# Patient Record
Sex: MALE | Race: WHITE | NOT HISPANIC OR LATINO | URBAN - METROPOLITAN AREA
[De-identification: names, ages, dates, MRNs, and addresses within clinical notes are randomized per-mention and may not be internally consistent; named-entity substitution may affect disease eponyms.]

---

## 2019-08-06 ENCOUNTER — INPATIENT (INPATIENT)
Facility: HOSPITAL | Age: 74
LOS: 19 days | Discharge: NURSING, REHAB OR LONG-TERM CARE | End: 2019-08-26
Attending: HOSPITALIST | Admitting: HOSPITALIST
Payer: MEDICARE

## 2019-08-06 VITALS
RESPIRATION RATE: 17 BRPM | HEART RATE: 93 BPM | OXYGEN SATURATION: 95 % | DIASTOLIC BLOOD PRESSURE: 77 MMHG | SYSTOLIC BLOOD PRESSURE: 127 MMHG | TEMPERATURE: 97 F

## 2019-08-06 DIAGNOSIS — K02.9 DENTAL CARIES, UNSPECIFIED: ICD-10-CM

## 2019-08-06 DIAGNOSIS — D72.829 ELEVATED WHITE BLOOD CELL COUNT, UNSPECIFIED: ICD-10-CM

## 2019-08-06 DIAGNOSIS — N50.89 OTHER SPECIFIED DISORDERS OF THE MALE GENITAL ORGANS: ICD-10-CM

## 2019-08-06 DIAGNOSIS — Z29.9 ENCOUNTER FOR PROPHYLACTIC MEASURES, UNSPECIFIED: ICD-10-CM

## 2019-08-06 DIAGNOSIS — F99 MENTAL DISORDER, NOT OTHERWISE SPECIFIED: ICD-10-CM

## 2019-08-06 DIAGNOSIS — M62.82 RHABDOMYOLYSIS: ICD-10-CM

## 2019-08-06 LAB
ALBUMIN SERPL ELPH-MCNC: 4.1 G/DL — SIGNIFICANT CHANGE UP (ref 3.3–5)
ALP SERPL-CCNC: 81 U/L — SIGNIFICANT CHANGE UP (ref 40–120)
ALT FLD-CCNC: 34 U/L — SIGNIFICANT CHANGE UP (ref 4–41)
ANION GAP SERPL CALC-SCNC: 12 MMO/L — SIGNIFICANT CHANGE UP (ref 7–14)
ANION GAP SERPL CALC-SCNC: 16 MMO/L — HIGH (ref 7–14)
APAP SERPL-MCNC: < 15 UG/ML — LOW (ref 15–25)
APPEARANCE UR: CLEAR — SIGNIFICANT CHANGE UP
APTT BLD: 26.2 SEC — LOW (ref 27.5–36.3)
AST SERPL-CCNC: 78 U/L — HIGH (ref 4–40)
BACTERIA # UR AUTO: NEGATIVE — SIGNIFICANT CHANGE UP
BASE EXCESS BLDV CALC-SCNC: 3.2 MMOL/L — SIGNIFICANT CHANGE UP
BASE EXCESS BLDV CALC-SCNC: 4.1 MMOL/L — SIGNIFICANT CHANGE UP
BASOPHILS # BLD AUTO: 0.06 K/UL — SIGNIFICANT CHANGE UP (ref 0–0.2)
BASOPHILS # BLD AUTO: 0.07 K/UL — SIGNIFICANT CHANGE UP (ref 0–0.2)
BASOPHILS NFR BLD AUTO: 0.2 % — SIGNIFICANT CHANGE UP (ref 0–2)
BASOPHILS NFR BLD AUTO: 0.4 % — SIGNIFICANT CHANGE UP (ref 0–2)
BASOPHILS NFR SPEC: 1.7 % — SIGNIFICANT CHANGE UP (ref 0–2)
BILIRUB SERPL-MCNC: 0.7 MG/DL — SIGNIFICANT CHANGE UP (ref 0.2–1.2)
BILIRUB UR-MCNC: NEGATIVE — SIGNIFICANT CHANGE UP
BLASTS # FLD: 0 % — SIGNIFICANT CHANGE UP (ref 0–0)
BLOOD GAS VENOUS - CREATININE: 0.75 MG/DL — SIGNIFICANT CHANGE UP (ref 0.5–1.3)
BLOOD GAS VENOUS - CREATININE: 0.84 MG/DL — SIGNIFICANT CHANGE UP (ref 0.5–1.3)
BLOOD UR QL VISUAL: HIGH
BUN SERPL-MCNC: 22 MG/DL — SIGNIFICANT CHANGE UP (ref 7–23)
BUN SERPL-MCNC: 25 MG/DL — HIGH (ref 7–23)
CALCIUM SERPL-MCNC: 8.7 MG/DL — SIGNIFICANT CHANGE UP (ref 8.4–10.5)
CALCIUM SERPL-MCNC: 9.7 MG/DL — SIGNIFICANT CHANGE UP (ref 8.4–10.5)
CHLORIDE BLDV-SCNC: 102 MMOL/L — SIGNIFICANT CHANGE UP (ref 96–108)
CHLORIDE BLDV-SCNC: 105 MMOL/L — SIGNIFICANT CHANGE UP (ref 96–108)
CHLORIDE SERPL-SCNC: 102 MMOL/L — SIGNIFICANT CHANGE UP (ref 98–107)
CHLORIDE SERPL-SCNC: 98 MMOL/L — SIGNIFICANT CHANGE UP (ref 98–107)
CK MB BLD-MCNC: 1.4 — SIGNIFICANT CHANGE UP (ref 0–2.5)
CK MB BLD-MCNC: 45.23 NG/ML — HIGH (ref 1–6.6)
CK SERPL-CCNC: 2450 U/L — HIGH (ref 30–200)
CK SERPL-CCNC: 3311 U/L — HIGH (ref 30–200)
CO2 SERPL-SCNC: 25 MMOL/L — SIGNIFICANT CHANGE UP (ref 22–31)
CO2 SERPL-SCNC: 26 MMOL/L — SIGNIFICANT CHANGE UP (ref 22–31)
COLOR SPEC: YELLOW — SIGNIFICANT CHANGE UP
CREAT SERPL-MCNC: 0.78 MG/DL — SIGNIFICANT CHANGE UP (ref 0.5–1.3)
CREAT SERPL-MCNC: 0.82 MG/DL — SIGNIFICANT CHANGE UP (ref 0.5–1.3)
EOSINOPHIL # BLD AUTO: 0.01 K/UL — SIGNIFICANT CHANGE UP (ref 0–0.5)
EOSINOPHIL # BLD AUTO: 0.02 K/UL — SIGNIFICANT CHANGE UP (ref 0–0.5)
EOSINOPHIL NFR BLD AUTO: 0 % — SIGNIFICANT CHANGE UP (ref 0–6)
EOSINOPHIL NFR BLD AUTO: 0.1 % — SIGNIFICANT CHANGE UP (ref 0–6)
EOSINOPHIL NFR FLD: 0 % — SIGNIFICANT CHANGE UP (ref 0–6)
ETHANOL BLD-MCNC: < 10 MG/DL — SIGNIFICANT CHANGE UP
FIBRINOGEN PPP-MCNC: 542 MG/DL — HIGH (ref 350–510)
GAS PNL BLDV: 133 MMOL/L — LOW (ref 136–146)
GAS PNL BLDV: 136 MMOL/L — SIGNIFICANT CHANGE UP (ref 136–146)
GLUCOSE BLDV-MCNC: 110 MG/DL — HIGH (ref 70–99)
GLUCOSE BLDV-MCNC: 149 MG/DL — HIGH (ref 70–99)
GLUCOSE SERPL-MCNC: 118 MG/DL — HIGH (ref 70–99)
GLUCOSE SERPL-MCNC: 138 MG/DL — HIGH (ref 70–99)
GLUCOSE UR-MCNC: NEGATIVE — SIGNIFICANT CHANGE UP
HBA1C BLD-MCNC: 5.5 % — SIGNIFICANT CHANGE UP (ref 4–5.6)
HCO3 BLDV-SCNC: 27 MMOL/L — SIGNIFICANT CHANGE UP (ref 20–27)
HCO3 BLDV-SCNC: 27 MMOL/L — SIGNIFICANT CHANGE UP (ref 20–27)
HCT VFR BLD CALC: 36.2 % — LOW (ref 39–50)
HCT VFR BLD CALC: 41.4 % — SIGNIFICANT CHANGE UP (ref 39–50)
HCT VFR BLDV CALC: 38.5 % — LOW (ref 39–51)
HCT VFR BLDV CALC: 42.5 % — SIGNIFICANT CHANGE UP (ref 39–51)
HGB BLD-MCNC: 11.6 G/DL — LOW (ref 13–17)
HGB BLD-MCNC: 13.5 G/DL — SIGNIFICANT CHANGE UP (ref 13–17)
HGB BLDV-MCNC: 12.5 G/DL — LOW (ref 13–17)
HGB BLDV-MCNC: 13.8 G/DL — SIGNIFICANT CHANGE UP (ref 13–17)
HYALINE CASTS # UR AUTO: NEGATIVE — SIGNIFICANT CHANGE UP
IMM GRANULOCYTES NFR BLD AUTO: 0.5 % — SIGNIFICANT CHANGE UP (ref 0–1.5)
IMM GRANULOCYTES NFR BLD AUTO: 0.7 % — SIGNIFICANT CHANGE UP (ref 0–1.5)
INR BLD: 1.35 — HIGH (ref 0.88–1.17)
KETONES UR-MCNC: >150 — HIGH
LACTATE BLDV-MCNC: 2.6 MMOL/L — HIGH (ref 0.5–2)
LACTATE BLDV-MCNC: 2.7 MMOL/L — HIGH (ref 0.5–2)
LEUKOCYTE ESTERASE UR-ACNC: NEGATIVE — SIGNIFICANT CHANGE UP
LYMPHOCYTES # BLD AUTO: 0.68 K/UL — LOW (ref 1–3.3)
LYMPHOCYTES # BLD AUTO: 0.94 K/UL — LOW (ref 1–3.3)
LYMPHOCYTES # BLD AUTO: 2.7 % — LOW (ref 13–44)
LYMPHOCYTES # BLD AUTO: 5.1 % — LOW (ref 13–44)
LYMPHOCYTES NFR SPEC AUTO: 2.5 % — LOW (ref 13–44)
MAGNESIUM SERPL-MCNC: 1.8 MG/DL — SIGNIFICANT CHANGE UP (ref 1.6–2.6)
MAGNESIUM SERPL-MCNC: 2 MG/DL — SIGNIFICANT CHANGE UP (ref 1.6–2.6)
MCHC RBC-ENTMCNC: 31.8 PG — SIGNIFICANT CHANGE UP (ref 27–34)
MCHC RBC-ENTMCNC: 32 % — SIGNIFICANT CHANGE UP (ref 32–36)
MCHC RBC-ENTMCNC: 32.2 PG — SIGNIFICANT CHANGE UP (ref 27–34)
MCHC RBC-ENTMCNC: 32.6 % — SIGNIFICANT CHANGE UP (ref 32–36)
MCV RBC AUTO: 98.8 FL — SIGNIFICANT CHANGE UP (ref 80–100)
MCV RBC AUTO: 99.2 FL — SIGNIFICANT CHANGE UP (ref 80–100)
METAMYELOCYTES # FLD: 0 % — SIGNIFICANT CHANGE UP (ref 0–1)
MONOCYTES # BLD AUTO: 1.64 K/UL — HIGH (ref 0–0.9)
MONOCYTES # BLD AUTO: 1.92 K/UL — HIGH (ref 0–0.9)
MONOCYTES NFR BLD AUTO: 7.7 % — SIGNIFICANT CHANGE UP (ref 2–14)
MONOCYTES NFR BLD AUTO: 8.8 % — SIGNIFICANT CHANGE UP (ref 2–14)
MONOCYTES NFR BLD: 8.4 % — SIGNIFICANT CHANGE UP (ref 2–9)
MYELOCYTES NFR BLD: 0 % — SIGNIFICANT CHANGE UP (ref 0–0)
NEUTROPHIL AB SER-ACNC: 84.9 % — HIGH (ref 43–77)
NEUTROPHILS # BLD AUTO: 15.82 K/UL — HIGH (ref 1.8–7.4)
NEUTROPHILS # BLD AUTO: 22.11 K/UL — HIGH (ref 1.8–7.4)
NEUTROPHILS NFR BLD AUTO: 85.1 % — HIGH (ref 43–77)
NEUTROPHILS NFR BLD AUTO: 88.7 % — HIGH (ref 43–77)
NEUTS BAND # BLD: 0 % — SIGNIFICANT CHANGE UP (ref 0–6)
NITRITE UR-MCNC: NEGATIVE — SIGNIFICANT CHANGE UP
NRBC # FLD: 0 K/UL — SIGNIFICANT CHANGE UP (ref 0–0)
NRBC # FLD: 0 K/UL — SIGNIFICANT CHANGE UP (ref 0–0)
OTHER - HEMATOLOGY %: 0 — SIGNIFICANT CHANGE UP
PCO2 BLDV: 44 MMHG — SIGNIFICANT CHANGE UP (ref 41–51)
PCO2 BLDV: 45 MMHG — SIGNIFICANT CHANGE UP (ref 41–51)
PH BLDV: 7.41 PH — SIGNIFICANT CHANGE UP (ref 7.32–7.43)
PH BLDV: 7.42 PH — SIGNIFICANT CHANGE UP (ref 7.32–7.43)
PH UR: 6 — SIGNIFICANT CHANGE UP (ref 5–8)
PHOSPHATE SERPL-MCNC: 3 MG/DL — SIGNIFICANT CHANGE UP (ref 2.5–4.5)
PLATELET # BLD AUTO: 205 K/UL — SIGNIFICANT CHANGE UP (ref 150–400)
PLATELET # BLD AUTO: 262 K/UL — SIGNIFICANT CHANGE UP (ref 150–400)
PLATELET COUNT - ESTIMATE: NORMAL — SIGNIFICANT CHANGE UP
PMV BLD: 10.6 FL — SIGNIFICANT CHANGE UP (ref 7–13)
PMV BLD: 10.8 FL — SIGNIFICANT CHANGE UP (ref 7–13)
PO2 BLDV: 34 MMHG — LOW (ref 35–40)
PO2 BLDV: 51 MMHG — HIGH (ref 35–40)
POTASSIUM BLDV-SCNC: 3.6 MMOL/L — SIGNIFICANT CHANGE UP (ref 3.4–4.5)
POTASSIUM BLDV-SCNC: 3.6 MMOL/L — SIGNIFICANT CHANGE UP (ref 3.4–4.5)
POTASSIUM SERPL-MCNC: 3.7 MMOL/L — SIGNIFICANT CHANGE UP (ref 3.5–5.3)
POTASSIUM SERPL-MCNC: 3.9 MMOL/L — SIGNIFICANT CHANGE UP (ref 3.5–5.3)
POTASSIUM SERPL-SCNC: 3.7 MMOL/L — SIGNIFICANT CHANGE UP (ref 3.5–5.3)
POTASSIUM SERPL-SCNC: 3.9 MMOL/L — SIGNIFICANT CHANGE UP (ref 3.5–5.3)
PROMYELOCYTES # FLD: 0 % — SIGNIFICANT CHANGE UP (ref 0–0)
PROT SERPL-MCNC: 7.3 G/DL — SIGNIFICANT CHANGE UP (ref 6–8.3)
PROT UR-MCNC: 70 — SIGNIFICANT CHANGE UP
PROTHROM AB SERPL-ACNC: 15.5 SEC — HIGH (ref 9.8–13.1)
RBC # BLD: 3.65 M/UL — LOW (ref 4.2–5.8)
RBC # BLD: 4.19 M/UL — LOW (ref 4.2–5.8)
RBC # FLD: 12.3 % — SIGNIFICANT CHANGE UP (ref 10.3–14.5)
RBC # FLD: 12.4 % — SIGNIFICANT CHANGE UP (ref 10.3–14.5)
RBC CASTS # UR COMP ASSIST: SIGNIFICANT CHANGE UP (ref 0–?)
REVIEW TO FOLLOW: YES — SIGNIFICANT CHANGE UP
SALICYLATES SERPL-MCNC: < 5 MG/DL — LOW (ref 15–30)
SAO2 % BLDV: 62.6 % — SIGNIFICANT CHANGE UP (ref 60–85)
SAO2 % BLDV: 85.8 % — HIGH (ref 60–85)
SODIUM SERPL-SCNC: 139 MMOL/L — SIGNIFICANT CHANGE UP (ref 135–145)
SODIUM SERPL-SCNC: 140 MMOL/L — SIGNIFICANT CHANGE UP (ref 135–145)
SP GR SPEC: 1.03 — SIGNIFICANT CHANGE UP (ref 1–1.04)
SQUAMOUS # UR AUTO: SIGNIFICANT CHANGE UP
TROPONIN T, HIGH SENSITIVITY: 21 NG/L — SIGNIFICANT CHANGE UP (ref ?–14)
TROPONIN T, HIGH SENSITIVITY: 27 NG/L — SIGNIFICANT CHANGE UP (ref ?–14)
TSH SERPL-MCNC: 0.89 UIU/ML — SIGNIFICANT CHANGE UP (ref 0.27–4.2)
URATE SERPL-MCNC: 5.8 MG/DL — SIGNIFICANT CHANGE UP (ref 3.4–8.8)
UROBILINOGEN FLD QL: NORMAL — SIGNIFICANT CHANGE UP
VARIANT LYMPHS # BLD: 2.5 % — SIGNIFICANT CHANGE UP
WBC # BLD: 18.58 K/UL — HIGH (ref 3.8–10.5)
WBC # BLD: 24.95 K/UL — HIGH (ref 3.8–10.5)
WBC # FLD AUTO: 18.58 K/UL — HIGH (ref 3.8–10.5)
WBC # FLD AUTO: 24.95 K/UL — HIGH (ref 3.8–10.5)
WBC UR QL: SIGNIFICANT CHANGE UP (ref 0–?)

## 2019-08-06 PROCEDURE — 71045 X-RAY EXAM CHEST 1 VIEW: CPT | Mod: 26

## 2019-08-06 PROCEDURE — 71260 CT THORAX DX C+: CPT | Mod: 26

## 2019-08-06 PROCEDURE — 70450 CT HEAD/BRAIN W/O DYE: CPT | Mod: 26

## 2019-08-06 PROCEDURE — 99223 1ST HOSP IP/OBS HIGH 75: CPT

## 2019-08-06 PROCEDURE — 70487 CT MAXILLOFACIAL W/DYE: CPT | Mod: 26

## 2019-08-06 PROCEDURE — 74177 CT ABD & PELVIS W/CONTRAST: CPT | Mod: 26

## 2019-08-06 PROCEDURE — 76870 US EXAM SCROTUM: CPT | Mod: 26

## 2019-08-06 PROCEDURE — 72170 X-RAY EXAM OF PELVIS: CPT | Mod: 26

## 2019-08-06 RX ORDER — SODIUM CHLORIDE 9 MG/ML
1000 INJECTION INTRAMUSCULAR; INTRAVENOUS; SUBCUTANEOUS
Refills: 0 | Status: DISCONTINUED | OUTPATIENT
Start: 2019-08-06 | End: 2019-08-07

## 2019-08-06 RX ORDER — PIPERACILLIN AND TAZOBACTAM 4; .5 G/20ML; G/20ML
3.38 INJECTION, POWDER, LYOPHILIZED, FOR SOLUTION INTRAVENOUS ONCE
Refills: 0 | Status: COMPLETED | OUTPATIENT
Start: 2019-08-06 | End: 2019-08-06

## 2019-08-06 RX ORDER — PIPERACILLIN AND TAZOBACTAM 4; .5 G/20ML; G/20ML
3.38 INJECTION, POWDER, LYOPHILIZED, FOR SOLUTION INTRAVENOUS EVERY 8 HOURS
Refills: 0 | Status: DISCONTINUED | OUTPATIENT
Start: 2019-08-06 | End: 2019-08-06

## 2019-08-06 RX ORDER — LACTOBACILLUS ACIDOPHILUS 100MM CELL
1 CAPSULE ORAL EVERY 12 HOURS
Refills: 0 | Status: DISCONTINUED | OUTPATIENT
Start: 2019-08-06 | End: 2019-08-06

## 2019-08-06 RX ORDER — VANCOMYCIN HCL 1 G
1000 VIAL (EA) INTRAVENOUS EVERY 12 HOURS
Refills: 0 | Status: DISCONTINUED | OUTPATIENT
Start: 2019-08-06 | End: 2019-08-07

## 2019-08-06 RX ORDER — PIPERACILLIN AND TAZOBACTAM 4; .5 G/20ML; G/20ML
3.38 INJECTION, POWDER, LYOPHILIZED, FOR SOLUTION INTRAVENOUS EVERY 8 HOURS
Refills: 0 | Status: DISCONTINUED | OUTPATIENT
Start: 2019-08-07 | End: 2019-08-07

## 2019-08-06 RX ORDER — SODIUM CHLORIDE 9 MG/ML
1000 INJECTION, SOLUTION INTRAVENOUS ONCE
Refills: 0 | Status: COMPLETED | OUTPATIENT
Start: 2019-08-06 | End: 2019-08-06

## 2019-08-06 RX ORDER — SODIUM CHLORIDE 9 MG/ML
2000 INJECTION INTRAMUSCULAR; INTRAVENOUS; SUBCUTANEOUS ONCE
Refills: 0 | Status: COMPLETED | OUTPATIENT
Start: 2019-08-06 | End: 2019-08-06

## 2019-08-06 RX ADMIN — SODIUM CHLORIDE 2000 MILLILITER(S): 9 INJECTION INTRAMUSCULAR; INTRAVENOUS; SUBCUTANEOUS at 15:56

## 2019-08-06 RX ADMIN — SODIUM CHLORIDE 150 MILLILITER(S): 9 INJECTION INTRAMUSCULAR; INTRAVENOUS; SUBCUTANEOUS at 22:19

## 2019-08-06 RX ADMIN — SODIUM CHLORIDE 1000 MILLILITER(S): 9 INJECTION, SOLUTION INTRAVENOUS at 14:32

## 2019-08-06 RX ADMIN — PIPERACILLIN AND TAZOBACTAM 200 GRAM(S): 4; .5 INJECTION, POWDER, LYOPHILIZED, FOR SOLUTION INTRAVENOUS at 22:38

## 2019-08-06 NOTE — ED ADULT NURSE NOTE - OBJECTIVE STATEMENT
Patient states that  he fell at home on Sunday and he could not get himself up. patient has multiple abrasions, right ear noted bleeding, right eye  swelling. Right  chest multiple pressure sores.

## 2019-08-06 NOTE — H&P ADULT - PROBLEM SELECTOR PLAN 2
ID consult in AM, RVP, R/O Pneumonia, + Cough, R/O Aspiration Pneumonia, R/O Cellulitis Face, Blood cultures, + Skin Erythema and Scab in Lower Abdomen R/O Cellulitis, Wound Consult, IV Vanco, IV Zosyn for now,    Hep A, B, C  profile,

## 2019-08-06 NOTE — H&P ADULT - SKIN COMMENTS
Skin: 6cm abrasion over R pectoral region with other areas of closed, stippled pink edema over thorax
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
## 2019-08-06 NOTE — ED PROVIDER NOTE - PROGRESS NOTE DETAILS
Peyman PGY3: PMD Frank [place]on [phone] Maryann PGY3: ekg nsr pt with rhabdo fluids given dental paged for tooth fx awaiting callback will admit

## 2019-08-06 NOTE — H&P ADULT - MUSCULOSKELETAL
details… detailed exam decreased ROM/no joint warmth/no calf tenderness/no joint swelling/no joint erythema/diminished strength
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
## 2019-08-06 NOTE — H&P ADULT - REASON FOR ADMISSION
S/P fall, Rhabdo, R/O Pneumonia, Leukocytosis, S/P fall, Rhabdo, R/O Pneumonia, Leukocytosis, Cough, R/O Cellulitis Face/lower Abdomen

## 2019-08-06 NOTE — ED ADULT NURSE NOTE - NSIMPLEMENTINTERV_GEN_ALL_ED
Implemented All Fall Risk Interventions:  [place] to call system. Call bell, personal items and telephone within reach. Instruct patient to call for assistance. Room bathroom lighting operational. Non-slip footwear when patient is off stretcher. Physically safe environment: no spills, clutter or unnecessary equipment. Stretcher in lowest position, wheels locked, appropriate side rails in place. Provide visual cue, wrist band, yellow gown, etc. Monitor gait and stability. Monitor for mental status changes and reorient to person, place, and time. Review medications for side effects contributing to fall risk. Reinforce activity limits and safety measures with patient and family.

## 2019-08-06 NOTE — H&P ADULT - PROBLEM SELECTOR PLAN 1
S/P Fall , elevated CPK, Tele monitor, ECHO, F/U CPK, Troponin, Fall/aspiration precaution, + weakness, cachectic, poor oral intake,   IVF NS @ 150 cc/hr for now, F/U CBC, CMP,   PT consult: weakness, Fatigue,   HIV Test, Iron studies, Vit B12, Folate, Ferritin, Decreased po intake, failure to thrive,    ECHO,

## 2019-08-06 NOTE — ED PROVIDER NOTE - OBJECTIVE STATEMENT
Patient reports falling off a stool on Sunday night around midnight and becoming pinned in position until rescued by EMS this afternoon. Denies drug or alcohol use prior to falling, and denies feeling lightheaded or dizzy prior. Patient is unsure what may have caused the fall other than a sense that his muscles have been "dysfunctional" lately. In the past month, he has noticed some muscle wasting, particularly in his arms, with 6-8 lbs of weight loss in this time. He has also noticed some fasciculations in his legs in the past month as well. He denies any family history of muscular pathology. He also denies seizures. Reports taking diazepam bid and flurazepam and no other medications. Of note, patient has known history of L scrotal hydrocele but has refused surgery. Patient reports falling off a stool 1.5 days prior to presentation around midnight and becoming pinned in position until rescued by EMS this afternoon. Denies drug or alcohol use prior to falling, and denies feeling lightheaded or dizzy prior. Patient is unsure what may have caused the fall other than a sense that his muscles have been "dysfunctional" lately. In the past month, he has noticed some muscle wasting, particularly in his arms, with 6-8 lbs of weight loss in this time. He has also noticed some fasciculations in his legs in the past month as well. He denies any family history of muscular pathology. He also denies seizures, chest pain, shortness of breath. Reports taking diazepam bid and flurazepam and no other medications. Of note, patient has known history of L scrotal hydrocele but has refused surgery. Patient reports falling off a stool 1.5 days prior to presentation around midnight and becoming pinned in position until rescued by EMS this afternoon. Denies drug or alcohol use prior to falling, and denies feeling lightheaded or dizzy prior. Patient is unsure what may have caused the fall other than a sense that his muscles have been "dysfunctional" lately. In the past month, he has noticed some muscle wasting, particularly in his arms, with 6-8 lbs of weight loss in this time. He has also noticed some fasciculations in his legs in the past month as well. He denies any family history of muscular pathology. He also denies seizures, chest pain, shortness of breath, nausea, and abdominal pain. Reports taking diazepam bid and flurazepam and no other medications. Of note, patient has known history of L scrotal hydrocele but has refused surgery. Patient reports falling off a stool 1.5 days prior to presentation around midnight and becoming pinned in position until rescued by EMS this afternoon. Denies drug or alcohol use prior to falling, and denies feeling lightheaded or dizzy prior. Patient is unsure what may have caused the fall other than a sense that his muscles have been "dysfunctional" lately. In the past month, he has noticed some muscle wasting, particularly in his arms, with 6-8 lbs of weight loss in this time. He has also noticed some fasciculations in his legs in the past month as well. He denies any family history of muscular pathology. He also denies seizures, chest pain, shortness of breath, nausea, and abdominal pain. Reports taking diazepam bid and flurazepam and no other medications. Of note, patient has known history of L scrotal hydrocele but has refused surgery.    Zamzam  ()

## 2019-08-06 NOTE — H&P ADULT - ENMT COMMENTS
R periorbital edema occluding vision, bleeding erythematous R pinna with crusting, 4cm abrasion over R temporal region, 4cm abrasion on R lower mandible with yellow/green exudate; moderate neck stiffness

## 2019-08-06 NOTE — ED ADULT NURSE REASSESSMENT NOTE - NS ED NURSE REASSESS COMMENT FT1
received pt A&Ox3 absent any distress or C/O pain. able to make needs know with care provided PRN. will continue with current plan of care PT swabbed for RVP with facemask placed pending results
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
## 2019-08-06 NOTE — ED PROVIDER NOTE - NS ED ROS FT
Gen: No fever, normal appetite  Eyes: +R eye swelling, no blurry vision  ENT: R ear pain, +congestion  Resp: No cough or trouble breathing  Cardiovascular: No chest pain or palpitation  Gastroenteric: No nausea/vomiting  : No dysuria  MS: +chronic low back pain  Skin: +abrasions, no rashes  Neuro: No headache; +thigh fasciculations  Remainder negative, except as per the HPI

## 2019-08-06 NOTE — ED PROVIDER NOTE - PHYSICAL EXAMINATION
Const:  Unkempt, cachectic man lying still in bed with multiple large abrasions and areas of edema, A+Ox3  HEENT: R periorbital edema occluding vision, bleeding erythematous R pinna with crusting, 4cm abrasion over R temporal region, 4cm abrasion on R lower mandible with yellow/green exudate; moderate neck stiffness   CV: Heart regular, normal S1/2, no murmurs  Pulm: Lungs clear to auscultation bilaterally  GI: Abdomen non-distended; No organomegaly, no tenderness, no masses  Pelvis/: Pelvic bones non-tender and non-mobile to palpation; large, full, non-erythematous L scrotal edema   Skin: 6cm abrasion over R pectoral region with other areas of closed, stippled pink edema over thorax  MSK: Able to move all extremities but with some stiffness on R side  Neuro: Alert; A+Ox3, following commands; language intact with no aphasia or speech slurring; short-term memory intact

## 2019-08-07 DIAGNOSIS — A41.9 SEPSIS, UNSPECIFIED ORGANISM: ICD-10-CM

## 2019-08-07 DIAGNOSIS — F05 DELIRIUM DUE TO KNOWN PHYSIOLOGICAL CONDITION: ICD-10-CM

## 2019-08-07 DIAGNOSIS — R13.10 DYSPHAGIA, UNSPECIFIED: ICD-10-CM

## 2019-08-07 DIAGNOSIS — J96.01 ACUTE RESPIRATORY FAILURE WITH HYPOXIA: ICD-10-CM

## 2019-08-07 LAB
ALBUMIN SERPL ELPH-MCNC: 3.3 G/DL — SIGNIFICANT CHANGE UP (ref 3.3–5)
ALP SERPL-CCNC: 64 U/L — SIGNIFICANT CHANGE UP (ref 40–120)
ALT FLD-CCNC: 32 U/L — SIGNIFICANT CHANGE UP (ref 4–41)
ANION GAP SERPL CALC-SCNC: 12 MMO/L — SIGNIFICANT CHANGE UP (ref 7–14)
APAP SERPL-MCNC: < 15 UG/ML — LOW (ref 15–25)
AST SERPL-CCNC: 60 U/L — HIGH (ref 4–40)
B PERT DNA SPEC QL NAA+PROBE: NOT DETECTED — SIGNIFICANT CHANGE UP
BACTERIA UR CULT: SIGNIFICANT CHANGE UP
BASE EXCESS BLDA CALC-SCNC: 0.3 MMOL/L — SIGNIFICANT CHANGE UP
BASOPHILS # BLD AUTO: 0.06 K/UL — SIGNIFICANT CHANGE UP (ref 0–0.2)
BASOPHILS NFR BLD AUTO: 0.4 % — SIGNIFICANT CHANGE UP (ref 0–2)
BILIRUB SERPL-MCNC: 0.8 MG/DL — SIGNIFICANT CHANGE UP (ref 0.2–1.2)
BLOOD GAS ARTERIAL - FIO2: 100 — SIGNIFICANT CHANGE UP
BUN SERPL-MCNC: 23 MG/DL — SIGNIFICANT CHANGE UP (ref 7–23)
C PNEUM DNA SPEC QL NAA+PROBE: NOT DETECTED — SIGNIFICANT CHANGE UP
CALCIUM SERPL-MCNC: 8.3 MG/DL — LOW (ref 8.4–10.5)
CHLORIDE BLDA-SCNC: 105 MMOL/L — SIGNIFICANT CHANGE UP (ref 96–108)
CHLORIDE SERPL-SCNC: 101 MMOL/L — SIGNIFICANT CHANGE UP (ref 98–107)
CHOLEST SERPL-MCNC: 127 MG/DL — SIGNIFICANT CHANGE UP (ref 120–199)
CK SERPL-CCNC: 1695 U/L — HIGH (ref 30–200)
CO2 SERPL-SCNC: 25 MMOL/L — SIGNIFICANT CHANGE UP (ref 22–31)
CREAT BLDA-MCNC: 0.88 MG/DL — SIGNIFICANT CHANGE UP (ref 0.5–1.3)
CREAT SERPL-MCNC: 0.83 MG/DL — SIGNIFICANT CHANGE UP (ref 0.5–1.3)
EOSINOPHIL # BLD AUTO: 0.04 K/UL — SIGNIFICANT CHANGE UP (ref 0–0.5)
EOSINOPHIL NFR BLD AUTO: 0.2 % — SIGNIFICANT CHANGE UP (ref 0–6)
ETHANOL BLD-MCNC: < 10 MG/DL — SIGNIFICANT CHANGE UP
FERRITIN SERPL-MCNC: 315.3 NG/ML — SIGNIFICANT CHANGE UP (ref 30–400)
FLUAV H1 2009 PAND RNA SPEC QL NAA+PROBE: NOT DETECTED — SIGNIFICANT CHANGE UP
FLUAV H1 RNA SPEC QL NAA+PROBE: NOT DETECTED — SIGNIFICANT CHANGE UP
FLUAV H3 RNA SPEC QL NAA+PROBE: NOT DETECTED — SIGNIFICANT CHANGE UP
FLUAV SUBTYP SPEC NAA+PROBE: NOT DETECTED — SIGNIFICANT CHANGE UP
FLUBV RNA SPEC QL NAA+PROBE: NOT DETECTED — SIGNIFICANT CHANGE UP
FOLATE SERPL-MCNC: 19.1 NG/ML — SIGNIFICANT CHANGE UP (ref 4.7–20)
GLUCOSE BLDA-MCNC: 133 MG/DL — HIGH (ref 70–99)
GLUCOSE BLDC GLUCOMTR-MCNC: 117 MG/DL — HIGH (ref 70–99)
GLUCOSE SERPL-MCNC: 128 MG/DL — HIGH (ref 70–99)
GRAM STN SPT: SIGNIFICANT CHANGE UP
HADV DNA SPEC QL NAA+PROBE: NOT DETECTED — SIGNIFICANT CHANGE UP
HAV IGM SER-ACNC: NONREACTIVE — SIGNIFICANT CHANGE UP
HBA1C BLD-MCNC: 5.4 % — SIGNIFICANT CHANGE UP (ref 4–5.6)
HBV CORE IGM SER-ACNC: NONREACTIVE — SIGNIFICANT CHANGE UP
HBV SURFACE AG SER-ACNC: NONREACTIVE — SIGNIFICANT CHANGE UP
HCO3 BLDA-SCNC: 25 MMOL/L — SIGNIFICANT CHANGE UP (ref 22–26)
HCOV PNL SPEC NAA+PROBE: SIGNIFICANT CHANGE UP
HCT VFR BLD CALC: 38 % — LOW (ref 39–50)
HCT VFR BLDA CALC: 35.7 % — LOW (ref 39–51)
HCV AB S/CO SERPL IA: 0.12 S/CO — SIGNIFICANT CHANGE UP (ref 0–0.99)
HCV AB SERPL-IMP: SIGNIFICANT CHANGE UP
HDLC SERPL-MCNC: 63 MG/DL — HIGH (ref 35–55)
HGB BLD-MCNC: 12.3 G/DL — LOW (ref 13–17)
HGB BLDA-MCNC: 11.6 G/DL — LOW (ref 13–17)
HIV 1+2 AB+HIV1 P24 AG SERPL QL IA: SIGNIFICANT CHANGE UP
HMPV RNA SPEC QL NAA+PROBE: NOT DETECTED — SIGNIFICANT CHANGE UP
HPIV1 RNA SPEC QL NAA+PROBE: NOT DETECTED — SIGNIFICANT CHANGE UP
HPIV2 RNA SPEC QL NAA+PROBE: NOT DETECTED — SIGNIFICANT CHANGE UP
HPIV3 RNA SPEC QL NAA+PROBE: NOT DETECTED — SIGNIFICANT CHANGE UP
HPIV4 RNA SPEC QL NAA+PROBE: NOT DETECTED — SIGNIFICANT CHANGE UP
IMM GRANULOCYTES NFR BLD AUTO: 0.4 % — SIGNIFICANT CHANGE UP (ref 0–1.5)
IRON SATN MFR SERPL: 15 UG/DL — LOW (ref 45–165)
IRON SATN MFR SERPL: 197 UG/DL — SIGNIFICANT CHANGE UP (ref 155–535)
LACTATE BLDA-SCNC: 1.3 MMOL/L — SIGNIFICANT CHANGE UP (ref 0.5–2)
LIPID PNL WITH DIRECT LDL SERPL: 56 MG/DL — SIGNIFICANT CHANGE UP
LYMPHOCYTES # BLD AUTO: 0.8 K/UL — LOW (ref 1–3.3)
LYMPHOCYTES # BLD AUTO: 4.9 % — LOW (ref 13–44)
MAGNESIUM SERPL-MCNC: 1.9 MG/DL — SIGNIFICANT CHANGE UP (ref 1.6–2.6)
MCHC RBC-ENTMCNC: 32.4 % — SIGNIFICANT CHANGE UP (ref 32–36)
MCHC RBC-ENTMCNC: 32.5 PG — SIGNIFICANT CHANGE UP (ref 27–34)
MCV RBC AUTO: 100.3 FL — HIGH (ref 80–100)
MONOCYTES # BLD AUTO: 1.44 K/UL — HIGH (ref 0–0.9)
MONOCYTES NFR BLD AUTO: 8.8 % — SIGNIFICANT CHANGE UP (ref 2–14)
NEUTROPHILS # BLD AUTO: 13.91 K/UL — HIGH (ref 1.8–7.4)
NEUTROPHILS NFR BLD AUTO: 85.3 % — HIGH (ref 43–77)
NRBC # FLD: 0 K/UL — SIGNIFICANT CHANGE UP (ref 0–0)
NT-PROBNP SERPL-SCNC: 740.2 PG/ML — SIGNIFICANT CHANGE UP
PCO2 BLDA: 32 MMHG — LOW (ref 35–48)
PH BLDA: 7.48 PH — HIGH (ref 7.35–7.45)
PHOSPHATE SERPL-MCNC: 2 MG/DL — LOW (ref 2.5–4.5)
PLATELET # BLD AUTO: 201 K/UL — SIGNIFICANT CHANGE UP (ref 150–400)
PMV BLD: 10.4 FL — SIGNIFICANT CHANGE UP (ref 7–13)
PO2 BLDA: 83 MMHG — SIGNIFICANT CHANGE UP (ref 83–108)
POTASSIUM BLDA-SCNC: 3 MMOL/L — LOW (ref 3.4–4.5)
POTASSIUM SERPL-MCNC: 3.5 MMOL/L — SIGNIFICANT CHANGE UP (ref 3.5–5.3)
POTASSIUM SERPL-SCNC: 3.5 MMOL/L — SIGNIFICANT CHANGE UP (ref 3.5–5.3)
PROT SERPL-MCNC: 6.1 G/DL — SIGNIFICANT CHANGE UP (ref 6–8.3)
RBC # BLD: 3.79 M/UL — LOW (ref 4.2–5.8)
RBC # FLD: 12.4 % — SIGNIFICANT CHANGE UP (ref 10.3–14.5)
RSV RNA SPEC QL NAA+PROBE: NOT DETECTED — SIGNIFICANT CHANGE UP
RV+EV RNA SPEC QL NAA+PROBE: NOT DETECTED — SIGNIFICANT CHANGE UP
SALICYLATES SERPL-MCNC: < 5 MG/DL — LOW (ref 15–30)
SAO2 % BLDA: 97.1 % — SIGNIFICANT CHANGE UP (ref 95–99)
SODIUM BLDA-SCNC: 133 MMOL/L — LOW (ref 136–146)
SODIUM SERPL-SCNC: 138 MMOL/L — SIGNIFICANT CHANGE UP (ref 135–145)
SPECIMEN SOURCE: SIGNIFICANT CHANGE UP
T4 FREE SERPL-MCNC: 1.25 NG/DL — SIGNIFICANT CHANGE UP (ref 0.9–1.8)
TRIGL SERPL-MCNC: 89 MG/DL — SIGNIFICANT CHANGE UP (ref 10–149)
TROPONIN T, HIGH SENSITIVITY: 25 NG/L — SIGNIFICANT CHANGE UP (ref ?–14)
TSH SERPL-MCNC: 1.03 UIU/ML — SIGNIFICANT CHANGE UP (ref 0.27–4.2)
UIBC SERPL-MCNC: 182.2 UG/DL — SIGNIFICANT CHANGE UP (ref 110–370)
VIT B12 SERPL-MCNC: 455 PG/ML — SIGNIFICANT CHANGE UP (ref 200–900)
WBC # BLD: 16.32 K/UL — HIGH (ref 3.8–10.5)
WBC # FLD AUTO: 16.32 K/UL — HIGH (ref 3.8–10.5)

## 2019-08-07 PROCEDURE — 99223 1ST HOSP IP/OBS HIGH 75: CPT | Mod: GC

## 2019-08-07 PROCEDURE — 90792 PSYCH DIAG EVAL W/MED SRVCS: CPT

## 2019-08-07 PROCEDURE — 99233 SBSQ HOSP IP/OBS HIGH 50: CPT

## 2019-08-07 RX ORDER — POTASSIUM CHLORIDE 20 MEQ
40 PACKET (EA) ORAL ONCE
Refills: 0 | Status: DISCONTINUED | OUTPATIENT
Start: 2019-08-07 | End: 2019-08-07

## 2019-08-07 RX ORDER — HEPARIN SODIUM 5000 [USP'U]/ML
5000 INJECTION INTRAVENOUS; SUBCUTANEOUS
Refills: 0 | Status: DISCONTINUED | OUTPATIENT
Start: 2019-08-07 | End: 2019-08-20

## 2019-08-07 RX ORDER — SODIUM CHLORIDE 9 MG/ML
3 INJECTION INTRAMUSCULAR; INTRAVENOUS; SUBCUTANEOUS EVERY 6 HOURS
Refills: 0 | Status: DISCONTINUED | OUTPATIENT
Start: 2019-08-07 | End: 2019-08-12

## 2019-08-07 RX ORDER — SODIUM CHLORIDE 9 MG/ML
1000 INJECTION INTRAMUSCULAR; INTRAVENOUS; SUBCUTANEOUS
Refills: 0 | Status: DISCONTINUED | OUTPATIENT
Start: 2019-08-07 | End: 2019-08-08

## 2019-08-07 RX ORDER — DIAZEPAM 5 MG
5 TABLET ORAL EVERY 6 HOURS
Refills: 0 | Status: DISCONTINUED | OUTPATIENT
Start: 2019-08-07 | End: 2019-08-07

## 2019-08-07 RX ORDER — DIAZEPAM 5 MG
5 TABLET ORAL
Refills: 0 | Status: DISCONTINUED | OUTPATIENT
Start: 2019-08-07 | End: 2019-08-07

## 2019-08-07 RX ORDER — LACTOBACILLUS ACIDOPHILUS 100MM CELL
1 CAPSULE ORAL EVERY 12 HOURS
Refills: 0 | Status: DISCONTINUED | OUTPATIENT
Start: 2019-08-07 | End: 2019-08-12

## 2019-08-07 RX ORDER — PIPERACILLIN AND TAZOBACTAM 4; .5 G/20ML; G/20ML
3.38 INJECTION, POWDER, LYOPHILIZED, FOR SOLUTION INTRAVENOUS EVERY 8 HOURS
Refills: 0 | Status: DISCONTINUED | OUTPATIENT
Start: 2019-08-07 | End: 2019-08-12

## 2019-08-07 RX ORDER — IPRATROPIUM/ALBUTEROL SULFATE 18-103MCG
3 AEROSOL WITH ADAPTER (GRAM) INHALATION EVERY 6 HOURS
Refills: 0 | Status: DISCONTINUED | OUTPATIENT
Start: 2019-08-07 | End: 2019-08-12

## 2019-08-07 RX ORDER — PIPERACILLIN AND TAZOBACTAM 4; .5 G/20ML; G/20ML
3.38 INJECTION, POWDER, LYOPHILIZED, FOR SOLUTION INTRAVENOUS ONCE
Refills: 0 | Status: DISCONTINUED | OUTPATIENT
Start: 2019-08-07 | End: 2019-08-07

## 2019-08-07 RX ORDER — SODIUM,POTASSIUM PHOSPHATES 278-250MG
1 POWDER IN PACKET (EA) ORAL THREE TIMES A DAY
Refills: 0 | Status: COMPLETED | OUTPATIENT
Start: 2019-08-07 | End: 2019-08-07

## 2019-08-07 RX ORDER — SODIUM CHLORIDE 9 MG/ML
500 INJECTION, SOLUTION INTRAVENOUS
Refills: 0 | Status: DISCONTINUED | OUTPATIENT
Start: 2019-08-07 | End: 2019-08-08

## 2019-08-07 RX ORDER — POTASSIUM CHLORIDE 20 MEQ
10 PACKET (EA) ORAL
Refills: 0 | Status: COMPLETED | OUTPATIENT
Start: 2019-08-07 | End: 2019-08-07

## 2019-08-07 RX ORDER — ACETAMINOPHEN 500 MG
1000 TABLET ORAL ONCE
Refills: 0 | Status: COMPLETED | OUTPATIENT
Start: 2019-08-07 | End: 2019-08-07

## 2019-08-07 RX ORDER — VANCOMYCIN HCL 1 G
1000 VIAL (EA) INTRAVENOUS ONCE
Refills: 0 | Status: COMPLETED | OUTPATIENT
Start: 2019-08-07 | End: 2019-08-07

## 2019-08-07 RX ADMIN — SODIUM CHLORIDE 3 MILLILITER(S): 9 INJECTION INTRAMUSCULAR; INTRAVENOUS; SUBCUTANEOUS at 22:46

## 2019-08-07 RX ADMIN — HEPARIN SODIUM 5000 UNIT(S): 5000 INJECTION INTRAVENOUS; SUBCUTANEOUS at 18:59

## 2019-08-07 RX ADMIN — Medication 100 MILLIEQUIVALENT(S): at 18:55

## 2019-08-07 RX ADMIN — Medication 1 MILLIGRAM(S): at 18:55

## 2019-08-07 RX ADMIN — Medication 3 MILLILITER(S): at 16:44

## 2019-08-07 RX ADMIN — PIPERACILLIN AND TAZOBACTAM 25 GRAM(S): 4; .5 INJECTION, POWDER, LYOPHILIZED, FOR SOLUTION INTRAVENOUS at 16:41

## 2019-08-07 RX ADMIN — PIPERACILLIN AND TAZOBACTAM 25 GRAM(S): 4; .5 INJECTION, POWDER, LYOPHILIZED, FOR SOLUTION INTRAVENOUS at 23:04

## 2019-08-07 RX ADMIN — Medication 250 MILLIGRAM(S): at 03:05

## 2019-08-07 RX ADMIN — Medication 3 MILLILITER(S): at 22:46

## 2019-08-07 RX ADMIN — Medication 400 MILLIGRAM(S): at 14:23

## 2019-08-07 RX ADMIN — SODIUM CHLORIDE 75 MILLILITER(S): 9 INJECTION INTRAMUSCULAR; INTRAVENOUS; SUBCUTANEOUS at 21:10

## 2019-08-07 RX ADMIN — Medication 100 MILLIEQUIVALENT(S): at 16:51

## 2019-08-07 RX ADMIN — PIPERACILLIN AND TAZOBACTAM 25 GRAM(S): 4; .5 INJECTION, POWDER, LYOPHILIZED, FOR SOLUTION INTRAVENOUS at 06:12

## 2019-08-07 RX ADMIN — Medication 1000 MILLIGRAM(S): at 14:45

## 2019-08-07 NOTE — BEHAVIORAL HEALTH ASSESSMENT NOTE - OTHER
cachectic on NRB unable to assess due to ?AMS in setting of respiratory distress. unable to complete evaluation due to ?AMS in setting of respiratory distress. unable to fully assess due to ?AMS in setting of respiratory distress.

## 2019-08-07 NOTE — CONSULT NOTE ADULT - SUBJECTIVE AND OBJECTIVE BOX
CHIEF COMPLAINT:     HPI:  73 y/o male with possible HX of Psych disorder , lives alone, very poor historian, unkempt and with very poor  hygiene , missing teeth,  Dental caries, on PO Benzo at home, denies ETOH and smoking,  reports falling off a stool 1.5 days prior to presentation around midnight and becoming pinned in position until rescued by EMS this afternoon. Denies drug or alcohol use prior to falling, and denies feeling lightheaded or dizzy prior. Patient is unsure what may have caused the fall other than a sense that his muscles have been "dysfunctional" lately. In the past month, he has noticed some muscle wasting, particularly in his arms, with 6-8 lbs of weight loss in this time. He has also noticed some fasciculations in his legs in the past month as well. He denies any family history of muscular pathology. He also denies seizures, chest pain, shortness of breath, nausea, and abdominal pain. Reports taking diazepam bid and flurazepam at nights and no other medications. Of note, patient has known history of L scrotal hydrocele but has refused surgery. Pt awake, A+O x 3, C/O + Productive cough as well, no fever, no dysuria, no N/V, No HA, no abdominal pain, + weakness, + cachectic with B/L Temporal wasting , Zamzam  (girl friend),     S/P CT Chest/Abdomen/pelvis as below:   < from: CT Abdomen and Pelvis w/ IV Cont (19 @ 16:47) >  IMPRESSION: A few right lower lobe ground glass opacities of uncertain   etiology. No evidence of solid organ injury or acute fracture.    S/P CT Maxillofacial as Below:     < from: CT Maxillofacial w/ IV Cont (19 @ 16:47) >  IMPRESSION:    Discontinuous appearance of the left lower medial incisor which appears   displaced posteriorly suggesting an avulsed tooth/alveolar fracture.   Clinical correlation is advised. Poor dentition with multiple missing   teeth. Right facial and external auditory canal soft tissue swelling likely   posttraumatic in nature however cellulitis/otitis externa is not excluded.    CT Head: unremarkable, , X ray: Pelvis No FX,     RVP was sent , started the pt on IV Zosyn, R/O Pneumonia, Elevated CPK 3311, + Rhabdo as well, on IVF NS @ 150 cc/hr, S/P IVF LR Bolus x 1 lit, and IVF Ns x 2 Lit Bolus, Dental consult was requested, NPO for now, pt was placed on TELE ,    Labs: PT 15.5, INR 1.35, PTT 26.2, Fibrinogen assay 542, Lactate 2.7, CPK 3311, Mg 2, Na 140, K+ 3.9, BUN 25, Creatinine 0.82, Glucose 138, AST 78, Troponin HS 21, WBC 24.95, Hgb 13.5, Platelet 262, UA: Large Blood , Ketone > 150,     Vitals: Tem 98.5, HR 87, /66, RR 16, 95 % RA, (06 Aug 2019 20:44)    MICU consult for hypoxic respiratory failure:       Consult for hypoxic respiratory failure:   In the ED pt suddenly developed acute hypoxia to 83% on RA and 91% on NRB w a RR of 23. Pt spiked a fever to 101.3 and was tachycardic (NSR) to low 100's.  Pt had gotten a total of 3L of fluids before hypoxia for rhabdo as well as 1g vanc and 2 doses of zosyn for empiric tx of sepsis w/ blood and urine cultures pending, RVP and U/A neg for infection; pt had been pan-scanned and chest CT revealed R lower lobe ground glass opacities.      Vitals on presentation were as follows: 122/55 (70)  (NSR), 97% on NRB w/ RR 23  On physical exam patient had decreased breath sounds in LLL and was endorsing cough w/ sputum production.  POCUS revealed dynamic air bronchograms in LLL consolidation.        FAMILY HISTORY:  FHx: stomach cancer      SOCIAL HISTORY:  Smoking: __ packs x ___ years  EtOH Use:  Marital Status:  Occupation:  Recent Travel:  Country of Birth:  Advance Directives:    Allergies    No Known Allergies    Intolerances        HOME MEDICATIONS:    REVIEW OF SYSTEMS:  Constitutional: feels warm  Eyes: has discharge from eyes bilaterally   ENT: no sore throat but does endorse cough w/ green sputum production   CV: no chest pain  Resp: + SOB  GI: no abdominal pain, no N/V/D/C  : no dysuria   MSK: R upper humerus in pain   Integumentary: multiple skin lesions from fall throughout body   Neurological: AAO x 3   Psychiatric: disheveled and hard to understand  [ ] All other systems negative  [ ] Unable to assess ROS because ________    OBJECTIVE:  ICU Vital Signs Last 24 Hrs  T(C): 38.5 (07 Aug 2019 13:51), Max: 38.5 (07 Aug 2019 13:51)  T(F): 101.3 (07 Aug 2019 13:51), Max: 101.3 (07 Aug 2019 13:51)  HR: 96 (07 Aug 2019 16:00) (59 - 101)  BP: 121/74 (07 Aug 2019 16:00) (108/59 - 134/66)  BP(mean): 86 (07 Aug 2019 16:00) (86 - 86)  ABP: --  ABP(mean): --  RR: 17 (07 Aug 2019 16:00) (16 - 22)  SpO2: 93% (07 Aug 2019 16:00) (91% - 99%)         @ 07:01  -  07 @ 07:00  --------------------------------------------------------  IN: 0 mL / OUT: 120 mL / NET: -120 mL      CAPILLARY BLOOD GLUCOSE      POCT Blood Glucose.: 119 mg/dL (07 Aug 2019 13:24)      PHYSICAL EXAM:  GENERAL: disheveled, unkempt  HEAD:  Atraumatic, Normocephalic  EYES: EOMI, PERRLA, conjunctiva and sclera clear  NECK: Supple, No JVD  CHEST/LUNG: Clear to auscultation bilaterally; No wheeze  HEART: Regular rate and rhythm; No murmurs, rubs, or gallops  ABDOMEN: Soft, Nontender, Nondistended; Bowel sounds present  EXTREMITIES:  2+ Peripheral Pulses, No clubbing, cyanosis, or edema  PSYCH: AAOx3  NEUROLOGY: non-focal  SKIN: No rashes or lesions      HOSPITAL MEDICATIONS:  MEDICATIONS  (STANDING):  ALBUTerol/ipratropium for Nebulization. 3 milliLiter(s) Nebulizer every 6 hours  heparin  Injectable 5000 Unit(s) SubCutaneous two times a day  lactated ringers. 500 milliLiter(s) (100 mL/Hr) IV Continuous <Continuous>  lactobacillus acidophilus 1 Tablet(s) Oral every 12 hours  LORazepam   Injectable 1 milliGRAM(s) IV Push two times a day  piperacillin/tazobactam IVPB.. 3.375 Gram(s) IV Intermittent every 8 hours  potassium chloride  10 mEq/100 mL IVPB 10 milliEquivalent(s) IV Intermittent every 1 hour  potassium phosphate / sodium phosphate powder 1 Packet(s) Oral three times a day  sodium chloride 0.9%. 1000 milliLiter(s) (75 mL/Hr) IV Continuous <Continuous>  sodium chloride 3%  Inhalation 3 milliLiter(s) Inhalation every 6 hours    MEDICATIONS  (PRN):      LABS:                        12.3   16.32 )-----------( 201      ( 07 Aug 2019 05:38 )             38.0     08-07    138  |  101  |  23  ----------------------------<  128<H>  3.5   |  25  |  0.83    Ca    8.3<L>      07 Aug 2019 05:38  Phos  2.0     08-  Mg     1.9     -    TPro  6.1  /  Alb  3.3  /  TBili  0.8  /  DBili  x   /  AST  60<H>  /  ALT  32  /  AlkPhos  64  08-07    PT/INR - ( 06 Aug 2019 16:30 )   PT: 15.5 SEC;   INR: 1.35          PTT - ( 06 Aug 2019 16:30 )  PTT:26.2 SEC  Urinalysis Basic - ( 06 Aug 2019 13:50 )    Color: YELLOW / Appearance: CLEAR / S.028 / pH: 6.0  Gluc: NEGATIVE / Ketone: >150  / Bili: NEGATIVE / Urobili: NORMAL   Blood: LARGE / Protein: 70 / Nitrite: NEGATIVE   Leuk Esterase: NEGATIVE / RBC: 3-5 / WBC 0-2   Sq Epi: OCC / Non Sq Epi: x / Bacteria: NEGATIVE      Arterial Blood Gas:   @ 15:00  7.48/32/83/25/97.1/0.3  ABG lactate: 1.3    Venous Blood Gas:   @ 15:45  7.41/45/51/27/85.8  VBG Lactate: 2.7  Venous Blood Gas:   @ 13:50  7.42/44/34/27/62.6  VBG Lactate: 2.6 CHIEF COMPLAINT:     HPI:  73 y/o male with possible HX of Psych disorder , lives alone, very poor historian, unkempt and with very poor  hygiene , missing teeth,  Dental caries, on PO Benzo at home, denies ETOH and smoking,  reports falling off a stool 1.5 days prior to presentation around midnight and becoming pinned in position until rescued by EMS this afternoon. Denies drug or alcohol use prior to falling, and denies feeling lightheaded or dizzy prior. Patient is unsure what may have caused the fall other than a sense that his muscles have been "dysfunctional" lately. In the past month, he has noticed some muscle wasting, particularly in his arms, with 6-8 lbs of weight loss in this time. He has also noticed some fasciculations in his legs in the past month as well. He denies any family history of muscular pathology. He also denies seizures, chest pain, shortness of breath, nausea, and abdominal pain. Reports taking diazepam bid and flurazepam at nights and no other medications. Of note, patient has known history of L scrotal hydrocele but has refused surgery. Pt awake, A+O x 3, C/O + Productive cough as well, no fever, no dysuria, no N/V, No HA, no abdominal pain, + weakness, + cachectic with B/L Temporal wasting , Zamzam  (girl friend),     S/P CT Chest/Abdomen/pelvis as below:   < from: CT Abdomen and Pelvis w/ IV Cont (19 @ 16:47) >  IMPRESSION: A few right lower lobe ground glass opacities of uncertain   etiology. No evidence of solid organ injury or acute fracture.    S/P CT Maxillofacial as Below:     < from: CT Maxillofacial w/ IV Cont (19 @ 16:47) >  IMPRESSION:    Discontinuous appearance of the left lower medial incisor which appears   displaced posteriorly suggesting an avulsed tooth/alveolar fracture.   Clinical correlation is advised. Poor dentition with multiple missing   teeth. Right facial and external auditory canal soft tissue swelling likely   posttraumatic in nature however cellulitis/otitis externa is not excluded.    CT Head: unremarkable, , X ray: Pelvis No FX,     RVP was sent , started the pt on IV Zosyn, R/O Pneumonia, Elevated CPK 3311, + Rhabdo as well, on IVF NS @ 150 cc/hr, S/P IVF LR Bolus x 1 lit, and IVF Ns x 2 Lit Bolus, Dental consult was requested, NPO for now, pt was placed on TELE ,    Labs: PT 15.5, INR 1.35, PTT 26.2, Fibrinogen assay 542, Lactate 2.7, CPK 3311, Mg 2, Na 140, K+ 3.9, BUN 25, Creatinine 0.82, Glucose 138, AST 78, Troponin HS 21, WBC 24.95, Hgb 13.5, Platelet 262, UA: Large Blood , Ketone > 150,     Vitals: Tem 98.5, HR 87, /66, RR 16, 95 % RA, (06 Aug 2019 20:44)    MICU consult for hypoxic respiratory failure:       Consult for hypoxic respiratory failure:   In the ED pt suddenly developed acute hypoxia to 83% on RA and 91% on NRB w a RR of 23. Pt spiked a fever to 101.3 and was tachycardic (NSR) to low 100's.  Pt had gotten a total of 3L of fluids before hypoxia for rhabdo as well as 1g vanc and 2 doses of zosyn for empiric tx of sepsis w/ blood and urine cultures pending, RVP and U/A neg for infection; pt had been pan-scanned and chest CT revealed R lower lobe ground glass opacities.      Vitals on presentation were as follows: 122/55 (70)  (NSR), 97% on NRB w/ RR 23  On physical exam patient had decreased breath sounds in LLL and was endorsing cough w/ sputum production.  POCUS revealed dynamic air bronchograms in LLL consolidation.        FAMILY HISTORY:  FHx: stomach cancer      SOCIAL HISTORY:  Smoking: __ packs x ___ years  EtOH Use:  Marital Status:  Occupation:  Recent Travel:  Country of Birth:  Advance Directives:    Allergies    No Known Allergies    Intolerances        HOME MEDICATIONS:    REVIEW OF SYSTEMS:  Constitutional: feels warm  Eyes: has discharge from eyes bilaterally   ENT: no sore throat but does endorse cough w/ green sputum production   CV: no chest pain  Resp: + SOB  GI: no abdominal pain, no N/V/D/C  : no dysuria   MSK: R upper humerus in pain   Integumentary: multiple skin lesions from fall throughout body   Neurological: AAO x 3   Psychiatric: disheveled and hard to understand  [ ] All other systems negative  [ ] Unable to assess ROS because ________    OBJECTIVE:  ICU Vital Signs Last 24 Hrs  T(C): 38.5 (07 Aug 2019 13:51), Max: 38.5 (07 Aug 2019 13:51)  T(F): 101.3 (07 Aug 2019 13:51), Max: 101.3 (07 Aug 2019 13:51)  HR: 96 (07 Aug 2019 16:00) (59 - 101)  BP: 121/74 (07 Aug 2019 16:00) (108/59 - 134/66)  BP(mean): 86 (07 Aug 2019 16:00) (86 - 86)  ABP: --  ABP(mean): --  RR: 17 (07 Aug 2019 16:00) (16 - 22)  SpO2: 93% (07 Aug 2019 16:00) (91% - 99%)        08 @ 07:01  -  07 @ 07:00  --------------------------------------------------------  IN: 0 mL / OUT: 120 mL / NET: -120 mL      CAPILLARY BLOOD GLUCOSE      POCT Blood Glucose.: 119 mg/dL (07 Aug 2019 13:24)      PHYSICAL EXAM:  GENERAL: disheveled, unkempt  HEAD:  multiple skin wounds on R side of face, hair is unkempt and not clean   EYES: EOMI, PERRLA, has pustular scleral drainage, no scleral erythema   NECK: Supple, No JVD  CHEST/LUNG: R lung clear breath sounds, L lung decreased breath sounds in middle and lower lung,  No wheeze  HEART: tachycardic w/  normal rhythm; No murmurs, rubs, or gallops  ABDOMEN: Soft, Nontender, Nondistended; Bowel sounds present  EXTREMITIES:  2+ Peripheral Pulses, No clubbing, cyanosis, or edema  PSYCH: AAOx3  NEUROLOGY: non-focal  SKIN: multiple abrasions from [facility]       HOSPITAL MEDICATIONS:  MEDICATIONS  (STANDING):  ALBUTerol/ipratropium for Nebulization. 3 milliLiter(s) Nebulizer every 6 hours  heparin  Injectable 5000 Unit(s) SubCutaneous two times a day  lactated ringers. 500 milliLiter(s) (100 mL/Hr) IV Continuous <Continuous>  lactobacillus acidophilus 1 Tablet(s) Oral every 12 hours  LORazepam   Injectable 1 milliGRAM(s) IV Push two times a day  piperacillin/tazobactam IVPB.. 3.375 Gram(s) IV Intermittent every 8 hours  potassium chloride  10 mEq/100 mL IVPB 10 milliEquivalent(s) IV Intermittent every 1 hour  potassium phosphate / sodium phosphate powder 1 Packet(s) Oral three times a day  sodium chloride 0.9%. 1000 milliLiter(s) (75 mL/Hr) IV Continuous <Continuous>  sodium chloride 3%  Inhalation 3 milliLiter(s) Inhalation every 6 hours    MEDICATIONS  (PRN):      LABS:                        12.3   16.32 )-----------( 201      ( 07 Aug 2019 05:38 )             38.0     08-07    138  |  101  |  23  ----------------------------<  128<H>  3.5   |  25  |  0.83    Ca    8.3<L>      07 Aug 2019 05:38  Phos  2.0     08-  Mg     1.9     -    TPro  6.1  /  Alb  3.3  /  TBili  0.8  /  DBili  x   /  AST  60<H>  /  ALT  32  /  AlkPhos  64  08-07    PT/INR - ( 06 Aug 2019 16:30 )   PT: 15.5 SEC;   INR: 1.35          PTT - ( 06 Aug 2019 16:30 )  PTT:26.2 SEC  Urinalysis Basic - ( 06 Aug 2019 13:50 )    Color: YELLOW / Appearance: CLEAR / S.028 / pH: 6.0  Gluc: NEGATIVE / Ketone: >150  / Bili: NEGATIVE / Urobili: NORMAL   Blood: LARGE / Protein: 70 / Nitrite: NEGATIVE   Leuk Esterase: NEGATIVE / RBC: 3-5 / WBC 0-2   Sq Epi: OCC / Non Sq Epi: x / Bacteria: NEGATIVE      Arterial Blood Gas:   @ 15:00  7.48/32/83/25/97.1/0.3  ABG lactate: 1.3    Venous Blood Gas:   @ 15:45  7.41/45/51/27/85.8  VBG Lactate: 2.7  Venous Blood Gas:   @ 13:50  7.42/44/34/27/62.6  VBG Lactate: 2.6

## 2019-08-07 NOTE — PROGRESS NOTE ADULT - PROBLEM SELECTOR PLAN 5
urine tox pending   f/u psych recs Testicle US showed large complex left hydrocele   No sonographic evidence of testicular torsion.

## 2019-08-07 NOTE — PROGRESS NOTE ADULT - PROBLEM SELECTOR PLAN 2
could be due to PNA vs pulm edema   ABG showed primary respiratory alkalosis   MICU consulted   c/w O2 supplement to keep O2 Sat > 92% could be due to PNA vs pulm edema   ABG showed primary respiratory alkalosis   MICU consulted   c/w O2 supplement to keep O2 Sat > 92%  frequent suctioning. d/w RN

## 2019-08-07 NOTE — PROGRESS NOTE ADULT - PROBLEM SELECTOR PLAN 4
Testicle US showed large complex left hydrocele   No sonographic evidence of testicular torsion. s/p s/s eval  Cinesophagram ordered s/p s/s eval, rec cineesophagogram for further eval

## 2019-08-07 NOTE — BEHAVIORAL HEALTH ASSESSMENT NOTE - NSBHCHARTREVIEWINVESTIGATE_PSY_A_CORE FT
< from: 12 Lead ECG (08.06.19 @ 16:00) >      Ventricular Rate 83 BPM    Atrial Rate 83 BPM    P-R Interval 124 ms    QRS Duration 86 ms    Q-T Interval 394 ms    QTC Calculation(Bezet) 462 ms    P Axis 80 degrees    R Axis 16 degrees    T Axis 78 degrees    Diagnosis Line Normal sinus rhythm  Normal ECG    < end of copied text >

## 2019-08-07 NOTE — CONSULT NOTE ADULT - ASSESSMENT
75 y/o male with possible HX of Psych disorder admitted after being found stuck at home between furniture after a fall w/ labs revealing for rhabdomyolysis who was consulted for sepsis w/ hypoxic respiratory failure 2/2 LLL PNA     Plan:   - obtain sputum cultures  - hypertonic saline nebulizing treatments q6 w/ IVF hydration for secretion mobilization  - airway clearance w/ vest  - standing duonebs q6   - c/w zosyn; RVP negative - no concern for Staph PNA, can d/c Vanc   -  repeat CXR  - obtain blood gas w/ lactate  - c/w supplemental O2 to maintain SpO2 > 92%       Nirali Harding MD  Internal Medicine, PGY-2  [id number]

## 2019-08-07 NOTE — BEHAVIORAL HEALTH ASSESSMENT NOTE - NSBHCHARTREVIEWVS_PSY_A_CORE FT
Vital Signs Last 24 Hrs  T(C): 38.5 (07 Aug 2019 13:51), Max: 38.5 (07 Aug 2019 13:51)  T(F): 101.3 (07 Aug 2019 13:51), Max: 101.3 (07 Aug 2019 13:51)  HR: 101 (07 Aug 2019 13:51) (59 - 101)  BP: 122/61 (07 Aug 2019 13:51) (108/59 - 134/66)  BP(mean): --  RR: 22 (07 Aug 2019 13:51) (16 - 22)  SpO2: 91% (07 Aug 2019 13:51) (91% - 99%)

## 2019-08-07 NOTE — BEHAVIORAL HEALTH ASSESSMENT NOTE - NSBHCHARTREVIEWLAB_PSY_A_CORE FT
12.3   16.32 )-----------( 201      ( 07 Aug 2019 05:38 )             38.0     08-07    138  |  101  |  23  ----------------------------<  128<H>  3.5   |  25  |  0.83    Ca    8.3<L>      07 Aug 2019 05:38  Phos  2.0     -  Mg     1.9         TPro  6.1  /  Alb  3.3  /  TBili  0.8  /  DBili  x   /  AST  60<H>  /  ALT  32  /  AlkPhos  64  -    Urinalysis Basic - ( 06 Aug 2019 13:50 )    Color: YELLOW / Appearance: CLEAR / S.028 / pH: 6.0  Gluc: NEGATIVE / Ketone: >150  / Bili: NEGATIVE / Urobili: NORMAL   Blood: LARGE / Protein: 70 / Nitrite: NEGATIVE   Leuk Esterase: NEGATIVE / RBC: 3-5 / WBC 0-2   Sq Epi: OCC / Non Sq Epi: x / Bacteria: NEGATIVE    Thyroid Stimulating Hormone, Serum: 1.03 uIU/mL (19 @ 05:38)    Vitamin B12, Serum: 455 pg/mL (19 @ 05:38)    Folate, Serum: 19.1:   PLEASE NOTE NEW REFERENCE RANGE ng/mL (19 @ 05:38)

## 2019-08-07 NOTE — PROGRESS NOTE ADULT - SUBJECTIVE AND OBJECTIVE BOX
Patient is a 74y old  Male who presents to ED due to S/P fall, Rhabdo, R/O Pneumonia, Leukocytosis, Cough, R/O Cellulitis Face/lower Abdomen (06 Aug 2019 20:44). Pt claims that teeth were unaffected by fall.       HPI:  75 y/o male with possible HX of Psych disorder , lives alone, very poor historian, unkempt and with very poor hygiene, missing teeth, dental caries, on PO Benzo at home, denies ETOH and smoking,  reports falling off a stool 1.5 days prior to presentation around midnight and becoming pinned in position until rescued by EMS this afternoon. Denies drug or alcohol use prior to falling, and denies feeling lightheaded or dizzy prior. Patient is unsure what may have caused the fall other than a sense that his muscles have been "dysfunctional" lately. In the past month, he has noticed some muscle wasting, particularly in his arms, with 6-8 lbs of weight loss in this time. He has also noticed some fasciculations in his legs in the past month as well. He denies any family history of muscular pathology. He also denies seizures, chest pain, shortness of breath, nausea, and abdominal pain. Reports taking diazepam bid and flurazepam at nights and no other medications. Of note, patient has known history of L scrotal hydrocele but has refused surgery. Pt awake, A+O x 3, C/O + Productive cough as well, no fever, no dysuria, no N/V, No HA, no abdominal pain, + weakness, + cachectic with B/L Temporal wasting , Zamzam  (girl friend),     S/P CT Chest/Abdomen/pelvis as below:   from: CT Abdomen and Pelvis w/ IV Cont (19 @ 16:47) >  IMPRESSION: A few right lower lobe ground glass opacities of uncertain   etiology. No evidence of solid organ injury or acute fracture.    S/P CT Maxillofacial as Below:     < from: CT Maxillofacial w/ IV Cont (19 @ 16:47) >  IMPRESSION:    Discontinuous appearance of the left lower medial incisor which appears   displaced posteriorly suggesting an avulsed tooth/alveolar fracture.   Clinical correlation is advised. Poor dentition with multiple missing   teeth. Right facial and external auditory canal soft tissue swelling likely   posttraumatic in nature however cellulitis/otitis externa is not excluded.    CT Head: unremarkable, , X ray: Pelvis No FX,     RVP was sent , started the pt on IV Zosyn, R/O Pneumonia, Elevated CPK 3311, + Rhabdo as well, on IVF NS @ 150 cc/hr, S/P IVF LR Bolus x 1 lit, and IVF Ns x 2 Lit Bolus, Dental consult was requested, NPO for now, pt was placed on TELE ,    Labs: PT 15.5, INR 1.35, PTT 26.2, Fibrinogen assay 542, Lactate 2.7, CPK 3311, Mg 2, Na 140, K+ 3.9, BUN 25, Creatinine 0.82, Glucose 138, AST 78, Troponin HS 21, WBC 24.95, Hgb 13.5, Platelet 262, UA: Large Blood , Ketone > 150,     Vitals: Tem 98.5, HR 87, /66, RR 16, 95 % RA, (06 Aug 2019 20:44)      PAST MEDICAL & SURGICAL HISTORY:  Scrotal swelling  Psychiatric disorder  No significant past surgical history      MEDICATIONS  (STANDING):  piperacillin/tazobactam IVPB.. 3.375 Gram(s) IV Intermittent every 8 hours  sodium chloride 0.9%. 1000 milliLiter(s) (150 mL/Hr) IV Continuous <Continuous>  vancomycin  IVPB 1000 milliGRAM(s) IV Intermittent once    MEDICATIONS  (PRN):      Allergies    No Known Allergies    Intolerances        FAMILY HISTORY:  FHx: stomach cancer      SOCIAL HISTORY: pt presented alone at time of dental page.      Vital Signs Last 24 Hrs  T(C): 36.9 (06 Aug 2019 20:30), Max: 37.1 (06 Aug 2019 17:20)  T(F): 98.5 (06 Aug 2019 20:30), Max: 98.7 (06 Aug 2019 17:20)  HR: 87 (06 Aug 2019 20:30) (84 - 93)  BP: 134/66 (06 Aug 2019 20:30) (127/77 - 134/66)  BP(mean): --  RR: 16 (06 Aug 2019 20:30) (16 - 17)  SpO2: 95% (06 Aug 2019 20:30) (95% - 96%)    EOE:  TMJ ( -  ) clicks                    ( -   ) pops                    ( -   ) crepitus             Mandible FROM             Facial bones and MOM grossly intact             ( -  ) trismus             ( -  ) LAD             ( -  ) swelling             ( -  ) asymmetry             ( -  ) palpation             (  - ) SOB             (   -) dysphagia             (  - ) LOC    IOE:  permanent dentition: MCT, Multiple missing teeth. Teeth are coated in severe amounts of plaque and calculus. #26 presents with grade 3 mobility.            hard/soft palate:  ( -  ) palatal torus           tongue/FOM wnl           labial/buccal mucosa wnl           ( -  ) percussion           ( -  ) palpation           ( -  ) swelling         LABS:                        11.6   18.58 )-----------( 205      ( 06 Aug 2019 22:07 )             36.2     08-    139  |  102  |  22  ----------------------------<  118<H>  3.7   |  25  |  0.78    Ca    8.7      06 Aug 2019 22:07  Phos  3.0     -  Mg     1.8         TPro  7.3  /  Alb  4.1  /  TBili  0.7  /  DBili  x   /  AST  78<H>  /  ALT  34  /  AlkPhos  81      Platelet Count - Automated: 205 K/uL [150 - 400] ( @ 22:07)  WBC Count: 18.58 K/uL <H> [3.8 - 10.5] ( @ 22:07)  INR: 1.35 <H> [0.88 - 1.17] ( @ 16:30)  Platelet Count - Automated: 262 K/uL [150 - 400] ( @ 13:50)  WBC Count: 24.95 K/uL <H> [3.8 - 10.5] ( @ 13:50)    Urinalysis Basic - ( 06 Aug 2019 13:50 )    Color: YELLOW / Appearance: CLEAR / S.028 / pH: 6.0  Gluc: NEGATIVE / Ketone: >150  / Bili: NEGATIVE / Urobili: NORMAL   Blood: LARGE / Protein: 70 / Nitrite: NEGATIVE   Leuk Esterase: NEGATIVE / RBC: 3-5 / WBC 0-2   Sq Epi: OCC / Non Sq Epi: x / Bacteria: NEGATIVE    DENTAL RADIOGRAPHS: none taken, pt refused treatment.     ASSESSMENT: 75 yo M presents without trauma to dentition from recent fall. Pt does have severe dental needs but patient refused dental treatment other than limited clinical exam.      PROCEDURE:  Verbal consent given for limited exam. Patient denied radiographs or any other dental treatment today. Limited clinical exam completed in ED hallway. Patient has severe periodontal disease, severe plaque and calculus accumulations, and Class III mobile tooth #26 which patient reports did not change from his fall. Advised extraction of #26 due to aspiration risk, but patient denied treatment. Reviewed RBA. Recommended patient see outpatient dentist for extraction of #26 and management of periodontal disease as soon as possible after discharge. Patient understood.       RECOMMENDATIONS:  1) Soft diet  2) Dental F/U with outpatient dentist for comprehensive dental care.   3) If any difficulty swallowing/breathing, fever occur, return to ED     Arabella Malone DMD #[id number]  Melvin Agudelo DDS #[id number]

## 2019-08-07 NOTE — BEHAVIORAL HEALTH ASSESSMENT NOTE - NSBHCHARTREVIEWIMAGING_PSY_A_CORE FT
Parenchymal volume loss and chronic microvascular ischemic changes are   identified    There is no evidence acute hemorrhage mass or mass effect in the   posterior fossa or supratentorial region.

## 2019-08-07 NOTE — PROGRESS NOTE ADULT - SUBJECTIVE AND OBJECTIVE BOX
Patient is a 74y old  Male who presents with a chief complaint of S/P fall, Rhabdo, R/O Pneumonia, Leukocytosis, Cough, R/O Cellulitis Face/lower Abdomen (07 Aug 2019 00:57)      SUBJECTIVE / OVERNIGHT EVENTS:  Pt was seen in AM. He appears cachectic and weak. + cough with gurgling sound. He is oriented, but sometimes it is hard to understand what he is trying to say. He denied cp/sob/dizziness/palpitation. However, later of the day, he developed high fever 101.3 and hypoxia. His MS remained the same.     MEDICATIONS  (STANDING):  ALBUTerol/ipratropium for Nebulization. 3 milliLiter(s) Nebulizer every 6 hours  heparin  Injectable 5000 Unit(s) SubCutaneous two times a day  lactobacillus acidophilus 1 Tablet(s) Oral every 12 hours  LORazepam   Injectable 1 milliGRAM(s) IV Push two times a day  piperacillin/tazobactam IVPB.. 3.375 Gram(s) IV Intermittent every 8 hours  potassium chloride  10 mEq/100 mL IVPB 10 milliEquivalent(s) IV Intermittent every 1 hour  potassium phosphate / sodium phosphate powder 1 Packet(s) Oral three times a day  sodium chloride 0.9%. 1000 milliLiter(s) (75 mL/Hr) IV Continuous <Continuous>  sodium chloride 3%  Inhalation 3 milliLiter(s) Inhalation every 6 hours    MEDICATIONS  (PRN):      T(C): 38.5 (19 @ 13:51), Max: 38.5 (19 @ 13:51)  HR: 101 (19 @ 13:51) (59 - 101)  BP: 122/61 (19 @ 13:51) (108/59 - 134/66)  RR: 22 (19 @ 13:51) (16 - 22)  SpO2: 91% (19 @ 13:51) (91% - 99%)  CAPILLARY BLOOD GLUCOSE      POCT Blood Glucose.: 119 mg/dL (07 Aug 2019 13:24)  POCT Blood Glucose.: 117 mg/dL (07 Aug 2019 09:19)    I&O's Summary    06 Aug 2019 07:01  -  07 Aug 2019 07:00  --------------------------------------------------------  IN: 0 mL / OUT: 120 mL / NET: -120 mL        PHYSICAL EXAM:  GENERAL: NAD, cachectic, weak   HEAD:  Atraumatic  EYES: conjunctiva and sclera clear  NECK: No JVD  CHEST/LUNG: gurgling sound in throat, poor breath effort   HEART: hard to auscultate due to gurgling sound  ABDOMEN: Soft, Nontender, Nondistended; Bowel sounds present  EXTREMITIES:  2+ Peripheral Pulses, No clubbing, cyanosis, or edema  PSYCH: AAOx3, calm   NEUROLOGY: non-focal  SKIN: multiple abrasions in the body, including chest wall, right ear, legs   UROLOGY: + scrotal edema     LABS:                        12.3   16.32 )-----------( 201      ( 07 Aug 2019 05:38 )             38.0     08-07    138  |  101  |  23  ----------------------------<  128<H>  3.5   |  25  |  0.83    Ca    8.3<L>      07 Aug 2019 05:38  Phos  2.0     08-07  Mg     1.9     08-07    TPro  6.1  /  Alb  3.3  /  TBili  0.8  /  DBili  x   /  AST  60<H>  /  ALT  32  /  AlkPhos  64  08-07    PT/INR - ( 06 Aug 2019 16:30 )   PT: 15.5 SEC;   INR: 1.35          PTT - ( 06 Aug 2019 16:30 )  PTT:26.2 SEC  CARDIAC MARKERS ( 07 Aug 2019 05:38 )  x     / x     / 1695 u/L / x     / x      CARDIAC MARKERS ( 06 Aug 2019 22:07 )  x     / x     / 2450 u/L / x     / x      CARDIAC MARKERS ( 06 Aug 2019 13:50 )  x     / x     / 3311 u/L / 45.23 ng/mL / x          Urinalysis Basic - ( 06 Aug 2019 13:50 )    Color: YELLOW / Appearance: CLEAR / S.028 / pH: 6.0  Gluc: NEGATIVE / Ketone: >150  / Bili: NEGATIVE / Urobili: NORMAL   Blood: LARGE / Protein: 70 / Nitrite: NEGATIVE   Leuk Esterase: NEGATIVE / RBC: 3-5 / WBC 0-2   Sq Epi: OCC / Non Sq Epi: x / Bacteria: NEGATIVE        RADIOLOGY & ADDITIONAL TESTS:    Imaging Personally Reviewed: < from: CT Abdomen and Pelvis w/ IV Cont (19 @ 16:47) >  IMPRESSION: A few right lower lobe ground glass opacities of uncertain   etiology.    No evidence of solid organ injury or acute fracture.    < end of copied text >      Consultant(s) Notes Reviewed:      Care Discussed with Consultants/Other Providers:

## 2019-08-07 NOTE — BEHAVIORAL HEALTH ASSESSMENT NOTE - NSBHCONSULTRECOMMENDOTHER_PSY_A_CORE FT
Valium 5 mg PO/IV BID  Valium 5 mg PO/IV q6h PRN agitation  Can give additional Valium 5 mg PO qhs PRN insomnia     ***patient's home flurazepam is not available; no direct conversion for flurazepam:diazepam***    ***Continue to monitor respiratory status/oxygen saturation; hold BZD for oversedation or if concern for respiratory depression*** Valium 5 mg PO BID  Valium 5 mg PO q6h PRN agitation  Can give additional Valium 5 mg PO qhs PRN insomnia     WHILE PATIENT IS NPO:  Ativan 1 mg IVP BID  Ativan 1 mg IVP q6h PRN agitation/insomnia    ***Continue to monitor respiratory status/oxygen saturation; hold BZD for oversedation or if concern for respiratory depression***    ***patient's home flurazepam is not available; no direct conversion for flurazepam:diazepam*** Valium 5 mg PO BID  Valium 5 mg PO q6h PRN agitation  Can give additional Valium 5 mg PO qhs PRN insomnia . As per team pt. cannot take PO meds and IV/IM Valium not available.     WHILE PATIENT IS NPO:  Ativan 1 mg IVP BID  Ativan 1 mg IVP q6h PRN agitation/insomnia    ***Please continue to monitor respiratory status/oxygen saturation closely; hold BZD for oversedation or if concern for respiratory depression***    ***patient's home flurazepam is not available; no direct conversion for flurazepam:diazepam***

## 2019-08-07 NOTE — BEHAVIORAL HEALTH ASSESSMENT NOTE - HPI (INCLUDE ILLNESS QUALITY, SEVERITY, DURATION, TIMING, CONTEXT, MODIFYING FACTORS, ASSOCIATED SIGNS AND SYMPTOMS)
74M living alone, +girlfriend; PMH scrotal hydrocele; PPH unspecified depression vs anxiety; BIBEMS s/p fall one night prior to being found on floor during wellness check. Per EMS, patient was found in a "hoarder house buried face down in newspapers". Psychiatry consulted for long history of BZD use.    Per admission note, patient p/w poor hygiene, missing teeth, dental caries, denied ETOH and smoking, reported falling off a stool 1.5 days prior to presentation around midnight and becoming pinned in position until rescued by EMS. Patient was unsure what may have caused the fall other than a sense that his muscles have been "dysfunctional" lately. In the past month, he has noticed some muscle wasting, particularly in his arms, with 6-8 lbs of weight loss in this time. He has also noticed some fasciculations in his legs in the past month as well.    Writer was unable to complete evaluation due to AMS in setting of respiratory distress.    Per patient's PMD Dr Frank Britton [phone], patient has a long history of depression and anxiety. Dr Durham has been continuing a very old prescription for flurazepam and diazepam for patient's anxiety. Patient has been taking flurazepam 15 to 30 mg qhs regularly, and the valium 5 mg PRN 0-4 times a day. Patient was last seen by Dr Durham about 2 months ago, and was in his usual state of health.    Per PA student assessment completed earlier in day:  "Patient was seen and examined at the bedside, AAOx2 (To self, and date) was unaware where he was initially, then said he was in a hospital in the city. When asked why he was in the medical center, he admitted to falling off the stool in his house that was unrelated to the benzo use.  The patient was linear, and there was no thought process deficits at the time of interview. When asked about any previous psychiatric hx he denied any prior hx and/or hospitalizations. 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 He then emphasized he had no psychiatric hx once again. Denied any SI/HI AH/VH at the moment. Denied any alcohol, tobacco, and/or substance abuse. As per collateral, Zamzam, she stated that patient had suffered from bouts of anxiety and depression when he was in graduate school and engaged in talk therapy. To note, when Zamzam presented to the hospital, she appeared to be somewhat “confused” when trying to find patient; that is, when she saw patient in front of her, she seemed a bit shocked that was him. However, she called EMS for patient who then subsequently brought him into the ED.  Zamzam then stated that he had a neurological workup at [facility] 10 days ago. Neurologist name: Sulema Siddiqi – 0 . PCP: Frank Britton – [phone]" 74M living alone, +girlfriend; PMH scrotal hydrocele; PPH unspecified depression vs anxiety; BIBEMS s/p fall one night prior to being found on floor during wellness check. Per EMS, patient was found in a "hoarder house buried face down in newspapers". Psychiatry consulted for long history of BZD use.    Per admission note, patient p/w poor hygiene, missing teeth, dental caries, denied ETOH and smoking, reported falling off a stool 1.5 days prior to presentation around midnight and becoming pinned in position until rescued by EMS. Patient was unsure what may have caused the fall other than a sense that his muscles have been "dysfunctional" lately. In the past month, he has noticed some muscle wasting, particularly in his arms, with 6-8 lbs of weight loss in this time. He has also noticed some fasciculations in his legs in the past month as well.    Writer was unable to complete evaluation due to AMS in setting of respiratory distress.    Per patient's PMD Dr Frank Britton [phone], patient has a long history of depression and anxiety. Dr Durham has been continuing a very old prescription for flurazepam and diazepam for patient's anxiety. Patient has been taking flurazepam 15 to 30 mg qhs regularly, and the valium 5 mg PRN 0-4 times a day. Patient was last seen by Dr Durham about 2 months ago, and was in his usual state of health.    Per PA student assessment completed earlier in day:  "Patient was seen and examined at the bedside, AAOx2 (To self, and date) was unaware where he was initially, then said he was in a hospital in the city. When asked why he was in the medical center, he admitted to falling off the stool in his house that was unrelated to the benzo use.  The patient was linear, and there was no thought process deficits at the time of interview. When asked about any previous psychiatric hx he denied any prior hx and/or hospitalizations. 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 He then emphasized he had no psychiatric hx once again. Denied any SI/HI AH/VH at the moment. Denied any alcohol, tobacco, and/or substance abuse. As per collateral, Zamzam, she stated that patient had suffered from bouts of anxiety and depression when he was in graduate school and engaged in talk therapy. To note, when Zamzam presented to the hospital, she appeared to be somewhat “confused” when trying to find patient; that is, when she saw patient in front of her, she seemed a bit shocked that was him. However, she called EMS for patient who then subsequently brought him into the ED.  Zamzam then stated that he had a neurological workup at [facility] 10 days ago. Neurologist name: Sulema Siddiqi – 3 . PCP: Frank Britton – [phone]"    Istop checked.

## 2019-08-07 NOTE — PROGRESS NOTE ADULT - PROBLEM SELECTOR PLAN 6
s/p dental eval, but pt refused further dental treatment   Dental F/U with outpatient dentist for comprehensive dental care. urine tox pending   long term use of Benzo   start valium, and monitor signs of benzo withdrawal  f/u psych recs urine tox pending. Pt is unkempt, cachectic. Long grown toe nails.   Per patient's PMD Dr Frank Britton [phone], patient has a long history of depression and anxiety. long term use of Benzo   start valium, and monitor signs of benzo withdrawal  f/u psych recs urine tox pending. Pt is unkempt, cachectic. Long grown toe nails.   Per patient's PMD Dr Frank Britton [phone], patient has a long history of depression and anxiety. long term use of Benzo   start ativan bid while NPO, and monitor signs of benzo withdrawal and respiratory status closely   f/u psych recs

## 2019-08-07 NOTE — SWALLOW BEDSIDE ASSESSMENT ADULT - SWALLOW EVAL: CURRENT DIET
Clear Liquid then changed to NPO status upon chart review. Clear Liquid discontinued; NPO discontinued.

## 2019-08-07 NOTE — BEHAVIORAL HEALTH ASSESSMENT NOTE - RISK ASSESSMENT
Patient is at acute risk of self harm 2/2 delirium. Patient is at acute risk of self harm 2/2 likley delirium.    Will follow

## 2019-08-07 NOTE — BEHAVIORAL HEALTH ASSESSMENT NOTE - CASE SUMMARY
Patient seen and evaluated, agree with above assessment and plan, unable to fully evaluate patient at this time due to acute medical issues . Recommend meds. as written above, collateral obtained from patient's PMD Dr Frank Britton [phone] as above, case discussed in detail with primary team, recs given will follow.

## 2019-08-07 NOTE — BEHAVIORAL HEALTH ASSESSMENT NOTE - SUMMARY
74M living alone, +girlfriend; PMH scrotal hydrocele; PPH unspecified depression vs anxiety; BIBEMS s/p fall one night prior to being found on floor during wellness check. Per EMS, patient was found in a "hoarder house buried face down in newspapers". Psychiatry consulted for long history of BZD use.    Patient currently with altered mental status in the setting of unclear acute medical issue(s), with possible history of longer functional decline. Though patient is at risk of delirium, patient should be continued on BZD to avoid withdrawal. 74M living alone, +girlfriend; PMH scrotal hydrocele; PPH unspecified depression vs anxiety; BIBEMS s/p fall one night prior to being found on floor during wellness check. Per EMS, patient was found in a "hoarder house buried face down in newspapers". Psychiatry consulted for long history of BZD use.    Patient currently with altered mental status in the setting of unclear acute medical issue(s), with possible history of longer functional decline. Though patient is at risk of delirium and also currently in respiratory distress, however  patient should be continued on BZD to avoid withdrawal, which can be very serious (as pt. has been on benzo) for a while.  Recommend to decrease the dose of standing benzo. and monitor vitals, O2 saturation very closely.

## 2019-08-07 NOTE — PROGRESS NOTE ADULT - PROBLEM SELECTOR PLAN 7
DVT Prophylaxis: HSQ s/p dental eval, but pt refused further dental treatment   Dental F/U with outpatient dentist for comprehensive dental care.

## 2019-08-07 NOTE — PROGRESS NOTE ADULT - PROBLEM SELECTOR PLAN 1
sepsis with hypoxia, likely due to PNA  c/w vanco/zosyn for now   RVP neg, UA neg  f/u blood cx / urine cx 8/6: NGTD   leukocytosis improved   ID consulted , will follow

## 2019-08-08 DIAGNOSIS — Z02.9 ENCOUNTER FOR ADMINISTRATIVE EXAMINATIONS, UNSPECIFIED: ICD-10-CM

## 2019-08-08 DIAGNOSIS — D53.9 NUTRITIONAL ANEMIA, UNSPECIFIED: ICD-10-CM

## 2019-08-08 LAB
AMPHET UR-MCNC: NEGATIVE — SIGNIFICANT CHANGE UP
ANION GAP SERPL CALC-SCNC: 12 MMO/L — SIGNIFICANT CHANGE UP (ref 7–14)
BARBITURATES UR SCN-MCNC: NEGATIVE — SIGNIFICANT CHANGE UP
BASOPHILS # BLD AUTO: 0.05 K/UL — SIGNIFICANT CHANGE UP (ref 0–0.2)
BASOPHILS NFR BLD AUTO: 0.3 % — SIGNIFICANT CHANGE UP (ref 0–2)
BENZODIAZ UR-MCNC: POSITIVE — SIGNIFICANT CHANGE UP
BUN SERPL-MCNC: 22 MG/DL — SIGNIFICANT CHANGE UP (ref 7–23)
CALCIUM SERPL-MCNC: 8.3 MG/DL — LOW (ref 8.4–10.5)
CANNABINOIDS UR-MCNC: NEGATIVE — SIGNIFICANT CHANGE UP
CHLORIDE SERPL-SCNC: 102 MMOL/L — SIGNIFICANT CHANGE UP (ref 98–107)
CK SERPL-CCNC: 1414 U/L — HIGH (ref 30–200)
CO2 SERPL-SCNC: 25 MMOL/L — SIGNIFICANT CHANGE UP (ref 22–31)
COCAINE METAB.OTHER UR-MCNC: NEGATIVE — SIGNIFICANT CHANGE UP
CREAT SERPL-MCNC: 0.74 MG/DL — SIGNIFICANT CHANGE UP (ref 0.5–1.3)
EOSINOPHIL # BLD AUTO: 0.01 K/UL — SIGNIFICANT CHANGE UP (ref 0–0.5)
EOSINOPHIL NFR BLD AUTO: 0.1 % — SIGNIFICANT CHANGE UP (ref 0–6)
GLUCOSE SERPL-MCNC: 134 MG/DL — HIGH (ref 70–99)
HCT VFR BLD CALC: 32.8 % — LOW (ref 39–50)
HGB BLD-MCNC: 10.6 G/DL — LOW (ref 13–17)
IMM GRANULOCYTES NFR BLD AUTO: 0.7 % — SIGNIFICANT CHANGE UP (ref 0–1.5)
LYMPHOCYTES # BLD AUTO: 0.45 K/UL — LOW (ref 1–3.3)
LYMPHOCYTES # BLD AUTO: 2.8 % — LOW (ref 13–44)
MAGNESIUM SERPL-MCNC: 1.7 MG/DL — SIGNIFICANT CHANGE UP (ref 1.6–2.6)
MCHC RBC-ENTMCNC: 32.3 % — SIGNIFICANT CHANGE UP (ref 32–36)
MCHC RBC-ENTMCNC: 32.5 PG — SIGNIFICANT CHANGE UP (ref 27–34)
MCV RBC AUTO: 100.6 FL — HIGH (ref 80–100)
METHADONE UR-MCNC: NEGATIVE — SIGNIFICANT CHANGE UP
MONOCYTES # BLD AUTO: 1.17 K/UL — HIGH (ref 0–0.9)
MONOCYTES NFR BLD AUTO: 7.2 % — SIGNIFICANT CHANGE UP (ref 2–14)
NEUTROPHILS # BLD AUTO: 14.54 K/UL — HIGH (ref 1.8–7.4)
NEUTROPHILS NFR BLD AUTO: 88.9 % — HIGH (ref 43–77)
NRBC # FLD: 0 K/UL — SIGNIFICANT CHANGE UP (ref 0–0)
OPIATES UR-MCNC: NEGATIVE — SIGNIFICANT CHANGE UP
OXYCODONE UR-MCNC: NEGATIVE — SIGNIFICANT CHANGE UP
PCP UR-MCNC: NEGATIVE — SIGNIFICANT CHANGE UP
PHOSPHATE SERPL-MCNC: 2.4 MG/DL — LOW (ref 2.5–4.5)
PLATELET # BLD AUTO: 151 K/UL — SIGNIFICANT CHANGE UP (ref 150–400)
PMV BLD: 10.9 FL — SIGNIFICANT CHANGE UP (ref 7–13)
POTASSIUM SERPL-MCNC: 3.2 MMOL/L — LOW (ref 3.5–5.3)
POTASSIUM SERPL-SCNC: 3.2 MMOL/L — LOW (ref 3.5–5.3)
RBC # BLD: 3.26 M/UL — LOW (ref 4.2–5.8)
RBC # FLD: 12.4 % — SIGNIFICANT CHANGE UP (ref 10.3–14.5)
SODIUM SERPL-SCNC: 139 MMOL/L — SIGNIFICANT CHANGE UP (ref 135–145)
SPECIMEN SOURCE: SIGNIFICANT CHANGE UP
SPECIMEN SOURCE: SIGNIFICANT CHANGE UP
WBC # BLD: 16.34 K/UL — HIGH (ref 3.8–10.5)
WBC # FLD AUTO: 16.34 K/UL — HIGH (ref 3.8–10.5)

## 2019-08-08 PROCEDURE — 99233 SBSQ HOSP IP/OBS HIGH 50: CPT

## 2019-08-08 PROCEDURE — 99232 SBSQ HOSP IP/OBS MODERATE 35: CPT

## 2019-08-08 PROCEDURE — 71045 X-RAY EXAM CHEST 1 VIEW: CPT | Mod: 26

## 2019-08-08 RX ORDER — POTASSIUM CHLORIDE 20 MEQ
10 PACKET (EA) ORAL
Refills: 0 | Status: COMPLETED | OUTPATIENT
Start: 2019-08-08 | End: 2019-08-08

## 2019-08-08 RX ORDER — SODIUM CHLORIDE 9 MG/ML
1000 INJECTION, SOLUTION INTRAVENOUS
Refills: 0 | Status: DISCONTINUED | OUTPATIENT
Start: 2019-08-08 | End: 2019-08-09

## 2019-08-08 RX ADMIN — PIPERACILLIN AND TAZOBACTAM 25 GRAM(S): 4; .5 INJECTION, POWDER, LYOPHILIZED, FOR SOLUTION INTRAVENOUS at 21:52

## 2019-08-08 RX ADMIN — SODIUM CHLORIDE 100 MILLILITER(S): 9 INJECTION, SOLUTION INTRAVENOUS at 06:48

## 2019-08-08 RX ADMIN — PIPERACILLIN AND TAZOBACTAM 25 GRAM(S): 4; .5 INJECTION, POWDER, LYOPHILIZED, FOR SOLUTION INTRAVENOUS at 16:13

## 2019-08-08 RX ADMIN — Medication 1 DROP(S): at 21:52

## 2019-08-08 RX ADMIN — SODIUM CHLORIDE 3 MILLILITER(S): 9 INJECTION INTRAMUSCULAR; INTRAVENOUS; SUBCUTANEOUS at 03:25

## 2019-08-08 RX ADMIN — HEPARIN SODIUM 5000 UNIT(S): 5000 INJECTION INTRAVENOUS; SUBCUTANEOUS at 17:53

## 2019-08-08 RX ADMIN — Medication 0.5 MILLIGRAM(S): at 17:53

## 2019-08-08 RX ADMIN — Medication 100 MILLIEQUIVALENT(S): at 12:38

## 2019-08-08 RX ADMIN — SODIUM CHLORIDE 100 MILLILITER(S): 9 INJECTION, SOLUTION INTRAVENOUS at 20:05

## 2019-08-08 RX ADMIN — Medication 3 MILLILITER(S): at 03:25

## 2019-08-08 RX ADMIN — HEPARIN SODIUM 5000 UNIT(S): 5000 INJECTION INTRAVENOUS; SUBCUTANEOUS at 06:49

## 2019-08-08 RX ADMIN — SODIUM CHLORIDE 3 MILLILITER(S): 9 INJECTION INTRAMUSCULAR; INTRAVENOUS; SUBCUTANEOUS at 21:24

## 2019-08-08 RX ADMIN — Medication 3 MILLILITER(S): at 09:57

## 2019-08-08 RX ADMIN — Medication 100 MILLIEQUIVALENT(S): at 10:59

## 2019-08-08 RX ADMIN — Medication 3 MILLILITER(S): at 21:24

## 2019-08-08 RX ADMIN — Medication 1 MILLIGRAM(S): at 06:49

## 2019-08-08 RX ADMIN — Medication 100 MILLIEQUIVALENT(S): at 11:51

## 2019-08-08 RX ADMIN — SODIUM CHLORIDE 3 MILLILITER(S): 9 INJECTION INTRAMUSCULAR; INTRAVENOUS; SUBCUTANEOUS at 09:58

## 2019-08-08 RX ADMIN — PIPERACILLIN AND TAZOBACTAM 25 GRAM(S): 4; .5 INJECTION, POWDER, LYOPHILIZED, FOR SOLUTION INTRAVENOUS at 06:49

## 2019-08-08 RX ADMIN — SODIUM CHLORIDE 100 MILLILITER(S): 9 INJECTION, SOLUTION INTRAVENOUS at 11:00

## 2019-08-08 NOTE — PHYSICAL THERAPY INITIAL EVALUATION ADULT - PERTINENT HX OF CURRENT PROBLEM, REHAB EVAL
Patient is a 74 year old male presenting after fall and being stuck between furniture x 1.5 days. Patient admitted for Rhabdo, r/o Pneumonia, Leukocytosis, Cough, r/o Cellulitis Face/lower Abdomen. Patient has a past medical history significant for depression and anxiety BIBEMS.

## 2019-08-08 NOTE — PHYSICAL THERAPY INITIAL EVALUATION ADULT - GENERAL OBSERVATIONS, REHAB EVAL
Patient received semisupine in bed, +telemetry, +pulse oximeter, +IV, in no apparent distress. Friends present. Patient agreeable to participate in physical therapy evaluation.

## 2019-08-08 NOTE — PROGRESS NOTE BEHAVIORAL HEALTH - NSBHCONSULTRECOMMENDOTHER_PSY_A_CORE FT
WHILE PATIENT IS NPO:  Ativan 0.5 mg IVP BID  Ativan 0.5 mg IVP q6h PRN agitation/insomnia    Valium 5 mg PO BID  Valium 5 mg PO q6h PRN agitation  Can give additional Valium 5 mg PO qhs PRN insomnia . As per team pt. cannot take PO meds and IV/IM Valium not available.     ***Please continue to monitor respiratory status/oxygen saturation closely; hold BZD for oversedation or if concern for respiratory depression***    ***patient's home flurazepam is not available; no direct conversion for flurazepam:diazepam*** WHILE PATIENT IS NPO:  Given current respiratory status, will decrease the ativan dose   Ativan 0.5 mg IVP BID   Ativan 0.5 mg IVP q6h PRN agitation/insomnia    *****Will consider switching back to valium (outpt. dose) once mental status is improved and when pt. can take po meds.   Valium 5 mg PO BID  Valium 5 mg PO q6h PRN agitation  Can give additional Valium 5 mg PO qhs PRN insomnia . As per team pt. cannot take PO meds and IV/IM Valium not available.     ***Please continue to monitor respiratory status/oxygen saturation closely; hold BZD for oversedation or if concern for respiratory depression***    ***patient's home flurazepam is not available; no direct conversion for flurazepam:diazepam***

## 2019-08-08 NOTE — PROGRESS NOTE ADULT - PROBLEM SELECTOR PLAN 5
Testicle US showed large complex left hydrocele   No sonographic evidence of testicular torsion. s/p s/s eval, pending cineesophagogram for further eval  NPO

## 2019-08-08 NOTE — ADVANCED PRACTICE NURSE CONSULT - REASON FOR CONSULT
Patient seen on skin care rounds after wound care referral received for assessment of skin impairment and recommendations of topical management. Chart reviewed: WBC 16.34k/uL, H/H 10.6/32.8, Plt 151, Akash 15. Patient evaluated by medical ICU team for hypoxic respiratory failure, did not require ICU monitoring at that time, recommended continuous pulse oximetry monitoring. Patient interviewed, fell at home was stuck on the floor lying on "sharp cardboard." Patient was unable to reach the phone and was "twisting and turning on the floor trying to get help," was able to finally call out to neighbors. Patient seen on skin care rounds after wound care referral received for assessment of skin impairment and recommendations of topical management. Chart reviewed: WBC 16.34k/uL, H/H 10.6/32.8, Plt 151, Akash 15. H/O Psych disorder, depression and anxiety, lives alone, very poor historian, unkempt and with very poor hygiene , missing teeth, dental caries, on PO Benzo at home, denies ETOH and smoking, reported falling off a stool 1.5 days prior to presentation around midnight and becoming pinned in position until rescued by EMS afternoon of admission. Followed by hospitalist, patient evaluated by medical ICU team for hypoxic respiratory failure, did not require ICU monitoring at that time, recommended continuous pulse oximetry monitoring. Patient interviewed, fell at home was stuck on the floor lying on "sharp cardboard." Patient was unable to reach the phone and was "twisting and turning on the floor trying to get help," was able to finally call out to neighbors.

## 2019-08-08 NOTE — PHYSICAL THERAPY INITIAL EVALUATION ADULT - MANUAL MUSCLE TESTING RESULTS, REHAB EVAL
Bilateral knee flexion, ankle dorsiflexion strength 3/5; bilateral upper extremities grossly 3/5 as assessed through antigravity movements

## 2019-08-08 NOTE — PROGRESS NOTE BEHAVIORAL HEALTH - NSBHFUPINTERVALHXFT_PSY_A_CORE
Patient received Ativan 1 mg IVP BID. No additional BZD PRNs required.    On evaluation with hospitalist attending, patient is sleeping while receiving nebs. He awakens to light tactile stimulus. Patient is oriented to self, month, and current president. He knows he is in a hospital but was unaware of which hospital or for how long. Patient remembers having "slipped off" a piece of furniture and being under some furniture.     Patient states that he takes 15-30 mg of flurazepam nightly and 1.25-5 mg of Valium PRN. Patient is adamant that he does not take either for depression or anxiety. He denies AH/VH/SI/HI/paranoid ideation.    Pending professional collateral from outpatient neurologist. Patient received Ativan 1 mg IVP BID. No additional BZD PRNs required.    On evaluation with hospitalist attending Dr. Tse, patient is sleeping while receiving nebs. He awakens to light tactile stimulus. Patient is oriented to self, month, and current president. He knows he is in a hospital but was unaware of which hospital or for how long. Patient remembers having "slipped off" a piece of furniture and being under some furniture.     Patient states that he takes 15-30 mg of flurazepam nightly and 1.25-5 mg of Valium PRN bid. Patient is adamant that he does not take either for depression or anxiety. Stated that he takes flurazepam for poor sleep.  He denies AH/VH/SI/HI/paranoid ideation.  Pt. also reported that he has been on benzos. for more then 30 years.  Pending professional collateral from outpatient neurologist. Left a message, awaiting call back.

## 2019-08-08 NOTE — PHYSICAL THERAPY INITIAL EVALUATION ADULT - PATIENT PROFILE REVIEW, REHAB EVAL
yes/Physical therapy orders received; Activity order: bedrest. Consult with Jordana BROWN, Tim DOWNEY, ok for physical therapy evaluation.

## 2019-08-08 NOTE — ADVANCED PRACTICE NURSE CONSULT - RECOMMEDATIONS
Recommend plastic surgery consult for assessment of facial and right ear trauma wounds.  Nutrition consult, patient with multiple trauma injuries with various tissue types, s/p fall.    Upon discharge wound recommend follow up care at [facility] for multiple partial thickness injuries: [phone]. Address: [address] [place] [address].     Topical Recommendations:  Right ear- Gently cleanse with NS, pat dry. Apply Liquid barrier film to periwound skin. Apply Aquacel hydrofiber to sheet to anterior and posterior ear, cover with 4x4 gauze and abdominal pad. Secure with size 10 tubular stretch netting. Change daily and prn if soiled and/or compromised.    Right cheek bone- apply liquid barrier film daily.    Right mandible, right and left iliac crest, right and left upper thigh- Cleanse wound and periwound skin with NS. Pat dry. Apply Liquid barrier film to periwound skin. Apply Silicone foam with border, change every-other day.    Right shoulder, right upper chest- Cleanse wound and periwound skin with NS. Dry well. Apply Liquid barrier film to periwound skin. Apply thin Hydrocolloid, change every other day.    Continue low air loss bed therapy, continue heel elevation with Z-flex fluidized positioning boots, continue to turn & reposition q2h with Z-tania fluidized positioning device, soft pillow between bony prominences, continue incontinence management with barrier creams & single breathable pad. Apply Interdry textile sheeting, under intertriginous scrotum leaving 2 inches exposed from groin at ends to wick, remove to wash & dry affected area, then replace. Individual sheeting may be used for up to 5 days unless soiled. Continue measures to decrease friction/shear/pressure.     Continue with nutritional support as per dietary/orders.    Findings and plan discussed with patient and primary team. Patient educated on topical wound therapy, all questions and concerns addressed.    Please contact Wound Care Service Line if we can be of further assistance (ext [phone]). Recommend plastic surgery consult for assessment of facial and right ear trauma wounds.  Nutrition consult, patient with multiple trauma injuries with various tissue types, s/p fall.    Please obtain height and weight.    Upon discharge wound recommend follow up care at [facility] for multiple partial thickness injuries: [phone]. Address: [address] [place] [address].     Topical Recommendations:  Right ear- Gently cleanse with NS, pat dry. Apply Liquid barrier film to periwound skin. Apply Aquacel hydrofiber to sheet to anterior and posterior ear, cover with 4x4 gauze and abdominal pad. Secure with size 10 tubular stretch netting. Change daily and prn if soiled and/or compromised.    Right cheek bone- apply liquid barrier film daily.    Right mandible, right and left iliac crest, right and left upper thigh- Cleanse wound and periwound skin with NS. Pat dry. Apply Liquid barrier film to periwound skin. Apply Silicone foam with border, change every-other day.    Right shoulder, right upper chest- Cleanse wound and periwound skin with NS. Dry well. Apply Liquid barrier film to periwound skin. Apply thin Hydrocolloid, change every other day.    Continue low air loss bed therapy, continue heel elevation with Z-flex fluidized positioning boots, continue to turn & reposition q2h with Z-tania fluidized positioning device, soft pillow between bony prominences, continue incontinence management with barrier creams & single breathable pad. Apply Interdry textile sheeting, under intertriginous scrotum leaving 2 inches exposed from groin at ends to wick, remove to wash & dry affected area, then replace. Individual sheeting may be used for up to 5 days unless soiled. Continue measures to decrease friction/shear/pressure.     Continue with nutritional support as per dietary/orders.    Findings and plan discussed with patient and primary team. Patient educated on topical wound therapy, all questions and concerns addressed.    Please contact Wound Care Service Line if we can be of further assistance (ext [phone]).

## 2019-08-08 NOTE — CONSULT NOTE ADULT - SUBJECTIVE AND OBJECTIVE BOX
HPI:  73 y/o male with possible HX of Psych disorder , lives alone, very poor historian, unkempt and with very poor  hygiene , missing teeth,  Dental caries, on PO Benzo at home, denies ETOH and smoking,  reports falling off a stool 1.5 days prior to presentation around midnight and becoming pinned in position until rescued by EMS this afternoon. Denies drug or alcohol use prior to falling, and denies feeling lightheaded or dizzy prior. Patient is unsure what may have caused the fall other than a sense that his muscles have been "dysfunctional" lately. In the past month, he has noticed some muscle wasting, particularly in his arms, with 6-8 lbs of weight loss in this time. He has also noticed some fasciculations in his legs in the past month as well. He denies any family history of muscular pathology. He also denies seizures, chest pain, shortness of breath, nausea, and abdominal pain. Reports taking diazepam bid and flurazepam at nights and no other medications. Of note, patient has known history of L scrotal hydrocele but has refused surgery. Pt awake, A+O x 3, C/O + Productive cough as well, no fever, no dysuria, no N/V, No HA, no abdominal pain, + weakness, + cachectic with B/L Temporal wasting , Zamzam  (girl friend),     S/P CT Chest/Abdomen/pelvis as below:   < from: CT Abdomen and Pelvis w/ IV Cont (08.06.19 @ 16:47) >  IMPRESSION: A few right lower lobe ground glass opacities of uncertain   etiology. No evidence of solid organ injury or acute fracture.    S/P CT Maxillofacial as Below:     < from: CT Maxillofacial w/ IV Cont (08.06.19 @ 16:47) >  IMPRESSION:    Discontinuous appearance of the left lower medial incisor which appears   displaced posteriorly suggesting an avulsed tooth/alveolar fracture.   Clinical correlation is advised. Poor dentition with multiple missing   teeth. Right facial and external auditory canal soft tissue swelling likely   posttraumatic in nature however cellulitis/otitis externa is not excluded.    CT Head: unremarkable, , X ray: Pelvis No FX,     RVP was sent , started the pt on IV Zosyn, R/O Pneumonia, Elevated CPK 3311, + Rhabdo as well, on IVF NS @ 150 cc/hr, S/P IVF LR Bolus x 1 lit, and IVF Ns x 2 Lit Bolus, Dental consult was requested, NPO for now, pt was placed on TELE ,    Labs: PT 15.5, INR 1.35, PTT 26.2, Fibrinogen assay 542, Lactate 2.7, CPK 3311, Mg 2, Na 140, K+ 3.9, BUN 25, Creatinine 0.82, Glucose 138, AST 78, Troponin HS 21, WBC 24.95, Hgb 13.5, Platelet 262, UA: Large Blood , Ketone > 150,     Vitals: Tem 98.5, HR 87, /66, RR 16, 95 % RA, (06 Aug 2019 20:44)    Patient is a 74y old  Male who presents with a chief complaint of S/P fall, Rhabdo, R/O Pneumonia, Leukocytosis, Cough, R/O Cellulitis Face/lower Abdomen (07 Aug 2019 18:12)    Allergies    No Known Allergies    Intolerances      Vital Signs Last 24 Hrs  T(C): 37.2 (08 Aug 2019 06:00), Max: 38.5 (07 Aug 2019 13:51)  T(F): 99 (08 Aug 2019 06:00), Max: 101.3 (07 Aug 2019 13:51)  HR: 68 (08 Aug 2019 06:00) (64 - 101)  BP: 115/65 (08 Aug 2019 06:00) (115/65 - 122/61)  BP(mean): 86 (07 Aug 2019 16:00) (86 - 86)  RR: 18 (08 Aug 2019 06:00) (17 - 22)  SpO2: 98% (08 Aug 2019 06:00) (91% - 98%)                        10.6   16.34 )-----------( 151      ( 08 Aug 2019 06:50 )             32.8     08-08    139  |  102  |  22  ----------------------------<  134<H>  3.2<L>   |  25  |  0.74    Ca    8.3<L>      08 Aug 2019 06:50  Phos  2.4     08-08  Mg     1.7     08-08    TPro  6.1  /  Alb  3.3  /  TBili  0.8  /  DBili  x   /  AST  60<H>  /  ALT  32  /  AlkPhos  64  08-07    CAPILLARY BLOOD GLUCOSE      POCT Blood Glucose.: 119 mg/dL (07 Aug 2019 13:24)    MEDICATIONS  (STANDING):  ALBUTerol/ipratropium for Nebulization. 3 milliLiter(s) Nebulizer every 6 hours  heparin  Injectable 5000 Unit(s) SubCutaneous two times a day  lactated ringers. 500 milliLiter(s) (100 mL/Hr) IV Continuous <Continuous>  lactobacillus acidophilus 1 Tablet(s) Oral every 12 hours  LORazepam   Injectable 1 milliGRAM(s) IV Push two times a day  piperacillin/tazobactam IVPB.. 3.375 Gram(s) IV Intermittent every 8 hours  potassium chloride  10 mEq/100 mL IVPB 10 milliEquivalent(s) IV Intermittent every 1 hour  sodium chloride 0.9%. 1000 milliLiter(s) (75 mL/Hr) IV Continuous <Continuous>  sodium chloride 3%  Inhalation 3 milliLiter(s) Inhalation every 6 hours    MEDICATIONS  (PRN):    PAST MEDICAL & SURGICAL HISTORY:  Scrotal swelling  Psychiatric disorder  No significant past surgical history      CHRISTIAN:  Painful, ingrowing toe nails x 10  Thickened and discolored  s/p debridement no open lesions or local signs of infection  Pedal pulses palp 2/4 bilaterally  No gross deformities  Epicritic sensation intact to b/l feet

## 2019-08-08 NOTE — PROGRESS NOTE ADULT - SUBJECTIVE AND OBJECTIVE BOX
Patient is a 74y old  Male who presents with a chief complaint of S/P fall, Rhabdo, R/O Pneumonia, Leukocytosis, Cough, R/O Cellulitis Face/lower Abdomen (08 Aug 2019 10:33)      SUBJECTIVE / OVERNIGHT EVENTS: Tmax 101.3 on 8/7. Patient denies pain, SOB, N/V/D. Wasn't sure where he was and how he got here but does remember being trapped at home between furniture. States he somehow slid off a chair and got stuck between furniture for 2 days. Denies LOC. Has family but not nearby per patient.    MEDICATIONS  (STANDING):  ALBUTerol/ipratropium for Nebulization. 3 milliLiter(s) Nebulizer every 6 hours  heparin  Injectable 5000 Unit(s) SubCutaneous two times a day  lactated ringers. 1000 milliLiter(s) (100 mL/Hr) IV Continuous <Continuous>  lactobacillus acidophilus 1 Tablet(s) Oral every 12 hours  LORazepam   Injectable 0.5 milliGRAM(s) IV Push two times a day  piperacillin/tazobactam IVPB.. 3.375 Gram(s) IV Intermittent every 8 hours  sodium chloride 3%  Inhalation 3 milliLiter(s) Inhalation every 6 hours    MEDICATIONS  (PRN):  LORazepam   Injectable 0.5 milliGRAM(s) IV Push every 6 hours PRN Agitation/insomnia      T(C): 37.2 (08-08-19 @ 10:00), Max: 37.6 (08-07-19 @ 22:00)  HR: 65 (08-08-19 @ 10:00) (64 - 96)  BP: 112/58 (08-08-19 @ 10:00) (112/58 - 121/74)  RR: 18 (08-08-19 @ 12:36) (17 - 19)  SpO2: 95% (08-08-19 @ 12:36) (93% - 98%)  CAPILLARY BLOOD GLUCOSE    I&O's Summary    PHYSICAL EXAM:  GENERAL: thin and disheveled, no apparent distress, somnolent but easily arousable, receiving neb treatment  HEENT: bruising over right cheekbone and jaw with some TTP, neck rotated to left and neck ROM limited, yellowish discharge from eyes, no neck LAD  CHEST/LUNG: course rhonchi b/l  HEART: s1/s2, RRR, no murmurs appreciated  ABDOMEN: Soft, Nontender, Nondistended; Bowel sounds present  EXTREMITIES: WWP, trace ankle edema b/l  NEUROLOGY: arousable, able to move all extremities but right shoulder limited by pain and right thigh, responds to Qs appropriately, 4/5 muscle strength b/l lower ext and good hand  b/l, no sensory deficits  PSYCH: calm, cooperative, follos commands, oriented to self, month, year, president but did not know where he was, somewhat tangential  SKIN: bruising over right thigh and right side of face    LABS:                        10.6   16.34 )-----------( 151      ( 08 Aug 2019 06:50 )             32.8     08-08    139  |  102  |  22  ----------------------------<  134<H>  3.2<L>   |  25  |  0.74    Ca    8.3<L>      08 Aug 2019 06:50  Phos  2.4     08-08  Mg     1.7     08-08    TPro  6.1  /  Alb  3.3  /  TBili  0.8  /  DBili  x   /  AST  60<H>  /  ALT  32  /  AlkPhos  64  08-07    PT/INR - ( 06 Aug 2019 16:30 )   PT: 15.5 SEC;   INR: 1.35          PTT - ( 06 Aug 2019 16:30 )  PTT:26.2 SEC  CARDIAC MARKERS ( 08 Aug 2019 06:50 )  x     / x     / 1414 u/L / x     / x      CARDIAC MARKERS ( 07 Aug 2019 05:38 )  x     / x     / 1695 u/L / x     / x      CARDIAC MARKERS ( 06 Aug 2019 22:07 )  x     / x     / 2450 u/L / x     / x              Culture - Respiratory with Gram Stain (collected 08-07-19 @ 17:47)  Source: SPUTUM      RADIOLOGY & ADDITIONAL TESTS: Patient is a 74y old  Male who presents with a chief complaint of S/P fall, Rhabdo, R/O Pneumonia, Leukocytosis, Cough, R/O Cellulitis Face/lower Abdomen (08 Aug 2019 10:33)      SUBJECTIVE / OVERNIGHT EVENTS: Tmax 101.3 on 8/7. Patient denies pain, SOB, N/V/D. Wasn't sure where he was and how he got here but does remember being trapped at home between furniture. States he somehow slid off a chair and got stuck between furniture for 2 days. Denies LOC. Has family but not nearby per patient.    MEDICATIONS  (STANDING):  ALBUTerol/ipratropium for Nebulization. 3 milliLiter(s) Nebulizer every 6 hours  heparin  Injectable 5000 Unit(s) SubCutaneous two times a day  lactated ringers. 1000 milliLiter(s) (100 mL/Hr) IV Continuous <Continuous>  lactobacillus acidophilus 1 Tablet(s) Oral every 12 hours  LORazepam   Injectable 0.5 milliGRAM(s) IV Push two times a day  piperacillin/tazobactam IVPB.. 3.375 Gram(s) IV Intermittent every 8 hours  sodium chloride 3%  Inhalation 3 milliLiter(s) Inhalation every 6 hours    MEDICATIONS  (PRN):  LORazepam   Injectable 0.5 milliGRAM(s) IV Push every 6 hours PRN Agitation/insomnia      T(C): 37.2 (08-08-19 @ 10:00), Max: 37.6 (08-07-19 @ 22:00)  HR: 65 (08-08-19 @ 10:00) (64 - 96)  BP: 112/58 (08-08-19 @ 10:00) (112/58 - 121/74)  RR: 18 (08-08-19 @ 12:36) (17 - 19)  SpO2: 95% (08-08-19 @ 12:36) (93% - 98%)  CAPILLARY BLOOD GLUCOSE    I&O's Summary    PHYSICAL EXAM:  GENERAL: thin and disheveled, no apparent distress, somnolent but easily arousable, receiving neb treatment  HEENT: bruising over right cheekbone and jaw with some TTP, neck rotated to left and neck ROM limited, yellowish discharge from eyes, no neck LAD  CHEST/LUNG: course rhonchi b/l  HEART: s1/s2, RRR, no murmurs appreciated  ABDOMEN: Soft, Nontender, Nondistended; Bowel sounds present, distended suprapubic region  EXTREMITIES: WWP, trace ankle edema b/l  : scrotal swelling  NEUROLOGY: arousable, able to move all extremities but right shoulder limited by pain and right thigh, responds to Qs appropriately, 4/5 muscle strength b/l lower ext and good hand  b/l, no sensory deficits  PSYCH: calm, cooperative, follos commands, oriented to self, month, year, president but did not know where he was, somewhat tangential  SKIN: bruising over right thigh and right side of face    LABS:                        10.6   16.34 )-----------( 151      ( 08 Aug 2019 06:50 )             32.8     08-08    139  |  102  |  22  ----------------------------<  134<H>  3.2<L>   |  25  |  0.74    Ca    8.3<L>      08 Aug 2019 06:50  Phos  2.4     08-08  Mg     1.7     08-08    TPro  6.1  /  Alb  3.3  /  TBili  0.8  /  DBili  x   /  AST  60<H>  /  ALT  32  /  AlkPhos  64  08-07    PT/INR - ( 06 Aug 2019 16:30 )   PT: 15.5 SEC;   INR: 1.35          PTT - ( 06 Aug 2019 16:30 )  PTT:26.2 SEC  CARDIAC MARKERS ( 08 Aug 2019 06:50 )  x     / x     / 1414 u/L / x     / x      CARDIAC MARKERS ( 07 Aug 2019 05:38 )  x     / x     / 1695 u/L / x     / x      CARDIAC MARKERS ( 06 Aug 2019 22:07 )  x     / x     / 2450 u/L / x     / x              Culture - Respiratory with Gram Stain (collected 08-07-19 @ 17:47)  Source: SPUTUM      RADIOLOGY & ADDITIONAL TESTS:

## 2019-08-08 NOTE — CHART NOTE - NSCHARTNOTEFT_GEN_A_CORE
This documentation is due to the fact that my previous note did not save yesterday. called by nurse yesterday at 2pm due to O2 saturation 84% on room air and 90% on no rebreather. Pt without complianst stating that he feels sleepy. NO chest pain, SOB, GARZA, PND, orthopnea, palpitations, diaphoresis, lightheadedness, dizziness, syncope, increased lower extremity edema, fever chills, malaise, myalgias, anorexia, weight changes ( loss or gain), night sweats, generalized fatigue abdominal pain, N/V/C/D BRBPR, melena, urinary symptoms, cough, and wheezing.    Physical Exam  Vital Signs Last 24 Hrs  T(C): 37.2 (08 Aug 2019 06:00), Max: 38.5 (07 Aug 2019 13:51)  T(F): 99 (08 Aug 2019 06:00), Max: 101.3 (07 Aug 2019 13:51)  HR: 68 (08 Aug 2019 06:00) (64 - 101)  BP: 115/65 (08 Aug 2019 06:00) (115/65 - 122/61)  BP(mean): 86 (07 Aug 2019 16:00) (86 - 86)  RR: 18 (08 Aug 2019 06:00) (17 - 22)  SpO2: 98% (08 Aug 2019 06:00) (91% - 98%)  Appearance: Normal	  HEENT:   Normal oral mucosa, PERRL, EOMI	  Lymphatic: No lymphadenopathy  Cardiovascular: Normal S1 S2, No JVD, No murmurs, No edema  Respiratory: Lungs clear to auscultation	  Psychiatry: A & O x 3, Mood & affect appropriate  Gastrointestinal:  Soft, Non-tender, + BS	  Skin: No rashes, No ecchymoses, No cyanosis  Neurologic: Non-focal  Extremities: Normal range of motion, No clubbing, cyanosis or edema  Vascular: Peripheral pulses palpable 2+ bilaterally    TELEMETRY: 	    ECG:  	  RADIOLOGY:     OTHER: 	  LABS                        10.6<L> [13.0 - 17.0]  16.34<H> [3.8 - 10.5] )-----------( 151 [150 - 400]    ( 08 Aug 2019 06:50 )             32.8<L> [39.0 - 50.0]    08-08    139  |  102  |  22  ----------------------------<  134<H>  3.2<L>   |  25  |  0.74    Ca    8.3<L>      08 Aug 2019 06:50  Phos  2.4     08-08  Mg     1.7     08-08    TPro  6.1  /  Alb  3.3  /  TBili  0.8  /  DBili  x   /  AST  60<H>  /  ALT  32  /  AlkPhos  64  08-07    CARDIAC MARKERS ( 08 Aug 2019 06:50 )  x     / x     / 1414 u/L / x     / x      CARDIAC MARKERS ( 07 Aug 2019 05:38 )  x     / x     / 1695 u/L / x     / x      CARDIAC MARKERS ( 06 Aug 2019 22:07 )  x     / x     / 2450 u/L / x     / x      CARDIAC MARKERS ( 06 Aug 2019 13:50 )  x     / x     / 3311 u/L / 45.23 ng/mL / x          ABG - ( 07 Aug 2019 15:00 )  pH, Arterial: 7.48  pH, Blood: x     /  pCO2: 32    /  pO2: 83    / HCO3: 25    / Base Excess: 0.3   /  SaO2: 97.1              Urinalysis Basic - ( 06 Aug 2019 13:50 )    Color: YELLOW / Appearance: CLEAR / S.028 / pH: 6.0  Gluc: NEGATIVE / Ketone: >150  / Bili: NEGATIVE / Urobili: NORMAL   Blood: LARGE / Protein: 70 / Nitrite: NEGATIVE   Leuk Esterase: NEGATIVE / RBC: 3-5 / WBC 0-2   Sq Epi: OCC / Non Sq Epi: x / Bacteria: NEGATIVE      PT/INR - ( 06 Aug 2019 16:30 )   PT: 15.5 SEC;   INR: 1.35          PTT - ( 06 Aug 2019 16:30 )  PTT:26.2 SEC  LIVER FUNCTIONS - ( 07 Aug 2019 05:38 )  Alb: 3.3 g/dL / Pro: 6.1 g/dL / ALK PHOS: 64 u/L / ALT: 32 u/L / AST: 60 u/L / GGT: x           CAPILLARY BLOOD GLUCOSE      POCT Blood Glucose.: 119 mg/dL (07 Aug 2019 13:24)  POCT Blood Glucose.: 117 mg/dL (07 Aug 2019 09:19)    MEDICATIONS  (STANDING):  ALBUTerol/ipratropium for Nebulization. 3 milliLiter(s) Nebulizer every 6 hours  heparin  Injectable 5000 Unit(s) SubCutaneous two times a day  lactated ringers. 500 milliLiter(s) (100 mL/Hr) IV Continuous <Continuous>  lactobacillus acidophilus 1 Tablet(s) Oral every 12 hours  LORazepam   Injectable 1 milliGRAM(s) IV Push two times a day  piperacillin/tazobactam IVPB.. 3.375 Gram(s) IV Intermittent every 8 hours  sodium chloride 0.9%. 1000 milliLiter(s) (75 mL/Hr) IV Continuous <Continuous>  sodium chloride 3%  Inhalation 3 milliLiter(s) Inhalation every 6 hours    MEDICATIONS  (PRN):    73 y/o male with possible HX of Psych disorder ,  reports falling off a stool 1.5 days prior to presentation around midnight and becoming pinned in position until rescued by EMS this afternoon. Denies drug or alcohol use prior to falling, and denies feeling lightheaded or dizzy prior now with decreased oxygen saturation    Attending Dr Belcher at bedside, MICU also called  PT o2 sat improved on Non rebreather. ABG showed mild  Continue O2 therapy and will wean off NR via ventimask Start nebulizers.  Continue zosyn for leukocytosis due to possible Nosocomial PNA. Check bld cultures./ urine cultures. Pt's saturation increased to 94% on Venti mask. Attending aware. continue abx for infection.  On hypertonic saline, nebulizers, chest vest, and suction.          1.Rhabdomyolysis.  + weakness, cachectic, poor oral intake,  S/P Fall ,  Tele monitor, ECHO,  Troponin, Fall/aspiration precaution,   IVF NS @ 150 cc/hr for now,    Trend CPK =3311--> 1695 MI ruled out via troponins  ECHO ordered      2.Leukocytosis.  Plan: ID consult in AM, RVP neg, + Cough R/O Pneumonia, , , R/O Cellulitis Face, + Skin Erythema and Scab in Lower Abdomen R/O Cellulitis, Wound Consult, on only IV Zosyn ( given vanco in ED)   CT head/maxilofacial: Discontinuous appearance of the left lower medial incisor which appears displaced posteriorly suggesting an avulsed tooth/alveolar fracture. Poor dentition with multiple missing teeth.Right facial and external auditory canal soft tissue swelling likely posttraumatic in nature however cellulitis/otitis externa is not excluded.   CT abd/pelvis: few RLL groundglass opacities     3.Scrotal swelling.  Scrotal US: Limited examination secondary to large mildly complex left hydrocele. Small right hydrocele. No sonographic evidence of testicular torsion. F/U urology as outpatient    4.Psychiatric disorder.     Psych consult in AM, on Benzo at home, continue benzos abut switch to ativan 1mg BID    5.: Dental caries and loose teethg  Plan: Dental consult =-pt refused dental extraction-for follow up as outpatient NPO for now, IVF NS,   Speech and Swallow consult,- cinesophragram pending      PT/OT/social work consult for placement- possibly nursing home/rehab?  Case dw Dr belcher This documentation is due to the fact that my previous note did not save yesterday. Called by nurse yesterday 2019 at 2pm due to O2 saturation 84% on room air and 90% on no rebreather. Pt without compliants stating that he feels sleepy. NO chest pain, SOB, GARZA, PND, orthopnea, palpitations, diaphoresis, lightheadedness, dizziness, syncope, increased lower extremity edema, fever chills, malaise, myalgias, anorexia, weight changes ( loss or gain), night sweats, generalized fatigue abdominal pain, N/V/C/D BRBPR, melena, urinary symptoms, cough, and wheezing.    Physical Exam  Vital Signs Last 24 Hrs  T(C): 37.2 (08 Aug 2019 06:00), Max: 38.5 (07 Aug 2019 13:51)  T(F): 99 (08 Aug 2019 06:00), Max: 101.3 (07 Aug 2019 13:51)  HR: 68 (08 Aug 2019 06:00) (64 - 101)  BP: 115/65 (08 Aug 2019 06:00) (115/65 - 122/61)  BP(mean): 86 (07 Aug 2019 16:00) (86 - 86)  RR: 18 (08 Aug 2019 06:00) (17 - 22)  SpO2: 98% (08 Aug 2019 06:00) (91% - 98%)  Appearance: Normal	  HEENT:   Normal oral mucosa, PERRL, EOMI	  Lymphatic: No lymphadenopathy  Cardiovascular: Normal S1 S2, No JVD, No murmurs, No edema  Respiratory: Lungs clear to auscultation	  Psychiatry: A & O x 3, Mood & affect appropriate  Gastrointestinal:  Soft, Non-tender, + BS	  Skin: No rashes, No ecchymoses, No cyanosis  Neurologic: Non-focal  Extremities: Normal range of motion, No clubbing, cyanosis or edema  Vascular: Peripheral pulses palpable 2+ bilaterally    TELEMETRY: 	    ECG:  	  RADIOLOGY:     OTHER: 	  LABS                        10.6<L> [13.0 - 17.0]  16.34<H> [3.8 - 10.5] )-----------( 151 [150 - 400]    ( 08 Aug 2019 06:50 )             32.8<L> [39.0 - 50.0]    08-08    139  |  102  |  22  ----------------------------<  134<H>  3.2<L>   |  25  |  0.74    Ca    8.3<L>      08 Aug 2019 06:50  Phos  2.4     08-08  Mg     1.7     08-08    TPro  6.1  /  Alb  3.3  /  TBili  0.8  /  DBili  x   /  AST  60<H>  /  ALT  32  /  AlkPhos  64  08-07    CARDIAC MARKERS ( 08 Aug 2019 06:50 )  x     / x     / 1414 u/L / x     / x      CARDIAC MARKERS ( 07 Aug 2019 05:38 )  x     / x     / 1695 u/L / x     / x      CARDIAC MARKERS ( 06 Aug 2019 22:07 )  x     / x     / 2450 u/L / x     / x      CARDIAC MARKERS ( 06 Aug 2019 13:50 )  x     / x     / 3311 u/L / 45.23 ng/mL / x          ABG - ( 07 Aug 2019 15:00 )  pH, Arterial: 7.48  pH, Blood: x     /  pCO2: 32    /  pO2: 83    / HCO3: 25    / Base Excess: 0.3   /  SaO2: 97.1              Urinalysis Basic - ( 06 Aug 2019 13:50 )    Color: YELLOW / Appearance: CLEAR / S.028 / pH: 6.0  Gluc: NEGATIVE / Ketone: >150  / Bili: NEGATIVE / Urobili: NORMAL   Blood: LARGE / Protein: 70 / Nitrite: NEGATIVE   Leuk Esterase: NEGATIVE / RBC: 3-5 / WBC 0-2   Sq Epi: OCC / Non Sq Epi: x / Bacteria: NEGATIVE      PT/INR - ( 06 Aug 2019 16:30 )   PT: 15.5 SEC;   INR: 1.35          PTT - ( 06 Aug 2019 16:30 )  PTT:26.2 SEC  LIVER FUNCTIONS - ( 07 Aug 2019 05:38 )  Alb: 3.3 g/dL / Pro: 6.1 g/dL / ALK PHOS: 64 u/L / ALT: 32 u/L / AST: 60 u/L / GGT: x           CAPILLARY BLOOD GLUCOSE      POCT Blood Glucose.: 119 mg/dL (07 Aug 2019 13:24)  POCT Blood Glucose.: 117 mg/dL (07 Aug 2019 09:19)    MEDICATIONS  (STANDING):  ALBUTerol/ipratropium for Nebulization. 3 milliLiter(s) Nebulizer every 6 hours  heparin  Injectable 5000 Unit(s) SubCutaneous two times a day  lactated ringers. 500 milliLiter(s) (100 mL/Hr) IV Continuous <Continuous>  lactobacillus acidophilus 1 Tablet(s) Oral every 12 hours  LORazepam   Injectable 1 milliGRAM(s) IV Push two times a day  piperacillin/tazobactam IVPB.. 3.375 Gram(s) IV Intermittent every 8 hours  sodium chloride 0.9%. 1000 milliLiter(s) (75 mL/Hr) IV Continuous <Continuous>  sodium chloride 3%  Inhalation 3 milliLiter(s) Inhalation every 6 hours    MEDICATIONS  (PRN):    73 y/o male with possible HX of Psych disorder ,  reports falling off a stool 1.5 days prior to presentation around midnight and becoming pinned in position until rescued by EMS this afternoon. Denies drug or alcohol use prior to falling, and denies feeling lightheaded or dizzy prior now with decreased oxygen saturation    Attending Dr Belcher at bedside, MICU also called  PT o2 sat improved on Non rebreather. ABG showed mild  Continue O2 therapy and will wean off NR via ventimask Start nebulizers.  Continue zosyn for leukocytosis due to possible Nosocomial PNA. Check bld cultures./ urine cultures. Pt's saturation increased to 94% on Venti mask. Attending aware. continue abx for infection.  On hypertonic saline, nebulizers, chest vest, and suction.          1.Rhabdomyolysis.  + weakness, cachectic, poor oral intake,  S/P Fall ,  Tele monitor, ECHO,  Troponin, Fall/aspiration precaution,   IVF NS @ 150 cc/hr for now,    Trend CPK =3311--> 1695 MI ruled out via troponins  ECHO ordered      2.Leukocytosis.  Plan: ID consult in AM, RVP neg, + Cough R/O Pneumonia, , , R/O Cellulitis Face, + Skin Erythema and Scab in Lower Abdomen R/O Cellulitis, Wound Consult, on only IV Zosyn ( given vanco in ED)   CT head/maxilofacial: Discontinuous appearance of the left lower medial incisor which appears displaced posteriorly suggesting an avulsed tooth/alveolar fracture. Poor dentition with multiple missing teeth.Right facial and external auditory canal soft tissue swelling likely posttraumatic in nature however cellulitis/otitis externa is not excluded.   CT abd/pelvis: few RLL groundglass opacities     3.Scrotal swelling.  Scrotal US: Limited examination secondary to large mildly complex left hydrocele. Small right hydrocele. No sonographic evidence of testicular torsion. F/U urology as outpatient    4.Psychiatric disorder.     Psych consult in AM, on Benzo at home, continue benzos abut switch to ativan 1mg BID    5.: Dental caries and loose teethg  Plan: Dental consult =-pt refused dental extraction-for follow up as outpatient NPO for now, IVF NS,   Speech and Swallow consult,- cinesophragram pending      PT/OT/social work consult for placement- possibly nursing home/rehab?  Case dw Dr belcher

## 2019-08-08 NOTE — PROGRESS NOTE ADULT - PROBLEM SELECTOR PLAN 2
could be due to PNA vs pulm edema   ABG showed primary respiratory alkalosis   MICU consulted   c/w O2 supplement to keep O2 Sat > 92%  frequent suctioning. d/w RN hypoxia resolved, now satting well on RA, thought to be from PNA vs pulm edema   ABG showed primary respiratory alkalosis   C/w Zosyn for PNA

## 2019-08-08 NOTE — CONSULT NOTE ADULT - ASSESSMENT
74 year old male with onychomycosis, b/l feet  -Pain relieved s/p debridement of ingrowing toe nails x 10  -No open lesions or local signs of infection  -Z floats while in bed  -No other acute podiatric concerns at this time  -Please reconsult as needed    pager [phone]

## 2019-08-08 NOTE — PROGRESS NOTE BEHAVIORAL HEALTH - OTHER
cachectic not assessed gait not assessed Neutral Initially confused but increasingly goal-directed as eval progressed somewhat confused

## 2019-08-08 NOTE — PHYSICAL THERAPY INITIAL EVALUATION ADULT - ADDITIONAL COMMENTS
Patient lives in a house with one step to enter. Within the home patient reports there are numerous steps throughout home, including one full flight of stairs to get to his bedroom. Patient lives alone and reports using no assistive device prior. Patient reports ~3weeks of balance disturbances prior to admission and "not able to move my legs because I don't know where I am in space".    CT Head: unremarkable; X ray: Pelvis No Fracture    Patient left as found, in semi supine, all lines intact, call smith in reach and RN Tim aware.

## 2019-08-08 NOTE — PROGRESS NOTE ADULT - PROBLEM SELECTOR PLAN 7
s/p dental eval, but pt refused further dental treatment   Dental F/U with outpatient dentist for comprehensive dental care. Testicle US showed large complex left hydrocele   No sonographic evidence of testicular torsion.

## 2019-08-08 NOTE — PROGRESS NOTE ADULT - PROBLEM SELECTOR PLAN 1
sepsis with hypoxia, likely due to PNA  c/w vanco/zosyn for now   RVP neg, UA neg  f/u blood cx / urine cx 8/6: NGTD   leukocytosis improved   ID consulted , will follow sepsis with hypoxia, likely due to PNA  c/w zosyn for now. WBC downtrending. Check urine legionella  Resp cx with gram neg diplococci, hep panel neg, HIV neg  RVP neg, UA neg, Bcx NGTD, urine cx 8/6: NGTD

## 2019-08-08 NOTE — PROGRESS NOTE BEHAVIORAL HEALTH - CASE SUMMARY
Patient seen and evaluated, agree with above assessment and plan, pt. still somewhat confused with impaired attention, however mental status is improved from yesterday, pt. AAOX2, denies acute psychotic sxs, denies SI and HI. Stated that he has been on benzos. for more than 30 years.  Recommend meds. as written above,  case discussed with Dr. Hull, will follow

## 2019-08-08 NOTE — PROGRESS NOTE ADULT - PROBLEM SELECTOR PLAN 3
S/P Fall , elevated CPK  c/w IVF   CPK trended down urine tox pending. PMD Dr Frank Britton [phone], patient has a long history of depression and anxiety and long term use of Benzo   Decreased ativan 0.5mg bid while NPO, and monitor signs of benzo withdrawal and respiratory status closely   Psych following

## 2019-08-08 NOTE — PROGRESS NOTE ADULT - PROBLEM SELECTOR PLAN 6
urine tox pending. Pt is unkempt, cachectic. Long grown toe nails.   Per patient's PMD Dr Frank Britton [phone], patient has a long history of depression and anxiety. long term use of Benzo   start ativan bid while NPO, and monitor signs of benzo withdrawal and respiratory status closely   f/u psych recs iron low, ferritin normal, b12 and folate WNL  outpt followup, transfuse for Hgb<7

## 2019-08-09 DIAGNOSIS — N40.1 BENIGN PROSTATIC HYPERPLASIA WITH LOWER URINARY TRACT SYMPTOMS: ICD-10-CM

## 2019-08-09 LAB
ALBUMIN SERPL ELPH-MCNC: 2.4 G/DL — LOW (ref 3.3–5)
ALP SERPL-CCNC: 54 U/L — SIGNIFICANT CHANGE UP (ref 40–120)
ALT FLD-CCNC: 31 U/L — SIGNIFICANT CHANGE UP (ref 4–41)
ANION GAP SERPL CALC-SCNC: 13 MMO/L — SIGNIFICANT CHANGE UP (ref 7–14)
AST SERPL-CCNC: 29 U/L — SIGNIFICANT CHANGE UP (ref 4–40)
BILIRUB SERPL-MCNC: 0.5 MG/DL — SIGNIFICANT CHANGE UP (ref 0.2–1.2)
BUN SERPL-MCNC: 19 MG/DL — SIGNIFICANT CHANGE UP (ref 7–23)
CALCIUM SERPL-MCNC: 8.2 MG/DL — LOW (ref 8.4–10.5)
CHLORIDE SERPL-SCNC: 100 MMOL/L — SIGNIFICANT CHANGE UP (ref 98–107)
CK SERPL-CCNC: 466 U/L — HIGH (ref 30–200)
CO2 SERPL-SCNC: 24 MMOL/L — SIGNIFICANT CHANGE UP (ref 22–31)
CREAT SERPL-MCNC: 0.74 MG/DL — SIGNIFICANT CHANGE UP (ref 0.5–1.3)
GLUCOSE SERPL-MCNC: 104 MG/DL — HIGH (ref 70–99)
HCT VFR BLD CALC: 29.9 % — LOW (ref 39–50)
HGB BLD-MCNC: 9.6 G/DL — LOW (ref 13–17)
L PNEUMO AG UR QL: NEGATIVE — SIGNIFICANT CHANGE UP
MAGNESIUM SERPL-MCNC: 1.7 MG/DL — SIGNIFICANT CHANGE UP (ref 1.6–2.6)
MCHC RBC-ENTMCNC: 31.8 PG — SIGNIFICANT CHANGE UP (ref 27–34)
MCHC RBC-ENTMCNC: 32.1 % — SIGNIFICANT CHANGE UP (ref 32–36)
MCV RBC AUTO: 99 FL — SIGNIFICANT CHANGE UP (ref 80–100)
NRBC # FLD: 0 K/UL — SIGNIFICANT CHANGE UP (ref 0–0)
PLATELET # BLD AUTO: 159 K/UL — SIGNIFICANT CHANGE UP (ref 150–400)
PMV BLD: 11.4 FL — SIGNIFICANT CHANGE UP (ref 7–13)
POTASSIUM SERPL-MCNC: 3.5 MMOL/L — SIGNIFICANT CHANGE UP (ref 3.5–5.3)
POTASSIUM SERPL-SCNC: 3.5 MMOL/L — SIGNIFICANT CHANGE UP (ref 3.5–5.3)
PROT SERPL-MCNC: 5.2 G/DL — LOW (ref 6–8.3)
RBC # BLD: 3.02 M/UL — LOW (ref 4.2–5.8)
RBC # FLD: 12.1 % — SIGNIFICANT CHANGE UP (ref 10.3–14.5)
SODIUM SERPL-SCNC: 137 MMOL/L — SIGNIFICANT CHANGE UP (ref 135–145)
WBC # BLD: 14.41 K/UL — HIGH (ref 3.8–10.5)
WBC # FLD AUTO: 14.41 K/UL — HIGH (ref 3.8–10.5)

## 2019-08-09 PROCEDURE — 71045 X-RAY EXAM CHEST 1 VIEW: CPT | Mod: 26

## 2019-08-09 PROCEDURE — 99233 SBSQ HOSP IP/OBS HIGH 50: CPT

## 2019-08-09 PROCEDURE — 74230 X-RAY XM SWLNG FUNCJ C+: CPT | Mod: 26

## 2019-08-09 PROCEDURE — 93306 TTE W/DOPPLER COMPLETE: CPT | Mod: 26

## 2019-08-09 RX ORDER — TAMSULOSIN HYDROCHLORIDE 0.4 MG/1
0.4 CAPSULE ORAL AT BEDTIME
Refills: 0 | Status: DISCONTINUED | OUTPATIENT
Start: 2019-08-09 | End: 2019-08-12

## 2019-08-09 RX ORDER — SODIUM CHLORIDE 9 MG/ML
1000 INJECTION, SOLUTION INTRAVENOUS
Refills: 0 | Status: DISCONTINUED | OUTPATIENT
Start: 2019-08-09 | End: 2019-08-12

## 2019-08-09 RX ADMIN — Medication 3 MILLILITER(S): at 22:43

## 2019-08-09 RX ADMIN — SODIUM CHLORIDE 75 MILLILITER(S): 9 INJECTION, SOLUTION INTRAVENOUS at 16:59

## 2019-08-09 RX ADMIN — Medication 3 MILLILITER(S): at 17:54

## 2019-08-09 RX ADMIN — Medication 1 DROP(S): at 22:14

## 2019-08-09 RX ADMIN — SODIUM CHLORIDE 3 MILLILITER(S): 9 INJECTION INTRAMUSCULAR; INTRAVENOUS; SUBCUTANEOUS at 17:55

## 2019-08-09 RX ADMIN — Medication 1 DROP(S): at 06:27

## 2019-08-09 RX ADMIN — PIPERACILLIN AND TAZOBACTAM 25 GRAM(S): 4; .5 INJECTION, POWDER, LYOPHILIZED, FOR SOLUTION INTRAVENOUS at 06:26

## 2019-08-09 RX ADMIN — SODIUM CHLORIDE 3 MILLILITER(S): 9 INJECTION INTRAMUSCULAR; INTRAVENOUS; SUBCUTANEOUS at 04:04

## 2019-08-09 RX ADMIN — HEPARIN SODIUM 5000 UNIT(S): 5000 INJECTION INTRAVENOUS; SUBCUTANEOUS at 18:55

## 2019-08-09 RX ADMIN — SODIUM CHLORIDE 3 MILLILITER(S): 9 INJECTION INTRAMUSCULAR; INTRAVENOUS; SUBCUTANEOUS at 22:43

## 2019-08-09 RX ADMIN — HEPARIN SODIUM 5000 UNIT(S): 5000 INJECTION INTRAVENOUS; SUBCUTANEOUS at 06:26

## 2019-08-09 RX ADMIN — Medication 0.5 MILLIGRAM(S): at 06:26

## 2019-08-09 RX ADMIN — Medication 3 MILLILITER(S): at 04:04

## 2019-08-09 RX ADMIN — PIPERACILLIN AND TAZOBACTAM 25 GRAM(S): 4; .5 INJECTION, POWDER, LYOPHILIZED, FOR SOLUTION INTRAVENOUS at 14:37

## 2019-08-09 RX ADMIN — PIPERACILLIN AND TAZOBACTAM 25 GRAM(S): 4; .5 INJECTION, POWDER, LYOPHILIZED, FOR SOLUTION INTRAVENOUS at 22:14

## 2019-08-09 RX ADMIN — SODIUM CHLORIDE 100 MILLILITER(S): 9 INJECTION, SOLUTION INTRAVENOUS at 06:27

## 2019-08-09 NOTE — SWALLOW VFSS/MBS ASSESSMENT ADULT - COMMENTS
Per charting, "75 y/o male with possible HX of Psych disorder , lives alone, very poor historian, unkempt and with very poor  hygiene , missing teeth,  Dental caries, on PO Benzo at home, denies ETOH and smoking,  reports falling off a stool 1.5 days prior to presentation around midnight and becoming pinned in position until rescued by EMS this afternoon. Denies drug or alcohol use prior to falling, and denies feeling lightheaded or dizzy prior. Patient is unsure what may have caused the fall other than a sense that his muscles have been "dysfunctional" lately. In the past month, he has noticed some muscle wasting, particularly in his arms, with 6-8 lbs of weight loss in this time. He has also noticed some fasciculations in his legs in the past month as well. He denies any family history of muscular pathology. He also denies seizures, chest pain, shortness of breath, nausea, and abdominal pain. Reports taking diazepam bid and flurazepam at nights and no other medications. Of note, patient has known history of L scrotal hydrocele but has refused surgery. Pt awake, A+O x 3, C/O + Productive cough as well, no fever, no dysuria, no N/V, No HA, no abdominal pain, + weakness, + cachectic with B/L Temporal wasting , Zamzam  (girl friend).  Admission: S/P fall, Rhabdo, R/O Pneumonia, Leukocytosis, Cough, R/O Cellulitis Face/lower Abdomen. "    Of note, clinical swallow assessment completed 8/7, at which time oral nutrition/hydration was contraindicated with recommendation for cine in order to determine PO candidacy.

## 2019-08-09 NOTE — SWALLOW VFSS/MBS ASSESSMENT ADULT - ROSENBEK'S PENETRATION ASPIRATION SCALE
(1) no aspiration, contrast does not enter airway after the swallow/(8) contrast passes glottis, visible subglottic residue remains, absent patient response (aspiration) (8) contrast passes glottis, visible subglottic residue remains, absent patient response (aspiration)

## 2019-08-09 NOTE — PROGRESS NOTE ADULT - SUBJECTIVE AND OBJECTIVE BOX
Patient is a 74y old  Male who presents with a chief complaint of S/P fall, Rhabdo, R/O Pneumonia, Leukocytosis, Cough, R/O Cellulitis Face/lower Abdomen (08 Aug 2019 13:59)      SUBJECTIVE / OVERNIGHT EVENTS: Febrile to 100.6 overnight. Retaining urine and required straight cath. Patient reports having BPH and takes saw palmetto. Denies chest pain, SOB, N/V/D    MEDICATIONS  (STANDING):  ALBUTerol/ipratropium for Nebulization. 3 milliLiter(s) Nebulizer every 6 hours  artificial tears (preservative free) Ophthalmic Solution 1 Drop(s) Both EYES three times a day  heparin  Injectable 5000 Unit(s) SubCutaneous two times a day  lactobacillus acidophilus 1 Tablet(s) Oral every 12 hours  LORazepam   Injectable 0.5 milliGRAM(s) IV Push two times a day  piperacillin/tazobactam IVPB.. 3.375 Gram(s) IV Intermittent every 8 hours  sodium chloride 3%  Inhalation 3 milliLiter(s) Inhalation every 6 hours  tamsulosin 0.4 milliGRAM(s) Oral at bedtime    MEDICATIONS  (PRN):  LORazepam   Injectable 0.5 milliGRAM(s) IV Push every 6 hours PRN Agitation/insomnia      T(C): 37.1 (08-09-19 @ 10:15), Max: 38.1 (08-09-19 @ 00:04)  HR: 79 (08-09-19 @ 10:15) (75 - 95)  BP: 107/60 (08-09-19 @ 10:15) (107/60 - 130/72)  RR: 17 (08-09-19 @ 10:15) (17 - 19)  SpO2: 96% (08-09-19 @ 10:15) (95% - 97%)  CAPILLARY BLOOD GLUCOSE        I&O's Summary    08 Aug 2019 07:01  -  09 Aug 2019 07:00  --------------------------------------------------------  IN: 0 mL / OUT: 420 mL / NET: -420 mL    09 Aug 2019 07:01  -  09 Aug 2019 14:35  --------------------------------------------------------  IN: 0 mL / OUT: 455 mL / NET: -455 mL        PHYSICAL EXAM:  GENERAL: thin and disheveled, no apparent distress, on room air  HEENT: bruising over right cheekbone and jaw with some TTP, and bruising/tear of right ear  CHEST/LUNG: CTA b/l, slightly decreased at LLL  HEART: s1/s2, RRR, no murmurs appreciated  ABDOMEN: Soft, Nontender, Nondistended; Bowel sounds present, distended suprapubic region  EXTREMITIES: WWP, trace ankle edema b/l  : scrotal swelling  NEUROLOGY: awake, alert, able to move all extremities, responds to Qs appropriately  PSYCH: calm, cooperative, follows commands, somewhat tangential  SKIN: bruising over right thigh and right side of face    LABS:                        9.6    14.41 )-----------( 159      ( 09 Aug 2019 06:44 )             29.9     08-09    137  |  100  |  19  ----------------------------<  104<H>  3.5   |  24  |  0.74    Ca    8.2<L>      09 Aug 2019 06:44  Phos  2.4     08-08  Mg     1.7     08-09    TPro  5.2<L>  /  Alb  2.4<L>  /  TBili  0.5  /  DBili  x   /  AST  29  /  ALT  31  /  AlkPhos  54  08-09      CARDIAC MARKERS ( 09 Aug 2019 06:44 )  x     / x     / 466 u/L / x     / x      CARDIAC MARKERS ( 08 Aug 2019 06:50 )  x     / x     / 1414 u/L / x     / x              RADIOLOGY & ADDITIONAL TESTS:

## 2019-08-09 NOTE — SWALLOW VFSS/MBS ASSESSMENT ADULT - RECOMMENDED CONSISTENCY
IMPRESSIONS CONT: Pt p/w reduced laryngopharyngeal sensation, as pt did not demonstrate cough response in relation to penetration/aspiration and denied sensation of pharyngeal stasis.    RECOMMENDATIONS:  1. Oral nutrition/hydration/medication is contraindicated at this time, consider short-term non-oral means of nutrition/hydration/medication.  2. Repeat objective assessment is warranted to determine PO candidacy given severity of oropharyngeal dysphagia.  3. Maintain good oral hygiene.

## 2019-08-09 NOTE — PROGRESS NOTE ADULT - PROBLEM SELECTOR PLAN 8
s/p dental eval, but pt refused further dental treatment   Dental F/U with outpatient dentist for comprehensive dental care.

## 2019-08-09 NOTE — PROGRESS NOTE ADULT - PROBLEM SELECTOR PLAN 5
urine tox + benzo. Per PMD Dr Frank Britton [phone], patient has a long history of depression and anxiety and long term use of Benzo   C/w ativan IV 0.5mg bid, monitor signs of benzo withdrawal and respiratory status closely. Psych following

## 2019-08-09 NOTE — SWALLOW VFSS/MBS ASSESSMENT ADULT - DIAGNOSTIC IMPRESSIONS
Pt p/w 1. Mild oral dysphagia when given puree, honey thick liquids, nectar thick liquids, and thin liquids marked by reduced utensil stripping, mild anterior spillage with liquids, reduced bolus cohesion with liquids resulting in premature spillage to the hypopharynx, delayed oral transit, and adequate oral clearance post swallow. 2. Severe pharyngeal dysphagia when given honey thick, nectar thick, and thin liquids marked by brief delay in pharyngeal swallow trigger at the level of the pyriforms with nectar thick and thin liquids, x3 swallows per bolus, reduced BOT retraction resulting in moderate to severe vallecular residue, reduced hyolaryngeal elevation/excursion with mild pyriform residue, incomplete epiglottic retroflexion, and reduced pharyngeal contractility resulting in mild to mod lateral pharyngeal wall residue. There was deep/silent penetration during the swallow with thin and nectar thick liquids with subsequent silent aspiration 2/2 penetrated residue migrating to subglottic level and spillage from pyriform residue. There was flash penetration with complete retrieval during the swallow with honey thick liquids; however, pharyngeal residue observed to spillover resulting in silent aspiration. Pt p/w reduced laryngopharyngeal sensation and was unable to clear penetrated/aspirated residue with cued cough response. 3. Moderate pharyngeal dysphagia when given puree marked by timely pharyngeal swallow trigger, x3 swallows per bolus, reduced BOT retraction resulting in mod to severe vallecular residue, reduced hyolaryngeal elevation/excursion with mild pyriform residue, and reduced pharyngeal contractility resulting in mild to mod lateral pharyngeal wall residue. There was no penetration and/or aspiration observed during the swallow; however, there was observed silent penetration/aspiration post swallow 2/2 presence of pharyngeal residue and upon pharyngeal residue clearance attempts (cued effortful swallows, liquid wash, chin tuck; all deemed unsuccessful).

## 2019-08-09 NOTE — PROGRESS NOTE ADULT - PROBLEM SELECTOR PLAN 1
2/2 aspiration/ gram negative pneumonia  c/w zosyn for now. WBC downtrending. f/u urine legionella  Resp cx with nml resp maricarmen, hep panel neg, HIV neg, RVP neg, UA neg, Bcx NGTD, urine cx 8/6: NGTD. CXR with new LLL collapse, satting well on RA: chest PT, incentive spirometry

## 2019-08-10 LAB
ANION GAP SERPL CALC-SCNC: 12 MMO/L — SIGNIFICANT CHANGE UP (ref 7–14)
APPEARANCE UR: CLEAR — SIGNIFICANT CHANGE UP
BACTERIA # UR AUTO: NEGATIVE — SIGNIFICANT CHANGE UP
BACTERIA SPT RESP CULT: SIGNIFICANT CHANGE UP
BILIRUB UR-MCNC: NEGATIVE — SIGNIFICANT CHANGE UP
BLOOD UR QL VISUAL: HIGH
BUN SERPL-MCNC: 14 MG/DL — SIGNIFICANT CHANGE UP (ref 7–23)
CALCIUM SERPL-MCNC: 7.9 MG/DL — LOW (ref 8.4–10.5)
CHLORIDE SERPL-SCNC: 99 MMOL/L — SIGNIFICANT CHANGE UP (ref 98–107)
CO2 SERPL-SCNC: 26 MMOL/L — SIGNIFICANT CHANGE UP (ref 22–31)
COLOR SPEC: YELLOW — SIGNIFICANT CHANGE UP
CREAT SERPL-MCNC: 0.67 MG/DL — SIGNIFICANT CHANGE UP (ref 0.5–1.3)
GLUCOSE SERPL-MCNC: 126 MG/DL — HIGH (ref 70–99)
GLUCOSE UR-MCNC: NEGATIVE — SIGNIFICANT CHANGE UP
HCT VFR BLD CALC: 30.1 % — LOW (ref 39–50)
HGB BLD-MCNC: 9.9 G/DL — LOW (ref 13–17)
HYALINE CASTS # UR AUTO: NEGATIVE — SIGNIFICANT CHANGE UP
KETONES UR-MCNC: HIGH
LEUKOCYTE ESTERASE UR-ACNC: NEGATIVE — SIGNIFICANT CHANGE UP
MAGNESIUM SERPL-MCNC: 1.8 MG/DL — SIGNIFICANT CHANGE UP (ref 1.6–2.6)
MCHC RBC-ENTMCNC: 32.6 PG — SIGNIFICANT CHANGE UP (ref 27–34)
MCHC RBC-ENTMCNC: 32.9 % — SIGNIFICANT CHANGE UP (ref 32–36)
MCV RBC AUTO: 99 FL — SIGNIFICANT CHANGE UP (ref 80–100)
NITRITE UR-MCNC: NEGATIVE — SIGNIFICANT CHANGE UP
NRBC # FLD: 0 K/UL — SIGNIFICANT CHANGE UP (ref 0–0)
PH UR: 7 — SIGNIFICANT CHANGE UP (ref 5–8)
PLATELET # BLD AUTO: 165 K/UL — SIGNIFICANT CHANGE UP (ref 150–400)
PMV BLD: 10.9 FL — SIGNIFICANT CHANGE UP (ref 7–13)
POTASSIUM SERPL-MCNC: 3.2 MMOL/L — LOW (ref 3.5–5.3)
POTASSIUM SERPL-SCNC: 3.2 MMOL/L — LOW (ref 3.5–5.3)
PROT UR-MCNC: 50 — SIGNIFICANT CHANGE UP
RBC # BLD: 3.04 M/UL — LOW (ref 4.2–5.8)
RBC # FLD: 12.2 % — SIGNIFICANT CHANGE UP (ref 10.3–14.5)
RBC CASTS # UR COMP ASSIST: >50 — HIGH (ref 0–?)
SODIUM SERPL-SCNC: 137 MMOL/L — SIGNIFICANT CHANGE UP (ref 135–145)
SP GR SPEC: 1.02 — SIGNIFICANT CHANGE UP (ref 1–1.04)
SPECIMEN SOURCE: SIGNIFICANT CHANGE UP
SPECIMEN SOURCE: SIGNIFICANT CHANGE UP
SQUAMOUS # UR AUTO: SIGNIFICANT CHANGE UP
UROBILINOGEN FLD QL: NORMAL — SIGNIFICANT CHANGE UP
WBC # BLD: 11.74 K/UL — HIGH (ref 3.8–10.5)
WBC # FLD AUTO: 11.74 K/UL — HIGH (ref 3.8–10.5)
WBC UR QL: SIGNIFICANT CHANGE UP (ref 0–?)

## 2019-08-10 PROCEDURE — 99232 SBSQ HOSP IP/OBS MODERATE 35: CPT

## 2019-08-10 RX ORDER — POTASSIUM CHLORIDE 20 MEQ
10 PACKET (EA) ORAL ONCE
Refills: 0 | Status: DISCONTINUED | OUTPATIENT
Start: 2019-08-10 | End: 2019-08-10

## 2019-08-10 RX ORDER — POTASSIUM CHLORIDE 20 MEQ
10 PACKET (EA) ORAL ONCE
Refills: 0 | Status: COMPLETED | OUTPATIENT
Start: 2019-08-10 | End: 2019-08-10

## 2019-08-10 RX ORDER — POTASSIUM CHLORIDE 20 MEQ
40 PACKET (EA) ORAL EVERY 4 HOURS
Refills: 0 | Status: COMPLETED | OUTPATIENT
Start: 2019-08-10 | End: 2019-08-10

## 2019-08-10 RX ADMIN — Medication 1 DROP(S): at 15:04

## 2019-08-10 RX ADMIN — Medication 1 DROP(S): at 05:56

## 2019-08-10 RX ADMIN — SODIUM CHLORIDE 3 MILLILITER(S): 9 INJECTION INTRAMUSCULAR; INTRAVENOUS; SUBCUTANEOUS at 16:16

## 2019-08-10 RX ADMIN — HEPARIN SODIUM 5000 UNIT(S): 5000 INJECTION INTRAVENOUS; SUBCUTANEOUS at 05:55

## 2019-08-10 RX ADMIN — PIPERACILLIN AND TAZOBACTAM 25 GRAM(S): 4; .5 INJECTION, POWDER, LYOPHILIZED, FOR SOLUTION INTRAVENOUS at 15:04

## 2019-08-10 RX ADMIN — Medication 1 DROP(S): at 22:30

## 2019-08-10 RX ADMIN — SODIUM CHLORIDE 3 MILLILITER(S): 9 INJECTION INTRAMUSCULAR; INTRAVENOUS; SUBCUTANEOUS at 05:07

## 2019-08-10 RX ADMIN — SODIUM CHLORIDE 75 MILLILITER(S): 9 INJECTION, SOLUTION INTRAVENOUS at 05:56

## 2019-08-10 RX ADMIN — HEPARIN SODIUM 5000 UNIT(S): 5000 INJECTION INTRAVENOUS; SUBCUTANEOUS at 18:12

## 2019-08-10 RX ADMIN — Medication 3 MILLILITER(S): at 11:58

## 2019-08-10 RX ADMIN — Medication 100 MILLIEQUIVALENT(S): at 22:30

## 2019-08-10 RX ADMIN — Medication 3 MILLILITER(S): at 21:50

## 2019-08-10 RX ADMIN — Medication 3 MILLILITER(S): at 05:07

## 2019-08-10 RX ADMIN — SODIUM CHLORIDE 3 MILLILITER(S): 9 INJECTION INTRAMUSCULAR; INTRAVENOUS; SUBCUTANEOUS at 11:58

## 2019-08-10 RX ADMIN — Medication 3 MILLILITER(S): at 16:16

## 2019-08-10 RX ADMIN — SODIUM CHLORIDE 3 MILLILITER(S): 9 INJECTION INTRAMUSCULAR; INTRAVENOUS; SUBCUTANEOUS at 21:55

## 2019-08-10 RX ADMIN — SODIUM CHLORIDE 75 MILLILITER(S): 9 INJECTION, SOLUTION INTRAVENOUS at 15:05

## 2019-08-10 RX ADMIN — PIPERACILLIN AND TAZOBACTAM 25 GRAM(S): 4; .5 INJECTION, POWDER, LYOPHILIZED, FOR SOLUTION INTRAVENOUS at 05:55

## 2019-08-10 NOTE — PROGRESS NOTE ADULT - SUBJECTIVE AND OBJECTIVE BOX
Patient is a 74y old  Male who presents with a chief complaint of S/P fall, Rhabdo, R/O Pneumonia, Leukocytosis, Cough, R/O Cellulitis Face/lower Abdomen (09 Aug 2019 14:35)      SUBJECTIVE / OVERNIGHT EVENTS: No new complaints.     MEDICATIONS  (STANDING):  ALBUTerol/ipratropium for Nebulization. 3 milliLiter(s) Nebulizer every 6 hours  artificial tears (preservative free) Ophthalmic Solution 1 Drop(s) Both EYES three times a day  dextrose 5% + sodium chloride 0.45%. 1000 milliLiter(s) (75 mL/Hr) IV Continuous <Continuous>  heparin  Injectable 5000 Unit(s) SubCutaneous two times a day  lactobacillus acidophilus 1 Tablet(s) Oral every 12 hours  LORazepam   Injectable 0.5 milliGRAM(s) IV Push two times a day  piperacillin/tazobactam IVPB.. 3.375 Gram(s) IV Intermittent every 8 hours  potassium chloride    Tablet ER 40 milliEquivalent(s) Oral every 4 hours  sodium chloride 3%  Inhalation 3 milliLiter(s) Inhalation every 6 hours  tamsulosin 0.4 milliGRAM(s) Oral at bedtime    MEDICATIONS  (PRN):  LORazepam   Injectable 0.5 milliGRAM(s) IV Push every 6 hours PRN Agitation/insomnia      Vital Signs Last 24 Hrs  T(C): 36.8 (10 Aug 2019 10:13), Max: 37.3 (09 Aug 2019 18:15)  T(F): 98.3 (10 Aug 2019 10:13), Max: 99.2 (09 Aug 2019 18:15)  HR: 77 (10 Aug 2019 10:13) (77 - 91)  BP: 126/61 (10 Aug 2019 10:13) (109/60 - 126/61)  BP(mean): --  RR: 16 (10 Aug 2019 10:13) (16 - 19)  SpO2: 98% (10 Aug 2019 10:13) (93% - 98%)  CAPILLARY BLOOD GLUCOSE        I&O's Summary    09 Aug 2019 07:01  -  10 Aug 2019 07:00  --------------------------------------------------------  IN: 375 mL / OUT: 1125 mL / NET: -750 mL    10 Aug 2019 07:01  -  10 Aug 2019 13:57  --------------------------------------------------------  IN: 225 mL / OUT: 700 mL / NET: -475 mL        PHYSICAL EXAM:  GENERAL: NAD, thin  HEAD:  bruising over right cheekbone and jaw with some TTP, and bruising/tear of right ear  EYES: EOMI, PERRLA, conjunctiva and sclera clear  NECK: Supple, No JVD  CHEST/LUNG: Clear to auscultation bilaterally; No wheeze  HEART: Regular rate and rhythm; No murmurs, rubs, or gallops  ABDOMEN: Soft, Nontender, Nondistended; Bowel sounds present  EXTREMITIES:  2+ Peripheral Pulses, No clubbing, cyanosis, or edema  : Scrotal swelling   PSYCH: AAOx3  NEUROLOGY: non-focal  SKIN: bruising over right thigh and right side of face      LABS:                        9.9    11.74 )-----------( 165      ( 10 Aug 2019 05:45 )             30.1     08-10    137  |  99  |  14  ----------------------------<  126<H>  3.2<L>   |  26  |  0.67    Ca    7.9<L>      10 Aug 2019 05:45  Mg     1.8     -10    TPro  5.2<L>  /  Alb  2.4<L>  /  TBili  0.5  /  DBili  x   /  AST  29  /  ALT  31  /  AlkPhos  54  08-09      CARDIAC MARKERS ( 09 Aug 2019 06:44 )  x     / x     / 466 u/L / x     / x          Urinalysis Basic - ( 09 Aug 2019 18:28 )    Color: YELLOW / Appearance: CLEAR / S.024 / pH: 7.0  Gluc: NEGATIVE / Ketone: MODERATE  / Bili: NEGATIVE / Urobili: NORMAL   Blood: MODERATE / Protein: 50 / Nitrite: NEGATIVE   Leuk Esterase: NEGATIVE / RBC: >50 / WBC 0-2   Sq Epi: OCC / Non Sq Epi: x / Bacteria: NEGATIVE        RADIOLOGY & ADDITIONAL TESTS:    Imaging Personally Reviewed:    Consultant(s) Notes Reviewed:      Care Discussed with Consultants/Other Providers:

## 2019-08-10 NOTE — CHART NOTE - NSCHARTNOTEFT_GEN_A_CORE
Patient refuses NGT  at this , explained to the patient he needs hydration and nutritional needs, still refuses.

## 2019-08-10 NOTE — PROGRESS NOTE ADULT - PROBLEM SELECTOR PLAN 1
2/2 aspiration/ gram negative pneumonia  c/w zosyn for now. WBC downtrending.  Resp cx with nml resp maricarmen, hep panel neg, HIV neg, RVP neg, UA neg, Bcx NGTD, urine cx 8/6: NGTD. CXR with new LLL collapse, satting well on RA: chest PT, incentive spirometry

## 2019-08-10 NOTE — PROGRESS NOTE ADULT - PROBLEM SELECTOR PLAN 5
urine tox + benzo. Per PMD Dr Frank Britton [phone], patient has a long history of depression and anxiety and long term use of Benzo   C/w ativan IV 0.5mg bid, monitor signs of benzo withdrawal and respiratory status closely. Psych following

## 2019-08-11 LAB
ANION GAP SERPL CALC-SCNC: 11 MMO/L — SIGNIFICANT CHANGE UP (ref 7–14)
BACTERIA BLD CULT: SIGNIFICANT CHANGE UP
BUN SERPL-MCNC: 10 MG/DL — SIGNIFICANT CHANGE UP (ref 7–23)
CALCIUM SERPL-MCNC: 8 MG/DL — LOW (ref 8.4–10.5)
CHLORIDE SERPL-SCNC: 99 MMOL/L — SIGNIFICANT CHANGE UP (ref 98–107)
CO2 SERPL-SCNC: 27 MMOL/L — SIGNIFICANT CHANGE UP (ref 22–31)
CREAT SERPL-MCNC: 0.61 MG/DL — SIGNIFICANT CHANGE UP (ref 0.5–1.3)
GLUCOSE SERPL-MCNC: 122 MG/DL — HIGH (ref 70–99)
HCT VFR BLD CALC: 30.5 % — LOW (ref 39–50)
HGB BLD-MCNC: 9.9 G/DL — LOW (ref 13–17)
MAGNESIUM SERPL-MCNC: 1.9 MG/DL — SIGNIFICANT CHANGE UP (ref 1.6–2.6)
MCHC RBC-ENTMCNC: 32.1 PG — SIGNIFICANT CHANGE UP (ref 27–34)
MCHC RBC-ENTMCNC: 32.5 % — SIGNIFICANT CHANGE UP (ref 32–36)
MCV RBC AUTO: 99 FL — SIGNIFICANT CHANGE UP (ref 80–100)
NRBC # FLD: 0 K/UL — SIGNIFICANT CHANGE UP (ref 0–0)
PLATELET # BLD AUTO: 193 K/UL — SIGNIFICANT CHANGE UP (ref 150–400)
PMV BLD: 10.5 FL — SIGNIFICANT CHANGE UP (ref 7–13)
POTASSIUM SERPL-MCNC: 3.1 MMOL/L — LOW (ref 3.5–5.3)
POTASSIUM SERPL-SCNC: 3.1 MMOL/L — LOW (ref 3.5–5.3)
RBC # BLD: 3.08 M/UL — LOW (ref 4.2–5.8)
RBC # FLD: 12.3 % — SIGNIFICANT CHANGE UP (ref 10.3–14.5)
SODIUM SERPL-SCNC: 137 MMOL/L — SIGNIFICANT CHANGE UP (ref 135–145)
WBC # BLD: 10.34 K/UL — SIGNIFICANT CHANGE UP (ref 3.8–10.5)
WBC # FLD AUTO: 10.34 K/UL — SIGNIFICANT CHANGE UP (ref 3.8–10.5)

## 2019-08-11 PROCEDURE — 99232 SBSQ HOSP IP/OBS MODERATE 35: CPT

## 2019-08-11 RX ORDER — POTASSIUM CHLORIDE 20 MEQ
10 PACKET (EA) ORAL
Refills: 0 | Status: COMPLETED | OUTPATIENT
Start: 2019-08-11 | End: 2019-08-11

## 2019-08-11 RX ORDER — SODIUM CHLORIDE 9 MG/ML
1000 INJECTION, SOLUTION INTRAVENOUS
Refills: 0 | Status: DISCONTINUED | OUTPATIENT
Start: 2019-08-11 | End: 2019-08-12

## 2019-08-11 RX ADMIN — Medication 3 MILLILITER(S): at 16:29

## 2019-08-11 RX ADMIN — SODIUM CHLORIDE 75 MILLILITER(S): 9 INJECTION, SOLUTION INTRAVENOUS at 17:33

## 2019-08-11 RX ADMIN — Medication 100 MILLIEQUIVALENT(S): at 13:42

## 2019-08-11 RX ADMIN — PIPERACILLIN AND TAZOBACTAM 25 GRAM(S): 4; .5 INJECTION, POWDER, LYOPHILIZED, FOR SOLUTION INTRAVENOUS at 05:49

## 2019-08-11 RX ADMIN — Medication 3 MILLILITER(S): at 11:08

## 2019-08-11 RX ADMIN — HEPARIN SODIUM 5000 UNIT(S): 5000 INJECTION INTRAVENOUS; SUBCUTANEOUS at 05:49

## 2019-08-11 RX ADMIN — SODIUM CHLORIDE 3 MILLILITER(S): 9 INJECTION INTRAMUSCULAR; INTRAVENOUS; SUBCUTANEOUS at 11:08

## 2019-08-11 RX ADMIN — HEPARIN SODIUM 5000 UNIT(S): 5000 INJECTION INTRAVENOUS; SUBCUTANEOUS at 17:29

## 2019-08-11 RX ADMIN — PIPERACILLIN AND TAZOBACTAM 25 GRAM(S): 4; .5 INJECTION, POWDER, LYOPHILIZED, FOR SOLUTION INTRAVENOUS at 13:42

## 2019-08-11 RX ADMIN — Medication 100 MILLIEQUIVALENT(S): at 12:36

## 2019-08-11 RX ADMIN — PIPERACILLIN AND TAZOBACTAM 25 GRAM(S): 4; .5 INJECTION, POWDER, LYOPHILIZED, FOR SOLUTION INTRAVENOUS at 00:04

## 2019-08-11 RX ADMIN — SODIUM CHLORIDE 75 MILLILITER(S): 9 INJECTION, SOLUTION INTRAVENOUS at 08:03

## 2019-08-11 RX ADMIN — Medication 0.5 MILLIGRAM(S): at 17:29

## 2019-08-11 RX ADMIN — Medication 1 DROP(S): at 13:42

## 2019-08-11 RX ADMIN — Medication 1 DROP(S): at 22:31

## 2019-08-11 RX ADMIN — Medication 1 DROP(S): at 05:49

## 2019-08-11 RX ADMIN — SODIUM CHLORIDE 75 MILLILITER(S): 9 INJECTION, SOLUTION INTRAVENOUS at 15:23

## 2019-08-11 RX ADMIN — Medication 100 MILLIEQUIVALENT(S): at 10:35

## 2019-08-11 RX ADMIN — SODIUM CHLORIDE 75 MILLILITER(S): 9 INJECTION, SOLUTION INTRAVENOUS at 05:49

## 2019-08-11 RX ADMIN — PIPERACILLIN AND TAZOBACTAM 25 GRAM(S): 4; .5 INJECTION, POWDER, LYOPHILIZED, FOR SOLUTION INTRAVENOUS at 22:31

## 2019-08-11 RX ADMIN — Medication 0.5 MILLIGRAM(S): at 08:03

## 2019-08-11 RX ADMIN — SODIUM CHLORIDE 3 MILLILITER(S): 9 INJECTION INTRAMUSCULAR; INTRAVENOUS; SUBCUTANEOUS at 16:29

## 2019-08-11 NOTE — PROGRESS NOTE ADULT - PROBLEM SELECTOR PLAN 5
urine tox + benzo. Per PMD Dr Frank Britton [phone], patient has a long history of depression and anxiety and long term use of Benzo   C/w ativan IV 0.5mg bid, monitor signs of benzo withdrawal and respiratory status closely. Psych following

## 2019-08-11 NOTE — PROGRESS NOTE ADULT - PROBLEM SELECTOR PLAN 3
Failed cineesophagogram  NPO/IVF  Refusing NGT  Patient appears to have capacity and wants to eat, but wants girlfriend to be involved int he conversation before making any final decisions

## 2019-08-11 NOTE — PROGRESS NOTE ADULT - SUBJECTIVE AND OBJECTIVE BOX
Patient is a 74y old  Male who presents with a chief complaint of S/P fall, Rhabdo, R/O Pneumonia, Leukocytosis, Cough, R/O Cellulitis Face/lower Abdomen (10 Aug 2019 13:57)      SUBJECTIVE / OVERNIGHT EVENTS: No new complaints.     MEDICATIONS  (STANDING):  ALBUTerol/ipratropium for Nebulization. 3 milliLiter(s) Nebulizer every 6 hours  artificial tears (preservative free) Ophthalmic Solution 1 Drop(s) Both EYES three times a day  dextrose 5% + sodium chloride 0.45%. 1000 milliLiter(s) (75 mL/Hr) IV Continuous <Continuous>  dextrose 5% + sodium chloride 0.45%. 1000 milliLiter(s) (75 mL/Hr) IV Continuous <Continuous>  heparin  Injectable 5000 Unit(s) SubCutaneous two times a day  lactobacillus acidophilus 1 Tablet(s) Oral every 12 hours  LORazepam   Injectable 0.5 milliGRAM(s) IV Push two times a day  piperacillin/tazobactam IVPB.. 3.375 Gram(s) IV Intermittent every 8 hours  sodium chloride 3%  Inhalation 3 milliLiter(s) Inhalation every 6 hours  tamsulosin 0.4 milliGRAM(s) Oral at bedtime    MEDICATIONS  (PRN):  LORazepam   Injectable 0.5 milliGRAM(s) IV Push every 6 hours PRN Agitation/insomnia      Vital Signs Last 24 Hrs  T(C): 36.9 (11 Aug 2019 13:36), Max: 37 (10 Aug 2019 18:09)  T(F): 98.5 (11 Aug 2019 13:36), Max: 98.6 (10 Aug 2019 18:09)  HR: 86 (11 Aug 2019 13:36) (74 - 86)  BP: 130/57 (11 Aug 2019 13:36) (102/59 - 130/57)  BP(mean): --  RR: 18 (11 Aug 2019 13:36) (17 - 19)  SpO2: 95% (11 Aug 2019 13:36) (95% - 98%)  CAPILLARY BLOOD GLUCOSE        I&O's Summary    10 Aug 2019 07:01  -  11 Aug 2019 07:00  --------------------------------------------------------  IN: 825 mL / OUT: 2700 mL / NET: -1875 mL        PHYSICAL EXAM:  GENERAL: NAD, thin  HEAD:  bruising over right cheekbone and jaw with some TTP, and bruising/tear of right ear  EYES: EOMI, PERRLA, conjunctiva and sclera clear  NECK: Supple, No JVD  CHEST/LUNG: Clear to auscultation bilaterally; No wheeze  HEART: Regular rate and rhythm; No murmurs, rubs, or gallops  ABDOMEN: Soft, Nontender, Nondistended; Bowel sounds present  EXTREMITIES:  2+ Peripheral Pulses, No clubbing, cyanosis, or edema  : Scrotal swelling   PSYCH: AAOx3  NEUROLOGY: non-focal  SKIN: bruising over right thigh and right side of face    LABS:                        9.9    10.34 )-----------( 193      ( 11 Aug 2019 06:10 )             30.5     08-11    137  |  99  |  10  ----------------------------<  122<H>  3.1<L>   |  27  |  0.61    Ca    8.0<L>      11 Aug 2019 06:10  Mg     1.9     08-11            Urinalysis Basic - ( 09 Aug 2019 18:28 )    Color: YELLOW / Appearance: CLEAR / S.024 / pH: 7.0  Gluc: NEGATIVE / Ketone: MODERATE  / Bili: NEGATIVE / Urobili: NORMAL   Blood: MODERATE / Protein: 50 / Nitrite: NEGATIVE   Leuk Esterase: NEGATIVE / RBC: >50 / WBC 0-2   Sq Epi: OCC / Non Sq Epi: x / Bacteria: NEGATIVE        RADIOLOGY & ADDITIONAL TESTS:    Imaging Personally Reviewed:    Consultant(s) Notes Reviewed:      Care Discussed with Consultants/Other Providers:

## 2019-08-12 LAB
ANION GAP SERPL CALC-SCNC: 12 MMO/L — SIGNIFICANT CHANGE UP (ref 7–14)
APPEARANCE UR: CLEAR — SIGNIFICANT CHANGE UP
BACTERIA # UR AUTO: SIGNIFICANT CHANGE UP
BACTERIA BLD CULT: SIGNIFICANT CHANGE UP
BACTERIA BLD CULT: SIGNIFICANT CHANGE UP
BILIRUB UR-MCNC: NEGATIVE — SIGNIFICANT CHANGE UP
BLOOD UR QL VISUAL: HIGH
BUN SERPL-MCNC: 8 MG/DL — SIGNIFICANT CHANGE UP (ref 7–23)
CALCIUM SERPL-MCNC: 8.4 MG/DL — SIGNIFICANT CHANGE UP (ref 8.4–10.5)
CHLORIDE SERPL-SCNC: 100 MMOL/L — SIGNIFICANT CHANGE UP (ref 98–107)
CO2 SERPL-SCNC: 26 MMOL/L — SIGNIFICANT CHANGE UP (ref 22–31)
COLOR SPEC: YELLOW — SIGNIFICANT CHANGE UP
CREAT SERPL-MCNC: 0.64 MG/DL — SIGNIFICANT CHANGE UP (ref 0.5–1.3)
GLUCOSE SERPL-MCNC: 118 MG/DL — HIGH (ref 70–99)
GLUCOSE UR-MCNC: NEGATIVE — SIGNIFICANT CHANGE UP
HCT VFR BLD CALC: 32.3 % — LOW (ref 39–50)
HGB BLD-MCNC: 10.5 G/DL — LOW (ref 13–17)
HYALINE CASTS # UR AUTO: NEGATIVE — SIGNIFICANT CHANGE UP
KETONES UR-MCNC: SIGNIFICANT CHANGE UP
LEUKOCYTE ESTERASE UR-ACNC: NEGATIVE — SIGNIFICANT CHANGE UP
MAGNESIUM SERPL-MCNC: 1.9 MG/DL — SIGNIFICANT CHANGE UP (ref 1.6–2.6)
MCHC RBC-ENTMCNC: 31.8 PG — SIGNIFICANT CHANGE UP (ref 27–34)
MCHC RBC-ENTMCNC: 32.5 % — SIGNIFICANT CHANGE UP (ref 32–36)
MCV RBC AUTO: 97.9 FL — SIGNIFICANT CHANGE UP (ref 80–100)
MUCOUS THREADS # UR AUTO: SIGNIFICANT CHANGE UP
NITRITE UR-MCNC: NEGATIVE — SIGNIFICANT CHANGE UP
NRBC # FLD: 0 K/UL — SIGNIFICANT CHANGE UP (ref 0–0)
PH UR: 6 — SIGNIFICANT CHANGE UP (ref 5–8)
PLATELET # BLD AUTO: 264 K/UL — SIGNIFICANT CHANGE UP (ref 150–400)
PMV BLD: 10.5 FL — SIGNIFICANT CHANGE UP (ref 7–13)
POTASSIUM SERPL-MCNC: 3.2 MMOL/L — LOW (ref 3.5–5.3)
POTASSIUM SERPL-SCNC: 3.2 MMOL/L — LOW (ref 3.5–5.3)
PROT UR-MCNC: 50 — SIGNIFICANT CHANGE UP
RBC # BLD: 3.3 M/UL — LOW (ref 4.2–5.8)
RBC # FLD: 12.1 % — SIGNIFICANT CHANGE UP (ref 10.3–14.5)
RBC CASTS # UR COMP ASSIST: HIGH (ref 0–?)
SODIUM SERPL-SCNC: 138 MMOL/L — SIGNIFICANT CHANGE UP (ref 135–145)
SP GR SPEC: 1.02 — SIGNIFICANT CHANGE UP (ref 1–1.04)
SQUAMOUS # UR AUTO: SIGNIFICANT CHANGE UP
UROBILINOGEN FLD QL: NORMAL — SIGNIFICANT CHANGE UP
WBC # BLD: 11.83 K/UL — HIGH (ref 3.8–10.5)
WBC # FLD AUTO: 11.83 K/UL — HIGH (ref 3.8–10.5)
WBC UR QL: SIGNIFICANT CHANGE UP (ref 0–?)

## 2019-08-12 PROCEDURE — 99232 SBSQ HOSP IP/OBS MODERATE 35: CPT

## 2019-08-12 PROCEDURE — 99233 SBSQ HOSP IP/OBS HIGH 50: CPT

## 2019-08-12 PROCEDURE — 71045 X-RAY EXAM CHEST 1 VIEW: CPT | Mod: 26

## 2019-08-12 RX ORDER — POTASSIUM CHLORIDE 20 MEQ
10 PACKET (EA) ORAL
Refills: 0 | Status: COMPLETED | OUTPATIENT
Start: 2019-08-12 | End: 2019-08-12

## 2019-08-12 RX ORDER — TAMSULOSIN HYDROCHLORIDE 0.4 MG/1
0.4 CAPSULE ORAL AT BEDTIME
Refills: 0 | Status: DISCONTINUED | OUTPATIENT
Start: 2019-08-12 | End: 2019-08-13

## 2019-08-12 RX ORDER — IPRATROPIUM/ALBUTEROL SULFATE 18-103MCG
3 AEROSOL WITH ADAPTER (GRAM) INHALATION EVERY 6 HOURS
Refills: 0 | Status: DISCONTINUED | OUTPATIENT
Start: 2019-08-12 | End: 2019-08-26

## 2019-08-12 RX ORDER — LACTOBACILLUS ACIDOPHILUS 100MM CELL
1 CAPSULE ORAL EVERY 12 HOURS
Refills: 0 | Status: DISCONTINUED | OUTPATIENT
Start: 2019-08-12 | End: 2019-08-19

## 2019-08-12 RX ADMIN — HEPARIN SODIUM 5000 UNIT(S): 5000 INJECTION INTRAVENOUS; SUBCUTANEOUS at 05:50

## 2019-08-12 RX ADMIN — Medication 3 MILLILITER(S): at 10:13

## 2019-08-12 RX ADMIN — Medication 1 DROP(S): at 14:38

## 2019-08-12 RX ADMIN — Medication 0.5 MILLIGRAM(S): at 02:10

## 2019-08-12 RX ADMIN — Medication 100 MILLIEQUIVALENT(S): at 13:22

## 2019-08-12 RX ADMIN — Medication 1 DROP(S): at 05:33

## 2019-08-12 RX ADMIN — SODIUM CHLORIDE 75 MILLILITER(S): 9 INJECTION, SOLUTION INTRAVENOUS at 05:35

## 2019-08-12 RX ADMIN — Medication 1 DROP(S): at 23:11

## 2019-08-12 RX ADMIN — HEPARIN SODIUM 5000 UNIT(S): 5000 INJECTION INTRAVENOUS; SUBCUTANEOUS at 18:44

## 2019-08-12 RX ADMIN — Medication 1 TABLET(S): at 18:44

## 2019-08-12 RX ADMIN — SODIUM CHLORIDE 3 MILLILITER(S): 9 INJECTION INTRAMUSCULAR; INTRAVENOUS; SUBCUTANEOUS at 10:13

## 2019-08-12 RX ADMIN — Medication 0.5 MILLIGRAM(S): at 05:34

## 2019-08-12 RX ADMIN — Medication 100 MILLIEQUIVALENT(S): at 12:14

## 2019-08-12 RX ADMIN — Medication 0.5 MILLIGRAM(S): at 23:11

## 2019-08-12 RX ADMIN — PIPERACILLIN AND TAZOBACTAM 25 GRAM(S): 4; .5 INJECTION, POWDER, LYOPHILIZED, FOR SOLUTION INTRAVENOUS at 05:33

## 2019-08-12 RX ADMIN — Medication 100 MILLIEQUIVALENT(S): at 11:08

## 2019-08-12 NOTE — PROGRESS NOTE ADULT - SUBJECTIVE AND OBJECTIVE BOX
Asked to evaluate right ear wound from pressure  Partial thickness injury    No gross signs of infection  Rec  Daily wound care  Wash with CHG soap and water  Silvadene or Aquacel AG   Avoid pressure

## 2019-08-12 NOTE — PROGRESS NOTE BEHAVIORAL HEALTH - NSBHCONSULTRECOMMENDOTHER_PSY_A_CORE FT
WHILE PATIENT IS NPO:  Given current respiratory status, will continue the ativan dose as below  Ativan 0.5 mg IVP BID   Ativan 0.5 mg IVP q6h PRN agitation/insomnia    *****Will consider switching back to valium (outpt. dose) once mental status is improved and when pt. can take po meds.   Valium 5 mg PO BID  Valium 5 mg PO q6h PRN agitation  Can give additional Valium 5 mg PO qhs PRN insomnia . As per team pt. cannot take PO meds and IV/IM Valium not available.     ***Please continue to monitor respiratory status/oxygen saturation closely; hold BZD for oversedation or if concern for respiratory depression***    ***patient's home flurazepam is not available; no direct conversion for flurazepam:diazepam***

## 2019-08-12 NOTE — PROGRESS NOTE BEHAVIORAL HEALTH - NSBHFUPINTERVALHXFT_PSY_A_CORE
Patient received Ativan PRN x1 on 8/11 and Ativan     Pt. seen and evaluated, AAOX2, mental status appears to be better than last week,  calm, cooperative, overall linear and mostly coherent. Patient expressed some frustration being in the hospital and due to acute medical issues, however he denies feeling depressed or anxious. Denies acute psychotic sxs and denies SI and HI.

## 2019-08-12 NOTE — PROGRESS NOTE ADULT - PROBLEM SELECTOR PLAN 5
urine tox + benzo. Per PMD Dr Frank Britton [phone], patient has a long history of depression and anxiety and long term use of Benzo   C/w ativan IV 0.5mg bid, monitor signs of benzo withdrawal and respiratory status Psych following

## 2019-08-12 NOTE — PROGRESS NOTE ADULT - SUBJECTIVE AND OBJECTIVE BOX
Patient is a 74y old  Male who presents with a chief complaint of S/P fall, Rhabdo, R/O Pneumonia, Leukocytosis, Cough, R/O Cellulitis Face/lower Abdomen (11 Aug 2019 13:59)      SUBJECTIVE / OVERNIGHT EVENTS: No acute events overnight. Denies chest pain, SOB, N/V.    MEDICATIONS  (STANDING):  ALBUTerol/ipratropium for Nebulization. 3 milliLiter(s) Nebulizer every 6 hours  artificial tears (preservative free) Ophthalmic Solution 1 Drop(s) Both EYES three times a day  dextrose 5% + sodium chloride 0.45%. 1000 milliLiter(s) (75 mL/Hr) IV Continuous <Continuous>  dextrose 5% + sodium chloride 0.45%. 1000 milliLiter(s) (75 mL/Hr) IV Continuous <Continuous>  heparin  Injectable 5000 Unit(s) SubCutaneous two times a day  lactobacillus acidophilus 1 Tablet(s) Oral every 12 hours  LORazepam   Injectable 0.5 milliGRAM(s) IV Push two times a day  piperacillin/tazobactam IVPB.. 3.375 Gram(s) IV Intermittent every 8 hours  sodium chloride 3%  Inhalation 3 milliLiter(s) Inhalation every 6 hours  tamsulosin 0.4 milliGRAM(s) Oral at bedtime    MEDICATIONS  (PRN):  LORazepam   Injectable 0.5 milliGRAM(s) IV Push every 6 hours PRN Agitation/insomnia      T(C): 36.7 (08-12-19 @ 12:12), Max: 37 (08-12-19 @ 05:29)  HR: 82 (08-12-19 @ 12:12) (74 - 93)  BP: 111/64 (08-12-19 @ 12:12) (111/64 - 127/80)  RR: 19 (08-12-19 @ 12:12) (17 - 19)  SpO2: 94% (08-12-19 @ 12:12) (94% - 98%)  CAPILLARY BLOOD GLUCOSE        I&O's Summary    11 Aug 2019 07:01  -  12 Aug 2019 07:00  --------------------------------------------------------  IN: 0 mL / OUT: 2750 mL / NET: -2750 mL    12 Aug 2019 07:01  -  12 Aug 2019 13:40  --------------------------------------------------------  IN: 0 mL / OUT: 280 mL / NET: -280 mL        PHYSICAL EXAM:  GENERAL: thin and disheveled, no apparent distress, on room air  HEENT: bruising over right cheekbone and jaw with some TTP, and bruising/tear of right ear  CHEST/LUNG: CTA b/l  HEART: s1/s2, RRR, no murmurs appreciated  ABDOMEN: Soft, Nontender, Nondistended; Bowel sounds present, distended suprapubic region  EXTREMITIES: WWP, trace ankle edema b/l  : scrotal swelling, + fowler with yellow urine  NEUROLOGY: awake, alert, able to move all extremities, responds to Qs appropriately  PSYCH: calm, cooperative, follows commands, somewhat tangential  SKIN: bruising over right thigh and right side of face    LABS:                        10.5   11.83 )-----------( 264      ( 12 Aug 2019 05:30 )             32.3     08-12    138  |  100  |  8   ----------------------------<  118<H>  3.2<L>   |  26  |  0.64    Ca    8.4      12 Aug 2019 05:30  Mg     1.9     08-12                RADIOLOGY & ADDITIONAL TESTS:

## 2019-08-12 NOTE — PROGRESS NOTE ADULT - PROBLEM SELECTOR PLAN 6
iron low, ferritin normal, b12 and folate WNL  outpt followup, transfuse for Hgb<7 Essential hypertension

## 2019-08-12 NOTE — PROGRESS NOTE ADULT - PROBLEM SELECTOR PLAN 3
Failed cineesophagogram, NPO  NGT placed and position confirmed with CXR  Start free water via NGT, nutrition recs for TFs, monitor for refeeding syndrome

## 2019-08-13 DIAGNOSIS — Z71.89 OTHER SPECIFIED COUNSELING: ICD-10-CM

## 2019-08-13 LAB
ANION GAP SERPL CALC-SCNC: 15 MMO/L — HIGH (ref 7–14)
BUN SERPL-MCNC: 13 MG/DL — SIGNIFICANT CHANGE UP (ref 7–23)
CALCIUM SERPL-MCNC: 8.4 MG/DL — SIGNIFICANT CHANGE UP (ref 8.4–10.5)
CHLORIDE SERPL-SCNC: 98 MMOL/L — SIGNIFICANT CHANGE UP (ref 98–107)
CO2 SERPL-SCNC: 22 MMOL/L — SIGNIFICANT CHANGE UP (ref 22–31)
CREAT SERPL-MCNC: 0.64 MG/DL — SIGNIFICANT CHANGE UP (ref 0.5–1.3)
GLUCOSE SERPL-MCNC: 85 MG/DL — SIGNIFICANT CHANGE UP (ref 70–99)
HCT VFR BLD CALC: 31.4 % — LOW (ref 39–50)
HGB BLD-MCNC: 10.2 G/DL — LOW (ref 13–17)
MAGNESIUM SERPL-MCNC: 1.9 MG/DL — SIGNIFICANT CHANGE UP (ref 1.6–2.6)
MCHC RBC-ENTMCNC: 32.1 PG — SIGNIFICANT CHANGE UP (ref 27–34)
MCHC RBC-ENTMCNC: 32.5 % — SIGNIFICANT CHANGE UP (ref 32–36)
MCV RBC AUTO: 98.7 FL — SIGNIFICANT CHANGE UP (ref 80–100)
NRBC # FLD: 0 K/UL — SIGNIFICANT CHANGE UP (ref 0–0)
PLATELET # BLD AUTO: 313 K/UL — SIGNIFICANT CHANGE UP (ref 150–400)
PMV BLD: 9.9 FL — SIGNIFICANT CHANGE UP (ref 7–13)
POTASSIUM SERPL-MCNC: 3.4 MMOL/L — LOW (ref 3.5–5.3)
POTASSIUM SERPL-SCNC: 3.4 MMOL/L — LOW (ref 3.5–5.3)
RBC # BLD: 3.18 M/UL — LOW (ref 4.2–5.8)
RBC # FLD: 12.2 % — SIGNIFICANT CHANGE UP (ref 10.3–14.5)
SODIUM SERPL-SCNC: 135 MMOL/L — SIGNIFICANT CHANGE UP (ref 135–145)
WBC # BLD: 10.2 K/UL — SIGNIFICANT CHANGE UP (ref 3.8–10.5)
WBC # FLD AUTO: 10.2 K/UL — SIGNIFICANT CHANGE UP (ref 3.8–10.5)

## 2019-08-13 PROCEDURE — 71045 X-RAY EXAM CHEST 1 VIEW: CPT | Mod: 26

## 2019-08-13 PROCEDURE — 99233 SBSQ HOSP IP/OBS HIGH 50: CPT

## 2019-08-13 RX ORDER — POTASSIUM CHLORIDE 20 MEQ
10 PACKET (EA) ORAL
Refills: 0 | Status: COMPLETED | OUTPATIENT
Start: 2019-08-13 | End: 2019-08-13

## 2019-08-13 RX ORDER — FINASTERIDE 5 MG/1
5 TABLET, FILM COATED ORAL DAILY
Refills: 0 | Status: DISCONTINUED | OUTPATIENT
Start: 2019-08-13 | End: 2019-08-19

## 2019-08-13 RX ADMIN — Medication 1 DROP(S): at 05:48

## 2019-08-13 RX ADMIN — Medication 100 MILLIEQUIVALENT(S): at 12:50

## 2019-08-13 RX ADMIN — Medication 0.5 MILLIGRAM(S): at 18:22

## 2019-08-13 RX ADMIN — HEPARIN SODIUM 5000 UNIT(S): 5000 INJECTION INTRAVENOUS; SUBCUTANEOUS at 05:48

## 2019-08-13 RX ADMIN — Medication 100 MILLIEQUIVALENT(S): at 14:49

## 2019-08-13 RX ADMIN — Medication 0.5 MILLIGRAM(S): at 05:49

## 2019-08-13 RX ADMIN — Medication 100 MILLIEQUIVALENT(S): at 11:52

## 2019-08-13 RX ADMIN — FINASTERIDE 5 MILLIGRAM(S): 5 TABLET, FILM COATED ORAL at 11:53

## 2019-08-13 RX ADMIN — HEPARIN SODIUM 5000 UNIT(S): 5000 INJECTION INTRAVENOUS; SUBCUTANEOUS at 18:23

## 2019-08-13 RX ADMIN — Medication 1 DROP(S): at 13:06

## 2019-08-13 RX ADMIN — Medication 1 DROP(S): at 22:38

## 2019-08-13 RX ADMIN — Medication 1 TABLET(S): at 05:48

## 2019-08-13 NOTE — PROVIDER CONTACT NOTE (OTHER) - ACTION/TREATMENT ORDERED:
Pa will come see patient
PA aware, awaiting placement of new NGT.
PA stated to dress wound as ordered. Did not assess patient in person. Will continue to monitor and pass along to day RN.

## 2019-08-13 NOTE — PROGRESS NOTE BEHAVIORAL HEALTH - OTHER
cachectic not assessed gait not assessed Frustrated more goal directed focused on current medical condition improved

## 2019-08-13 NOTE — DIETITIAN INITIAL EVALUATION ADULT. - PERTINENT LABORATORY DATA
08-13 Na135 mmol/L Glu 85 mg/dL K+ 3.4 mmol/L<L> Cr  0.64 mg/dL BUN 13 mg/dL 08-08 Phos 2.4 mg/dL<L> 08-09 Alb 2.4 g/dL<L> 08-07 [id number] 5.4 % 08-07 Chol 127 mg/dL LDL 56 mg/dL HDL 63 mg/dL<H> Trig 89 mg/dL

## 2019-08-13 NOTE — CHART NOTE - NSCHARTNOTEFT_GEN_A_CORE
Patient self-discontinued NGT.  NGT was re-inserted, gastric bubbles heard on auscultation. Will check CXR for placement.

## 2019-08-13 NOTE — DIETITIAN INITIAL EVALUATION ADULT. - PHYSICAL APPEARANCE
emaciated/other (specify)/underweight Nutrition focused physical exam conducted - found signs of malnutrition [ ]absent [ ]present   Subcutaneous fat loss: [ ] Orbital fat pads region, [ ]Buccal fat region, [ ]Triceps region,  [ ]Ribs region  Muscle wasting: [ ]Temples region, [ ]Clavicle region, [ ]Shoulder region, [ ]Scapula region, [ ]Interosseous region,  [ ]thigh region, [ ]Calf region

## 2019-08-13 NOTE — DIETITIAN INITIAL EVALUATION ADULT. - OTHER INFO
Pt is a 74 year old male who lives alone. He is a poor historian, unkempt, poor hygiene, missing teeth & dental caries.  Past medical Hx is inclusive of depression and was BIBEMS and admitted after being after being found down at home stuck between furniture.  He is presently admitted with sepsis 2/2 aspiration PNA and rhabdomyolysis. Speech and swallow bedside evaluation 8/7 and MBS 8/9 found PO intake to be contraindicated and alternate means of nutrition recommended. s/p NGT 8/12, currently in use for medications and free water.    Pt reports a usual Wt. of ~ 146 pounds prior to admission.  a 6-8 pound weight loss over the past month is reported. Also, Pt states that he is "losing muscle."  Pt appears severely cachectic and with severe depletion on muscle and fat stores. However, current weight is consistent with usual weight... question accuracy of weight.  Suggest obtaining standing weight as feasible. Pt is not able to discuss usual PO intake prior to admission, stated that he has not been eating less despite severe muscle/fat loss   Pt reports taking beta carotene, folic acid, Multivitamin , and saw palmetto prior to admission.

## 2019-08-13 NOTE — PROGRESS NOTE ADULT - PROBLEM SELECTOR PLAN 1
Failed cineesophagogram, NPO  NGT placed 8/12 and position confirmed with CXR  Started TFs and free water per nutrition recs: + severe protein calorie malnutrition  S/S reeval  Per Zamzam, patient's gf, patient was undergoing some Neuromuscular workup at [facility] and was awaiting an EMG prior to the event that brought him to hospital

## 2019-08-13 NOTE — PROGRESS NOTE ADULT - PROBLEM SELECTOR PLAN 2
undergoing TOV  flomax switched to proscar as unable to put flomax via NGT per pharmacy  UA with mod blood and RBC but no leuk esterase or bacteria

## 2019-08-13 NOTE — PROVIDER CONTACT NOTE (OTHER) - SITUATION
Upon daily ear wound dressing change, patient was found to have an area of indentation on right/mid forehead.

## 2019-08-13 NOTE — DIETITIAN INITIAL EVALUATION ADULT. - ENTERAL
Consider the provision of Jevity 1.2 @ a goal rate of 70ml per hour continuously x 24 hours.  This will provide a total of 1680 ml Jevity 1.2, 2,016 kcal, and 93.24 gram of protein.  This is approximately 31.5 kcal/kg and ~1.5 grams of protein per kg current weight.  Given possible risk of refeeding and cachexia, start TF at 15 ml per hour and increase by 10ml every 6 hours until goal rate is met.

## 2019-08-13 NOTE — PROGRESS NOTE ADULT - SUBJECTIVE AND OBJECTIVE BOX
Patient is a 74y old  Male who presents with a chief complaint of S/P fall, Rhabdo, R/O Pneumonia, Leukocytosis, Cough, R/O Cellulitis Face/lower Abdomen (12 Aug 2019 17:02)      SUBJECTIVE / OVERNIGHT EVENTS: No acute events overnight. Fowler dced overnight. No chest pain, SOB, N/V/D    MEDICATIONS  (STANDING):  artificial tears (preservative free) Ophthalmic Solution 1 Drop(s) Both EYES three times a day  finasteride 5 milliGRAM(s) Oral daily  heparin  Injectable 5000 Unit(s) SubCutaneous two times a day  lactobacillus acidophilus 1 Tablet(s) Oral every 12 hours  LORazepam   Injectable 0.5 milliGRAM(s) IV Push two times a day  potassium chloride  10 mEq/100 mL IVPB 10 milliEquivalent(s) IV Intermittent every 1 hour    MEDICATIONS  (PRN):  ALBUTerol/ipratropium for Nebulization 3 milliLiter(s) Nebulizer every 6 hours PRN Shortness of Breath and/or Wheezing  LORazepam   Injectable 0.5 milliGRAM(s) IV Push every 6 hours PRN Agitation/insomnia      T(C): 36.9 (19 @ 05:47), Max: 36.9 (19 @ 05:47)  HR: 90 (19 @ 05:47) (90 - 93)  BP: 115/63 (19 @ 05:47) (103/56 - 115/63)  RR: 17 (19 @ 05:47) (17 - 19)  SpO2: 96% (19 @ 05:47) (96% - 98%)  CAPILLARY BLOOD GLUCOSE    I&O's Summary    12 Aug 2019 07:01  -  13 Aug 2019 07:00  --------------------------------------------------------  IN: 200 mL / OUT: 1880 mL / NET: -1680 mL    PHYSICAL EXAM:  GENERAL: thin and disheveled, no apparent distress, on room air  HEENT: bruising over right cheekbone and jaw with some TTP, and bruising/tear of right ear covered with dressing  CHEST/LUNG: CTA anteriorly b/l  HEART: s1/s2, RRR, no murmurs appreciated  ABDOMEN: Soft, Nontender, Nondistended; Bowel sounds present, distended suprapubic region  EXTREMITIES: WWP, trace ankle edema b/l  : scrotal swelling, no fowler  NEUROLOGY: awake, alert, able to move all extremities, responds to Qs appropriately  PSYCH: calm, cooperative, follows commands, linear thought process although needs redirecting at times  SKIN: bruising over right thigh and right side of face    LABS:                        10.2   10.20 )-----------( 313      ( 13 Aug 2019 06:30 )             31.4     08-13    135  |  98  |  13  ----------------------------<  85  3.4<L>   |  22  |  0.64    Ca    8.4      13 Aug 2019 06:30  Mg     1.9     08-13            Urinalysis Basic - ( 12 Aug 2019 19:25 )    Color: YELLOW / Appearance: CLEAR / S.016 / pH: 6.0  Gluc: NEGATIVE / Ketone: SMALL  / Bili: NEGATIVE / Urobili: NORMAL   Blood: MODERATE / Protein: 50 / Nitrite: NEGATIVE   Leuk Esterase: NEGATIVE / RBC: 26-50 / WBC 3-5   Sq Epi: OCC / Non Sq Epi: x / Bacteria: FEW        RADIOLOGY & ADDITIONAL TESTS:

## 2019-08-13 NOTE — PROGRESS NOTE ADULT - PROBLEM SELECTOR PLAN 3
urine tox + benzo. Per PMD Dr Frank Britton [phone], patient has a long history of depression and anxiety and long term use of Benzo   C/w ativan IV 0.5mg bid, monitor signs of benzo withdrawal and respiratory status Psych following

## 2019-08-13 NOTE — PROGRESS NOTE BEHAVIORAL HEALTH - NSBHFUPINTERVALHXFT_PSY_A_CORE
Patient received Ativan PRN x2 on 8/12   Psychiatry was reconsulted for capacity to participate in goals of care: Pt. seen and evaluated, AAOX3, mental status appears to be better than last week,  calm, cooperative,  linear and mostly coherent. Patient expressed frustration and anxiety in the context of being in the hospital and due to acute medical issues, however he denies feeling depressed.  Denies acute psychotic sxs and denies SI and HI. Discussed about current acute medical issues. Patient knows his current acute medical condition, knows his current treatment, patient stated that  he does not want a feeding tube permanently. Patient was able to verbalized understanding of the indication/risks/benefits of accepting or refusing the proposed treatment. Patient was able to manipulate information provided to him in a logical manner, able to appreciate his current acute medical situation, patient was able verbalized understanding of the risk of aspiration pneumonia and even death. Patient also told me that he had a discussion with Dr. Tse, and he would want a trial of resuscitation with CPR/intubation if needed.

## 2019-08-13 NOTE — PROVIDER CONTACT NOTE (OTHER) - ASSESSMENT
Pt seems weak and lethargic
Area has blanchable redness, is not open, not bleeding, no drainage, no hematoma. Pt denies pain. Area measures 5 cm x 3 cm x 0.2 cm.
RN back was turned from pt while crushing his medications. When RN turn around, pt NG was out. Per pt he pulled it out because he thought it would be better to have a new one prior to tube feeds.

## 2019-08-13 NOTE — DIETITIAN INITIAL EVALUATION ADULT. - PERTINENT MEDS FT
MEDICATIONS  (STANDING):  artificial tears (preservative free) Ophthalmic Solution 1 Drop(s) Both EYES three times a day  finasteride 5 milliGRAM(s) Oral daily  heparin  Injectable 5000 Unit(s) SubCutaneous two times a day  lactobacillus acidophilus 1 Tablet(s) Oral every 12 hours  LORazepam   Injectable 0.5 milliGRAM(s) IV Push two times a day  potassium chloride  10 mEq/100 mL IVPB 10 milliEquivalent(s) IV Intermittent every 1 hour

## 2019-08-14 LAB
ANION GAP SERPL CALC-SCNC: 15 MMO/L — HIGH (ref 7–14)
BACTERIA BLD CULT: SIGNIFICANT CHANGE UP
BACTERIA BLD CULT: SIGNIFICANT CHANGE UP
BUN SERPL-MCNC: 22 MG/DL — SIGNIFICANT CHANGE UP (ref 7–23)
CALCIUM SERPL-MCNC: 8.8 MG/DL — SIGNIFICANT CHANGE UP (ref 8.4–10.5)
CHLORIDE SERPL-SCNC: 96 MMOL/L — LOW (ref 98–107)
CO2 SERPL-SCNC: 24 MMOL/L — SIGNIFICANT CHANGE UP (ref 22–31)
CREAT SERPL-MCNC: 0.65 MG/DL — SIGNIFICANT CHANGE UP (ref 0.5–1.3)
GLUCOSE SERPL-MCNC: 95 MG/DL — SIGNIFICANT CHANGE UP (ref 70–99)
HCT VFR BLD CALC: 32.5 % — LOW (ref 39–50)
HGB BLD-MCNC: 10.6 G/DL — LOW (ref 13–17)
MAGNESIUM SERPL-MCNC: 1.9 MG/DL — SIGNIFICANT CHANGE UP (ref 1.6–2.6)
MCHC RBC-ENTMCNC: 32.6 % — SIGNIFICANT CHANGE UP (ref 32–36)
MCHC RBC-ENTMCNC: 32.6 PG — SIGNIFICANT CHANGE UP (ref 27–34)
MCV RBC AUTO: 100 FL — SIGNIFICANT CHANGE UP (ref 80–100)
NRBC # FLD: 0 K/UL — SIGNIFICANT CHANGE UP (ref 0–0)
PHOSPHATE SERPL-MCNC: 2.8 MG/DL — SIGNIFICANT CHANGE UP (ref 2.5–4.5)
PLATELET # BLD AUTO: 352 K/UL — SIGNIFICANT CHANGE UP (ref 150–400)
PMV BLD: 9.7 FL — SIGNIFICANT CHANGE UP (ref 7–13)
POTASSIUM SERPL-MCNC: 3.5 MMOL/L — SIGNIFICANT CHANGE UP (ref 3.5–5.3)
POTASSIUM SERPL-SCNC: 3.5 MMOL/L — SIGNIFICANT CHANGE UP (ref 3.5–5.3)
RBC # BLD: 3.25 M/UL — LOW (ref 4.2–5.8)
RBC # FLD: 11.9 % — SIGNIFICANT CHANGE UP (ref 10.3–14.5)
SODIUM SERPL-SCNC: 135 MMOL/L — SIGNIFICANT CHANGE UP (ref 135–145)
WBC # BLD: 9.56 K/UL — SIGNIFICANT CHANGE UP (ref 3.8–10.5)
WBC # FLD AUTO: 9.56 K/UL — SIGNIFICANT CHANGE UP (ref 3.8–10.5)

## 2019-08-14 PROCEDURE — 99233 SBSQ HOSP IP/OBS HIGH 50: CPT

## 2019-08-14 PROCEDURE — 99497 ADVNCD CARE PLAN 30 MIN: CPT

## 2019-08-14 RX ORDER — POTASSIUM CHLORIDE 20 MEQ
10 PACKET (EA) ORAL
Refills: 0 | Status: COMPLETED | OUTPATIENT
Start: 2019-08-14 | End: 2019-08-14

## 2019-08-14 RX ADMIN — Medication 0.5 MILLIGRAM(S): at 06:18

## 2019-08-14 RX ADMIN — Medication 1 TABLET(S): at 06:17

## 2019-08-14 RX ADMIN — HEPARIN SODIUM 5000 UNIT(S): 5000 INJECTION INTRAVENOUS; SUBCUTANEOUS at 06:17

## 2019-08-14 RX ADMIN — Medication 1 DROP(S): at 14:31

## 2019-08-14 RX ADMIN — Medication 1 DROP(S): at 22:04

## 2019-08-14 RX ADMIN — Medication 100 MILLIEQUIVALENT(S): at 11:56

## 2019-08-14 RX ADMIN — Medication 100 MILLIEQUIVALENT(S): at 10:57

## 2019-08-14 RX ADMIN — Medication 1 TABLET(S): at 18:22

## 2019-08-14 RX ADMIN — Medication 0.5 MILLIGRAM(S): at 18:22

## 2019-08-14 RX ADMIN — Medication 100 MILLIEQUIVALENT(S): at 12:56

## 2019-08-14 RX ADMIN — HEPARIN SODIUM 5000 UNIT(S): 5000 INJECTION INTRAVENOUS; SUBCUTANEOUS at 18:22

## 2019-08-14 RX ADMIN — Medication 1 DROP(S): at 06:17

## 2019-08-14 RX ADMIN — FINASTERIDE 5 MILLIGRAM(S): 5 TABLET, FILM COATED ORAL at 14:30

## 2019-08-14 NOTE — CHART NOTE - NSCHARTNOTEFT_GEN_A_CORE
Multiple attempts made at placing fowler. However all attempts were unsuccessful. Blood was noted on the tip when trying to place fowler. Urology called to place fowler.

## 2019-08-14 NOTE — SWALLOW BEDSIDE ASSESSMENT ADULT - COMMENTS
As per discussion with PA (Jordana), cinesophagram not appropriate at this time given patient lethargic and with venti mask in place. MD to discontinue orders and reconsult this service when patient is awake/alert and with improved respiratory status.
73 y/o male with possible HX of Psych disorder , lives alone, very poor historian, unkempt and with very poor  hygiene , missing teeth,  Dental caries, on PO Benzo at home, denies ETOH and smoking,  reports falling off a stool 1.5 days prior to presentation around midnight and becoming pinned in position until rescued by EMS this afternoon. Denies drug or alcohol use prior to falling, and denies feeling lightheaded or dizzy prior. Patient is unsure what may have caused the fall other than a sense that his muscles have been "dysfunctional" lately. In the past month, he has noticed some muscle wasting, particularly in his arms, with 6-8 lbs of weight loss in this time. He has also noticed some fasciculations in his legs in the past month as well. He denies any family history of muscular pathology. He also denies seizures, chest pain, shortness of breath, nausea, and abdominal pain. Reports taking diazepam bid and flurazepam at nights and no other medications. Of note, patient has known history of L scrotal hydrocele but has refused surgery. Pt awake, A+O x 3, C/O + Productive cough as well, no fever, no dysuria, no N/V, No HA, no abdominal pain, + weakness, + cachectic with B/L Temporal wasting , Zamzam  (girl friend).  Admission: S/P fall, Rhabdo, R/O Pneumonia, Leukocytosis, Cough, R/O Cellulitis Face/lower Abdomen.     Patient seen in [facility], [facility] 90 degrees. Patient is alert and oriented; able to follow simple commands and express simple needs.
Patient is a "75 y/o male with possible HX of Psych disorder , lives alone, very poor historian, unkempt and with very poor hygiene, missing teeth, Dental caries, on PO Benzo at home, denies ETOH and smoking,  reports falling off a stool 1.5 days prior to presentation around midnight and becoming pinned in position until rescued by EMS. C/O Productive cough as well".     Patient known to this service. Clinical Bedside Swallow Evaluation completed on 8/7/19 recommended NPO (see full report for details). Cinesophagram completed on 8/9/19 recommended NPO secondary to silent aspiration (see full report for details).    Patient seen upright at bedside with RN and MD present. Patient was alert/awake and responsive to simple questions. Patient able to follow simple directions.

## 2019-08-14 NOTE — PROGRESS NOTE ADULT - PROBLEM SELECTOR PLAN 1
Failed cineesophagogram, NPO for now, S/S reeval appreciated and repeat cine for 8/15  NGT replaced 8/13 and position confirmed with CXR  C/w TFs and free water per nutrition recs: + severe protein calorie malnutrition  Per Zamzam, patient's gf, patient was undergoing some Neuromuscular workup at [facility] and was awaiting an EMG prior to the event that brought him to hospital

## 2019-08-14 NOTE — SWALLOW BEDSIDE ASSESSMENT ADULT - ASR SWALLOW RECOMMEND DIAG
VFSS/MBS/Consider repeat Cinesophagram given patient's history of silent aspiration
Cinesophagram to objectively assess the swallow mechanism to rule out PNA/Cough is dysphagia-related./VFSS/MBS

## 2019-08-14 NOTE — PROGRESS NOTE ADULT - PROBLEM SELECTOR PLAN 5
Testicle US showed large complex left hydrocele. Urology aware  No sonographic evidence of testicular torsion.

## 2019-08-14 NOTE — SWALLOW BEDSIDE ASSESSMENT ADULT - PHARYNGEAL PHASE
Decreased laryngeal elevation Decreased laryngeal elevation/Wet vocal quality post oral intake/Multiple swallows

## 2019-08-14 NOTE — SWALLOW BEDSIDE ASSESSMENT ADULT - SWALLOW EVAL: DIAGNOSIS
1. Functional oral phase for puree consistency marked by adequate bolus manipulation, adequate anterior-posterior transport and functional oral transit time 2. Mild oral phase for honey thick liquids marked by reduced bolus manipulation and reduced anterior-posterior transport with suspected posterior loss of bolus and premature spillage 3. Moderate pharyngeal phase dysphagia for puree consistency marked by reduced laryngeal elevation upon digital palpation and multiple swallows noted. No overt s/s of laryngeal penetration/aspiration noted 4. Moderate-severe pharyngeal phase dysphagia for honey thick liquids marked by reduced laryngeal elevation upon digital palpation and multiple swallows noted. Immediate evidence of wet vocal quality subsequent deglutition indicative of laryngeal penetration/aspiration.
Patient presents with OroPharyngeal Stage Dysphagia characterized by adequate oral containment, bolus manipulation and transfer of the bolus with adequate oral clearance. There is laryngeal elevation upon palpation, suspect delayed initiation of the pharyngeal swallow with multiple repeated swallows (3-5x) per bolus trial with overt signs of impaired airway protection characterized by delayed cough with puree/honey thick liquids and immediate cough with thin liquids.

## 2019-08-14 NOTE — SWALLOW BEDSIDE ASSESSMENT ADULT - ASR SWALLOW ASPIRATION MONITOR
change of breathing pattern/cough/fever/throat clearing/pneumonia/oral hygiene/position upright (90Y)/gurgly voice

## 2019-08-14 NOTE — PROGRESS NOTE ADULT - PROBLEM SELECTOR PLAN 3
urine tox + benzo. Per PMD Dr Frank Britton [phone], patient has a long history of depression and anxiety and long term use of Benzo   C/w ativan IV 0.5mg bid, monitor signs of benzo withdrawal and respiratory status Psych following

## 2019-08-14 NOTE — PROCEDURE NOTE - NSURITECHNIQUE_GU_A_CORE
Proper hand hygiene was performed/Sterile gloves were worn for the duration of the procedure/A sterile drape was used to cover all adjacent areas/The catheter was appropriately lubricated/All applicable medical record documentation is completed/The catheter was secured with a securement device (e.g. StatLock)/The collection bag is below the level of the patient and urinary bladder/The site was cleaned with soap/water and sterile solution (betadine)/The urinary drainage system is closed at the end of the procedure

## 2019-08-14 NOTE — SWALLOW BEDSIDE ASSESSMENT ADULT - SWALLOW EVAL: RECOMMENDED DIET
Oral means of nutrition/hydration contraindicated at this time; Consider to continue with short term non oral means of nutrition via NGT Oral nutrition/hydration contraindicated at this time; Consider to continue with short term non oral means of nutrition/ hydration/ medication via NGT

## 2019-08-14 NOTE — PROGRESS NOTE ADULT - PROBLEM SELECTOR PLAN 2
Failed TOV and fowler replaced by urology this AM. Ucx ordered by urology  flomax switched to proscar as unable to put flomax via NGT per pharmacy  UA with mod blood and RBC but no leuk esterase or bacteria

## 2019-08-15 LAB
ANION GAP SERPL CALC-SCNC: 10 MMO/L — SIGNIFICANT CHANGE UP (ref 7–14)
BUN SERPL-MCNC: 15 MG/DL — SIGNIFICANT CHANGE UP (ref 7–23)
CALCIUM SERPL-MCNC: 9 MG/DL — SIGNIFICANT CHANGE UP (ref 8.4–10.5)
CHLORIDE SERPL-SCNC: 96 MMOL/L — LOW (ref 98–107)
CO2 SERPL-SCNC: 30 MMOL/L — SIGNIFICANT CHANGE UP (ref 22–31)
CREAT SERPL-MCNC: 0.72 MG/DL — SIGNIFICANT CHANGE UP (ref 0.5–1.3)
GLUCOSE SERPL-MCNC: 131 MG/DL — HIGH (ref 70–99)
HCT VFR BLD CALC: 33.6 % — LOW (ref 39–50)
HGB BLD-MCNC: 10.6 G/DL — LOW (ref 13–17)
MAGNESIUM SERPL-MCNC: 1.9 MG/DL — SIGNIFICANT CHANGE UP (ref 1.6–2.6)
MCHC RBC-ENTMCNC: 31.5 % — LOW (ref 32–36)
MCHC RBC-ENTMCNC: 31.7 PG — SIGNIFICANT CHANGE UP (ref 27–34)
MCV RBC AUTO: 100.6 FL — HIGH (ref 80–100)
NRBC # FLD: 0 K/UL — SIGNIFICANT CHANGE UP (ref 0–0)
PHOSPHATE SERPL-MCNC: 2.9 MG/DL — SIGNIFICANT CHANGE UP (ref 2.5–4.5)
PLATELET # BLD AUTO: 395 K/UL — SIGNIFICANT CHANGE UP (ref 150–400)
PMV BLD: 9.8 FL — SIGNIFICANT CHANGE UP (ref 7–13)
POTASSIUM SERPL-MCNC: 3.8 MMOL/L — SIGNIFICANT CHANGE UP (ref 3.5–5.3)
POTASSIUM SERPL-SCNC: 3.8 MMOL/L — SIGNIFICANT CHANGE UP (ref 3.5–5.3)
RBC # BLD: 3.34 M/UL — LOW (ref 4.2–5.8)
RBC # FLD: 11.9 % — SIGNIFICANT CHANGE UP (ref 10.3–14.5)
SODIUM SERPL-SCNC: 136 MMOL/L — SIGNIFICANT CHANGE UP (ref 135–145)
WBC # BLD: 12.26 K/UL — HIGH (ref 3.8–10.5)
WBC # FLD AUTO: 12.26 K/UL — HIGH (ref 3.8–10.5)

## 2019-08-15 PROCEDURE — 74230 X-RAY XM SWLNG FUNCJ C+: CPT | Mod: 26

## 2019-08-15 PROCEDURE — 99233 SBSQ HOSP IP/OBS HIGH 50: CPT

## 2019-08-15 PROCEDURE — 99223 1ST HOSP IP/OBS HIGH 75: CPT

## 2019-08-15 RX ADMIN — Medication 1 DROP(S): at 21:47

## 2019-08-15 RX ADMIN — HEPARIN SODIUM 5000 UNIT(S): 5000 INJECTION INTRAVENOUS; SUBCUTANEOUS at 06:30

## 2019-08-15 RX ADMIN — Medication 1 TABLET(S): at 06:31

## 2019-08-15 RX ADMIN — Medication 0.5 MILLIGRAM(S): at 18:27

## 2019-08-15 RX ADMIN — Medication 1 DROP(S): at 06:31

## 2019-08-15 RX ADMIN — Medication 1 DROP(S): at 15:08

## 2019-08-15 RX ADMIN — HEPARIN SODIUM 5000 UNIT(S): 5000 INJECTION INTRAVENOUS; SUBCUTANEOUS at 18:27

## 2019-08-15 RX ADMIN — Medication 0.5 MILLIGRAM(S): at 06:31

## 2019-08-15 NOTE — PROGRESS NOTE ADULT - SUBJECTIVE AND OBJECTIVE BOX
Patient is a 74y old  Male who presents with a chief complaint of S/P fall, Rhabdo, R/O Pneumonia, Leukocytosis, Cough, R/O Cellulitis Face/lower Abdomen (14 Aug 2019 12:32)      SUBJECTIVE / OVERNIGHT EVENTS: No acute events overnight. Denies chest pain, SOB, cough. Had cineesophagogram done this AM    MEDICATIONS  (STANDING):  artificial tears (preservative free) Ophthalmic Solution 1 Drop(s) Both EYES three times a day  finasteride 5 milliGRAM(s) Oral daily  heparin  Injectable 5000 Unit(s) SubCutaneous two times a day  lactobacillus acidophilus 1 Tablet(s) Oral every 12 hours  LORazepam   Injectable 0.5 milliGRAM(s) IV Push two times a day    MEDICATIONS  (PRN):  ALBUTerol/ipratropium for Nebulization 3 milliLiter(s) Nebulizer every 6 hours PRN Shortness of Breath and/or Wheezing  LORazepam   Injectable 0.5 milliGRAM(s) IV Push every 6 hours PRN Agitation/insomnia      T(C): 36.9 (08-15-19 @ 11:38), Max: 36.9 (08-15-19 @ 11:38)  HR: 87 (08-15-19 @ 11:38) (81 - 87)  BP: 112/59 (08-15-19 @ 11:38) (106/63 - 114/62)  RR: 16 (08-15-19 @ 11:38) (16 - 16)  SpO2: 95% (08-15-19 @ 11:38) (95% - 98%)  CAPILLARY BLOOD GLUCOSE    I&O's Summary    14 Aug 2019 07:01  -  15 Aug 2019 07:00  --------------------------------------------------------  IN: 400 mL / OUT: 1800 mL / NET: -1400 mL    PHYSICAL EXAM:  GENERAL: thin and disheveled, no apparent distress, on room air  HEENT: bruising over right cheekbone and jaw with some TTP, and bruising/tear of right ear covered with dressing  CHEST/LUNG: CTA anteriorly b/l  HEART: s1/s2, RRR, no murmurs appreciated  ABDOMEN: Soft, Nontender, nontender  EXTREMITIES: WWP, trace ankle edema b/l  : scrotal swelling, + indwelling fowler with yellow urine  NEUROLOGY: awake, alert, responds to Qs appropriately  PSYCH: calm, cooperative, follows commands, linear thought process although needs redirecting at times and occasionally confused, states he needs to go back to his room in 423 while he was in the room  SKIN: bruising over right thigh and right side of face    LABS:                        10.6   12.26 )-----------( 395      ( 15 Aug 2019 07:30 )             33.6     08-15    136  |  96<L>  |  15  ----------------------------<  131<H>  3.8   |  30  |  0.72    Ca    9.0      15 Aug 2019 07:30  Phos  2.9     08-15  Mg     1.9     08-15                RADIOLOGY & ADDITIONAL TESTS:

## 2019-08-15 NOTE — SWALLOW VFSS/MBS ASSESSMENT ADULT - DIAGNOSTIC IMPRESSIONS
PO trials presented initially with NGT in place. NGT subsequently removed by MD, given moderate residue in valleculae noted with all trials presented and reduced ability to clear despite multiple attempts to clear via prompted second swallow: Without NGT in place, patient demonstrated 1. Mild oral phase dysphagia for puree consistency and honey thick liquids marked by adequate bolus manipulation, adequate tongue motion, and reduced anterior-posterior transport with posterior loss of bolus 2. Mild-Moderate oral phase dysphagia for nectar thick liquids and thin liquids marked by reduced bolus manipulation, adequate tongue motion, and reduced anterior-posterior transport with posterior loss of bolus 3. Moderate pharyngeal phase dysphagia for puree consistency and honey thick liquids marked by delayed initiation of pharyngeal swallow, reduced laryngeal elevation, reduced laryngeal vestibular closure, reduced base of tongue retraction, reduced epiglottic deflection and reduced pharyngeal contractility. Silent aspiration noted after the swallow given evidence of barium contrast in the trachea. Chin tuck attempted though not successful 4.  Severe pharyngeal dysphagia for nectar thick liquids and thin liquids marked by delay in initiation of pharyngeal swallow, reduced laryngeal elevation, reduced laryngeal vestibular closure, reduced base of tongue retraction, reduced epiglottic deflection and reduced pharyngeal contractility. Silent penetration noted before the swallow with subsequent silent aspiration. Chin tuck attempted, though unsuccessful. PO trials presented initially with NGT in place. NGT subsequently removed by Radiologist with verbal approval from medical team: Without NGT in place, patient demonstrated 1. Mild oral phase dysphagia for puree consistency and honey thick liquids marked by adequate bolus manipulation, adequate tongue motion, and reduced anterior-posterior transport with posterior loss of bolus 2. Mild-Moderate oral phase dysphagia for nectar thick liquids and thin liquids marked by reduced bolus manipulation, adequate tongue motion, and reduced anterior-posterior transport with posterior loss of bolus 3. Moderate pharyngeal phase dysphagia for puree consistency and honey thick liquids marked by delayed initiation of pharyngeal swallow, reduced laryngeal elevation, reduced laryngeal vestibular closure, reduced/ poor base of tongue retraction, reduced epiglottic deflection and reduced pharyngeal contractility. Silent aspiration noted after the swallow given evidence of barium contrast in the trachea. Chin tuck attempted though not successful 4.  Severe pharyngeal dysphagia for nectar thick liquids and thin liquids marked by delay in initiation of pharyngeal swallow, reduced laryngeal elevation, reduced laryngeal vestibular closure, reduced/poor base of tongue retraction, reduced epiglottic deflection and reduced pharyngeal contractility. Silent laryngeal penetration noted during the swallow with subsequent silent aspiration. Chin tuck attempted, though unsuccessful. PO trials presented initially with NGT in place. NGT subsequently removed by Radiologist with verbal approval from medical team: Without NGT in place, patient demonstrated 1. Mild oral phase dysphagia for puree consistency and honey thick liquids marked by adequate bolus manipulation, adequate tongue motion, and reduced anterior-posterior transport with posterior loss of bolus 2. Mild-Moderate oral phase dysphagia for nectar thick liquids and thin liquids marked by reduced bolus manipulation, adequate tongue motion, and reduced anterior-posterior transport with posterior loss of bolus 3. Moderate pharyngeal phase dysphagia for puree consistency and honey thick liquids marked by delayed initiation of pharyngeal swallow, reduced laryngeal elevation, reduced laryngeal vestibular closure, reduced/ poor base of tongue retraction, reduced epiglottic deflection and reduced pharyngeal contractility. Severe pharyngeal clearance deficits with severe residue located primarily in the valleculae. Silent aspiration noted after the swallow given evidence of barium contrast in the trachea when fluoroscopy machine was turned back on for the next PO trial. Chin tuck attempted though not successful 4.  Severe pharyngeal dysphagia for nectar thick liquids and thin liquids marked by delay in initiation of pharyngeal swallow, reduced laryngeal elevation, reduced laryngeal vestibular closure, reduced/poor base of tongue retraction, reduced epiglottic deflection and reduced pharyngeal contractility. Moderate pharyngeal clearance deficits with moderate residue located primarily in the valleculae. Silent laryngeal penetration noted during the swallow with subsequent silent aspiration. Chin tuck attempted, though unsuccessful.

## 2019-08-15 NOTE — SWALLOW VFSS/MBS ASSESSMENT ADULT - PHARYNGEAL PHASE COMMENTS
Delayed initiation of pharyngeal swallow, reduced laryngeal elevation, reduced laryngeal vestibular closure, reduced base of tongue retraction resulting in residue in valleculae post primary swallow, reduced epiglottic deflection and reduced pharyngeal contractility. No laryngeal penetration/aspiration observed. Delayed initiation of pharyngeal swallow, reduced laryngeal elevation, reduced laryngeal vestibular closure, reduced base of tongue retraction, reduced epiglottic deflection and reduced pharyngeal contractility. Delay in initiation of pharyngeal swallow, reduced laryngeal elevation, reduced vestibular closure, reduced base of tongue retraction, reduced epiglottic deflection and reduced pharyngeal contractility. Deep penetration above the level of the vocal cords noted before the swallow with subsequent silent aspiration after the swallow. Delayed initiation of pharyngeal swallow, reduced laryngeal elevation, reduced laryngeal vestibular closure, reduced epiglottic deflection and reduced pharyngeal contractility. Silent laryngeal penetration noted before the swallow. Delayed initiation of pharyngeal swallow, reduced laryngeal elevation, reduced laryngeal vestibular closure, reduced base of tongue retraction resulting in residue in valleculae post primary swallow, reduced epiglottic deflection and reduced pharyngeal contractility. Silent aspiration noted given evidence of barium contract in trachea. Delayed initiation of pharyngeal swallow, reduced laryngeal elevation, reduced laryngeal vestibular closure, reduced base of tongue retraction resulting in residue in valleculae post primary swallow with attempts at multiple dry swallows to clear without sucess, reduced epiglottic deflection and reduced pharyngeal contractility. Silent aspiration after the swallow noted given evidence of barium contract in trachea. Delayed initiation of pharyngeal swallow, reduced laryngeal elevation, reduced laryngeal vestibular closure, reduced base of tongue retraction, reduced epiglottic deflection and reduced pharyngeal contractility. Silent penetration noted during the swallow without retrieval. Delayed initiation of pharyngeal swallow, reduced laryngeal elevation, reduced laryngeal vestibular closure, reduced epiglottic deflection and reduced pharyngeal contractility. Silent laryngeal penetration noted before the swallow without retrieval. Delayed initiation of pharyngeal swallow, reduced laryngeal elevation, reduced laryngeal vestibular closure, reduced base of tongue retraction resulting in residue in valleculae post primary swallow with attempts at multiple dry swallows to clear without success, reduced epiglottic deflection and reduced pharyngeal contractility. Silent aspiration after the swallow noted given evidence of barium contrast in trachea. Delay in initiation of pharyngeal swallow, reduced laryngeal elevation, reduced vestibular closure, reduced base of tongue retraction, reduced epiglottic deflection and reduced pharyngeal contractility. Deep laryngeal penetration to the level of the vocal cords noted before the swallow with subsequent silent aspiration after the swallow. Delayed initiation of pharyngeal swallow, reduced laryngeal elevation, reduced laryngeal vestibular closure, reduced epiglottic deflection and reduced pharyngeal contractility. Silent laryngeal penetration noted during the swallow without retrieval.

## 2019-08-15 NOTE — SWALLOW VFSS/MBS ASSESSMENT ADULT - ROSENBEK'S PENETRATION ASPIRATION SCALE
(1) no aspiration, contrast does not enter airway (8) contrast passes glottis, visible subglottic residue remains, absent patient response (aspiration) (5) contrast contacts vocal cords, visible residue remains (penetration)

## 2019-08-15 NOTE — CONSULT NOTE ADULT - PROBLEM SELECTOR RECOMMENDATION 3
- Multifactorial.   - Patient is now in agreement with PEG tube.  - Aggressive mouth care.   - Aspiration precautions.  -

## 2019-08-15 NOTE — SWALLOW VFSS/MBS ASSESSMENT ADULT - ORAL PHASE
Uncontrolled bolus / spillover in hypopharynx/Incomplete tongue to palate contact Incomplete tongue to palate contact/Laryngeal penetration before swallow - silent/Uncontrolled bolus / spillover in hypopharynx Incomplete tongue to palate contact/Laryngeal penetration before swallow - silent within functional limits Reduced anterior - posterior transport/Uncontrolled bolus / spillover in norman-pharynx/Incomplete tongue to palate contact

## 2019-08-15 NOTE — CONSULT NOTE ADULT - SUBJECTIVE AND OBJECTIVE BOX
HPI:  75 y/o male with possible HX of Psych disorder , lives alone, very poor historian, unkempt and with very poor  hygiene , missing teeth,  Dental caries, on PO Benzo at home, denies ETOH and smoking,  reports falling off a stool 1.5 days prior to presentation around midnight and becoming pinned in position until rescued by EMS this afternoon. Denies drug or alcohol use prior to falling, and denies feeling lightheaded or dizzy prior. Patient is unsure what may have caused the fall other than a sense that his muscles have been "dysfunctional" lately. In the past month, he has noticed some muscle wasting, particularly in his arms, with 6-8 lbs of weight loss in this time. He has also noticed some fasciculations in his legs in the past month as well. He denies any family history of muscular pathology. He also denies seizures, chest pain, shortness of breath, nausea, and abdominal pain. Reports taking diazepam bid and flurazepam at nights and no other medications. Of note, patient has known history of L scrotal hydrocele but has refused surgery. Pt awake, A+O x 3, C/O + Productive cough as well, no fever, no dysuria, no N/V, No HA, no abdominal pain, + weakness, + cachectic with B/L Temporal wasting , Zamzam  (girl friend),     S/P CT Chest/Abdomen/pelvis as below:   < from: CT Abdomen and Pelvis w/ IV Cont (08.06.19 @ 16:47) >  IMPRESSION: A few right lower lobe ground glass opacities of uncertain   etiology. No evidence of solid organ injury or acute fracture.    S/P CT Maxillofacial as Below:     < from: CT Maxillofacial w/ IV Cont (08.06.19 @ 16:47) >  IMPRESSION:    Discontinuous appearance of the left lower medial incisor which appears   displaced posteriorly suggesting an avulsed tooth/alveolar fracture.   Clinical correlation is advised. Poor dentition with multiple missing   teeth. Right facial and external auditory canal soft tissue swelling likely   posttraumatic in nature however cellulitis/otitis externa is not excluded.    CT Head: unremarkable, , X ray: Pelvis No FX,     RVP was sent , started the pt on IV Zosyn, R/O Pneumonia, Elevated CPK 3311, + Rhabdo as well, on IVF NS @ 150 cc/hr, S/P IVF LR Bolus x 1 lit, and IVF Ns x 2 Lit Bolus, Dental consult was requested, NPO for now, pt was placed on TELE ,    Labs: PT 15.5, INR 1.35, PTT 26.2, Fibrinogen assay 542, Lactate 2.7, CPK 3311, Mg 2, Na 140, K+ 3.9, BUN 25, Creatinine 0.82, Glucose 138, AST 78, Troponin HS 21, WBC 24.95, Hgb 13.5, Platelet 262, UA: Large Blood , Ketone > 150,     Vitals: Tem 98.5, HR 87, /66, RR 16, 95 % RA, (06 Aug 2019 20:44)    PERTINENT PM/SXH:   Scrotal swelling  Psychiatric disorder  No pertinent past medical history    No significant past surgical history    FAMILY HISTORY:  FHx: stomach cancer    ITEMS NOT CHECKED ARE NOT PRESENT    SOCIAL HISTORY:   Significant other/partner:  [x ]  Children:  [ ]  Religious/Spirituality:  Substance hx:  [ ]   Tobacco hx:  [ x]   Alcohol hx: [x ]   Home Opioid hx:  [ ] I-Stop Reference No:  Living Situation: [x ]Home  [ ]Long term care  [ ]Rehab [ ]Other    ADVANCE DIRECTIVES:    DNR  MOLST  [ ]  Living Will  [ ]   DECISION MAKER(s):  [ ] Health Care Proxy(s)  [ ] Surrogate(s)  [ ] Guardian           Name(s): Phone Number(s): Zamzam Gutiérrez (Girlfriend) [phone]    BASELINE (I)ADL(s) (prior to admission):  [place]: [ ]Total  [x ] Moderate [ ]Dependent    Allergies    No Known Allergies    Intolerances    MEDICATIONS  (STANDING):  artificial tears (preservative free) Ophthalmic Solution 1 Drop(s) Both EYES three times a day  dextrose 5% + sodium chloride 0.45%. 1000 milliLiter(s) (75 mL/Hr) IV Continuous <Continuous>  finasteride 5 milliGRAM(s) Oral daily  heparin  Injectable 5000 Unit(s) SubCutaneous two times a day  lactobacillus acidophilus 1 Tablet(s) Oral every 12 hours  LORazepam   Injectable 0.5 milliGRAM(s) IV Push two times a day    MEDICATIONS  (PRN):  ALBUTerol/ipratropium for Nebulization 3 milliLiter(s) Nebulizer every 6 hours PRN Shortness of Breath and/or Wheezing  LORazepam   Injectable 0.5 milliGRAM(s) IV Push every 6 hours PRN Agitation/insomnia    PRESENT SYMPTOMS: [x ]Unable to obtain due to poor mentation   Source if other than patient:  [ ]Family   [ ]Team     Pain (Impact on QOL):  None  Location -         Minimal acceptable level (0-10 scale):   Aggravating factors -  Quality -  Radiation -  Severity (0-10 scale) -    Timing -    PAIN AD Score:     [email or web address] (press ctrl +  left click to view)    Dyspnea:                           [ ]Mild [ ]Moderate [ ]Severe  Anxiety:                             [ ]Mild [ ]Moderate [ ]Severe  Fatigue:                             [ ]Mild [ ]Moderate [ ]Severe  Nausea:                             [ ]Mild [ ]Moderate [ ]Severe  Loss of appetite:              [ ]Mild [ ]Moderate [ ]Severe  Constipation:                    [ ]Mild [ ]Moderate [ ]Severe  Grief Present                    [ ] Yes   [ ] No   Other Symptoms:  [x ]All other review of systems negative     Karnofsky Performance Score/Palliative Performance Status Version 2:    40-50     %    http://palliative.info/resource_material/[email or web address].pdf  PHYSICAL EXAM:  Vital Signs Last 24 Hrs  T(C): 36.6 (19 Aug 2019 05:08), Max: 36.7 (18 Aug 2019 12:11)  T(F): 97.8 (19 Aug 2019 05:08), Max: 98.1 (18 Aug 2019 17:16)  HR: 64 (19 Aug 2019 05:08) (64 - 91)  BP: 107/55 (19 Aug 2019 05:08) (93/57 - 123/73)  BP(mean): --  RR: 16 (19 Aug 2019 05:08) (16 - 17)  SpO2: 98% (19 Aug 2019 05:08) (96% - 99%) I&O's Summary    18 Aug 2019 07:01  -  19 Aug 2019 07:00  --------------------------------------------------------  IN: 525 mL / OUT: 1425 mL / NET: -900 mL    GENERAL:  [ x]Alert  [x ]Oriented x 1-2  [ ]Lethargic  [ ]Cachexia  [ ]Unarousable  [ ]Verbal  [ ]Non-Verbal  Behavioral:   [ ] Anxiety  [ ] Delirium [x ] Agitation [ ] Other  HEENT:  [ ]Normal   [ x]Dry mouth   [ ]ET Tube/Trach  [ ]Oral lesions [x] poor oral dentition.  PULMONARY:   [x ]Clear [ ]Tachypnea  [ ]Audible excessive secretions   [ ]Rhonchi        [ ]Right [ ]Left [ ]Bilateral  [ ]Crackles        [ ]Right [ ]Left [ ]Bilateral  [ ]Wheezing     [ ]Right [ ]Left [ ]Bilateral  CARDIOVASCULAR:    [x ]Regular [ ]Irregular [ ]Tachy  [ ]Edison [ ]Murmur [ ]Other  GASTROINTESTINAL:  [x ]Soft  [ ]Distended   [ x]+BS  [x ]Non tender [ ]Tender  [ ]PEG [ ]OGT/ NGT  Last BM:   GENITOURINARY:  [x ]Normal [ ] Incontinent   [ ]Oliguria/Anuria   [ ]S[place]cer  MUSCULOSKELETAL:   [ ]Normal   [ x]Weakness  [ ]Bed/Wheelchair bound [ ]Edema  NEUROLOGIC:   [ ]No focal deficits  [x ] Cognitive impairment  [ ] Dysphagia [ ]Dysarthria [ ] Paresis [ ]Other   SKIN:   [ x]Normal   [ ]Pressure ulcer(s)  [ ]Rash    CRITICAL CARE:  [ ] Shock Present  [ ]Septic [ ]Cardiogenic [ ]Neurologic [ ]Hypovolemic  [ ]  Vasopressors [ ]  Inotropes   [ ] Respiratory failure present  [ ] Acute  [ ] Chronic [ ] Hypoxic  [ ] Hypercarbic [ ] Other  [ ] Other organ failure     LABS:                        11.7   11.93 )-----------( 544      ( 19 Aug 2019 05:50 )             36.0   08-19    136  |  96<L>  |  8   ----------------------------<  120<H>  3.6   |  29  |  0.72    Ca    9.2      19 Aug 2019 05:50  Mg     1.9     08-19    PT/INR - ( 19 Aug 2019 05:50 )   PT: 17.0 SEC;   INR: 1.51                RADIOLOGY & ADDITIONAL STUDIES:    PROTEIN CALORIE MALNUTRITION PRESENT: [ ] Yes [ ] No  [ ] PPSV2 < or = to 30% [ ] significant weight loss  [ ] poor nutritional intake [ ] catabolic state [ ] anasarca     Albumin, Serum: 2.4 g/dL (08-09-19 @ 06:44)  Artificial Nutrition [ ]     REFERRALS:   [ ]Chaplaincy  [ ] Hospice  [ ]Child Life  [ ]Social Work  [ ]Case management [ ]Holistic Therapy   Goals of Care Discussion Document:

## 2019-08-15 NOTE — PROGRESS NOTE ADULT - PROBLEM SELECTOR PLAN 3
urine tox + benzo. Per PMD Dr Frank Britton [phone], patient has a long history of depression and anxiety and long term use of Benzo   C/w ativan IV 0.5mg bid, monitor signs of benzo withdrawal and respiratory status Psych following

## 2019-08-15 NOTE — CONSULT NOTE ADULT - PROBLEM SELECTOR RECOMMENDATION 4
- Patient with progressive functional and cognitive decline.  - Supportive care.   - Will attempt to contact patients girlfriend.

## 2019-08-15 NOTE — SWALLOW VFSS/MBS ASSESSMENT ADULT - COMMENTS
Patient is a "75 y/o male with possible HX of Psych disorder , lives alone, very poor historian, poor hygiene, missing teeth, Dental caries, on PO Benzo at home, denies ETOH and smoking,  admitted for rhabdomyolysis s/p fall. C/O Productive cough as well".     Patient known to this service. Clinical Bedside Swallow Evaluations completed on 8/7/19, 8/8/19 and 8/14/19 (see full reports for details). Cinesophagram completed on 8/9/19 recommended NPO due to silent aspiration (see full reports for details). Patient is a "75 y/o male with possible HX of Psych disorder , lives alone, very poor historian, poor hygiene, missing teeth, Dental caries, on PO Benzo at home, denies ETOH and smoking,  admitted for rhabdomyolysis s/p fall. C/O Productive cough as well".     Patient known to this service. Clinical Bedside Swallow Evaluations completed on 8/7/19, 8/8/19 and 8/14/19 (see full reports for details). Cinesophagram completed on 8/9/19 recommended NPO due to silent aspiration (see full reports for details).    Patient seen upright in chair. Patient was alert/awake and verbally responsive to simple stimuli. Patient able to follow all directions presented. Patient is a "75 y/o male with possible HX of Psych disorder , lives alone, very poor historian, poor hygiene, missing teeth, Dental caries, on PO Benzo at home, denies ETOH and smoking,  admitted for rhabdomyolysis s/p fall. C/O Productive cough as well".     Patient known to this service. Clinical Bedside Swallow Evaluations completed on 8/7/19, 8/8/19 and 8/14/19 (see full reports for details). Cinesophagram completed on 8/9/19 recommended NPO due to silent aspiration (see full reports for details).    Patient seen upright in chair. Patient was alert/awake and verbally responsive to simple stimuli. Patient able to follow simple directions presented.

## 2019-08-15 NOTE — CONSULT NOTE ADULT - PROBLEM SELECTOR RECOMMENDATION 9
- Patient with prolonged hospitalization, necessitating MICU stay.  - Hospital course with progressive functional decline, with note of weight loss and immobility even before the hospitalization.  - Required full assist with most ADLs.

## 2019-08-15 NOTE — PROGRESS NOTE ADULT - PROBLEM SELECTOR PLAN 2
fowler replaced by urology on 8/14 after failing TOV. f/u Ucx ordered by urology  Elevated WBC today likely reactive after fowler insertion on 8/14  flomax switched to proscar as unable to put flomax via NGT per pharmacy  UA with mod blood and RBC but no leuk esterase or bacteria

## 2019-08-15 NOTE — PROGRESS NOTE ADULT - PROBLEM SELECTOR PLAN 1
Failed cineesophagogram x2, NPO for now, does not want NGT at this time  Wants time to consider taking risk of pleasure feeds vs having a PEG  + severe protein calorie malnutrition  Per Zamzam, patient's gf, patient was undergoing some Neuromuscular workup at [place] and was awaiting an EMG prior to the event that brought him to hospital

## 2019-08-15 NOTE — CONSULT NOTE ADULT - SUBJECTIVE AND OBJECTIVE BOX
HPI:  75 y/o male with possible HX of Psych disorder , lives alone, very poor historian, unkempt and with very poor  hygiene , missing teeth,  Dental caries, on PO Benzo at home, denies ETOH and smoking,  reports falling off a stool 1.5 days prior to presentation around midnight and becoming pinned in position until rescued by EMS this afternoon. Denies drug or alcohol use prior to falling, and denies feeling lightheaded or dizzy prior. Patient is unsure what may have caused the fall other than a sense that his muscles have been "dysfunctional" lately. In the past month, he has noticed some muscle wasting, particularly in his arms, with 6-8 lbs of weight loss in this time. He has also noticed some fasciculations in his legs in the past month as well. He denies any family history of muscular pathology. He also denies seizures, chest pain, shortness of breath, nausea, and abdominal pain. Reports taking diazepam bid and flurazepam at nights and no other medications. Of note, patient has known history of L scrotal hydrocele but has refused surgery. Pt awake, A+O x 3, C/O + Productive cough as well, no fever, no dysuria, no N/V, No HA, no abdominal pain, + weakness, + cachectic with B/L Temporal wasting , Zamzam  (girl friend),     S/P CT Chest/Abdomen/pelvis as below:   < from: CT Abdomen and Pelvis w/ IV Cont (08.06.19 @ 16:47) >  IMPRESSION: A few right lower lobe ground glass opacities of uncertain   etiology. No evidence of solid organ injury or acute fracture.    S/P CT Maxillofacial as Below:     < from: CT Maxillofacial w/ IV Cont (08.06.19 @ 16:47) >  IMPRESSION:    Discontinuous appearance of the left lower medial incisor which appears   displaced posteriorly suggesting an avulsed tooth/alveolar fracture.   Clinical correlation is advised. Poor dentition with multiple missing   teeth. Right facial and external auditory canal soft tissue swelling likely   posttraumatic in nature however cellulitis/otitis externa is not excluded.    CT Head: unremarkable, , X ray: Pelvis No FX,     RVP was sent , started the pt on IV Zosyn, R/O Pneumonia, Elevated CPK 3311, + Rhabdo as well, on IVF NS @ 150 cc/hr, S/P IVF LR Bolus x 1 lit, and IVF Ns x 2 Lit Bolus, Dental consult was requested, NPO for now, pt was placed on TELE ,    Labs: PT 15.5, INR 1.35, PTT 26.2, Fibrinogen assay 542, Lactate 2.7, CPK 3311, Mg 2, Na 140, K+ 3.9, BUN 25, Creatinine 0.82, Glucose 138, AST 78, Troponin HS 21, WBC 24.95, Hgb 13.5, Platelet 262, UA: Large Blood , Ketone > 150,     Vitals: Tem 98.5, HR 87, /66, RR 16, 95 % RA, (06 Aug 2019 20:44)    PERTINENT PM/SXH:   Scrotal swelling  Psychiatric disorder  No pertinent past medical history    No significant past surgical history    FAMILY HISTORY:  FHx: stomach cancer    ITEMS NOT CHECKED ARE NOT PRESENT    SOCIAL HISTORY:   Significant other/partner:  [ ]  Children:  [ ]  Sikh/Spirituality:  Substance hx:  [ ]   Tobacco hx:  [ ]   Alcohol hx: [ ]   Home Opioid hx:  [ ] I-Stop Reference No:  Living Situation: [ ]Home  [ ]Long term care  [ ]Rehab [ ]Other    ADVANCE DIRECTIVES:    DNR  MOLST  [ ]  Living Will  [ ]   DECISION MAKER(s):  [ ] Health Care Proxy(s)  [ ] Surrogate(s)  [ ] Guardian           Name(s): Phone Number(s):    BASELINE (I)ADL(s) (prior to admission):  [place]: [ ]Total  [ ] Moderate [ ]Dependent    Allergies    No Known Allergies    Intolerances    MEDICATIONS  (STANDING):  artificial tears (preservative free) Ophthalmic Solution 1 Drop(s) Both EYES three times a day  finasteride 5 milliGRAM(s) Oral daily  heparin  Injectable 5000 Unit(s) SubCutaneous two times a day  lactobacillus acidophilus 1 Tablet(s) Oral every 12 hours  LORazepam   Injectable 0.5 milliGRAM(s) IV Push two times a day  potassium chloride  10 mEq/100 mL IVPB 10 milliEquivalent(s) IV Intermittent every 1 hour    MEDICATIONS  (PRN):  ALBUTerol/ipratropium for Nebulization 3 milliLiter(s) Nebulizer every 6 hours PRN Shortness of Breath and/or Wheezing  LORazepam   Injectable 0.5 milliGRAM(s) IV Push every 6 hours PRN Agitation/insomnia    PRESENT SYMPTOMS: [ ]Unable to obtain due to poor mentation   Source if other than patient:  [ ]Family   [ ]Team     Pain (Impact on QOL):    Location -         Minimal acceptable level (0-10 scale):                    Aggravating factors -  Quality -  Radiation -  Severity (0-10 scale) -    Timing -    PAIN AD Score:     [email or web address] (press ctrl +  left click to view)    Dyspnea:                           [ ]Mild [ ]Moderate [ ]Severe  Anxiety:                             [ ]Mild [ ]Moderate [ ]Severe  Fatigue:                             [ ]Mild [ ]Moderate [ ]Severe  Nausea:                             [ ]Mild [ ]Moderate [ ]Severe  Loss of appetite:              [ ]Mild [ ]Moderate [ ]Severe  Constipation:                    [ ]Mild [ ]Moderate [ ]Severe  Grief Present                    [ ] Yes   [ ] No   Other Symptoms:  [ ]All other review of systems negative     Karnofsky Performance Score/Palliative Performance Status Version 2:         %    http://palliative.info/resource_material/[email or web address].pdf  PHYSICAL EXAM:  Vital Signs Last 24 Hrs  T(C): 36.6 (16 Aug 2019 06:04), Max: 36.9 (15 Aug 2019 11:38)  T(F): 97.8 (16 Aug 2019 06:04), Max: 98.4 (15 Aug 2019 11:38)  HR: 73 (16 Aug 2019 06:04) (73 - 87)  BP: 102/57 (16 Aug 2019 06:04) (102/57 - 120/63)  BP(mean): --  RR: 17 (16 Aug 2019 06:04) (16 - 17)  SpO2: 95% (16 Aug 2019 06:04) (95% - 96%) I&O's Summary    15 Aug 2019 07:01  -  16 Aug 2019 07:00  --------------------------------------------------------  IN: 0 mL / OUT: 1530 mL / NET: -1530 mL    GENERAL:  [ ]Alert  [ ]Oriented x   [ ]Lethargic  [ ]Cachexia  [ ]Unarousable  [ ]Verbal  [ ]Non-Verbal  Behavioral:   [ ] Anxiety  [ ] Delirium [ ] Agitation [ ] Other  HEENT:  [ ]Normal   [ ]Dry mouth   [ ]ET Tube/Trach  [ ]Oral lesions  PULMONARY:   [ ]Clear [ ]Tachypnea  [ ]Audible excessive secretions   [ ]Rhonchi        [ ]Right [ ]Left [ ]Bilateral  [ ]Crackles        [ ]Right [ ]Left [ ]Bilateral  [ ]Wheezing     [ ]Right [ ]Left [ ]Bilateral  CARDIOVASCULAR:    [ ]Regular [ ]Irregular [ ]Tachy  [ ]Edison [ ]Murmur [ ]Other  GASTROINTESTINAL:  [ ]Soft  [ ]Distended   [ ]+BS  [ ]Non tender [ ]Tender  [ ]PEG [ ]OGT/ NGT  Last BM:   GENITOURINARY:  [ ]Normal [ ] Incontinent   [ ]Oliguria/Anuria   [ ]Spencer  MUSCULOSKELETAL:   [ ]Normal   [ ]Weakness  [ ]Bed/Wheelchair bound [ ]Edema  NEUROLOGIC:   [ ]No focal deficits  [ ] Cognitive impairment  [ ] Dysphagia [ ]Dysarthria [ ] Paresis [ ]Other   SKIN:   [ ]Normal   [ ]Pressure ulcer(s)  [ ]Rash    CRITICAL CARE:  [ ] Shock Present  [ ]Septic [ ]Cardiogenic [ ]Neurologic [ ]Hypovolemic  [ ]  Vasopressors [ ]  Inotropes   [ ] Respiratory failure present  [ ] Acute  [ ] Chronic [ ] Hypoxic  [ ] Hypercarbic [ ] Other  [ ] Other organ failure     LABS:                        10.9   9.76  )-----------( 459      ( 16 Aug 2019 06:30 )             35.0   08-16    137  |  96<L>  |  14  ----------------------------<  108<H>  3.6   |  27  |  0.71    Ca    8.8      16 Aug 2019 06:30  Phos  2.9     08-15  Mg     1.9     08-16          RADIOLOGY & ADDITIONAL STUDIES:    PROTEIN CALORIE MALNUTRITION PRESENT: [ ] Yes [ ] No  [ ] PPSV2 < or = to 30% [ ] significant weight loss  [ ] poor nutritional intake [ ] catabolic state [ ] anasarca     Albumin, Serum: 2.4 g/dL (08-09-19 @ 06:44)  Artificial Nutrition [ ]     REFERRALS:   [ ]Chaplaincy  [ ] Hospice  [ ]Child Life  [ ]Social Work  [ ]Case management [ ]Holistic Therapy   Goals of Care Discussion Document:

## 2019-08-15 NOTE — SWALLOW VFSS/MBS ASSESSMENT ADULT - NS SWALLOW VFSS REC ASPIR MON
pneumonia/throat clearing/upper respiratory infection/oral hygiene/change of breathing pattern/position upright (90Y)/cough/gurgly voice/fever
position upright (90Y)/pneumonia/oral hygiene/fever/cough/gurgly voice/throat clearing/change of breathing pattern

## 2019-08-15 NOTE — CONSULT NOTE ADULT - PROBLEM SELECTOR RECOMMENDATION 2
- Waxing and waning.  - Unclear what new baseline will be.  - Frequent reorientation.  - Medical team is treating underlying pathology.  - Supportive care.

## 2019-08-15 NOTE — SWALLOW VFSS/MBS ASSESSMENT ADULT - RECOMMENDED CONSISTENCY
1. Oral nutrition/hydration contraindicated at this time  2. Defer to MD for Nutritional Plan of Care discussion with family/patient   3. Maintain good oral hygiene  4. Aspiration precautions 1. Oral nutrition/hydration contraindicated at this time given the severity of the oropharyngeal stage deficits   2. Defer to MD for Nutritional Plan of Care discussion with family/patient   3. Maintain good oral hygiene  4. Aspiration precautions

## 2019-08-16 LAB
ANION GAP SERPL CALC-SCNC: 14 MMO/L — SIGNIFICANT CHANGE UP (ref 7–14)
BUN SERPL-MCNC: 14 MG/DL — SIGNIFICANT CHANGE UP (ref 7–23)
CALCIUM SERPL-MCNC: 8.8 MG/DL — SIGNIFICANT CHANGE UP (ref 8.4–10.5)
CHLORIDE SERPL-SCNC: 96 MMOL/L — LOW (ref 98–107)
CO2 SERPL-SCNC: 27 MMOL/L — SIGNIFICANT CHANGE UP (ref 22–31)
CREAT SERPL-MCNC: 0.71 MG/DL — SIGNIFICANT CHANGE UP (ref 0.5–1.3)
GLUCOSE SERPL-MCNC: 108 MG/DL — HIGH (ref 70–99)
HCT VFR BLD CALC: 35 % — LOW (ref 39–50)
HGB BLD-MCNC: 10.9 G/DL — LOW (ref 13–17)
MAGNESIUM SERPL-MCNC: 1.9 MG/DL — SIGNIFICANT CHANGE UP (ref 1.6–2.6)
MCHC RBC-ENTMCNC: 31.1 % — LOW (ref 32–36)
MCHC RBC-ENTMCNC: 31.6 PG — SIGNIFICANT CHANGE UP (ref 27–34)
MCV RBC AUTO: 101.4 FL — HIGH (ref 80–100)
NRBC # FLD: 0 K/UL — SIGNIFICANT CHANGE UP (ref 0–0)
PLATELET # BLD AUTO: 459 K/UL — HIGH (ref 150–400)
PMV BLD: 10.1 FL — SIGNIFICANT CHANGE UP (ref 7–13)
POTASSIUM SERPL-MCNC: 3.6 MMOL/L — SIGNIFICANT CHANGE UP (ref 3.5–5.3)
POTASSIUM SERPL-SCNC: 3.6 MMOL/L — SIGNIFICANT CHANGE UP (ref 3.5–5.3)
RBC # BLD: 3.45 M/UL — LOW (ref 4.2–5.8)
RBC # FLD: 12.2 % — SIGNIFICANT CHANGE UP (ref 10.3–14.5)
SODIUM SERPL-SCNC: 137 MMOL/L — SIGNIFICANT CHANGE UP (ref 135–145)
WBC # BLD: 9.76 K/UL — SIGNIFICANT CHANGE UP (ref 3.8–10.5)
WBC # FLD AUTO: 9.76 K/UL — SIGNIFICANT CHANGE UP (ref 3.8–10.5)

## 2019-08-16 PROCEDURE — 99233 SBSQ HOSP IP/OBS HIGH 50: CPT

## 2019-08-16 PROCEDURE — 99222 1ST HOSP IP/OBS MODERATE 55: CPT | Mod: GC

## 2019-08-16 RX ORDER — SODIUM CHLORIDE 9 MG/ML
1000 INJECTION, SOLUTION INTRAVENOUS
Refills: 0 | Status: DISCONTINUED | OUTPATIENT
Start: 2019-08-16 | End: 2019-08-22

## 2019-08-16 RX ORDER — POTASSIUM CHLORIDE 20 MEQ
10 PACKET (EA) ORAL
Refills: 0 | Status: COMPLETED | OUTPATIENT
Start: 2019-08-16 | End: 2019-08-16

## 2019-08-16 RX ADMIN — Medication 1 DROP(S): at 21:42

## 2019-08-16 RX ADMIN — SODIUM CHLORIDE 75 MILLILITER(S): 9 INJECTION, SOLUTION INTRAVENOUS at 22:07

## 2019-08-16 RX ADMIN — Medication 100 MILLIEQUIVALENT(S): at 09:27

## 2019-08-16 RX ADMIN — SODIUM CHLORIDE 75 MILLILITER(S): 9 INJECTION, SOLUTION INTRAVENOUS at 18:18

## 2019-08-16 RX ADMIN — Medication 1 DROP(S): at 14:01

## 2019-08-16 RX ADMIN — Medication 0.5 MILLIGRAM(S): at 06:06

## 2019-08-16 RX ADMIN — Medication 100 MILLIEQUIVALENT(S): at 10:39

## 2019-08-16 RX ADMIN — Medication 0.5 MILLIGRAM(S): at 18:18

## 2019-08-16 RX ADMIN — Medication 1 DROP(S): at 06:07

## 2019-08-16 RX ADMIN — Medication 100 MILLIEQUIVALENT(S): at 12:34

## 2019-08-16 RX ADMIN — HEPARIN SODIUM 5000 UNIT(S): 5000 INJECTION INTRAVENOUS; SUBCUTANEOUS at 06:13

## 2019-08-16 NOTE — CHART NOTE - NSCHARTNOTEFT_GEN_A_CORE
Spoke with patient regarding NGT. Explained to patient that PEG placement will likely happen early next week and he will need hydration/nutrition in the interim. Patient refusing NGT reinsertion at this time as his throat is still sore from previous NGT. He prefers IVF for now and to re-evaluate in the morning. Spoke with patient regarding NGT. Explained to patient that PEG placement will likely happen early next week and he will need hydration/nutrition in the interim. Patient refusing NGT reinsertion at this time as his throat is still sore from previous NGT. He prefers IVF for now and to re-evaluate in the morning. Maintenance fluids started.

## 2019-08-16 NOTE — PROGRESS NOTE ADULT - PROBLEM SELECTOR PLAN 1
Failed cineesophagogram x2, NPO, + severe protein calorie malnutrition  Patient agreeable to PEG today, GI consult  Per Zamzam, patient's gf, patient was undergoing some Neuromuscular workup at [place] and was awaiting an EMG prior to the event that brought him to hospital

## 2019-08-16 NOTE — PROGRESS NOTE ADULT - PROBLEM SELECTOR PLAN 3
urine tox + benzo. Per PMD Dr Frank Britton [phone], patient has a long history of depression and anxiety and long term use of Benzo   C/w ativan IV 0.5mg bid, monitor signs of benzo withdrawal and respiratory status Psych following

## 2019-08-16 NOTE — PROGRESS NOTE ADULT - SUBJECTIVE AND OBJECTIVE BOX
Patient is a 74y old  Male who presents with a chief complaint of S/P fall, Rhabdo, R/O Pneumonia, Leukocytosis, Cough, R/O Cellulitis Face/lower Abdomen (15 Aug 2019 15:56)      SUBJECTIVE / OVERNIGHT EVENTS: No acute events overnight    MEDICATIONS  (STANDING):  artificial tears (preservative free) Ophthalmic Solution 1 Drop(s) Both EYES three times a day  finasteride 5 milliGRAM(s) Oral daily  heparin  Injectable 5000 Unit(s) SubCutaneous two times a day  lactobacillus acidophilus 1 Tablet(s) Oral every 12 hours  LORazepam   Injectable 0.5 milliGRAM(s) IV Push two times a day  potassium chloride  10 mEq/100 mL IVPB 10 milliEquivalent(s) IV Intermittent every 1 hour    MEDICATIONS  (PRN):  ALBUTerol/ipratropium for Nebulization 3 milliLiter(s) Nebulizer every 6 hours PRN Shortness of Breath and/or Wheezing  LORazepam   Injectable 0.5 milliGRAM(s) IV Push every 6 hours PRN Agitation/insomnia      T(C): 36.6 (08-16-19 @ 06:04), Max: 36.9 (08-15-19 @ 11:38)  HR: 73 (08-16-19 @ 06:04) (73 - 87)  BP: 102/57 (08-16-19 @ 06:04) (102/57 - 120/63)  RR: 17 (08-16-19 @ 06:04) (16 - 17)  SpO2: 95% (08-16-19 @ 06:04) (95% - 96%)  CAPILLARY BLOOD GLUCOSE    I&O's Summary    15 Aug 2019 07:01  -  16 Aug 2019 07:00  --------------------------------------------------------  IN: 0 mL / OUT: 1530 mL / NET: -1530 mL    PHYSICAL EXAM:  GENERAL: cachectic and disheveled, no apparent distress, on room air  HEENT: b/l temporal wasting, scarring/tear of right ear  CHEST/LUNG: CTA anteriorly b/l  HEART: s1/s2, RRR, no murmurs appreciated  ABDOMEN: Soft, Nontender, nontender  EXTREMITIES: WWP, no edema  : scrotal swelling, + indwelling fowler with yellow urine  NEUROLOGY: awake, alert, responds to Qs appropriately  PSYCH: calm, cooperative, follows commands, linear thought process although needs redirecting at times  SKIN: tears of right anterior chest and right ear    LABS:                        10.9   9.76  )-----------( 459      ( 16 Aug 2019 06:30 )             35.0     08-16    137  |  96<L>  |  14  ----------------------------<  108<H>  3.6   |  27  |  0.71    Ca    8.8      16 Aug 2019 06:30  Phos  2.9     08-15  Mg     1.9     08-16                RADIOLOGY & ADDITIONAL TESTS:

## 2019-08-16 NOTE — CONSULT NOTE ADULT - SUBJECTIVE AND OBJECTIVE BOX
Chief Complaint:  Patient is a 74y old  Male who presents with a chief complaint of S/P fall, Rhabdo, R/O Pneumonia, Leukocytosis, Cough, R/O Cellulitis Face/lower Abdomen (16 Aug 2019 10:46)      HPI: 73 y/o male with possible HX of Psych disorder , lives alone, very poor historian, unkempt and with very poor  hygiene , missing teeth,  Dental caries, on PO Benzo at home, denies ETOH and smoking,  reports falling off a stool 1.5 days prior to presentation around midnight and becoming pinned in position until rescued by EMS this afternoon. Denies drug or alcohol use prior to falling, and denies feeling lightheaded or dizzy prior. Patient is unsure what may have caused the fall other than a sense that his muscles have been "dysfunctional" lately. In the past month, he has noticed some muscle wasting, particularly in his arms, with 6-8 lbs of weight loss in this time. He has also noticed some fasciculations in his legs in the past month as well. He denies any family history of muscular pathology. He also denies seizures, chest pain, shortness of breath, nausea, and abdominal pain. Reports taking diazepam bid and flurazepam at nights and no other medications. Of note, patient has known history of L scrotal hydrocele but has refused surgery. Pt awake, A+O x 3, C/O + Productive cough as well, no fever, no dysuria, no N/V, No HA, no abdominal pain, + weakness, + cachectic with B/L Temporal wasting . Patient was treated this hospitalization for aspiration pneumonia and rhabdomyolysis. Patient unable to pass speech and swallow evaluation and GI consulted for PEG placement.    GI Evaluation: Patient states he feels well overall. He denies a hx of dysphagia or difficulty tolerating oral intake. He states he never feels food being stuck in his chest. He is unclear why this has happened. Per chart review, patient is following up at OSH for neuromuscular disorder symptoms. Patient deneis cough, GERD, nausea, vomiting, diarrhea, fever or chills.         Allergies:  No Known Allergies      Home Medications:    Hospital Medications:  ALBUTerol/ipratropium for Nebulization 3 milliLiter(s) Nebulizer every 6 hours PRN  artificial tears (preservative free) Ophthalmic Solution 1 Drop(s) Both EYES three times a day  finasteride 5 milliGRAM(s) Oral daily  heparin  Injectable 5000 Unit(s) SubCutaneous two times a day  lactobacillus acidophilus 1 Tablet(s) Oral every 12 hours  LORazepam   Injectable 0.5 milliGRAM(s) IV Push two times a day  LORazepam   Injectable 0.5 milliGRAM(s) IV Push every 6 hours PRN      PMHX/PSHX:  Scrotal swelling  Psychiatric disorder  No pertinent past medical history  No significant past surgical history      Family history:  FHx: stomach cancer unclear which memebr, no hx of colon, breast, endometrial, cervical cancer      Social History: No alcohol, drug, tobacco use.    ROS:     General:  No wt loss, fevers, chills, night sweats, fatigue,   Eyes:  Good vision, no reported pain  ENT:  No sore throat, pain, runny nose, dysphagia  CV:  No pain, palpitations, hypo/hypertension  Resp:  No dyspnea, cough, tachypnea, wheezing  GI:  See HPI  :  No pain, bleeding, incontinence, nocturia  Muscle:  No pain, weakness  Neuro:  No weakness, tingling, memory problems  Psych:  No fatigue, insomnia, mood problems, depression  Endocrine:  No polyuria, polydipsia, cold/heat intolerance  Heme:  No petechiae, ecchymosis, easy bruisability  Skin:  No rash, edema      PHYSICAL EXAM:     Vital Signs:  Vital Signs Last 24 Hrs  T(C): 36.6 (16 Aug 2019 06:04), Max: 36.8 (15 Aug 2019 18:26)  T(F): 97.8 (16 Aug 2019 06:04), Max: 98.3 (15 Aug 2019 18:26)  HR: 73 (16 Aug 2019 06:04) (73 - 85)  BP: 102/57 (16 Aug 2019 06:04) (102/57 - 120/63)  BP(mean): --  RR: 17 (16 Aug 2019 06:04) (16 - 17)  SpO2: 95% (16 Aug 2019 06:04) (95% - 96%)  Daily     Daily     GENERAL:  unkept, frail appearing  HEENT:  NC/AT, poor dentition, conjunctivae clear and pink  CHEST:  Full & symmetric excursion, no increased effort, breath sounds clear  HEART:  Regular rhythm, S1, S2, no murmur/rub/S3/S4, no abdominal bruit, no edema, no JVD  ABDOMEN:  Soft, non-tender, non-distended, normoactive bowel sounds,  no masses , no hepatosplenomegaly  EXTREMITIES:  no cyanosis, clubbing or edema  SKIN:  scattered echymoses  NEURO:  Alert, oriented    LABS:                        10.9   9.76  )-----------( 459      ( 16 Aug 2019 06:30 )             35.0     08-16    137  |  96<L>  |  14  ----------------------------<  108<H>  3.6   |  27  |  0.71    Ca    8.8      16 Aug 2019 06:30  Phos  2.9     08-15  Mg     1.9     08-16                Imaging:  < from: Xray Cinesophagram (08.15.19 @ 08:57) >  INTERPRETATION:  CLINICAL INFORMATION:  Dysphagia    TECHNIQUE: Various foods with different consistencies were mixed with   barium and patient was asked to consume them under direct fluoroscopic   evaluation.  The study was performed in cooperation with the speech   pathologist.    FLUOROSCOPIC TIME:2.47 minutes.    COMPARISON:  [facility] [place] 8/9/19      IMPRESSION:  The nasogastric tube was removed with verbal approval from   KEVIN Chan. Silent laryngeal penetration was visualized with nectar   thick liquids. A full report will be issued by the department of speech   and hearing.     < end of copied text >    Swallow eval:  Recommendations:   · Diagnostic Impressions	PO trials presented initially with NGT in place. NGT subsequently removed by Radiologist with verbal approval from medical team: Without NGT in place, patient demonstrated 1. Mild oral phase dysphagia for puree consistency and honey thick liquids marked by adequate bolus manipulation, adequate tongue motion, and reduced anterior-posterior transport with posterior loss of bolus 2. Mild-Moderate oral phase dysphagia for nectar thick liquids and thin liquids marked by reduced bolus manipulation, adequate tongue motion, and reduced anterior-posterior transport with posterior loss of bolus 3. Moderate pharyngeal phase dysphagia for puree consistency and honey thick liquids marked by delayed initiation of pharyngeal swallow, reduced laryngeal elevation, reduced laryngeal vestibular closure, reduced/ poor base of tongue retraction, reduced epiglottic deflection and reduced pharyngeal contractility. Severe pharyngeal clearance deficits with severe residue located primarily in the valleculae. Silent aspiration noted after the swallow given evidence of barium contrast in the trachea when fluoroscopy machine was turned back on for the next PO trial. Chin tuck attempted though not successful 4.  Severe pharyngeal dysphagia for nectar thick liquids and thin liquids marked by delay in initiation of pharyngeal swallow, reduced laryngeal elevation, reduced laryngeal vestibular closure, reduced/poor base of tongue retraction, reduced epiglottic deflection and reduced pharyngeal contractility. Moderate pharyngeal clearance deficits with moderate residue located primarily in the valleculae. Silent laryngeal penetration noted during the swallow with subsequent silent aspiration. Chin tuck attempted, though unsuccessful.  	  · Recommended Consistencies	1. Oral nutrition/hydration contraindicated at this time given the severity of the oropharyngeal stage deficits   2. Defer to MD for Nutritional Plan of Care discussion with family/patient   3. Maintain good oral hygiene  4. Aspiration precautions

## 2019-08-16 NOTE — CONSULT NOTE ADULT - ASSESSMENT
Impression:  73 y/o male with possible HX of Psych disorder, lives alone, very poor historian, unkempt and with very poor hygiene, missing teeth,  Dental caries, on PO Benzo at home, presented with a fall s/p treatment for aspiraton pneumonia and rhabomyolysis. GI consulted for PEG placement given oropharyngeal dysphagia    # PEG Placement: at this time given swallow evaluation and severe oropharyngeal dysphagia, limited options for nutrition  # Pharyngeal dysphagia  # Aspiration pneumonia  # rhabdomyolysis    Recommendation:  - plan for PEG placement on Monday or Tuesday  - aspiration precautions  - swallow consultation appreciated  - recommend NGT placement in the interim, patient appears amenable at this time  - dental consultation for loose teeth   - consider neuro consultation for workup for oropharyngeal dysphagia    Rahul Crespo, PGY-4

## 2019-08-17 LAB
ANION GAP SERPL CALC-SCNC: 11 MMO/L — SIGNIFICANT CHANGE UP (ref 7–14)
BUN SERPL-MCNC: 15 MG/DL — SIGNIFICANT CHANGE UP (ref 7–23)
CALCIUM SERPL-MCNC: 9.1 MG/DL — SIGNIFICANT CHANGE UP (ref 8.4–10.5)
CHLORIDE SERPL-SCNC: 97 MMOL/L — LOW (ref 98–107)
CO2 SERPL-SCNC: 27 MMOL/L — SIGNIFICANT CHANGE UP (ref 22–31)
CREAT SERPL-MCNC: 0.67 MG/DL — SIGNIFICANT CHANGE UP (ref 0.5–1.3)
GLUCOSE SERPL-MCNC: 120 MG/DL — HIGH (ref 70–99)
HCT VFR BLD CALC: 33.7 % — LOW (ref 39–50)
HGB BLD-MCNC: 10.9 G/DL — LOW (ref 13–17)
MAGNESIUM SERPL-MCNC: 1.9 MG/DL — SIGNIFICANT CHANGE UP (ref 1.6–2.6)
MCHC RBC-ENTMCNC: 31.9 PG — SIGNIFICANT CHANGE UP (ref 27–34)
MCHC RBC-ENTMCNC: 32.3 % — SIGNIFICANT CHANGE UP (ref 32–36)
MCV RBC AUTO: 98.5 FL — SIGNIFICANT CHANGE UP (ref 80–100)
NRBC # FLD: 0 K/UL — SIGNIFICANT CHANGE UP (ref 0–0)
PLATELET # BLD AUTO: 492 K/UL — HIGH (ref 150–400)
PMV BLD: 9.9 FL — SIGNIFICANT CHANGE UP (ref 7–13)
POTASSIUM SERPL-MCNC: 4.2 MMOL/L — SIGNIFICANT CHANGE UP (ref 3.5–5.3)
POTASSIUM SERPL-SCNC: 4.2 MMOL/L — SIGNIFICANT CHANGE UP (ref 3.5–5.3)
RBC # BLD: 3.42 M/UL — LOW (ref 4.2–5.8)
RBC # FLD: 12.3 % — SIGNIFICANT CHANGE UP (ref 10.3–14.5)
SODIUM SERPL-SCNC: 135 MMOL/L — SIGNIFICANT CHANGE UP (ref 135–145)
WBC # BLD: 10.59 K/UL — HIGH (ref 3.8–10.5)
WBC # FLD AUTO: 10.59 K/UL — HIGH (ref 3.8–10.5)

## 2019-08-17 PROCEDURE — 99233 SBSQ HOSP IP/OBS HIGH 50: CPT

## 2019-08-17 RX ADMIN — Medication 0.5 MILLIGRAM(S): at 17:59

## 2019-08-17 RX ADMIN — SODIUM CHLORIDE 75 MILLILITER(S): 9 INJECTION, SOLUTION INTRAVENOUS at 17:59

## 2019-08-17 RX ADMIN — Medication 1 DROP(S): at 15:11

## 2019-08-17 RX ADMIN — Medication 0.5 MILLIGRAM(S): at 05:38

## 2019-08-17 RX ADMIN — Medication 1 DROP(S): at 05:38

## 2019-08-17 NOTE — PROGRESS NOTE ADULT - PROBLEM SELECTOR PLAN 2
fowler replaced by urology on 8/14 after failing TOV. WBC downtrending  resume flomax and proscar once has a feeding tube  UA with mod blood and RBC but no leuk esterase or bacteria

## 2019-08-17 NOTE — PROGRESS NOTE ADULT - PROBLEM SELECTOR PLAN 1
Failed cineesophagogram x2, NPO, + severe protein calorie malnutrition  Patient agreeable to PEG, will need dental extraction of loose teeth prior. Refuses temporary NGT in meantime. C/w gentle IVF  Per Zamzam, patient's gf, patient was undergoing some Neuromuscular workup at [place] and was awaiting an EMG prior to the event that brought him to hospital

## 2019-08-17 NOTE — PROGRESS NOTE ADULT - PROBLEM SELECTOR PLAN 3
urine tox + benzo. Per PMD Dr Frank Britton [phone], patient has a long history of depression and anxiety and long term use of Benzo   C/w ativan IV 0.5mg bid, monitor signs of benzo withdrawal and respiratory status Psych following

## 2019-08-17 NOTE — PROGRESS NOTE ADULT - PROBLEM SELECTOR PLAN 8
lives alone in unclean living environment. Has no family nearby per pt  SW to followup  PT recs: rehab

## 2019-08-17 NOTE — PROGRESS NOTE ADULT - PROBLEM SELECTOR PLAN 6
s/p dental eval, patient informed he will need extraction of loose teeth prior to PEG  Dental F/U with outpatient dentist for comprehensive dental care.

## 2019-08-17 NOTE — PROGRESS NOTE ADULT - SUBJECTIVE AND OBJECTIVE BOX
Patient is a 74y old  Male who presents with a chief complaint of S/P fall, Rhabdo, R/O Pneumonia, Leukocytosis, Cough, R/O Cellulitis Face/lower Abdomen (16 Aug 2019 13:07)      SUBJECTIVE / OVERNIGHT EVENTS: No acute events overnight. Denies chest pain, SOB. States he does not know how he got here. No N/V    MEDICATIONS  (STANDING):  artificial tears (preservative free) Ophthalmic Solution 1 Drop(s) Both EYES three times a day  dextrose 5% + sodium chloride 0.45%. 1000 milliLiter(s) (75 mL/Hr) IV Continuous <Continuous>  finasteride 5 milliGRAM(s) Oral daily  heparin  Injectable 5000 Unit(s) SubCutaneous two times a day  lactobacillus acidophilus 1 Tablet(s) Oral every 12 hours  LORazepam   Injectable 0.5 milliGRAM(s) IV Push two times a day    MEDICATIONS  (PRN):  ALBUTerol/ipratropium for Nebulization 3 milliLiter(s) Nebulizer every 6 hours PRN Shortness of Breath and/or Wheezing  LORazepam   Injectable 0.5 milliGRAM(s) IV Push every 6 hours PRN Agitation/insomnia      T(C): 36.6 (08-17-19 @ 05:11), Max: 36.6 (08-17-19 @ 05:11)  HR: 72 (08-17-19 @ 05:11) (72 - 90)  BP: 115/70 (08-17-19 @ 05:11) (115/70 - 128/77)  RR: 17 (08-17-19 @ 05:11) (17 - 17)  SpO2: 96% (08-17-19 @ 05:11) (96% - 96%)  CAPILLARY BLOOD GLUCOSE    I&O's Summary    16 Aug 2019 07:01  -  17 Aug 2019 07:00  --------------------------------------------------------  IN: 75 mL / OUT: 1050 mL / NET: -975 mL    17 Aug 2019 07:01  -  17 Aug 2019 11:24  --------------------------------------------------------  IN: 150 mL / OUT: 550 mL / NET: -400 mL    PHYSICAL EXAM:  GENERAL: cachectic and disheveled, no apparent distress, on room air  HEENT: b/l temporal wasting, scarring/tear of right ear  CHEST/LUNG: CTA anteriorly b/l  HEART: s1/s2, RRR, no murmurs appreciated  ABDOMEN: Soft, Nontender, nontender  EXTREMITIES: WWP, no edema  : scrotal swelling, + indwelling fowler with yellow urine  NEUROLOGY: awake, alert, responds to Qs  PSYCH: calm, cooperative, follows commands, tangential and circumferential and needs frequent redirecting  SKIN: skin tears of right anterior chest and right ear  LABS:                        10.9   10.59 )-----------( 492      ( 17 Aug 2019 07:48 )             33.7     08-17    135  |  97<L>  |  15  ----------------------------<  120<H>  4.2   |  27  |  0.67    Ca    9.1      17 Aug 2019 07:48  Mg     1.9     08-17                RADIOLOGY & ADDITIONAL TESTS:

## 2019-08-18 LAB
ANION GAP SERPL CALC-SCNC: 13 MMO/L — SIGNIFICANT CHANGE UP (ref 7–14)
BUN SERPL-MCNC: 10 MG/DL — SIGNIFICANT CHANGE UP (ref 7–23)
CALCIUM SERPL-MCNC: 9 MG/DL — SIGNIFICANT CHANGE UP (ref 8.4–10.5)
CHLORIDE SERPL-SCNC: 96 MMOL/L — LOW (ref 98–107)
CO2 SERPL-SCNC: 26 MMOL/L — SIGNIFICANT CHANGE UP (ref 22–31)
CREAT SERPL-MCNC: 0.68 MG/DL — SIGNIFICANT CHANGE UP (ref 0.5–1.3)
GLUCOSE SERPL-MCNC: 129 MG/DL — HIGH (ref 70–99)
HCT VFR BLD CALC: 33.8 % — LOW (ref 39–50)
HGB BLD-MCNC: 11.2 G/DL — LOW (ref 13–17)
MAGNESIUM SERPL-MCNC: 1.8 MG/DL — SIGNIFICANT CHANGE UP (ref 1.6–2.6)
MCHC RBC-ENTMCNC: 32.6 PG — SIGNIFICANT CHANGE UP (ref 27–34)
MCHC RBC-ENTMCNC: 33.1 % — SIGNIFICANT CHANGE UP (ref 32–36)
MCV RBC AUTO: 98.3 FL — SIGNIFICANT CHANGE UP (ref 80–100)
NRBC # FLD: 0 K/UL — SIGNIFICANT CHANGE UP (ref 0–0)
PLATELET # BLD AUTO: 514 K/UL — HIGH (ref 150–400)
PMV BLD: 9.3 FL — SIGNIFICANT CHANGE UP (ref 7–13)
POTASSIUM SERPL-MCNC: 3.7 MMOL/L — SIGNIFICANT CHANGE UP (ref 3.5–5.3)
POTASSIUM SERPL-SCNC: 3.7 MMOL/L — SIGNIFICANT CHANGE UP (ref 3.5–5.3)
RBC # BLD: 3.44 M/UL — LOW (ref 4.2–5.8)
RBC # FLD: 12.1 % — SIGNIFICANT CHANGE UP (ref 10.3–14.5)
SODIUM SERPL-SCNC: 135 MMOL/L — SIGNIFICANT CHANGE UP (ref 135–145)
WBC # BLD: 11.61 K/UL — HIGH (ref 3.8–10.5)
WBC # FLD AUTO: 11.61 K/UL — HIGH (ref 3.8–10.5)

## 2019-08-18 PROCEDURE — 99232 SBSQ HOSP IP/OBS MODERATE 35: CPT

## 2019-08-18 RX ADMIN — SODIUM CHLORIDE 75 MILLILITER(S): 9 INJECTION, SOLUTION INTRAVENOUS at 05:22

## 2019-08-18 RX ADMIN — Medication 0.5 MILLIGRAM(S): at 17:17

## 2019-08-18 RX ADMIN — HEPARIN SODIUM 5000 UNIT(S): 5000 INJECTION INTRAVENOUS; SUBCUTANEOUS at 17:18

## 2019-08-18 RX ADMIN — SODIUM CHLORIDE 75 MILLILITER(S): 9 INJECTION, SOLUTION INTRAVENOUS at 12:12

## 2019-08-18 RX ADMIN — Medication 1 DROP(S): at 14:15

## 2019-08-18 RX ADMIN — Medication 0.5 MILLIGRAM(S): at 05:53

## 2019-08-18 RX ADMIN — HEPARIN SODIUM 5000 UNIT(S): 5000 INJECTION INTRAVENOUS; SUBCUTANEOUS at 05:53

## 2019-08-18 NOTE — PROGRESS NOTE ADULT - PROBLEM SELECTOR PLAN 1
Failed cineesophagogram x2, NPO, + severe protein calorie malnutrition  Patient agreeable to PEG, will need dental extraction of loose teeth prior. Refuses temporary NGT in meantime. C/w gentle IVF  Per Zamzam, patient's gf, patient was undergoing some Neuromuscular workup at [place] and was awaiting an EMG prior to the event that brought him to hospital

## 2019-08-18 NOTE — PROGRESS NOTE ADULT - NSHPATTENDINGPLANDISCUSS_GEN_ALL_CORE
patient, KEVIN Flores
patient, PA
patient
patient, PA
pt, RN, PA
patient, PA
psychiatry, pt, RN, PA

## 2019-08-18 NOTE — PROGRESS NOTE ADULT - PROBLEM SELECTOR PLAN 2
fowler replaced by urology on 8/14 after failing TOV.  resume flomax and proscar once has a feeding tube  UA with mod blood and RBC but no leuk esterase or bacteria

## 2019-08-18 NOTE — PROGRESS NOTE ADULT - PROBLEM SELECTOR PLAN 3
urine tox + benzo. Per PMD Dr Frank Britton [phone], patient has a long history of depression and anxiety and long term use of Benzo   C/w ativan IV 0.5mg bid, monitor signs of benzo withdrawal and respiratory status Psych following

## 2019-08-18 NOTE — PROGRESS NOTE ADULT - SUBJECTIVE AND OBJECTIVE BOX
Patient is a 74y old  Male who presents with a chief complaint of S/P fall, Rhabdo, R/O Pneumonia, Leukocytosis, Cough, R/O Cellulitis Face/lower Abdomen (17 Aug 2019 11:24)      SUBJECTIVE / OVERNIGHT EVENTS: No acute events overnight. Denies chest pain, SOB, N/V. States he does not know how he got to this room.    MEDICATIONS  (STANDING):  artificial tears (preservative free) Ophthalmic Solution 1 Drop(s) Both EYES three times a day  dextrose 5% + sodium chloride 0.45%. 1000 milliLiter(s) (75 mL/Hr) IV Continuous <Continuous>  finasteride 5 milliGRAM(s) Oral daily  heparin  Injectable 5000 Unit(s) SubCutaneous two times a day  lactobacillus acidophilus 1 Tablet(s) Oral every 12 hours  LORazepam   Injectable 0.5 milliGRAM(s) IV Push two times a day    MEDICATIONS  (PRN):  ALBUTerol/ipratropium for Nebulization 3 milliLiter(s) Nebulizer every 6 hours PRN Shortness of Breath and/or Wheezing  LORazepam   Injectable 0.5 milliGRAM(s) IV Push every 6 hours PRN Agitation/insomnia      T(C): 36.6 (08-18-19 @ 05:18), Max: 36.8 (08-17-19 @ 20:33)  HR: 80 (08-18-19 @ 05:18) (75 - 80)  BP: 119/70 (08-18-19 @ 05:18) (114/68 - 126/66)  RR: 17 (08-18-19 @ 05:18) (16 - 18)  SpO2: 98% (08-18-19 @ 05:18) (98% - 98%)  CAPILLARY BLOOD GLUCOSE        I&O's Summary    17 Aug 2019 07:01  -  18 Aug 2019 07:00  --------------------------------------------------------  IN: 825 mL / OUT: 1850 mL / NET: -1025 mL    PHYSICAL EXAM:  GENERAL: cachectic and disheveled, no apparent distress, on room air  HEENT: b/l temporal wasting, scarring/tear of right ear  CHEST/LUNG: CTA anteriorly b/l  HEART: s1/s2, RRR, no murmurs appreciated  ABDOMEN: Soft, Nontender, nontender  EXTREMITIES: WWP, no edema  : scrotal swelling, + indwelling fowler with yellow urine  NEUROLOGY: awake, alert, responds to Qs  PSYCH: calm, cooperative, follows commands, tangential and circumferential and needs frequent redirecting, occasionally confused but presently AAOX3  SKIN: skin tears of right anterior chest and right ear    LABS:                        11.2   11.61 )-----------( 514      ( 18 Aug 2019 05:40 )             33.8     08-18    135  |  96<L>  |  10  ----------------------------<  129<H>  3.7   |  26  |  0.68    Ca    9.0      18 Aug 2019 05:40  Mg     1.8     08-18                RADIOLOGY & ADDITIONAL TESTS:

## 2019-08-19 DIAGNOSIS — Z51.5 ENCOUNTER FOR PALLIATIVE CARE: ICD-10-CM

## 2019-08-19 DIAGNOSIS — R41.0 DISORIENTATION, UNSPECIFIED: ICD-10-CM

## 2019-08-19 DIAGNOSIS — E43 UNSPECIFIED SEVERE PROTEIN-CALORIE MALNUTRITION: ICD-10-CM

## 2019-08-19 DIAGNOSIS — R62.7 ADULT FAILURE TO THRIVE: ICD-10-CM

## 2019-08-19 DIAGNOSIS — R53.81 OTHER MALAISE: ICD-10-CM

## 2019-08-19 DIAGNOSIS — R13.11 DYSPHAGIA, ORAL PHASE: ICD-10-CM

## 2019-08-19 LAB
ANION GAP SERPL CALC-SCNC: 11 MMO/L — SIGNIFICANT CHANGE UP (ref 7–14)
BUN SERPL-MCNC: 8 MG/DL — SIGNIFICANT CHANGE UP (ref 7–23)
CALCIUM SERPL-MCNC: 9.2 MG/DL — SIGNIFICANT CHANGE UP (ref 8.4–10.5)
CHLORIDE SERPL-SCNC: 96 MMOL/L — LOW (ref 98–107)
CO2 SERPL-SCNC: 29 MMOL/L — SIGNIFICANT CHANGE UP (ref 22–31)
CREAT SERPL-MCNC: 0.72 MG/DL — SIGNIFICANT CHANGE UP (ref 0.5–1.3)
GLUCOSE SERPL-MCNC: 120 MG/DL — HIGH (ref 70–99)
HCT VFR BLD CALC: 36 % — LOW (ref 39–50)
HGB BLD-MCNC: 11.7 G/DL — LOW (ref 13–17)
INR BLD: 1.51 — HIGH (ref 0.88–1.17)
MAGNESIUM SERPL-MCNC: 1.9 MG/DL — SIGNIFICANT CHANGE UP (ref 1.6–2.6)
MCHC RBC-ENTMCNC: 32.2 PG — SIGNIFICANT CHANGE UP (ref 27–34)
MCHC RBC-ENTMCNC: 32.5 % — SIGNIFICANT CHANGE UP (ref 32–36)
MCV RBC AUTO: 99.2 FL — SIGNIFICANT CHANGE UP (ref 80–100)
NRBC # FLD: 0 K/UL — SIGNIFICANT CHANGE UP (ref 0–0)
PLATELET # BLD AUTO: 544 K/UL — HIGH (ref 150–400)
PMV BLD: 9.5 FL — SIGNIFICANT CHANGE UP (ref 7–13)
POTASSIUM SERPL-MCNC: 3.6 MMOL/L — SIGNIFICANT CHANGE UP (ref 3.5–5.3)
POTASSIUM SERPL-SCNC: 3.6 MMOL/L — SIGNIFICANT CHANGE UP (ref 3.5–5.3)
PROTHROM AB SERPL-ACNC: 17 SEC — HIGH (ref 9.8–13.1)
RBC # BLD: 3.63 M/UL — LOW (ref 4.2–5.8)
RBC # FLD: 12.2 % — SIGNIFICANT CHANGE UP (ref 10.3–14.5)
SODIUM SERPL-SCNC: 136 MMOL/L — SIGNIFICANT CHANGE UP (ref 135–145)
WBC # BLD: 11.93 K/UL — HIGH (ref 3.8–10.5)
WBC # FLD AUTO: 11.93 K/UL — HIGH (ref 3.8–10.5)

## 2019-08-19 PROCEDURE — 99233 SBSQ HOSP IP/OBS HIGH 50: CPT

## 2019-08-19 RX ORDER — THIAMINE MONONITRATE (VIT B1) 100 MG
500 TABLET ORAL EVERY 8 HOURS
Refills: 0 | Status: COMPLETED | OUTPATIENT
Start: 2019-08-19 | End: 2019-08-22

## 2019-08-19 RX ADMIN — Medication 1 DROP(S): at 22:51

## 2019-08-19 RX ADMIN — Medication 1 DROP(S): at 05:34

## 2019-08-19 RX ADMIN — HEPARIN SODIUM 5000 UNIT(S): 5000 INJECTION INTRAVENOUS; SUBCUTANEOUS at 17:31

## 2019-08-19 RX ADMIN — SODIUM CHLORIDE 75 MILLILITER(S): 9 INJECTION, SOLUTION INTRAVENOUS at 04:07

## 2019-08-19 RX ADMIN — Medication 105 MILLIGRAM(S): at 22:51

## 2019-08-19 RX ADMIN — Medication 1 DROP(S): at 13:02

## 2019-08-19 RX ADMIN — Medication 0.5 MILLIGRAM(S): at 17:30

## 2019-08-19 RX ADMIN — HEPARIN SODIUM 5000 UNIT(S): 5000 INJECTION INTRAVENOUS; SUBCUTANEOUS at 05:34

## 2019-08-19 RX ADMIN — Medication 0.5 MILLIGRAM(S): at 06:24

## 2019-08-19 RX ADMIN — SODIUM CHLORIDE 75 MILLILITER(S): 9 INJECTION, SOLUTION INTRAVENOUS at 13:02

## 2019-08-19 NOTE — PROGRESS NOTE ADULT - PROBLEM SELECTOR PLAN 1
Failed cineesophagogram x2, NPO  - neurology eval given was going OSH eval for ?muscle weakness  - dental for teeth extraction--stable to go off the floor in stretcher for extractions, no need for abx pre-procedure as no known cardiac issues   - appreciate GI eval, possible PEG when amenable  - declines NGT  - currently on IVF

## 2019-08-19 NOTE — CONSULT NOTE ADULT - CONSULT REQUESTED DATE/TIME
08-Aug-2019 10:33
15-Aug-2019 13:00
15-Aug-2019 16:00
16-Aug-2019 13:07
07-Aug-2019 18:12
19-Aug-2019 13:13

## 2019-08-19 NOTE — PROGRESS NOTE ADULT - PROBLEM SELECTOR PLAN 2
Unclear cause, CTAP and CT chest without evidence of masses/LAD/malignancy.  TSH/B12/folic acid wnl.  HIV negative  - f/u neurology re: underlying neurological degenerative condition given dysphagia/retention/weakness  - needs nutrition, pending dental extractions, pending PEG if amenable (had been Friday, now wants to rediscuss)

## 2019-08-19 NOTE — CONSULT NOTE ADULT - CONSULT REASON
Advanced Illness  Goals of care.
Dysphagia
PEG placement
Painful,. elongated toenails
hypoxic respiratory failure
Dysphagia

## 2019-08-19 NOTE — PROGRESS NOTE ADULT - PROBLEM SELECTOR PLAN 3
Spencer replaced by urology on 8/14 after failing TOV.  - resume flomax and proscar once has a feeding tube

## 2019-08-19 NOTE — PROGRESS NOTE ADULT - SUBJECTIVE AND OBJECTIVE BOX
Patient is a 74y old  Male who presents with a chief complaint of S/P fall, Rhabdo, R/O Pneumonia, Leukocytosis, Cough, R/O Cellulitis Face/lower Abdomen    SUBJECTIVE / OVERNIGHT EVENTS:    Feels unsure about the PEG wants to d/w his "family" (girlfriend)  No CP, SOB, f/c/n/v  A&Ox3 but at times unclear what he is referring to when speaks     MEDICATIONS  (STANDING):  artificial tears (preservative free) Ophthalmic Solution 1 Drop(s) Both EYES three times a day  dextrose 5% + sodium chloride 0.45%. 1000 milliLiter(s) (75 mL/Hr) IV Continuous <Continuous>  finasteride 5 milliGRAM(s) Oral daily  heparin  Injectable 5000 Unit(s) SubCutaneous two times a day  lactobacillus acidophilus 1 Tablet(s) Oral every 12 hours  LORazepam   Injectable 0.5 milliGRAM(s) IV Push two times a day    MEDICATIONS  (PRN):  ALBUTerol/ipratropium for Nebulization 3 milliLiter(s) Nebulizer every 6 hours PRN Shortness of Breath and/or Wheezing  LORazepam   Injectable 0.5 milliGRAM(s) IV Push every 6 hours PRN Agitation/insomnia    T(C): 36.7 (08-19-19 @ 13:01), Max: 36.7 (08-18-19 @ 17:16)  HR: 85 (08-19-19 @ 13:01) (64 - 91)  BP: 118/72 (08-19-19 @ 13:01) (107/55 - 123/73)  RR: 17 (08-19-19 @ 13:01) (16 - 17)  SpO2: 99% (08-19-19 @ 13:01) (98% - 99%)      I&O's Summary    18 Aug 2019 07:01  -  19 Aug 2019 07:00  --------------------------------------------------------  IN: 525 mL / OUT: 1425 mL / NET: -900 mL      PHYSICAL EXAM:  GENERAL: NAD, thin  HEAD:  R ear w/ pressure injury   CHEST/LUNG: Clear to auscultation bilaterally; No wheeze  HEART: Regular rate and rhythm; No murmurs, rubs, or gallops  ABDOMEN: Soft, Nontender, Nondistended; Bowel sounds present  EXTREMITIES:   wwp, no edema   PSYCH: AAOx3  NEUROLOGY: generally weak, will not cooperate full as "im too codl right now"   SKIN: abrasions on R face   : + fowler, testicular swelling     LABS:                        11.7   11.93 )-----------( 544      ( 19 Aug 2019 05:50 )             36.0     08-19    136  |  96<L>  |  8   ----------------------------<  120<H>  3.6   |  29  |  0.72    Ca    9.2      19 Aug 2019 05:50  Mg     1.9     08-19    PT/INR - ( 19 Aug 2019 05:50 )   PT: 17.0 SEC;   INR: 1.51       Notes Reviewed:  GI  Care Discussed with Consultants/Other Providers:

## 2019-08-19 NOTE — CONSULT NOTE ADULT - SUBJECTIVE AND OBJECTIVE BOX
HPI:  75 y/o male with possible HX of Psych disorder , lives alone, very poor historian, unkempt and with very poor  hygiene , missing teeth,  Dental caries, on PO Benzo at home, denies ETOH and smoking,  reports falling off a stool 1.5 days prior to presentation around midnight and becoming pinned in position until rescued by EMS this afternoon. Denies drug or alcohol use prior to falling, and denies feeling lightheaded or dizzy prior. Patient is unsure what may have caused the fall other than a sense that his muscles have been "dysfunctional" lately. In the past month, he has noticed some muscle wasting, particularly in his arms, with 6-8 lbs of weight loss in this time. He has also noticed some fasciculations in his legs in the past month as well. He denies any family history of muscular pathology. He also denies seizures, chest pain, shortness of breath, nausea, and abdominal pain. Reports taking diazepam bid and flurazepam at nights and no other medications. Of note, patient has known history of L scrotal hydrocele but has refused surgery. Pt awake, A+O x 3, C/O + Productive cough as well, no fever, no dysuria, no N/V, No HA, no abdominal pain, + weakness, + cachectic with B/L Temporal wasting , Zamzam  (girl friend),   Neurology consulted: He states that the initial fall was mechanical (he lost his balance while trying to avoid a stool). The past two months he noticed loss of muscle bulk in the left upper extremity.    S/P CT Chest/Abdomen/pelvis as below:   < from: CT Abdomen and Pelvis w/ IV Cont (08.06.19 @ 16:47) >  IMPRESSION: A few right lower lobe ground glass opacities of uncertain   etiology. No evidence of solid organ injury or acute fracture.    S/P CT Maxillofacial as Below:     < from: CT Maxillofacial w/ IV Cont (08.06.19 @ 16:47) >  IMPRESSION:    Discontinuous appearance of the left lower medial incisor which appears   displaced posteriorly suggesting an avulsed tooth/alveolar fracture.   Clinical correlation is advised. Poor dentition with multiple missing   teeth. Right facial and external auditory canal soft tissue swelling likely   posttraumatic in nature however cellulitis/otitis externa is not excluded.    CT Head: unremarkable, , X ray: Pelvis No FX,     RVP was sent , started the pt on IV Zosyn, R/O Pneumonia, Elevated CPK 3311, + Rhabdo as well, on IVF NS @ 150 cc/hr, S/P IVF LR Bolus x 1 lit, and IVF Ns x 2 Lit Bolus, Dental consult was requested, NPO for now, pt was placed on TELE ,    Labs: PT 15.5, INR 1.35, PTT 26.2, Fibrinogen assay 542, Lactate 2.7, CPK 3311, Mg 2, Na 140, K+ 3.9, BUN 25, Creatinine 0.82, Glucose 138, AST 78, Troponin HS 21, WBC 24.95, Hgb 13.5, Platelet 262, UA: Large Blood , Ketone > 150,     Vitals: Tem 98.5, HR 87, /66, RR 16, 95 % RA, (06 Aug 2019 20:44)      REVIEW OF SYSTEMS	    A 10-system ROS was performed and is negative except for those items noted above and/or in the HPI.    PAST MEDICAL & SURGICAL HISTORY:  Scrotal swelling  Psychiatric disorder  No significant past surgical history    FAMILY HISTORY:  FHx: stomach cancer    SOCIAL HISTORY:   T/E/D: denies  Occupation: retired "white collar" work   Lives: alone    MEDICATIONS (HOME):  Home Medications:  diazePAM 5 mg oral tablet: 1 tab(s) orally 4 times a day ([facility] ref# [id number]: Last filled 7/15/19 x #120 tabs)  (07 Aug 2019 14:49)  flurazepam 30 mg oral capsule: 1 cap(s) orally once a day (at bedtime) (07 Aug 2019 14:49)    MEDICATIONS  (STANDING):  artificial tears (preservative free) Ophthalmic Solution 1 Drop(s) Both EYES three times a day  dextrose 5% + sodium chloride 0.45%. 1000 milliLiter(s) (75 mL/Hr) IV Continuous <Continuous>  finasteride 5 milliGRAM(s) Oral daily  heparin  Injectable 5000 Unit(s) SubCutaneous two times a day  lactobacillus acidophilus 1 Tablet(s) Oral every 12 hours  LORazepam   Injectable 0.5 milliGRAM(s) IV Push two times a day    MEDICATIONS  (PRN):  ALBUTerol/ipratropium for Nebulization 3 milliLiter(s) Nebulizer every 6 hours PRN Shortness of Breath and/or Wheezing  LORazepam   Injectable 0.5 milliGRAM(s) IV Push every 6 hours PRN Agitation/insomnia    ALLERGIES/INTOLERANCES:  Allergies  No Known Allergies    Intolerances    VITALS & EXAMINATION:  Vital Signs Last 24 Hrs  T(C): 36.7 (19 Aug 2019 13:01), Max: 36.7 (18 Aug 2019 17:16)  T(F): 98.1 (19 Aug 2019 13:01), Max: 98.1 (18 Aug 2019 17:16)  HR: 85 (19 Aug 2019 13:01) (64 - 91)  BP: 118/72 (19 Aug 2019 13:01) (107/55 - 123/73)  BP(mean): --  RR: 17 (19 Aug 2019 13:01) (16 - 17)  SpO2: 99% (19 Aug 2019 13:01) (98% - 99%)    General: thin elderly Male, appears stated age, in no apparent distress including pain  Head: scars on forehead, right zygomatic arch, right EAC, right mandible.   Eyes: right superior medial eyelid/canthus dehiscence    Extremities: thin.    Neurological (>12):  MS: Awake, alert, oriented to person, place, situation, time. Normal affect. Follows all commands.    Language: Speech is clear, fluent with good repetition & comprehension (able to name objects ID, button, knuckles)    CNs: PERRL (R = 3mm, L = 3mm). VFF. EOMI no nystagmus, no diplopia. V1-3 intact to LT, well developed masseter muscles b/l. decreased eyelid closing strength on right, unable to raise right eyebrow, smile appears grossly symmetrical. Hearing grossly normal (rubbing fingers) b/l. Symmetric palate elevation in midline. Gag reflex deferred. Head turning & shoulder shrug intact b/l. Tongue midline, normal movements, no atrophy.  Decreased finger tapping on left, decreased toe tapping on left  Can count from 1-20 in one breath      Motor: Decreased muscle bulk in all extremities & normal tone. No noticeable tremor. No pronator drift.              Deltoid	Biceps	Triceps	Wrist	Finger ABd	   R	5	5	5	5	5		5 	  L	5	5	5	5	5		5    	H-Flex	H-Ext	H-ABd	H-ADd	K-Flex	K-Ext	D-Flex	P-Flex  R	5	5	5	5	5	5	5	5 	   L	5	5	5	5	5	5	5	5	     Sensation: Intact to LT b/l throughout.     Cortical: Extinction on DSS (neglect): none    Reflexes:              Biceps(C5)       BR(C6)     Triceps(C7)               Patellar(L4)    Achilles(S1)    Plantar Resp  R	1	         	1             1		        2		    1		Down   L	1	          1             1		        3		    1		Down     Coordination: No dysmetria to FTN/HTS    Gait: Not tested due to weakness    LABORATORY:  CBC                       11.7   11.93 )-----------( 544      ( 19 Aug 2019 05:50 )             36.0     Chem 08-19    136  |  96<L>  |  8   ----------------------------<  120<H>  3.6   |  29  |  0.72    Ca    9.2      19 Aug 2019 05:50  Mg     1.9     08-19      LFTs   Coagulopathy PT/INR - ( 19 Aug 2019 05:50 )   PT: 17.0 SEC;   INR: 1.51            Lipid Panel 08-07 Chol 127 LDL 56 HDL 63<H> Trig 89  A1c 08-07 OhowsedhnpP0H 5.4  08-06 OwjzfdouccH6A 5.5    Cardiac enzymes     U/A   CSF  Immunological  Other    STUDIES & IMAGING:  Studies (EKG, EEG, EMG, etc):     Radiology (XR, CT, MR, U/S, TTE/TANI): HPI:  73 y/o male with possible HX of Psych disorder , lives alone, very poor historian, unkempt and with very poor  hygiene , missing teeth,  Dental caries, on PO Benzo at home, denies ETOH and smoking,  reports falling off a stool 1.5 days prior to presentation around midnight and becoming pinned in position until rescued by EMS this afternoon. Denies drug or alcohol use prior to falling, and denies feeling lightheaded or dizzy prior. Patient is unsure what may have caused the fall other than a sense that his muscles have been "dysfunctional" lately. In the past month, he has noticed some muscle wasting, particularly in his arms, with 6-8 lbs of weight loss in this time. He has also noticed some fasciculations in his legs in the past month as well. He denies any family history of muscular pathology. He also denies seizures, chest pain, shortness of breath, nausea, and abdominal pain. Reports taking diazepam bid and flurazepam at nights and no other medications. Of note, patient has known history of L scrotal hydrocele but has refused surgery. Pt awake, A+O x 3, C/O + Productive cough as well, no fever, no dysuria, no N/V, No HA, no abdominal pain, + weakness, + cachectic with B/L Temporal wasting , Zamzam  (girl friend),   Neurology consulted: He states that the initial fall was mechanical (he lost his balance while trying to avoid a stool). The past two months he noticed loss of muscle bulk particularly in the left upper extremity, difficulty getting up from a sitting position, difficulty walking, and stiffness/cramping in his waist, arms, and legs. His girlfriend states that he was seen outpatient by a neurologist (Dr. Siddiqi [phone]) 10 days before his fall for unsteady gait and weakness who found that his CK was elevated and recommended an EMG.  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 He endorses a decline in both near and distance visual acuity (blurriness) over the past ten years. He states that he has noticed eyelid drooping for the past 10 years. He states that he has not had constipation, but has had a decreased sense of smell. He noted that he sometimes kicks out with his leg during sleep. He sometimes has numbness, tingling, and cramping in his bilateral hands and feet. He sometimes notices a jerking movement in his leg. He denies any changes in hearing. He noticed that his voice has become more hypophonic since the fall.       S/P CT Chest/Abdomen/pelvis as below:   < from: CT Abdomen and Pelvis w/ IV Cont (08.06.19 @ 16:47) >  IMPRESSION: A few right lower lobe ground glass opacities of uncertain   etiology. No evidence of solid organ injury or acute fracture.    S/P CT Maxillofacial as Below:     < from: CT Maxillofacial w/ IV Cont (08.06.19 @ 16:47) >  IMPRESSION:    Discontinuous appearance of the left lower medial incisor which appears   displaced posteriorly suggesting an avulsed tooth/alveolar fracture.   Clinical correlation is advised. Poor dentition with multiple missing   teeth. Right facial and external auditory canal soft tissue swelling likely   posttraumatic in nature however cellulitis/otitis externa is not excluded.    CT Head: unremarkable, , X ray: Pelvis No FX,     RVP was sent , started the pt on IV Zosyn, R/O Pneumonia, Elevated CPK 3311, + Rhabdo as well, on IVF NS @ 150 cc/hr, S/P IVF LR Bolus x 1 lit, and IVF Ns x 2 Lit Bolus, Dental consult was requested, NPO for now, pt was placed on TELE ,    Labs: PT 15.5, INR 1.35, PTT 26.2, Fibrinogen assay 542, Lactate 2.7, CPK 3311, Mg 2, Na 140, K+ 3.9, BUN 25, Creatinine 0.82, Glucose 138, AST 78, Troponin HS 21, WBC 24.95, Hgb 13.5, Platelet 262, UA: Large Blood , Ketone > 150,     Vitals: Tem 98.5, HR 87, /66, RR 16, 95 % RA, (06 Aug 2019 20:44)      REVIEW OF SYSTEMS	    A 10-system ROS was performed and is negative except for those items noted above and/or in the HPI.    PAST MEDICAL & SURGICAL HISTORY:  Scrotal swelling  Psychiatric disorder  No significant past surgical history    FAMILY HISTORY:  FHx: stomach cancer  Mother: stroke    SOCIAL HISTORY:   T/E/D: denies  Occupation: retired "white collar" worker  Lives: alone    MEDICATIONS (HOME):  Home Medications:  diazePAM 5 mg oral tablet: 1 tab(s) orally 4 times a day ([facility] ref# [id number]: Last filled 7/15/19 x #120 tabs)  (07 Aug 2019 14:49)  flurazepam 30 mg oral capsule: 1 cap(s) orally once a day (at bedtime) (07 Aug 2019 14:49)    MEDICATIONS  (STANDING):  artificial tears (preservative free) Ophthalmic Solution 1 Drop(s) Both EYES three times a day  dextrose 5% + sodium chloride 0.45%. 1000 milliLiter(s) (75 mL/Hr) IV Continuous <Continuous>  finasteride 5 milliGRAM(s) Oral daily  heparin  Injectable 5000 Unit(s) SubCutaneous two times a day  lactobacillus acidophilus 1 Tablet(s) Oral every 12 hours  LORazepam   Injectable 0.5 milliGRAM(s) IV Push two times a day    MEDICATIONS  (PRN):  ALBUTerol/ipratropium for Nebulization 3 milliLiter(s) Nebulizer every 6 hours PRN Shortness of Breath and/or Wheezing  LORazepam   Injectable 0.5 milliGRAM(s) IV Push every 6 hours PRN Agitation/insomnia    ALLERGIES/INTOLERANCES:  Allergies  No Known Allergies    Intolerances    VITALS & EXAMINATION:  Vital Signs Last 24 Hrs  T(C): 36.7 (19 Aug 2019 13:01), Max: 36.7 (18 Aug 2019 17:16)  T(F): 98.1 (19 Aug 2019 13:01), Max: 98.1 (18 Aug 2019 17:16)  HR: 85 (19 Aug 2019 13:01) (64 - 91)  BP: 118/72 (19 Aug 2019 13:01) (107/55 - 123/73)  BP(mean): --  RR: 17 (19 Aug 2019 13:01) (16 - 17)  SpO2: 99% (19 Aug 2019 13:01) (98% - 99%)    General: cachectic elderly Male, appears stated age, in no apparent distress including pain  Head: scars on forehead, right zygomatic arch, right EAC, right mandible.   Eyes: b/l ptosis, right superior medial eyelid dehiscence?    Extremities: thin.    Neurological (>12):  MS: Awake, alert, oriented to person, place, situation, time. Normal affect. Follows all commands.    Language: Speech is hypophonic, fluent with good repetition & comprehension (able to name objects ID, button, knuckles)    CNs: PERRL (R = 3mm, L = 3mm). VFF. Bilateral ptosis, EOMI no nystagmus, no diplopia. V1-3 intact to LT, well developed masseter muscles b/l. decreased eyelid closing strength on right, unable to raise right eyebrow, smile appears grossly symmetrical. Hearing grossly normal (rubbing fingers) b/l. Symmetric palate elevation in midline. Gag reflex present b/l. Head turning & shoulder shrug intact b/l. Tongue midline, normal movements, no atrophy.  Decreased finger tapping on left, decreased toe tapping on left  Can count from 1-20 in one breath    Motor: Decreased muscle bulk in all extremities & normal tone. No noticeable tremor. No pronator drift.              Deltoid	Biceps	Triceps	Wrist	Finger ABd	   R	5	5	5	5	5		5 	  L	5	5	5	5	5		5    	H-Flex	H-Ext	H-ABd	H-ADd	K-Flex	K-Ext	D-Flex	P-Flex  R	5	5	5	5	5	5	5	5 	   L	5	5	5	5	5	5	5	5	     Sensation: Intact to LT b/l throughout.     Cortical: Extinction on DSS (neglect): none    Reflexes:              Biceps(C5)       BR(C6)     Triceps(C7)               Patellar(L4)    Achilles(S1)    Plantar Resp  R	1	         	1             1		        2		    1		Down   L	1	          1             1		        3		    1		Down     Coordination: No dysmetria to FTN/HTS    Gait: Not tested due to weakness    LABORATORY:  CBC                       11.7   11.93 )-----------( 544      ( 19 Aug 2019 05:50 )             36.0     Chem 08-19    136  |  96<L>  |  8   ----------------------------<  120<H>  3.6   |  29  |  0.72    Ca    9.2      19 Aug 2019 05:50  Mg     1.9     08-19      LFTs   Coagulopathy PT/INR - ( 19 Aug 2019 05:50 )   PT: 17.0 SEC;   INR: 1.51            Lipid Panel 08-07 Chol 127 LDL 56 HDL 63<H> Trig 89  A1c 08-07 QftsfnmvdkY5Q 5.4  08-06 JqmhmsctzjK6G 5.5    Cardiac enzymes     U/A   CSF  Immunological  Other    STUDIES & IMAGING:  Studies (EKG, EEG, EMG, etc):     Radiology (XR, CT, MR, U/S, TTE/TANI): HPI:  75 y/o male with possible HX of Psych disorder , lives alone, very poor historian, unkempt and with very poor  hygiene , missing teeth,  Dental caries, on PO Benzo at home, denies ETOH and smoking,  reports falling off a stool 1.5 days prior to presentation around midnight and becoming pinned in position until rescued by EMS this afternoon. Denies drug or alcohol use prior to falling, and denies feeling lightheaded or dizzy prior. Patient is unsure what may have caused the fall other than a sense that his muscles have been "dysfunctional" lately. In the past month, he has noticed some muscle wasting, particularly in his arms, with 6-8 lbs of weight loss in this time. He has also noticed some fasciculations in his legs in the past month as well. He denies any family history of muscular pathology. He also denies seizures, chest pain, shortness of breath, nausea, and abdominal pain. Reports taking diazepam bid and flurazepam at nights and no other medications. Of note, patient has known history of L scrotal hydrocele but has refused surgery. Pt awake, A+O x 3, C/O + Productive cough as well, no fever, no dysuria, no N/V, No HA, no abdominal pain, + weakness, + cachectic with B/L Temporal wasting , Zamzam  (girl friend),   Neurology consulted: He states that the initial fall was mechanical (he lost his balance while trying to avoid a stool). The past two months he noticed loss of muscle bulk particularly in the left upper extremity, difficulty getting up from a sitting position, difficulty walking, and stiffness/cramping in his waist, arms, and legs. His girlfriend states that he was seen outpatient by a neurologist (Dr. Siddiqi [phone]) 10 days before his fall for unsteady gait and weakness who found that his CK was elevated and recommended an EMG.  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 He endorses a decline in both near and distance visual acuity (blurriness) over the past ten years. He states that he has noticed eyelid drooping for the past 10 years. He states that he has not had constipation, but has had a decreased sense of smell. He noted that he sometimes kicks out with his leg during sleep. He sometimes has numbness, tingling, and cramping in his bilateral hands and feet. He sometimes notices a jerking movement in his leg. He denies any changes in hearing. He noticed that his voice has become more hypophonic since the fall.       S/P CT Chest/Abdomen/pelvis as below:   < from: CT Abdomen and Pelvis w/ IV Cont (08.06.19 @ 16:47) >  IMPRESSION: A few right lower lobe ground glass opacities of uncertain   etiology. No evidence of solid organ injury or acute fracture.    S/P CT Maxillofacial as Below:     < from: CT Maxillofacial w/ IV Cont (08.06.19 @ 16:47) >  IMPRESSION:    Discontinuous appearance of the left lower medial incisor which appears   displaced posteriorly suggesting an avulsed tooth/alveolar fracture.   Clinical correlation is advised. Poor dentition with multiple missing   teeth. Right facial and external auditory canal soft tissue swelling likely   posttraumatic in nature however cellulitis/otitis externa is not excluded.    CT Head: unremarkable, , X ray: Pelvis No FX,     RVP was sent , started the pt on IV Zosyn, R/O Pneumonia, Elevated CPK 3311, + Rhabdo as well, on IVF NS @ 150 cc/hr, S/P IVF LR Bolus x 1 lit, and IVF Ns x 2 Lit Bolus, Dental consult was requested, NPO for now, pt was placed on TELE ,    Labs: PT 15.5, INR 1.35, PTT 26.2, Fibrinogen assay 542, Lactate 2.7, CPK 3311, Mg 2, Na 140, K+ 3.9, BUN 25, Creatinine 0.82, Glucose 138, AST 78, Troponin HS 21, WBC 24.95, Hgb 13.5, Platelet 262, UA: Large Blood , Ketone > 150,     Vitals: Tem 98.5, HR 87, /66, RR 16, 95 % RA, (06 Aug 2019 20:44)      REVIEW OF SYSTEMS	    A 10-system ROS was performed and is negative except for those items noted above and/or in the HPI.    PAST MEDICAL & SURGICAL HISTORY:  Scrotal swelling  Psychiatric disorder  No significant past surgical history    FAMILY HISTORY:  FHx: stomach cancer  Mother: stroke    SOCIAL HISTORY:   T/E/D: denies  Occupation: retired "white collar" worker  Lives: alone    MEDICATIONS (HOME):  Home Medications:  diazePAM 5 mg oral tablet: 1 tab(s) orally 4 times a day ([facility] ref# [id number]: Last filled 7/15/19 x #120 tabs)  (07 Aug 2019 14:49)  flurazepam 30 mg oral capsule: 1 cap(s) orally once a day (at bedtime) (07 Aug 2019 14:49)    MEDICATIONS  (STANDING):  artificial tears (preservative free) Ophthalmic Solution 1 Drop(s) Both EYES three times a day  dextrose 5% + sodium chloride 0.45%. 1000 milliLiter(s) (75 mL/Hr) IV Continuous <Continuous>  finasteride 5 milliGRAM(s) Oral daily  heparin  Injectable 5000 Unit(s) SubCutaneous two times a day  lactobacillus acidophilus 1 Tablet(s) Oral every 12 hours  LORazepam   Injectable 0.5 milliGRAM(s) IV Push two times a day    MEDICATIONS  (PRN):  ALBUTerol/ipratropium for Nebulization 3 milliLiter(s) Nebulizer every 6 hours PRN Shortness of Breath and/or Wheezing  LORazepam   Injectable 0.5 milliGRAM(s) IV Push every 6 hours PRN Agitation/insomnia    ALLERGIES/INTOLERANCES:  Allergies  No Known Allergies    Intolerances    VITALS & EXAMINATION:  Vital Signs Last 24 Hrs  T(C): 36.7 (19 Aug 2019 13:01), Max: 36.7 (18 Aug 2019 17:16)  T(F): 98.1 (19 Aug 2019 13:01), Max: 98.1 (18 Aug 2019 17:16)  HR: 85 (19 Aug 2019 13:01) (64 - 91)  BP: 118/72 (19 Aug 2019 13:01) (107/55 - 123/73)  BP(mean): --  RR: 17 (19 Aug 2019 13:01) (16 - 17)  SpO2: 99% (19 Aug 2019 13:01) (98% - 99%)    General: cachectic elderly Male, appears stated age, in no apparent distress including pain  Head: scars on forehead, right zygomatic arch, right EAC, right mandible.   Eyes: b/l ptosis, right superior medial eyelid dehiscence?    Extremities: thin.    Neurological (>12):  MS: Awake, alert, oriented to person, place, situation, time. Normal affect. Follows all commands.    Language: Speech is hypophonic, fluent with good repetition & comprehension (able to name objects ID, button, knuckles)    CNs: PERRL (R = 3mm, L = 3mm). VFF. Bilateral ptosis, EOMI no nystagmus, no diplopia. V1-3 intact to LT, well developed masseter muscles b/l. decreased eyelid closing strength on right, unable to raise right eyebrow, smile appears grossly symmetrical. Hearing grossly normal (rubbing fingers) b/l. Symmetric palate elevation in midline. Gag reflex present b/l. Head turning & shoulder shrug intact b/l. Tongue midline, normal movements, no atrophy.  Decreased finger tapping on left, decreased toe tapping on left  Can count from 1-20 in one breath    Motor: Decreased muscle bulk in all extremities & normal tone. No noticeable tremor. No pronator drift. No cogwheel rigidity.               Deltoid	Biceps	Triceps	Wrist	Finger ABd	   R	5	5	5	5	5		5 	  L	5	5	5	5	5		5    	H-Flex	H-Ext	H-ABd	H-ADd	K-Flex	K-Ext	D-Flex	P-Flex  R	5	5	5	5	5	5	5	5 	   L	5	5	5	5	5	5	5	5	     Sensation: Intact to LT b/l throughout.     Cortical: Extinction on DSS (neglect): none    Reflexes:              Biceps(C5)       BR(C6)     Triceps(C7)               Patellar(L4)    Achilles(S1)    Plantar Resp  R	1	         	1             1		        2		    1		Down   L	1	          1             1		        3		    1		Down     Coordination: No dysmetria to FTN/HTS    Gait: Not tested due to weakness    LABORATORY:  CBC                       11.7   11.93 )-----------( 544      ( 19 Aug 2019 05:50 )             36.0     Chem 08-19    136  |  96<L>  |  8   ----------------------------<  120<H>  3.6   |  29  |  0.72    Ca    9.2      19 Aug 2019 05:50  Mg     1.9     08-19      LFTs   Coagulopathy PT/INR - ( 19 Aug 2019 05:50 )   PT: 17.0 SEC;   INR: 1.51            Lipid Panel 08-07 Chol 127 LDL 56 HDL 63<H> Trig 89  A1c 08-07 OoyktkerodO1H 5.4  08-06 AecwzhfqqzU9H 5.5    Cardiac enzymes     U/A   CSF  Immunological  Other    STUDIES & IMAGING:  Studies (EKG, EEG, EMG, etc):     Radiology (XR, CT, MR, U/S, TTE/TANI): HPI:  73 y/o male with possible HX of Psych disorder , lives alone, very poor historian, unkempt and with very poor  hygiene , missing teeth,  Dental caries, on PO Benzo at home, denies ETOH and smoking,  reports falling off a stool 1.5 days prior to presentation around midnight and becoming pinned in position until rescued by EMS this afternoon. Denies drug or alcohol use prior to falling, and denies feeling lightheaded or dizzy prior. Patient is unsure what may have caused the fall other than a sense that his muscles have been "dysfunctional" lately. In the past month, he has noticed some muscle wasting, particularly in his arms, with 6-8 lbs of weight loss in this time. He has also noticed some fasciculations in his legs in the past month as well. He denies any family history of muscular pathology. He also denies seizures, chest pain, shortness of breath, nausea, and abdominal pain. Reports taking diazepam bid and flurazepam at nights and no other medications. Of note, patient has known history of L scrotal hydrocele but has refused surgery. Pt awake, A+O x 3, C/O + Productive cough as well, no fever, no dysuria, no N/V, No HA, no abdominal pain, + weakness, + cachectic with B/L Temporal wasting , Zamzam  (girl friend),   Neurology consulted: He states that the initial fall involved a loss of balance while trying to avoid a stool. The past two months he noticed loss of muscle bulk particularly in the left upper extremity, difficulty getting up from a sitting position, difficulty walking, and stiffness/cramping in his waist, arms, and legs. His girlfriend states that he was seen outpatient by a neurologist (Dr. Siddiqi [phone]) 10 days before his fall for unsteady gait and weakness who found that his CK was elevated and recommended an EMG.  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 He endorses a decline in both near and distance visual acuity (blurriness) over the past ten years. He states that he has noticed eyelid drooping for the past 10 years. He states that he has not had constipation, but has had a decreased sense of smell. He noted that he sometimes kicks out with his leg during sleep. He sometimes has numbness, tingling, and cramping in his bilateral hands and feet. He sometimes notices a jerking movement in his leg. He denies any changes in hearing. He noticed that his voice has become more hypophonic since the fall.       S/P CT Chest/Abdomen/pelvis as below:   < from: CT Abdomen and Pelvis w/ IV Cont (08.06.19 @ 16:47) >  IMPRESSION: A few right lower lobe ground glass opacities of uncertain   etiology. No evidence of solid organ injury or acute fracture.    S/P CT Maxillofacial as Below:     < from: CT Maxillofacial w/ IV Cont (08.06.19 @ 16:47) >  IMPRESSION:    Discontinuous appearance of the left lower medial incisor which appears   displaced posteriorly suggesting an avulsed tooth/alveolar fracture.   Clinical correlation is advised. Poor dentition with multiple missing   teeth. Right facial and external auditory canal soft tissue swelling likely   posttraumatic in nature however cellulitis/otitis externa is not excluded.    CT Head: unremarkable, , X ray: Pelvis No FX,     RVP was sent , started the pt on IV Zosyn, R/O Pneumonia, Elevated CPK 3311, + Rhabdo as well, on IVF NS @ 150 cc/hr, S/P IVF LR Bolus x 1 lit, and IVF Ns x 2 Lit Bolus, Dental consult was requested, NPO for now, pt was placed on TELE ,    Labs: PT 15.5, INR 1.35, PTT 26.2, Fibrinogen assay 542, Lactate 2.7, CPK 3311, Mg 2, Na 140, K+ 3.9, BUN 25, Creatinine 0.82, Glucose 138, AST 78, Troponin HS 21, WBC 24.95, Hgb 13.5, Platelet 262, UA: Large Blood , Ketone > 150,     Vitals: Tem 98.5, HR 87, /66, RR 16, 95 % RA, (06 Aug 2019 20:44)      REVIEW OF SYSTEMS	    A 10-system ROS was performed and is negative except for those items noted above and/or in the HPI.    PAST MEDICAL & SURGICAL HISTORY:  Scrotal swelling  Psychiatric disorder  No significant past surgical history    FAMILY HISTORY:  FHx: stomach cancer  Mother: stroke    SOCIAL HISTORY:   T/E/D: denies  Occupation: retired "white collar" worker  Lives: alone    MEDICATIONS (HOME):  Home Medications:  diazePAM 5 mg oral tablet: 1 tab(s) orally 4 times a day ([facility] ref# [id number]: Last filled 7/15/19 x #120 tabs)  (07 Aug 2019 14:49)  flurazepam 30 mg oral capsule: 1 cap(s) orally once a day (at bedtime) (07 Aug 2019 14:49)    MEDICATIONS  (STANDING):  artificial tears (preservative free) Ophthalmic Solution 1 Drop(s) Both EYES three times a day  dextrose 5% + sodium chloride 0.45%. 1000 milliLiter(s) (75 mL/Hr) IV Continuous <Continuous>  finasteride 5 milliGRAM(s) Oral daily  heparin  Injectable 5000 Unit(s) SubCutaneous two times a day  lactobacillus acidophilus 1 Tablet(s) Oral every 12 hours  LORazepam   Injectable 0.5 milliGRAM(s) IV Push two times a day    MEDICATIONS  (PRN):  ALBUTerol/ipratropium for Nebulization 3 milliLiter(s) Nebulizer every 6 hours PRN Shortness of Breath and/or Wheezing  LORazepam   Injectable 0.5 milliGRAM(s) IV Push every 6 hours PRN Agitation/insomnia    ALLERGIES/INTOLERANCES:  Allergies  No Known Allergies    Intolerances    VITALS & EXAMINATION:  Vital Signs Last 24 Hrs  T(C): 36.7 (19 Aug 2019 13:01), Max: 36.7 (18 Aug 2019 17:16)  T(F): 98.1 (19 Aug 2019 13:01), Max: 98.1 (18 Aug 2019 17:16)  HR: 85 (19 Aug 2019 13:01) (64 - 91)  BP: 118/72 (19 Aug 2019 13:01) (107/55 - 123/73)  BP(mean): --  RR: 17 (19 Aug 2019 13:01) (16 - 17)  SpO2: 99% (19 Aug 2019 13:01) (98% - 99%)    General: cachectic elderly Male, appears stated age, in no apparent distress including pain  Head: scars on forehead, right zygomatic arch, right EAC, right mandible.   Eyes: b/l ptosis, right superior medial eyelid dehiscence?    Extremities: thin.    Neurological (>12):  MS: Awake, alert, oriented to person, place, situation, time. Normal affect. Follows all commands.    Language: Speech is hypophonic, fluent with good repetition & comprehension (able to name objects ID, button, knuckles)    CNs: PERRL (R = 3mm, L = 3mm). VFF. Bilateral ptosis, EOMI no nystagmus, no diplopia. V1-3 intact to LT, well developed masseter muscles b/l. decreased eyelid closing strength on right, unable to raise right eyebrow, smile appears grossly symmetrical. Hearing grossly normal (rubbing fingers) b/l. Symmetric palate elevation in midline. Gag reflex present b/l. Head turning & shoulder shrug intact b/l. Tongue midline, normal movements, no atrophy.  Decreased finger tapping on left, decreased toe tapping on left  Can count from 1-20 in one breath    Motor: Decreased muscle bulk in all extremities & normal tone. No noticeable tremor. No pronator drift. No cogwheel rigidity.               Deltoid	Biceps	Triceps	Wrist	Finger ABd	   R	5	5	5	5	5		5 	  L	5	5	5	5	5		5    	H-Flex	H-Ext	H-ABd	H-ADd	K-Flex	K-Ext	D-Flex	P-Flex  R	5	5	5	5	5	5	5	5 	   L	5	5	5	5	5	5	5	5	     Sensation: Intact to LT b/l throughout.     Cortical: Extinction on DSS (neglect): none    Reflexes:              Biceps(C5)       BR(C6)     Triceps(C7)               Patellar(L4)    Achilles(S1)    Plantar Resp  R	1	         	1             1		        2		    1		Down   L	1	          1             1		        3		    1		Down     Coordination: No dysmetria to FTN/HTS    Gait: Not tested due to weakness    LABORATORY:  CBC                       11.7   11.93 )-----------( 544      ( 19 Aug 2019 05:50 )             36.0     Chem 08-19    136  |  96<L>  |  8   ----------------------------<  120<H>  3.6   |  29  |  0.72    Ca    9.2      19 Aug 2019 05:50  Mg     1.9     08-19      LFTs   Coagulopathy PT/INR - ( 19 Aug 2019 05:50 )   PT: 17.0 SEC;   INR: 1.51            Lipid Panel 08-07 Chol 127 LDL 56 HDL 63<H> Trig 89  A1c 08-07 RjyqfhoumzK5S 5.4  08-06 CczuwmviqsD1T 5.5    Cardiac enzymes     U/A   CSF  Immunological  Other    STUDIES & IMAGING:  Studies (EKG, EEG, EMG, etc):     Radiology (XR, CT, MR, U/S, TTE/TANI): HPI:  73 y/o male with possible HX of Psych disorder , lives alone, very poor historian, unkempt and with very poor  hygiene , missing teeth,  Dental caries, on PO Benzo at home, denies ETOH and smoking,  reports falling off a stool 1.5 days prior to presentation around midnight and becoming pinned in position until rescued by EMS this afternoon. Denies drug or alcohol use prior to falling, and denies feeling lightheaded or dizzy prior. Patient is unsure what may have caused the fall other than a sense that his muscles have been "dysfunctional" lately. In the past month, he has noticed some muscle wasting, particularly in his arms, with 6-8 lbs of weight loss in this time. He has also noticed some fasciculations in his legs in the past month as well. He denies any family history of muscular pathology. He also denies seizures, chest pain, shortness of breath, nausea, and abdominal pain. Reports taking diazepam bid and flurazepam at nights and no other medications. Of note, patient has known history of L scrotal hydrocele but has refused surgery. Pt awake, A+O x 3, C/O + Productive cough as well, no fever, no dysuria, no N/V, No HA, no abdominal pain, + weakness, + cachectic with B/L Temporal wasting , Zamzam  (girl friend),   Neurology consulted: He states that the initial fall involved a loss of balance while trying to avoid a stool. The past two months he noticed loss of muscle bulk particularly in the left upper extremity, difficulty getting up from a sitting position, difficulty walking, and stiffness/cramping in his waist, arms, and legs. His girlfriend states that he was seen outpatient by a neurologist (Dr. Siddiqi [phone]) 10 days before his fall for unsteady gait and weakness who found that his CK was elevated and recommended an EMG.  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 He endorses a decline in both near and distance visual acuity (blurriness) over the past ten years. He states that he has noticed eyelid drooping for the past 10 years. He states that he has not had constipation, but has had a decreased sense of smell. He noted that he sometimes kicks out with his leg during sleep. He sometimes has numbness, tingling, and cramping in his bilateral hands and feet. He sometimes notices a jerking movement in his leg. He denies any changes in hearing. He noticed that his voice has become more hypophonic since the fall.       S/P CT Chest/Abdomen/pelvis as below:   < from: CT Abdomen and Pelvis w/ IV Cont (08.06.19 @ 16:47) >  IMPRESSION: A few right lower lobe ground glass opacities of uncertain   etiology. No evidence of solid organ injury or acute fracture.    S/P CT Maxillofacial as Below:     < from: CT Maxillofacial w/ IV Cont (08.06.19 @ 16:47) >  IMPRESSION:    Discontinuous appearance of the left lower medial incisor which appears   displaced posteriorly suggesting an avulsed tooth/alveolar fracture.   Clinical correlation is advised. Poor dentition with multiple missing   teeth. Right facial and external auditory canal soft tissue swelling likely   posttraumatic in nature however cellulitis/otitis externa is not excluded.    CT Head: unremarkable, , X ray: Pelvis No FX,     RVP was sent , started the pt on IV Zosyn, R/O Pneumonia, Elevated CPK 3311, + Rhabdo as well, on IVF NS @ 150 cc/hr, S/P IVF LR Bolus x 1 lit, and IVF Ns x 2 Lit Bolus, Dental consult was requested, NPO for now, pt was placed on TELE ,    Labs: PT 15.5, INR 1.35, PTT 26.2, Fibrinogen assay 542, Lactate 2.7, CPK 3311, Mg 2, Na 140, K+ 3.9, BUN 25, Creatinine 0.82, Glucose 138, AST 78, Troponin HS 21, WBC 24.95, Hgb 13.5, Platelet 262, UA: Large Blood , Ketone > 150,     Vitals: Tem 98.5, HR 87, /66, RR 16, 95 % RA, (06 Aug 2019 20:44)      REVIEW OF SYSTEMS	    A 10-system ROS was performed and is negative except for those items noted above and/or in the HPI.    PAST MEDICAL & SURGICAL HISTORY:  Scrotal swelling  Psychiatric disorder  No significant past surgical history    FAMILY HISTORY:  FHx: stomach cancer  Mother: stroke    SOCIAL HISTORY:   T/E/D: denies  Occupation: retired "white collar" worker  Lives: alone    MEDICATIONS (HOME):  Home Medications:  diazePAM 5 mg oral tablet: 1 tab(s) orally 4 times a day ([facility] ref# [id number]: Last filled 7/15/19 x #120 tabs)  (07 Aug 2019 14:49)  flurazepam 30 mg oral capsule: 1 cap(s) orally once a day (at bedtime) (07 Aug 2019 14:49)    MEDICATIONS  (STANDING):  artificial tears (preservative free) Ophthalmic Solution 1 Drop(s) Both EYES three times a day  dextrose 5% + sodium chloride 0.45%. 1000 milliLiter(s) (75 mL/Hr) IV Continuous <Continuous>  finasteride 5 milliGRAM(s) Oral daily  heparin  Injectable 5000 Unit(s) SubCutaneous two times a day  lactobacillus acidophilus 1 Tablet(s) Oral every 12 hours  LORazepam   Injectable 0.5 milliGRAM(s) IV Push two times a day    MEDICATIONS  (PRN):  ALBUTerol/ipratropium for Nebulization 3 milliLiter(s) Nebulizer every 6 hours PRN Shortness of Breath and/or Wheezing  LORazepam   Injectable 0.5 milliGRAM(s) IV Push every 6 hours PRN Agitation/insomnia    ALLERGIES/INTOLERANCES:  Allergies  No Known Allergies    Intolerances    VITALS & EXAMINATION:  Vital Signs Last 24 Hrs  T(C): 36.7 (19 Aug 2019 13:01), Max: 36.7 (18 Aug 2019 17:16)  T(F): 98.1 (19 Aug 2019 13:01), Max: 98.1 (18 Aug 2019 17:16)  HR: 85 (19 Aug 2019 13:01) (64 - 91)  BP: 118/72 (19 Aug 2019 13:01) (107/55 - 123/73)  BP(mean): --  RR: 17 (19 Aug 2019 13:01) (16 - 17)  SpO2: 99% (19 Aug 2019 13:01) (98% - 99%)    General: cachectic elderly Male, appears stated age, in no apparent distress including pain  Head: scars on forehead, right zygomatic arch, right EAC, right mandible.   Eyes: b/l ptosis, right superior medial eyelid dehiscence?    Extremities: thin.    Neurological (>12):  MS: Awake, alert, oriented to person, place, situation, time. Normal affect. Follows all commands.    Language: Speech is hypophonic, fluent with good repetition & comprehension (able to name objects ID, button, knuckles)    CNs: PERRL (R = 3mm, L = 3mm). VFF. Bilateral ptosis, EOMI no nystagmus, no diplopia. V1-3 intact to LT, well developed masseter muscles b/l. decreased eyelid closing strength on right, unable to raise right eyebrow, smile appears grossly symmetrical. Hearing grossly normal (rubbing fingers) b/l. Symmetric palate elevation in midline. Gag reflex present b/l. Head turning & shoulder shrug intact b/l. Tongue midline, normal movements, no atrophy.  Decreased finger tapping on left, decreased toe tapping on left  Can count from 1-20 in one breath    Motor: Decreased muscle bulk in all extremities & normal tone. No noticeable tremor. No pronator drift. No cogwheel rigidity.               Deltoid	Biceps	Triceps	Wrist	Finger ABd	   R	4	4	4	4	4		4 	  L	4	4	4	4	4		4    	H-Flex	H-Ext	H-ABd	H-ADd	K-Flex	K-Ext	D-Flex	P-Flex  R	4	4	4	4	4	4	4	4 	   L	4	4	4	4	4	4	4	4	     Sensation: Intact to LT b/l throughout.     Cortical: Extinction on DSS (neglect): none    Reflexes:              Biceps(C5)       BR(C6)     Triceps(C7)               Patellar(L4)    Achilles(S1)    Plantar Resp  R	1	         	1             1		        2		    1		Down   L	1	          1             1		        3		    1		Down     Coordination: No dysmetria to FTN/HTS    Gait: Not tested due to weakness    LABORATORY:  CBC                       11.7   11.93 )-----------( 544      ( 19 Aug 2019 05:50 )             36.0     Chem 08-19    136  |  96<L>  |  8   ----------------------------<  120<H>  3.6   |  29  |  0.72    Ca    9.2      19 Aug 2019 05:50  Mg     1.9     08-19      LFTs   Coagulopathy PT/INR - ( 19 Aug 2019 05:50 )   PT: 17.0 SEC;   INR: 1.51            Lipid Panel 08-07 Chol 127 LDL 56 HDL 63<H> Trig 89  A1c 08-07 [id number] 5.4  08-06 TprpctldgjY9U 5.5    Cardiac enzymes     U/A   CSF  Immunological  Other    STUDIES & IMAGING:  Studies (EKG, EEG, EMG, etc):     Radiology (XR, CT, MR, U/S, TTE/TANI):

## 2019-08-19 NOTE — CONSULT NOTE ADULT - REASON FOR ADMISSION
S/P fall, Rhabdo, R/O Pneumonia, Leukocytosis, Cough, R/O Cellulitis Face/lower Abdomen

## 2019-08-19 NOTE — PROGRESS NOTE ADULT - PROBLEM SELECTOR PLAN 4
Per PMD Dr Frank Britton [phone], patient has a long history of depression and anxiety and long term use of Benzo   - c/w ativan IV 0.5mg bid, monitor signs of benzo withdrawal and respiratory status - Psych following, recs appreciated

## 2019-08-19 NOTE — PROGRESS NOTE ADULT - PROBLEM SELECTOR PLAN 6
Testicle US showed large complex left hydrocele. Urology aware. No sonographic evidence of testicular torsion.  - monitor

## 2019-08-19 NOTE — PROGRESS NOTE ADULT - SUBJECTIVE AND OBJECTIVE BOX
Chief Complaint:  Patient is a 74y old  Male who presents with a chief complaint of S/P fall, Rhabdo, R/O Pneumonia, Leukocytosis, Cough, R/O Cellulitis Face/lower Abdomen (18 Aug 2019 11:44)      Interval Events: patient still remains npo. patient is confused about why he has swallowing issues as this is new to him. denies nausea or vomiting. requests to speak to family before deciding on peg    Allergies:  No Known Allergies      Hospital Medications:  ALBUTerol/ipratropium for Nebulization 3 milliLiter(s) Nebulizer every 6 hours PRN  artificial tears (preservative free) Ophthalmic Solution 1 Drop(s) Both EYES three times a day  dextrose 5% + sodium chloride 0.45%. 1000 milliLiter(s) IV Continuous <Continuous>  finasteride 5 milliGRAM(s) Oral daily  heparin  Injectable 5000 Unit(s) SubCutaneous two times a day  lactobacillus acidophilus 1 Tablet(s) Oral every 12 hours  LORazepam   Injectable 0.5 milliGRAM(s) IV Push two times a day  LORazepam   Injectable 0.5 milliGRAM(s) IV Push every 6 hours PRN      PMHX/PSHX:  Scrotal swelling  Psychiatric disorder  No pertinent past medical history  No significant past surgical history      Family history:  FHx: stomach cancer      ROS:     General:  No wt loss, fevers, chills, night sweats, fatigue,   Eyes:  Good vision, no reported pain  ENT:  No sore throat, pain, runny nose, dysphagia  CV:  No pain, palpitations, hypo/hypertension  Resp:  No dyspnea, cough, tachypnea, wheezing  GI:  See HPI  :  No pain, bleeding, incontinence, nocturia  Muscle:  No pain, weakness  Neuro:  No weakness, tingling, memory problems  Psych:  No fatigue, insomnia, mood problems, depression  Endocrine:  No polyuria, polydipsia, cold/heat intolerance  Heme:  No petechiae, ecchymosis, easy bruisability  Skin:  No rash, edema      PHYSICAL EXAM:     Vital Signs:  Vital Signs Last 24 Hrs  T(C): 36.6 (19 Aug 2019 05:08), Max: 36.7 (18 Aug 2019 12:11)  T(F): 97.8 (19 Aug 2019 05:08), Max: 98.1 (18 Aug 2019 17:16)  HR: 64 (19 Aug 2019 05:08) (64 - 91)  BP: 107/55 (19 Aug 2019 05:08) (93/57 - 123/73)  BP(mean): --  RR: 16 (19 Aug 2019 05:08) (16 - 17)  SpO2: 98% (19 Aug 2019 05:08) (96% - 99%)  Daily     Daily   GENERAL:  NAD, frail appearing  HEENT:  NC/AT, poor dentition, conjunctivae clear and pink  CHEST:  Full & symmetric excursion, no increased effort, breath sounds clear  HEART:  Regular rhythm, S1, S2, no murmur/rub/S3/S4, no abdominal bruit, no edema, no JVD  ABDOMEN:  Soft, non-tender, non-distended, +bowel sounds  EXTREMITIES:  no cyanosis, clubbing or edema  SKIN:  scattered echymoses  NEURO:  Alert, oriented  LABS:                        11.7   11.93 )-----------( 544      ( 19 Aug 2019 05:50 )             36.0     08-19    136  |  96<L>  |  8   ----------------------------<  120<H>  3.6   |  29  |  0.72    Ca    9.2      19 Aug 2019 05:50  Mg     1.9     08-19        PT/INR - ( 19 Aug 2019 05:50 )   PT: 17.0 SEC;   INR: 1.51                  Imaging:

## 2019-08-19 NOTE — CONSULT NOTE ADULT - ATTENDING COMMENTS
I have seen and evaluated the patient with the GI Fellow and GI Team.  I agree with the findings, formulation and plan of care as documented in the GI fellow's note, except as noted.
Acute hypoxemic respiratory failure in the setting of recent fall and acute rhabdomyolysis. Found on ultrasound to have dense consolidation at the left base with dynamic air bronchograms and B lines anteriorly on left, A lines anteriorly on right, and no consolidation or effusion at right base. Interestingly, CT chest done about 12 hrs earlier without evidence of consolidation or pneumonia on the left. Oral and nasotracheal suctioning with dense white-yellow mucopurulent secretions. Continue with IV antibiotics, currently on Zosyn. Mucociliary clearance with Duoneb q6h, chest vest & hypertonic saline 2-3x/day, chest PT, mucomyst. IVF hydration. Oxygen decreased from 100% NRB to 6 liters NC, with SpO2>92%. Does not require ICU care at this time, but will need pulse ox monitoring on the floors. Please call back with questions.
I personally interviewed, examined, and participated in the care of this patient, on rounds 8/21/2019 (he was off the olea getting dental surgery on rounds yesterday) with the neurology consult service team.  Reviewed findings and management plan with the team.  In addition to or instead of the Hx and findings and plan reported above, or in particular, I note as follow:    Extremely gaunt appearance with scalloped temporal areas and marked facial wasting.  Severe generalized limb muscle wasting; the calves are relatively spared in terms of visible wasting.  No evident tongue atrophy or fasciculations.  Mild slurring of /t/ and /d/ on testing alliteration.      Bilateral hamstring tightness (limited passive range of extension of the knees).      Marked generalized weakness; in addition, guarding of RUE due to shoulder injury,      Reflex                           Right    Left   Comment    Biceps                             1+       0  Triceps                            0         3-  Hatch                       absent  present  Patellar                           1         2-  Plantar                         ext        ext    No spontaneous fasciculations noted on short-term observation of limbs.  Percussion-induced fasciculations obtained from biceps, triceps and quadriceps muscles bilat; after percussion, continued fasciculations observed in biceps and triceps muscles bilat.        DISCUSSION    He has UMN signs (Hatch and Babinski reflexes), and LMN signs (wasting; fasciculations).  Concern is for motor neuron disease.      ADDITIONAL RECOMMENDATION    Non-contrast c-spine MRI.  Referral to neuromuscular specialist.  EMG/NCV (OK for out-patient).

## 2019-08-19 NOTE — CONSULT NOTE ADULT - ASSESSMENT
Assessment: 74 year old M who presents with a mechanical fall. Physical exam significant for muscle atrophy and decreased left sided fine motor movements. Initial labs significant for an elevated CK.  CTH showed no acute findings.      Impression: Dysphagia of possible neuromuscular etiology, stroke appears less likely     Plan:  [] MRI brain without contrast  [] Anti-MUSK antibodies, anti-Acetylcholine receptor antibodies  [] EMG outpatient  [] PT/OT  [] Recommend ophthalmology (oculoplastics) evaluation for right eye's ptosis    Case to be discussed with neurology attending, Dr. Alvarado. Assessment: 74 year old M who presents with a mechanical fall. Physical exam significant for muscle atrophy and decreased left sided fine motor movements. Initial labs significant for an elevated CK.  CTH showed no acute findings.      Impression: Dysphagia of possible neuromuscular etiology, stroke appears less likely, possible Parkinsonism     Plan:  [] MRI brain without contrast  [] Anti-MUSK antibodies, anti-Acetylcholine receptor antibodies  [] EMG outpatient  [] PT/OT  [] Recommend ophthalmology (oculoplastics) evaluation for right eye's ptosis    Case to be discussed with neurology attending, Dr. Alvarado.

## 2019-08-20 DIAGNOSIS — E87.6 HYPOKALEMIA: ICD-10-CM

## 2019-08-20 LAB
ANION GAP SERPL CALC-SCNC: 12 MMO/L — SIGNIFICANT CHANGE UP (ref 7–14)
BUN SERPL-MCNC: 7 MG/DL — SIGNIFICANT CHANGE UP (ref 7–23)
CALCIUM SERPL-MCNC: 8.9 MG/DL — SIGNIFICANT CHANGE UP (ref 8.4–10.5)
CHLORIDE SERPL-SCNC: 98 MMOL/L — SIGNIFICANT CHANGE UP (ref 98–107)
CO2 SERPL-SCNC: 27 MMOL/L — SIGNIFICANT CHANGE UP (ref 22–31)
CREAT SERPL-MCNC: 0.7 MG/DL — SIGNIFICANT CHANGE UP (ref 0.5–1.3)
GLUCOSE SERPL-MCNC: 122 MG/DL — HIGH (ref 70–99)
HCT VFR BLD CALC: 33 % — LOW (ref 39–50)
HGB BLD-MCNC: 10.8 G/DL — LOW (ref 13–17)
MAGNESIUM SERPL-MCNC: 1.9 MG/DL — SIGNIFICANT CHANGE UP (ref 1.6–2.6)
MCHC RBC-ENTMCNC: 32.2 PG — SIGNIFICANT CHANGE UP (ref 27–34)
MCHC RBC-ENTMCNC: 32.7 % — SIGNIFICANT CHANGE UP (ref 32–36)
MCV RBC AUTO: 98.5 FL — SIGNIFICANT CHANGE UP (ref 80–100)
NRBC # FLD: 0 K/UL — SIGNIFICANT CHANGE UP (ref 0–0)
PLATELET # BLD AUTO: 512 K/UL — HIGH (ref 150–400)
PMV BLD: 9.5 FL — SIGNIFICANT CHANGE UP (ref 7–13)
POTASSIUM SERPL-MCNC: 3.2 MMOL/L — LOW (ref 3.5–5.3)
POTASSIUM SERPL-SCNC: 3.2 MMOL/L — LOW (ref 3.5–5.3)
RBC # BLD: 3.35 M/UL — LOW (ref 4.2–5.8)
RBC # FLD: 12.4 % — SIGNIFICANT CHANGE UP (ref 10.3–14.5)
SODIUM SERPL-SCNC: 137 MMOL/L — SIGNIFICANT CHANGE UP (ref 135–145)
WBC # BLD: 10.54 K/UL — HIGH (ref 3.8–10.5)
WBC # FLD AUTO: 10.54 K/UL — HIGH (ref 3.8–10.5)

## 2019-08-20 PROCEDURE — 70551 MRI BRAIN STEM W/O DYE: CPT | Mod: 26

## 2019-08-20 PROCEDURE — 99233 SBSQ HOSP IP/OBS HIGH 50: CPT

## 2019-08-20 RX ORDER — MAGNESIUM SULFATE 500 MG/ML
1 VIAL (ML) INJECTION ONCE
Refills: 0 | Status: COMPLETED | OUTPATIENT
Start: 2019-08-20 | End: 2019-08-20

## 2019-08-20 RX ORDER — POTASSIUM CHLORIDE 20 MEQ
10 PACKET (EA) ORAL
Refills: 0 | Status: COMPLETED | OUTPATIENT
Start: 2019-08-20 | End: 2019-08-20

## 2019-08-20 RX ADMIN — HEPARIN SODIUM 5000 UNIT(S): 5000 INJECTION INTRAVENOUS; SUBCUTANEOUS at 06:17

## 2019-08-20 RX ADMIN — Medication 100 GRAM(S): at 13:42

## 2019-08-20 RX ADMIN — SODIUM CHLORIDE 75 MILLILITER(S): 9 INJECTION, SOLUTION INTRAVENOUS at 09:49

## 2019-08-20 RX ADMIN — Medication 105 MILLIGRAM(S): at 16:29

## 2019-08-20 RX ADMIN — Medication 1 DROP(S): at 21:57

## 2019-08-20 RX ADMIN — HEPARIN SODIUM 5000 UNIT(S): 5000 INJECTION INTRAVENOUS; SUBCUTANEOUS at 18:25

## 2019-08-20 RX ADMIN — Medication 100 MILLIEQUIVALENT(S): at 11:16

## 2019-08-20 RX ADMIN — Medication 100 MILLIEQUIVALENT(S): at 08:34

## 2019-08-20 RX ADMIN — Medication 105 MILLIGRAM(S): at 06:16

## 2019-08-20 RX ADMIN — Medication 100 MILLIEQUIVALENT(S): at 09:48

## 2019-08-20 RX ADMIN — Medication 0.5 MILLIGRAM(S): at 06:17

## 2019-08-20 RX ADMIN — Medication 1 DROP(S): at 14:17

## 2019-08-20 RX ADMIN — SODIUM CHLORIDE 75 MILLILITER(S): 9 INJECTION, SOLUTION INTRAVENOUS at 18:25

## 2019-08-20 NOTE — CHART NOTE - NSCHARTNOTEFT_GEN_A_CORE
Patient with no new events. GI consulted for PEG placement given oropharyngeal dysphagia.    Recommendation:  - plan for PEG placement tmro after dental extraction  - aspiration precautions    Rahul Crespo  [phone]  [id number]  Please call/page on call fellow on weekends and weekdays after 5pm

## 2019-08-20 NOTE — PROGRESS NOTE ADULT - SUBJECTIVE AND OBJECTIVE BOX
Patient is a 74y old  Male who presents with a chief complaint of fall w/ rhabdo, PNA s/p abx, FTT, dysphagia    SUBJECTIVE / OVERNIGHT EVENTS:    No CP, SOB, f/c/n/v  Tired, still in agreement w/ dental and PEG     MEDICATIONS  (STANDING):  artificial tears (preservative free) Ophthalmic Solution 1 Drop(s) Both EYES three times a day  dextrose 5% + sodium chloride 0.45%. 1000 milliLiter(s) (75 mL/Hr) IV Continuous <Continuous>  heparin  Injectable 5000 Unit(s) SubCutaneous two times a day  LORazepam   Injectable 0.5 milliGRAM(s) IV Push two times a day  magnesium sulfate  IVPB 1 Gram(s) IV Intermittent once  potassium chloride  10 mEq/100 mL IVPB 10 milliEquivalent(s) IV Intermittent every 1 hour  thiamine IVPB 500 milliGRAM(s) IV Intermittent every 8 hours    MEDICATIONS  (PRN):  ALBUTerol/ipratropium for Nebulization 3 milliLiter(s) Nebulizer every 6 hours PRN Shortness of Breath and/or Wheezing  LORazepam   Injectable 0.5 milliGRAM(s) IV Push every 6 hours PRN Agitation/insomnia    T(C): 37.1 (08-20-19 @ 06:15), Max: 37.1 (08-20-19 @ 06:15)  HR: 70 (08-20-19 @ 06:15) (70 - 93)  BP: 100/55 (08-20-19 @ 06:15) (100/55 - 123/65)  RR: 16 (08-20-19 @ 06:15) (16 - 17)  SpO2: 100% (08-20-19 @ 06:15) (97% - 100%)    I&O's Summary    19 Aug 2019 07:01  -  20 Aug 2019 07:00  --------------------------------------------------------  IN: 825 mL / OUT: 725 mL / NET: 100 mL    PHYSICAL EXAM:  GENERAL: NAD, thin  HEAD:  R ear w/ pressure injury   CHEST/LUNG: Clear to auscultation bilaterally; No wheeze  HEART: Regular rate and rhythm; No murmurs, rubs, or gallops  ABDOMEN: Soft, Nontender, Nondistended; Bowel sounds present  EXTREMITIES:   wwp, no edema   PSYCH: AAOx3  NEUROLOGY: generally weak, will not cooperate full as "im too codl right now"   SKIN: abrasions on R face   : + fowler, testicular swelling     LABS:                        10.8   10.54 )-----------( 512      ( 20 Aug 2019 06:35 )             33.0     08-20    137  |  98  |  7   ----------------------------<  122<H>  3.2<L>   |  27  |  0.70    Ca    8.9      20 Aug 2019 06:35  Mg     1.9     08-20    PT/INR - ( 19 Aug 2019 05:50 )   PT: 17.0 SEC;   INR: 1.51       Notes Reviewed:  neurology, GI, dental   Care Discussed with Consultants/Other Providers: Patient is a 74y old  Male who presents with a chief complaint of fall w/ rhabdo, PNA s/p abx, FTT, dysphagia    SUBJECTIVE / OVERNIGHT EVENTS:    No CP, SOB, f/c/n/v  Tired, still in agreement w/ dental and PEG     MEDICATIONS  (STANDING):  artificial tears (preservative free) Ophthalmic Solution 1 Drop(s) Both EYES three times a day  dextrose 5% + sodium chloride 0.45%. 1000 milliLiter(s) (75 mL/Hr) IV Continuous <Continuous>  heparin  Injectable 5000 Unit(s) SubCutaneous two times a day  LORazepam   Injectable 0.5 milliGRAM(s) IV Push two times a day  magnesium sulfate  IVPB 1 Gram(s) IV Intermittent once  potassium chloride  10 mEq/100 mL IVPB 10 milliEquivalent(s) IV Intermittent every 1 hour  thiamine IVPB 500 milliGRAM(s) IV Intermittent every 8 hours    MEDICATIONS  (PRN):  ALBUTerol/ipratropium for Nebulization 3 milliLiter(s) Nebulizer every 6 hours PRN Shortness of Breath and/or Wheezing  LORazepam   Injectable 0.5 milliGRAM(s) IV Push every 6 hours PRN Agitation/insomnia    T(C): 37.1 (08-20-19 @ 06:15), Max: 37.1 (08-20-19 @ 06:15)  HR: 70 (08-20-19 @ 06:15) (70 - 93)  BP: 100/55 (08-20-19 @ 06:15) (100/55 - 123/65)  RR: 16 (08-20-19 @ 06:15) (16 - 17)  SpO2: 100% (08-20-19 @ 06:15) (97% - 100%)    I&O's Summary    19 Aug 2019 07:01  -  20 Aug 2019 07:00  --------------------------------------------------------  IN: 825 mL / OUT: 725 mL / NET: 100 mL    PHYSICAL EXAM:  GENERAL: NAD, thin  HEAD:  R ear w/ pressure injury   CHEST/LUNG: Clear to auscultation bilaterally; No wheeze  HEART: Regular rate and rhythm; No murmurs, rubs, or gallops  ABDOMEN: Soft, Nontender, Nondistended; Bowel sounds present  EXTREMITIES:   wwp, no edema   PSYCH: AAOx3  NEUROLOGY: generally weak but nonfocal  SKIN: abrasions on R face   : + fowler, testicular swelling     LABS:                        10.8   10.54 )-----------( 512      ( 20 Aug 2019 06:35 )             33.0     08-20    137  |  98  |  7   ----------------------------<  122<H>  3.2<L>   |  27  |  0.70    Ca    8.9      20 Aug 2019 06:35  Mg     1.9     08-20    PT/INR - ( 19 Aug 2019 05:50 )   PT: 17.0 SEC;   INR: 1.51       Notes Reviewed:  neurology, GI, dental   Care Discussed with Consultants/Other Providers:

## 2019-08-20 NOTE — PROGRESS NOTE ADULT - PROBLEM SELECTOR PLAN 1
Failed cineesophagogram x2, NPO  - appreciate neurology eval, MRI ordered, Anti-Ach Ab sent, ?MUSK ab   - for dental extractions and then PEG (stable to go off floor in stretcher, no need for pre-procedural ppx abx)  - declines NGT  - currently on IVF

## 2019-08-20 NOTE — PROGRESS NOTE ADULT - PROBLEM SELECTOR PLAN 10
lives alone in unclean living environment.   SW to followup
Patient gives providers consent to discuss his medical information with his girlfriend of 4 years, Zamzam Candis: [phone] but does not want to sign her as his HCP at this time. Agreeable to PEG. Also informed of risk of bleeding/infection/aspiration with PEG. Currently wants trial of resuscitation with CPR/intubation if needed. Palliative care following, appreciate c/s
lives alone in unclean living environment. No nearby family per patient  SW to followup
Patient gives providers consent to discuss his medical information with his girlfriend of 4 years, Zamzam Petenatalie: [phone] but does not want to sign her as his HCP at this time. Agreeable to PEG. Also informed of risk of bleeding/infection/aspiration with PEG. Currently wants trial of resuscitation with CPR/intubation if needed. Palliative care following
lives alone in unclean living environment.   SW to followup  PT recs: rehab
lives alone in unclean living environment. No nearby family per patient  SW to followup
lives alone in unclean living environment. No nearby family per patient  SW to followup

## 2019-08-20 NOTE — PROGRESS NOTE ADULT - PROBLEM SELECTOR PLAN 2
Unclear cause, CTAP and CT chest without evidence of masses/LAD/malignancy.  TSH/B12/folic acid wnl.  HIV negative  - f/u neurology re: underlying neurological degenerative condition given dysphagia/retention/weakness--recomending MRI and Ab (MUSK and Ach)  - needs nutrition, pending dental extractions, pending PEG

## 2019-08-20 NOTE — PROGRESS NOTE ADULT - PROBLEM SELECTOR PLAN 5
Per PMD Dr Frank Britton [phone], patient has a long history of depression and anxiety and long term use of Benzo   - c/w ativan IV 0.5mg bid, monitor signs of benzo withdrawal and respiratory status - psych following, recs appreciated

## 2019-08-20 NOTE — PROGRESS NOTE ADULT - SUBJECTIVE AND OBJECTIVE BOX
Patient is a 74y old  Male who presents with a chief complaint of weakness, CK elevation, PNA, FTT/dysphagia (20 Aug 2019 09:22)    HPI:  73 y/o male with possible HX of Psych disorder , lives alone, very poor historian, unkempt and with very poor  hygiene , missing teeth,  Dental caries, on PO Benzo at home, denies ETOH and smoking,  reports falling off a stool 1.5 days prior to presentation around midnight and becoming pinned in position until rescued by EMS this afternoon. Denies drug or alcohol use prior to falling, and denies feeling lightheaded or dizzy prior. Patient is unsure what may have caused the fall other than a sense that his muscles have been "dysfunctional" lately. In the past month, he has noticed some muscle wasting, particularly in his arms, with 6-8 lbs of weight loss in this time. He has also noticed some fasciculations in his legs in the past month as well. He denies any family history of muscular pathology. He also denies seizures, chest pain, shortness of breath, nausea, and abdominal pain. Reports taking diazepam bid and flurazepam at nights and no other medications. Of note, patient has known history of L scrotal hydrocele but has refused surgery. Pt awake, A+O x 3, C/O + Productive cough as well, no fever, no dysuria, no N/V, No HA, no abdominal pain, + weakness, + cachectic with B/L Temporal wasting , Zamazm  (girl friend),     S/P CT Chest/Abdomen/pelvis as below:   < from: CT Abdomen and Pelvis w/ IV Cont (08.06.19 @ 16:47) >  IMPRESSION: A few right lower lobe ground glass opacities of uncertain   etiology. No evidence of solid organ injury or acute fracture.    S/P CT Maxillofacial as Below:     < from: CT Maxillofacial w/ IV Cont (08.06.19 @ 16:47) >  IMPRESSION:    Discontinuous appearance of the left lower medial incisor which appears   displaced posteriorly suggesting an avulsed tooth/alveolar fracture.   Clinical correlation is advised. Poor dentition with multiple missing   teeth. Right facial and external auditory canal soft tissue swelling likely   posttraumatic in nature however cellulitis/otitis externa is not excluded.    CT Head: unremarkable, , X ray: Pelvis No FX,     RVP was sent , started the pt on IV Zosyn, R/O Pneumonia, Elevated CPK 3311, + Rhabdo as well, on IVF NS @ 150 cc/hr, S/P IVF LR Bolus x 1 lit, and IVF Ns x 2 Lit Bolus, Dental consult was requested, NPO for now, pt was placed on TELE ,    Labs: PT 15.5, INR 1.35, PTT 26.2, Fibrinogen assay 542, Lactate 2.7, CPK 3311, Mg 2, Na 140, K+ 3.9, BUN 25, Creatinine 0.82, Glucose 138, AST 78, Troponin HS 21, WBC 24.95, Hgb 13.5, Platelet 262, UA: Large Blood , Ketone > 150,     Vitals: Tem 98.5, HR 87, /66, RR 16, 95 % RA, (06 Aug 2019 20:44)      PAST MEDICAL & SURGICAL HISTORY:  Scrotal swelling  Psychiatric disorder  No significant past surgical history    ( -  ) heart valve replacement  ( -  ) joint replacement  ( -  ) pregnancy    MEDICATIONS  (STANDING):  artificial tears (preservative free) Ophthalmic Solution 1 Drop(s) Both EYES three times a day  dextrose 5% + sodium chloride 0.45%. 1000 milliLiter(s) (75 mL/Hr) IV Continuous <Continuous>  heparin  Injectable 5000 Unit(s) SubCutaneous two times a day  LORazepam   Injectable 0.5 milliGRAM(s) IV Push two times a day  magnesium sulfate  IVPB 1 Gram(s) IV Intermittent once  thiamine IVPB 500 milliGRAM(s) IV Intermittent every 8 hours    MEDICATIONS  (PRN):  ALBUTerol/ipratropium for Nebulization 3 milliLiter(s) Nebulizer every 6 hours PRN Shortness of Breath and/or Wheezing  LORazepam   Injectable 0.5 milliGRAM(s) IV Push every 6 hours PRN Agitation/insomnia      Allergies    No Known Allergies    Intolerances        FAMILY HISTORY:  FHx: stomach cancer      *SOCIAL HISTORY: Patient presents alone    *Last Dental Visit: Unknown    Vital Signs Last 24 Hrs  T(C): 37.1 (20 Aug 2019 06:15), Max: 37.1 (20 Aug 2019 06:15)  T(F): 98.7 (20 Aug 2019 06:15), Max: 98.7 (20 Aug 2019 06:15)  HR: 70 (20 Aug 2019 06:15) (70 - 93)  BP: 100/55 (20 Aug 2019 06:15) (100/55 - 123/65)  BP(mean): --  RR: 16 (20 Aug 2019 06:15) (16 - 17)  SpO2: 100% (20 Aug 2019 06:15) (97% - 100%)    EOE:  TMJ ( -  ) clicks                    ( -  ) pops                    ( -  ) crepitus             Mandible FROM             Facial bones and MOM grossly intact             ( -  ) trismus             ( -  ) LAD             ( -  ) swelling             ( -  ) asymmetry             ( -  ) palpation             ( -  ) SOB             ( -  ) dysphagia             ( -  ) LOC    IOE:  permanent dentition: multiple missing teeth           hard/soft palate:  ( -  ) palatal torus           tongue/FOM WNL           labial/buccal mucosa WNL           ( -  ) percussion           ( -  ) palpation           ( -  ) swelling     Mobility: Class III mobility tooth #25. Class II mobility teeth #24, 27  Radiographs: 1 PA of mandibular anterior sextant, interpreted: chronic severe periodontitis tooth #25 (referred to as tooth #26 in previous dental consult note. Radiographically confirmed as tooth #25). #25 retained by soft tissue and gingiva. Root tip tooth #26.     LABS:                        10.8   10.54 )-----------( 512      ( 20 Aug 2019 06:35 )             33.0     08-20    137  |  98  |  7   ----------------------------<  122<H>  3.2<L>   |  27  |  0.70    Ca    8.9      20 Aug 2019 06:35  Mg     1.9     08-20      WBC Count: 10.54 K/uL <H> [3.8 - 10.5] (08-20 @ 06:35)    Platelet Count - Automated: 512 K/uL <H> [150 - 400] (08-20 @ 06:35)  Platelet Count - Automated: 544 K/uL <H> [150 - 400] (08-19 @ 05:50)    INR: 1.51 <H> [0.88 - 1.17] (08-19 @ 05:50)    ASSESSMENT: Chronic severe periodontitis tooth #25 with class 3 mobility, at risk for aspiration. Teeth #27 and 24 have Class II mobility, not at risk for aspiration.  Reviewed findings with patients. Patient informed that he has multiple loose teeth and that even under normal intubation procedure that teeth are at higher risk for subluxation, fracture, avulsion, or aspiration due to existing compromised state. Patient would like to have only tooth #25 extracted at today's visit.    PROCEDURE:  Verbal and written consent given. Patient identified. Time out done.   20% topical benzocaine, 0.3cc 4% septocaine w/1:100k epi for local infiltration of gingival tissue about tooth #25. Periosteal elevator used to separate tissue. Tooth #25 delivered by forcep technique without complication. Extraction site lightly curetted and irrigated with sterile saline. Hemostasis achieved. POIG. Patient tolerated procedure well. Patient is cleared for procedure from dental perspective.     RECOMMENDATIONS:   1) Dental F/U with outpatient dentist for comprehensive dental care.   2) If any difficulty swallowing/breathing, fever occur, page dental.       Tasha Resendiz DDS, #[id number] Patient is a 74y old  Male who presents with a chief complaint of weakness, CK elevation, PNA, FTT/dysphagia (20 Aug 2019 09:22)    HPI:  73 y/o male with possible HX of Psych disorder , lives alone, very poor historian, unkempt and with very poor  hygiene , missing teeth,  Dental caries, on PO Benzo at home, denies ETOH and smoking,  reports falling off a stool 1.5 days prior to presentation around midnight and becoming pinned in position until rescued by EMS this afternoon. Denies drug or alcohol use prior to falling, and denies feeling lightheaded or dizzy prior. Patient is unsure what may have caused the fall other than a sense that his muscles have been "dysfunctional" lately. In the past month, he has noticed some muscle wasting, particularly in his arms, with 6-8 lbs of weight loss in this time. He has also noticed some fasciculations in his legs in the past month as well. He denies any family history of muscular pathology. He also denies seizures, chest pain, shortness of breath, nausea, and abdominal pain. Reports taking diazepam bid and flurazepam at nights and no other medications. Of note, patient has known history of L scrotal hydrocele but has refused surgery. Pt awake, A+O x 3, C/O + Productive cough as well, no fever, no dysuria, no N/V, No HA, no abdominal pain, + weakness, + cachectic with B/L Temporal wasting , Zamzam  (girl friend),     S/P CT Chest/Abdomen/pelvis as below:   < from: CT Abdomen and Pelvis w/ IV Cont (08.06.19 @ 16:47) >  IMPRESSION: A few right lower lobe ground glass opacities of uncertain   etiology. No evidence of solid organ injury or acute fracture.    S/P CT Maxillofacial as Below:     < from: CT Maxillofacial w/ IV Cont (08.06.19 @ 16:47) >  IMPRESSION:    Discontinuous appearance of the left lower medial incisor which appears   displaced posteriorly suggesting an avulsed tooth/alveolar fracture.   Clinical correlation is advised. Poor dentition with multiple missing   teeth. Right facial and external auditory canal soft tissue swelling likely   posttraumatic in nature however cellulitis/otitis externa is not excluded.    CT Head: unremarkable, , X ray: Pelvis No FX,     RVP was sent , started the pt on IV Zosyn, R/O Pneumonia, Elevated CPK 3311, + Rhabdo as well, on IVF NS @ 150 cc/hr, S/P IVF LR Bolus x 1 lit, and IVF Ns x 2 Lit Bolus, Dental consult was requested, NPO for now, pt was placed on TELE ,    Labs: PT 15.5, INR 1.35, PTT 26.2, Fibrinogen assay 542, Lactate 2.7, CPK 3311, Mg 2, Na 140, K+ 3.9, BUN 25, Creatinine 0.82, Glucose 138, AST 78, Troponin HS 21, WBC 24.95, Hgb 13.5, Platelet 262, UA: Large Blood , Ketone > 150,     Vitals: Tem 98.5, HR 87, /66, RR 16, 95 % RA, (06 Aug 2019 20:44)      PAST MEDICAL & SURGICAL HISTORY:  Scrotal swelling  Psychiatric disorder  No significant past surgical history    ( -  ) heart valve replacement  ( -  ) joint replacement  ( -  ) pregnancy    MEDICATIONS  (STANDING):  artificial tears (preservative free) Ophthalmic Solution 1 Drop(s) Both EYES three times a day  dextrose 5% + sodium chloride 0.45%. 1000 milliLiter(s) (75 mL/Hr) IV Continuous   heparin  Injectable 5000 Unit(s) SubCutaneous two times a day  LORazepam   Injectable 0.5 milliGRAM(s) IV Push two times a day  magnesium sulfate  IVPB 1 Gram(s) IV Intermittent once  thiamine IVPB 500 milliGRAM(s) IV Intermittent every 8 hours    MEDICATIONS  (PRN):  ALBUTerol/ipratropium for Nebulization 3 milliLiter(s) Nebulizer every 6 hours PRN Shortness of Breath and/or Wheezing  LORazepam   Injectable 0.5 milliGRAM(s) IV Push every 6 hours PRN Agitation/insomnia      Allergies    No Known Allergies    Intolerances        FAMILY HISTORY:  FHx: stomach cancer      *SOCIAL HISTORY: Patient presents alone    *Last Dental Visit: Unknown    Vital Signs Last 24 Hrs  T(C): 37.1 (20 Aug 2019 06:15), Max: 37.1 (20 Aug 2019 06:15)  T(F): 98.7 (20 Aug 2019 06:15), Max: 98.7 (20 Aug 2019 06:15)  HR: 70 (20 Aug 2019 06:15) (70 - 93)  BP: 100/55 (20 Aug 2019 06:15) (100/55 - 123/65)  BP(mean): --  RR: 16 (20 Aug 2019 06:15) (16 - 17)  SpO2: 100% (20 Aug 2019 06:15) (97% - 100%)    EOE:  TMJ ( -  ) clicks                    ( -  ) pops                    ( -  ) crepitus             Mandible FROM             Facial bones and MOM grossly intact             ( -  ) trismus             ( -  ) LAD             ( -  ) swelling             ( -  ) asymmetry             ( -  ) palpation             ( -  ) SOB             ( -  ) dysphagia             ( -  ) LOC    IOE:  permanent dentition: multiple missing teeth           hard/soft palate:  ( -  ) palatal torus           tongue/FOM WNL           labial/buccal mucosa WNL           ( -  ) percussion           ( -  ) palpation           ( -  ) swelling     Mobility: Class III mobility tooth #25. Class II mobility teeth #24, 27  Radiographs: 1 PA of mandibular anterior sextant, interpreted: chronic severe periodontitis tooth #25 (referred to as tooth #26 in previous dental consult note. Radiographically confirmed as tooth #25). #25 retained by soft tissue and gingiva. Root tip tooth #26.     LABS:                        10.8   10.54 )-----------( 512      ( 20 Aug 2019 06:35 )             33.0     08-20    137  |  98  |  7   ----------------------------<  122<H>  3.2<L>   |  27  |  0.70    Ca    8.9      20 Aug 2019 06:35  Mg     1.9     08-20      WBC Count: 10.54 K/uL <H> [3.8 - 10.5] (08-20 @ 06:35)    Platelet Count - Automated: 512 K/uL <H> [150 - 400] (08-20 @ 06:35)  Platelet Count - Automated: 544 K/uL <H> [150 - 400] (08-19 @ 05:50)    INR: 1.51 <H> [0.88 - 1.17] (08-19 @ 05:50)    ASSESSMENT: Chronic severe periodontitis tooth #25 with class 3 mobility, at risk for aspiration. Teeth #27 and 24 have Class II mobility  Reviewed findings with patients. Patient informed that he has multiple loose teeth and that even under normal intubation procedure that teeth are at higher risk for subluxation, fracture, avulsion, or aspiration due to existing compromised state. Patient would like to have only tooth #25 extracted at today's visit.    PROCEDURE:  Verbal and written consent given. Patient identified. Time out done.   20% topical benzocaine, 0.3cc 4% septocaine w/1:100k epi for local infiltration of gingival tissue about tooth #25. Periosteal elevator used to separate tissue. Tooth #25 delivered by forcep technique without complication. Extraction site lightly curetted and irrigated with sterile saline. Hemostasis achieved. POIG. Patient tolerated procedure well. Patient is cleared for procedure from dental perspective.     RECOMMENDATIONS:   1) Dental F/U with outpatient dentist for comprehensive dental care.   2) If any difficulty swallowing/breathing, fever occur, page dental.       Tasha Resendiz DDS, #[id number]

## 2019-08-20 NOTE — PROGRESS NOTE ADULT - PROBLEM SELECTOR PLAN 8
Due to poor PO intake, currently 2/2 dysphagia cannot be on a diet  - f/u with pt re: PEG/NGT  - IV thiamine x 3 days

## 2019-08-20 NOTE — PROGRESS NOTE ADULT - PROBLEM SELECTOR PROBLEM 10
Discharge planning issues
Advance care planning
Discharge planning issues
Advance care planning
Discharge planning issues

## 2019-08-21 LAB
ANION GAP SERPL CALC-SCNC: 11 MMO/L — SIGNIFICANT CHANGE UP (ref 7–14)
BUN SERPL-MCNC: 6 MG/DL — LOW (ref 7–23)
CALCIUM SERPL-MCNC: 9 MG/DL — SIGNIFICANT CHANGE UP (ref 8.4–10.5)
CHLORIDE SERPL-SCNC: 96 MMOL/L — LOW (ref 98–107)
CO2 SERPL-SCNC: 29 MMOL/L — SIGNIFICANT CHANGE UP (ref 22–31)
CREAT SERPL-MCNC: 0.79 MG/DL — SIGNIFICANT CHANGE UP (ref 0.5–1.3)
GLUCOSE SERPL-MCNC: 118 MG/DL — HIGH (ref 70–99)
HCT VFR BLD CALC: 34.8 % — LOW (ref 39–50)
HGB BLD-MCNC: 11.3 G/DL — LOW (ref 13–17)
MAGNESIUM SERPL-MCNC: 2 MG/DL — SIGNIFICANT CHANGE UP (ref 1.6–2.6)
MCHC RBC-ENTMCNC: 32.2 PG — SIGNIFICANT CHANGE UP (ref 27–34)
MCHC RBC-ENTMCNC: 32.5 % — SIGNIFICANT CHANGE UP (ref 32–36)
MCV RBC AUTO: 99.1 FL — SIGNIFICANT CHANGE UP (ref 80–100)
NRBC # FLD: 0 K/UL — SIGNIFICANT CHANGE UP (ref 0–0)
PLATELET # BLD AUTO: 537 K/UL — HIGH (ref 150–400)
PMV BLD: 9.7 FL — SIGNIFICANT CHANGE UP (ref 7–13)
POTASSIUM SERPL-MCNC: 3.7 MMOL/L — SIGNIFICANT CHANGE UP (ref 3.5–5.3)
POTASSIUM SERPL-SCNC: 3.7 MMOL/L — SIGNIFICANT CHANGE UP (ref 3.5–5.3)
RBC # BLD: 3.51 M/UL — LOW (ref 4.2–5.8)
RBC # FLD: 12.6 % — SIGNIFICANT CHANGE UP (ref 10.3–14.5)
SODIUM SERPL-SCNC: 136 MMOL/L — SIGNIFICANT CHANGE UP (ref 135–145)
WBC # BLD: 12.22 K/UL — HIGH (ref 3.8–10.5)
WBC # FLD AUTO: 12.22 K/UL — HIGH (ref 3.8–10.5)

## 2019-08-21 PROCEDURE — 72141 MRI NECK SPINE W/O DYE: CPT | Mod: 26

## 2019-08-21 PROCEDURE — 43246 EGD PLACE GASTROSTOMY TUBE: CPT | Mod: GC

## 2019-08-21 PROCEDURE — 99233 SBSQ HOSP IP/OBS HIGH 50: CPT

## 2019-08-21 PROCEDURE — 99222 1ST HOSP IP/OBS MODERATE 55: CPT

## 2019-08-21 RX ADMIN — Medication 1 DROP(S): at 05:25

## 2019-08-21 RX ADMIN — Medication 105 MILLIGRAM(S): at 08:22

## 2019-08-21 RX ADMIN — Medication 1 DROP(S): at 21:57

## 2019-08-21 RX ADMIN — Medication 105 MILLIGRAM(S): at 00:15

## 2019-08-21 RX ADMIN — Medication 105 MILLIGRAM(S): at 16:44

## 2019-08-21 RX ADMIN — Medication 1 DROP(S): at 16:43

## 2019-08-21 NOTE — PROGRESS NOTE ADULT - PROBLEM SELECTOR PLAN 1
Failed cineesophagogram x2, NPO  - appreciate neurology eval, MRI ordered, Anti-Ach Ab sent, MUSK ab   - declines NGT  - for PEG on schedule for 1230 pm  - currently on IVF Failed cineesophagogram x2, NPO  - appreciate neurology eval:  MRI (done 8/20, wnl), Anti-Ach Ab sent, MUSK ab   - declines NGT  - for PEG on schedule for 1230 pm  - currently on IVF Failed cineesophagogram x2, NPO  - appreciate neurology eval:  MRI (done 8/20, wnl), Anti-Ach Ab sent, MUSK ab   - declines NGT  - for PEG on schedule for 1230 pm today  - currently on IVF

## 2019-08-21 NOTE — CHART NOTE - NSCHARTNOTEFT_GEN_A_CORE
Diet : Diet, NPO (08-15-19 @ 16:05)   PO intake:  < 50% [ ] 50-75% [ ]   % [ ]  other : patient is NPO   Current Weight: Height (cm): 185.4 (08-21 @ 10:35)  Weight (kg): 54.3 (08-21 @ 10:35); Wt obtained from [facility] 8/13 64 kgs.  Questionable accuracy.    BMI (kg/m2): 15.8 (08-21 @ 10:35)    Pt NPO since 8/15 & currently pending  PEG placement today.  Patient declined NGT for EN feeds throughout hospitalization. Suggest the provision of Jevity 1.2 @ a goal rate of 70ml per hour continuously x 24 hours.  This will provide a total of 1680 ml Jevity 1.2, 2,016 kcal, and 93.24 gram of protein.   Pt at risk for refeeding given significant weight loss and dx of malnutrition.  Start feeds at 10 ml per hour and increase by 10ml every 6 -8 hours until goal rate is met. When and if medically feasible, patient may be transitioned to bolus feeds of 280ml every 4 hours for a total of 6 daily feeds.  This will provide the same nutrition profile and continuos feeds.       Pertinent Medications: MEDICATIONS  (STANDING):  artificial tears (preservative free) Ophthalmic Solution 1 Drop(s) Both EYES three times a day  dextrose 5% + sodium chloride 0.45%. 1000 milliLiter(s) (75 mL/Hr) IV Continuous <Continuous>  LORazepam   Injectable 0.5 milliGRAM(s) IV Push two times a day  thiamine IVPB 500 milliGRAM(s) IV Intermittent every 8 hours    MEDICATIONS  (PRN):  ALBUTerol/ipratropium for Nebulization 3 milliLiter(s) Nebulizer every 6 hours PRN Shortness of Breath and/or Wheezing  LORazepam   Injectable 0.5 milliGRAM(s) IV Push every 6 hours PRN Agitation/insomnia    Pertinent Labs:  08-21 Na136 mmol/L Glu 118 mg/dL<H> K+ 3.7 mmol/L Cr  0.79 mg/dL BUN 6 mg/dL<L> 08-15 Phos 2.9 mg/dL 08-07 [id number] 5.4 % 08-07 Chol 127 mg/dL LDL 56 mg/dL HDL 63 mg/dL<H> Trig 89 mg/dL    Estimated Needs:   [X] no change since previous assessment  [ ] recalculated:     Nutrition Diagnosis is [X] ongoing  [ ] resolved [ ] not applicable       Nutrition Recommendations:  1-Jevity 1.2 @ a goal rate of 70ml ( 1680 ml Jevity 1.2, 2,016 kcal, and 93.24 grams of protein)  ***Pt at risk for refeeding given significant weight loss and dx of malnutrition.  Start feeds at 10 ml per hour and increase by 10ml every 6 -8 hours until goal rate is met.***    When and if medically feasible, patient may be transitioned to bolus feeds of 280ml every 4 hours for a total of 6 daily feeds.    This will provide the same nutrition profile and continuos feeds.     2- Monitor weights, labs, PO intake, tolerance to current diet  3- RD remains available, re-consult as needed. Acacia Montalvo RD Pager #[phone]       RDN remains available, reconsult as needed -Acacia Montalvo RDN Pager [phone]

## 2019-08-21 NOTE — PROGRESS NOTE ADULT - PROBLEM SELECTOR PLAN 2
Unclear cause, CTAP and CT chest without evidence of masses/LAD/malignancy.  TSH/B12/folic acid wnl.  HIV negative  - f/u neurology re: underlying neurological degenerative condition given dysphagia/retention/weakness--recomending MRI and Ab (MUSK and Ach)  - pending PEG today Unclear cause, CTAP and CT chest without evidence of masses/LAD/malignancy.  TSH/B12/folic acid wnl.  HIV negative  - f/u neurology re: underlying neurological degenerative condition given dysphagia/retention/weakness--MRI wnl, f/u Ab (MUSK and Ach)  - pending PEG today

## 2019-08-21 NOTE — PROGRESS NOTE ADULT - SUBJECTIVE AND OBJECTIVE BOX
Patient is a 74y old  Male who presents with a chief complaint of S/P fall, Rhabdo, R/O Pneumonia, Leukocytosis, Cough, R/O Cellulitis Face/lower Abdomen    SUBJECTIVE / OVERNIGHT EVENTS:    No CP or other pain, states this place is dismal  States he hasn't eaten and thus he is getting worse, confirms he wouldn't want the NGT, awaiting PEG  Reports never had an MRI before, was able to tolerate  Reports couldn't sleep due to noise on the floor     MEDICATIONS  (STANDING):  artificial tears (preservative free) Ophthalmic Solution 1 Drop(s) Both EYES three times a day  dextrose 5% + sodium chloride 0.45%. 1000 milliLiter(s) (75 mL/Hr) IV Continuous <Continuous>  LORazepam   Injectable 0.5 milliGRAM(s) IV Push two times a day  thiamine IVPB 500 milliGRAM(s) IV Intermittent every 8 hours    MEDICATIONS  (PRN):  ALBUTerol/ipratropium for Nebulization 3 milliLiter(s) Nebulizer every 6 hours PRN Shortness of Breath and/or Wheezing  LORazepam   Injectable 0.5 milliGRAM(s) IV Push every 6 hours PRN Agitation/insomnia    T(C): 36.7 (08-21-19 @ 05:23), Max: 36.8 (08-20-19 @ 21:49)  HR: 60 (08-21-19 @ 05:23) (60 - 80)  BP: 101/67 (08-21-19 @ 05:23) (101/67 - 106/69)  RR: 18 (08-21-19 @ 05:23) (17 - 18)  SpO2: 99% (08-21-19 @ 05:23) (98% - 99%)    I&O's Summary    20 Aug 2019 07:01  -  21 Aug 2019 07:00  --------------------------------------------------------  IN: 0 mL / OUT: 1550 mL / NET: -1550 mL    PHYSICAL EXAM:  GENERAL: NAD, thin  HEAD:  R ear w/ pressure injury   CHEST/LUNG: Clear to auscultation bilaterally; No wheeze  HEART: Regular rate and rhythm; No murmurs, rubs, or gallops  ABDOMEN: Soft, Nontender, Nondistended; Bowel sounds present  EXTREMITIES:   wwp, no edema   PSYCH: AAOx3  NEUROLOGY: generally weak but nonfocal  SKIN: abrasions on R face   : + fowler, testicular swelling     LABS:                        11.3   12.22 )-----------( 537      ( 21 Aug 2019 06:36 )             34.8     08-21    136  |  96<L>  |  6<L>  ----------------------------<  118<H>  3.7   |  29  |  0.79    Ca    9.0      21 Aug 2019 06:36  Mg     2.0     08-21    Consultant(s) Notes Reviewed:  GI, dental   Care Discussed with Consultants/Other Providers: Patient is a 74y old  Male who presents with a chief complaint of S/P fall, Rhabdo, R/O Pneumonia, Leukocytosis, Cough, R/O Cellulitis Face/lower Abdomen    SUBJECTIVE / OVERNIGHT EVENTS:    No CP or other pain, states this place is dismal  States he hasn't eaten and thus he is getting worse, confirms he wouldn't want the NGT, awaiting PEG  Reports never had an MRI before, was able to tolerate  Reports couldn't sleep due to noise on the floor     MEDICATIONS  (STANDING):  artificial tears (preservative free) Ophthalmic Solution 1 Drop(s) Both EYES three times a day  dextrose 5% + sodium chloride 0.45%. 1000 milliLiter(s) (75 mL/Hr) IV Continuous <Continuous>  LORazepam   Injectable 0.5 milliGRAM(s) IV Push two times a day  thiamine IVPB 500 milliGRAM(s) IV Intermittent every 8 hours    MEDICATIONS  (PRN):  ALBUTerol/ipratropium for Nebulization 3 milliLiter(s) Nebulizer every 6 hours PRN Shortness of Breath and/or Wheezing  LORazepam   Injectable 0.5 milliGRAM(s) IV Push every 6 hours PRN Agitation/insomnia    T(C): 36.7 (08-21-19 @ 05:23), Max: 36.8 (08-20-19 @ 21:49)  HR: 60 (08-21-19 @ 05:23) (60 - 80)  BP: 101/67 (08-21-19 @ 05:23) (101/67 - 106/69)  RR: 18 (08-21-19 @ 05:23) (17 - 18)  SpO2: 99% (08-21-19 @ 05:23) (98% - 99%)    I&O's Summary    20 Aug 2019 07:01  -  21 Aug 2019 07:00  --------------------------------------------------------  IN: 0 mL / OUT: 1550 mL / NET: -1550 mL    PHYSICAL EXAM:  GENERAL: NAD, thin  HEAD:  R ear w/ pressure injury   CHEST/LUNG: Clear to auscultation bilaterally; No wheeze  HEART: Regular rate and rhythm; No murmurs, rubs, or gallops  ABDOMEN: Soft, Nontender, Nondistended; Bowel sounds present  EXTREMITIES:   wwp, no edema   PSYCH: AAOx3  NEUROLOGY: generally weak but nonfocal  SKIN: abrasions on R face   : + fowler, testicular swelling     LABS:                        11.3   12.22 )-----------( 537      ( 21 Aug 2019 06:36 )             34.8     08-21    136  |  96<L>  |  6<L>  ----------------------------<  118<H>  3.7   |  29  |  0.79    Ca    9.0      21 Aug 2019 06:36  Mg     2.0     08-21    EXAM:  MR BRAIN    PROCEDURE DATE:  Aug 20 2019     INTERPRETATION:  Clinical indications: Dysphagia. Weakness. Muscle   wasting.     MRI of the brain was performed using sagittal T1, axial T1 T2 T2 FLAIR   diffusion and susceptibility weighted sequence.    This exam is compared with prior noncontrast head CT performed on August 6, 2019.    Parenchymal volume loss and chronic microvascular ischemic changes are   identified.    There is no evidence of acute hemorrhage mass mass effect in posterior   fossa or supratentorial region    Evaluation of the diffusion weighted sequence demonstrates no abnormal   areas of restricted diffusion to suggest acute infarct.    The large vessels demonstrate normal flow voids    Right sphenoid polyp versus retention cyst is seen, the rest of the   visualized paranasal sinuses mastoid and middle ear regions appear clear.    Impression: No evidence of acute hemorrhage mass or mass effect.    Consultant(s) Notes Reviewed:  GI, dental   Care Discussed with Consultants/Other Providers: Patient is a 74y old  Male who presents with a chief complaint of S/P fall, Rhabdo, R/O Pneumonia, Leukocytosis, Cough, R/O Cellulitis Face/lower Abdomen    SUBJECTIVE / OVERNIGHT EVENTS:    No CP or other pain, states this place is dismal  States he hasn't eaten and thus he is getting worse, confirms he wouldn't want the NGT, awaiting PEG  Reports never had an MRI before, was able to tolerate  Reports couldn't sleep due to noise on the floor     MEDICATIONS  (STANDING):  artificial tears (preservative free) Ophthalmic Solution 1 Drop(s) Both EYES three times a day  dextrose 5% + sodium chloride 0.45%. 1000 milliLiter(s) (75 mL/Hr) IV Continuous <Continuous>  LORazepam   Injectable 0.5 milliGRAM(s) IV Push two times a day  thiamine IVPB 500 milliGRAM(s) IV Intermittent every 8 hours    MEDICATIONS  (PRN):  ALBUTerol/ipratropium for Nebulization 3 milliLiter(s) Nebulizer every 6 hours PRN Shortness of Breath and/or Wheezing  LORazepam   Injectable 0.5 milliGRAM(s) IV Push every 6 hours PRN Agitation/insomnia    T(C): 36.7 (08-21-19 @ 05:23), Max: 36.8 (08-20-19 @ 21:49)  HR: 60 (08-21-19 @ 05:23) (60 - 80)  BP: 101/67 (08-21-19 @ 05:23) (101/67 - 106/69)  RR: 18 (08-21-19 @ 05:23) (17 - 18)  SpO2: 99% (08-21-19 @ 05:23) (98% - 99%)    I&O's Summary    20 Aug 2019 07:01  -  21 Aug 2019 07:00  --------------------------------------------------------  IN: 0 mL / OUT: 1550 mL / NET: -1550 mL    PHYSICAL EXAM:  GENERAL: NAD, thin  HEAD:  R ear w/ pressure injury   CHEST/LUNG: Clear to auscultation bilaterally; No wheeze  HEART: Regular rate and rhythm; No murmurs, rubs, or gallops  ABDOMEN: Soft, Nontender, Nondistended; Bowel sounds present  EXTREMITIES:   wwp, no edema   PSYCH: AAOx3  NEUROLOGY: generally weak but nonfocal  SKIN: abrasions on R face and R ear   : + fowler, testicular swelling     LABS:                        11.3   12.22 )-----------( 537      ( 21 Aug 2019 06:36 )             34.8     08-21    136  |  96<L>  |  6<L>  ----------------------------<  118<H>  3.7   |  29  |  0.79    Ca    9.0      21 Aug 2019 06:36  Mg     2.0     08-21    EXAM:  MR BRAIN    PROCEDURE DATE:  Aug 20 2019     INTERPRETATION:  Clinical indications: Dysphagia. Weakness. Muscle   wasting.   Parenchymal volume loss and chronic microvascular ischemic changes are   identified.    There is no evidence of acute hemorrhage mass mass effect in posterior   fossa or supratentorial region    Evaluation of the diffusion weighted sequence demonstrates no abnormal   areas of restricted diffusion to suggest acute infarct.    The large vessels demonstrate normal flow voids    Right sphenoid polyp versus retention cyst is seen, the rest of the   visualized paranasal sinuses mastoid and middle ear regions appear clear.    Impression: No evidence of acute hemorrhage mass or mass effect.    Consultant(s) Notes Reviewed:  GI, dental   Care Discussed with Consultants/Other Providers:

## 2019-08-21 NOTE — PROGRESS NOTE ADULT - PROBLEM SELECTOR PLAN 4
Per PMD Dr Frank Britton [phone], patient has a long history of depression and anxiety and long term use of Benzo   - c/w ativan IV 0.5mg bid, monitor signs of benzo withdrawal and respiratory status; will change to via PEG once cleared fo use   - psych following, recs appreciated

## 2019-08-21 NOTE — PROGRESS NOTE ADULT - PROBLEM SELECTOR PLAN 7
Due to poor PO intake, currently 2/2 dysphagia cannot be on a diet  - pending PEG  - IV thiamine x 3 days

## 2019-08-21 NOTE — PROGRESS NOTE ADULT - PROBLEM SELECTOR PLAN 5
iron low, ferritin normal, b12 and folate WNL  - monitor CBC Iron low, ferritin normal, b12 and folate WNL  - monitor CBC

## 2019-08-22 LAB
ANION GAP SERPL CALC-SCNC: 12 MMO/L — SIGNIFICANT CHANGE UP (ref 7–14)
BUN SERPL-MCNC: 9 MG/DL — SIGNIFICANT CHANGE UP (ref 7–23)
CALCIUM SERPL-MCNC: 8.9 MG/DL — SIGNIFICANT CHANGE UP (ref 8.4–10.5)
CHLORIDE SERPL-SCNC: 97 MMOL/L — LOW (ref 98–107)
CO2 SERPL-SCNC: 27 MMOL/L — SIGNIFICANT CHANGE UP (ref 22–31)
CREAT SERPL-MCNC: 0.78 MG/DL — SIGNIFICANT CHANGE UP (ref 0.5–1.3)
GLUCOSE SERPL-MCNC: 131 MG/DL — HIGH (ref 70–99)
HCT VFR BLD CALC: 32.2 % — LOW (ref 39–50)
HGB BLD-MCNC: 10.2 G/DL — LOW (ref 13–17)
MCHC RBC-ENTMCNC: 31.7 % — LOW (ref 32–36)
MCHC RBC-ENTMCNC: 31.7 PG — SIGNIFICANT CHANGE UP (ref 27–34)
MCV RBC AUTO: 100 FL — SIGNIFICANT CHANGE UP (ref 80–100)
NRBC # FLD: 0 K/UL — SIGNIFICANT CHANGE UP (ref 0–0)
PLATELET # BLD AUTO: 500 K/UL — HIGH (ref 150–400)
PMV BLD: 9.8 FL — SIGNIFICANT CHANGE UP (ref 7–13)
POTASSIUM SERPL-MCNC: 3.5 MMOL/L — SIGNIFICANT CHANGE UP (ref 3.5–5.3)
POTASSIUM SERPL-SCNC: 3.5 MMOL/L — SIGNIFICANT CHANGE UP (ref 3.5–5.3)
RBC # BLD: 3.22 M/UL — LOW (ref 4.2–5.8)
RBC # FLD: 12.9 % — SIGNIFICANT CHANGE UP (ref 10.3–14.5)
SODIUM SERPL-SCNC: 136 MMOL/L — SIGNIFICANT CHANGE UP (ref 135–145)
WBC # BLD: 14.12 K/UL — HIGH (ref 3.8–10.5)
WBC # FLD AUTO: 14.12 K/UL — HIGH (ref 3.8–10.5)

## 2019-08-22 PROCEDURE — 99232 SBSQ HOSP IP/OBS MODERATE 35: CPT | Mod: GC

## 2019-08-22 PROCEDURE — 74018 RADEX ABDOMEN 1 VIEW: CPT | Mod: 26

## 2019-08-22 PROCEDURE — 99232 SBSQ HOSP IP/OBS MODERATE 35: CPT

## 2019-08-22 RX ORDER — ACETAMINOPHEN 500 MG
650 TABLET ORAL EVERY 6 HOURS
Refills: 0 | Status: DISCONTINUED | OUTPATIENT
Start: 2019-08-22 | End: 2019-08-26

## 2019-08-22 RX ORDER — ONDANSETRON 8 MG/1
4 TABLET, FILM COATED ORAL EVERY 6 HOURS
Refills: 0 | Status: DISCONTINUED | OUTPATIENT
Start: 2019-08-22 | End: 2019-08-26

## 2019-08-22 RX ORDER — DIAZEPAM 5 MG
5 TABLET ORAL
Refills: 0 | Status: DISCONTINUED | OUTPATIENT
Start: 2019-08-22 | End: 2019-08-26

## 2019-08-22 RX ORDER — SODIUM CHLORIDE 9 MG/ML
1000 INJECTION, SOLUTION INTRAVENOUS
Refills: 0 | Status: DISCONTINUED | OUTPATIENT
Start: 2019-08-22 | End: 2019-08-23

## 2019-08-22 RX ADMIN — Medication 1 TABLET(S): at 12:34

## 2019-08-22 RX ADMIN — Medication 5 MILLIGRAM(S): at 17:23

## 2019-08-22 RX ADMIN — Medication 105 MILLIGRAM(S): at 09:46

## 2019-08-22 RX ADMIN — Medication 1 DROP(S): at 23:51

## 2019-08-22 RX ADMIN — Medication 1 DROP(S): at 06:14

## 2019-08-22 RX ADMIN — SODIUM CHLORIDE 40 MILLILITER(S): 9 INJECTION, SOLUTION INTRAVENOUS at 10:03

## 2019-08-22 RX ADMIN — Medication 1 DROP(S): at 14:00

## 2019-08-22 RX ADMIN — SODIUM CHLORIDE 75 MILLILITER(S): 9 INJECTION, SOLUTION INTRAVENOUS at 09:46

## 2019-08-22 RX ADMIN — Medication 105 MILLIGRAM(S): at 00:31

## 2019-08-22 NOTE — PROGRESS NOTE ADULT - SUBJECTIVE AND OBJECTIVE BOX
Patient is a 74y old  Male who presents with a chief complaint of S/P fall, Rhabdo, R/O Pneumonia, Leukocytosis, Cough, R/O Cellulitis Face/lower Abdomen    SUBJECTIVE / OVERNIGHT EVENTS:    no CP, SOB, f/c/n/v  States at times feels a little nausea but not currently  Reports small BMs     MEDICATIONS  (STANDING):  artificial tears (preservative free) Ophthalmic Solution 1 Drop(s) Both EYES three times a day  dextrose 5% + sodium chloride 0.45%. 1000 milliLiter(s) (40 mL/Hr) IV Continuous <Continuous>    MEDICATIONS  (PRN):  acetaminophen   Tablet .. 650 milliGRAM(s) Oral every 6 hours PRN Mild Pain (1 - 3), Moderate Pain (4 - 6)  ALBUTerol/ipratropium for Nebulization 3 milliLiter(s) Nebulizer every 6 hours PRN Shortness of Breath and/or Wheezing  LORazepam   Injectable 0.5 milliGRAM(s) IV Push every 6 hours PRN Agitation/insomnia  ondansetron Injectable 4 milliGRAM(s) IV Push every 6 hours PRN Nausea and/or Vomiting    T(C): 36.8 (08-22-19 @ 06:12), Max: 36.8 (08-22-19 @ 06:12)  HR: 64 (08-22-19 @ 06:12) (64 - 82)  BP: 109/62 (08-22-19 @ 06:12) (109/62 - 121/55)  RR: 16 (08-22-19 @ 06:12) (16 - 16)  SpO2: 100% (08-22-19 @ 06:12) (100% - 100%)    I&O's Summary    21 Aug 2019 07:01  -  22 Aug 2019 07:00  --------------------------------------------------------  IN: 525 mL / OUT: 1800 mL / NET: -1275 mL    PHYSICAL EXAM:  GENERAL: NAD, thin  HEAD:  R ear w/ pressure injury   CHEST/LUNG: Clear to auscultation bilaterally; No wheeze  HEART: Regular rate and rhythm; No murmurs, rubs, or gallops  ABDOMEN: Soft, Nontender, Nondistended; Bowel sounds present  EXTREMITIES:   wwp, no edema   PSYCH: AAOx3  NEUROLOGY: generally weak but nonfocal  SKIN: abrasions on R face and R ear   : + fowler, testicular swelling     LABS:                        10.2   14.12 )-----------( 500      ( 22 Aug 2019 06:40 )             32.2     08-22    136  |  97<L>  |  9   ----------------------------<  131<H>  3.5   |  27  |  0.78    Ca    8.9      22 Aug 2019 06:40  Mg     2.0     08-21    PEG placement film    INTERPRETATION:  Injected contrast via the percutaneous G-tub opacifies   the stomach and 1st portion of the duodenum.     No contrast extravasation.      MRI C-spine  IMPRESSION:    Mild cervical spondylosis with multilevel foraminal narrowing..    No significant spinal stenosis or spinal cord abnormality.    Consultant(s) Notes Reviewed:  GI, neuro  Care Discussed with Consultants/Other Providers:

## 2019-08-22 NOTE — CHART NOTE - NSCHARTNOTEFT_GEN_A_CORE
Pt s/p PEG tub placement.   Abdominal X-ray ordered to confirm placement of PEG. once confirmed will start patient on free water and allow medications, Feeds to be started tomorrow.     Will continue to monitor.

## 2019-08-22 NOTE — PROGRESS NOTE ADULT - ATTENDING COMMENTS
I personally interviewed, examined, and participated in the care of this patient, on rounds 8/22/2019 with the neurology consult service team.  Reviewed findings and management plan with the team.  In addition to or instead of the Hx and findings and plan reported above, or in particular, I note as follow:      As written in my Attending Attestation to the neurology consult note of 8/19/2019:      ++++++++++ START QUOTATION ++++++++++++  "Extremely gaunt appearance with scalloped temporal areas and marked facial wasting.  Severe generalized limb muscle wasting; the calves are relatively spared in terms of visible wasting.  No evident tongue atrophy or fasciculations.  Mild slurring of /t/ and /d/ on testing alliteration.      Bilateral hamstring tightness (limited passive range of extension of the knees).      Marked generalized weakness; in addition, guarding of RUE due to shoulder injury,      Reflex                           Right    Left   Comment    Biceps                             1+       0  Triceps                            0         3-  Hatch                       absent  present  Patellar                           1         2-  Plantar                         ext        ext    No spontaneous fasciculations noted on short-term observation of limbs.  Percussion-induced fasciculations obtained from biceps, triceps and quadriceps muscles bilat; after percussion, continued fasciculations observed in biceps and triceps muscles bilat.        DISCUSSION    He has UMN signs (Hatch and Babinski reflexes), and LMN signs (wasting; fasciculations).  Concern is for motor neuron disease."  ++++++++++++++++  END QUOTATION +++++++++++++++++    I note he has an anemia with borderline macrocytosis (MCV averaging about 100), in the face of a low Fe of 15, and B12/folate levels of 455/19.1.  he may be functionally deficient in B12 and/or folic acid.         Additional Recommendations:    T-spine MRI as suggested by neuroradiologist.      Methylmalonic acid, homocysteine and TSH determinations.  After levels drawn would not wait for results but instead start folic acid 2mg daily and  B-complex w C daily, and administer cyanocobalamin 1000mcg im x 4.    Consider hematology evaluation.
Recommend dental evaluation prior to peg.  Agree with above.
s/p peg stable,bumper at 2 cm,     plan start peg feeds today.
Dispo: pending PEG then rehab
Dispo: pending PEG then rehab planning.
Dispo: pending repeat cineesophagogram and eventual rehab
Dispo: pending PEG then rehab
Dispo: pending decision on PEG then eventual rehab
Advance care planning discussion was had with patient. Explained CPR and intubation. Explained need for NGT and patient does not seem to want a permanent PEG if his swallowing function doesn't improve. States he had a conversation with his friend and girlfriend. Agreeable to NGT for now. Could not make a decision regarding CPR/intubation at this time. Psych to assess continued capacity to have GOC discussions but at present moment was able to have linear thought process. ACP time: 30 min
Hypokalemia, supplement K

## 2019-08-22 NOTE — PROGRESS NOTE ADULT - PROBLEM SELECTOR PLAN 3
Spencer replaced by urology on 8/14 after failing TOV.  - resume flomax? cannot be crushed via PEG  - proscar daily

## 2019-08-22 NOTE — PROGRESS NOTE ADULT - PROBLEM SELECTOR PLAN 1
Failed cineesophagogram x2, NPO  - appreciate neurology eval:  MRI brain and c-spine wnl; Anti-acetylcholine and MUSK ab sent per neuro, results pending  - s/p PEG on 8/21, cleared for use today 8/22  - appreciate nutrition eval for TFs ordered placed for Jevity 1.2 to goal of 70cc  - c/w IVF for now, decrease 75-->40cc/hr  - monitor closely for re-feeding syndrome

## 2019-08-22 NOTE — PROGRESS NOTE ADULT - SUBJECTIVE AND OBJECTIVE BOX
SUBJECTIVE: No events overnight    MEDICATIONS (HOME):  Home Medications:  diazePAM 5 mg oral tablet: 1 tab(s) orally 4 times a day ([facility] ref# [id number]: Last filled 7/15/19 x #120 tabs)  (07 Aug 2019 14:49)  flurazepam 30 mg oral capsule: 1 cap(s) orally once a day (at bedtime) (07 Aug 2019 14:49)    MEDICATIONS  (STANDING):  artificial tears (preservative free) Ophthalmic Solution 1 Drop(s) Both EYES three times a day  dextrose 5% + sodium chloride 0.45%. 1000 milliLiter(s) (40 mL/Hr) IV Continuous <Continuous>    MEDICATIONS  (PRN):  acetaminophen   Tablet .. 650 milliGRAM(s) Oral every 6 hours PRN Mild Pain (1 - 3), Moderate Pain (4 - 6)  ALBUTerol/ipratropium for Nebulization 3 milliLiter(s) Nebulizer every 6 hours PRN Shortness of Breath and/or Wheezing  LORazepam   Injectable 0.5 milliGRAM(s) IV Push every 6 hours PRN Agitation/insomnia  ondansetron Injectable 4 milliGRAM(s) IV Push every 6 hours PRN Nausea and/or Vomiting    ALLERGIES/INTOLERANCES:  No Known Allergies    VITALS & EXAMINATION:  Vital Signs Last 24 Hrs  T(C): 36.8 (22 Aug 2019 06:12), Max: 36.8 (22 Aug 2019 06:12)  T(F): 98.2 (22 Aug 2019 06:12), Max: 98.2 (22 Aug 2019 06:12)  HR: 64 (22 Aug 2019 06:12) (64 - 82)  BP: 109/62 (22 Aug 2019 06:12) (109/62 - 121/55)  RR: 16 (22 Aug 2019 06:12) (16 - 16)  SpO2: 100% (22 Aug 2019 06:12) (100% - 100%)    PHYSICAL EXAMINATION  Constitutional: No apparent distress.  Head: Normocephalic & atraumatic.  Extremities: No edema, clubbing, cyanosis  Skin: No rashes    Neurological:  Mental Status: Awake, alert, oriented to person, place, situation, time. Normal affect. Follows all commands.    Language: Speech is clear, fluent with good repetition & comprehension    Cranial Nerves: PERRLA. EOMI. Temporal atrophy. No facial asymmetry. Hearing grossly normal B/L.     Motor: Decreased muscle bulk in all extremities & normal tone. No noticeable tremor. No pronator drift. No cogwheel rigidity.               Deltoid	Biceps	Triceps	Wrist	Finger ABd	   R	4	4	4	4	4		4 	  L	4	4	4	4	4		4    	H-Flex	H-Ext	H-ABd	H-ADd	K-Flex	K-Ext	D-Flex	P-Flex  R	4	4	4	4	4	4	4	4 	   L	4	4	4	4	4	4	4	4	     LABORATORY:  CBC                       10.2   14.12 )-----------( 500      ( 22 Aug 2019 06:40 )             32.2     Chem 08-22    136  |  97<L>  |  9   ----------------------------<  131<H>  3.5   |  27  |  0.78    Ca    8.9      22 Aug 2019 06:40  Mg     2.0     08-21    Lipid Panel 08-07 Chol 127 LDL 56 HDL 63<H> Trig 89  A1c 08-07 [id number] 5.4  08-06 [id number] 5.5

## 2019-08-22 NOTE — PROGRESS NOTE ADULT - PROBLEM SELECTOR PLAN 2
Unclear cause, CTAP and CT chest without evidence of masses/LAD/malignancy.  TSH/B12/folic acid wnl.  HIV negative  - f/u neurology re: underlying neurological degenerative condition given dysphagia/retention/weakness--MRI brain & C-spine wnl, f/u MUSK and Ach Ab  - OP EMG  - PT/OT for JEWELS  - needs OP neuromuscular neurology eval  - s/p PEG

## 2019-08-22 NOTE — PROGRESS NOTE ADULT - SUBJECTIVE AND OBJECTIVE BOX
ANESTHESIA POSTOP CHECK    74y Male POSTOP DAY 1 S/P EGD/PEG    Vital Signs Last 24 Hrs  T(C): 36.8 (22 Aug 2019 06:12), Max: 36.8 (22 Aug 2019 06:12)  T(F): 98.2 (22 Aug 2019 06:12), Max: 98.2 (22 Aug 2019 06:12)  HR: 64 (22 Aug 2019 06:12) (64 - 82)  BP: 109/62 (22 Aug 2019 06:12) (109/62 - 121/55)  BP(mean): --  RR: 16 (22 Aug 2019 06:12) (16 - 16)  SpO2: 100% (22 Aug 2019 06:12) (100% - 100%)  I&O's Summary    21 Aug 2019 07:01  -  22 Aug 2019 07:00  --------------------------------------------------------  IN: 525 mL / OUT: 1800 mL / NET: -1275 mL        [x ] NO APPARENT ANESTHESIA COMPLICATIONS      Comments:

## 2019-08-22 NOTE — PROGRESS NOTE ADULT - PROBLEM SELECTOR PLAN 4
Per PMD Dr Frank Britton [phone], patient has a long history of depression and anxiety and long term use of Benzo   - change ativan to 0.5 mg BID via PEG   - psych following, recs appreciated

## 2019-08-22 NOTE — PROGRESS NOTE ADULT - SUBJECTIVE AND OBJECTIVE BOX
Chief Complaint:  Patient is a 74y old  Male who presents with a chief complaint of FTT, dysphagia (21 Aug 2019 08:46)      Interval Events: Having mild abdominal pain around peg site. Denies subjective fever or chills. Tolerated free water through peg.     Allergies:  No Known Allergies      Hospital Medications:  acetaminophen   Tablet .. 650 milliGRAM(s) Oral every 6 hours PRN  ALBUTerol/ipratropium for Nebulization 3 milliLiter(s) Nebulizer every 6 hours PRN  artificial tears (preservative free) Ophthalmic Solution 1 Drop(s) Both EYES three times a day  dextrose 5% + sodium chloride 0.45%. 1000 milliLiter(s) IV Continuous <Continuous>  LORazepam   Injectable 0.5 milliGRAM(s) IV Push two times a day  LORazepam   Injectable 0.5 milliGRAM(s) IV Push every 6 hours PRN  thiamine IVPB 500 milliGRAM(s) IV Intermittent every 8 hours      PMHX/PSHX:  Scrotal swelling  Psychiatric disorder  No pertinent past medical history  No significant past surgical history      Family history:  FHx: stomach cancer      ROS:     General:  No wt loss, fevers, chills, night sweats, fatigue,   Eyes:  Good vision, no reported pain  ENT:  No sore throat, pain, runny nose, dysphagia  CV:  No pain, palpitations, hypo/hypertension  Resp:  No dyspnea, cough, tachypnea, wheezing  GI:  See HPI  :  No pain, bleeding, incontinence, nocturia  Muscle:  No pain, weakness  Neuro:  No weakness, tingling, memory problems  Psych:  No fatigue, insomnia, mood problems, depression  Endocrine:  No polyuria, polydipsia, cold/heat intolerance  Heme:  No petechiae, ecchymosis, easy bruisability  Skin:  No rash, edema      PHYSICAL EXAM:     Vital Signs:  Vital Signs Last 24 Hrs  T(C): 36.8 (22 Aug 2019 06:12), Max: 36.8 (22 Aug 2019 06:12)  T(F): 98.2 (22 Aug 2019 06:12), Max: 98.2 (22 Aug 2019 06:12)  HR: 64 (22 Aug 2019 06:12) (64 - 84)  BP: 109/62 (22 Aug 2019 06:12) (105/65 - 121/55)  BP(mean): --  RR: 16 (22 Aug 2019 06:12) (15 - 16)  SpO2: 100% (22 Aug 2019 06:12) (100% - 100%)  Daily Height in cm: 185.42 (21 Aug 2019 10:35)    Daily Weight in k.3 (21 Aug 2019 10:35)    GENERAL:  NAD, frail appearing  HEENT:  NC/AT, poor dentition, conjunctivae clear and pink  CHEST:  Full & symmetric excursion, no increased effort, breath sounds clear  HEART:  Regular rhythm, S1, S2, no murmur/rub/S3/S4, no abdominal bruit, no edema, no JVD  ABDOMEN:  Soft, non-tender, non-distended, +bowel sounds, PEG in place with binder   EXTREMITIES:  no cyanosis, clubbing or edema  SKIN:  scattered echymoses  NEURO:  Alert, oriented    LABS:                        10.2   14.12 )-----------( 500      ( 22 Aug 2019 06:40 )             32.2     08-22    136  |  97<L>  |  9   ----------------------------<  131<H>  3.5   |  27  |  0.78    Ca    8.9      22 Aug 2019 06:40  Mg     2.0     08-21                Imaging: Chief Complaint:  Patient is a 74y old  Male who presents with a chief complaint of FTT, dysphagia (21 Aug 2019 08:46)      Interval Events: Having mild abdominal pain around peg site. Denies subjective fever or chills. Tolerated free water through peg.     Allergies:  No Known Allergies      Hospital Medications:  acetaminophen   Tablet .. 650 milliGRAM(s) Oral every 6 hours PRN  ALBUTerol/ipratropium for Nebulization 3 milliLiter(s) Nebulizer every 6 hours PRN  artificial tears (preservative free) Ophthalmic Solution 1 Drop(s) Both EYES three times a day  dextrose 5% + sodium chloride 0.45%. 1000 milliLiter(s) IV Continuous <Continuous>  LORazepam   Injectable 0.5 milliGRAM(s) IV Push two times a day  LORazepam   Injectable 0.5 milliGRAM(s) IV Push every 6 hours PRN  thiamine IVPB 500 milliGRAM(s) IV Intermittent every 8 hours      PMHX/PSHX:  Scrotal swelling  Psychiatric disorder  No pertinent past medical history  No significant past surgical history      Family history:  FHx: stomach cancer      ROS:     General:  No wt loss, fevers, chills, night sweats, fatigue,   Eyes:  Good vision, no reported pain  ENT:  No sore throat, pain, runny nose, dysphagia  CV:  No pain, palpitations, hypo/hypertension  Resp:  No dyspnea, cough, tachypnea, wheezing  GI:  See HPI  :  No pain, bleeding, incontinence, nocturia  Muscle:  No pain, weakness  Neuro:  No weakness, tingling, memory problems  Psych:  No fatigue, insomnia, mood problems, depression  Endocrine:  No polyuria, polydipsia, cold/heat intolerance  Heme:  No petechiae, ecchymosis, easy bruisability  Skin:  No rash, edema      PHYSICAL EXAM:     Vital Signs:  Vital Signs Last 24 Hrs  T(C): 36.8 (22 Aug 2019 06:12), Max: 36.8 (22 Aug 2019 06:12)  T(F): 98.2 (22 Aug 2019 06:12), Max: 98.2 (22 Aug 2019 06:12)  HR: 64 (22 Aug 2019 06:12) (64 - 84)  BP: 109/62 (22 Aug 2019 06:12) (105/65 - 121/55)  BP(mean): --  RR: 16 (22 Aug 2019 06:12) (15 - 16)  SpO2: 100% (22 Aug 2019 06:12) (100% - 100%)  Daily Height in cm: 185.42 (21 Aug 2019 10:35)    Daily Weight in k.3 (21 Aug 2019 10:35)    GENERAL:  NAD, frail appearing  HEENT:  NC/AT, poor dentition, conjunctivae clear and pink  CHEST:  Full & symmetric excursion, no increased effort, breath sounds clear  HEART:  Regular rhythm, S1, S2, no murmur/rub/S3/S4, no abdominal bruit, no edema, no JVD  ABDOMEN:  Soft, non-tender, non-distended, +bowel sounds, PEG in place with binder , bumper at 2 cm  EXTREMITIES:  no cyanosis, clubbing or edema  SKIN:  scattered echymoses  NEURO:  Alert, oriented    LABS:                        10.2   14.12 )-----------( 500      ( 22 Aug 2019 06:40 )             32.2     08-22    136  |  97<L>  |  9   ----------------------------<  131<H>  3.5   |  27  |  0.78    Ca    8.9      22 Aug 2019 06:40  Mg     2.0     08-21                Imaging:

## 2019-08-23 LAB
ANION GAP SERPL CALC-SCNC: 11 MMO/L — SIGNIFICANT CHANGE UP (ref 7–14)
BUN SERPL-MCNC: 9 MG/DL — SIGNIFICANT CHANGE UP (ref 7–23)
CALCIUM SERPL-MCNC: 8.7 MG/DL — SIGNIFICANT CHANGE UP (ref 8.4–10.5)
CHLORIDE SERPL-SCNC: 98 MMOL/L — SIGNIFICANT CHANGE UP (ref 98–107)
CO2 SERPL-SCNC: 28 MMOL/L — SIGNIFICANT CHANGE UP (ref 22–31)
CREAT SERPL-MCNC: 0.68 MG/DL — SIGNIFICANT CHANGE UP (ref 0.5–1.3)
GLUCOSE SERPL-MCNC: 123 MG/DL — HIGH (ref 70–99)
HCT VFR BLD CALC: 34 % — LOW (ref 39–50)
HCYS SERPL-MCNC: 8.9 UMOL/L — SIGNIFICANT CHANGE UP
HGB BLD-MCNC: 10.7 G/DL — LOW (ref 13–17)
MCHC RBC-ENTMCNC: 31.4 PG — SIGNIFICANT CHANGE UP (ref 27–34)
MCHC RBC-ENTMCNC: 31.5 % — LOW (ref 32–36)
MCV RBC AUTO: 99.7 FL — SIGNIFICANT CHANGE UP (ref 80–100)
NRBC # FLD: 0 K/UL — SIGNIFICANT CHANGE UP (ref 0–0)
PLATELET # BLD AUTO: 475 K/UL — HIGH (ref 150–400)
PMV BLD: 10 FL — SIGNIFICANT CHANGE UP (ref 7–13)
POTASSIUM SERPL-MCNC: 3.4 MMOL/L — LOW (ref 3.5–5.3)
POTASSIUM SERPL-SCNC: 3.4 MMOL/L — LOW (ref 3.5–5.3)
RBC # BLD: 3.41 M/UL — LOW (ref 4.2–5.8)
RBC # FLD: 12.8 % — SIGNIFICANT CHANGE UP (ref 10.3–14.5)
SODIUM SERPL-SCNC: 137 MMOL/L — SIGNIFICANT CHANGE UP (ref 135–145)
WBC # BLD: 9.82 K/UL — SIGNIFICANT CHANGE UP (ref 3.8–10.5)
WBC # FLD AUTO: 9.82 K/UL — SIGNIFICANT CHANGE UP (ref 3.8–10.5)

## 2019-08-23 PROCEDURE — 72146 MRI CHEST SPINE W/O DYE: CPT | Mod: 26

## 2019-08-23 PROCEDURE — 99232 SBSQ HOSP IP/OBS MODERATE 35: CPT

## 2019-08-23 RX ORDER — PREGABALIN 225 MG/1
1000 CAPSULE ORAL DAILY
Refills: 0 | Status: COMPLETED | OUTPATIENT
Start: 2019-08-23 | End: 2019-08-26

## 2019-08-23 RX ORDER — FOLIC ACID 0.8 MG
1 TABLET ORAL DAILY
Refills: 0 | Status: DISCONTINUED | OUTPATIENT
Start: 2019-08-23 | End: 2019-08-26

## 2019-08-23 RX ORDER — POTASSIUM CHLORIDE 20 MEQ
10 PACKET (EA) ORAL
Refills: 0 | Status: COMPLETED | OUTPATIENT
Start: 2019-08-23 | End: 2019-08-23

## 2019-08-23 RX ADMIN — Medication 100 MILLIEQUIVALENT(S): at 10:48

## 2019-08-23 RX ADMIN — Medication 1 DROP(S): at 06:48

## 2019-08-23 RX ADMIN — Medication 5 MILLIGRAM(S): at 18:54

## 2019-08-23 RX ADMIN — Medication 1 TABLET(S): at 13:30

## 2019-08-23 RX ADMIN — Medication 1 MILLIGRAM(S): at 13:30

## 2019-08-23 RX ADMIN — Medication 100 MILLIEQUIVALENT(S): at 09:36

## 2019-08-23 RX ADMIN — SODIUM CHLORIDE 40 MILLILITER(S): 9 INJECTION, SOLUTION INTRAVENOUS at 05:57

## 2019-08-23 RX ADMIN — Medication 1 DROP(S): at 13:29

## 2019-08-23 RX ADMIN — Medication 5 MILLIGRAM(S): at 05:57

## 2019-08-23 RX ADMIN — Medication 1 DROP(S): at 21:25

## 2019-08-23 RX ADMIN — PREGABALIN 1000 MICROGRAM(S): 225 CAPSULE ORAL at 13:30

## 2019-08-23 RX ADMIN — Medication 100 MILLIEQUIVALENT(S): at 08:17

## 2019-08-23 NOTE — PROGRESS NOTE ADULT - PROBLEM SELECTOR PLAN 5
Iron low, ferritin normal, b12 and folate WNL  - monitor CBC  - per neuro send MMA/homocysteine, low suspicion for def but no harm in repleting

## 2019-08-23 NOTE — ADVANCED PRACTICE NURSE CONSULT - ASSESSMENT
General: A & O x 2-3. Pt currently bedbound, cachetic with temporal wasting, Frontal head with two intact scabs. Reactive hyperemia in left side. Incontinent of stool, actively incontinence of loose pasty stool, incontience care provided, indwelling catheter in place. Skin warm, dry, poor skin turgor, hair matted, poor oral care. Scattered areas of ecchymosis on bilateral upper extremities. Right hand with intact scab. Multiple wounds noted secondary to fall with increased pressure, friction and sheer. Right lateral upper arm, right knee with intact stable scabs. Right lateral lower leg with area of hypopigmentation Poor foot hygiene, thickened hyperpigmented toenails. Blanchable erythema of bilateral heels.     Left upper quadrant with PEG tube-peritubular skin intact.     Right anterior ear extending to posterior ear- mixed etiology trauma wound complicated by pressure and moisture associated dermatitis complicated by friction- 3.5cmx3.8cmx0.2cm, unable to determine true anatomical depth secondary to necrotic tissue. Well defined irregular borders 90% black brown lifting eschar (previously noted as purple maroon discoloration), 5% yellow moist slough and 5% pink tissue exposed. Occipita and posterior ear with linear fissure measuring 1.6cmx0.5cmx0.3cm now exposing 20% skin slippage exposing deep red-moist dermis and 80% yellow moist slough firmly attach.  of well Moderate serosanguinous drainage, no odor. Periwound skin intact. No increased warmth, no erythema, no induration. Goals of care: absorb, manage drainage, given unstable eschar goal is to autolytic debridement of necrotic tissue, monitor for changes in tissue type, protect periwound skin. SACHIN Hurley assessed wound at the bedside, agreed to reconsult plastics team.     Right cheek bone/right zygomatic bone- 1.5cmx2.2pao3ol- mixed etiology trauma wound and unstagable pressure injury complicated by friction- 100% black hard dry, stable eschar, well defined borders. No drainage noted. Periwound skin intact. No increased warmth, no erythema, no induration. Goals of care: maintain stable eschar, monitor for changes in tissue type.    Right mandible- mixed etiology trauma wound and pressure injury complicated by friction (now healing)- 1.5cmx1.55cmx0.2cm- wound base with 100% pink-moist dermis with scattered fibrin, Scant serosanguineous drainage, no odor. Periwound skin intact. No induration, no increased warmth, no edema noted. Goals of care: maintain moist base to promote wound healing, monitor for changes in tissue type.    Right neck immediately superior to right clavicle- abrasion- now presenting as intact scab. Goals of care:  monitor for tissue type changes.     Right shoulder- mixed etiology trauma wound complicated by pressure injury and friction and sheer- healed-now presenting as intact scab.     Right upper chest- mixed etiology trauma wound complicated by friction- Healed-now presenting as hypopigmentation.     Right ileac crest- mixed etiology trauma wound complicated by friction and stage 2 pressure injury- 1cmx1.5cmx0.2cm, well demarcated irregular borders,  95% fibrin film, dry base with 5% pink dermis surrounding fibrin film.. Scant serous drainage, no odor. Periwound skin intact, no palpable skin changes. Goals of care: maintain moist environment to promote wound healing, reduce biobrdenprotect periwound skin.    Right upper thigh- mixed etiology trauma healing wound complicated by friction- 1cmx1.5cmx0.2cm, irregular borders, 100% pale-pink moist dermis with scattered fibrin, scant serous drainage, no odor. Periwound skin with intact. Remaining periwound skin intact no palpable skin changes. Goals of care: maintain moist environment to promote wound healing, reduce bioburden, protect from friction and sheer, protect periwound skin.     Left ileac crest- mixed etiology trauma wound complicated by friction- Healed wound now presenting as hypopigmentation surrounded by hyperpigmentation.     Left anterior thigh- frictional injury- now presenting with area of hyperpigmentation and mild induration underneath hyperpigmentation measuring [id number], periwound skin intact. Goals of care: monitor for tissue type changes, protect from friction sheer.    Risk for incontinence associated dermatitis- skin currently intact, sacrum and left buttocks with areas of reactive hyperemia, Chronic hyperpigmentation noted to sacrum. Scrotal edema, secondary to left scrotal hydrocele. Intact skin underneath scrotum.     Bilateral lower extremities with scattered areas of hyper and hypopigmentation. Dry, flaky skin.  Thickened-yellow hyperpigmented toenails. Claw toes. No temperature changes noted. No Edema. Capillary refill <3 seconds. DP/PT pulses 2+, with biphasic doppler sounds.     Left medial great toe with partially open hyperpigmented callus, prior to cleansing skin appeared with 100% hyperpigmented callus, upon cleansing toe callus partially removed exposing pink skin entire callus measuring 1.5cmx1.5cmx0.2cm. Open area measures 0.2cmx0.2cmx0.2cm. Periwound skin intact. No increased warmth, no erythema, no induration. Goals of care: monitor for tissue type changes, prevent friction between toes, wick moisture.     Left medial second toe with area of hyperpigmentation. General: A & O x 2-3. Pt currently bedbound, cachetic with temporal wasting, Frontal head with two intact scabs. Reactive hyperemia in left side. Incontinent of stool, actively incontinence of loose pasty stool, incontience care provided, indwelling catheter in place. Skin warm, dry, poor skin turgor, hair matted, poor oral care. Scattered areas of ecchymosis on bilateral upper extremities. Right hand with intact scab. Multiple wounds noted secondary to fall with increased pressure, friction and sheer. Right lateral upper arm, right knee with intact stable scabs. Right lateral lower leg with area of hypopigmentation Poor foot hygiene, thickened hyperpigmented toenails. Blanchable erythema of bilateral heels.     Left upper quadrant with PEG tube-peritubular skin intact.     Right anterior ear extending to posterior ear- mixed etiology trauma wound complicated by pressure (Unstageable pressure injury)and moisture associated dermatitis complicated by friction- 3.5cmx3.8cmx0.2cm, unable to determine true anatomical depth secondary to necrotic tissue. Well defined irregular borders 90% black brown lifting eschar (previously noted as purple maroon discoloration), 5% yellow moist slough and 5% pink tissue exposed. Occipita and posterior ear with linear fissure measuring 1.6cmx0.5cmx0.3cm now exposing 20% skin slippage exposing deep red-moist dermis and 80% yellow moist slough firmly attach.  of well Moderate serosanguinous drainage, no odor. Periwound skin intact. No increased warmth, no erythema, no induration. Goals of care: absorb, manage drainage, given unstable eschar goal is to autolytic debridement of necrotic tissue, monitor for changes in tissue type, protect periwound skin. SACHIN Hurley assessed wound at the bedside, agreed to reconsult plastics team.     Right cheek bone/right zygomatic bone- 1.5cmx2.0stw2pc- mixed etiology trauma wound and unstagable pressure injury complicated by friction- 100% black hard dry, stable eschar, well defined borders. No drainage noted. Periwound skin intact. No increased warmth, no erythema, no induration. Goals of care: maintain stable eschar, monitor for changes in tissue type.    Right mandible- mixed etiology trauma wound and pressure injury complicated by friction (now healing)- 1.5cmx1.55cmx0.2cm- wound base with 100% pink-moist dermis with scattered fibrin, Scant serosanguineous drainage, no odor. Periwound skin intact. No induration, no increased warmth, no edema noted. Goals of care: maintain moist base to promote wound healing, monitor for changes in tissue type.    Right neck immediately superior to right clavicle- abrasion- now presenting as intact scab. Goals of care:  monitor for tissue type changes.     Right shoulder- mixed etiology trauma wound complicated by pressure injury and friction and sheer- healed-now presenting as intact scab.     Right upper chest- mixed etiology trauma wound complicated by friction- Healed-now presenting as hypopigmentation.     Right ileac crest- mixed etiology trauma wound complicated by friction and stage 2 pressure injury- 1cmx1.5cmx0.2cm, well demarcated irregular borders,  95% fibrin film, dry base with 5% pink dermis surrounding fibrin film.. Scant serous drainage, no odor. Periwound skin intact, no palpable skin changes. Goals of care: maintain moist environment to promote wound healing, reduce bioburden, protect periwound skin.    Right upper thigh- mixed etiology trauma healing wound complicated by friction- 1cmx1.5cmx0.2cm, irregular borders, 100% pale-pink moist dermis with scattered fibrin, scant serous drainage, no odor. Periwound skin with intact. Remaining periwound skin intact no palpable skin changes. Goals of care: maintain moist environment to promote wound healing, reduce bioburden, protect from friction and sheer, protect periwound skin.     Left ileac crest- mixed etiology trauma wound complicated by friction- Healed wound now presenting as hypopigmentation surrounded by hyperpigmentation.     Left anterior thigh- frictional injury- now presenting with area of hyperpigmentation and mild induration underneath hyperpigmentation measuring [id number], periwound skin intact. Goals of care: monitor for tissue type changes, protect from friction sheer.    Risk for incontinence associated dermatitis- skin currently intact, sacrum and left buttocks with areas of reactive hyperemia, Chronic hyperpigmentation noted to sacrum. Scrotal edema, secondary to left scrotal hydrocele. Intact skin underneath scrotum.     Bilateral lower extremities with scattered areas of hyper and hypopigmentation. Dry, flaky skin.  Thickened-yellow hyperpigmented toenails. Claw toes. No temperature changes noted. No Edema. Capillary refill <3 seconds. DP/PT pulses 2+, with biphasic doppler sounds.     Left medial great toe with partially open hyperpigmented callus, prior to cleansing skin appeared with 100% hyperpigmented callus, upon cleansing toe callus partially removed exposing pink skin entire callus measuring 1.5cmx1.5cmx0.2cm. Open area measures 0.2cmx0.2cmx0.2cm. Periwound skin intact. No increased warmth, no erythema, no induration. Goals of care: monitor for tissue type changes, prevent friction between toes, wick moisture.     Left medial second toe with area of hyperpigmentation.     Most wounds significantly improved since last seen, right ear evolved to an Unstageable pressure injury last assessed as DTPI pressure injury.     Findings discussed with primary RN and NP.

## 2019-08-23 NOTE — PROGRESS NOTE ADULT - PROBLEM SELECTOR PLAN 1
Failed cineesophagogram x2, NPO  - appreciate neurology eval:  MRI brain and c-spine wnl; Anti-acetylcholine and MUSK ab sent per neuro, results pending  - s/p PEG on 8/21, cleared for use 8/22, tolerating TFs  - dc IVF  - monitor closely for re-feeding syndrome

## 2019-08-23 NOTE — ADVANCED PRACTICE NURSE CONSULT - RECOMMEDATIONS
R/O osteomyelitis of right ear   Recommend reconsult plastic surgery for right ear now exposing significant unstable eschar   Place Z-fluidized pillow in head to offload head and ears  Nutrition consult to optimize nutrition    Topical Recommendations    Right anterior extending to posterior ear and between occipita and posterior ear: Clean with NS. Pat dry. Apply Liquid barrier film to periwound skin. Apply a piece of Medihoney alginate (item#[id number]), cover with foam with borders. Change daily.     Right cheek: Clean with NS. Pat dry. Apply Liquid barrier film twice a day.     Right mandible: Clean with NS. Pat dry. Place Silicone foam with borders. Change daily.     Right neck and shoulder scab: Apply Liquid barrier film once a day.     Right iliac crest and right upper thigh: Clean with NS. Pat dry. Apply Liquid barrier film to periwound skin. Apply Medihoney gel to wound bed. Cover with Silicone foam with borders. Change daily.     Left anterior thigh, and bilateral heels: Apply Liquid barrier film twice a day.     Bilateral buttocks, bilateral groin and posterior scrotum: Clean with skin cleanser. Pat dry. Apply a thin layer of Mihai barrier cream. Apply twice a day or PRN.    Left medial great and second toe: Clean with soap and water. Pat dry. Apply a piece of Sterile gauze (4x4) to separate toes, secure with small Kerlix. Change daily.      Continue low air loss bed therapy, continue heel elevation with Z-flex fluidized positioning boots, continue to turn & reposition q2h with Z-tania fluidized positioning device, soft pillow between bony prominences, continue incontinence management with barrier creams & single breathable pad. Apply Interdry textile sheeting, under intertriginous scrotum leaving 2 inches exposed from groin at ends to wick, remove to wash & dry affected area, then replace. Individual sheeting may be used for up to 5 days unless soiled. Continue measures to decrease friction/shear/pressure.     Please contact Wound Care Service Line if we can be of further assistance (ext [phone]). R/O osteomyelitis of right ear   Recommend reconsult plastic surgery for right ear now exposing significant unstable eschar   Place Z-fluidized pillow in head to offload head and ears  Nutrition consult to optimize nutrition    Topical Recommendations    Peritubular skin from PEG tube: Cleanse q shift with NS, apply liquid barrier film beneath zak disc.  If redness noted under zak disc bumper apply thin foam  dressing without border (Mepilex Lite)- cut to mid dressing with a 'Y' shape. Secondary securement to abdomen taping in 'H' fashion with Steri-strips.     Right anterior extending to posterior ear and between occipita and posterior ear: Clean with NS. Pat dry. Apply Liquid barrier film to periwound skin. Apply a piece of Medihoney alginate (item#[id number]), cover with foam with borders. Change daily.     Right cheek: Clean with NS. Pat dry. Apply Liquid barrier film twice a day.     Right mandible: Clean with NS. Pat dry. Place Silicone foam with borders. Change daily.     Right neck and shoulder scab: Apply Liquid barrier film once a day.     Right iliac crest and right upper thigh: Clean with NS. Pat dry. Apply Liquid barrier film to periwound skin. Apply Medihoney gel to wound bed. Cover with Silicone foam with borders. Change daily.     Left anterior thigh, and bilateral heels: Apply Liquid barrier film twice a day.     Bilateral buttocks, bilateral groin and posterior scrotum: Clean with skin cleanser. Pat dry. Apply a thin layer of Mihai barrier cream. Apply twice a day or PRN.    Left medial great and second toe: Clean with soap and water. Pat dry. Apply a piece of Sterile gauze (4x4) to separate toes, secure with small Kerlix. Change daily.      Continue low air loss bed therapy, continue heel elevation with Z-flex fluidized positioning boots, continue to turn & reposition q2h with Z-tania fluidized positioning device, soft pillow between bony prominences, continue incontinence management with barrier creams & single breathable pad. Apply Interdry textile sheeting, under intertriginous scrotum leaving 2 inches exposed from groin at ends to wick, remove to wash & dry affected area, then replace. Individual sheeting may be used for up to 5 days unless soiled. Continue measures to decrease friction/shear/pressure.     Please contact Wound Care Service Line if we can be of further assistance (ext [phone]).

## 2019-08-23 NOTE — PROGRESS NOTE ADULT - PROBLEM SELECTOR PLAN 4
Per PMD Dr Frank Britton [phone], patient has a long history of depression and anxiety and long term use of Benzo   - valium 5 mg BID via PEG  - f/u psych recs

## 2019-08-23 NOTE — ADVANCED PRACTICE NURSE CONSULT - REASON FOR CONSULT
Patient seen on skin care rounds after wound care referral received for assessment of left ear (noted deterioration of the wound) and left 1st and second toe skin impairment. Patient known to [facility] nursing last seen on 8/8/2019 for multiple skin impairments. Since last seeing chart reviewed: MR of the head negative for hemorrhage, Akash 13, BMI 16.4, WBC 9.82, MR cervical spine with mild cervical spondylosis with multilevel foraminal narrowing. Patient H/O of Psych disorder, lives alone, very poor historian, unkempt and with very poor hygiene , missing teeth, Dental caries, on PO Benzo at home, denies ETOH and smoking,  reports falling off a stool 1.5 days prior to presentation around midnight and becoming pinned in position until rescued by EMS this afternoon. Denies drug or alcohol use prior to falling, and denies feeling lightheaded or dizzy prior. Patient is unsure what may have caused the fall other than a sense that his muscles have been "dysfunctional" lately. In the past month, he has noticed some muscle wasting, particularly in his arms, with 6-8 lbs of weight loss in this time. He has also noticed some fasciculations in his legs in the past month as well. He denies any family history of muscular pathology. He also denies seizures, chest pain, shortness of breath, nausea, and abdominal pain. Reports taking diazepam bid and flurazepam at nights and no other medications. Of note, patient has known history of L scrotal hydrocele but has refused surgery. Pt awake, A+O x 3, C/O + Productive cough as well, no fever, no dysuria, no N/V, No HA, no abdominal pain, + weakness, + cachectic with B/L Temporal wasting , Zamzam  (girl friend). Since last seen patient continues to be followed by GI s/p PEG tube placement on 8/21, palliative for advanced illness, neurology for dysphasia, plastics surgical team for right ear wound (as recommended by wound care team on 8/8) and podiatry for toenail care and ingrow toe nails.

## 2019-08-23 NOTE — PROGRESS NOTE ADULT - PROBLEM SELECTOR PLAN 7
Due to poor PO intake, currently 2/2 dysphagia cannot be on a diet; BMI 16  - IV thiamine x 3 days  - PEG feeds  - MV

## 2019-08-23 NOTE — CHART NOTE - NSCHARTNOTEFT_GEN_A_CORE
Notified by RN patient with abdominal distention after returning from MRI this evening; tube feeds held.  Seen and evaluated patient at the bedside, patient denies abdominal pain, n/v; reports last BM today. During assessment abdomen soft, non-tender, non-distended with + BS x 4; will check residual and restart feeds at this time.  Will continue to monitor.

## 2019-08-23 NOTE — PROGRESS NOTE ADULT - PROBLEM SELECTOR PLAN 2
Unclear cause, CTAP and CT chest without evidence of masses/LAD/malignancy.  TSH/B12/folic acid wnl.  HIV negative  - appreciated neurology re: underlying neurological degenerative condition given dysphagia/retention/weakness--MRI brain & C-spine wnl, f/u MUSK and Ach Ab, now requesting T spine MRI  - OP EMG  - PT/OT for JEWELS  - needs OP neuromuscular neurology eval  - s/p PEG, tolerating TFs (started 8/22)

## 2019-08-23 NOTE — PROGRESS NOTE ADULT - SUBJECTIVE AND OBJECTIVE BOX
Patient is a 74y old  Male who presents with a chief complaint of S/P fall, Rhabdo, R/O Pneumonia, Leukocytosis, Cough, R/O Cellulitis Face/lower Abdomen    SUBJECTIVE / OVERNIGHT EVENTS:    A&Ox3 but confused, was moved from 4N to 5S but he believes he crossed the street  No complaints re: nausea/vomiting/abdominal pain  Denies pain  Feeds at 60 cc/hr currently   Able to call his gf with the phone in his room    MEDICATIONS  (STANDING):  artificial tears (preservative free) Ophthalmic Solution 1 Drop(s) Both EYES three times a day  dextrose 5% + sodium chloride 0.45%. 1000 milliLiter(s) (40 mL/Hr) IV Continuous <Continuous>  diazepam    Tablet 5 milliGRAM(s) Oral two times a day  multivitamin 1 Tablet(s) Oral daily  potassium chloride  10 mEq/100 mL IVPB 10 milliEquivalent(s) IV Intermittent every 1 hour    MEDICATIONS  (PRN):  acetaminophen   Tablet .. 650 milliGRAM(s) Oral every 6 hours PRN Mild Pain (1 - 3), Moderate Pain (4 - 6)  ALBUTerol/ipratropium for Nebulization 3 milliLiter(s) Nebulizer every 6 hours PRN Shortness of Breath and/or Wheezing  ondansetron Injectable 4 milliGRAM(s) IV Push every 6 hours PRN Nausea and/or Vomiting    T(C): 36.6 (08-23-19 @ 05:51), Max: 36.9 (08-22-19 @ 12:44)  HR: 70 (08-23-19 @ 05:51) (60 - 70)  BP: 103/60 (08-23-19 @ 05:51) (100/56 - 110/55)  RR: 16 (08-23-19 @ 05:51) (16 - 18)  SpO2: 97% (08-23-19 @ 05:51) (97% - 99%)    I&O's Summary    22 Aug 2019 07:01  -  23 Aug 2019 07:00  --------------------------------------------------------  IN: 30 mL / OUT: 1850 mL / NET: -1820 mL    PHYSICAL EXAM:  GENERAL: NAD, thin  HEAD:  R ear w/ pressure injury   CHEST/LUNG: Clear to auscultation bilaterally; No wheeze  HEART: Regular rate and rhythm; No murmurs, rubs, or gallops  ABDOMEN: Soft, Nontender, Nondistended; Bowel sounds present  EXTREMITIES:   wwp, no edema   PSYCH: AAOx3  NEUROLOGY: generally weak but nonfocal  SKIN: abrasions on R face and R ear   : + fowler, testicular swelling       LABS:                        10.7   9.82  )-----------( 475      ( 23 Aug 2019 06:05 )             34.0     08-23    137  |  98  |  9   ----------------------------<  123<H>  3.4<L>   |  28  |  0.68    Ca    8.7      23 Aug 2019 06:05      Consultant(s) Notes Reviewed:  neuro, GI  Care Discussed with Consultants/Other Providers:

## 2019-08-23 NOTE — CHART NOTE - NSCHARTNOTEFT_GEN_A_CORE
As per wound care nurse, patient with worsening wound over right ear, now with necrosis. Discussed with Dr. Sanchez from the plastic surgery team. As per Dr. Sanchez, no acute intervention needed at this very time. Dr. Sanchez to see patient in AM. Team to follow up further recommendations. Medical attending made aware.

## 2019-08-24 LAB
ANION GAP SERPL CALC-SCNC: 11 MMO/L — SIGNIFICANT CHANGE UP (ref 7–14)
BASOPHILS # BLD AUTO: 0.07 K/UL — SIGNIFICANT CHANGE UP (ref 0–0.2)
BASOPHILS NFR BLD AUTO: 0.6 % — SIGNIFICANT CHANGE UP (ref 0–2)
BUN SERPL-MCNC: 15 MG/DL — SIGNIFICANT CHANGE UP (ref 7–23)
CALCIUM SERPL-MCNC: 8.9 MG/DL — SIGNIFICANT CHANGE UP (ref 8.4–10.5)
CHLORIDE SERPL-SCNC: 98 MMOL/L — SIGNIFICANT CHANGE UP (ref 98–107)
CO2 SERPL-SCNC: 30 MMOL/L — SIGNIFICANT CHANGE UP (ref 22–31)
CREAT SERPL-MCNC: 0.69 MG/DL — SIGNIFICANT CHANGE UP (ref 0.5–1.3)
EOSINOPHIL # BLD AUTO: 0.19 K/UL — SIGNIFICANT CHANGE UP (ref 0–0.5)
EOSINOPHIL NFR BLD AUTO: 1.8 % — SIGNIFICANT CHANGE UP (ref 0–6)
GLUCOSE SERPL-MCNC: 115 MG/DL — HIGH (ref 70–99)
HCT VFR BLD CALC: 35.4 % — LOW (ref 39–50)
HGB BLD-MCNC: 11 G/DL — LOW (ref 13–17)
IMM GRANULOCYTES NFR BLD AUTO: 0.5 % — SIGNIFICANT CHANGE UP (ref 0–1.5)
LYMPHOCYTES # BLD AUTO: 0.68 K/UL — LOW (ref 1–3.3)
LYMPHOCYTES # BLD AUTO: 6.3 % — LOW (ref 13–44)
MAGNESIUM SERPL-MCNC: 1.9 MG/DL — SIGNIFICANT CHANGE UP (ref 1.6–2.6)
MCHC RBC-ENTMCNC: 31.1 % — LOW (ref 32–36)
MCHC RBC-ENTMCNC: 31.5 PG — SIGNIFICANT CHANGE UP (ref 27–34)
MCV RBC AUTO: 101.4 FL — HIGH (ref 80–100)
MONOCYTES # BLD AUTO: 1.05 K/UL — HIGH (ref 0–0.9)
MONOCYTES NFR BLD AUTO: 9.7 % — SIGNIFICANT CHANGE UP (ref 2–14)
NEUTROPHILS # BLD AUTO: 8.75 K/UL — HIGH (ref 1.8–7.4)
NEUTROPHILS NFR BLD AUTO: 81.1 % — HIGH (ref 43–77)
NRBC # FLD: 0 K/UL — SIGNIFICANT CHANGE UP (ref 0–0)
PHOSPHATE SERPL-MCNC: 3.6 MG/DL — SIGNIFICANT CHANGE UP (ref 2.5–4.5)
PLATELET # BLD AUTO: 480 K/UL — HIGH (ref 150–400)
PMV BLD: 10.3 FL — SIGNIFICANT CHANGE UP (ref 7–13)
POTASSIUM SERPL-MCNC: 3.8 MMOL/L — SIGNIFICANT CHANGE UP (ref 3.5–5.3)
POTASSIUM SERPL-SCNC: 3.8 MMOL/L — SIGNIFICANT CHANGE UP (ref 3.5–5.3)
RBC # BLD: 3.49 M/UL — LOW (ref 4.2–5.8)
RBC # FLD: 12.9 % — SIGNIFICANT CHANGE UP (ref 10.3–14.5)
SODIUM SERPL-SCNC: 139 MMOL/L — SIGNIFICANT CHANGE UP (ref 135–145)
WBC # BLD: 10.79 K/UL — HIGH (ref 3.8–10.5)
WBC # FLD AUTO: 10.79 K/UL — HIGH (ref 3.8–10.5)

## 2019-08-24 PROCEDURE — 99232 SBSQ HOSP IP/OBS MODERATE 35: CPT

## 2019-08-24 RX ADMIN — Medication 5 MILLIGRAM(S): at 17:11

## 2019-08-24 RX ADMIN — Medication 1 DROP(S): at 13:06

## 2019-08-24 RX ADMIN — Medication 5 MILLIGRAM(S): at 05:52

## 2019-08-24 RX ADMIN — Medication 1 DROP(S): at 21:00

## 2019-08-24 RX ADMIN — PREGABALIN 1000 MICROGRAM(S): 225 CAPSULE ORAL at 15:12

## 2019-08-24 RX ADMIN — Medication 1 MILLIGRAM(S): at 13:06

## 2019-08-24 RX ADMIN — Medication 1 DROP(S): at 05:52

## 2019-08-24 RX ADMIN — Medication 1 TABLET(S): at 13:06

## 2019-08-24 NOTE — PROGRESS NOTE ADULT - SUBJECTIVE AND OBJECTIVE BOX
Patient is a 74y old  Male who presents with a chief complaint of S/P fall, Rhabdo, R/O Pneumonia, Leukocytosis, Cough, R/O Cellulitis Face/lower Abdomen (24 Aug 2019 13:01)      SUBJECTIVE / OVERNIGHT EVENTS:  A poor historian. He is concerned he is losing some memory. no cp/sob/palpitation/fever/chills.     MEDICATIONS  (STANDING):  artificial tears (preservative free) Ophthalmic Solution 1 Drop(s) Both EYES three times a day  cyanocobalamin Injectable 1000 MICROGram(s) IntraMuscular daily  diazepam    Tablet 5 milliGRAM(s) Oral two times a day  folic acid 1 milliGRAM(s) Oral daily  multivitamin 1 Tablet(s) Oral daily    MEDICATIONS  (PRN):  acetaminophen   Tablet .. 650 milliGRAM(s) Oral every 6 hours PRN Mild Pain (1 - 3), Moderate Pain (4 - 6)  ALBUTerol/ipratropium for Nebulization 3 milliLiter(s) Nebulizer every 6 hours PRN Shortness of Breath and/or Wheezing  ondansetron Injectable 4 milliGRAM(s) IV Push every 6 hours PRN Nausea and/or Vomiting      T(C): 36.6 (08-24-19 @ 14:28), Max: 36.6 (08-24-19 @ 06:10)  HR: 78 (08-24-19 @ 14:28) (67 - 80)  BP: 106/66 (08-24-19 @ 14:28) (105/66 - 109/60)  RR: 16 (08-24-19 @ 14:28) (16 - 18)  SpO2: 100% (08-24-19 @ 14:28) (97% - 100%)  CAPILLARY BLOOD GLUCOSE        I&O's Summary    23 Aug 2019 07:01  -  24 Aug 2019 07:00  --------------------------------------------------------  IN: 0 mL / OUT: 1300 mL / NET: -1300 mL        PHYSICAL EXAM:  GENERAL: NAD, thin  HEAD:  Right ear with eschar anterior and posterior ear. surrounding redness and swelling, no discharge.   CHEST/LUNG: Clear to auscultation bilaterally; No wheeze  HEART: Regular rate and rhythm; No murmurs, rubs, or gallops  ABDOMEN: Soft, Nontender, Nondistended; Bowel sounds present  EXTREMITIES:   wwp, no edema   PSYCH: awake, alert   NEUROLOGY: generally weak but nonfocal  SKIN: abrasions on R face and R ear   : + fowler, testicular swelling     LABS:                        11.0   10.79 )-----------( 480      ( 24 Aug 2019 06:45 )             35.4     08-24    139  |  98  |  15  ----------------------------<  115<H>  3.8   |  30  |  0.69    Ca    8.9      24 Aug 2019 06:45  Phos  3.6     08-24  Mg     1.9     08-24                RADIOLOGY & ADDITIONAL TESTS:    Imaging Personally Reviewed:    Consultant(s) Notes Reviewed:      Care Discussed with Consultants/Other Providers:

## 2019-08-24 NOTE — PROGRESS NOTE ADULT - PROBLEM SELECTOR PLAN 4
Per PMD Dr Frank Britton [phone], patient has a long history of depression and anxiety and long term use of Benzo   - valium 5 mg BID via PEG  - f/u psych recs

## 2019-08-24 NOTE — PROGRESS NOTE ADULT - PROBLEM SELECTOR PLAN 2
Unclear cause, CTAP and CT chest without evidence of masses/LAD/malignancy.  TSH/B12/folic acid wnl.  HIV negative  - appreciated neurology re: underlying neurological degenerative condition given dysphagia/retention/weakness--MRI brain & C-spine wnl, f/u MUSK and Ach Ab  T spine MRI 8/23: degenerative changes   - OP EMG  - PT/OT for JEWELS  - needs OP neuromuscular neurology eval  - s/p PEG, tolerating TFs (started 8/22)

## 2019-08-24 NOTE — PROGRESS NOTE ADULT - SUBJECTIVE AND OBJECTIVE BOX
Attending: Daniel Zhang  [address] [facility] [address] [phone]    Patient:SHANTANU CHARLES  [id number]      Subjective:  Patient is a 74y old  Male who presents with a chief complaint of FTT, dysphagia, NM weakness, rhabdo, PNA (23 Aug 2019 09:05) Asked to follow up as wound care service is concerned about right ear and "osteomyelitis"       Objective:  T(C): 36.6 (08-24-19 @ 06:10), Max: 36.6 (08-24-19 @ 06:10)  HR: 80 (08-24-19 @ 06:10) (67 - 80)  BP: 109/60 (08-24-19 @ 06:10) (105/66 - 113/57)  RR: 18 (08-24-19 @ 06:10) (16 - 18)  SpO2: 97% (08-24-19 @ 06:10) (97% - 98%)  Wt(kg): --   08-24    139  |  98  |  15  ----------------------------<  115<H>  3.8   |  30  |  0.69    Ca    8.9      24 Aug 2019 06:45  Phos  3.6     08-24  Mg     1.9     08-24                          11.0   10.79 )-----------( 480      ( 24 Aug 2019 06:45 )             35.4       08-23 @ 07:01  -  08-24 @ 07:00  --------------------------------------------------------  IN: 0 mL / OUT: 1300 mL / NET: -1300 mL      PHYSICAL EXAM:    General:    Debilitated  NAD  Alert  Breathing unlabored    Right ear with eschar anterior and posterior ear. Erythema and swelling without tenderness  No purulence         MEDICATIONS  (STANDING):  artificial tears (preservative free) Ophthalmic Solution 1 Drop(s) Both EYES three times a day  cyanocobalamin Injectable 1000 MICROGram(s) IntraMuscular daily  diazepam    Tablet 5 milliGRAM(s) Oral two times a day  folic acid 1 milliGRAM(s) Oral daily  multivitamin 1 Tablet(s) Oral daily    MEDICATIONS  (PRN):  acetaminophen   Tablet .. 650 milliGRAM(s) Oral every 6 hours PRN Mild Pain (1 - 3), Moderate Pain (4 - 6)  ALBUTerol/ipratropium for Nebulization 3 milliLiter(s) Nebulizer every 6 hours PRN Shortness of Breath and/or Wheezing  ondansetron Injectable 4 milliGRAM(s) IV Push every 6 hours PRN Nausea and/or Vomiting      Assessment/Plan:  Patient is a 74y old  Male who presents with a chief complaint of FTT, dysphagia, NM weakness, rhabdo, PNA (23 Aug 2019 09:05) Right ear wound from pressure  From my understanding there is no bone in the area of eschar so osteomyelitis would not be possible. There is no mastoid tenderness or erythema to suggest mastoid oste. He does not have typical findings of chondritis which may occur findings would primarily be significant tenderness followed by swelling. If chondritis is considered would need treatment with PO and topical abx.     For the present time would continue with topical treatment which would keep right ear eschar moist.   Silvadene will provide both a moist environment and antimicrobial properties and cover both GN and GP bacteria   Would wash off and clean area prior to reapplication  Can follow up outpatient   Please call if any changes

## 2019-08-24 NOTE — PROGRESS NOTE ADULT - PROBLEM SELECTOR PLAN 1
Failed cineesophagogram x2, NPO  - appreciate neurology eval:  MRI brain and c-spine wnl; Anti-acetylcholine and MUSK ab sent per neuro, results pending  - s/p PEG on 8/21, cleared for use 8/22, tolerating TFs  - monitor closely for re-feeding syndrome

## 2019-08-25 LAB
ANION GAP SERPL CALC-SCNC: 9 MMO/L — SIGNIFICANT CHANGE UP (ref 7–14)
BASOPHILS # BLD AUTO: 0.12 K/UL — SIGNIFICANT CHANGE UP (ref 0–0.2)
BASOPHILS NFR BLD AUTO: 1.3 % — SIGNIFICANT CHANGE UP (ref 0–2)
BUN SERPL-MCNC: 18 MG/DL — SIGNIFICANT CHANGE UP (ref 7–23)
CALCIUM SERPL-MCNC: 9.1 MG/DL — SIGNIFICANT CHANGE UP (ref 8.4–10.5)
CHLORIDE SERPL-SCNC: 98 MMOL/L — SIGNIFICANT CHANGE UP (ref 98–107)
CO2 SERPL-SCNC: 33 MMOL/L — HIGH (ref 22–31)
CREAT SERPL-MCNC: 0.72 MG/DL — SIGNIFICANT CHANGE UP (ref 0.5–1.3)
EOSINOPHIL # BLD AUTO: 0.36 K/UL — SIGNIFICANT CHANGE UP (ref 0–0.5)
EOSINOPHIL NFR BLD AUTO: 3.8 % — SIGNIFICANT CHANGE UP (ref 0–6)
GLUCOSE SERPL-MCNC: 136 MG/DL — HIGH (ref 70–99)
HCT VFR BLD CALC: 34.6 % — LOW (ref 39–50)
HGB BLD-MCNC: 11.1 G/DL — LOW (ref 13–17)
IMM GRANULOCYTES NFR BLD AUTO: 0.5 % — SIGNIFICANT CHANGE UP (ref 0–1.5)
LYMPHOCYTES # BLD AUTO: 0.93 K/UL — LOW (ref 1–3.3)
LYMPHOCYTES # BLD AUTO: 9.7 % — LOW (ref 13–44)
MAGNESIUM SERPL-MCNC: 1.9 MG/DL — SIGNIFICANT CHANGE UP (ref 1.6–2.6)
MCHC RBC-ENTMCNC: 32.1 % — SIGNIFICANT CHANGE UP (ref 32–36)
MCHC RBC-ENTMCNC: 32.1 PG — SIGNIFICANT CHANGE UP (ref 27–34)
MCV RBC AUTO: 100 FL — SIGNIFICANT CHANGE UP (ref 80–100)
MONOCYTES # BLD AUTO: 0.9 K/UL — SIGNIFICANT CHANGE UP (ref 0–0.9)
MONOCYTES NFR BLD AUTO: 9.4 % — SIGNIFICANT CHANGE UP (ref 2–14)
NEUTROPHILS # BLD AUTO: 7.19 K/UL — SIGNIFICANT CHANGE UP (ref 1.8–7.4)
NEUTROPHILS NFR BLD AUTO: 75.3 % — SIGNIFICANT CHANGE UP (ref 43–77)
NRBC # FLD: 0 K/UL — SIGNIFICANT CHANGE UP (ref 0–0)
PHOSPHATE SERPL-MCNC: 4.2 MG/DL — SIGNIFICANT CHANGE UP (ref 2.5–4.5)
PLATELET # BLD AUTO: 416 K/UL — HIGH (ref 150–400)
PMV BLD: 9.9 FL — SIGNIFICANT CHANGE UP (ref 7–13)
POTASSIUM SERPL-MCNC: 3.8 MMOL/L — SIGNIFICANT CHANGE UP (ref 3.5–5.3)
POTASSIUM SERPL-SCNC: 3.8 MMOL/L — SIGNIFICANT CHANGE UP (ref 3.5–5.3)
RBC # BLD: 3.46 M/UL — LOW (ref 4.2–5.8)
RBC # FLD: 13.1 % — SIGNIFICANT CHANGE UP (ref 10.3–14.5)
SODIUM SERPL-SCNC: 140 MMOL/L — SIGNIFICANT CHANGE UP (ref 135–145)
WBC # BLD: 9.55 K/UL — SIGNIFICANT CHANGE UP (ref 3.8–10.5)
WBC # FLD AUTO: 9.55 K/UL — SIGNIFICANT CHANGE UP (ref 3.8–10.5)

## 2019-08-25 PROCEDURE — 99232 SBSQ HOSP IP/OBS MODERATE 35: CPT

## 2019-08-25 RX ADMIN — Medication 1 DROP(S): at 05:13

## 2019-08-25 RX ADMIN — Medication 1 APPLICATION(S): at 05:12

## 2019-08-25 RX ADMIN — Medication 1 DROP(S): at 22:30

## 2019-08-25 RX ADMIN — Medication 5 MILLIGRAM(S): at 17:43

## 2019-08-25 RX ADMIN — Medication 1 APPLICATION(S): at 17:43

## 2019-08-25 RX ADMIN — PREGABALIN 1000 MICROGRAM(S): 225 CAPSULE ORAL at 13:48

## 2019-08-25 RX ADMIN — Medication 1 TABLET(S): at 13:47

## 2019-08-25 RX ADMIN — Medication 1 MILLIGRAM(S): at 13:43

## 2019-08-25 RX ADMIN — Medication 5 MILLIGRAM(S): at 05:12

## 2019-08-25 NOTE — PROGRESS NOTE ADULT - PROBLEM SELECTOR PLAN 5
Iron low, ferritin normal, b12 and folate WNL  - monitor CBC  - per neuro send MMA/homocysteine, pending

## 2019-08-25 NOTE — PROGRESS NOTE ADULT - SUBJECTIVE AND OBJECTIVE BOX
Patient is a 74y old  Male who presents with a chief complaint of S/P fall, Rhabdo, R/O Pneumonia, Leukocytosis, Cough, R/O Cellulitis Face/lower Abdomen (25 Aug 2019 16:38)      SUBJECTIVE / OVERNIGHT EVENTS:  Pt was seen in AM. He is in good mood, reports feels fine, reports some discomfort around PEG site. no cp/sob/n/v/d/fever.     MEDICATIONS  (STANDING):  artificial tears (preservative free) Ophthalmic Solution 1 Drop(s) Both EYES three times a day  cyanocobalamin Injectable 1000 MICROGram(s) IntraMuscular daily  diazepam    Tablet 5 milliGRAM(s) Oral two times a day  folic acid 1 milliGRAM(s) Oral daily  multivitamin 1 Tablet(s) Oral daily  silver sulfADIAZINE 1% Cream 1 Application(s) Topical two times a day    MEDICATIONS  (PRN):  acetaminophen   Tablet .. 650 milliGRAM(s) Oral every 6 hours PRN Mild Pain (1 - 3), Moderate Pain (4 - 6)  ALBUTerol/ipratropium for Nebulization 3 milliLiter(s) Nebulizer every 6 hours PRN Shortness of Breath and/or Wheezing  ondansetron Injectable 4 milliGRAM(s) IV Push every 6 hours PRN Nausea and/or Vomiting      T(C): 36.8 (08-25-19 @ 13:06), Max: 36.8 (08-25-19 @ 13:06)  HR: 68 (08-25-19 @ 13:06) (67 - 68)  BP: 117/75 (08-25-19 @ 13:06) (100/55 - 117/75)  RR: 18 (08-25-19 @ 13:06) (16 - 18)  SpO2: 99% (08-25-19 @ 13:06) (97% - 100%)  CAPILLARY BLOOD GLUCOSE        I&O's Summary    24 Aug 2019 07:01  -  25 Aug 2019 07:00  --------------------------------------------------------  IN: 0 mL / OUT: 700 mL / NET: -700 mL    25 Aug 2019 07:01  -  25 Aug 2019 19:51  --------------------------------------------------------  IN: 150 mL / OUT: 1400 mL / NET: -1250 mL        PHYSICAL EXAM:  GENERAL: NAD, cachectic   HEAD:  Right ear with eschar anterior and posterior ear. surrounding redness and swelling, no discharge.   CHEST/LUNG: Clear to auscultation bilaterally; No wheeze  HEART: Regular rate and rhythm; No murmurs, rubs, or gallops  ABDOMEN: Soft, Nontender, Nondistended; Bowel sounds present; + PEG with binder, site clean   EXTREMITIES:  no edema   PSYCH: awake, alert   NEUROLOGY: nonfocal  : + fowler    LABS:                        11.1   9.55  )-----------( 416      ( 25 Aug 2019 06:15 )             34.6     08-25    140  |  98  |  18  ----------------------------<  136<H>  3.8   |  33<H>  |  0.72    Ca    9.1      25 Aug 2019 06:15  Phos  4.2     08-25  Mg     1.9     08-25                RADIOLOGY & ADDITIONAL TESTS:    Imaging Personally Reviewed:    Consultant(s) Notes Reviewed:      Care Discussed with Consultants/Other Providers:

## 2019-08-25 NOTE — PROGRESS NOTE ADULT - PROBLEM SELECTOR PLAN 4
Per PMD Dr Frank Britton [phone], patient has a long history of depression and anxiety and long term use of Benzo   - valium 5 mg BID via PEG  - f/u psych recs

## 2019-08-25 NOTE — PROGRESS NOTE ADULT - PROBLEM SELECTOR PLAN 1
Failed cineesophagogram x2, NPO  - appreciate neurology eval:  MRI brain and c-spine wnl; Anti-acetylcholine and MUSK ab sent per neuro, results pending  - s/p PEG on 8/21, tolerating TFs  - monitor closely for re-feeding syndrome

## 2019-08-26 ENCOUNTER — TRANSCRIPTION ENCOUNTER (OUTPATIENT)
Age: 74
End: 2019-08-26

## 2019-08-26 LAB
ACHR BIND AB SER-SCNC: 0 — SIGNIFICANT CHANGE UP
ANION GAP SERPL CALC-SCNC: 12 MMO/L — SIGNIFICANT CHANGE UP (ref 7–14)
BASOPHILS # BLD AUTO: 0.09 K/UL — SIGNIFICANT CHANGE UP (ref 0–0.2)
BASOPHILS NFR BLD AUTO: 0.8 % — SIGNIFICANT CHANGE UP (ref 0–2)
BUN SERPL-MCNC: 24 MG/DL — HIGH (ref 7–23)
CALCIUM SERPL-MCNC: 9.3 MG/DL — SIGNIFICANT CHANGE UP (ref 8.4–10.5)
CHLORIDE SERPL-SCNC: 99 MMOL/L — SIGNIFICANT CHANGE UP (ref 98–107)
CO2 SERPL-SCNC: 30 MMOL/L — SIGNIFICANT CHANGE UP (ref 22–31)
CREAT SERPL-MCNC: 0.72 MG/DL — SIGNIFICANT CHANGE UP (ref 0.5–1.3)
EOSINOPHIL # BLD AUTO: 0.38 K/UL — SIGNIFICANT CHANGE UP (ref 0–0.5)
EOSINOPHIL NFR BLD AUTO: 3.4 % — SIGNIFICANT CHANGE UP (ref 0–6)
GLUCOSE SERPL-MCNC: 138 MG/DL — HIGH (ref 70–99)
HCT VFR BLD CALC: 35.7 % — LOW (ref 39–50)
HGB BLD-MCNC: 11.3 G/DL — LOW (ref 13–17)
IMM GRANULOCYTES NFR BLD AUTO: 0.7 % — SIGNIFICANT CHANGE UP (ref 0–1.5)
LYMPHOCYTES # BLD AUTO: 0.84 K/UL — LOW (ref 1–3.3)
LYMPHOCYTES # BLD AUTO: 7.5 % — LOW (ref 13–44)
MAGNESIUM SERPL-MCNC: 2 MG/DL — SIGNIFICANT CHANGE UP (ref 1.6–2.6)
MCHC RBC-ENTMCNC: 31.7 % — LOW (ref 32–36)
MCHC RBC-ENTMCNC: 31.9 PG — SIGNIFICANT CHANGE UP (ref 27–34)
MCV RBC AUTO: 100.8 FL — HIGH (ref 80–100)
METHYLMALONATE SERPL-SCNC: 126 NMOL/L — SIGNIFICANT CHANGE UP (ref 0–378)
MONOCYTES # BLD AUTO: 1 K/UL — HIGH (ref 0–0.9)
MONOCYTES NFR BLD AUTO: 8.9 % — SIGNIFICANT CHANGE UP (ref 2–14)
NEUTROPHILS # BLD AUTO: 8.84 K/UL — HIGH (ref 1.8–7.4)
NEUTROPHILS NFR BLD AUTO: 78.7 % — HIGH (ref 43–77)
NRBC # FLD: 0 K/UL — SIGNIFICANT CHANGE UP (ref 0–0)
PHOSPHATE SERPL-MCNC: 4.1 MG/DL — SIGNIFICANT CHANGE UP (ref 2.5–4.5)
PLATELET # BLD AUTO: 385 K/UL — SIGNIFICANT CHANGE UP (ref 150–400)
PMV BLD: 10.1 FL — SIGNIFICANT CHANGE UP (ref 7–13)
POTASSIUM SERPL-MCNC: 3.8 MMOL/L — SIGNIFICANT CHANGE UP (ref 3.5–5.3)
POTASSIUM SERPL-SCNC: 3.8 MMOL/L — SIGNIFICANT CHANGE UP (ref 3.5–5.3)
RBC # BLD: 3.54 M/UL — LOW (ref 4.2–5.8)
RBC # FLD: 13.1 % — SIGNIFICANT CHANGE UP (ref 10.3–14.5)
SODIUM SERPL-SCNC: 141 MMOL/L — SIGNIFICANT CHANGE UP (ref 135–145)
WBC # BLD: 11.23 K/UL — HIGH (ref 3.8–10.5)
WBC # FLD AUTO: 11.23 K/UL — HIGH (ref 3.8–10.5)

## 2019-08-26 PROCEDURE — 99232 SBSQ HOSP IP/OBS MODERATE 35: CPT

## 2019-08-26 RX ADMIN — Medication 1 APPLICATION(S): at 05:10

## 2019-08-26 RX ADMIN — Medication 1 TABLET(S): at 12:32

## 2019-08-26 RX ADMIN — Medication 1 MILLIGRAM(S): at 12:32

## 2019-08-26 RX ADMIN — Medication 1 APPLICATION(S): at 17:05

## 2019-08-26 RX ADMIN — PREGABALIN 1000 MICROGRAM(S): 225 CAPSULE ORAL at 12:32

## 2019-08-26 RX ADMIN — Medication 1 DROP(S): at 21:30

## 2019-08-26 RX ADMIN — Medication 5 MILLIGRAM(S): at 05:10

## 2019-08-26 RX ADMIN — Medication 1 DROP(S): at 05:10

## 2019-08-26 RX ADMIN — Medication 5 MILLIGRAM(S): at 17:05

## 2019-08-26 NOTE — PROGRESS NOTE ADULT - PROBLEM SELECTOR PLAN 5
Iron low, ferritin normal, b12 and folate WNL  - monitor CBC  - per neuro send MMA pending  homocysteine nl

## 2019-08-26 NOTE — DISCHARGE NOTE PROVIDER - NSDCCPCAREPLAN_GEN_ALL_CORE_FT
PRINCIPAL DISCHARGE DIAGNOSIS  Diagnosis: Rhabdomyolysis  Assessment and Plan of Treatment: after a fall, presented with weakness and poor oral intake. Now improving. Monitor outpatient labs with primary care provider (PCP) Dr. Britton (CPK, CBC, BMP, electrolytes, iron studies)      SECONDARY DISCHARGE DIAGNOSES  Diagnosis: Dysphagia  Assessment and Plan of Treatment: You had a failed cineesophagogramexam, gastroenterology was consulted. You had a PEG tube placed on 8/21, tube feeds were started and now tolerating well.  monitor closely for re-feeding syndrome  Dietician recommendations- monitor weights, labs, BMs, skin integrity.  You were seen by the Neurology, your CAT scan head and MRI of brain was negative. Your workup result (Anti-MUSK Ab) is pending: you need to follow up for outpatient EMG with outpatient Neurologist Dr. Conner within 1-2 weeks after discharge from rehab, call office to make appt: [phone].    Diagnosis: FTT (failure to thrive) in adult  Assessment and Plan of Treatment: Due to underlying neurological degenerative condition, your T- spine MRI was noted with degenerative changes.   **You need close outpatient neuromuscular neurology eval for outpatient EMG with Neuro Dr. Conner.    Diagnosis: Severe protein-calorie malnutrition  Assessment and Plan of Treatment: Due to poor oral intake, likely due to dysphagia cannot be on a diet; continue PEG tube feedings as tolerated. Monitor weights, skin intergrity, labs, bowel movements.       Diagnosis: Urinary retention due to benign prostatic hyperplasia  Assessment and Plan of Treatment: Continue medications as prescribed. Spencer catheter was placed by urology as you were unable to urinate on your own.  Monitor outputs, outpatient trial of void if allows, and urology follow up outpatient with your doctor or at the ruology clinic.  If you are in need of a general medicine physician and post-discharge medical follow-up for further care/recommendations you may contact the [facility] for an appointment [phone].    Diagnosis: Scrotal swelling  Assessment and Plan of Treatment: Your ultrasound showed mild complex left hydrocele. Small right hydrocele. No sonographic evidence of testicular torsion  Outpatient follow up with Urology within -2 weeks after discharge from rehab:       Diagnosis: Macrocytic anemia  Assessment and Plan of Treatment: [place] CBC, iron studies, folate, B12 outpatient.   homocysteine normal, MMA normal   No ctive signs of bleeding    Diagnosis: Psychiatric disorder  Assessment and Plan of Treatment: Seen by the psychiatrist in the hospital, Continue your medications as directed and follow up with your PCP and psychiatrist for further evaluation and medical management. If you are ever in need of immediate psychiatric assistance you may reach out to the [facility] [phone].  **Outpatient follow up: [facility] Geriatric outpt. clinic: [phone]; [facility] outpt. walk in clinic : [phone] (9AM-7PM); [facility] Outpatient clinic: [phone]**    Diagnosis: Sepsis  Assessment and Plan of Treatment: Completed antibiotics in the hospital, no resolved. Workup negative.   Continue vest therapy.   Follow up outpatient with your doctor for repeat chest xray and further maangement      Diagnosis: Dental caries  Assessment and Plan of Treatment: you had a tooth extraxtion with dental in the hospital, you need to closely follow up outpatient with your dentist or at the Dental Clinic within 1-2 weeks after discharge from rehab: for a comprehensive dental care  exam       Diagnosis: Debility  Assessment and Plan of Treatment: continue physical therapy exercises as directed    Diagnosis: Ptosis, right  Assessment and Plan of Treatment: You need an outpatient Opthalmology evaluation.   Seen by plastics, no intervention, you can follow up outpatient with Plastics Dr. Lombardo.    Diagnosis: Wound of skin  Assessment and Plan of Treatment: You can follow up with outpatient plastics Dr. Lombardo for further management. Silvadene to ear.   - Peritubular skin from PEG tube: Cleanse q shift with NS, apply liquid barrier film beneath zak disc.  If redness noted under zak disc bumper apply thin foam  dressing without border (Mepilex Lite)- cut to mid dressing with a 'Y' shape. Secondary securement to abdomen taping in 'H' fashion with Steri-strips.   - Right anterior extending to posterior ear and between occipita and posterior ear: Clean with NS. Pat dry. Apply Liquid barrier film to periwound skin. Apply a piece of Medihoney alginate (item#[id number]), cover with foam with borders. Change daily.  - Right cheek: Clean with NS. Pat dry. Apply Liquid barrier film twice a day. Place Z-fluidized pillow in head to offload head and ears.  Right mandible: Clean with NS. Pat dry. Place Silicone foam with borders. Change daily.     - Right neck and shoulder scab: Apply Liquid barrier film once a day. Right iliac crest and right upper thigh: Clean with NS. Pat dry. Apply Liquid barrier film to periwound skin. Apply Medihoney gel to wound bed. Cover with Silicone foam with borders. Change daily.   - Left anterior thigh, and bilateral heels: Apply Liquid barrier film twice a day.   - Bilateral buttocks, bilateral groin and posterior scrotum: Clean with skin cleanser. Pat dry. Apply a thin layer of Mihai barrier cream. Apply twice a day or PRN.  - Left medial great and second toe: Clean with soap and water. Pat dry. Apply a piece of Sterile gauze (4x4) to separate toes, secure with small Kerlix. Change daily.   - Continue low air loss bed therapy, continue heel elevation with Z-flex fluidized positioning boots, continue to turn & reposition q2h with Z-tania fluidized positioning device, soft pillow between bony prominences, continue incontinence management with barrier creams & single breathable pad. Apply Interdry textile sheeting, under intertriginous scrotum leaving 2 inches exposed from groin at ends to wick, remove to wash &

## 2019-08-26 NOTE — DISCHARGE NOTE PROVIDER - CARE PROVIDERS DIRECT ADDRESSES
,DirectAddress_Unknown,DirectAddress_Unknown,DirectAddress_Unknown,[email or web address],DirectAddress_Unknown ,DirectAddress_Unknown,DirectAddress_Unknown,[email or web address],DirectAddress_Unknown,DirectAddress_Unknown

## 2019-08-26 NOTE — DISCHARGE NOTE PROVIDER - CARE PROVIDER_API CALL
Frank Britton)  Cardiology; Internal Medicine  [address] [facility], [address]  [place] [address]  Phone: [phone]  Fax: [phone]  Follow Up Time:     Daniel Zhang)  Plastic Surgery; Surgery; Surgical Critical Care  [address] [place] [address]  Phone: [phone]  Fax: [phone]  Follow Up Time:     Suresh Alvaardo)  Neurology  [facility]  [address] [place] [address]  Phone: [phone]  Fax: [phone]  Follow Up Time:     Gayathri Kong)  Gastroenterology  [address] [place] [address] [address]  [place] [address]  Phone: [phone]  Fax: [phone]  Follow Up Time:     Outpatient [place],   Phone: (   )    -  Fax: (   )    -  Follow Up Time: Frank Britton)  Cardiology; Internal Medicine  [address] [facility], [address]  [place] [address]  Phone: [phone]  Fax: [phone]  Follow Up Time:     Daniel Zhang)  Plastic Surgery; Surgery; Surgical Critical Care  [address] [place] [address]  Phone: [phone]  Fax: [phone]  Follow Up Time:     Gayathri Kong)  Gastroenterology  [address] [place] [address] [address]  [place] [address]  Phone: [phone]  Fax: [phone]  Follow Up Time:     Follow up Neurology outpatient Dr. Conner,   call to make appt within 2-3 weeks [phone]  To discuss test results, outpatient EMG test, neuromuscular workup  Phone: (   )    -  Fax: (   )    -  Follow Up Time:     Outpatient Dentist within 2-3 weeks,   follow up with your dentist or at the [facility] clinic for comprehensive dental care and workup  Phone: (   )    -  Fax: (   )    -  Follow Up Time: 2 weeks

## 2019-08-26 NOTE — PROGRESS NOTE ADULT - SUBJECTIVE AND OBJECTIVE BOX
Patient is a 74y old  Male who presents with a chief complaint of S/P fall, Rhabdo, R/O Pneumonia, Leukocytosis, Cough, R/O Cellulitis Face/lower Abdomen (26 Aug 2019 06:58)      SUBJECTIVE / OVERNIGHT EVENTS: Pt was seen and examined at 11:25am. No overnight events, reports feels fine, no cp/sob/n/v/d/fever.       MEDICATIONS  (STANDING):  artificial tears (preservative free) Ophthalmic Solution 1 Drop(s) Both EYES three times a day  diazepam    Tablet 5 milliGRAM(s) Oral two times a day  folic acid 1 milliGRAM(s) Oral daily  multivitamin 1 Tablet(s) Oral daily  silver sulfADIAZINE 1% Cream 1 Application(s) Topical two times a day    MEDICATIONS  (PRN):  acetaminophen   Tablet .. 650 milliGRAM(s) Oral every 6 hours PRN Mild Pain (1 - 3), Moderate Pain (4 - 6)  ALBUTerol/ipratropium for Nebulization 3 milliLiter(s) Nebulizer every 6 hours PRN Shortness of Breath and/or Wheezing  ondansetron Injectable 4 milliGRAM(s) IV Push every 6 hours PRN Nausea and/or Vomiting      Vital Signs Last 24 Hrs  T(C): 36.6 (26 Aug 2019 12:31), Max: 36.7 (25 Aug 2019 21:24)  T(F): 97.9 (26 Aug 2019 12:31), Max: 98 (25 Aug 2019 21:24)  HR: 69 (26 Aug 2019 12:31) (69 - 87)  BP: 106/66 (26 Aug 2019 12:31) (101/62 - 107/62)  BP(mean): --  RR: 18 (26 Aug 2019 12:31) (18 - 18)  SpO2: 99% (26 Aug 2019 12:31) (99% - 99%)  CAPILLARY BLOOD GLUCOSE        I&O's Summary    25 Aug 2019 07:01  -  26 Aug 2019 07:00  --------------------------------------------------------  IN: 150 mL / OUT: 1400 mL / NET: -1250 mL        PHYSICAL EXAM:  GENERAL: NAD, cachectic   HEENT:  Right ear with eschar anterior and posterior ear. surrounding mild redness and swelling, no discharge.   CHEST/LUNG: Clear to auscultation bilaterally; No wheeze  HEART: Regular rate and rhythm; No murmurs, rubs, or gallops  ABDOMEN: Soft, Nontender, Nondistended, + PEG with binder   EXTREMITIES:  no edema   PSYCH: Calm  NEUROLOGY: awake, alert, answers questions appropriately  : + fowler, scrotal edema+      LABS:                        11.3   11.23 )-----------( 385      ( 26 Aug 2019 06:41 )             35.7     08-26    141  |  99  |  24<H>  ----------------------------<  138<H>  3.8   |  30  |  0.72    Ca    9.3      26 Aug 2019 06:41  Phos  4.1     08-26  Mg     2.0     08-26                RADIOLOGY & ADDITIONAL TESTS:    Imaging Personally Reviewed:    Consultant(s) Notes Reviewed:      Care Discussed with Consultants/Other Providers:

## 2019-08-26 NOTE — DISCHARGE NOTE PROVIDER - NSDCFUADDAPPT_GEN_ALL_CORE_FT
Follow up with Dr. Britton within one week of discharge from the rehab facility    Follow up with neurology team within 1-2 weeks of discharge from the rehab facility     Follow up with gastroenterology team within 1-2 weeks of discharge from the rehab facility    Follow up with your outpatient dentist within 1-2 weeks of discharge from the rehab facility for comprehensive dental care Follow up with your primary doctor within one week of discharge from the rehab facility- repeat bloodwork (CBC, BMP, iron studies, psych referral)  Follow up with gastroenterology team within 1-2 weeks of discharge from the rehab facility  Follow up with your outpatient dentist within 1-2 weeks of discharge from the rehab facility for comprehensive dental care  *****[facility] outpt. clinic: [phone]; [facility] outpt. walk in clinic : [phone] (9AM-7PM); [facility] Outpatient clinic: [phone]

## 2019-08-26 NOTE — PROGRESS NOTE ADULT - PROBLEM SELECTOR PLAN 4
Per PMD Dr Frank Britton [phone], patient has a long history of depression and anxiety and long term use of Benzo   - valium 5 mg BID via PEG  - f/u psych recs

## 2019-08-26 NOTE — DISCHARGE NOTE PROVIDER - HOSPITAL COURSE
75 yo M with depression/anxiety/hoarding BIBEMS after being found down at home  his home admitted with sepsis 2/2 aspiration PNA (completed 5 days abx) and rhabdo c/b dysphagia and FTT of unclear etiology undergoing neuro eval (normal MRI brain and c-spine) possible neuromuscular illness and now s/p PEG 8/21 given dysphagia/FTT/weight loss.          Dysphagia    - Failed cineesophagogram x2, NPO    - neurology consulted    - MRI brain and c-spine wnl    - Anti-acetylcholine and MUSK ab sent per neuro, results pending    - s/p PEG on 8/21, tolerating TFs    - monitor closely for re-feeding syndrome.         FTT (failure to thrive) in adult    - Unclear cause, CTAP and CT chest without evidence of masses/LAD/malignancy.      - TSH/B12/folic acid wnl.  HIV negative    - appreciated neurology re: underlying neurological degenerative condition given dysphagia/retention/weakness    - MRI brain & C-spine wnl, f/u MUSK and Ach Ab    - T spine MRI 8/23: degenerative changes     - EMG as outpatient     - PT/OT for JEWELS    - needs OP neuromuscular neurology eval    - s/p PEG, tolerating TFs (started 8/22).         Urinary retention due to benign prostatic hyperplasia.    - Spencer replaced by urology on 8/14 after failing TOV.    - proscar daily.         Psychiatric disorder    - Per PMD Dr Frank Britton [phone], patient has a long history of depression and anxiety and long term use of Benzo     - valium 5 mg BID via PEG    - psych consulted         Macrocytic anemia    - Iron low, ferritin normal, b12 and folate WNL    - monitor CBC    - per neuro send MMA/homocysteine, pending.         Scrotal swelling    - Testicle US showed large complex left hydrocele. Urology aware. No sonographic evidence of testicular torsion.        Severe protein-calorie malnutrition    - Due to poor PO intake, currently 2/2 dysphagia cannot be on a diet; BMI 16    - status post IV thiamine x 3 days    - PEG feeds    - PT consulted, recs: rehab        Discussed case with  _____________ on ______________. patient medically stable for discharge to rehab facility 75 yo M with depression/anxiety/hoarding BIBEMS after being found down at home  his home admitted with sepsis 2/2 aspiration PNA (completed 5 days abx) and rhabdo c/b dysphagia and FTT of unclear etiology undergoing neuro eval (normal MRI brain and c-spine) possible neuromuscular illness and now s/p PEG 8/21 given dysphagia/FTT/weight loss, tolerating feeds well.         Dysphagia    - Failed cineesophagogram x2 (last on 8/15), NPO    - NGT - was Getting free water via NGT; refused reinsertion     - Neurology eval: CT head-neg; MRI brain and c-spine wnl; Anti-acetylcholine receptor ab- wnl (8/20); and MUSK pending results. advised to f/u with neuro as outpt in 2-3 weeks with Dr. Conner ([phone])    - s/p PEG on 8/21, tolerating TFs Jevity 1.2- total of 1680 ml- 2,016 kcal, @ 75ml/hr [id number] 93.24 gram of protein    - monitor closely for re-feeding syndrome    - Dietician recs- monitor weights, labs, BMs, skin integrity        FTT (failure to thrive) in adult.      - Unclear cause, CTAP and CT chest without evidence of masses/LAD/malignancy. TSH/B12/folic acid wnl. HIV negative    - appreciated neurology re: underlying neurological degenerative condition given dysphagia/retention/weakness--MRI brain & C-spine wnl, f/u MUSK and Ach Ab    -T spine MRI 8/23: degenerative changes     - PT/OT for JEWELS    - needs OP neuromuscular neurology eval, OP EMG with Neuro Dr. Conner    - s/p PEG, tolerating TFs (started 8/22).         Urinary retention due to benign prostatic hyperplasia    - UA - moderate blood, moderate ketone     - Started on Proscar    - Fowler placed, 8/14- failed TOV so fowler placed back again by Urology on 8/14, DC with fowler to rehab    - monitor for hematuria         Psychiatric disorder    - Per PMD Dr Frank Britton [phone], patient has a long history of depression and anxiety and long term use of Benzo, urine tox + benzo.    - Psych following- c/w home valium 5mg PO BID, diazepam coversion for fluzazepam (home med), prn valium     - Outpt F/U -[facility] outpt. clinic: [phone]; [facility] outpt. walk in clinic : [phone] (9AM-7PM); [facility] Outpatient clinic: [phone]        Macrocytic anemia    - Iron low, ferritin normal, b12 and folate WNL    - monitor CBC    - homocysteine normal, MMA wnl    - Outpt followup with PCP for repeat iron studies, folate and b12        Scrotal swelling.      - Scrotal US: large mildly complex left hydrocele. Small right hydrocele. No sonographic evidence of testicular torsion. urology aware.  No sonographic evidence of testicular torsion.    - outpt F/U with PCP or at urology Clinic          Severe protein-calorie malnutrition.      - Due to poor PO intake, currently 2/2 dysphagia cannot be on a diet; BMI 16    - IV thiamine x 3 days    - c/w PEG feeds Jevity 1.2 75cc/hr [id number]     - c/w MV.    - GI following           Rhabdomyolysis s/p fall    - + weakness, cachectic, poor oral intake    - s/p IV fluids, was Getting free water via NGT    - CPK =3311--> 1695. Trop=21--> 27--> 25    - TTE: EF 61% Mitral annular calcification, otherwise normal mitral    valve. Minimal mitral regurgitation. Minimal aortic regurgitation. Normal left ventricular systolic function. No segmental wall motion abnormalities. Right ventricular enlargement with normal right ventricular systolic function. Mild tricuspid regurgitation.        Sepsis 2/2 aspiration/ gram negative pneumonia    - Completed 5 days Zosyn, sepsis now resolved. BCX- NTD    - RVP neg , blood cultures neg. legionella neg    - 8/6 CT abd/pelvis: few RLL groundglass opacities     - CXR: The heart is not enlarged. Left lower lobe collapse, unchanged. Small left pleural effusion. Right mid lung linear atelectasis    - +New complete collapse of the left lower lobe, likely from mucous plugging --> vest therapy    - outpt F/U with PCP or at Pulm Clinic for further management         Dental caries and loose teeth    - CT head/maxilofacial showed an avulsed tooth/alveolar fracture. Poor dentition with multiple missing teeth    - Dental consulted - 8/20 s/p tooth extraction; if any difficulty swallowing/breathing, fever occur    - Outpt comprehensive dental care *        Right eye ptosis    - outpt Opthalmology eval    - 8/23 Wound care nurse reports worsening necrosis of right ear.     - Discussed with Dr. Sanchez, plastic surgery, as per Dr. Sanchez no acute intervention at this time, silvadene to ear. Can f/u outpt with plastic sx        Wound care recs:    - Peritubular skin from PEG tube: Cleanse q shift with NS, apply liquid barrier film beneath zak disc.  If redness noted under zak disc bumper apply thin foam  dressing without border (Mepilex Lite)- cut to mid dressing with a 'Y' shape. Secondary securement to abdomen taping in 'H' fashion with Steri-strips.     - Right anterior extending to posterior ear and between occipita and posterior ear: Clean with NS. Pat dry. Apply Liquid barrier film to periwound skin. Apply a piece of Medihoney alginate (item#[id number]), cover with foam with borders. Change daily.    - Right cheek: Clean with NS. Pat dry. Apply Liquid barrier film twice a day. Place Z-fluidized pillow in head to offload head and ears.  Right mandible: Clean with NS. Pat dry. Place Silicone foam with borders. Change daily.       - Right neck and shoulder scab: Apply Liquid barrier film once a day. Right iliac crest and right upper thigh: Clean with NS. Pat dry. Apply Liquid barrier film to periwound skin. Apply Medihoney gel to wound bed. Cover with Silicone foam with borders. Change daily.     - Left anterior thigh, and bilateral heels: Apply Liquid barrier film twice a day.     - Bilateral buttocks, bilateral groin and posterior scrotum: Clean with skin cleanser. Pat dry. Apply a thin layer of Mihai barrier cream. Apply twice a day or PRN.    - Left medial great and second toe: Clean with soap and water. Pat dry. Apply a piece of Sterile gauze (4x4) to separate toes, secure with small Kerlix. Change daily.     - Continue low air loss bed therapy, continue heel elevation with Z-flex fluidized positioning boots, continue to turn & reposition q2h with Z-tania fluidized positioning device, soft pillow between bony prominences, continue incontinence management with barrier creams & single breathable pad. Apply Interdry textile sheeting, under intertriginous scrotum leaving 2 inches exposed from groin at ends to wick, remove to wash & dry affected area, then replace. Individual sheeting may be used for up to 5 days unless soiled. Continue measures to decrease friction/shear/pressure.         DVT ppx: SQH        DISPO: rehab        8/27- VSS, pt denies any acute complaints. Plan of care and discharge meds discussed with Dr. Altamirano, patient is medically cleared and optimized for discharge to rehab today. Outpt F/U with PCP Dr. Britton, Psych, neuro Dr. Conner, GI, Urology, Optho, and Dental. 75 yo M with depression/anxiety/hoarding BIBEMS after being found down at home  his home admitted with sepsis 2/2 aspiration PNA (completed 5 days abx) and rhabdo c/b dysphagia and FTT of unclear etiology undergoing neuro eval (normal MRI brain and c-spine) possible neuromuscular illness and now s/p PEG 8/21 given dysphagia/FTT/weight loss, tolerating feeds well.         Dysphagia    - Failed cineesophagogram x2 (last on 8/15), NPO    - NGT - was Getting free water via NGT; refused reinsertion     - Neurology eval: CT head-neg; MRI brain and c-spine wnl; Anti-acetylcholine receptor ab- wnl (8/20); and MUSK pending results. advised to f/u with neuro as outpt in 2-3 weeks with Dr. Conner ([id number])    - s/p PEG on 8/21, tolerating TFs Jevity 1.2- total of 1680 ml- 2,016 kcal, @ 75ml/hr [id number] 93.24 gram of protein    - monitor closely for re-feeding syndrome    - Dietician recs- monitor weights, labs, BMs, skin integrity        FTT (failure to thrive) in adult.      - Unclear cause, CTAP and CT chest without evidence of masses/LAD/malignancy. TSH/B12/folic acid wnl. HIV negative    - appreciated neurology re: underlying neurological degenerative condition given dysphagia/retention/weakness--MRI brain & C-spine wnl, f/u MUSK and Ach Ab    -T spine MRI 8/23: degenerative changes     - PT/OT for JEWELS    - needs OP neuromuscular neurology eval, OP EMG with Neuro Dr. Conner, f/u pending studies with neuro as outpt    - s/p PEG, tolerating TFs (started 8/22).         Urinary retention due to benign prostatic hyperplasia    - UA - moderate blood, moderate ketone     - Started on Proscar    - Fowler placed, 8/14- failed TOV so fowler placed back again by Urology on 8/14, DC with fowler to rehab    - monitor for hematuria         Psychiatric disorder    - Per PMD Dr Frank Britton [phone], patient has a long history of depression and anxiety and long term use of Benzo, urine tox + benzo.    - Psych following- c/w home valium 5mg PO BID, diazepam coversion for fluzazepam (home med), prn valium     - Outpt F/U -[facility] Geriatric outpt. clinic: [phone]; [facility] outpt. walk in clinic : [phone] (9AM-7PM); [facility] Outpatient clinic: [phone]        Macrocytic anemia    - Iron low, ferritin normal, b12 and folate WNL    - monitor CBC    - homocysteine normal, MMA wnl    - Outpt followup with PCP for repeat iron studies, folate and b12        Scrotal swelling.      - Scrotal US: large mildly complex left hydrocele. Small right hydrocele. No sonographic evidence of testicular torsion. urology aware.  No sonographic evidence of testicular torsion.    - outpt F/U with PCP or at urology Clinic          Severe protein-calorie malnutrition.      - Due to poor PO intake, currently 2/2 dysphagia cannot be on a diet; BMI 16    - IV thiamine x 3 days    - c/w PEG feeds Jevity 1.2 75cc/hr [id number]     - c/w MV.    - GI following           Rhabdomyolysis s/p fall    - + weakness, cachectic, poor oral intake    - s/p IV fluids, was Getting free water via NGT    - CPK =3311--> 1695. Trop=21--> 27--> 25    - TTE: EF 61% Mitral annular calcification, otherwise normal mitral    valve. Minimal mitral regurgitation. Minimal aortic regurgitation. Normal left ventricular systolic function. No segmental wall motion abnormalities. Right ventricular enlargement with normal right ventricular systolic function. Mild tricuspid regurgitation.        Sepsis 2/2 aspiration/ gram negative pneumonia    - Completed 5 days Zosyn, sepsis now resolved. BCX- NTD    - RVP neg , blood cultures neg. legionella neg    - 8/6 CT abd/pelvis: few RLL groundglass opacities     - CXR: The heart is not enlarged. Left lower lobe collapse, unchanged. Small left pleural effusion. Right mid lung linear atelectasis    - +New complete collapse of the left lower lobe, likely from mucous plugging --> vest therapy    - outpt F/U with PCP or at Pulm Clinic for further management         Dental caries and loose teeth    - CT head/maxilofacial showed an avulsed tooth/alveolar fracture. Poor dentition with multiple missing teeth    - Dental consulted - 8/20 s/p tooth extraction; if any difficulty swallowing/breathing, fever occur    - Outpt comprehensive dental care *        Right eye ptosis    - outpt Opthalmology eval    - 8/23 Wound care nurse reports worsening necrosis of right ear.     - Discussed with Dr. Sanchez, plastic surgery, as per Dr. Sanchez no acute intervention at this time, silvadene to ear. Can f/u outpt with plastic sx        Wound care recs:    - Peritubular skin from PEG tube: Cleanse q shift with NS, apply liquid barrier film beneath zak disc.  If redness noted under zak disc bumper apply thin foam  dressing without border (Mepilex Lite)- cut to mid dressing with a 'Y' shape. Secondary securement to abdomen taping in 'H' fashion with Steri-strips.     - Right anterior extending to posterior ear and between occipita and posterior ear: Clean with NS. Pat dry. Apply Liquid barrier film to periwound skin. Apply a piece of Medihoney alginate (item#[id number]), cover with foam with borders. Change daily.    - Right cheek: Clean with NS. Pat dry. Apply Liquid barrier film twice a day. Place Z-fluidized pillow in head to offload head and ears.  Right mandible: Clean with NS. Pat dry. Place Silicone foam with borders. Change daily.       - Right neck and shoulder scab: Apply Liquid barrier film once a day. Right iliac crest and right upper thigh: Clean with NS. Pat dry. Apply Liquid barrier film to periwound skin. Apply Medihoney gel to wound bed. Cover with Silicone foam with borders. Change daily.     - Left anterior thigh, and bilateral heels: Apply Liquid barrier film twice a day.     - Bilateral buttocks, bilateral groin and posterior scrotum: Clean with skin cleanser. Pat dry. Apply a thin layer of Mihai barrier cream. Apply twice a day or PRN.    - Left medial great and second toe: Clean with soap and water. Pat dry. Apply a piece of Sterile gauze (4x4) to separate toes, secure with small Kerlix. Change daily.     - Continue low air loss bed therapy, continue heel elevation with Z-flex fluidized positioning boots, continue to turn & reposition q2h with Z-tania fluidized positioning device, soft pillow between bony prominences, continue incontinence management with barrier creams & single breathable pad. Apply Interdry textile sheeting, under intertriginous scrotum leaving 2 inches exposed from groin at ends to wick, remove to wash & dry affected area, then replace. Individual sheeting may be used for up to 5 days unless soiled. Continue measures to decrease friction/shear/pressure.         DVT ppx: SQH        DISPO: rehab        8/27- VSS, pt denies any acute complaints. Plan of care and discharge meds discussed with Dr. Altamirano, patient is medically cleared and optimized for discharge to rehab today. Outpt F/U with PCP Dr. Britton, Psych, neuro Dr. Conner, GI, Urology, Optho, and Dental.

## 2019-08-26 NOTE — CHART NOTE - NSCHARTNOTEFT_GEN_A_CORE
Source: Patient and Medical Chart Reviewed  Enteral /Parenteral Nutrition: Diet, NPO with Tube Feed:   Tube Feeding Modality: Gastrostomy  Jevity 1.2 Chilo (JEVITY1.2RTH)  Total Volume for 24 Hours (mL): 1680  Continuous  Starting Tube Feed Rate {mL per Hour}: 10     Every 4 hours  Until Goal Tube Feed Rate (mL per Hour): 70  Tube Feed Duration (in Hours): 24  Tube Feed Start Time: 10:00 (08-22-19 @ 09:55)    Current Weight: Weight: 56.2 kg 8/25,  64 kg kg 8/21,  63.9 kg 8/13    Pt continues with dx of FTT and with macrocytic anemia receiving B12 injections, folic acid, MVI and IV thiamine x 3 days (completed). Pt had PEG placed 8/21. Visited pt who states that he does not feel too hungry. Enteral feeding being tolerated. Pt has multiple advanced pressure injuries: Unstagable R Anterior and Posterior Ear, R Cheek, R Mandible; Stage 2 R Iliac crest, R Shoulder and DTI of L First Toe. 4+ scrotal edema noted. In view of wt stable since admission and with advanced stage pressure injuries, recommend increasing the rate of enteral feeding to Jevity 1.2 @ 75ml/hr x 24 hours (provides 2160 kcal, 100 gm protein, 1452 ml Free H2O and 1800 ml Total Volume) to meet pt's estimated needs on the higher end of the range. Pt remains at risk of malnutrition based on Dx FTT, stable wt, and emaciated appearance with nutrition focused physical exam exemplifying no notable change from 8/13.     __________________ Pertinent Medications__________________   MEDICATIONS  (STANDING):  artificial tears (preservative free) Ophthalmic Solution 1 Drop(s) Both EYES three times a day  cyanocobalamin Injectable 1000 MICROGram(s) IntraMuscular daily  diazepam    Tablet 5 milliGRAM(s) Oral two times a day  folic acid 1 milliGRAM(s) Oral daily  multivitamin 1 Tablet(s) Oral daily  silver sulfADIAZINE 1% Cream 1 Application(s) Topical two times a day    MEDICATIONS  (PRN):  acetaminophen   Tablet .. 650 milliGRAM(s) Oral every 6 hours PRN Mild Pain (1 - 3), Moderate Pain (4 - 6)  ALBUTerol/ipratropium for Nebulization 3 milliLiter(s) Nebulizer every 6 hours PRN Shortness of Breath and/or Wheezing  ondansetron Injectable 4 milliGRAM(s) IV Push every 6 hours PRN Nausea and/or Vomiting      __________________ Pertinent Labs__________________   08-26 Na141 mmol/L Glu 138 mg/dL<H> K+ 3.8 mmol/L Cr  0.72 mg/dL BUN 24 mg/dL<H> 08-26 Phos 4.1 mg/dL 08-07 [id number] 5.4 % 08-07 Chol 127 mg/dL LDL 56 mg/dL HDL 63 mg/dL<H> Trig 89 mg/dL    Estimated Needs:   [x ] no change since previous assessment      Previous Nutrition Diagnosis:     [x ] Malnutrition     Recommend: Increasing rate of enteral PEG feeding to Jevity 1.2 @ 75ml/hr x 24 hrs.     Monitor weights, labs, BM's, skin integrity, edema and tolerance to adjusted rate of enteral feeding. Follow up as per protocol.

## 2019-08-26 NOTE — DISCHARGE NOTE PROVIDER - INSTRUCTIONS
NPO with PEG tube feeds  Total volume for 24 hours: 1680 mL  70 ml per hour  Tube feed duration: 24 hours NPO with PEG tube feeds  Total volume for 24 hours: 1680 mL, 2,016 kcal, 93.24 gram of protein  Rate: 75ml per hour  Tube feed duration: 24 hours

## 2019-08-26 NOTE — DISCHARGE NOTE NURSING/CASE MANAGEMENT/SOCIAL WORK - NSDCFUADDAPPT_GEN_ALL_CORE_FT
Follow up with Dr. Britton within one week of discharge from the rehab facility    Follow up with neurology team within 1-2 weeks of discharge from the rehab facility     Follow up with gastroenterology team within 1-2 weeks of discharge from the rehab facility    Follow up with your outpatient dentist within 1-2 weeks of discharge from the rehab facility for comprehensive dental care

## 2019-08-26 NOTE — DISCHARGE NOTE PROVIDER - NSDCCAREPROVSEEN_GEN_ALL_CORE_FT
[facility] Medicine ACP, Team [facility] Medicine ACP, Team  Rebecca Altamirano

## 2019-08-26 NOTE — DISCHARGE NOTE PROVIDER - NSFOLLOWUPCLINICS_GEN_ALL_ED_FT
[facility]  Dental  [address] [place] [address]  Phone: [phone]  Fax:     [facility]  Behavioral Health  [address] [place] [address]  Phone: [phone]  Fax:     [facility] - Urology  Urology  [address], 3rd & 4th [place] [address]  Phone: [phone]  Fax:   Follow Up Time:     [facility]  Gastroenterology  [address] [place] [address], [facility] [address]  [place] [address]  Phone: [phone]  Fax:   Follow Up Time:

## 2019-08-26 NOTE — DISCHARGE NOTE PROVIDER - PROVIDER TOKENS
PROVIDER:[TOKEN:[[id number]]],PROVIDER:[TOKEN:[[id number]]],PROVIDER:[TOKEN:[[id number]]],PROVIDER:[TOKEN:[[id number]]],FREE:[LAST:[Outpatient Denist],PHONE:[(   )    -],FAX:[(   )    -]] PROVIDER:[TOKEN:[[id number]]],PROVIDER:[TOKEN:[[id number]]],PROVIDER:[TOKEN:[[id number]]],FREE:[LAST:[Follow up Neurology outpatient Dr. Conner],PHONE:[(   )    -],FAX:[(   )    -],ADDRESS:[call to make appt within 2-3 weeks [phone]  To discuss test results, outpatient EMG test, neuromuscular workup]],FREE:[LAST:[Outpatient Dentist within 2-3 weeks],PHONE:[(   )    -],FAX:[(   )    -],ADDRESS:[follow up with your dentist or at the [facility] Dental clinic for comprehensive dental care and workup],FOLLOWUP:[2 weeks]]

## 2019-08-26 NOTE — DISCHARGE NOTE NURSING/CASE MANAGEMENT/SOCIAL WORK - NSDCDPATPORTLINK_GEN_ALL_CORE
You can access the [facility], offered by [facility], by registering with the following website: [email or web address]

## 2019-08-27 VITALS
RESPIRATION RATE: 16 BRPM | DIASTOLIC BLOOD PRESSURE: 57 MMHG | HEART RATE: 82 BPM | SYSTOLIC BLOOD PRESSURE: 97 MMHG | OXYGEN SATURATION: 96 % | TEMPERATURE: 98 F

## 2019-08-27 DIAGNOSIS — H02.401 UNSPECIFIED PTOSIS OF RIGHT EYELID: ICD-10-CM

## 2019-08-27 LAB — MISCELLANEOUS - CHEM: SIGNIFICANT CHANGE UP

## 2019-08-27 PROCEDURE — 99233 SBSQ HOSP IP/OBS HIGH 50: CPT

## 2019-08-27 RX ORDER — FOLIC ACID 0.8 MG
1 TABLET ORAL
Qty: 0 | Refills: 0 | DISCHARGE
Start: 2019-08-27

## 2019-08-27 RX ORDER — DIAZEPAM 5 MG
1 TABLET ORAL
Qty: 0 | Refills: 0 | DISCHARGE
Start: 2019-08-27

## 2019-08-27 RX ORDER — IPRATROPIUM/ALBUTEROL SULFATE 18-103MCG
3 AEROSOL WITH ADAPTER (GRAM) INHALATION
Qty: 0 | Refills: 0 | DISCHARGE
Start: 2019-08-27

## 2019-08-27 RX ORDER — ACETAMINOPHEN 500 MG
2 TABLET ORAL
Qty: 0 | Refills: 0 | DISCHARGE
Start: 2019-08-27

## 2019-08-27 RX ORDER — FLURAZEPAM HCL 15 MG
1 CAPSULE ORAL
Qty: 0 | Refills: 0 | DISCHARGE

## 2019-08-27 RX ORDER — DIAZEPAM 5 MG
1 TABLET ORAL
Qty: 0 | Refills: 0 | DISCHARGE

## 2019-08-27 RX ADMIN — Medication 1 DROP(S): at 14:07

## 2019-08-27 RX ADMIN — Medication 1 MILLIGRAM(S): at 17:07

## 2019-08-27 RX ADMIN — Medication 1 TABLET(S): at 17:08

## 2019-08-27 RX ADMIN — Medication 5 MILLIGRAM(S): at 05:26

## 2019-08-27 RX ADMIN — Medication 5 MILLIGRAM(S): at 17:08

## 2019-08-27 RX ADMIN — Medication 1 APPLICATION(S): at 05:26

## 2019-08-27 NOTE — PROGRESS NOTE BEHAVIORAL HEALTH - NSBHFUPINTERVALCCFT_PSY_A_CORE
" I am feeling OK"
" I am feeling like my self now, I am getting my valium now"
" I am feeling tired"
"How long have I been here?"

## 2019-08-27 NOTE — PROGRESS NOTE ADULT - PROBLEM SELECTOR PROBLEM 2
FTT (failure to thrive) in adult
Urinary retention due to benign prostatic hyperplasia
FTT (failure to thrive) in adult
Acute respiratory failure with hypoxia
Urinary retention due to benign prostatic hyperplasia
Acute respiratory failure with hypoxia
FTT (failure to thrive) in adult
Urinary retention due to benign prostatic hyperplasia
FTT (failure to thrive) in adult
FTT (failure to thrive) in adult
Urinary retention due to benign prostatic hyperplasia
FTT (failure to thrive) in adult
Urinary retention due to benign prostatic hyperplasia
Urinary retention due to benign prostatic hyperplasia

## 2019-08-27 NOTE — PROGRESS NOTE ADULT - REASON FOR ADMISSION
S/P fall, Rhabdo, R/O Pneumonia, Leukocytosis, Cough, R/O Cellulitis Face/lower Abdomen
weakness, CK elevation, PNA, FTT/dysphagia
S/P fall, Rhabdo, R/O Pneumonia, Leukocytosis, Cough, R/O Cellulitis Face/lower Abdomen
FTT, PNA
FTT, dysphagia
FTT, dysphagia, NM weakness, rhabdo, PNA
S/P fall, Rhabdo, R/O Pneumonia, Leukocytosis, Cough, R/O Cellulitis Face/lower Abdomen
fall w/ rhabdo, PNA s/p abx, FTT, dysphagia
S/P fall, Rhabdo, R/O Pneumonia, Leukocytosis, Cough, R/O Cellulitis Face/lower Abdomen
S/P fall, Rhabdo, R/O Pneumonia, Leukocytosis, Cough, R/O Cellulitis Face/lower Abdomen

## 2019-08-27 NOTE — PROGRESS NOTE ADULT - PROVIDER SPECIALTY LIST ADULT
Anesthesia
Dental
Dental
Gastroenterology
Gastroenterology
Hospitalist
Neurology
Plastic Surgery
Plastic Surgery
Hospitalist

## 2019-08-27 NOTE — PROGRESS NOTE ADULT - PROBLEM SELECTOR PLAN 9
Patient gives providers consent to discuss his medical information with his girlfriend of 4 years, Zamzam Petenatalie: [phone] but does not want to sign her as his HCP at this time. Palliative care following, appreciate c/s  Pt being d/c to rehab today, d/c planning time spent in coordination 40 mts ( discussion with CM, SW, NP, preparing d/c summary and plan) f/u with neuro with pending labs.
Patient gives providers consent to discuss his medical information with his girlfriend of 4 years, Zamzam Petenatalie: [phone] but does not want to sign her as his HCP at this time. Agreeable to PEG. Also informed of risk of bleeding/infection/aspiration with PEG. Currently wants trial of resuscitation with CPR/intubation if needed. Palliative care following
Patient gives providers consent to discuss his medical information with his girlfriend of 4 years, Zamzam Petenatalie: [phone] but does not want to sign her as his HCP at this time. Agreeable to PEG. Also informed of risk of bleeding/infection/aspiration with PEG. Currently wants trial of resuscitation with CPR/intubation if needed. Palliative care following
[facility]  PT eval rec rehab
Patient gives providers consent to discuss his medical information with his girlfriend of 4 years, Zamzam Chirinos: [phone] but is thinking of making her HCP but needed some time to read over the papers before signing. Pt was able to explain what a HCP does in clear though process. Also states he would not want a feeding tube permanently and is willing to accept risks of aspiration, however would want a trial of resuscitation with CPR/intubation if needed.
DVT Prophylaxis: HSQ  PT eval: rehab
Due to poor PO intake, currently 2/2 dysphagia cannot be on a diet  - will need to f/u pallcare re: ?pleasure feeds  - f/u with pt re: PEG/NGT  - IV thiamine x 3 days
Patient gives providers consent to discuss his medical information with his girlfriend of 4 years, Zamzam Candis: [phone] but does not want to sign her as his HCP at this time. Agreeable to PEG. Also informed of risk of bleeding/infection/aspiration with PEG. Currently wants trial of resuscitation with CPR/intubation if needed. Palliative care following, appreciate c/s
Patient gives providers consent to discuss his medical information with his girlfriend of 4 years, Zamzam Candis: [phone] but does not want to sign her as his HCP at this time. Agreeable to PEG. Also informed of risk of bleeding/infection/aspiration with PEG. Currently wants trial of resuscitation with CPR/intubation if needed. Palliative care following, appreciate c/s
Patient gives providers consent to discuss his medical information with his girlfriend of 4 years, Zamzam Candis: [phone] but does not want to sign her as his HCP at this time. Palliative care following, appreciate c/s
Patient gives providers consent to discuss his medical information with his girlfriend of 4 years, Zamzam Candis: [phone] but does not want to sign her as his HCP at this time. Agreeable to PEG. Also informed of risk of bleeding/infection/aspiration with PEG. Currently wants trial of resuscitation with CPR/intubation if needed. Palliative care following, appreciate c/s
Patient gives providers consent to discuss his medical information with his girlfriend of 4 years, Zamzam Chirinos: [phone] but does not want to sign her as his HCP at this time. Currently does not want NGT reinserted and wants time to think about PEG. Understands risks of aspiration with both pleasure feed and PEG. Also informed of risk of bleeding/infection with PEG. Currently wants trial of resuscitation with CPR/intubation if needed. Palliative care input would be appreciated to help further guide GO
Patient gives providers consent to discuss his medical information with his girlfriend of 4 years, Zamzam Chirinos: [phone] but is thinking of making her HCP but needed some time to read over the papers before signing. Pt was able to explain what a HCP does in clear though process. Also states he would not want a feeding tube permanently and is willing to accept risks of aspiration, however would want a trial of resuscitation with CPR/intubation if needed.
Patient gives providers consent to discuss his medical information with his girlfriend of 4 years, Zamzam Petenatalie: [phone] but does not want to sign her as his HCP at this time. Palliative care following, appreciate c/s  Pt being d/c to rehab today, d/c planning time spent in coordination 40 mts ( discussion with CM, SW, NP, preparing d/c summary and plan) f/u with neuro with pending labs.
Patient gives providers consent to discuss his medical information with his girlfriend of 4 years, Zamzam Petenatalie: [phone] but does not want to sign her as his HCP at this time. Agreeable to PEG. Also informed of risk of bleeding/infection/aspiration with PEG. Currently wants trial of resuscitation with CPR/intubation if needed. Palliative care following
Patient gives providers consent to discuss his medical information with his girlfriend of 4 years, Zamzam Candis: [phone] but does not want to sign her as his HCP at this time. Palliative care following, appreciate c/s
DVT Prophylaxis: HSQ  PT eval: rehab
DVT Prophylaxis: HSQ  PT eval

## 2019-08-27 NOTE — PROGRESS NOTE BEHAVIORAL HEALTH - SUMMARY
74M living alone, +girlfriend; PMH scrotal hydrocele; PPH unspecified depression vs anxiety; BIBEMS s/p fall one night prior to being found on floor during wellness check. Per EMS, patient was found in a "hoarder house buried face down in newspapers". Psychiatry consulted for long history of BZD use.    8/7: Patient currently with altered mental status in the setting of unclear acute medical issue(s), with possible history of longer functional decline. Though patient is at risk of delirium and also currently in respiratory distress, patient should be continued on BZD to avoid withdrawal, which can be very serious (as pt. has been on benzo) for a while.  Recommend to decrease the dose of standing benzo. and monitor vitals, O2 saturation very closely.    8/8: Patient with improved oxygen saturation and mental status though with impaired recent memory in the setting of unclear acute/subacute medical issues. Patient denies history of anxiety/depression but confirms daily BZD use. Patient c/t have some level of delirium.    8/12: Pt. seen and evaluated, AAOX2, mental status appears to be better than last week,  calm, cooperative, overall linear and mostly coherent. Patient expressed some frustration being in the hospital and due to acute medical issues, however he denies feeling depressed or anxious. Denies acute psychotic sxs and denies SI and HI.
74M living alone, +girlfriend; PMH scrotal hydrocele; PPH unspecified depression vs anxiety; BIBEMS s/p fall one night prior to being found on floor during wellness check. Per EMS, patient was found in a "hoarder house buried face down in newspapers". Psychiatry consulted for long history of BZD use.    8/7: Patient currently with altered mental status in the setting of unclear acute medical issue(s), with possible history of longer functional decline. Though patient is at risk of delirium and also currently in respiratory distress, patient should be continued on BZD to avoid withdrawal, which can be very serious (as pt. has been on benzo) for a while.  Recommend to decrease the dose of standing benzo. and monitor vitals, O2 saturation very closely.    8/8: Patient with improved oxygen saturation and mental status though with impaired recent memory in the setting of unclear acute/subacute medical issues. Patient denies history of anxiety/depression but confirms daily BZD use. Patient c/t have some level of delirium.
74M living alone, +girlfriend; PMH scrotal hydrocele; PPH unspecified depression vs anxiety; BIBEMS s/p fall one night prior to being found on floor during wellness check. Per EMS, patient was found in a "hoarder house buried face down in newspapers". Psychiatry consulted for long history of BZD use.    8/7: Patient currently with altered mental status in the setting of unclear acute medical issue(s), with possible history of longer functional decline. Though patient is at risk of delirium and also currently in respiratory distress, patient should be continued on BZD to avoid withdrawal, which can be very serious (as pt. has been on benzo) for a while.  Recommend to decrease the dose of standing benzo. and monitor vitals, O2 saturation very closely.    8/8: Patient with improved oxygen saturation and mental status though with impaired recent memory in the setting of unclear acute/subacute medical issues. Patient denies history of anxiety/depression but confirms daily BZD use. Patient c/t have some level of delirium.    8/12: Pt. seen and evaluated, AAOX2, mental status appears to be better than last week,  calm, cooperative, overall linear and mostly coherent. Patient expressed some frustration being in the hospital and due to acute medical issues, however he denies feeling depressed or anxious. Denies acute psychotic sxs and denies SI and HI.    8/13: Psychiatry was reconsulted for capacity to participate in goals of care: Pt. seen and evaluated, AAOX3, mental status appears to be better than last week,  calm, cooperative,  linear and mostly coherent. Patient expressed frustration and anxiety in the context of being in the hospital and due to acute medical issues, however he denies feeling depressed.  Denies acute psychotic sxs and denies SI and HI.  Discussed about current acute medical issues. Patient knows his current acute medical condition, knows his current treatment, patient stated that  he does not want a feeding tube permanently. Patient was able to verbalized understanding of the indication/risks/benefits of accepting or refusing the proposed treatment. Patient was able to manipulate information provided to him in a logical manner, able to appreciate his current acute medical situation, patient was able verbalized understanding of the risk of aspiration pneumonia and even death. Patient also told me that he had a discussion with Dr. Tse, and he would want a trial of resuscitation with CPR/intubation if needed. Based on my evaluation, at this time patient has capacity to make decision regarding his goals of care.    8/27: Pt. seen and evaluated, AAOX3,  calm, cooperative,  linear and  coherent. Patient expressed some frustration being in the hospital,  however he denies feeling depressed.  Denies acute psychotic sxs and denies SI and HI. Patient stated that since he has been on Valium again, he is feeling much better, denies feeling anxious. Stated that he has been on Valium for the last 4 years, prescribed by PCP Dr. Frank Britton. Stated that his GF Zamzam has been visiting. Patient is looking forward to go to the rehab. and looking forward to listen to music. Discussed about outpt. follow up. Pt. is not interested seeing an outpt.  psychiatrist and stated that he prefers to get his Valium from his PCP.    PLAN:   1-Continue Valium 5mg PO BID  2-Valium 5 mg PO q8h PRN for anxiety/agitation  3-Can give additional Valium 5mg PO qhs PRN insomnia     ***Please continue to monitor respiratory status/oxygen saturation closely; hold BZD for oversedation or if concern for respiratory depression***
74M living alone, +girlfriend; PMH scrotal hydrocele; PPH unspecified depression vs anxiety; BIBEMS s/p fall one night prior to being found on floor during wellness check. Per EMS, patient was found in a "hoarder house buried face down in newspapers". Psychiatry consulted for long history of BZD use.    8/7: Patient currently with altered mental status in the setting of unclear acute medical issue(s), with possible history of longer functional decline. Though patient is at risk of delirium and also currently in respiratory distress, patient should be continued on BZD to avoid withdrawal, which can be very serious (as pt. has been on benzo) for a while.  Recommend to decrease the dose of standing benzo. and monitor vitals, O2 saturation very closely.    8/8: Patient with improved oxygen saturation and mental status though with impaired recent memory in the setting of unclear acute/subacute medical issues. Patient denies history of anxiety/depression but confirms daily BZD use. Patient c/t have some level of delirium.    8/12: Pt. seen and evaluated, AAOX2, mental status appears to be better than last week,  calm, cooperative, overall linear and mostly coherent. Patient expressed some frustration being in the hospital and due to acute medical issues, however he denies feeling depressed or anxious. Denies acute psychotic sxs and denies SI and HI.    8/13: Psychiatry was reconsulted for capacity to participate in goals of care: Pt. seen and evaluated, AAOX3, mental status appears to be better than last week,  calm, cooperative,  linear and mostly coherent. Patient expressed frustration and anxiety in the context of being in the hospital and due to acute medical issues, however he denies feeling depressed.  Denies acute psychotic sxs and denies SI and HI.  Discussed about current acute medical issues. Patient knows his current acute medical condition, knows his current treatment, patient stated that  he does not want a feeding tube permanently. Patient was able to verbalized understanding of the indication/risks/benefits of accepting or refusing the proposed treatment. Patient was able to manipulate information provided to him in a logical manner, able to appreciate his current acute medical situation, patient was able verbalized understanding of the risk of aspiration pneumonia and even death. Patient also told me that he had a discussion with Dr. Tse, and he would want a trial of resuscitation with CPR/intubation if needed. Based on my evaluation, at this time patient has capacity to make decision regarding his goals of care.    Discussed with Dr. Hull.

## 2019-08-27 NOTE — PROGRESS NOTE BEHAVIORAL HEALTH - NSBHFUPINTERVALHXFT_PSY_A_CORE
Patient did not get any PRNs, now on Valium 5mg po bid  Pt. seen and evaluated, AAOX3,  calm, cooperative,  linear and  coherent. Patient expressed some frustration being in the hospital,  however he denies feeling depressed.  Denies acute psychotic sxs and denies SI and HI. Patient stated that since he has been on Valium again, he is feeling much better, denies feeling anxious. Stated that he has been on Valium for the last 4 years, prescribed by PCP Dr. Frank Britton. Stated that his GF Zamzam has been visiting. Patient is looking forward to go to the rehab. and looking forward to listen to music.  Pt. is not interested seeing an outpt.  psychiatrist and stated that he prefers to get his Valium from his PCP. Patient did not get any PRNs, now on Valium 5mg po bid  Pt. seen and evaluated, AAOX3,  calm, cooperative,  linear and  coherent. Patient expressed some frustration being in the hospital,  however he denies feeling depressed.  Denies acute psychotic sxs and denies SI and HI. Patient stated that since he has been on Valium again, he is feeling much better, denies feeling anxious. Stated that he has been on Valium for the last 4 years, prescribed by PCP Dr. Frank Britton. Stated that his GF Zamzam has been visiting. Patient is looking forward to go to the rehab. and looking forward to listen to music.  Pt. is not interested seeing an outpt.  psychiatrist and stated that he prefers to get his Valium from his PCP.    Spoke with pt.'s GF Ms. Wyman Candis: [phone] (pt. gave consent); Ms. Wyman has no acute psychiatric safety concerns. Discussed providing outpt. psychiatry referrals.

## 2019-08-27 NOTE — PROGRESS NOTE ADULT - PROBLEM SELECTOR PLAN 4
Per PMD Dr Frank Britton [phone], patient has a long history of depression and anxiety and long term use of Benzo   - valium 5 mg BID via PEG  - f/u psych recs

## 2019-08-27 NOTE — PROGRESS NOTE BEHAVIORAL HEALTH - OTHER
cachectic not assessed gait not assessed "better, I am getting my valium" more goal directed focused on getting better, future oriented

## 2019-08-27 NOTE — PROGRESS NOTE BEHAVIORAL HEALTH - NSBHCHARTREVIEWVS_PSY_A_CORE FT
IICU Vital Signs Last 24 Hrs  T(C): 36.5 (27 Aug 2019 05:19), Max: 36.7 (26 Aug 2019 21:26)  T(F): 97.7 (27 Aug 2019 05:19), Max: 98.1 (26 Aug 2019 21:26)  HR: 70 (27 Aug 2019 05:19) (70 - 82)  BP: 100/60 (27 Aug 2019 05:19) (100/60 - 101/57)  BP(mean): --  ABP: --  ABP(mean): --  RR: 17 (27 Aug 2019 05:19) (17 - 17)  SpO2: 95% (27 Aug 2019 05:19) (95% - 97%)
ICU Vital Signs Last 24 Hrs  T(C): 36.9 (13 Aug 2019 05:47), Max: 36.9 (13 Aug 2019 05:47)  T(F): 98.4 (13 Aug 2019 05:47), Max: 98.4 (13 Aug 2019 05:47)  HR: 90 (13 Aug 2019 05:47) (90 - 93)  BP: 115/63 (13 Aug 2019 05:47) (103/56 - 115/63)  BP(mean): --  ABP: --  ABP(mean): --  RR: 17 (13 Aug 2019 05:47) (17 - 19)  SpO2: 96% (13 Aug 2019 05:47) (96% - 98%)
ICU Vital Signs Last 24 Hrs  T(C): 36.7 (12 Aug 2019 12:12), Max: 37 (12 Aug 2019 05:29)  T(F): 98 (12 Aug 2019 12:12), Max: 98.6 (12 Aug 2019 05:29)  HR: 82 (12 Aug 2019 12:12) (74 - 93)  BP: 111/64 (12 Aug 2019 12:12) (111/64 - 130/57)  BP(mean): --  ABP: --  ABP(mean): --  RR: 19 (12 Aug 2019 12:12) (17 - 19)  SpO2: 94% (12 Aug 2019 12:12) (94% - 98%)
Vital Signs Last 24 Hrs  T(C): 37.2 (08 Aug 2019 06:00), Max: 38.5 (07 Aug 2019 13:51)  T(F): 99 (08 Aug 2019 06:00), Max: 101.3 (07 Aug 2019 13:51)  HR: 68 (08 Aug 2019 06:00) (64 - 101)  BP: 115/65 (08 Aug 2019 06:00) (115/65 - 122/61)  BP(mean): 86 (07 Aug 2019 16:00) (86 - 86)  RR: 18 (08 Aug 2019 06:00) (17 - 22)  SpO2: 98% (08 Aug 2019 06:00) (91% - 98%)

## 2019-08-27 NOTE — PROGRESS NOTE BEHAVIORAL HEALTH - NSBHADMITCOUNSEL_PSY_A_CORE
risks and benefits of treatment options/client/family/caregiver education/importance of adherence to chosen treatment
client/family/caregiver education

## 2019-08-27 NOTE — PROGRESS NOTE ADULT - PROBLEM SELECTOR PROBLEM 9
Advance care planning
Preventive measure
Advance care planning
Advance care planning
Preventive measure
Advance care planning
Preventive measure
Preventive measure
Severe protein-calorie malnutrition
Advance care planning
Preventive measure
Preventive measure

## 2019-08-27 NOTE — PROGRESS NOTE ADULT - ASSESSMENT
75 yo M with depression/anxiety/hoarding BIBEMS after being found down at home  his home admitted with sepsis 2/2 aspiration PNA (completed 5 days abx) and rhabdo c/b dysphagia and FTT of unclear etiology undergoing neuro eval (normal MRI brain and c-spine) possible neuromuscular illness and now s/p PEG 8/21 given dysphagia/FTT/weight loss.
73 y/o male with possible HX of Psych disorder , lives alone, very poor historian, unkempt and with very poor  hygiene , missing teeth,  Dental caries, on PO Benzo at home, denies ETOH and smoking,  reports falling off a stool 1.5 days prior to presentation around midnight and becoming pinned in position until rescued by EMS this afternoon.
75 y/o man PMH depression and anxiety BIBEMS after being found down at home stuck between furniture in his home. Admitted with sepsis 2/2 aspiration PNA and rhabdo.
73 y/o man PMH depression and anxiety BIBEMS after being found down at home stuck between furniture in his home. Admitted with sepsis 2/2 aspiration PNA (completed 5 days Abx) and rhabdo (resolved), c/b dysphagia.
73 y/o man PMH depression and anxiety BIBEMS after being found down at home stuck between furniture in his home. Admitted with sepsis 2/2 aspiration PNA and rhabdo.
73 yo M with depression/anxiety/hoarding BIBEMS after being found down at home  his home admitted with sepsis 2/2 aspiration PNA (completed 5 days abx) and rhabdo c/b dysphagia and FTT of unclear etiology undergoing neuro eval (normal MRI brain and c-spine) possible neuromuscular illness and now s/p PEG 8/21 given dysphagia/FTT/weight loss.
74 year old M who presents with a mechanical fall. Physical exam significant for muscle atrophy and decreased left sided fine motor movements. Initial labs significant for an elevated CK.  CTH showed no acute findings.    MRI brain unremarkable. MRI C spine with possible abnormal signaling in T spine.    Impression: Possible neuromuscular etiology     Plan:  [] MRI T spine without contrast  [] Anti-MUSK antibodies, anti-Acetylcholine receptor antibodies pending  [] EMG outpatient  [] PT/OT
75 y/o man PMH depression and anxiety BIBEMS after being found down at home stuck between furniture in his home.
75 y/o man PMH depression and anxiety BIBEMS after being found down at home stuck between furniture in his home. Admitted with sepsis 2/2 aspiration PNA (completed 5 days Abx) and rhabdo (resolved), c/b dysphagia.
75 y/o man PMH depression and anxiety BIBEMS after being found down at home stuck between furniture in his home. Admitted with sepsis 2/2 aspiration PNA (completed 5 days Abx) and rhabdo (resolved), c/b dysphagia.
75 y/o man PMH depression and anxiety BIBEMS after being found down at home stuck between furniture in his home. Admitted with sepsis 2/2 aspiration PNA and rhabdo.
75 yo M with depression/anxiety/hoarding BIBEMS after being found down at home  his home admitted with sepsis 2/2 aspiration PNA (completed 5 days abx) and rhabdo c/b dysphagia and FTT of unclear etiology undergoing neuro eval (normal MRI brain and c-spine) possible neuromuscular illness and now s/p PEG 8/21 given dysphagia/FTT/weight loss.
75 yo M with depression/anxiety/hoarding BIBEMS after being found down at home  his home admitted with sepsis 2/2 aspiration PNA (completed 5 days abx) and rhabdo c/b dysphagia and FTT of unclear etiology undergoing neuro eval (normal MRI brain and c-spine) possible neuromuscular illness and now s/p PEG 8/21 given dysphagia/FTT/weight loss.
75 yo M with depression/anxiety/hoarding BIBEMS after being found down at home  his home admitted with sepsis 2/2 aspiration PNA (completed 5 days abx) and rhabdo c/b dysphagia and FTT of unclear etiology undergoing neuro eval.
75 yo M with depression/anxiety/hoarding BIBEMS after being found down at home  his home admitted with sepsis 2/2 aspiration PNA (completed 5 days abx) and rhabdo c/b dysphagia and FTT of unclear etiology.
Impression:  73 y/o male with possible HX of Psych disorder, lives alone, very poor historian, unkempt and with poor hygiene, missing teeth and dental caries, on PO Benzo at home, presented with a fall s/p treatment for aspiraton pneumonia and rhabomyolysis. GI consulted for PEG placement given oropharyngeal dysphagia    # PEG Placement: swallow evaluation with severe oropharyngeal dysphagia, limited options for nutrition. Explained risks and benefits of PEG placement, now s/p successful PEG placement on 8/21  # Pharyngeal dysphagia  # Aspiration pneumonia  # rhabdomyolysis    Recommendation:  - can begin PEG feed today  - proper local care of peg, change dressing daily and clean site daily  - nutrition consult and SW consult for home care for peg tube  - aspiration precautions  - speech/swallow consultation appreciated  - consider neuro consultation for workup for oropharyngeal dysphagia    GI to sign off, please call back if needed
Impression:  73 y/o male with possible HX of Psych disorder, lives alone, very poor historian, unkempt and with very poor hygiene, missing teeth,  Dental caries, on PO Benzo at home, presented with a fall s/p treatment for aspiraton pneumonia and rhabomyolysis. GI consulted for PEG placement given oropharyngeal dysphagia    # PEG Placement: swallow evaluation with severe oropharyngeal dysphagia, limited options for nutrition. Patient requesting to speak with his primary team and family to help clarify options and GOC. Explained risks and benefits of PEG placement.  # Pharyngeal dysphagia  # Aspiration pneumonia  # rhabdomyolysis    Recommendation:  - plan for PEG placement once patient agreeable, can plan for tomorrow  - aspiration precautions  - swallow consultation appreciated  - recommend NGT placement in the interim if patient agreeable  - dental consultation for loose teeth   - consider neuro consultation for workup for oropharyngeal dysphagia    Rahul Crespo, PGY-4
73 yo M with depression/anxiety/hoarding BIBEMS after being found down at home  his home admitted with sepsis 2/2 aspiration PNA (completed 5 days abx) and rhabdo c/b dysphagia and FTT of unclear etiology undergoing neuro eval (normal MRI brain and c-spine) possible neuromuscular illness and now s/p PEG 8/21 given dysphagia/FTT/weight loss.
75 y/o man PMH depression and anxiety BIBEMS after being found down at home stuck between furniture in his home. Admitted with sepsis 2/2 aspiration PNA and rhabdo.
73 y/o man PMH depression and anxiety BIBEMS after being found down at home stuck between furniture in his home. Admitted with sepsis 2/2 aspiration PNA (completed 5 days Abx) and rhabdo (resolved), c/b dysphagia.
73 y/o man PMH depression and anxiety BIBEMS after being found down at home stuck between furniture in his home. Admitted with sepsis 2/2 aspiration PNA (completed 5 days Abx) and rhabdo (resolved), c/b dysphagia.
73 yo M with depression/anxiety/hoarding BIBEMS after being found down at home  his home admitted with sepsis 2/2 aspiration PNA (completed 5 days abx) and rhabdo c/b dysphagia and FTT of unclear etiology.
75 yo M with depression/anxiety/hoarding BIBEMS after being found down at home  his home admitted with sepsis 2/2 aspiration PNA (completed 5 days abx) and rhabdo c/b dysphagia and FTT of unclear etiology undergoing neuro eval (normal MRI brain and c-spine) possible neuromuscular illness and now s/p PEG 8/21 given dysphagia/FTT/weight loss.
73 y/o man PMH depression and anxiety BIBEMS after being found down at home stuck between furniture in his home. Admitted with sepsis 2/2 aspiration PNA (completed 5 days Abx) and rhabdo (resolved), c/b dysphagia.

## 2019-08-27 NOTE — PROGRESS NOTE ADULT - PROBLEM SELECTOR PLAN 1
Failed cineesophagogram x2, NPO  - appreciate neurology eval:  MRI brain and c-spine wnl; Anti-acetylcholine and MUSK ab sent per neuro, results pending advised to f/u with neuro as outpt  - s/p PEG on 8/21, tolerating TFs  - monitor closely for re-feeding syndrome

## 2019-08-27 NOTE — PROGRESS NOTE BEHAVIORAL HEALTH - NSBHCONSULTFOLLOWAFTERCARE_PSY_A_CORE FT
Pt. would like to follow up with [facility] PCP Dr. Frank Britton    May give following referrals:    [facility] out[facility] clinic: [phone]  [facility] outpt. walk in clinic : [phone] (9AM-7PM)  [facility] Outpatient clinic: [phone]

## 2019-08-27 NOTE — PROGRESS NOTE ADULT - PROBLEM SELECTOR PROBLEM 1
Dysphagia
Sepsis
Sepsis
Dysphagia
Sepsis
Dysphagia
Dysphagia
Sepsis
Dysphagia

## 2019-08-27 NOTE — CHART NOTE - NSCHARTNOTEFT_GEN_A_CORE
Spoke with Neuro, will follow up results of Anti-MUSK Ab outpt (takes about 2 weeks), and for outpt EMG. patient can follow up with Dr. Conner, [phone]

## 2019-08-27 NOTE — PROGRESS NOTE BEHAVIORAL HEALTH - NSBHCHARTREVIEWLAB_PSY_A_CORE FT
11.3   11.23 )-----------( 385      ( 26 Aug 2019 06:41 )             35.7
10.2   10.20 )-----------( 313      ( 13 Aug 2019 06:30 )             31.4
10.5   11.83 )-----------( 264      ( 12 Aug 2019 05:30 )             32.3
10.6   16.34 )-----------( 151      ( 08 Aug 2019 06:50 )             32.8     08-08    139  |  102  |  22  ----------------------------<  134<H>  3.2<L>   |  25  |  0.74    Ca    8.3<L>      08 Aug 2019 06:50  Phos  2.4     08-08  Mg     1.7     08-08    TPro  6.1  /  Alb  3.3  /  TBili  0.8  /  DBili  x   /  AST  60<H>  /  ALT  32  /  AlkPhos  64  08-07    Creatine Kinase, Serum: 1414:   (08.08.19 @ 06:50)

## 2019-08-27 NOTE — PROGRESS NOTE BEHAVIORAL HEALTH - RISK ASSESSMENT
Risk factors; Delirium, acute and chronic medical issues  Protective factors; Future oriented, denies SI and HI    Not at acute risk of harm to self or others at this time, will continue to assess
Patient is at acute risk of self harm 2/2 delirium.    Will follow
Risk factors; Delirium, acute and chronic medical issues  Protective factors; Future oriented, denies SI and HI, denies acute psychotic sxs, mood is better, delirium seems to be resolved    Not at acute risk of harm to self or others at this time, and does not need an inpatient admission
Risk factors; Delirium, acute and chronic medical issues  Protective factors; Future oriented, denies SI and HI    Not at acute risk of harm to self or others at this time, will continue to assess

## 2019-08-27 NOTE — PROGRESS NOTE ADULT - SUBJECTIVE AND OBJECTIVE BOX
Patient is a 74y old  Male who presents with a chief complaint of S/P fall, Rhabdo, R/O Pneumonia, Leukocytosis, Cough, R/O Cellulitis Face/lower Abdomen (26 Aug 2019 15:01)      SUBJECTIVE / OVERNIGHT EVENTS: Pt was seen and examined at 12:10pm. No overnight events, reports feels fine, no cp/sob/n/v/d/fever.         MEDICATIONS  (STANDING):  artificial tears (preservative free) Ophthalmic Solution 1 Drop(s) Both EYES three times a day  diazepam    Tablet 5 milliGRAM(s) Oral two times a day  folic acid 1 milliGRAM(s) Oral daily  multivitamin 1 Tablet(s) Oral daily  silver sulfADIAZINE 1% Cream 1 Application(s) Topical two times a day    MEDICATIONS  (PRN):  acetaminophen   Tablet .. 650 milliGRAM(s) Oral every 6 hours PRN Mild Pain (1 - 3), Moderate Pain (4 - 6)  ALBUTerol/ipratropium for Nebulization 3 milliLiter(s) Nebulizer every 6 hours PRN Shortness of Breath and/or Wheezing  ondansetron Injectable 4 milliGRAM(s) IV Push every 6 hours PRN Nausea and/or Vomiting      Vital Signs Last 24 Hrs  T(C): 36.6 (27 Aug 2019 15:20), Max: 36.7 (26 Aug 2019 21:26)  T(F): 97.9 (27 Aug 2019 15:20), Max: 98.1 (26 Aug 2019 21:26)  HR: 82 (27 Aug 2019 15:20) (70 - 82)  BP: 97/57 (27 Aug 2019 15:20) (97/57 - 101/57)  BP(mean): --  RR: 16 (27 Aug 2019 15:20) (16 - 17)  SpO2: 96% (27 Aug 2019 15:20) (95% - 97%)  CAPILLARY BLOOD GLUCOSE        I&O's Summary    26 Aug 2019 07:01  -  27 Aug 2019 07:00  --------------------------------------------------------  IN: 0 mL / OUT: 400 mL / NET: -400 mL    27 Aug 2019 07:01  -  27 Aug 2019 15:34  --------------------------------------------------------  IN: 0 mL / OUT: 1200 mL / NET: -1200 mL        PHYSICAL EXAM:  GENERAL: NAD, cachectic   HEENT:  Right ear with eschar anterior and posterior ear. surrounding mild redness and swelling, no discharge.   CHEST/LUNG: Clear to auscultation bilaterally; No wheeze  HEART: Regular rate and rhythm; No murmurs, rubs, or gallops  ABDOMEN: Soft, Nontender, Nondistended, + PEG with binder   EXTREMITIES:  no edema   PSYCH: Calm  NEUROLOGY: awake, alert, answers questions appropriately  : + fowler, scrotal edema+      LABS:                        11.3   11.23 )-----------( 385      ( 26 Aug 2019 06:41 )             35.7     08-26    141  |  99  |  24<H>  ----------------------------<  138<H>  3.8   |  30  |  0.72    Ca    9.3      26 Aug 2019 06:41  Phos  4.1     08-26  Mg     2.0     08-26                RADIOLOGY & ADDITIONAL TESTS:    Imaging Personally Reviewed:    Consultant(s) Notes Reviewed:      Care Discussed with Consultants/Other Providers:

## 2019-09-13 NOTE — PROGRESS NOTE BEHAVIORAL HEALTH - GROOMING
Vasopressin titrated off. Levophed remains infusing at 16 mcg/min.    Temp now 97.6 with juanita hugger in place
Poor
Fair
Poor
Poor

## 2019-09-16 ENCOUNTER — INPATIENT (INPATIENT)
Facility: HOSPITAL | Age: 74
LOS: 1 days | Discharge: NURSING, REHAB OR LONG-TERM CARE | End: 2019-09-18
Attending: STUDENT IN AN ORGANIZED HEALTH CARE EDUCATION/TRAINING PROGRAM | Admitting: STUDENT IN AN ORGANIZED HEALTH CARE EDUCATION/TRAINING PROGRAM
Payer: MEDICARE

## 2019-09-16 VITALS
HEART RATE: 77 BPM | DIASTOLIC BLOOD PRESSURE: 65 MMHG | OXYGEN SATURATION: 97 % | SYSTOLIC BLOOD PRESSURE: 102 MMHG | RESPIRATION RATE: 16 BRPM | TEMPERATURE: 98 F

## 2019-09-16 DIAGNOSIS — N39.0 URINARY TRACT INFECTION, SITE NOT SPECIFIED: ICD-10-CM

## 2019-09-16 DIAGNOSIS — T83.9XXA UNSPECIFIED COMPLICATION OF GENITOURINARY PROSTHETIC DEVICE, IMPLANT AND GRAFT, INITIAL ENCOUNTER: ICD-10-CM

## 2019-09-16 DIAGNOSIS — L89.159 PRESSURE ULCER OF SACRAL REGION, UNSPECIFIED STAGE: ICD-10-CM

## 2019-09-16 DIAGNOSIS — R13.10 DYSPHAGIA, UNSPECIFIED: ICD-10-CM

## 2019-09-16 PROBLEM — N50.89 OTHER SPECIFIED DISORDERS OF THE MALE GENITAL ORGANS: Chronic | Status: ACTIVE | Noted: 2019-08-06

## 2019-09-16 PROBLEM — F99 MENTAL DISORDER, NOT OTHERWISE SPECIFIED: Chronic | Status: ACTIVE | Noted: 2019-08-06

## 2019-09-16 LAB
ALBUMIN SERPL ELPH-MCNC: 3.4 G/DL — SIGNIFICANT CHANGE UP (ref 3.3–5)
ALP SERPL-CCNC: 111 U/L — SIGNIFICANT CHANGE UP (ref 40–120)
ALT FLD-CCNC: 16 U/L — SIGNIFICANT CHANGE UP (ref 4–41)
ANION GAP SERPL CALC-SCNC: 12 MMO/L — SIGNIFICANT CHANGE UP (ref 7–14)
APPEARANCE UR: CLEAR — SIGNIFICANT CHANGE UP
AST SERPL-CCNC: 14 U/L — SIGNIFICANT CHANGE UP (ref 4–40)
BASE EXCESS BLDV CALC-SCNC: 7.8 MMOL/L — SIGNIFICANT CHANGE UP
BASOPHILS # BLD AUTO: 0.06 K/UL — SIGNIFICANT CHANGE UP (ref 0–0.2)
BASOPHILS NFR BLD AUTO: 0.4 % — SIGNIFICANT CHANGE UP (ref 0–2)
BILIRUB SERPL-MCNC: 0.3 MG/DL — SIGNIFICANT CHANGE UP (ref 0.2–1.2)
BILIRUB UR-MCNC: NEGATIVE — SIGNIFICANT CHANGE UP
BLOOD GAS VENOUS - CREATININE: 0.75 MG/DL — SIGNIFICANT CHANGE UP (ref 0.5–1.3)
BLOOD UR QL VISUAL: NEGATIVE — SIGNIFICANT CHANGE UP
BUN SERPL-MCNC: 16 MG/DL — SIGNIFICANT CHANGE UP (ref 7–23)
CALCIUM SERPL-MCNC: 9.2 MG/DL — SIGNIFICANT CHANGE UP (ref 8.4–10.5)
CHLORIDE BLDV-SCNC: 98 MMOL/L — SIGNIFICANT CHANGE UP (ref 96–108)
CHLORIDE SERPL-SCNC: 97 MMOL/L — LOW (ref 98–107)
CO2 SERPL-SCNC: 29 MMOL/L — SIGNIFICANT CHANGE UP (ref 22–31)
COLOR SPEC: SIGNIFICANT CHANGE UP
CREAT SERPL-MCNC: 0.73 MG/DL — SIGNIFICANT CHANGE UP (ref 0.5–1.3)
EOSINOPHIL # BLD AUTO: 0.1 K/UL — SIGNIFICANT CHANGE UP (ref 0–0.5)
EOSINOPHIL NFR BLD AUTO: 0.7 % — SIGNIFICANT CHANGE UP (ref 0–6)
GAS PNL BLDV: 136 MMOL/L — SIGNIFICANT CHANGE UP (ref 136–146)
GLUCOSE BLDV-MCNC: 113 MG/DL — HIGH (ref 70–99)
GLUCOSE SERPL-MCNC: 117 MG/DL — HIGH (ref 70–99)
GLUCOSE UR-MCNC: NEGATIVE — SIGNIFICANT CHANGE UP
HCO3 BLDV-SCNC: 31 MMOL/L — HIGH (ref 20–27)
HCT VFR BLD CALC: 33.9 % — LOW (ref 39–50)
HCT VFR BLDV CALC: 36.4 % — LOW (ref 39–51)
HGB BLD-MCNC: 11.1 G/DL — LOW (ref 13–17)
HGB BLDV-MCNC: 11.8 G/DL — LOW (ref 13–17)
IMM GRANULOCYTES NFR BLD AUTO: 0.6 % — SIGNIFICANT CHANGE UP (ref 0–1.5)
KETONES UR-MCNC: NEGATIVE — SIGNIFICANT CHANGE UP
LACTATE BLDV-MCNC: 1.9 MMOL/L — SIGNIFICANT CHANGE UP (ref 0.5–2)
LEUKOCYTE ESTERASE UR-ACNC: NEGATIVE — SIGNIFICANT CHANGE UP
LYMPHOCYTES # BLD AUTO: 0.82 K/UL — LOW (ref 1–3.3)
LYMPHOCYTES # BLD AUTO: 5.4 % — LOW (ref 13–44)
MCHC RBC-ENTMCNC: 32.1 PG — SIGNIFICANT CHANGE UP (ref 27–34)
MCHC RBC-ENTMCNC: 32.7 % — SIGNIFICANT CHANGE UP (ref 32–36)
MCV RBC AUTO: 98 FL — SIGNIFICANT CHANGE UP (ref 80–100)
MONOCYTES # BLD AUTO: 0.84 K/UL — SIGNIFICANT CHANGE UP (ref 0–0.9)
MONOCYTES NFR BLD AUTO: 5.6 % — SIGNIFICANT CHANGE UP (ref 2–14)
NEUTROPHILS # BLD AUTO: 13.21 K/UL — HIGH (ref 1.8–7.4)
NEUTROPHILS NFR BLD AUTO: 87.3 % — HIGH (ref 43–77)
NITRITE UR-MCNC: NEGATIVE — SIGNIFICANT CHANGE UP
NRBC # FLD: 0 K/UL — SIGNIFICANT CHANGE UP (ref 0–0)
PCO2 BLDV: 40 MMHG — LOW (ref 41–51)
PH BLDV: 7.5 PH — HIGH (ref 7.32–7.43)
PH UR: 7.5 — SIGNIFICANT CHANGE UP (ref 5–8)
PLATELET # BLD AUTO: 295 K/UL — SIGNIFICANT CHANGE UP (ref 150–400)
PMV BLD: 10.3 FL — SIGNIFICANT CHANGE UP (ref 7–13)
PO2 BLDV: 56 MMHG — HIGH (ref 35–40)
POTASSIUM BLDV-SCNC: 3.5 MMOL/L — SIGNIFICANT CHANGE UP (ref 3.4–4.5)
POTASSIUM SERPL-MCNC: 3.6 MMOL/L — SIGNIFICANT CHANGE UP (ref 3.5–5.3)
POTASSIUM SERPL-SCNC: 3.6 MMOL/L — SIGNIFICANT CHANGE UP (ref 3.5–5.3)
PROT SERPL-MCNC: 6.8 G/DL — SIGNIFICANT CHANGE UP (ref 6–8.3)
PROT UR-MCNC: 10 — SIGNIFICANT CHANGE UP
RBC # BLD: 3.46 M/UL — LOW (ref 4.2–5.8)
RBC # FLD: 12.6 % — SIGNIFICANT CHANGE UP (ref 10.3–14.5)
SAO2 % BLDV: 90.8 % — HIGH (ref 60–85)
SODIUM SERPL-SCNC: 138 MMOL/L — SIGNIFICANT CHANGE UP (ref 135–145)
SP GR SPEC: 1.01 — SIGNIFICANT CHANGE UP (ref 1–1.04)
UROBILINOGEN FLD QL: NORMAL — SIGNIFICANT CHANGE UP
WBC # BLD: 15.12 K/UL — HIGH (ref 3.8–10.5)
WBC # FLD AUTO: 15.12 K/UL — HIGH (ref 3.8–10.5)

## 2019-09-16 PROCEDURE — 71045 X-RAY EXAM CHEST 1 VIEW: CPT | Mod: 26

## 2019-09-16 PROCEDURE — 99223 1ST HOSP IP/OBS HIGH 75: CPT

## 2019-09-16 RX ORDER — VANCOMYCIN HCL 1 G
1000 VIAL (EA) INTRAVENOUS ONCE
Refills: 0 | Status: COMPLETED | OUTPATIENT
Start: 2019-09-16 | End: 2019-09-16

## 2019-09-16 RX ORDER — ONDANSETRON 8 MG/1
4 TABLET, FILM COATED ORAL EVERY 6 HOURS
Refills: 0 | Status: DISCONTINUED | OUTPATIENT
Start: 2019-09-16 | End: 2019-09-18

## 2019-09-16 RX ORDER — PIPERACILLIN AND TAZOBACTAM 4; .5 G/20ML; G/20ML
3.38 INJECTION, POWDER, LYOPHILIZED, FOR SOLUTION INTRAVENOUS ONCE
Refills: 0 | Status: COMPLETED | OUTPATIENT
Start: 2019-09-16 | End: 2019-09-16

## 2019-09-16 RX ORDER — ACETAMINOPHEN 500 MG
650 TABLET ORAL EVERY 8 HOURS
Refills: 0 | Status: DISCONTINUED | OUTPATIENT
Start: 2019-09-16 | End: 2019-09-18

## 2019-09-16 RX ORDER — CIPROFLOXACIN LACTATE 400MG/40ML
VIAL (ML) INTRAVENOUS
Refills: 0 | Status: DISCONTINUED | OUTPATIENT
Start: 2019-09-16 | End: 2019-09-17

## 2019-09-16 RX ORDER — DIAZEPAM 5 MG
5 TABLET ORAL EVERY 12 HOURS
Refills: 0 | Status: DISCONTINUED | OUTPATIENT
Start: 2019-09-16 | End: 2019-09-18

## 2019-09-16 RX ORDER — CIPROFLOXACIN LACTATE 400MG/40ML
400 VIAL (ML) INTRAVENOUS ONCE
Refills: 0 | Status: COMPLETED | OUTPATIENT
Start: 2019-09-16 | End: 2019-09-16

## 2019-09-16 RX ORDER — MUPIROCIN 20 MG/G
1 OINTMENT TOPICAL
Refills: 0 | Status: DISCONTINUED | OUTPATIENT
Start: 2019-09-16 | End: 2019-09-18

## 2019-09-16 RX ORDER — FOLIC ACID 0.8 MG
1 TABLET ORAL DAILY
Refills: 0 | Status: DISCONTINUED | OUTPATIENT
Start: 2019-09-16 | End: 2019-09-18

## 2019-09-16 RX ORDER — IPRATROPIUM/ALBUTEROL SULFATE 18-103MCG
3 AEROSOL WITH ADAPTER (GRAM) INHALATION EVERY 6 HOURS
Refills: 0 | Status: DISCONTINUED | OUTPATIENT
Start: 2019-09-16 | End: 2019-09-18

## 2019-09-16 RX ORDER — SODIUM CHLORIDE 9 MG/ML
1000 INJECTION INTRAMUSCULAR; INTRAVENOUS; SUBCUTANEOUS ONCE
Refills: 0 | Status: COMPLETED | OUTPATIENT
Start: 2019-09-16 | End: 2019-09-16

## 2019-09-16 RX ORDER — CIPROFLOXACIN LACTATE 400MG/40ML
400 VIAL (ML) INTRAVENOUS EVERY 12 HOURS
Refills: 0 | Status: DISCONTINUED | OUTPATIENT
Start: 2019-09-17 | End: 2019-09-17

## 2019-09-16 RX ADMIN — Medication 5 MILLIGRAM(S): at 22:52

## 2019-09-16 RX ADMIN — PIPERACILLIN AND TAZOBACTAM 200 GRAM(S): 4; .5 INJECTION, POWDER, LYOPHILIZED, FOR SOLUTION INTRAVENOUS at 19:38

## 2019-09-16 RX ADMIN — SODIUM CHLORIDE 1000 MILLILITER(S): 9 INJECTION INTRAMUSCULAR; INTRAVENOUS; SUBCUTANEOUS at 21:54

## 2019-09-16 RX ADMIN — Medication 250 MILLIGRAM(S): at 20:09

## 2019-09-16 RX ADMIN — Medication 200 MILLIGRAM(S): at 22:34

## 2019-09-16 NOTE — ED ADULT NURSE NOTE - OBJECTIVE STATEMENT
patient alert ox3 came in c/o lower abdominal pain with urinary retention . 18b English Spencer to BSD states it was changed this morning and was not having urinary output. skin warm and  dry. breathing even and unlabored. PEG noted . dressing noted to chest and to right ear from wound. hydrocele noted. labs done for sepsis. Spencer changed to 20 English caudae . draining sal color urine. awaiting results and re eval.

## 2019-09-16 NOTE — H&P ADULT - NSHPLABSRESULTS_GEN_ALL_CORE
.  LABS:                         11.1   15.12 )-----------( 295      ( 16 Sep 2019 18:30 )             33.9     09-16    138  |  97<L>  |  16  ----------------------------<  117<H>  3.6   |  29  |  0.73    Ca    9.2      16 Sep 2019 18:30    TPro  6.8  /  Alb  3.4  /  TBili  0.3  /  DBili  x   /  AST  14  /  ALT  16  /  AlkPhos  111  09-16      Urinalysis Basic - ( 16 Sep 2019 18:30 )    Color: LIGHT YELLOW / Appearance: CLEAR / S.015 / pH: 7.5  Gluc: NEGATIVE / Ketone: NEGATIVE  / Bili: NEGATIVE / Urobili: NORMAL   Blood: NEGATIVE / Protein: 10 / Nitrite: NEGATIVE   Leuk Esterase: NEGATIVE / RBC: x / WBC x   Sq Epi: x / Non Sq Epi: x / Bacteria: x          RADIOLOGY, EKG & ADDITIONAL TESTS: Reviewed.

## 2019-09-16 NOTE — ED PROVIDER NOTE - PROGRESS NOTE DETAILS
PGY2/MD Nikkie. fluid will be given, urosepsis, apparently klebsiella growed in the fascility, will cover the pt with broad spectrum abx. EKG with no change from the prevois, QTc 458. admission accepted by Dr. Rivas

## 2019-09-16 NOTE — ED ADULT TRIAGE NOTE - CHIEF COMPLAINT QUOTE
from [facility] sent in for "r/o fowler obstruction". as per ems, fowler has not drained x 1 day.  pt c/o pelvic pain x 1 week. as per pt, fowler was just replaced today

## 2019-09-16 NOTE — H&P ADULT - NSHPPHYSICALEXAM_GEN_ALL_CORE
T(C): 35.9 (09-16-19 @ 21:12), Max: 37.8 (09-16-19 @ 19:01)  HR: 88 (09-16-19 @ 19:01) (77 - 89)  BP: 93/62 (09-16-19 @ 21:12) (93/62 - 131/76)  RR: 14 (09-16-19 @ 21:12) (14 - 17)  SpO2: 100% (09-16-19 @ 21:12) (97% - 100%)    GENERAL: No acute distress, cachectic appearing  HEAD:  Atraumatic, Normocephalic  ENT: EOMI, PERRLA, conjunctiva and sclera clear, Neck supple, No JVD, moist mucosa, no pharyngeal erythema, no tonsillar enlargement or exudate  CHEST/LUNG: Clear to auscultation bilaterally; No wheeze, equal breath sounds bilaterally   HEART: Regular rate and rhythm; No murmurs, rubs, or gallops  ABDOMEN: PEG tube in place. Abd soft, Nontender, Nondistended; Bowel sounds present, no organomegaly  EXTREMITIES:  2+ Peripheral Pulses, No clubbing, cyanosis, or edema  PSYCH: AAOx3, normal affect, normal behavior   NEUROLOGY: non-focal, cranial nerves intact  SKIN: Sacral decub ulcer   : Spencer in place, clear urine in bag

## 2019-09-16 NOTE — ED PROVIDER NOTE - CLINICAL SUMMARY MEDICAL DECISION MAKING FREE TEXT BOX
New Spencer placed with initial 500 ml UOP.  rectal temp 100.1 Plan for empiric abx (+BCx and new UCx) and labs to eval for evidence of sepsis. Dispo pending.

## 2019-09-16 NOTE — ED ADULT NURSE REASSESSMENT NOTE - NS ED NURSE REASSESS COMMENT FT1
Pt. received in room #18 A&O x4, report from Lakshmi DOWNEY. Pt. came from [facility] with c/o urinary retention. fowler was replaced in ED output noted to be 700cc clear yellow urine. vitals stable. no acute distress noted. denies pain. 20g noted to L FA. intact, no redness or swelling at site. will continue to monitor. ST

## 2019-09-16 NOTE — H&P ADULT - HISTORY OF PRESENT ILLNESS
73 yo M with h/o urinary retention s/p indwelling Spencer dysphagia s/p PEG tube placement presenting with Spencer obstruction. Pt states that he had his Spencer replaced this morning and afterwards did not notice any urine draining into the bag. He also noted having worsening suprapubic pain and distention. He denies any fevers or chills. No nausea or vomiting. No other GI symptoms. Pt had been taking doxycycline for UTI.    In ED pt was given vancomycin and zosyn  VS:  112/79  88  100.1  16  97% on RA

## 2019-09-16 NOTE — ED PROVIDER NOTE - OBJECTIVE STATEMENT
75 yo M presents from SNF with concern for malfunctioning Spencer. Pt reports pain in lower abd worsening over past day.  Denies fever or other specific complaints.  I spoke to pt's doctor, Dr King who reports pt is being treated with doxy via PEG for Klebsiella UTI.  He says that Spencer was clogged and he is unsue of it was replaced properly today so he had pt sent to ED.

## 2019-09-16 NOTE — H&P ADULT - NSHPREVIEWOFSYSTEMS_GEN_ALL_CORE
REVIEW OF SYSTEMS:    CONSTITUTIONAL: No weakness, fevers or chills, no weight loss  EYES/ENT: No visual changes;  No dysphagia or odynophagia, no tinnitus  NECK: No pain or stiffness  RESPIRATORY: No cough, wheezing, hemoptysis; No shortness of breath  CARDIOVASCULAR: No chest pain or palpitations; No lower extremity edema  GASTROINTESTINAL: No abdominal or epigastric pain. No nausea, vomiting, or hematemesis; No diarrhea or constipation. No melena or hematochezia.  MUSCULOSKELETAL: No joint pain, swelling, erythema or warmth, no back pain  GENITOURINARY: Spencer not draining. No dysuria, frequency or hematuria, no suprapubic pain  NEUROLOGICAL: No numbness or weakness, no headache, no syncope, no gait abnormalities   SKIN: No itching, burning, rashes, or lesions   All other review of systems is negative unless indicated above.

## 2019-09-16 NOTE — H&P ADULT - ASSESSMENT
73 yo M with h/o urinary retention s/p indwelling Spencer dysphagia s/p PEG tube placement presenting with Spencer obstruction.

## 2019-09-16 NOTE — ED PROVIDER NOTE - ATTENDING CONTRIBUTION TO CARE
74M with fowler catheter from rehab with occluded fowler. Initially, pt in signficant pain to abdomen, once fowler replaced he had immediate relief of symtpoms with 500cc immediately drained. Rectal temp 100.1, now with WBC 15 (was 6 yesterday- I confirmed with his rehab doctor who has been treating with doxy for klebsiella UTI)- concern for incomplete treatmetn and not pt with occluded fowler for 2nd time in 24 hours. On exam, he is generaly well appearing with soft abdomen. Will treat with IV abx and admit for further management including fowler monitoring, IV abx and culture sensitivities

## 2019-09-16 NOTE — H&P ADULT - PROBLEM SELECTOR PLAN 2
- Pt with indwelling fowler for urinary retention. Replaced in ED and urine draining well.   - Monitor I/O

## 2019-09-17 ENCOUNTER — TRANSCRIPTION ENCOUNTER (OUTPATIENT)
Age: 74
End: 2019-09-17

## 2019-09-17 LAB
ALBUMIN SERPL ELPH-MCNC: 3.2 G/DL — LOW (ref 3.3–5)
ALP SERPL-CCNC: 108 U/L — SIGNIFICANT CHANGE UP (ref 40–120)
ALT FLD-CCNC: 14 U/L — SIGNIFICANT CHANGE UP (ref 4–41)
ANION GAP SERPL CALC-SCNC: 12 MMO/L — SIGNIFICANT CHANGE UP (ref 7–14)
AST SERPL-CCNC: 15 U/L — SIGNIFICANT CHANGE UP (ref 4–40)
BASOPHILS # BLD AUTO: 0.08 K/UL — SIGNIFICANT CHANGE UP (ref 0–0.2)
BASOPHILS NFR BLD AUTO: 0.8 % — SIGNIFICANT CHANGE UP (ref 0–2)
BILIRUB SERPL-MCNC: 0.4 MG/DL — SIGNIFICANT CHANGE UP (ref 0.2–1.2)
BUN SERPL-MCNC: 13 MG/DL — SIGNIFICANT CHANGE UP (ref 7–23)
CALCIUM SERPL-MCNC: 8.9 MG/DL — SIGNIFICANT CHANGE UP (ref 8.4–10.5)
CHLORIDE SERPL-SCNC: 97 MMOL/L — LOW (ref 98–107)
CO2 SERPL-SCNC: 28 MMOL/L — SIGNIFICANT CHANGE UP (ref 22–31)
CREAT SERPL-MCNC: 0.74 MG/DL — SIGNIFICANT CHANGE UP (ref 0.5–1.3)
EOSINOPHIL # BLD AUTO: 0.3 K/UL — SIGNIFICANT CHANGE UP (ref 0–0.5)
EOSINOPHIL NFR BLD AUTO: 2.9 % — SIGNIFICANT CHANGE UP (ref 0–6)
GLUCOSE SERPL-MCNC: 111 MG/DL — HIGH (ref 70–99)
HCT VFR BLD CALC: 32.4 % — LOW (ref 39–50)
HCV AB S/CO SERPL IA: 0.18 S/CO — SIGNIFICANT CHANGE UP (ref 0–0.99)
HCV AB SERPL-IMP: SIGNIFICANT CHANGE UP
HGB BLD-MCNC: 10.4 G/DL — LOW (ref 13–17)
IMM GRANULOCYTES NFR BLD AUTO: 0.4 % — SIGNIFICANT CHANGE UP (ref 0–1.5)
LYMPHOCYTES # BLD AUTO: 1.27 K/UL — SIGNIFICANT CHANGE UP (ref 1–3.3)
LYMPHOCYTES # BLD AUTO: 12.2 % — LOW (ref 13–44)
MAGNESIUM SERPL-MCNC: 1.9 MG/DL — SIGNIFICANT CHANGE UP (ref 1.6–2.6)
MCHC RBC-ENTMCNC: 32.1 % — SIGNIFICANT CHANGE UP (ref 32–36)
MCHC RBC-ENTMCNC: 32.1 PG — SIGNIFICANT CHANGE UP (ref 27–34)
MCV RBC AUTO: 100 FL — SIGNIFICANT CHANGE UP (ref 80–100)
MONOCYTES # BLD AUTO: 0.75 K/UL — SIGNIFICANT CHANGE UP (ref 0–0.9)
MONOCYTES NFR BLD AUTO: 7.2 % — SIGNIFICANT CHANGE UP (ref 2–14)
NEUTROPHILS # BLD AUTO: 7.98 K/UL — HIGH (ref 1.8–7.4)
NEUTROPHILS NFR BLD AUTO: 76.5 % — SIGNIFICANT CHANGE UP (ref 43–77)
NRBC # FLD: 0 K/UL — SIGNIFICANT CHANGE UP (ref 0–0)
PHOSPHATE SERPL-MCNC: 3.1 MG/DL — SIGNIFICANT CHANGE UP (ref 2.5–4.5)
PLATELET # BLD AUTO: 247 K/UL — SIGNIFICANT CHANGE UP (ref 150–400)
PMV BLD: 11.3 FL — SIGNIFICANT CHANGE UP (ref 7–13)
POTASSIUM SERPL-MCNC: 3.7 MMOL/L — SIGNIFICANT CHANGE UP (ref 3.5–5.3)
POTASSIUM SERPL-SCNC: 3.7 MMOL/L — SIGNIFICANT CHANGE UP (ref 3.5–5.3)
PROT SERPL-MCNC: 6.4 G/DL — SIGNIFICANT CHANGE UP (ref 6–8.3)
RBC # BLD: 3.24 M/UL — LOW (ref 4.2–5.8)
RBC # FLD: 12.7 % — SIGNIFICANT CHANGE UP (ref 10.3–14.5)
SODIUM SERPL-SCNC: 137 MMOL/L — SIGNIFICANT CHANGE UP (ref 135–145)
SPECIMEN SOURCE: SIGNIFICANT CHANGE UP
SPECIMEN SOURCE: SIGNIFICANT CHANGE UP
WBC # BLD: 10.42 K/UL — SIGNIFICANT CHANGE UP (ref 3.8–10.5)
WBC # FLD AUTO: 10.42 K/UL — SIGNIFICANT CHANGE UP (ref 3.8–10.5)

## 2019-09-17 PROCEDURE — 99223 1ST HOSP IP/OBS HIGH 75: CPT

## 2019-09-17 PROCEDURE — 99233 SBSQ HOSP IP/OBS HIGH 50: CPT

## 2019-09-17 RX ORDER — ENOXAPARIN SODIUM 100 MG/ML
40 INJECTION SUBCUTANEOUS DAILY
Refills: 0 | Status: DISCONTINUED | OUTPATIENT
Start: 2019-09-17 | End: 2019-09-18

## 2019-09-17 RX ADMIN — MUPIROCIN 1 APPLICATION(S): 20 OINTMENT TOPICAL at 06:45

## 2019-09-17 RX ADMIN — Medication 5 MILLIGRAM(S): at 22:45

## 2019-09-17 RX ADMIN — Medication 650 MILLIGRAM(S): at 10:22

## 2019-09-17 RX ADMIN — MUPIROCIN 1 APPLICATION(S): 20 OINTMENT TOPICAL at 18:20

## 2019-09-17 RX ADMIN — Medication 1 DROP(S): at 06:45

## 2019-09-17 RX ADMIN — Medication 1 MILLIGRAM(S): at 13:53

## 2019-09-17 RX ADMIN — Medication 5 MILLIGRAM(S): at 09:28

## 2019-09-17 RX ADMIN — Medication 200 MILLIGRAM(S): at 09:52

## 2019-09-17 RX ADMIN — Medication 1 DROP(S): at 18:20

## 2019-09-17 RX ADMIN — Medication 650 MILLIGRAM(S): at 09:53

## 2019-09-17 NOTE — PROGRESS NOTE ADULT - SUBJECTIVE AND OBJECTIVE BOX
Patient is a 74y old  Male who presents with a chief complaint of Fowler obstruction (17 Sep 2019 13:40)      SUBJECTIVE / OVERNIGHT EVENTS: Pt seen and examined at 11:40am, no overnight events, pt denies any suprapubic pain or any other complaints, per nsg urine is draining in the fowler now,     MEDICATIONS  (STANDING):  artificial  tears Solution 1 Drop(s) Both EYES two times a day  diazepam    Tablet 5 milliGRAM(s) Oral every 12 hours  folic acid 1 milliGRAM(s) Oral daily  mupirocin 2% Ointment 1 Application(s) Topical two times a day    MEDICATIONS  (PRN):  acetaminophen    Suspension .. 650 milliGRAM(s) Oral every 8 hours PRN Severe Pain (7 - 10)  ALBUTerol/ipratropium for Nebulization 3 milliLiter(s) Nebulizer every 6 hours PRN Shortness of Breath and/or Wheezing  ondansetron Injectable 4 milliGRAM(s) IV Push every 6 hours PRN Nausea and/or Vomiting      Vital Signs Last 24 Hrs  T(C): 36.7 (17 Sep 2019 13:33), Max: 37.8 (16 Sep 2019 19:01)  T(F): 98 (17 Sep 2019 13:33), Max: 100.1 (16 Sep 2019 19:01)  HR: 79 (17 Sep 2019 13:33) (60 - 89)  BP: 108/61 (17 Sep 2019 13:33) (93/62 - 131/76)  BP(mean): --  RR: 16 (17 Sep 2019 13:33) (14 - 18)  SpO2: 97% (17 Sep 2019 13:33) (97% - 100%)  CAPILLARY BLOOD GLUCOSE        I&O's Summary    16 Sep 2019 07:  -  17 Sep 2019 07:00  --------------------------------------------------------  IN: 0 mL / OUT: 2100 mL / NET: -2100 mL    17 Sep 2019 07:01  -  17 Sep 2019 13:50  --------------------------------------------------------  IN: 0 mL / OUT: 200 mL / NET: -200 mL        PHYSICAL EXAM:  GENERAL: NAD, well-developed  HEENT: rt ear with dressing for the old wound, healing not actively bleeding  CHEST/LUNG: Clear to auscultation bilaterally; No wheeze  HEART: Regular rate and rhythm; No murmurs  ABDOMEN: Soft, Nontender, Nondistended  EXTREMITIES:  no LE edema  : + fowler + scrotal edema ( hydrocele from prior, pt was recently here and was evaluated by urology)  PSYCH: Calm  NEUROLOGY: AAOx3  SKIN: No rashes or lesions    LABS:                        10.4   10.42 )-----------( 247      ( 17 Sep 2019 06:00 )             32.4         137  |  97<L>  |  13  ----------------------------<  111<H>  3.7   |  28  |  0.74    Ca    8.9      17 Sep 2019 06:00  Phos  3.1       Mg     1.9         TPro  6.4  /  Alb  3.2<L>  /  TBili  0.4  /  DBili  x   /  AST  15  /  ALT  14  /  AlkPhos  108            Urinalysis Basic - ( 16 Sep 2019 18:30 )    Color: LIGHT YELLOW / Appearance: CLEAR / S.015 / pH: 7.5  Gluc: NEGATIVE / Ketone: NEGATIVE  / Bili: NEGATIVE / Urobili: NORMAL   Blood: NEGATIVE / Protein: 10 / Nitrite: NEGATIVE   Leuk Esterase: NEGATIVE / RBC: x / WBC x   Sq Epi: x / Non Sq Epi: x / Bacteria: x        RADIOLOGY & ADDITIONAL TESTS:    Imaging Personally Reviewed:    Consultant(s) Notes Reviewed:      Care Discussed with Consultants/Other Providers:

## 2019-09-17 NOTE — PHYSICAL THERAPY INITIAL EVALUATION ADULT - ACTIVE RANGE OF MOTION EXAMINATION, REHAB EVAL
bilateral  lower extremity Active ROM was WFL (within functional limits)/bilateral upper extremity Active ROM was WFL (within functional limits)/except bilateral shoulder flexion 0-80 degrees

## 2019-09-17 NOTE — DISCHARGE NOTE PROVIDER - HOSPITAL COURSE
73 yo M with h/o urinary retention s/p indwelling Fowler dysphagia s/p PEG tube placement presenting with Fowler obstruction. Pt states that he had his Fowler replaced this morning and afterwards did not notice any urine draining into the bag. He also noted having worsening suprapubic pain and distention. He denies any fevers or chills. No nausea or vomiting. No other GI symptoms. Pt had been taking doxycycline for uti.             Urinary tract infection without hematuria    - ESBL Klebsiella per UCx from 9/15    - ID consulted, rec to observe off abx, as pt had doxycycline and UA now is neg    -f/u repeat UCx---------------------    -f/u blood Cx--------------            Fowler catheter problem    - Pt with indwelling fowler for urinary retention. Replaced in ED and urine draining well.     - Monitored I/O.              Dysphagia    - s/p PEG. Appears cachectic     - Cont tube feeds    - Dietitian consult.              Pressure injury of skin of sacral region, unspecified injury stage    - Wound care per nursing         PT eval: rehab         Dispo: 75 yo M with h/o urinary retention s/p indwelling Fowler dysphagia s/p PEG tube placement presenting with Fowelr obstruction. Pt states that he had his Fowler replaced this morning and afterwards did not notice any urine draining into the bag. He also noted having worsening suprapubic pain and distention. He denies any fevers or chills. No nausea or vomiting. No other GI symptoms. Pt had been taking doxycycline for uti.             Urinary tract infection without hematuria    - ESBL Klebsiella per UCx from 9/15    - ID consulted, rec to observe off abx, as pt had doxycycline and UA now is neg    -repeat UCx neg     -f/u blood Cx NTD x24h             Fowler catheter problem    - Pt with indwelling fowler for urinary retention. Replaced in ED and urine draining well.     - Monitored I/O.              Dysphagia    - s/p PEG. Appears cachectic     - Cont tube feeds    - Dietitian consult.         Weakness    -outpt follow up with neuro              Pressure injury of skin of sacral region, unspecified injury stage    - Wound care per nursing         PT eval: rehab         Dispo: Pt medically stable for d/c per Dr. Altamirano to rehab 73 yo M with h/o urinary retention s/p indwelling Fowler dysphagia s/p PEG tube placement presenting with Fowler obstruction. Pt states that he had his Fowler replaced this morning and afterwards did not notice any urine draining into the bag. He also noted having worsening suprapubic pain and distention. He denies any fevers or chills. No nausea or vomiting. No other GI symptoms. Pt had been taking doxycycline for uti.             Urinary tract infection without hematuria    - ESBL Klebsiella per UCx from 9/15    - ID consulted, rec to observe off abx, as pt had doxycycline and UA now is neg    -repeat UCx neg     -f/u blood Cx NTD x24h             Fowler catheter problem    - Pt with indwelling fowler for urinary retention. Replaced in ED and urine draining well.     - Monitored I/O.              Dysphagia    - s/p PEG. Appears cachectic     - Cont tube feeds    - Dietitian consult.         Weakness    -outpt follow up with neuro              Pressure injury of skin of sacral region, unspecified injury stage    - Wound care per nursing         H/o of anxiety/depression     -Continue valium as directed     -no active SI/HI/depression         PT eval: rehab         Dispo: Pt medically stable for d/c per Dr. Altamirano to rehab 75 yo M with h/o urinary retention s/p indwelling Fowler dysphagia s/p PEG tube placement presenting with Fowler obstruction. Pt states that he had his Fowler replaced this morning and afterwards did not notice any urine draining into the bag. He also noted having worsening suprapubic pain and distention. He denies any fevers or chills. No nausea or vomiting. No other GI symptoms. Pt had been taking doxycycline for uti.             Urinary tract infection without hematuria    - ESBL Klebsiella per UCx from 9/15    - ID consulted, rec to observe off abx, as pt had doxycycline and UA now is neg    -repeat UCx neg     -f/u blood Cx NTD x24h     Per ID no need for abx            Fowler catheter problem    - Pt with indwelling fowler for urinary retention. Replaced in ED and urine draining well.     - Monitored I/O.              Dysphagia    - s/p PEG. Appears cachectic     - Cont tube feeds    - Dietitian consult.         Weakness    -outpt follow up with neuro              Pressure injury of skin of sacral region, unspecified injury stage    - Wound care per nursing         H/o of anxiety/depression     -Continue valium as directed     -no active SI/HI/depression         PT eval: rehab         Dispo: Pt medically stable for d/c per Dr. Altamirano to rehab 75 yo M with h/o urinary retention s/p indwelling Fowler dysphagia s/p PEG tube placement presenting with Fowler obstruction. Pt states that he had his Fowler replaced this morning and afterwards did not notice any urine draining into the bag. He also noted having worsening suprapubic pain and distention. He denies any fevers or chills. No nausea or vomiting. No other GI symptoms. Pt had been taking doxycycline for uti.             Urinary tract infection without hematuria    - ESBL Klebsiella per UCx from 9/15    - ID consulted, rec to observe off abx, as pt had doxycycline and UA now is neg    -repeat UCx neg     -f/u blood Cx NTD x24h     Per ID    this was colonization and his symptoms and leukocytosis were due to pain from obstruction, not from UTI                Fowler catheter problem    - Pt with indwelling fowler for urinary retention. Replaced in ED and urine draining well.     - Monitored I/O.              Dysphagia    - s/p PEG. Appears cachectic     - Cont tube feeds    - Dietitian consult.         Weakness    -outpt follow up with neuro              Pressure injury of skin of sacral region, unspecified injury stage    - Wound care per nursing         H/o of anxiety/depression     -Continue valium as directed     -no active SI/HI/depression         PT eval: rehab     Pt also with severe protein calorie malnutrition        Dispo: Pt medically stable for d/c per Dr. Altamirano to rehab

## 2019-09-17 NOTE — DISCHARGE NOTE PROVIDER - CARE PROVIDER_API CALL
Tayler King)  Family Medicine  [address] [place] [address]  Phone: [phone]  Fax: [phone]  Follow Up Time: Tayler King)  Family Medicine  [address] [place] [address]  Phone: [phone]  Fax: [phone]  Follow Up Time:

## 2019-09-17 NOTE — CONSULT NOTE ADULT - ASSESSMENT
I think his symptoms and leukocytosis were all secondary to obstruction  He felt immediately and significantly better after fowler drained in the ED    Pyuria with Klebsiella oxytoca on 9/11 but repeat UA clear here and only received Doxycycline...    Would stop Ciprofloxacin     Spoke with medicine attending     Francois Ko MD   Infectious Disease   Pager [phone]   After 5PM and on weekends please call [phone] 74M was recently admitted in August after a fall with rhabdomyolysis, pneumonia, failure to thrive requiring a PEG and urinary retention requiring a fowler catheter.   Admitted 9/16/19 after his fowler was exchanged at his nursing facility but was not functioning, had it replaced in the ED with 500cc output and immediate relief of suprapubic pain.   Klebsiella oxytoca bacteruria and pyuria from 9/11 from a fowler catheter is difficult to interpret but given clear UA here without treatment (received Doxycycline which is inactive), I suspect this was colonization and his symptoms and leukocytosis were due to pain from obstruction. He feels well. I'm not impressed with his rectal temperature of 100.1F. He is nontoxic and never had subjective fevers or chills.     Recommend  -stop antibiotics     Spoke with medicine attending     Francois Ko MD   Infectious Disease   Pager [phone]   After 5PM and on weekends please call [phone]

## 2019-09-17 NOTE — PHYSICAL THERAPY INITIAL EVALUATION ADULT - PERTINENT HX OF CURRENT PROBLEM, REHAB EVAL
This is a 73 yo M with h/o urinary retention s/p indwelling Spencer dysphagia s/p PEG tube placement presenting with Spencer obstruction.

## 2019-09-17 NOTE — CONSULT NOTE ADULT - SUBJECTIVE AND OBJECTIVE BOX
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   He's had an indwelling fowler catheter since discharge, it was not bothering him at all and he barely noticed it was there until this past weekend the tip of his penis started to burn a bit. It was changed at his nursing home but for some reason no urine came out and he became distended with severe suprapubic pain.   Referred to the ED where it was replaced, 500cc output and he immediately felt much better.   No fevers or chills but because he was sweating from the pain, he had a rectal temperature checked which read as 100.1F.   Received Vancomycin and Zosyn in the ED, now on Ciprofloxacin. Has been on Doxycycline for his urine at his nursing facility.     PAST MEDICAL & SURGICAL HISTORY:  Scrotal swelling  Psychiatric disorder  No significant past surgical history      Allergies    No Known Allergies    Intolerances    ANTIMICROBIALS:      OTHER MEDS:  acetaminophen    Suspension .. 650 milliGRAM(s) Oral every 8 hours PRN  ALBUTerol/ipratropium for Nebulization 3 milliLiter(s) Nebulizer every 6 hours PRN  artificial  tears Solution 1 Drop(s) Both EYES two times a day  diazepam    Tablet 5 milliGRAM(s) Oral every 12 hours  enoxaparin Injectable 40 milliGRAM(s) SubCutaneous daily  folic acid 1 milliGRAM(s) Oral daily  mupirocin 2% Ointment 1 Application(s) Topical two times a day  ondansetron Injectable 4 milliGRAM(s) IV Push every 6 hours PRN    SOCIAL HISTORY: Never smoker or drinker. 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     FAMILY HISTORY:  Father  of stomach cancer  Mother lived to age 84 without medical problems     ROS:  All other systems negative   Constitutional: no fever, no chills  Eye: no vision changes  ENT:  no sore throat, no rhinorrhea  Cardiovascular:  no chest pain, no palpitation  Respiratory:  no SOB, no cough  GI:  no abd pain, no vomiting, no diarrhea  urinary: no more suprapubic pain. no flank pain   musculoskeletal:  no joint pain, no joint swelling   skin:  no rash   neurology:  general weakness   psych: no anxiety     Physical Exam:   General: underweight, chronically ill appearing, NAD, non toxic   Head: atraumatic, normocephalic   Eyes: normal sclera and conjunctiva   ENT: no gross oropharyngeal lesions, no LAD   Cardio:  regular rate and rhythm   Respiratory:   clear b/l, no wheezing  abd:   PEG. soft, BS +, not tender   :  indwelling fowler. no CVAT, no suprapubic tenderness   Musculoskeletal: no joint swelling, no edema  Skin: no rash  vascular: no phlebitis  psych: normal affect, carries a normal conversation, seems pretty sharp     Drug Dosing Weight  Height (cm): 185.4 (23 Aug 2019 01:20)  Weight (kg): 56.5 (23 Aug 2019 01:20)  BMI (kg/m2): 16.4 (23 Aug 2019 01:20)  BSA (m2): 1.76 (23 Aug 2019 01:20)    Vital Signs Last 24 Hrs  T(F): 98 (19 @ 13:33), Max: 100.1 (19 @ 19:01)    Vital Signs Last 24 Hrs  HR: 79 (19 @ 13:33) (60 - 89)  BP: 108/61 (19 @ 13:33) (93/62 - 131/76)  RR: 16 (19 @ 13:33)  SpO2: 97% (19 @ 13:33) (97% - 100%)  Wt(kg): --                          10.4   10.42 )-----------( 247      ( 17 Sep 2019 06:00 )             32.4           137  |  97<L>  |  13  ----------------------------<  111<H>  3.7   |  28  |  0.74    Ca    8.9      17 Sep 2019 06:00  Phos  3.1       Mg     1.9         TPro  6.4  /  Alb  3.2<L>  /  TBili  0.4  /  DBili  x   /  AST  15  /  ALT  14  /  AlkPhos  108        Urinalysis Basic - ( 16 Sep 2019 18:30 )    Color: LIGHT YELLOW / Appearance: CLEAR / S.015 / pH: 7.5  Gluc: NEGATIVE / Ketone: NEGATIVE  / Bili: NEGATIVE / Urobili: NORMAL   Blood: NEGATIVE / Protein: 10 / Nitrite: NEGATIVE   Leuk Esterase: NEGATIVE / RBC: x / WBC x   Sq Epi: x / Non Sq Epi: x / Bacteria: x      MICROBIOLOGY:  >100,000 CFU/mL Klebsiella oxytoca 19   Amikacin S (<=16)   Ampicillin R (>16)   Ampicillin/Sulbactam R (>16/8)   Aztreonam R (>16)   Cefazolin R (>16)   Cefepime S (<=4)   Cefoxitin S (<=8)   Ceftriaxone R (32)   Ciprofloxacin S (<=1)   Gentamicin S (<=4)   Imipenem S (<=1)   Levofloxacin S (<=2)   Meropenem S (<=1)   Nitrofurantoin S (<=32)   Piperacillin/Tazobactam R (>64)   Tigecycline S (<=2)   Tobramycin S (<=4)   Trimethoprim/Sulfamethoxazole S (<=2/38)     RADIOLOGY:  Images below reviewed personally  Xray Chest 1 View AP/PA (19 @ 20:08)   No focal consolidation tosuggest pneumonia.

## 2019-09-17 NOTE — PHYSICAL THERAPY INITIAL EVALUATION ADULT - ADDITIONAL COMMENTS
As per patient,  prior to previous hospitalization on Aug 6th 2019, PT evaluation on 9/8/19 , patient lived alone and was independent with ADL's and ambulation.

## 2019-09-17 NOTE — DISCHARGE NOTE PROVIDER - NSDCCPCAREPLAN_GEN_ALL_CORE_FT
PRINCIPAL DISCHARGE DIAGNOSIS  Diagnosis: Urinary tract infection without hematuria, site unspecified  Assessment and Plan of Treatment: Montiored off antibiotics. Monitor for signs/symptoms of infection, such as, fever/chills, burning/pain with urination, urinary frequency/hesitancy, cloudy urine, or blood in urine.      SECONDARY DISCHARGE DIAGNOSES  Diagnosis: Spencer catheter problem, initial encounter  Assessment and Plan of Treatment: exchanged in the ER. Draining well. PRINCIPAL DISCHARGE DIAGNOSIS  Diagnosis: Urinary tract infection without hematuria, site unspecified  Assessment and Plan of Treatment: Montiored off antibiotics. Monitor for signs/symptoms of infection, such as, fever/chills, burning/pain with urination, urinary frequency/hesitancy, cloudy urine, or blood in urine.      SECONDARY DISCHARGE DIAGNOSES  Diagnosis: Spencer catheter problem, initial encounter  Assessment and Plan of Treatment: exchanged in the ER. Draining well.    Diagnosis: Dysphagia, unspecified type  Assessment and Plan of Treatment: Continue peg feeds as directed    Diagnosis: Pressure injury of skin of sacral region, unspecified injury stage  Assessment and Plan of Treatment: Continue routine wound care as needed, frequent repositioning in bed

## 2019-09-17 NOTE — PROGRESS NOTE ADULT - SUBJECTIVE AND OBJECTIVE BOX
Follow Up:      Interval History/ROS:     Allergies  No Known Allergies        ANTIMICROBIALS:      OTHER MEDS:  acetaminophen    Suspension .. 650 milliGRAM(s) Oral every 8 hours PRN  ALBUTerol/ipratropium for Nebulization 3 milliLiter(s) Nebulizer every 6 hours PRN  artificial  tears Solution 1 Drop(s) Both EYES two times a day  diazepam    Tablet 5 milliGRAM(s) Oral every 12 hours  folic acid 1 milliGRAM(s) Oral daily  mupirocin 2% Ointment 1 Application(s) Topical two times a day  ondansetron Injectable 4 milliGRAM(s) IV Push every 6 hours PRN      Vital Signs Last 24 Hrs  T(C): 36.7 (17 Sep 2019 13:33), Max: 37.8 (16 Sep 2019 19:01)  T(F): 98 (17 Sep 2019 13:33), Max: 100.1 (16 Sep 2019 19:01)  HR: 79 (17 Sep 2019 13:33) (60 - 89)  BP: 108/61 (17 Sep 2019 13:33) (93/62 - 131/76)  BP(mean): --  RR: 16 (17 Sep 2019 13:33) (14 - 18)  SpO2: 97% (17 Sep 2019 13:33) (97% - 100%)    Physical Exam:  General:    NAD,  non toxic  Head: atraumatic, normocephalic  Eye: normal sclera and conjunctiva  ENT:    no oropharyngeal lesions,   no LAD  Cardio:  regular rate and rhythm   Respiratory: nonlabored, clear b/l,    no wheezing  abd:     soft,   BS +,   no tenderness  :   no CVAT,  no suprapubic tenderness,   no  fowler  Musculoskeletal:   no joint swelling,   no edema  vascular: no phlebitis, normal pulses  Skin:    no rash  Neurologic:     no focal deficit  psych: normal affect                          10.4   10.42 )-----------( 247      ( 17 Sep 2019 06:00 )             32.4       09-    137  |  97<L>  |  13  ----------------------------<  111<H>  3.7   |  28  |  0.74    Ca    8.9      17 Sep 2019 06:00  Phos  3.1     -  Mg     1.9         TPro  6.4  /  Alb  3.2<L>  /  TBili  0.4  /  DBili  x   /  AST  15  /  ALT  14  /  AlkPhos  108  09-17      Urinalysis Basic - ( 16 Sep 2019 18:30 )    Color: LIGHT YELLOW / Appearance: CLEAR / S.015 / pH: 7.5  Gluc: NEGATIVE / Ketone: NEGATIVE  / Bili: NEGATIVE / Urobili: NORMAL   Blood: NEGATIVE / Protein: 10 / Nitrite: NEGATIVE   Leuk Esterase: NEGATIVE / RBC: x / WBC x   Sq Epi: x / Non Sq Epi: x / Bacteria: x        MICROBIOLOGY:  v            RADIOLOGY:  Images below reviewed personally

## 2019-09-17 NOTE — PROGRESS NOTE ADULT - PROBLEM SELECTOR PLAN 1
- ESBL Klebsiella per UCx from 9/15  - ID consulted, rec to observe off abx, as pt had doxycycline and UA now is neg,  f/u repeat UCx, f/u blood culture,

## 2019-09-17 NOTE — PROGRESS NOTE ADULT - ASSESSMENT
75 yo M with h/o urinary retention s/p indwelling Spencer dysphagia s/p PEG tube placement presenting with Spencer obstruction.

## 2019-09-17 NOTE — PROGRESS NOTE ADULT - ASSESSMENT
I think his symptoms and leukocytosis were all secondary to obstruction  He felt immediately and significantly better after fowler drained in the ED    Pyuria with Klebsiella oxytoca on 9/11 but repeat UA clear here and only received Doxycycline...    Would stop Ciprofloxacin         Francois Ko MD   Infectious Disease   Pager [phone]   After 5PM and on weekends please call [phone]

## 2019-09-18 ENCOUNTER — TRANSCRIPTION ENCOUNTER (OUTPATIENT)
Age: 74
End: 2019-09-18

## 2019-09-18 VITALS
DIASTOLIC BLOOD PRESSURE: 60 MMHG | OXYGEN SATURATION: 100 % | TEMPERATURE: 98 F | SYSTOLIC BLOOD PRESSURE: 113 MMHG | HEART RATE: 64 BPM | RESPIRATION RATE: 16 BRPM

## 2019-09-18 DIAGNOSIS — T83.091A OTHER MECHANICAL COMPLICATION OF INDWELLING URETHRAL CATHETER, INITIAL ENCOUNTER: ICD-10-CM

## 2019-09-18 LAB
ANION GAP SERPL CALC-SCNC: 11 MMO/L — SIGNIFICANT CHANGE UP (ref 7–14)
BACTERIA UR CULT: SIGNIFICANT CHANGE UP
BUN SERPL-MCNC: 14 MG/DL — SIGNIFICANT CHANGE UP (ref 7–23)
CALCIUM SERPL-MCNC: 9 MG/DL — SIGNIFICANT CHANGE UP (ref 8.4–10.5)
CHLORIDE SERPL-SCNC: 99 MMOL/L — SIGNIFICANT CHANGE UP (ref 98–107)
CO2 SERPL-SCNC: 29 MMOL/L — SIGNIFICANT CHANGE UP (ref 22–31)
CREAT SERPL-MCNC: 0.63 MG/DL — SIGNIFICANT CHANGE UP (ref 0.5–1.3)
GLUCOSE SERPL-MCNC: 108 MG/DL — HIGH (ref 70–99)
HCT VFR BLD CALC: 32.7 % — LOW (ref 39–50)
HGB BLD-MCNC: 10.2 G/DL — LOW (ref 13–17)
MAGNESIUM SERPL-MCNC: 2 MG/DL — SIGNIFICANT CHANGE UP (ref 1.6–2.6)
MCHC RBC-ENTMCNC: 31.2 % — LOW (ref 32–36)
MCHC RBC-ENTMCNC: 31.9 PG — SIGNIFICANT CHANGE UP (ref 27–34)
MCV RBC AUTO: 102.2 FL — HIGH (ref 80–100)
NRBC # FLD: 0 K/UL — SIGNIFICANT CHANGE UP (ref 0–0)
PHOSPHATE SERPL-MCNC: 3.7 MG/DL — SIGNIFICANT CHANGE UP (ref 2.5–4.5)
PLATELET # BLD AUTO: 246 K/UL — SIGNIFICANT CHANGE UP (ref 150–400)
PMV BLD: 10.9 FL — SIGNIFICANT CHANGE UP (ref 7–13)
POTASSIUM SERPL-MCNC: 3.5 MMOL/L — SIGNIFICANT CHANGE UP (ref 3.5–5.3)
POTASSIUM SERPL-SCNC: 3.5 MMOL/L — SIGNIFICANT CHANGE UP (ref 3.5–5.3)
RBC # BLD: 3.2 M/UL — LOW (ref 4.2–5.8)
RBC # FLD: 12.8 % — SIGNIFICANT CHANGE UP (ref 10.3–14.5)
SODIUM SERPL-SCNC: 139 MMOL/L — SIGNIFICANT CHANGE UP (ref 135–145)
SPECIMEN SOURCE: SIGNIFICANT CHANGE UP
WBC # BLD: 7.01 K/UL — SIGNIFICANT CHANGE UP (ref 3.8–10.5)
WBC # FLD AUTO: 7.01 K/UL — SIGNIFICANT CHANGE UP (ref 3.8–10.5)

## 2019-09-18 PROCEDURE — 99239 HOSP IP/OBS DSCHRG MGMT >30: CPT

## 2019-09-18 RX ORDER — MUPIROCIN 20 MG/G
1 OINTMENT TOPICAL
Qty: 0 | Refills: 0 | DISCHARGE
Start: 2019-09-18

## 2019-09-18 RX ORDER — DIAZEPAM 5 MG
1 TABLET ORAL
Qty: 0 | Refills: 0 | DISCHARGE
Start: 2019-09-18

## 2019-09-18 RX ORDER — FOLIC ACID 0.8 MG
1 TABLET ORAL
Qty: 0 | Refills: 0 | DISCHARGE
Start: 2019-09-18

## 2019-09-18 RX ORDER — IPRATROPIUM/ALBUTEROL SULFATE 18-103MCG
3 AEROSOL WITH ADAPTER (GRAM) INHALATION
Qty: 0 | Refills: 0 | DISCHARGE
Start: 2019-09-18

## 2019-09-18 RX ORDER — ACETAMINOPHEN 500 MG
20.31 TABLET ORAL
Qty: 0 | Refills: 0 | DISCHARGE
Start: 2019-09-18

## 2019-09-18 RX ORDER — MUPIROCIN 20 MG/G
1 OINTMENT TOPICAL
Qty: 0 | Refills: 0 | DISCHARGE

## 2019-09-18 RX ORDER — COLLAGENASE CLOSTRIDIUM HIST. 250 UNIT/G
1 OINTMENT (GRAM) TOPICAL
Qty: 0 | Refills: 0 | DISCHARGE

## 2019-09-18 RX ADMIN — Medication 1 DROP(S): at 06:56

## 2019-09-18 RX ADMIN — Medication 1 MILLIGRAM(S): at 09:36

## 2019-09-18 RX ADMIN — Medication 5 MILLIGRAM(S): at 09:36

## 2019-09-18 RX ADMIN — MUPIROCIN 1 APPLICATION(S): 20 OINTMENT TOPICAL at 06:56

## 2019-09-18 NOTE — CHART NOTE - NSCHARTNOTEFT_GEN_A_CORE
If there has been no clinical change, I have no objection to discharge.    Spoke with KEVIN Ko MD   Infectious Disease   Pager [phone]   After 5PM and on weekends please call [phone]

## 2019-09-18 NOTE — DIETITIAN INITIAL EVALUATION ADULT. - ENTERAL
1. Recommend Jevity 1.2 via PEG at 75mL/hr x 24 hours to provide 1800mL total volume, 2160Kcal, 99.9g protein daily,1452mL free water. 2. Further adjustments to rate/volume/duration/free water provision of enteral feeds will be determined in accordance with long term patient tolerance, needs and weight trends. Pt refuses to answer

## 2019-09-18 NOTE — DIETITIAN INITIAL EVALUATION ADULT. - OTHER INFO
Per chart review patient with medical history of urinary retention s/p indwelling Spencer, dysphagia s/p PEG tube  placement (8/21/19) presenting with Spencer obstruction. Patient from nursing home, per transfer paperwork patient receives volume based feeds of Jevity 1.5 via PEG at 75mL/hr until total volume of 1200mL reached daily (provides 1800Kcal and ~76.5g protein daily). Patient reports tolerating feeds - reports bolus feeds were tried during previous [facility] admission dn patient reports developing nausea so patient prefers continuous feeds. Patient reports weight loss prior to admission over the past couple of months. Reports UBW 65.9kg -> recent weight of 53.6kg two weeks ago at NH. Notes that his weight seems to be remaining stable recently. Patient reports he receives speech therapy at NH. No GI distress (nausea/vomiting/diarrhea/constipation) noted at this time. Patient tolerating feeds in-house.

## 2019-09-18 NOTE — PROGRESS NOTE ADULT - SUBJECTIVE AND OBJECTIVE BOX
Patient is a 74y old  Male who presents with a chief complaint of Fowler obstruction (17 Sep 2019 16:18)      SUBJECTIVE / OVERNIGHT EVENTS: Pt seen and examined at 11:45am, no overnight events, pt denies any suprapubic pain or any other complaints, per nsg has good urine outpt, refused lovenox. No other new issues reported.        MEDICATIONS  (STANDING):  artificial  tears Solution 1 Drop(s) Both EYES two times a day  diazepam    Tablet 5 milliGRAM(s) Oral every 12 hours  enoxaparin Injectable 40 milliGRAM(s) SubCutaneous daily  folic acid 1 milliGRAM(s) Oral daily  mupirocin 2% Ointment 1 Application(s) Topical two times a day    MEDICATIONS  (PRN):  acetaminophen    Suspension .. 650 milliGRAM(s) Oral every 8 hours PRN Severe Pain (7 - 10)  ALBUTerol/ipratropium for Nebulization 3 milliLiter(s) Nebulizer every 6 hours PRN Shortness of Breath and/or Wheezing  ondansetron Injectable 4 milliGRAM(s) IV Push every 6 hours PRN Nausea and/or Vomiting      Vital Signs Last 24 Hrs  T(C): 36.4 (18 Sep 2019 06:50), Max: 36.7 (17 Sep 2019 13:33)  T(F): 97.6 (18 Sep 2019 06:50), Max: 98.1 (17 Sep 2019 22:23)  HR: 76 (18 Sep 2019 09:35) (65 - 79)  BP: 113/58 (18 Sep 2019 09:35) (103/57 - 113/58)  BP(mean): --  RR: 18 (18 Sep 2019 09:35) (16 - 18)  SpO2: 100% (18 Sep 2019 09:35) (97% - 100%)  CAPILLARY BLOOD GLUCOSE        I&O's Summary    17 Sep 2019 07:01  -  18 Sep 2019 07:00  --------------------------------------------------------  IN: 500 mL / OUT: 1250 mL / NET: -750 mL    18 Sep 2019 07:01  -  18 Sep 2019 13:24  --------------------------------------------------------  IN: 0 mL / OUT: 400 mL / NET: -400 mL        PHYSICAL EXAM:  GENERAL: NAD, well-developed  HEENT: rt ear with dressing for the old wound, healing not actively bleeding  CHEST/LUNG: Clear to auscultation bilaterally; No wheeze  HEART: Regular rate and rhythm; No murmurs  ABDOMEN: Soft, Nontender, Nondistended, + peg  EXTREMITIES:  no LE edema  : + fowler + scrotal edema ( hydrocele from prior, pt was recently here and was evaluated by urology)  PSYCH: Calm  NEUROLOGY: AAOx3  SKIN: No rashes or lesions    LABS:                        10.2   7.01  )-----------( 246      ( 18 Sep 2019 05:30 )             32.7     09-18    139  |  99  |  14  ----------------------------<  108<H>  3.5   |  29  |  0.63    Ca    9.0      18 Sep 2019 05:30  Phos  3.7     09-18  Mg     2.0     -18    TPro  6.4  /  Alb  3.2<L>  /  TBili  0.4  /  DBili  x   /  AST  15  /  ALT  14  /  AlkPhos  108  09-17          Urinalysis Basic - ( 16 Sep 2019 18:30 )    Color: LIGHT YELLOW / Appearance: CLEAR / S.015 / pH: 7.5  Gluc: NEGATIVE / Ketone: NEGATIVE  / Bili: NEGATIVE / Urobili: NORMAL   Blood: NEGATIVE / Protein: 10 / Nitrite: NEGATIVE   Leuk Esterase: NEGATIVE / RBC: x / WBC x   Sq Epi: x / Non Sq Epi: x / Bacteria: x        RADIOLOGY & ADDITIONAL TESTS:    Imaging Personally Reviewed:    Consultant(s) Notes Reviewed:      Care Discussed with Consultants/Other Providers: Patient is a 74y old  Male who presents with a chief complaint of Fowler obstruction (17 Sep 2019 16:18)      SUBJECTIVE / OVERNIGHT EVENTS: Pt seen and examined at 11:45am, no overnight events, afebrile, pt denies any suprapubic pain or any other complaints, per nsg has good urine outpt, refused lovenox. No other new issues reported.        MEDICATIONS  (STANDING):  artificial  tears Solution 1 Drop(s) Both EYES two times a day  diazepam    Tablet 5 milliGRAM(s) Oral every 12 hours  enoxaparin Injectable 40 milliGRAM(s) SubCutaneous daily  folic acid 1 milliGRAM(s) Oral daily  mupirocin 2% Ointment 1 Application(s) Topical two times a day    MEDICATIONS  (PRN):  acetaminophen    Suspension .. 650 milliGRAM(s) Oral every 8 hours PRN Severe Pain (7 - 10)  ALBUTerol/ipratropium for Nebulization 3 milliLiter(s) Nebulizer every 6 hours PRN Shortness of Breath and/or Wheezing  ondansetron Injectable 4 milliGRAM(s) IV Push every 6 hours PRN Nausea and/or Vomiting      Vital Signs Last 24 Hrs  T(C): 36.4 (18 Sep 2019 06:50), Max: 36.7 (17 Sep 2019 13:33)  T(F): 97.6 (18 Sep 2019 06:50), Max: 98.1 (17 Sep 2019 22:23)  HR: 76 (18 Sep 2019 09:35) (65 - 79)  BP: 113/58 (18 Sep 2019 09:35) (103/57 - 113/58)  BP(mean): --  RR: 18 (18 Sep 2019 09:35) (16 - 18)  SpO2: 100% (18 Sep 2019 09:35) (97% - 100%)  CAPILLARY BLOOD GLUCOSE        I&O's Summary    17 Sep 2019 07:01  -  18 Sep 2019 07:00  --------------------------------------------------------  IN: 500 mL / OUT: 1250 mL / NET: -750 mL    18 Sep 2019 07:01  -  18 Sep 2019 13:24  --------------------------------------------------------  IN: 0 mL / OUT: 400 mL / NET: -400 mL        PHYSICAL EXAM:  GENERAL: NAD, well-developed  HEENT: rt ear with dressing for the old wound, healing not actively bleeding  CHEST/LUNG: Clear to auscultation bilaterally; No wheeze  HEART: Regular rate and rhythm; No murmurs  ABDOMEN: Soft, Nontender, Nondistended, + peg  EXTREMITIES:  no LE edema  : + fowler + scrotal edema ( hydrocele from prior, pt was recently here and was evaluated by urology)  PSYCH: Calm  NEUROLOGY: AAOx3  SKIN: No rashes or lesions    LABS:                        10.2   7.01  )-----------( 246      ( 18 Sep 2019 05:30 )             32.7     09-18    139  |  99  |  14  ----------------------------<  108<H>  3.5   |  29  |  0.63    Ca    9.0      18 Sep 2019 05:30  Phos  3.7     09-18  Mg     2.0     09-18    TPro  6.4  /  Alb  3.2<L>  /  TBili  0.4  /  DBili  x   /  AST  15  /  ALT  14  /  AlkPhos  108  09-17          Urinalysis Basic - ( 16 Sep 2019 18:30 )    Color: LIGHT YELLOW / Appearance: CLEAR / S.015 / pH: 7.5  Gluc: NEGATIVE / Ketone: NEGATIVE  / Bili: NEGATIVE / Urobili: NORMAL   Blood: NEGATIVE / Protein: 10 / Nitrite: NEGATIVE   Leuk Esterase: NEGATIVE / RBC: x / WBC x   Sq Epi: x / Non Sq Epi: x / Bacteria: x        RADIOLOGY & ADDITIONAL TESTS:    Imaging Personally Reviewed:    Consultant(s) Notes Reviewed:      Care Discussed with Consultants/Other Providers:

## 2019-09-18 NOTE — DISCHARGE NOTE NURSING/CASE MANAGEMENT/SOCIAL WORK - PATIENT PORTAL LINK FT
You can access the FollowMyHealth Patient Portal offered by [facility] by registering at the following website: [email or web address]. By joining [facility]’s FollowMyHealth portal, you will also be able to view your health information using other applications (apps) compatible with our system.

## 2019-09-18 NOTE — DIETITIAN INITIAL EVALUATION ADULT. - ENERGY NEEDS
Height (cm): 185.4 (23 Aug 2019, from previous admission)  Weight (kg): 53.6 (9/2019, stated weight by patient), bed-scale weight 58.6kg* inaccurate, scale not zeroed.  BMI (kg/m2): 15.5kg  IBW: 83.6kg +/-10%

## 2019-09-18 NOTE — DIETITIAN INITIAL EVALUATION ADULT. - PHYSICAL APPEARANCE
underweight/Patient with severe muscle mass depletion of temples, shoulders and clavicles and severe body fat depletion of buccal, orbital region./emaciated/other (specify) Edema: 3+ scrotum  Skin: skin tears to chest and right thigh per flowhsheets

## 2019-09-18 NOTE — DIETITIAN INITIAL EVALUATION ADULT. - PERTINENT LABORATORY DATA
09-18 Na 139 mmol/L Glu 108 mg/dL<H> K+ 3.5 mmol/L Cr 0.63 mg/dL BUN 14 mg/dL Phos 3.7 mg/dL Alb n/a   PAB n/a   Hgb 10.2 g/dL<L> Hct 32.7 %<L> HgA1C n/a    Glucose, Serum: 108 mg/dL <H>

## 2019-09-18 NOTE — DIETITIAN INITIAL EVALUATION ADULT. - SIGNS/SYMPTOMS
severe muscle mass loss, severe body fat loss, 18.6% weight loss in 1.5 months As evidenced by current enteral regimen not meeting patient's estimated nutrition needs

## 2019-09-18 NOTE — PROGRESS NOTE ADULT - PROBLEM SELECTOR PLAN 1
- ESBL Klebsiella per UCx from 9/15  - ID consulted, rec to observe off abx, as pt had doxycycline and UA now is neg,   UCx and blood culture neg to date, discussed with ID no objection for d/c today,  d/c today to rehab, d/c planning time spent in coordination 40mts ( discussion with ID, NP, preparing d/c summary and plan)

## 2019-09-18 NOTE — DIETITIAN INITIAL EVALUATION ADULT. - PERTINENT MEDS FT
acetaminophen    Suspension .. PRN  ALBUTerol/ipratropium for Nebulization PRN  artificial  tears Solution  diazepam    Tablet  enoxaparin Injectable  folic acid  mupirocin 2% Ointment  ondansetron Injectable PRN

## 2019-09-18 NOTE — CHART NOTE - NSCHARTNOTEFT_GEN_A_CORE
NUTRITION SERVICES     Upon Nutritional Assessment by the Registered Dietitian your patient was determined to meet criteria/ has evidence of the following diagnosis/diagnoses:  [ ] Mild Protein Calorie Malnutrition   [ ] Moderate Protein Calorie Malnutrition   [x] Severe Protein Calorie Malnutrition   [ ] Unspecified Protein Calorie Malnutrition   [x] Underweight / BMI <19  [ ] Morbid Obesity / BMI >40    Findings as based on:  •  Comprehensive nutritional assessment and consultation    Please refer to Initial Dietitian Evaluation or Nutrition Follow-up via documents section of [facility] for further recommendations.

## 2019-09-21 LAB
BACTERIA BLD CULT: SIGNIFICANT CHANGE UP
BACTERIA BLD CULT: SIGNIFICANT CHANGE UP

## 2019-09-29 NOTE — ADVANCED PRACTICE NURSE CONSULT - ASSESSMENT
General: A & O x 3, pt lethargic, falling asleep during assessment. Pt currently bedbound, cachetic with temporal wasting, incontinent of urine and stool, actively incontinent of both urine and semi-formed stool during assessment, perineal care provided. Skin warm, dry, poor skin turgor, hair matted, poor oral care. Scattered areas of ecchymosis on bilateral upper extremities. Multiple wounds noted secondary to fall with increased pressure, friction and sheer (see patient's report above). Right lateral upper arm, right knee with intact stable scabs.  Poor foot hygiene, thickened hyperpigmented toenails. Blanchable erythema of bilateral heels.    Right anterior ear extending to posterior ear- mixed etiology trauma wound complicated by deep tissue pressure injury and moisture associated dermatitis complicated by friction- 5.5cmx6.5cmx0.2cm, well defined irregular borders, 60% purple-maroon non-blanching discoloration deepest discoloration within crease between occipita and posterior ear with linear maceration, 40% skin slippage exposing deep red-moist dermis. Trace edema, of well demarcated discoloration. Moderate serosanguinous drainage, no odor.  Periwound skin intact. Goals of care: absorb, manage drainage, monitor for changes in tissue type, protect periwound skin.    Right cheek bone/right zygomatic bone- [id number]- mixed etiology trauma wound and unstagable pressure injury complicated by friction- 100% black hard dry, stable eschar, well defined borders. No drainage noted. Periwound skin with purple-maroon discoloration circumferentially with induration from 12-5 o'clock extending 0.5cm-1.5cm with 1.5cm at 12 o'clock. Goals of care: maintain stable eschar, monitor for changes in tissue type.    Right mandible- mixed etiology trauma wound and unstagable pressure injury complicated by friction- [id number]- wound base with 25% flat, hard, dry firmly attached eschar at 9o'clock of wound base, 25% pink-moist dermis, 50% dry serous crust unable to remove while cleansing. Scant serosanguineous drainage, no odor. Periwound skin with blanchable erythema extending 1cm from wound edge, no induration, no increased warmth, no edema noted. Goals of care: autolytic debridement of necrotic tissue and dry crust, maintain moist base to promote wound healing, monitor for changes in tissue type.    Right neck immediately superior to right clavicle- abrasion- 1cmx1.5cmx0.2cm, 100% pink-moist dermis, scant serous drainage, no odor. Periwound skin with blanchable erythema circumferentially extending 1cm from wound edge. Goals of care: maintain moist environment to promote wound healing, protect periwound skin.    Right shoulder- mixed etiology trauma wound complicated by stage 2 pressure injury and friction and sheer- entire area measures 9.5acm2vhh3.2cm within this area are three areas of denuded epidermis with irregular borders making up 40% of wound base exposing pink-moist dermis with fibrin film, remaining 60% well defined with irregular borders slow blanching erythema with trace edema. Scant serous drainage, no odor. Periwound skin intact. Goals of care: autolytic debridement of fibrin film, maintain moist environment to promote wound healing, protect periwound skin.    Right upper chest- mixed etiology trauma wound complicated by friction- three superficial open ulcerations with irregular borders;  superior wound- 3cmx2.5cmx0.5cm- 5% superficial moist, brown eschar beginning to lift from wound base, 95% pale-pink moist dermis. Scant serous drainage, no odor. 1cm distal from wound edge is a second ulcerations 2.4vjk4yrb3.2cm, 100% pale-pink moist dermis, scant serous drainage, no odor. 2cm from distal edge is a third ulceration 1.5cmx5.5cmx0.2cm, 100% pale-pink moist dermis, scant serous drainage, no odor. Periwound skin of all ulcerations with (+) blanching erythema extending 1cm from wound edges, no palpable skin changes. Goals of care: autolytic debridement of necrotic tissue, maintain moist environment to promote wound healing, protect periwound skin.    Right ileac crest- mixed etiology trauma wound complicated by friction and stage 2 pressure injury- 5swr5mhs6.2cm, well demarcated irregular borders, 50% well defined slow blanching erythema, 25% pink-moist dermis, 25% moist dermis with fibrin film. Scant serous drainage, no odor. Periwound skin intact, no palpable skin changes. Goals of care: maintain moist environment to promote wound healing, protect periwound skin.    Right upper thigh- mixed etiology trauma wound complicated by friction- 2cmx2.5cmx0.2cm, irregular borders, 100% pale-pink moist dermis, scant serous drainage, no odor. Periwound skin with intact serous filled blisters one at 12 o'clock 1cm from wound edge measuring 4ino2sg, second at 3 o'clock along wound edge measuring 2.5cmx2.5cm. Remaining periwound skin intact no palpable skin changes. Goals of care: maintain moist environment to promote wound healing, promote reabsorption of serous filled blisters, protect from friction and sheer. protect periwound skin.     Left ileac crest- mixed etiology trauma wound complicated by friction- multiple intact stable scabs notes, largest measuring 3.9req3zme2, 100% dry serous crust unable to remove while cleansing. 2cm from distal wound edge are two linear scabs measuring 0.0prg5jd  by 0.5cm intact epidermis. Periwound skin intact. Goals of care: maintain intact stable scab, protect from friction sheer.    Left anterior thigh- frictional injury- serous filled blister noted 4.5cm5.5cmx0, periwound skin intact. Goals of care: promote reabsorption, protect from friction sheer.    Risk for incontinence associated dermatitis- skin currently intact, of note there is an area of reactive hyperemia on left upper buttock measuring [id number]; when patient was in side lying position while providing perineal care, area of note was beginning to fade, however it appears with well defined borders of slow blanching erythema. Chronic hyperpigmentation noted to sacrum. Scrotal edema, secondary to left scrotal hydrocele. 67 YO F with a PMH of hernia repair who presents to the hospital for eval of right-sided flank pain for the past x 5 years. Progressively worsening. Associated with purulent discharge and a redness and swelling over the flank. Of note, the pt states that she had a hernia repair in the same area and that she never followed up with her surgeon because she thought they were making things worse. Denies any fevers, chills, cough, CP, SOB, dysuria, or constipation.     1. Right flank open wound, infected  -? enterocutaneous fistula-- IR did fistulogram-- no fistula noted  - -Wound cx showed staph aureus with mixed organisms--  ID suggested --will add ABX after  drainage and repeat cultures   - Surgery will take her to OR for drainage of wound as there is copius discharge    2. Elevated BP  -patient has not been taking medications for over a month as per son  - changed amlodipine to nifedipine XL 30mg and BP is high today as she did not sleep all night..    3. Morbid obesity  patient is noncompliant with drugs and diet    Pending drainage in OR on monday 69 YO F with a PMH of hernia repair who presents to the hospital for eval of right-sided flank pain for the past x 5 years. Progressively worsening. Associated with purulent discharge and a redness and swelling over the flank. Of note, the pt states that she had a hernia repair in the same area and that she never followed up with her surgeon because she thought they were making things worse. Denies any fevers, chills, cough, CP, SOB, dysuria, or constipation.     1. Right flank open wound, infected  -? enterocutaneous fistula-- IR did fistulogram-- no fistula noted  - -Wound cx showed staph aureus with mixed organisms--  ID suggested --will add ABX after  drainage and repeat cultures   - Surgery will take her to OR for drainage of wound as there is copius discharge    2. Elevated BP  -patient has not been taking medications for over a month as per son  - changed amlodipine to nifedipine XL 30mg and BP is high today as she did not sleep all night..    3. Morbid obesity  patient is noncompliant with drugs and diet    4. Vit K def one dose of Vit k given po    Pending drainage in OR on monday 67 YO F with a PMH of hernia repair who presents to the hospital for eval of right-sided flank pain for the past x 5 years. Progressively worsening. Associated with purulent discharge and a redness and swelling over the flank. Of note, the pt states that she had a hernia repair in the same area and that she never followed up with her surgeon because she thought they were making things worse. Denies any fevers, chills, cough, CP, SOB, dysuria, or constipation.     1. Right flank open wound, infected  -? enterocutaneous fistula-- IR did fistulogram-- no fistula noted  - -Wound cx showed staph aureus with mixed organisms--  ID suggested --will add ABX after  drainage and repeat cultures   - Surgery will take her to OR for drainage of wound as there is copius discharge    2. Elevated BP  -patient has not been taking medications for over a month as per son  - changed amlodipine to nifedipine XL 30mg and BP is high today as she did not sleep all night..    3. Morbid obesity  patient is noncompliant with drugs and diet    4.  afib on xarelto-- held now, INR high will give him VIt K    Pending drainage in OR on monday 67 YO F with a PMH of hernia repair who presents to the hospital for eval of right-sided flank pain for the past x 5 years. Progressively worsening. Associated with purulent discharge and a redness and swelling over the flank. Of note, the pt states that she had a hernia repair in the same area and that she never followed up with her surgeon because she thought they were making things worse. Denies any fevers, chills, cough, CP, SOB, dysuria, or constipation.     1. Right flank open wound, infected  -? abscess on rt abdominal wall-- IR did fistulogram-- no fistula noted  - -Wound cx showed staph aureus with mixed organisms--  ID suggested --will add ABX after  drainage and repeat cultures   - Surgery will take her to OR for drainage of wound as there is copius discharge    2. Elevated BP  -patient has not been taking medications for over a month as per son  - changed amlodipine to nifedipine XL 30mg and BP is high today as she did not sleep all night..    3. Morbid obesity  patient is noncompliant with drugs and diet    4.  afib on xarelto-- held now, INR high will give her VIt K to bring down INR    Pending drainage in OR on monday.   EKG hr 59/min-- slow afib not on AV alyse blocking agents. patient is obese and has not seen her cardiologist in 2years  Dr Rose-- family will provide further details-- awaiting call back  echo pending  Moderate risk for procedure-- patient refusing general anesthesia 67 YO F with a PMH of hernia repair who presents to the hospital for eval of right-sided flank pain for the past x 5 years. Progressively worsening. Associated with purulent discharge and a redness and swelling over the flank. Of note, the pt states that she had a hernia repair in the same area and that she never followed up with her surgeon because she thought they were making things worse. Denies any fevers, chills, cough, CP, SOB, dysuria, or constipation.     1. Right flank open wound, infected  -? abscess on rt abdominal wall-- IR did fistulogram-- no fistula noted  - -Wound cx showed staph aureus with mixed organisms--  ID suggested --will add ABX after  drainage and repeat cultures   - Surgery will take her to OR for drainage of wound as there is copius discharge    2. Elevated BP  -patient has not been taking medications for over a month as per son  - changed amlodipine to nifedipine XL 30mg and BP is high today as she did not sleep all night..    3. Morbid obesity  patient is noncompliant with drugs and diet    4.  afib on xarelto-- held now, INR high will give her VIt K to bring down INR    Pending drainage in OR on monday.   EKG hr 59/min-- slow afib on metoprolol which has been stopped. patient is obese and has not seen her cardiologist in 2years  Dr Rose-- family will provide further details-- they could not provide any details. Pharmacy said her MD is Citlali Rendon and the doctor prescribing xarelto.  echo pending  Moderate risk for procedure-- patient refusing general anesthesia 69 YO F with a PMH of hernia repair who presents to the hospital for eval of right-sided flank pain for the past x 5 years. Progressively worsening. Associated with purulent discharge and a redness and swelling over the flank. Of note, the pt states that she had a hernia repair in the same area and that she never followed up with her surgeon because she thought they were making things worse. Denies any fevers, chills, cough, CP, SOB, dysuria, or constipation.     1. Right flank open wound, infected  -? abscess on rt abdominal wall-- IR did fistulogram-- no fistula noted  - -Wound cx showed staph aureus with mixed organisms--  ID suggested --will add ABX after  drainage and repeat cultures   - Surgery will take her to OR for drainage of wound as there is copius discharge    2. Elevated BP  -patient has not been taking medications for over a month as per son  - changed amlodipine to nifedipine XL 30mg and BP is high today as she did not sleep all night..    3. Morbid obesity  patient is noncompliant with drugs and diet    4.  afib on xarelto-- held now, INR high will give her VIt K to bring down INR    Pending drainage in OR on monday.   EKG hr 59/min-- slow afib on metoprolol which has been stopped. patient is obese and has not seen her cardiologist in 2years  Dr Rose-- family will provide further details-- they could not provide any details. Pharmacy said her MD is Citlali Rendon and the doctor prescribing xarelto. cardiologist is Elder Rose [phone]. Will talk with him in AM.  echo pending  Moderate risk for procedure-- patient refusing general anesthesia

## 2019-10-01 ENCOUNTER — EMERGENCY (EMERGENCY)
Facility: HOSPITAL | Age: 74
LOS: 1 days | Discharge: HOME | End: 2019-10-01
Attending: EMERGENCY MEDICINE | Admitting: EMERGENCY MEDICINE
Payer: MEDICARE

## 2019-10-01 VITALS
TEMPERATURE: 98 F | SYSTOLIC BLOOD PRESSURE: 107 MMHG | RESPIRATION RATE: 16 BRPM | DIASTOLIC BLOOD PRESSURE: 62 MMHG | HEART RATE: 87 BPM | OXYGEN SATURATION: 99 %

## 2019-10-01 VITALS
SYSTOLIC BLOOD PRESSURE: 102 MMHG | RESPIRATION RATE: 16 BRPM | TEMPERATURE: 97 F | DIASTOLIC BLOOD PRESSURE: 54 MMHG | OXYGEN SATURATION: 98 % | HEART RATE: 87 BPM

## 2019-10-01 LAB
APPEARANCE UR: SIGNIFICANT CHANGE UP
BACTERIA # UR AUTO: NEGATIVE — SIGNIFICANT CHANGE UP
BILIRUB UR-MCNC: NEGATIVE — SIGNIFICANT CHANGE UP
BLOOD UR QL VISUAL: HIGH
COLOR SPEC: SIGNIFICANT CHANGE UP
GLUCOSE UR-MCNC: NEGATIVE — SIGNIFICANT CHANGE UP
HYALINE CASTS # UR AUTO: NEGATIVE — SIGNIFICANT CHANGE UP
KETONES UR-MCNC: NEGATIVE — SIGNIFICANT CHANGE UP
LEUKOCYTE ESTERASE UR-ACNC: SIGNIFICANT CHANGE UP
NITRITE UR-MCNC: NEGATIVE — SIGNIFICANT CHANGE UP
PH UR: 8 — SIGNIFICANT CHANGE UP (ref 5–8)
PROT UR-MCNC: 70 — SIGNIFICANT CHANGE UP
RBC CASTS # UR COMP ASSIST: >50 — HIGH (ref 0–?)
SP GR SPEC: 1.01 — SIGNIFICANT CHANGE UP (ref 1–1.04)
SQUAMOUS # UR AUTO: SIGNIFICANT CHANGE UP
UROBILINOGEN FLD QL: NORMAL — SIGNIFICANT CHANGE UP
WBC UR QL: HIGH (ref 0–?)

## 2019-10-01 PROCEDURE — 99283 EMERGENCY DEPT VISIT LOW MDM: CPT

## 2019-10-01 RX ORDER — CIPROFLOXACIN LACTATE 400MG/40ML
500 VIAL (ML) INTRAVENOUS ONCE
Refills: 0 | Status: COMPLETED | OUTPATIENT
Start: 2019-10-01 | End: 2019-10-01

## 2019-10-01 RX ADMIN — Medication 500 MILLIGRAM(S): at 22:11

## 2019-10-01 NOTE — ED PROVIDER NOTE - PATIENT PORTAL LINK FT
You can access the FollowMyHealth Patient Portal offered by [facility] by registering at the following website: [email or web address]. By joining [facility]’s FollowMyHealth portal, you will also be able to view your health information using other applications (apps) compatible with our system.

## 2019-10-01 NOTE — ED PROVIDER NOTE - OBJECTIVE STATEMENT
75 y/o male c/o blocked fowler catheter xtoday around 12PM. Pt admits to having a fowler in for his BPH, recently changed x2 weeks ago. pt states that it stopped flowing around noon and he then developed lower abd discomfort. Pt also admits to noticing a small amount of blood in the tubing. Pt denies chest pain, sob, n/v/d, dysuria, numbness, tingling, weakness, dizziness, syncope, fever or chills.

## 2019-10-01 NOTE — ED PROVIDER NOTE - CLINICAL SUMMARY MEDICAL DECISION MAKING FREE TEXT BOX
72 y/o male w/ obstructed urinary catheter, attempt to flush catheter unsuccessful, will exhange fowler, send off UA and UCx, reassess

## 2019-10-01 NOTE — ED ADULT TRIAGE NOTE - CHIEF COMPLAINT QUOTE
Pt brought in by EMS from [facility] for hematuria and urinary retention. Pt denies chest pain, SOB. Pt brought in by EMS from [facility] for hematuria and urinary retention. Pt denies chest pain, SOB.    Addendum: Pt complaining of abdominal pain, states it is getting worse. pt appears uncomfortable. Charge RN notified. Pt MARCELA level upgraded

## 2019-10-01 NOTE — ED PROVIDER NOTE - ATTENDING CONTRIBUTION TO CARE
Patient is a 75 yo here for chronic urinary retention secondary to BPH sent in from rehab facility here for clogged fowler since 12 noon today. He had a fowler swapped 13 days ago. Fowler was changed 13 days ago. Patient is a 73 yo here for chronic urinary retention secondary to BPH sent in from rehab facility here for clogged fowler since 12 noon today. He had a fowler swapped 13 days ago. Patient denies fevers, nausea, vomiting, flank pain.     VS noted  Gen. no acute distress, chronically ill  HEENT: EOMI, mmm  Lungs: CTAB/L no C/ W /R   CVS: RRR   Abd; Soft, mild suprapubic tenderness, no rebound  Ext: no edema  Skin: no rash  Neuro AAOx3 non focal clear speech  a/p: urinary retention, Upon arrival, fowler exchanged and patient experience relief of suprapubic discomfort. U/A done and concerning for UTI. KEVIN Barba informed facility of need for treatment for UTI. Urine culture sent. Patient to be discharged back to facility.   - Elieser GROSSMAN

## 2019-10-03 LAB
BACTERIA UR CULT: SIGNIFICANT CHANGE UP
SPECIMEN SOURCE: SIGNIFICANT CHANGE UP

## 2019-10-05 NOTE — ED POST DISCHARGE NOTE - RESULT SUMMARY
UCX: Staph sp. coag. neg > 100,000.  No sensitivity final result.  Pt seen in ED for obstructed urinary catheter.

## 2019-10-05 NOTE — ED POST DISCHARGE NOTE - DETAILS
[phone] - left message [phone] - no answer [phone] emergency contact number - discussed results with family Zamzam Gutiérrez.  Pt is currently taking Cipro and is feeling better.  Pt has a follow up appt with urologist scheduled.

## 2019-10-13 PROBLEM — Z00.00 ENCOUNTER FOR PREVENTIVE HEALTH EXAMINATION: Status: ACTIVE | Noted: 2019-10-13

## 2019-10-16 ENCOUNTER — APPOINTMENT (OUTPATIENT)
Dept: UROLOGY | Facility: CLINIC | Age: 74
End: 2019-10-16
Payer: MEDICARE

## 2019-10-16 VITALS
TEMPERATURE: 98.8 F | HEIGHT: 73 IN | RESPIRATION RATE: 17 BRPM | BODY MASS INDEX: 16.83 KG/M2 | HEART RATE: 99 BPM | WEIGHT: 127 LBS | DIASTOLIC BLOOD PRESSURE: 63 MMHG | SYSTOLIC BLOOD PRESSURE: 113 MMHG

## 2019-10-16 PROCEDURE — 99203 OFFICE O/P NEW LOW 30 MIN: CPT

## 2019-10-16 NOTE — PHYSICAL EXAM
[General Appearance - Well Developed] : well developed [General Appearance - Well Nourished] : well nourished [Normal Appearance] : normal appearance [Well Groomed] : well groomed [General Appearance - In No Acute Distress] : no acute distress [Edema] : no peripheral edema [Respiration, Rhythm And Depth] : normal respiratory rhythm and effort [Exaggerated Use Of Accessory Muscles For Inspiration] : no accessory muscle use [Abdomen Soft] : soft [Abdomen Tenderness] : non-tender [Costovertebral Angle Tenderness] : no ~M costovertebral angle tenderness [Urethral Meatus] : meatus normal [Urinary Bladder Findings] : the bladder was normal on palpation [Scrotum] : the scrotum was normal [Testes Mass (___cm)] : there were no testicular masses [No Prostate Nodules] : no prostate nodules [Normal Station and Gait] : the gait and station were normal for the patient's age [] : no rash [No Focal Deficits] : no focal deficits [Oriented To Time, Place, And Person] : oriented to person, place, and time [Affect] : the affect was normal [Mood] : the mood was normal [Not Anxious] : not anxious [No Palpable Adenopathy] : no palpable adenopathy [FreeTextEntry1] : Pt ambulates with a wheelchair

## 2019-10-16 NOTE — HISTORY OF PRESENT ILLNESS
[FreeTextEntry1] : 74 year old male presents for urinary retention. \par The pt had a Hx of BPH. \par Hospitalized in early August after slipping off stool. Furniture fell on him and it was 36 hours before he was rescued and taken to the hospital. Transferred to Rehab and has been doing three types of PT.  An indwelling catheter was inserted \par Started losing weight in June. Has lost 30lbs and has gained 13lb back. \par Pt has a peg and is tube feeding. \par Nocturia x1-2 before his accident. \par \par Pt should not have his catheter removed at the present due to his current state of health. \par Will arrange for a visiting nurse to change his catheter every 3-4 weeks. \par We will teach the pt how to remove his catheter today.\par He will remove the catheter early morning in 2-3 days and RTO for TOV. \par If he does not void, he will require UDS\par RTO in 1 month when he he closer to being able to walk again.

## 2019-10-16 NOTE — ASSESSMENT
[FreeTextEntry1] : Pt should not have his catheter removed at the present due to his current state of health. \par Will arrange for a visiting nurse to change his catheter every 3-4 weeks. \par We will teach the pt how to remove his catheter today.\par He will remove the catheter early morning in 2-3 days and RTO for TOV. \par RTO in 1 month when he he closer to being able to walk again.

## 2019-10-16 NOTE — REVIEW OF SYSTEMS
[Negative] : Heme/Lymph [Recent Weight Loss (___ Lbs)] : recent [unfilled] ~Ulb weight loss [see HPI] : see HPI [Blood in urine that you can see] : blood visible in urine [Wake up at night to urinate  How many times?  ___] : wakes up to urinate [unfilled] times during the night [Slow urine stream] : slow urine stream [Skin Wound] : skin wound [Difficulty Walking] : difficulty walking [Muscle Weakness] : muscle weakness [Feelings Of Weakness] : feelings of weakness

## 2019-10-16 NOTE — ADDENDUM
[FreeTextEntry1] :  I, Alyce Cohen, acted solely as a scribe for Dr. Hector Jara. The documentation recorded by the scribe accurately reflects the service I personally performed and the decision by me.\par

## 2019-10-24 ENCOUNTER — APPOINTMENT (OUTPATIENT)
Dept: RADIOLOGY | Facility: HOSPITAL | Age: 74
End: 2019-10-24

## 2019-10-25 ENCOUNTER — APPOINTMENT (OUTPATIENT)
Dept: UROLOGY | Facility: CLINIC | Age: 74
End: 2019-10-25
Payer: MEDICARE

## 2019-10-25 ENCOUNTER — OUTPATIENT (OUTPATIENT)
Dept: OUTPATIENT SERVICES | Facility: HOSPITAL | Age: 74
LOS: 1 days | End: 2019-10-25
Payer: MEDICARE

## 2019-10-25 ENCOUNTER — CLINICAL ADVICE (OUTPATIENT)
Age: 74
End: 2019-10-25

## 2019-10-25 VITALS
RESPIRATION RATE: 17 BRPM | TEMPERATURE: 98 F | HEART RATE: 85 BPM | SYSTOLIC BLOOD PRESSURE: 107 MMHG | DIASTOLIC BLOOD PRESSURE: 67 MMHG

## 2019-10-25 DIAGNOSIS — R35.0 FREQUENCY OF MICTURITION: ICD-10-CM

## 2019-10-25 PROCEDURE — 51702 INSERT TEMP BLADDER CATH: CPT

## 2019-10-30 ENCOUNTER — APPOINTMENT (OUTPATIENT)
Dept: UROLOGY | Facility: CLINIC | Age: 74
End: 2019-10-30

## 2019-11-01 DIAGNOSIS — R33.9 RETENTION OF URINE, UNSPECIFIED: ICD-10-CM

## 2019-11-15 ENCOUNTER — APPOINTMENT (OUTPATIENT)
Dept: UROLOGY | Facility: CLINIC | Age: 74
End: 2019-11-15
Payer: MEDICARE

## 2019-11-15 PROCEDURE — 51798 US URINE CAPACITY MEASURE: CPT

## 2019-11-15 PROCEDURE — 99213 OFFICE O/P EST LOW 20 MIN: CPT | Mod: 25

## 2019-11-15 NOTE — HISTORY OF PRESENT ILLNESS
[FreeTextEntry1] : 74 year old male presents for urinary retention. \par The pt had a Hx of BPH. \par Hospitalized in early August after slipping off stool. Furniture fell on him and it was 36 hours before he was rescued and taken to the hospital. Transferred to Rehab and has been doing three types of PT. An indwelling catheter was inserted \par Pt lost 30lbs over the summer and has since gained back 22lb.\par Pt is on Tamsulosin \par \par PVR today 202. Repeat PVR was 122\par Continue Tamsulosin \par RTO on 11/20/19\par \par

## 2019-11-15 NOTE — PHYSICAL EXAM
[General Appearance - Well Nourished] : well nourished [General Appearance - Well Developed] : well developed [Normal Appearance] : normal appearance [Abdomen Soft] : soft [General Appearance - In No Acute Distress] : no acute distress [Well Groomed] : well groomed [Costovertebral Angle Tenderness] : no ~M costovertebral angle tenderness [Abdomen Tenderness] : non-tender [Urinary Bladder Findings] : the bladder was normal on palpation [Urethral Meatus] : meatus normal [Scrotum] : the scrotum was normal [Testes Mass (___cm)] : there were no testicular masses [No Prostate Nodules] : no prostate nodules [Edema] : no peripheral edema [] : no respiratory distress [Respiration, Rhythm And Depth] : normal respiratory rhythm and effort [Oriented To Time, Place, And Person] : oriented to person, place, and time [Exaggerated Use Of Accessory Muscles For Inspiration] : no accessory muscle use [Affect] : the affect was normal [Mood] : the mood was normal [Not Anxious] : not anxious [Normal Station and Gait] : the gait and station were normal for the patient's age [No Focal Deficits] : no focal deficits [No Palpable Adenopathy] : no palpable adenopathy

## 2019-11-18 ENCOUNTER — INPATIENT (INPATIENT)
Facility: HOSPITAL | Age: 74
LOS: 3 days | Discharge: NURSING, REHAB OR LONG-TERM CARE | DRG: 871 | End: 2019-11-22
Attending: GENERAL ACUTE CARE HOSPITAL | Admitting: GENERAL ACUTE CARE HOSPITAL
Payer: MEDICARE

## 2019-11-18 VITALS
SYSTOLIC BLOOD PRESSURE: 133 MMHG | TEMPERATURE: 98 F | DIASTOLIC BLOOD PRESSURE: 78 MMHG | WEIGHT: 136.91 LBS | RESPIRATION RATE: 18 BRPM | OXYGEN SATURATION: 95 % | HEIGHT: 73 IN | HEART RATE: 97 BPM

## 2019-11-18 DIAGNOSIS — N39.0 URINARY TRACT INFECTION, SITE NOT SPECIFIED: ICD-10-CM

## 2019-11-18 LAB
ANION GAP SERPL CALC-SCNC: 15 MMOL/L — SIGNIFICANT CHANGE UP (ref 5–17)
APPEARANCE UR: CLEAR — SIGNIFICANT CHANGE UP
B PERT DNA SPEC QL NAA+PROBE: SIGNIFICANT CHANGE UP
BACTERIA # UR AUTO: ABNORMAL
BASE EXCESS BLDV CALC-SCNC: 6.9 MMOL/L — HIGH (ref -2–2)
BASOPHILS # BLD AUTO: 0.06 K/UL — SIGNIFICANT CHANGE UP (ref 0–0.2)
BASOPHILS NFR BLD AUTO: 0.3 % — SIGNIFICANT CHANGE UP (ref 0–2)
BILIRUB UR-MCNC: NEGATIVE — SIGNIFICANT CHANGE UP
BUN SERPL-MCNC: 29 MG/DL — HIGH (ref 7–23)
C PNEUM DNA SPEC QL NAA+PROBE: SIGNIFICANT CHANGE UP
CA-I SERPL-SCNC: 1.17 MMOL/L — SIGNIFICANT CHANGE UP (ref 1.12–1.3)
CALCIUM SERPL-MCNC: 10 MG/DL — SIGNIFICANT CHANGE UP (ref 8.4–10.5)
CHLORIDE BLDV-SCNC: 101 MMOL/L — SIGNIFICANT CHANGE UP (ref 96–108)
CHLORIDE SERPL-SCNC: 96 MMOL/L — SIGNIFICANT CHANGE UP (ref 96–108)
CO2 BLDV-SCNC: 32 MMOL/L — HIGH (ref 22–30)
CO2 SERPL-SCNC: 29 MMOL/L — SIGNIFICANT CHANGE UP (ref 22–31)
COLOR SPEC: SIGNIFICANT CHANGE UP
CREAT SERPL-MCNC: 1.27 MG/DL — SIGNIFICANT CHANGE UP (ref 0.5–1.3)
DIFF PNL FLD: ABNORMAL
EOSINOPHIL # BLD AUTO: 0.11 K/UL — SIGNIFICANT CHANGE UP (ref 0–0.5)
EOSINOPHIL NFR BLD AUTO: 0.5 % — SIGNIFICANT CHANGE UP (ref 0–6)
EPI CELLS # UR: 0 /HPF — SIGNIFICANT CHANGE UP
FLUAV H1 2009 PAND RNA SPEC QL NAA+PROBE: SIGNIFICANT CHANGE UP
FLUAV H1 RNA SPEC QL NAA+PROBE: SIGNIFICANT CHANGE UP
FLUAV H3 RNA SPEC QL NAA+PROBE: SIGNIFICANT CHANGE UP
FLUAV SUBTYP SPEC NAA+PROBE: SIGNIFICANT CHANGE UP
FLUBV RNA SPEC QL NAA+PROBE: SIGNIFICANT CHANGE UP
GAS PNL BLDV: 138 MMOL/L — SIGNIFICANT CHANGE UP (ref 135–145)
GAS PNL BLDV: SIGNIFICANT CHANGE UP
GAS PNL BLDV: SIGNIFICANT CHANGE UP
GLUCOSE BLDV-MCNC: 127 MG/DL — HIGH (ref 70–99)
GLUCOSE SERPL-MCNC: 113 MG/DL — HIGH (ref 70–99)
GLUCOSE UR QL: NEGATIVE — SIGNIFICANT CHANGE UP
HADV DNA SPEC QL NAA+PROBE: SIGNIFICANT CHANGE UP
HCO3 BLDV-SCNC: 31 MMOL/L — HIGH (ref 21–29)
HCOV PNL SPEC NAA+PROBE: SIGNIFICANT CHANGE UP
HCT VFR BLD CALC: 37.4 % — LOW (ref 39–50)
HCT VFR BLDA CALC: 34 % — LOW (ref 39–50)
HGB BLD CALC-MCNC: 11.1 G/DL — LOW (ref 13–17)
HGB BLD-MCNC: 12.2 G/DL — LOW (ref 13–17)
HMPV RNA SPEC QL NAA+PROBE: SIGNIFICANT CHANGE UP
HPIV1 RNA SPEC QL NAA+PROBE: SIGNIFICANT CHANGE UP
HPIV2 RNA SPEC QL NAA+PROBE: SIGNIFICANT CHANGE UP
HPIV3 RNA SPEC QL NAA+PROBE: SIGNIFICANT CHANGE UP
HPIV4 RNA SPEC QL NAA+PROBE: SIGNIFICANT CHANGE UP
HYALINE CASTS # UR AUTO: 0 /LPF — SIGNIFICANT CHANGE UP (ref 0–2)
IMM GRANULOCYTES NFR BLD AUTO: 0.6 % — SIGNIFICANT CHANGE UP (ref 0–1.5)
KETONES UR-MCNC: NEGATIVE — SIGNIFICANT CHANGE UP
LACTATE BLDV-MCNC: 1.5 MMOL/L — SIGNIFICANT CHANGE UP (ref 0.7–2)
LEUKOCYTE ESTERASE UR-ACNC: ABNORMAL
LYMPHOCYTES # BLD AUTO: 0.63 K/UL — LOW (ref 1–3.3)
LYMPHOCYTES # BLD AUTO: 3.1 % — LOW (ref 13–44)
MCHC RBC-ENTMCNC: 32.4 PG — SIGNIFICANT CHANGE UP (ref 27–34)
MCHC RBC-ENTMCNC: 32.6 GM/DL — SIGNIFICANT CHANGE UP (ref 32–36)
MCV RBC AUTO: 99.5 FL — SIGNIFICANT CHANGE UP (ref 80–100)
MONOCYTES # BLD AUTO: 0.63 K/UL — SIGNIFICANT CHANGE UP (ref 0–0.9)
MONOCYTES NFR BLD AUTO: 3.1 % — SIGNIFICANT CHANGE UP (ref 2–14)
NEUTROPHILS # BLD AUTO: 18.81 K/UL — HIGH (ref 1.8–7.4)
NEUTROPHILS NFR BLD AUTO: 92.4 % — HIGH (ref 43–77)
NITRITE UR-MCNC: NEGATIVE — SIGNIFICANT CHANGE UP
NRBC # BLD: 0 /100 WBCS — SIGNIFICANT CHANGE UP (ref 0–0)
PCO2 BLDV: 41 MMHG — SIGNIFICANT CHANGE UP (ref 35–50)
PH BLDV: 7.48 — HIGH (ref 7.35–7.45)
PH UR: 6.5 — SIGNIFICANT CHANGE UP (ref 5–8)
PLATELET # BLD AUTO: 258 K/UL — SIGNIFICANT CHANGE UP (ref 150–400)
PO2 BLDV: 44 MMHG — SIGNIFICANT CHANGE UP (ref 25–45)
POTASSIUM BLDV-SCNC: 3.2 MMOL/L — LOW (ref 3.5–5.3)
POTASSIUM SERPL-MCNC: 3.8 MMOL/L — SIGNIFICANT CHANGE UP (ref 3.5–5.3)
POTASSIUM SERPL-SCNC: 3.8 MMOL/L — SIGNIFICANT CHANGE UP (ref 3.5–5.3)
PROCALCITONIN SERPL-MCNC: 0.22 NG/ML — HIGH (ref 0.02–0.1)
PROT UR-MCNC: ABNORMAL
RAPID RVP RESULT: SIGNIFICANT CHANGE UP
RBC # BLD: 3.76 M/UL — LOW (ref 4.2–5.8)
RBC # FLD: 12.2 % — SIGNIFICANT CHANGE UP (ref 10.3–14.5)
RBC CASTS # UR COMP ASSIST: >50 /HPF — SIGNIFICANT CHANGE UP (ref 0–4)
RSV RNA SPEC QL NAA+PROBE: SIGNIFICANT CHANGE UP
RV+EV RNA SPEC QL NAA+PROBE: SIGNIFICANT CHANGE UP
SAO2 % BLDV: 81 % — SIGNIFICANT CHANGE UP (ref 67–88)
SODIUM SERPL-SCNC: 140 MMOL/L — SIGNIFICANT CHANGE UP (ref 135–145)
SP GR SPEC: 1.01 — SIGNIFICANT CHANGE UP (ref 1.01–1.02)
UROBILINOGEN FLD QL: NEGATIVE — SIGNIFICANT CHANGE UP
WBC # BLD: 20.37 K/UL — HIGH (ref 3.8–10.5)
WBC # FLD AUTO: 20.37 K/UL — HIGH (ref 3.8–10.5)
WBC UR QL: >50 /HPF — SIGNIFICANT CHANGE UP (ref 0–5)

## 2019-11-18 PROCEDURE — 99223 1ST HOSP IP/OBS HIGH 75: CPT

## 2019-11-18 PROCEDURE — 71045 X-RAY EXAM CHEST 1 VIEW: CPT | Mod: 26

## 2019-11-18 PROCEDURE — 99285 EMERGENCY DEPT VISIT HI MDM: CPT | Mod: GC

## 2019-11-18 RX ORDER — ERTAPENEM SODIUM 1 G/1
1000 INJECTION, POWDER, LYOPHILIZED, FOR SOLUTION INTRAMUSCULAR; INTRAVENOUS EVERY 24 HOURS
Refills: 0 | Status: DISCONTINUED | OUTPATIENT
Start: 2019-11-18 | End: 2019-11-18

## 2019-11-18 RX ORDER — SODIUM CHLORIDE 9 MG/ML
500 INJECTION INTRAMUSCULAR; INTRAVENOUS; SUBCUTANEOUS ONCE
Refills: 0 | Status: COMPLETED | OUTPATIENT
Start: 2019-11-18 | End: 2019-11-18

## 2019-11-18 RX ORDER — DIAZEPAM 5 MG
5 TABLET ORAL EVERY 12 HOURS
Refills: 0 | Status: DISCONTINUED | OUTPATIENT
Start: 2019-11-18 | End: 2019-11-18

## 2019-11-18 RX ORDER — SODIUM CHLORIDE 9 MG/ML
1000 INJECTION INTRAMUSCULAR; INTRAVENOUS; SUBCUTANEOUS ONCE
Refills: 0 | Status: COMPLETED | OUTPATIENT
Start: 2019-11-18 | End: 2019-11-18

## 2019-11-18 RX ORDER — SODIUM CHLORIDE 9 MG/ML
500 INJECTION INTRAMUSCULAR; INTRAVENOUS; SUBCUTANEOUS ONCE
Refills: 0 | Status: DISCONTINUED | OUTPATIENT
Start: 2019-11-18 | End: 2019-11-18

## 2019-11-18 RX ORDER — CEFPODOXIME PROXETIL 100 MG
1 TABLET ORAL
Qty: 14 | Refills: 0
Start: 2019-11-18 | End: 2019-11-24

## 2019-11-18 RX ORDER — VANCOMYCIN HCL 1 G
500 VIAL (EA) INTRAVENOUS ONCE
Refills: 0 | Status: COMPLETED | OUTPATIENT
Start: 2019-11-18 | End: 2019-11-18

## 2019-11-18 RX ORDER — SODIUM CHLORIDE 9 MG/ML
1000 INJECTION INTRAMUSCULAR; INTRAVENOUS; SUBCUTANEOUS
Refills: 0 | Status: DISCONTINUED | OUTPATIENT
Start: 2019-11-18 | End: 2019-11-19

## 2019-11-18 RX ORDER — DIAZEPAM 5 MG
5 TABLET ORAL EVERY 12 HOURS
Refills: 0 | Status: DISCONTINUED | OUTPATIENT
Start: 2019-11-18 | End: 2019-11-22

## 2019-11-18 RX ORDER — MUPIROCIN 20 MG/G
1 OINTMENT TOPICAL
Refills: 0 | Status: DISCONTINUED | OUTPATIENT
Start: 2019-11-18 | End: 2019-11-19

## 2019-11-18 RX ORDER — ACETAMINOPHEN 500 MG
650 TABLET ORAL EVERY 6 HOURS
Refills: 0 | Status: DISCONTINUED | OUTPATIENT
Start: 2019-11-18 | End: 2019-11-22

## 2019-11-18 RX ORDER — MEROPENEM 1 G/30ML
1 INJECTION INTRAVENOUS EVERY 8 HOURS
Refills: 0 | Status: DISCONTINUED | OUTPATIENT
Start: 2019-11-18 | End: 2019-11-18

## 2019-11-18 RX ORDER — LACTOBACILLUS ACIDOPHILUS 100MM CELL
1 CAPSULE ORAL
Refills: 0 | Status: DISCONTINUED | OUTPATIENT
Start: 2019-11-18 | End: 2019-11-22

## 2019-11-18 RX ORDER — CEFTRIAXONE 500 MG/1
1000 INJECTION, POWDER, FOR SOLUTION INTRAMUSCULAR; INTRAVENOUS ONCE
Refills: 0 | Status: COMPLETED | OUTPATIENT
Start: 2019-11-18 | End: 2019-11-18

## 2019-11-18 RX ORDER — TAMSULOSIN HYDROCHLORIDE 0.4 MG/1
0.8 CAPSULE ORAL AT BEDTIME
Refills: 0 | Status: DISCONTINUED | OUTPATIENT
Start: 2019-11-18 | End: 2019-11-22

## 2019-11-18 RX ORDER — SODIUM CHLORIDE 9 MG/ML
1000 INJECTION INTRAMUSCULAR; INTRAVENOUS; SUBCUTANEOUS
Refills: 0 | Status: DISCONTINUED | OUTPATIENT
Start: 2019-11-18 | End: 2019-11-18

## 2019-11-18 RX ORDER — HEPARIN SODIUM 5000 [USP'U]/ML
5000 INJECTION INTRAVENOUS; SUBCUTANEOUS EVERY 12 HOURS
Refills: 0 | Status: DISCONTINUED | OUTPATIENT
Start: 2019-11-18 | End: 2019-11-22

## 2019-11-18 RX ORDER — FOLIC ACID 0.8 MG
1 TABLET ORAL DAILY
Refills: 0 | Status: DISCONTINUED | OUTPATIENT
Start: 2019-11-18 | End: 2019-11-22

## 2019-11-18 RX ORDER — MEROPENEM 1 G/30ML
1000 INJECTION INTRAVENOUS EVERY 12 HOURS
Refills: 0 | Status: DISCONTINUED | OUTPATIENT
Start: 2019-11-18 | End: 2019-11-19

## 2019-11-18 RX ORDER — ACETAMINOPHEN 500 MG
650 TABLET ORAL ONCE
Refills: 0 | Status: COMPLETED | OUTPATIENT
Start: 2019-11-18 | End: 2019-11-18

## 2019-11-18 RX ADMIN — SODIUM CHLORIDE 500 MILLILITER(S): 9 INJECTION INTRAMUSCULAR; INTRAVENOUS; SUBCUTANEOUS at 07:30

## 2019-11-18 RX ADMIN — Medication 1 DROP(S): at 17:37

## 2019-11-18 RX ADMIN — Medication 1 MILLIGRAM(S): at 17:22

## 2019-11-18 RX ADMIN — SODIUM CHLORIDE 50 MILLILITER(S): 9 INJECTION INTRAMUSCULAR; INTRAVENOUS; SUBCUTANEOUS at 13:44

## 2019-11-18 RX ADMIN — SODIUM CHLORIDE 1000 MILLILITER(S): 9 INJECTION INTRAMUSCULAR; INTRAVENOUS; SUBCUTANEOUS at 08:00

## 2019-11-18 RX ADMIN — TAMSULOSIN HYDROCHLORIDE 0.8 MILLIGRAM(S): 0.4 CAPSULE ORAL at 22:02

## 2019-11-18 RX ADMIN — HEPARIN SODIUM 5000 UNIT(S): 5000 INJECTION INTRAVENOUS; SUBCUTANEOUS at 17:16

## 2019-11-18 RX ADMIN — CEFTRIAXONE 1000 MILLIGRAM(S): 500 INJECTION, POWDER, FOR SOLUTION INTRAMUSCULAR; INTRAVENOUS at 08:20

## 2019-11-18 RX ADMIN — MUPIROCIN 1 APPLICATION(S): 20 OINTMENT TOPICAL at 17:14

## 2019-11-18 RX ADMIN — SODIUM CHLORIDE 500 MILLILITER(S): 9 INJECTION INTRAMUSCULAR; INTRAVENOUS; SUBCUTANEOUS at 06:18

## 2019-11-18 RX ADMIN — Medication 650 MILLIGRAM(S): at 08:01

## 2019-11-18 RX ADMIN — SODIUM CHLORIDE 1000 MILLILITER(S): 9 INJECTION INTRAMUSCULAR; INTRAVENOUS; SUBCUTANEOUS at 10:00

## 2019-11-18 RX ADMIN — Medication 1 TABLET(S): at 17:22

## 2019-11-18 RX ADMIN — MEROPENEM 100 MILLIGRAM(S): 1 INJECTION INTRAVENOUS at 18:50

## 2019-11-18 RX ADMIN — Medication 5 MILLIGRAM(S): at 17:22

## 2019-11-18 RX ADMIN — Medication 650 MILLIGRAM(S): at 08:40

## 2019-11-18 RX ADMIN — CEFTRIAXONE 100 MILLIGRAM(S): 500 INJECTION, POWDER, FOR SOLUTION INTRAMUSCULAR; INTRAVENOUS at 07:32

## 2019-11-18 RX ADMIN — Medication 100 MILLIGRAM(S): at 17:14

## 2019-11-18 NOTE — ED PROVIDER NOTE - OBJECTIVE STATEMENT
73 yo M with depression/anxiety/hoarding s/p PEG 8/21, presenting from nursing home with urinary retention. He states he has had dribbling urine output all day, had increased lower abd distension and pain and had a fowler placed at approx 1am without return of urine, but notes there is blood in the catheter. He has increasing discomfort, no nausea or vomiting. No fevers. He states he had an indwelling fowler prior that was removed 3 days ago after he passed a trial of void with his urologist. 75 yo M with depression/anxiety/hoarding s/p PEG 8/21, presenting from [facility] with urinary retention. He states he has had dribbling urine output all day, had increased lower abd distension and pain and had a fowler placed at approx 1am without return of urine, but notes there is blood in the catheter. He has increasing discomfort, no nausea or vomiting. No fevers. He states he had an indwelling fowler prior that was removed 3 days ago after he passed a trial of void with his urologist.

## 2019-11-18 NOTE — ED PROVIDER NOTE - CARE PROVIDER_API CALL
Hector Jara)  Urology  [address] [place] [address]  Phone: [phone]  Fax: [phone]  Follow Up Time: 1-3 Days

## 2019-11-18 NOTE — ED PROVIDER NOTE - ATTENDING CONTRIBUTION TO CARE
pt presented with urinary retention 2 days s/p having fowler removed. pt had fowler reinserted at nursing facility but did not drain. fowler changd here in ED - now draining. got 500 out right away. pt asymptomatic now, bening abd exam, benign vitals. will send urine, expect home with urology f/u as scheduled 2 days from now.

## 2019-11-18 NOTE — ED PROVIDER NOTE - PROGRESS NOTE DETAILS
Tha PGY3: pts fowler initially with 500cc uo, initially reddish and then clear yellow, continue to have urine output dark yellow in color, now up to 1000 cc, will check electrolytes. Almazan PGY3: stable UO, still approx 1000 cc, slowly draining, electrolytes stable, pt states he feels well, no abdominal pain, no fevers. WBC noted to be markedly elevated- likely in the setting of prolonged urinary retention, will tx for UTI and given return precautions- spoke w/ Sheri Asif at [facility] regarding abx rx, stated that as long as it is on dc ppwk that they will rx it, advised her that nursing staff should watch out for fevers/abd pain/ nausea or any worsening symptoms in pt. pt states he has urology appt in 3 days. Tha PGY3: reassessed pt prior to dc and now has fever 103.7F rectally, will admit, obtain blood cultures, vbg, fluids. Tha PGY3: accepted to Dr Pichardo service, at this time, presuming urinary source, low threshold for abx expansion. Almazan PGY3: stable UO, still approx 1000 cc, slowly draining, electrolytes stable, pt states he feels well, no abdominal pain, no subjective fevers. Almazan PGY3: accepted to Dr Pichardo service, at this time, presuming urinary source, low threshold for abx escalation pending labs

## 2019-11-18 NOTE — ED ADULT NURSE NOTE - OBJECTIVE STATEMENT
74 YOM A&OX3 with pmh of depression/anxiety, urinary retention, and PEG tube brought in by EMS for clogged fowler. pt states had fowler placed earlier today at 1am and pt noticed fowler did not drain for 1 hour. pt complains of lower abdominal pain rated 6/10. pt states has had multiple foleys placed in the past and that they always get clogged. pt denies sob, chest pain, n/v/d, headaches, dizziness, blurry vision. safety maintained.

## 2019-11-18 NOTE — ED ADULT NURSE REASSESSMENT NOTE - NS ED NURSE REASSESS COMMENT FT1
Spencer catheter placed using sterile technique. Second RN present to confirm sterility. Draining to gravity. Secured w/ stat lock. Initial output off approx. 400cc's urine. Sterile specimens collected and sent to lab. Patient tolerated procedure well. Will cont to monitor.

## 2019-11-18 NOTE — CONSULT NOTE ADULT - ASSESSMENT
74M PMHx of an unknown neurologic disorder in the process of being worked up and BPH leading to urinary retention who was sent to the ED from rehab for fevers and abdominal pain. He was found to have sepsis 2/2 UTI with a fowler that was not functioning on arrival. Because of his past history of coag negative staph in his urine in the past, will cover gram positives until urine gram stain is back.     Sepsis 2/2 UTI  - Stop ceftriaxone  - Start meropenem 1g IV q 8 hours  - Give vancomycin 500mg IV x 1 74M PMHx of an unknown neurologic disorder in the process of being worked up and BPH leading to urinary retention who was sent to the ED from rehab for fevers and abdominal pain. He was found to have sepsis 2/2 UTI with a fowler that was not functioning on arrival. Because of his past history of coag negative staph in his urine in the past, will cover gram positives until urine gram stain is back.     Sepsis 2/2 UTI  - Stop ceftriaxone  - Start meropenem 1g IV q 8 hours  - Give vancomycin 500mg IV x 1   - Follow up cultures  - Monitor for fevers  - Trend WBCs    Angie Sheikh, PGY-4  Infectious Disease Fellow   Pager: [phone]  After 5pm/weekends: [phone]

## 2019-11-18 NOTE — H&P ADULT - NSICDXPASTMEDICALHX_GEN_ALL_CORE_FT
PAST MEDICAL HISTORY:  H/O muscular dystrophy     Psychiatric disorder     Scrotal swelling     Urinary retention

## 2019-11-18 NOTE — ED PROVIDER NOTE - NSFOLLOWUPINSTRUCTIONS_ED_ALL_ED_FT
You were seen today for urinary retention and urinary tract infection. You were given one dose of ceftriaxone and you will be started on cefpodoxime 200mg twice daily for 7 days.     Please follow up with your urologist Dr Jara in 3 days at your appointment to evaluate your fowler.     You have a three way fowler placed which is a larger size than usual. You may feel some discomfort.     Return to the emergency room if you develop abdominal pain, fevers, chills, nausea, vomiting or back pain or bloody urine or clots in the urine or decreased urine output for more than 6 hours.     Please stay well hydrated.

## 2019-11-18 NOTE — ED PROVIDER NOTE - PHYSICAL EXAMINATION
Gen: AAOx3, NAD  Head: NCAT  ENT: Airway patent, dry mucous membranes   Cardiac: Normal rate, normal rhythm, no murmurs/rubs/gallops appreciated  Respiratory: Lungs CTA B/L  Gastrointestinal: Abdomen soft, TTP lower abdomen, palpable bladder, no rebound, no guarding , PEG in place, dressing c/d/i   MSK: No gross abnormalities, FROM of all four extremities, no edema  HEME: Extremities warm, pulses intact and symmetrical in all four extremities  Skin: No rashes, no lesions  Neuro: No gross neurologic deficits Gen: AAOx3, NAD  Head: NCAT  ENT: Airway patent, dry mucous membranes   Cardiac: Normal rate, normal rhythm, no murmurs/rubs/gallops appreciated  Respiratory: Lungs CTA B/L  Gastrointestinal: Abdomen soft, TTP lower abdomen, palpable bladder, no rebound, no guarding , PEG in place, dressing   : fowler in place, blood within fowler catheter, not draining, scrotal edema present without tenderness or crepitus/erythema. chaperoned by SHAYAN Villalobos  MSK: No gross abnormalities, FROM of all four extremities, no edema  HEME: Extremities warm, pulses intact and symmetrical in all four extremities  Skin: No rashes, no lesions  Neuro: No gross neurologic deficits

## 2019-11-18 NOTE — ED PROVIDER NOTE - PATIENT PORTAL LINK FT
You can access the FollowMyHealth Patient Portal offered by [facility] by registering at the following website: [email or web address]. By joining [facility]’s FollowMyHealth portal, you will also be able to view your health information using other applications (apps) compatible with our system.

## 2019-11-18 NOTE — ED PROVIDER NOTE - CLINICAL SUMMARY MEDICAL DECISION MAKING FREE TEXT BOX
74M w/ urinary retention likely 2/2 bph, will check ua for infection, monitor UO and reassess, likely dc f/u w/ urology.

## 2019-11-18 NOTE — ED PROVIDER NOTE - NS ED ROS FT
Received pt from PACU in stable condition. Pt in bed with  at bedside. Drowsy. Resp even & unlabored on 3L NC; lungs clear. HR regular. Abdomen soft & tender with hypoactive bowel sounds. 4 lap sites with dermabond intact. Rivera in place & draining yellow urine in small amount. PACU RN states that pt has packing. Pt's paty pad c/d/i. SCD's on. Pt states her abdominal pain is 2/10 & tolerable at this time. Tolerating ginger ale at this time. Oriented to room & call light. Bed low & locked; call light in reach; will continue to monitor. Gen: No fever  Eyes: No eye irritation or discharge  ENT: No ear pain, congestion, sore throat  Resp: No cough or trouble breathing  Cardiovascular: No chest pain or palpitation  Gastroenteric: No nausea/vomiting, diarrhea, constipation, + lower abd pain   :  + decreased UO  MS: No joint or muscle pain  Skin: No rashes  Neuro: No headache; no abnormal movements  Remainder negative, except as per the HPI

## 2019-11-18 NOTE — H&P ADULT - ASSESSMENT
73 y/o male w/ hx of muscular dystropy  followed at [place], dysphagia s/p PEG , urinary retention sec to BPH , hx ESBL UTI , recent fowler and TOV done by urology as o/p .. was doing ok overall without fowler until he started c/o lower abd discomfort .  Fowler placed and  only in 100cc o/p and pt continued to c/o pain/ discomfort and sent to ED  . Fowler placed and 1000cc obtained    deneis any pain at this time .. no N/V/flank pain   pt was found to meet sepsis criteria in ED..    1- sepsis sec to UTI in settign of urinary retention   hx of ESBL   start abx   consult ID   cont fowler and f/u mikki  urology consult     2- urianry retention :  start flomax   urolgoy consult   cont foely     3- Dysphagia : cont peg feeds   aspiration prcatuions 73 y/o male w/ hx of muscular dystropy  followed at [place], dysphagia s/p PEG , urinary retention sec to BPH , hx ESBL UTI , recent fowler and TOV done by urology as o/p .. was doing ok overall without fowler until he started c/o lower abd discomfort .  Fowler placed and  only in 100cc o/p and pt continued to c/o pain/ discomfort and sent to ED  . Fowler placed and 1000cc obtained    deneis any pain at this time .. no N/V/flank pain   pt was found to meet sepsis criteria in ED..    1- sepsis sec to UTI in settign of urinary retention   hx of ESBL   start abx   consult ID   cont fowler and f/u culuters  urology consult     2- urianry retention :  start flomax   urolgoy consult   cont foely     3- Dysphagia : cont peg feeds   aspiration prcatuions     4- Lethargy : sec to sepsis however also on Benzos twice daily   attmpt to taper if possible

## 2019-11-18 NOTE — ED ADULT NURSE REASSESSMENT NOTE - NS ED NURSE REASSESS COMMENT FT1
Received report from night nurse Lorie Villalobos.  No acute respiratory distress noted, v/s obtained.   Spencer to bedside draining urine.

## 2019-11-18 NOTE — H&P ADULT - HISTORY OF PRESENT ILLNESS
Pt is able to provide some information however does not seem to be reliable .. he is not sure why he has the peg  and what it is.. he is somewhat lethargic but arouosable, oriented to person but not to place ..  part of history taken from his o/p provider [place]. Miller from [place]   pt is 73 y/o male w/ hx of muscular dystropy  followed at [place], dysphagia s/p PEG , urinary retention sec to BPH , hx ESBL UTI ,  he was c/o lower abd discomfort and attempts to change fowler resulted only in 100cc o/p and pt continued to c/o pain and sent to ED  ..pt is not able to provide any futher information .. deneis any pain at this time .. no N/V/flank pain   pt was found to meet sepsis criteria in ED.. Pt is able to provide some information however does not seem to be reliable .. he is not sure why he has the peg  and what it is.. he is somewhat lethargic but arouosable, oriented to person but not to place ..  part of history taken from his o/p provider Dr. Miller from [facility]   pt is 73 y/o male w/ hx of muscular dystropy  followed at [place], dysphagia s/p PEG , urinary retention sec to BPH , hx ESBL UTI , recent fowler and TOV done by urology as o/p .. was doing ok overall without fowler until he started c/o lower abd discomfort .  Fowler placed and  only in 100cc o/p and pt continued to c/o pain/ discomfort and sent to ED  . Fowler placed and 1000cc obtained    deneis any pain at this time .. no N/V/flank pain   pt was found to meet sepsis criteria in ED..

## 2019-11-18 NOTE — ED ADULT NURSE NOTE - NSIMPLEMENTINTERV_GEN_ALL_ED
Implemented All Fall Risk Interventions:  [place] to call system. Call bell, personal items and telephone within reach. Instruct patient to call for assistance. Room bathroom lighting operational. Non-slip footwear when patient is off stretcher. Physically safe environment: no spills, clutter or unnecessary equipment. Stretcher in lowest position, wheels locked, appropriate side rails in place. Provide visual cue, wrist band, yellow gown, etc. Monitor gait and stability. Monitor for mental status changes and reorient to person, place, and time. Review medications for side effects contributing to fall risk. Reinforce activity limits and safety measures with patient and family.

## 2019-11-18 NOTE — CONSULT NOTE ADULT - SUBJECTIVE AND OBJECTIVE BOX
INFECTIOUS DISEASE SERVICE INITIAL CONSULTATION NOTE    HPI:  Pt is able to provide some information however does not seem to be reliable .. he is not sure why he has the peg  and what it is.. he is somewhat lethargic but arouosable, oriented to person but not to place ..  part of history taken from his o/p provider Dr. Miller from [facility]   pt is 75 y/o male w/ hx of muscular dystropy  followed at [place], dysphagia s/p PEG , urinary retention sec to BPH , hx ESBL UTI , recent fowler and TOV done by urology as o/p .. was doing ok overall without fowler until he started c/o lower abd discomfort .  Fowler placed and  only in 100cc o/p and pt continued to c/o pain/ discomfort and sent to ED  . Fowler placed and 1000cc obtained    deneis any pain at this time .. no N/V/flank pain   pt was found to meet sepsis criteria in ED.. (2019 11:47)      PAST MEDICAL & SURGICAL HISTORY:  Urinary retention  H/O muscular dystrophy  Scrotal swelling  Psychiatric disorder  No significant past surgical history      REVIEW OF SYSTEMS:  Constitutional: no weakness, no fevers, no chills  Dermatologic: no rash  Respiratory: no SOB, no cough  Cardiovascular: no chest pain, no palpitations  Gastrointestinal: no nausea, no vomiting, no diarrhea  Genitourinary: no dysuria, no urinary frequency, no hematuria, no urinary retention  Musculoskeletal:	no weakness, no joint swelling/pain  Neurological: no focal weakness or numbness  Endocrine: no polyuria, no polydipsia    ACTIVE ANTIMICROBIAL/ANTIBIOTIC MEDICATIONS:  meropenem  IVPB 1 milliGRAM(s) IV Intermittent every 8 hours  vancomycin  IVPB 500 milliGRAM(s) IV Intermittent once      OTHER MEDICATIONS:  acetaminophen   Tablet .. 650 milliGRAM(s) Oral every 6 hours PRN  artificial  tears Solution 1 Drop(s) Both EYES two times a day  diazepam    Tablet 5 milliGRAM(s) Oral every 12 hours  folic acid 1 milliGRAM(s) Oral daily  heparin  Injectable 5000 Unit(s) SubCutaneous every 12 hours  lactobacillus acidophilus 1 Tablet(s) Oral two times a day  mupirocin 2% Ointment 1 Application(s) Topical two times a day  sodium chloride 0.9%. 1000 milliLiter(s) IV Continuous <Continuous>  tamsulosin 0.8 milliGRAM(s) Oral at bedtime      ALLERGIES:  Allergies    No Known Allergies    Intolerances        SOCIAL HISTORY:      FAMILY HISTORY:  FAMILY HISTORY:  FHx: stomach cancer      VITAL SIGNS:  ICU Vital Signs Last 24 Hrs  T(C): 36.9 (2019 14:35), Max: 39.8 (2019 07:57)  T(F): 98.4 (2019 14:35), Max: 103.7 (2019 07:57)  HR: 90 (2019 14:35) (89 - 104)  BP: 109/60 (2019 14:35) (96/55 - 133/78)  BP(mean): --  ABP: --  ABP(mean): --  RR: 16 (2019 14:35) (16 - 18)  SpO2: 97% (2019 14:35) (95% - 98%)      PHYSICAL EXAM:  Constitutional: WDWN  Head: NC/AT  Eyes: PERRLA, EOMI; anicteric slcera  ENMT: no rhinorrhea; no sinus tenderness on palpation; no oropharyngeal lesions, erythema, or exudates	  Neck: supple; no JVD or LAD  Respiratory: CTA B/L  Cardiovascular: +S1/S2, RRR; no appreciable murmurs  Gastrointestinal: soft, NT/ND; +BSx4, no HSM  Extremities: WWP; no clubbing, cyanosis, or edema  Vascular: 2+ radial, DP/PT pulses B/L  Dermatologic: skin warm and dry; no visible rashes or lesions  Neurologic: AAOx3; no focal deficits    LABS:                        12.2   20.37 )-----------( 258      ( 2019 06:34 )             37.4     -18    140  |  96  |  29<H>  ----------------------------<  113<H>  3.8   |  29  |  1.27    Ca    10.0      2019 06:34        Urinalysis Basic - ( 2019 05:23 )    Color: Light Yellow / Appearance: Clear / S.014 / pH: x  Gluc: x / Ketone: Negative  / Bili: Negative / Urobili: Negative   Blood: x / Protein: Trace / Nitrite: Negative   Leuk Esterase: Large / RBC: >50 /hpf / WBC >50 /HPF   Sq Epi: x / Non Sq Epi: 0 /hpf / Bacteria: Few        MICROBIOLOGY:      RADIOLOGY & ADDITIONAL STUDIES: INFECTIOUS DISEASE SERVICE INITIAL CONSULTATION NOTE    HPI:  Pt is able to provide some information however does not seem to be reliable .. he is not sure why he has the peg  and what it is.. he is somewhat lethargic but arouosable, oriented to person but not to place ..  part of history taken from his o/p provider Dr. Miller from [facility]   pt is 73 y/o male w/ hx of muscular dystropy  followed at [place], dysphagia s/p PEG , urinary retention sec to BPH , hx ESBL UTI , recent fowler and TOV done by urology as o/p .. was doing ok overall without fowler until he started c/o lower abd discomfort .  Fowler placed and  only in 100cc o/p and pt continued to c/o pain/ discomfort and sent to ED  . Fowler placed and 1000cc obtained    deneis any pain at this time .. no N/V/flank pain   pt was found to meet sepsis criteria in ED.. (2019 11:47)    Patient is being admitted to medicine for sepsis 2/2 UTI. ID consulted for antibiotic recs. Currently, the patient is feeling a bit better.     PAST MEDICAL & SURGICAL HISTORY:  Urinary retention  H/O muscular dystrophy  Scrotal swelling  Psychiatric disorder  No significant past surgical history      REVIEW OF SYSTEMS:  Constitutional: no weakness, +fevers, no chills  Dermatologic: no rash  Respiratory: no SOB, no cough  Cardiovascular: no chest pain, no palpitations  Gastrointestinal: no nausea, no vomiting, no diarrhea  Genitourinary: +fowler problems  Musculoskeletal:	no weakness, no joint swelling/pain  Neurological: no focal weakness or numbness  Endocrine: no polyuria, no polydipsia    ACTIVE ANTIMICROBIAL/ANTIBIOTIC MEDICATIONS:  meropenem  IVPB 1 milliGRAM(s) IV Intermittent every 8 hours  vancomycin  IVPB 500 milliGRAM(s) IV Intermittent once      OTHER MEDICATIONS:  acetaminophen   Tablet .. 650 milliGRAM(s) Oral every 6 hours PRN  artificial  tears Solution 1 Drop(s) Both EYES two times a day  diazepam    Tablet 5 milliGRAM(s) Oral every 12 hours  folic acid 1 milliGRAM(s) Oral daily  heparin  Injectable 5000 Unit(s) SubCutaneous every 12 hours  lactobacillus acidophilus 1 Tablet(s) Oral two times a day  mupirocin 2% Ointment 1 Application(s) Topical two times a day  sodium chloride 0.9%. 1000 milliLiter(s) IV Continuous <Continuous>  tamsulosin 0.8 milliGRAM(s) Oral at bedtime      ALLERGIES:  Allergies    No Known Allergies    Intolerances        SOCIAL HISTORY: Currently living in [facility]. No alcohol, smoking, or drugs. 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    FAMILY HISTORY:  FHx: stomach cancer      VITAL SIGNS:  ICU Vital Signs Last 24 Hrs  T(C): 36.9 (2019 14:35), Max: 39.8 (2019 07:57)  T(F): 98.4 (2019 14:35), Max: 103.7 (2019 07:57)  HR: 90 (2019 14:35) (89 - 104)  BP: 109/60 (2019 14:35) (96/55 - 133/78)  BP(mean): --  ABP: --  ABP(mean): --  RR: 16 (2019 14:35) (16 - 18)  SpO2: 97% (2019 14:35) (95% - 98%)      PHYSICAL EXAM:  Constitutional: Thin chronically ill appearing man  Head: NC/AT, facial muscle wasting  Eyes: anicteric sclera  ENMT: no rhinorrhea; no sinus tenderness on palpation; no oropharyngeal lesions, erythema, or exudates	  Neck: supple; no JVD or LAD  Respiratory: CTA B/L  Cardiovascular: +S1/S2, RRR; no appreciable murmurs  Gastrointestinal: soft, NT/ND; +BSx4, no HSM  : +fowler with pyuria draining   Extremities: WWP; no clubbing, cyanosis, or edema  Dermatologic: skin warm and dry; no visible rashes or lesions  Neurologic: UE strength in tact, RLE 4/5, LLE 5/5    LABS:                        12.2   20.37 )-----------( 258      ( 2019 06:34 )             37.4     11-18    140  |  96  |  29<H>  ----------------------------<  113<H>  3.8   |  29  |  1.27    Ca    10.0      2019 06:34        Urinalysis Basic - ( 2019 05:23 )    Color: Light Yellow / Appearance: Clear / S.014 / pH: x  Gluc: x / Ketone: Negative  / Bili: Negative / Urobili: Negative   Blood: x / Protein: Trace / Nitrite: Negative   Leuk Esterase: Large / RBC: >50 /hpf / WBC >50 /HPF   Sq Epi: x / Non Sq Epi: 0 /hpf / Bacteria: Few        MICROBIOLOGY:  Blood and urine cultures are pending   Past urine cultures with K. oxytoca and Coag negative staph     RADIOLOGY & ADDITIONAL STUDIES:    < from: Xray Chest 1 View AP/PA (19 @ 10:00) >  EXAM:  XR CHEST AP OR PA 1V                            PROCEDURE DATE:  2019            INTERPRETATION:  CLINICAL INFORMATION: Fever.    EXAM: Frontal chest radiograph dated 2019.    COMPARISON: Chest radiograph dated 2019. CT chest 2019.    FINDINGS:  Unchanged right mid lung atelectasis/fibrosis. The lungs are otherwise   clear.  There are no pleural effusions or pneumothorax.  The cardiomediastinal silhouette is within normal limits.    IMPRESSION:   Clear lungs.                RAYNE DURAN M.D., RADIOLOGY RESIDENT  This document has been electronically signed.  FLOR FIGUEROA M.D., ATTENDING RADIOLOGIST  This document has been electronically signed. 2019 12:12PM    < end of copied text > INFECTIOUS DISEASE SERVICE INITIAL CONSULTATION NOTE    HPI:  Pt is able to provide some information however does not seem to be reliable .. he is not sure why he has the peg  and what it is.. he is somewhat lethargic but arouosable, oriented to person but not to place ..  part of history taken from his o/p provider Dr. Miller from [facility]   pt is 73 y/o male w/ hx of muscular dystropy  followed at [place], dysphagia s/p PEG , urinary retention sec to BPH , hx ESBL UTI , recent fowler and TOV done by urology as o/p .. was doing ok overall without fowler until he started c/o lower abd discomfort .  Fowler placed and  only in 100cc o/p and pt continued to c/o pain/ discomfort and sent to ED  . Fowler placed and 1000cc obtained    deneis any pain at this time .. no N/V/flank pain   pt was found to meet sepsis criteria in ED.. (2019 11:47)    Patient is being admitted to medicine for sepsis 2/2 UTI. ID consulted for antibiotic recs. Currently, the patient is feeling a bit better.     PAST MEDICAL & SURGICAL HISTORY:  Urinary retention  H/O muscular dystrophy  Scrotal swelling  Psychiatric disorder  No significant past surgical history      REVIEW OF SYSTEMS:  Constitutional: no weakness, +fevers, no chills  Dermatologic: no rash  Respiratory: no SOB, no cough  Cardiovascular: no chest pain, no palpitations  Gastrointestinal: no nausea, no vomiting, no diarrhea  Genitourinary: +fowler problems  Musculoskeletal:	no weakness, no joint swelling/pain  Neurological: no focal weakness or numbness  Endocrine: no polyuria, no polydipsia    ACTIVE ANTIMICROBIAL/ANTIBIOTIC MEDICATIONS:  meropenem  IVPB 1 milliGRAM(s) IV Intermittent every 8 hours  vancomycin  IVPB 500 milliGRAM(s) IV Intermittent once      OTHER MEDICATIONS:  acetaminophen   Tablet .. 650 milliGRAM(s) Oral every 6 hours PRN  artificial  tears Solution 1 Drop(s) Both EYES two times a day  diazepam    Tablet 5 milliGRAM(s) Oral every 12 hours  folic acid 1 milliGRAM(s) Oral daily  heparin  Injectable 5000 Unit(s) SubCutaneous every 12 hours  lactobacillus acidophilus 1 Tablet(s) Oral two times a day  mupirocin 2% Ointment 1 Application(s) Topical two times a day  sodium chloride 0.9%. 1000 milliLiter(s) IV Continuous <Continuous>  tamsulosin 0.8 milliGRAM(s) Oral at bedtime      ALLERGIES:  Allergies    No Known Allergies    Intolerances        SOCIAL HISTORY: Currently living in [facility]. No alcohol, smoking, or drugs. 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    FAMILY HISTORY:  FHx: stomach cancer      VITAL SIGNS:  ICU Vital Signs Last 24 Hrs  T(C): 36.9 (2019 14:35), Max: 39.8 (2019 07:57)  T(F): 98.4 (2019 14:35), Max: 103.7 (2019 07:57)  HR: 90 (2019 14:35) (89 - 104)  BP: 109/60 (2019 14:35) (96/55 - 133/78)  BP(mean): --  ABP: --  ABP(mean): --  RR: 16 (2019 14:35) (16 - 18)  SpO2: 97% (2019 14:35) (95% - 98%)      PHYSICAL EXAM:  Constitutional: Thin chronically ill appearing man  Head: NC/AT, facial muscle wasting  Eyes: anicteric sclera  ENMT: no rhinorrhea; no sinus tenderness on palpation; no oropharyngeal lesions, erythema, or exudates	  Neck: supple; no JVD or LAD  Respiratory: CTA B/L  Cardiovascular: +S1/S2, RRR; no appreciable murmurs  Gastrointestinal: soft, NT/ND; +BSx4, no HSM  : +fowler with pyuria draining   Extremities: WWP; no clubbing, cyanosis, or edema  Dermatologic: skin warm and dry; no visible rashes or lesions  Neurologic: UE strength in tact, RLE 4/5, LLE 5/5    LABS:                        12.2   20.37 )-----------( 258      ( 2019 06:34 )             37.4     11-18    140  |  96  |  29<H>  ----------------------------<  113<H>  3.8   |  29  |  1.27    Ca    10.0      2019 06:34        Urinalysis Basic - ( 2019 05:23 )    Color: Light Yellow / Appearance: Clear / S.014 / pH: x  Gluc: x / Ketone: Negative  / Bili: Negative / Urobili: Negative   Blood: x / Protein: Trace / Nitrite: Negative   Leuk Esterase: Large / RBC: >50 /hpf / WBC >50 /HPF   Sq Epi: x / Non Sq Epi: 0 /hpf / Bacteria: Few        MICROBIOLOGY:  Blood and urine cultures are pending   Past urine cultures with K. oxytoca and Coag negative staph     RADIOLOGY & ADDITIONAL STUDIES:    < from: Xray Chest 1 View AP/PA (19 @ 10:00) >  EXAM:  XR CHEST AP OR PA 1V                            PROCEDURE DATE:  2019            INTERPRETATION:  CLINICAL INFORMATION: Fever.    EXAM: Frontal chest radiograph dated 2019.    COMPARISON: Chest radiograph dated 2019. CT chest 2019.    FINDINGS:  Unchanged right mid lung atelectasis/fibrosis. The lungs are otherwise   clear.  There are no pleural effusions or pneumothorax.  The cardiomediastinal silhouette is within normal limits.    IMPRESSION:   Clear lungs.      RAYNE DURAN M.D., RADIOLOGY RESIDENT  This document has been electronically signed.  FLOR FIGUEROA M.D., ATTENDING RADIOLOGIST  This document has been electronically signed. 2019 12:12PM    < end of copied text >

## 2019-11-19 LAB
-  COAGULASE NEGATIVE STAPHYLOCOCCUS: SIGNIFICANT CHANGE UP
ALBUMIN SERPL ELPH-MCNC: 3.1 G/DL — LOW (ref 3.3–5)
ALP SERPL-CCNC: 73 U/L — SIGNIFICANT CHANGE UP (ref 40–120)
ALT FLD-CCNC: 12 U/L — SIGNIFICANT CHANGE UP (ref 10–45)
ANION GAP SERPL CALC-SCNC: 10 MMOL/L — SIGNIFICANT CHANGE UP (ref 5–17)
AST SERPL-CCNC: 14 U/L — SIGNIFICANT CHANGE UP (ref 10–40)
BASOPHILS # BLD AUTO: 0.05 K/UL — SIGNIFICANT CHANGE UP (ref 0–0.2)
BASOPHILS NFR BLD AUTO: 0.4 % — SIGNIFICANT CHANGE UP (ref 0–2)
BILIRUB SERPL-MCNC: 0.3 MG/DL — SIGNIFICANT CHANGE UP (ref 0.2–1.2)
BUN SERPL-MCNC: 24 MG/DL — HIGH (ref 7–23)
CALCIUM SERPL-MCNC: 8.8 MG/DL — SIGNIFICANT CHANGE UP (ref 8.4–10.5)
CHLORIDE SERPL-SCNC: 102 MMOL/L — SIGNIFICANT CHANGE UP (ref 96–108)
CO2 SERPL-SCNC: 30 MMOL/L — SIGNIFICANT CHANGE UP (ref 22–31)
CREAT SERPL-MCNC: 0.95 MG/DL — SIGNIFICANT CHANGE UP (ref 0.5–1.3)
EOSINOPHIL # BLD AUTO: 0.02 K/UL — SIGNIFICANT CHANGE UP (ref 0–0.5)
EOSINOPHIL NFR BLD AUTO: 0.2 % — SIGNIFICANT CHANGE UP (ref 0–6)
GLUCOSE SERPL-MCNC: 111 MG/DL — HIGH (ref 70–99)
GRAM STN FLD: SIGNIFICANT CHANGE UP
GRAM STN FLD: SIGNIFICANT CHANGE UP
HCT VFR BLD CALC: 31.3 % — LOW (ref 39–50)
HGB BLD-MCNC: 9.8 G/DL — LOW (ref 13–17)
IMM GRANULOCYTES NFR BLD AUTO: 0.3 % — SIGNIFICANT CHANGE UP (ref 0–1.5)
LYMPHOCYTES # BLD AUTO: 0.92 K/UL — LOW (ref 1–3.3)
LYMPHOCYTES # BLD AUTO: 7.4 % — LOW (ref 13–44)
MAGNESIUM SERPL-MCNC: 1.8 MG/DL — SIGNIFICANT CHANGE UP (ref 1.6–2.6)
MCHC RBC-ENTMCNC: 31.3 GM/DL — LOW (ref 32–36)
MCHC RBC-ENTMCNC: 32.7 PG — SIGNIFICANT CHANGE UP (ref 27–34)
MCV RBC AUTO: 104.3 FL — HIGH (ref 80–100)
METHOD TYPE: SIGNIFICANT CHANGE UP
MONOCYTES # BLD AUTO: 1.23 K/UL — HIGH (ref 0–0.9)
MONOCYTES NFR BLD AUTO: 9.9 % — SIGNIFICANT CHANGE UP (ref 2–14)
NEUTROPHILS # BLD AUTO: 10.13 K/UL — HIGH (ref 1.8–7.4)
NEUTROPHILS NFR BLD AUTO: 81.8 % — HIGH (ref 43–77)
PHOSPHATE SERPL-MCNC: 3.1 MG/DL — SIGNIFICANT CHANGE UP (ref 2.5–4.5)
PLATELET # BLD AUTO: 182 K/UL — SIGNIFICANT CHANGE UP (ref 150–400)
POTASSIUM SERPL-MCNC: 3.8 MMOL/L — SIGNIFICANT CHANGE UP (ref 3.5–5.3)
POTASSIUM SERPL-SCNC: 3.8 MMOL/L — SIGNIFICANT CHANGE UP (ref 3.5–5.3)
PROT SERPL-MCNC: 6 G/DL — SIGNIFICANT CHANGE UP (ref 6–8.3)
RBC # BLD: 3 M/UL — LOW (ref 4.2–5.8)
RBC # FLD: 12.8 % — SIGNIFICANT CHANGE UP (ref 10.3–14.5)
SODIUM SERPL-SCNC: 142 MMOL/L — SIGNIFICANT CHANGE UP (ref 135–145)
WBC # BLD: 12.39 K/UL — HIGH (ref 3.8–10.5)
WBC # FLD AUTO: 12.39 K/UL — HIGH (ref 3.8–10.5)

## 2019-11-19 PROCEDURE — 99232 SBSQ HOSP IP/OBS MODERATE 35: CPT

## 2019-11-19 RX ORDER — VANCOMYCIN HCL 1 G
1000 VIAL (EA) INTRAVENOUS EVERY 24 HOURS
Refills: 0 | Status: DISCONTINUED | OUTPATIENT
Start: 2019-11-19 | End: 2019-11-22

## 2019-11-19 RX ORDER — MEROPENEM 1 G/30ML
1000 INJECTION INTRAVENOUS EVERY 8 HOURS
Refills: 0 | Status: DISCONTINUED | OUTPATIENT
Start: 2019-11-19 | End: 2019-11-20

## 2019-11-19 RX ADMIN — Medication 1 TABLET(S): at 17:04

## 2019-11-19 RX ADMIN — MUPIROCIN 1 APPLICATION(S): 20 OINTMENT TOPICAL at 06:17

## 2019-11-19 RX ADMIN — Medication 5 MILLIGRAM(S): at 17:04

## 2019-11-19 RX ADMIN — Medication 1 DROP(S): at 17:05

## 2019-11-19 RX ADMIN — SODIUM CHLORIDE 80 MILLILITER(S): 9 INJECTION INTRAMUSCULAR; INTRAVENOUS; SUBCUTANEOUS at 11:52

## 2019-11-19 RX ADMIN — Medication 5 MILLIGRAM(S): at 06:17

## 2019-11-19 RX ADMIN — Medication 1 MILLIGRAM(S): at 11:51

## 2019-11-19 RX ADMIN — MEROPENEM 100 MILLIGRAM(S): 1 INJECTION INTRAVENOUS at 06:18

## 2019-11-19 RX ADMIN — MEROPENEM 100 MILLIGRAM(S): 1 INJECTION INTRAVENOUS at 17:05

## 2019-11-19 RX ADMIN — Medication 1 TABLET(S): at 06:17

## 2019-11-19 RX ADMIN — Medication 1 DROP(S): at 06:17

## 2019-11-19 NOTE — PROGRESS NOTE ADULT - ASSESSMENT
74M PMHx of an unknown neurologic disorder in the process of being worked up and BPH leading to urinary retention who was sent to the ED from rehab for fevers and abdominal pain. He was found to have sepsis 2/2 UTI with a fowler that was not functioning on arrival. Because of his past history of coag negative staph in his urine in the past, will cover gram positives until urine gram stain is back.     Sepsis 2/2 UTI  - Stop ceftriaxone  - Start meropenem 1g IV q 8 hours  - Give vancomycin 500mg IV x 1 - redosed  - Follow up cultures  - Monitor for fevers  - Trend WBCs  - await ID of GPC in urine

## 2019-11-19 NOTE — PHYSICAL THERAPY INITIAL EVALUATION ADULT - ADDITIONAL COMMENTS
lives alone in private home no steps to enter and flight to bedroom, did not use assistive device, glasses for reading, hearing good, R handed

## 2019-11-19 NOTE — PHYSICAL THERAPY INITIAL EVALUATION ADULT - CRITERIA FOR SKILLED THERAPEUTIC INTERVENTIONS
back to subacute rehab if pt goes home home w/ home PT for progressive ambulation training, transfers, bed mobs, and safety assessment and rolling walker assistance at home for amb and ADL:s/impairments found/anticipated discharge recommendation

## 2019-11-19 NOTE — PHYSICAL THERAPY INITIAL EVALUATION ADULT - PERTINENT HX OF CURRENT PROBLEM, REHAB EVAL
73 y/o male admitted to [facility] on 11/18/19  w/ hx of muscular dystropy  followed at [place], dysphagia s/p PEG , urinary retention sec to BPH , hx ESBL UTI , recent fowler and TOV done by urology as o/p .. was doing ok overall without fowler until he started c/o lower abd discomfort .  Fowler placed and  only in 100cc o/p and pt continued to c/o pain/ discomfort and sent to ED  . Fowler placed and 1000cc obtained

## 2019-11-19 NOTE — PROGRESS NOTE ADULT - SUBJECTIVE AND OBJECTIVE BOX
Patient is a 74y old  Male who presents with a chief complaint of   Being followed by ID for        Interval history:  No other acute events      ROS:  No cough,SOB,CP  No N/V/D  No abd pain  No urinary complaints  No HA  No joint or limb pain  No other complaints    PAST MEDICAL & SURGICAL HISTORY:  Urinary retention  H/O muscular dystrophy  Scrotal swelling  Psychiatric disorder  No significant past surgical history    Allergies    No Known Allergies    Intolerances      Antimicrobials:    meropenem  IVPB 1000 milliGRAM(s) IV Intermittent every 12 hours    MEDICATIONS  (STANDING):  artificial  tears Solution 1 Drop(s) Both EYES two times a day  diazepam    Tablet 5 milliGRAM(s) Oral every 12 hours  folic acid 1 milliGRAM(s) Oral daily  heparin  Injectable 5000 Unit(s) SubCutaneous every 12 hours  lactobacillus acidophilus 1 Tablet(s) Oral two times a day  meropenem  IVPB 1000 milliGRAM(s) IV Intermittent every 12 hours  tamsulosin 0.8 milliGRAM(s) Oral at bedtime      Vital Signs Last 24 Hrs  T(C): 36.9 (19 @ 14:26), Max: 37.1 (19 @ 00:29)  T(F): 98.5 (19 @ 14:26), Max: 98.7 (19 @ 00:29)  HR: 90 (19 @ 15:48) (90 - 96)  BP: 102/61 (19 @ 15:48) (96/56 - 112/63)  BP(mean): --  RR: 18 (19 @ 15:48) (16 - 18)  SpO2: 98% (19 @ 15:48) (94% - 98%)    Physical Exam:    Constitutional well preserved,comfortable,pleasant    HEENT PERRLA EOMI,No pallor or icterus    No oral exudate or erythema    Neck supple no JVD or LN    Chest Good AE,CTA    CVS RRR S1 S2 WNl No murmur or rub or gallop    Abd soft BS normal No tenderness no masses    Ext No cyanosis clubbing or edema    IV site no erythema tenderness or discharge    Joints no swelling or LOM    CNS AAO X 3 no focal    Lab Data:                          9.8    12.39 )-----------( 182      ( 2019 09:09 )             31.3           142  |  102  |  24<H>  ----------------------------<  111<H>  3.8   |  30  |  0.95    Ca    8.8      2019 07:08  Phos  3.1       Mg     1.8         TPro  6.0  /  Alb  3.1<L>  /  TBili  0.3  /  DBili  x   /  AST  14  /  ALT  12  /  AlkPhos  73        Urinalysis Basic - ( 2019 05:23 )    Color: Light Yellow / Appearance: Clear / S.014 / pH: x  Gluc: x / Ketone: Negative  / Bili: Negative / Urobili: Negative   Blood: x / Protein: Trace / Nitrite: Negative   Leuk Esterase: Large / RBC: >50 /hpf / WBC >50 /HPF   Sq Epi: x / Non Sq Epi: 0 /hpf / Bacteria: Few        .Blood Blood-Peripheral  19   No growth to date.  --  --      .Urine Catheterized  19   >100,000 CFU/ml Gram positive organisms  --  --                    WBC Count: 12.39 (19 @ 09:09)  WBC Count: 20.37 (19 @ 06:34) Patient is a 74y old  Male who presents with a chief complaint of   Being followed by ID for        Interval history:  pt is resting quietly  feeling more comfortable today  No other acute events      PAST MEDICAL & SURGICAL HISTORY:  Urinary retention  H/O muscular dystrophy  Scrotal swelling  Psychiatric disorder  No significant past surgical history    Allergies    No Known Allergies    Intolerances      Antimicrobials:    meropenem  IVPB 1000 milliGRAM(s) IV Intermittent every 12 hours    MEDICATIONS  (STANDING):  artificial  tears Solution 1 Drop(s) Both EYES two times a day  diazepam    Tablet 5 milliGRAM(s) Oral every 12 hours  folic acid 1 milliGRAM(s) Oral daily  heparin  Injectable 5000 Unit(s) SubCutaneous every 12 hours  lactobacillus acidophilus 1 Tablet(s) Oral two times a day  meropenem  IVPB 1000 milliGRAM(s) IV Intermittent every 12 hours  tamsulosin 0.8 milliGRAM(s) Oral at bedtime      Vital Signs Last 24 Hrs  T(C): 36.9 (19 @ 14:26), Max: 37.1 (19 @ 00:29)  T(F): 98.5 (19 @ 14:26), Max: 98.7 (19 @ 00:29)  HR: 90 (19 @ 15:48) (90 - 96)  BP: 102/61 (19 @ 15:48) (96/56 - 112/63)  BP(mean): --  RR: 18 (19 @ 15:48) (16 - 18)  SpO2: 98% (19 @ 15:48) (94% - 98%)    Physical Exam:    Constitutional well preserved,comfortable,pleasant    HEENT PERRLA EOMI,No pallor or icterus    No oral exudate or erythema    Neck supple no JVD or LN    Chest Good AE,CTA    CVS RRR S1 S2 WNl  Abd soft BS normal No tenderness PEG    Ext No cyanosis clubbing or edema    IV site no erythema tenderness or discharge    Joints no swelling or LOM    CNS AAO X 3 no focal    Lab Data:                          9.8    12.39 )-----------( 182      ( 2019 09:09 )             31.3           142  |  102  |  24<H>  ----------------------------<  111<H>  3.8   |  30  |  0.95    Ca    8.8      2019 07:08  Phos  3.1       Mg     1.8         TPro  6.0  /  Alb  3.1<L>  /  TBili  0.3  /  DBili  x   /  AST  14  /  ALT  12  /  AlkPhos  73        Urinalysis Basic - ( 2019 05:23 )    Color: Light Yellow / Appearance: Clear / S.014 / pH: x  Gluc: x / Ketone: Negative  / Bili: Negative / Urobili: Negative   Blood: x / Protein: Trace / Nitrite: Negative   Leuk Esterase: Large / RBC: >50 /hpf / WBC >50 /HPF   Sq Epi: x / Non Sq Epi: 0 /hpf / Bacteria: Few        .Blood Blood-Peripheral  19   No growth to date.  --  --      .Urine Catheterized  19   >100,000 CFU/ml Gram positive organisms  --  --            WBC Count: 12.39 (19 @ 09:09)  WBC Count: 20.37 (19 @ 06:34)

## 2019-11-19 NOTE — PROGRESS NOTE ADULT - SUBJECTIVE AND OBJECTIVE BOX
Patient is a 74y old  Male who presents with a chief complaint of                                                              INTERVAL HPI/OVERNIGHT EVENTS:    REVIEW OF SYSTEMS:     CONSTITUTIONAL: No weakness, fevers or chills  RESPIRATORY: No cough, wheezing,  No shortness of breath  CARDIOVASCULAR: No chest pain or palpitations  GASTROINTESTINAL: No abdominal pain  . No nausea, vomiting, or hematemesis; No diarrhea or constipation. No melena or hematochezia.  GENITOURINARY: fowler in place   NEUROLOGICAL: No numbness or weakness                                                                                                                                                                                                                                                                              Medications:  MEDICATIONS  (STANDING):  artificial  tears Solution 1 Drop(s) Both EYES two times a day  diazepam    Tablet 5 milliGRAM(s) Oral every 12 hours  folic acid 1 milliGRAM(s) Oral daily  heparin  Injectable 5000 Unit(s) SubCutaneous every 12 hours  lactobacillus acidophilus 1 Tablet(s) Oral two times a day  meropenem  IVPB 1000 milliGRAM(s) IV Intermittent every 12 hours  tamsulosin 0.8 milliGRAM(s) Oral at bedtime    MEDICATIONS  (PRN):  acetaminophen   Tablet .. 650 milliGRAM(s) Oral every 6 hours PRN Temp greater or equal to 38C (100.4F), Mild Pain (1 - 3)       Allergies    No Known Allergies    Intolerances      Vital Signs Last 24 Hrs  T(C): 36.7 (19 Nov 2019 17:05), Max: 37.1 (19 Nov 2019 00:29)  T(F): 98.1 (19 Nov 2019 17:05), Max: 98.7 (19 Nov 2019 00:29)  HR: 86 (19 Nov 2019 17:05) (86 - 96)  BP: 101/61 (19 Nov 2019 17:05) (96/56 - 112/63)  BP(mean): --  RR: 18 (19 Nov 2019 17:05) (16 - 18)  SpO2: 99% (19 Nov 2019 17:05) (94% - 99%)  CAPILLARY BLOOD GLUCOSE          11-18 @ 07:01  -  11-19 @ 07:00  --------------------------------------------------------  IN: 930 mL / OUT: 1450 mL / NET: -520 mL    11-19 @ 07:01  -  11-19 @ 17:40  --------------------------------------------------------  IN: 50 mL / OUT: 650 mL / NET: -600 mL      Physical Exam:    Daily     Daily   General:  NAD   HEENT:  Nonicteric, PERRLA  CV:  RRR, S1S2   Lungs:  CTA B/L, no wheezes, rales, rhonchi  Abdomen:  Soft, non-tender, no distended, positive BS  Extremities:  2+ pulses, no c/c, no edema    :   malcolm  Neuro:  AAOx3, non-focal, grossly intact                                                                                                                                                                                                                                                                                                LABS:                               9.8    12.39 )-----------( 182      ( 19 Nov 2019 09:09 )             31.3                      11-19    142  |  102  |  24<H>  ----------------------------<  111<H>  3.8   |  30  |  0.95    Ca    8.8      19 Nov 2019 07:08  Phos  3.1     11-19  Mg     1.8     11-19    TPro  6.0  /  Alb  3.1<L>  /  TBili  0.3  /  DBili  x   /  AST  14  /  ALT  12  /  AlkPhos  73  11-19

## 2019-11-19 NOTE — PROGRESS NOTE ADULT - ASSESSMENT
75 y/o male w/ hx of muscular dystropy  followed at [place], dysphagia s/p PEG , urinary retention sec to BPH , hx ESBL UTI , recent fowler and TOV done by urology as o/p .. was doing ok overall without fowler until he started c/o lower abd discomfort .  Fowler placed and  only in 100cc o/p and pt continued to c/o pain/ discomfort and sent to ED  . Fowler placed and 1000cc obtained    deneis any pain at this time .. no N/V/flank pain   pt was found to meet sepsis criteria in ED..    1- sepsis sec to UTI in setting of urinary retention   hx of ESBL   ID input appreciated : cont meropenem   urine culture : Gram positive " organism "  urology consult     2- urinary retention :  cont  flomax   urology consult   cont foly     3- Dysphagia : cont peg feeds   aspiration precautions     4- Lethargy : improved and now at baseline   sec to sepsis   pt also on  Benzos twice daily ..can consider to taper as o/p.

## 2019-11-20 LAB
-  AMPICILLIN: SIGNIFICANT CHANGE UP
-  CIPROFLOXACIN: SIGNIFICANT CHANGE UP
-  LEVOFLOXACIN: SIGNIFICANT CHANGE UP
-  NITROFURANTOIN: SIGNIFICANT CHANGE UP
-  TETRACYCLINE: SIGNIFICANT CHANGE UP
-  VANCOMYCIN: SIGNIFICANT CHANGE UP
ANION GAP SERPL CALC-SCNC: 9 MMOL/L — SIGNIFICANT CHANGE UP (ref 5–17)
APTT BLD: 28.8 SEC — SIGNIFICANT CHANGE UP (ref 27.5–36.3)
BUN SERPL-MCNC: 23 MG/DL — SIGNIFICANT CHANGE UP (ref 7–23)
CALCIUM SERPL-MCNC: 8.5 MG/DL — SIGNIFICANT CHANGE UP (ref 8.4–10.5)
CHLORIDE SERPL-SCNC: 100 MMOL/L — SIGNIFICANT CHANGE UP (ref 96–108)
CO2 SERPL-SCNC: 29 MMOL/L — SIGNIFICANT CHANGE UP (ref 22–31)
CREAT SERPL-MCNC: 0.72 MG/DL — SIGNIFICANT CHANGE UP (ref 0.5–1.3)
CULTURE RESULTS: SIGNIFICANT CHANGE UP
GLUCOSE SERPL-MCNC: 146 MG/DL — HIGH (ref 70–99)
HCT VFR BLD CALC: 31.3 % — LOW (ref 39–50)
HGB BLD-MCNC: 9.9 G/DL — LOW (ref 13–17)
INR BLD: 1.25 RATIO — HIGH (ref 0.88–1.16)
MAGNESIUM SERPL-MCNC: 1.7 MG/DL — SIGNIFICANT CHANGE UP (ref 1.6–2.6)
MCHC RBC-ENTMCNC: 31.6 GM/DL — LOW (ref 32–36)
MCHC RBC-ENTMCNC: 33 PG — SIGNIFICANT CHANGE UP (ref 27–34)
MCV RBC AUTO: 104.3 FL — HIGH (ref 80–100)
METHOD TYPE: SIGNIFICANT CHANGE UP
ORGANISM # SPEC MICROSCOPIC CNT: SIGNIFICANT CHANGE UP
ORGANISM # SPEC MICROSCOPIC CNT: SIGNIFICANT CHANGE UP
PHOSPHATE SERPL-MCNC: 2.7 MG/DL — SIGNIFICANT CHANGE UP (ref 2.5–4.5)
PLATELET # BLD AUTO: 165 K/UL — SIGNIFICANT CHANGE UP (ref 150–400)
POTASSIUM SERPL-MCNC: 3.4 MMOL/L — LOW (ref 3.5–5.3)
POTASSIUM SERPL-SCNC: 3.4 MMOL/L — LOW (ref 3.5–5.3)
PROTHROM AB SERPL-ACNC: 14.4 SEC — HIGH (ref 10–13.1)
RBC # BLD: 3 M/UL — LOW (ref 4.2–5.8)
RBC # FLD: 12.4 % — SIGNIFICANT CHANGE UP (ref 10.3–14.5)
SODIUM SERPL-SCNC: 138 MMOL/L — SIGNIFICANT CHANGE UP (ref 135–145)
SPECIMEN SOURCE: SIGNIFICANT CHANGE UP
WBC # BLD: 8.56 K/UL — SIGNIFICANT CHANGE UP (ref 3.8–10.5)
WBC # FLD AUTO: 8.56 K/UL — SIGNIFICANT CHANGE UP (ref 3.8–10.5)

## 2019-11-20 PROCEDURE — 99232 SBSQ HOSP IP/OBS MODERATE 35: CPT

## 2019-11-20 RX ORDER — POTASSIUM CHLORIDE 20 MEQ
40 PACKET (EA) ORAL
Refills: 0 | Status: COMPLETED | OUTPATIENT
Start: 2019-11-20 | End: 2019-11-20

## 2019-11-20 RX ORDER — MAGNESIUM SULFATE 500 MG/ML
2 VIAL (ML) INJECTION ONCE
Refills: 0 | Status: COMPLETED | OUTPATIENT
Start: 2019-11-20 | End: 2019-11-20

## 2019-11-20 RX ADMIN — Medication 1 DROP(S): at 18:37

## 2019-11-20 RX ADMIN — HEPARIN SODIUM 5000 UNIT(S): 5000 INJECTION INTRAVENOUS; SUBCUTANEOUS at 05:37

## 2019-11-20 RX ADMIN — TAMSULOSIN HYDROCHLORIDE 0.8 MILLIGRAM(S): 0.4 CAPSULE ORAL at 00:10

## 2019-11-20 RX ADMIN — MEROPENEM 100 MILLIGRAM(S): 1 INJECTION INTRAVENOUS at 00:10

## 2019-11-20 RX ADMIN — Medication 1 TABLET(S): at 05:36

## 2019-11-20 RX ADMIN — Medication 40 MILLIEQUIVALENT(S): at 10:54

## 2019-11-20 RX ADMIN — MEROPENEM 100 MILLIGRAM(S): 1 INJECTION INTRAVENOUS at 05:36

## 2019-11-20 RX ADMIN — Medication 250 MILLIGRAM(S): at 00:10

## 2019-11-20 RX ADMIN — Medication 5 MILLIGRAM(S): at 05:36

## 2019-11-20 RX ADMIN — TAMSULOSIN HYDROCHLORIDE 0.8 MILLIGRAM(S): 0.4 CAPSULE ORAL at 23:49

## 2019-11-20 RX ADMIN — Medication 1 TABLET(S): at 18:37

## 2019-11-20 RX ADMIN — Medication 1 MILLIGRAM(S): at 18:36

## 2019-11-20 RX ADMIN — Medication 1 DROP(S): at 05:36

## 2019-11-20 RX ADMIN — Medication 250 MILLIGRAM(S): at 23:49

## 2019-11-20 RX ADMIN — Medication 5 MILLIGRAM(S): at 18:41

## 2019-11-20 RX ADMIN — Medication 50 GRAM(S): at 10:55

## 2019-11-20 NOTE — PROVIDER CONTACT NOTE (CRITICAL VALUE NOTIFICATION) - ASSESSMENT
A&Ox4. Safety checks completed every hour. Pts safety maintained. IV site assessed every 2 hours and remain within defined limits. Pt able to turn and position self. PAS stockings in place. Pt running tube feeds through PEG. Pain management effective. Pt voiding adequately via fowler. All VSS. No s/s of distress. Tolerating diet. Call bell within reach. Will continue to monitor.

## 2019-11-20 NOTE — DIETITIAN INITIAL EVALUATION ADULT. - OTHER INFO
Patient admitted from NH, receives both oral diet and PEG feeds (PEG since August). Per NH transfer records pt was receiving nocturnal feeds of Jevity 1.5 @ 90ml/hr starting at 5pm with total volume received 1200ml (provides 1800 Kcal and 77gm protein). Pt also receiving Proform (liquid protein supplement) 30mL TID (provides 100 kcal, 15gm protein per serving). TF + protein supplement 2100Kcal and 122gm protein). Pt states he had MBS done ~ 1 month ago at OSH and was starting of oral diet, per NH transfer record pt was receiving PO diet consistency of pureed foods with honey thick liquids. States he was receiving speech therapy regularly and has an upcoming swallow test scheduled as outpatient to assess if diet could be further upgraded. Pt reports good tolerance to diet at NH, consumes > 75% of meals. Denies any acute GI distress. Confirms NKFA. Per transfer record pt takes folic acid.     Weights: pt reports significant weight loss over the last year after having a fall. Reports UBW is 145lbs, reports weight dropped to 118lbs (9/20/19). Pt reports weight gain over the last few months. Reports weight most recent has been ~ 137lbs which is consistent with current dosing weight.     Pt currently on dysphagia 1 diet with honey thick liquids + nocturnal tube feeds. Pt receiving Jevity 1.5 @ 90ml/hr x 13hrs (5pm to 6am). Regimen at goal provides 1170ml total volume, 1756Kcal, 74.6gm protein and 889ml free water. Provides 28 Kcal/kg and 1.2gm protein based on dosing wt of 62.1Kg. Pt reports good tolerance to tube feeds, no acute GI distress noted, last BM today per pt report. Pt also tolerating PO diet, consuming ~60- 75% of meals so far. Discussed availability of prosource supplement instead of Proform (not carried in-house). Pt reports ultimate goal is see if he had discontinue tube feeds, interested in trialing oral supplement instead. Amenable to Mighty Shakes (honey thick) TID to supplement PO intake (provides 200Kcal and 6gm protein per serving). Pt asking if swallow test scheduled for outpatient could be done inpatient, will discussed with provider.

## 2019-11-20 NOTE — DIETITIAN INITIAL EVALUATION ADULT. - +GENDER
Neurodevelopmental Consult    Chief Complaint:  This consult was requested by Neonatology (See Consult Request) secondary to increased risk of developmental delays and evaluation for need for Early Intention Services including PT/ OT/ SP-Feeding    Gender:Male    Age:12d    Gestational Age  33.6 (2018 19:20)    Severity:	  		  Late prematurity       history:  	    HPI:  33 6/7 weeks baby boy, born to a 32 year old mother  blood type O+, Hep B-, Rubella immune, HIV-, RPR-, GBS unknown, No ROM or labor, mother scheduled for primary  for PIH on Labetolol 400mg and Mag sulfate for , received Beta X2 on  and . PMH of  , ectopic pregnancy, Past surgical history of fallopian tube removed in , ovarian burst with lap repair, bowel resection . Baby emerged vigorous with spontaneous cry, brought to warmer, SS. Color was pale and dusky. At 2 1/2 min of life baby began to look more dusky and subcostal retraction were noted, pulse oximeter was placed on the right hand and CPAP initiated at 30% and increased eventually to 70%. Baby's color improved with CPAP and continuous stimulation, baby remained on CPAP and was transferred to NICU for further care. Likely RDS, will observe for improvement. No risk factors for sepsis. Apgars 9 and 9      Birth History:		    Birth weight:____1910______g		  				  Category: 		AGA		    Severity: 	                      LBW (<2500g)  											  PAST MEDICAL & SURGICAL HISTORY:    Respiratory: Stable in RA. s/p RDS, s/p CPAP   CV: Stable hemodynamics. Continue cardiorespiratory monitoring. Observe for the signs of PDA, once PVR decreases.  Hem: At risk for hyperbilirubinemia due to prematurity.  Phototherapy (-; -) - stable bili  ID: No antibiotics  Neuro: Exam appropriate for age. NDE PTD  Thermal: crib .   Social: Mother updated at bedside on   Meds: PVS, Fe    Labs/Images/Studies:     Allergies    No Known Allergies    Intolerances    MEDICATIONS  (STANDING):  ferrous sulfate Oral Liquid - Peds 3.7 milliGRAM(s) Elemental Iron Oral daily  hepatitis B IntraMuscular Vaccine (ENGERIX) - Peds 0.5 milliLiter(s) IntraMuscular once  multivitamin Oral Drops - Peds 1 milliLiter(s) Oral daily    MEDICATIONS  (PRN):    FAMILY HISTORY:    Family History:		Non-contributory 	    Social History: 		Stable Family		    ROS (obtained from caregiver):    Fever:		Afebrile for 24 hours		  Nasal:	                    Discharge:       No  Respiratory:                  Apneas:     No	  Cardiac:                         Bradycardias:     No      Gastrointestinal:          Vomiting:  No	Spit-up: No  Stool Pattern:               Constipation: No 	Diarrhea: No              Blood per rectum: No    Feeding:  	Coordinated suck and swallow  	    Skin:   Rash: No		Wound: No  Neurological: Seizure: No   Hematologic: Petechia: No	  Bruising: No    Physical Exam:    Eyes:		Momentary gaze		  Facies:		Non dysmorphic		  Ears:		Normal set		  Mouth		Normal		  Cardiac		Pulses normal  Skin:		No significant birth marks		  GI: 		Soft		No masses		  Spine:		Intact			  Hips:		Negative   Neurological:	See Developmental Testing for DTR and Tone analysis    Developmental Testing:  Neurodevelopment Risk Exam:    Behavior During exam:  Alert			Active		    Sensory Exam:  	  Behavior State          [ X ]Normal	[  ] Normal for corrected age   [  ] Suspect	[ ] Abnormal		  Visual tracking          [ X ]Normal	[  ] Normal for corrected age   [  ] Suspect	[ ] Abnormal		  Auditory Behavior   [ X ]Normal	[  ] Normal for corrected age   [  ] Suspect	[ ] Abnormal					    Deep Tendon Reflexes:    		  Biceps    [ X ]Normal	[  ] Normal for corrected age   [  ] Suspect	[ ] Abnormal		  Patella    [ X ]Normal	[  ] Normal for corrected age   [  ] Suspect	[ ] Abnormal		  Ankle      [ X ]Normal	[  ] Normal for corrected age   [  ] Suspect	[ ] Abnormal		  Clonus    [ X ]Normal	[  ] Normal for corrected age   [  ] Suspect	[ ] Abnormal		  Mass       [ X ]Normal	[  ] Normal for corrected age   [  ] Suspect	[ ] Abnormal		    			  Axial Tone:    Head Control:      [   ]Normal	[  ] Normal for corrected age   [ X ] Suspect	[ ] Abnormal		  Axial Tone:           [   ]Normal	[  ] Normal for corrected age   [ X ] Suspect	[ ] Abnormal	  Ventral Curve:     [ X ]Normal	[  ] Normal for corrected age   [  ] Suspect	[ ] Abnormal				    Appendicular Tone:  	  Upper Extremities  [  ]Normal	[  ] Normal for corrected age   [X  ] Suspect	[ ] Abnormal		  Lower Extremities   [  ]Normal	[  ] Normal for corrected age   [X  ] Suspect	[ ] Abnormal		  Posture	               [ X ]Normal	[  ] Normal for corrected age   [  ] Suspect	[ ] Abnormal				    Primitive Reflexes:     Suck                  [ X ]Normal	[  ] Normal for corrected age   [  ] Suspect	[ ] Abnormal		  Root                  [ X ]Normal	[  ] Normal for corrected age   [  ] Suspect	[ ] Abnormal		  Linnea                 [ X ]Normal	[  ] Normal for corrected age   [  ] Suspect	[ ] Abnormal		  Palmar Grasp   [ X ]Normal	[  ] Normal for corrected age   [  ] Suspect	[ ] Abnormal		  Plantar Grasp   [ X ]Normal	[  ] Normal for corrected age   [  ] Suspect	[ ] Abnormal		  Placing	       [ X ]Normal	[  ] Normal for corrected age   [  ] Suspect	[ ] Abnormal		  Stepping           [ X ]Normal	[  ] Normal for corrected age   [  ] Suspect	[ ] Abnormal		  ATNR                [ X ]Normal	[  ] Normal for corrected age   [  ] Suspect	[ ] Abnormal				    NRE Summary:  	Normal  (= 1)	Suspect (= 2)	Abnormal (= 3)    NeuroDevelopmental:	 		     Sensory	                     1            		  DTR		 1       	  Primitive Reflexes         1       			    NeuroMotor:			             Appendicular Tone        2    			  Axial Tone	                     2       		    NRE SCORE  = 7      Interpretation of Results:    5-8 Low risk for Neurodevelopmental complications    Diagnosis:    HEALTH ISSUES - PROBLEM Dx:  Temperature instability in : Temperature instability in   Prematurity, 1,750-1,999 grams, 33-34 completed weeks: Prematurity, 1,750-1,999 grams, 33-34 completed weeks  Nutritional assessment: Nutritional assessment  Respiratory distress syndrome of : Respiratory distress syndrome of           Risk for developmental delay          Mild             Recommendations for Physicians:  1.)	Early Intervention    is not    recommended at this time.  2.)	Follow up in  Developmental Follow-up Clinic in 6   months.  3.)	Follow up with subspecialties as per Neonatology physicians.  4.)	Additional specific referral to:     Recommendations for Parents:    •	Please remember to use “gestation-adjusted” age when calculating your baby’s developmental milestones and age/ height percentiles.  In order to calculate your baby’s’ adjusted age take the number 40 and subtract your baby’s gestation (for example 40-32=8) Then subtract this number from your babies actual age and you will know your gestation adjusted age.    •	Please remember that vaccinations are performed at chronologic age    •	Please remember that feeding schedules, growth, and developmental milestones should be performed at adjusted age.    •	Reading to your baby is recommended daily to all children regardless of adjusted or developmental age    •	If medically stable, all babies should be placed on their tummies while awake, supervised, at least 5 times a day and more if tolerated.  This is called “tummy time” and is essential to your baby’s muscle development and developmental progress.     If parents have developmental questions or wish to schedule an appointment please call Monika Bergman at [phone] or Annette Zacarias at [phone] Statement Selected

## 2019-11-20 NOTE — PROVIDER CONTACT NOTE (CRITICAL VALUE NOTIFICATION) - SITUATION
Preliminary results from blood cultures drawn on 11/18 showed gram positive cocci in clusters in aerobic and anaerobic bottles.

## 2019-11-20 NOTE — CONSULT NOTE ADULT - ASSESSMENT
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 Jara as oputpt-urodynamics if not done when uti resolved

## 2019-11-20 NOTE — PROGRESS NOTE ADULT - ASSESSMENT
75 y/o male w/ hx of muscular dystropy  followed at [place], dysphagia s/p PEG , urinary retention sec to BPH , hx ESBL UTI , recent fowler and TOV done by urology as o/p .. was doing ok overall without fowler until he started c/o lower abd discomfort .  Fowler placed and  only in 100cc o/p and pt continued to c/o pain/ discomfort and sent to ED  . Fowler placed and 1000cc obtained    deneis any pain at this time .. no N/V/flank pain   pt was found to meet sepsis criteria in ED..    1- sepsis sec to UTI in setting of urinary retention   hx of ESBL   ID input appreciated : cont meropenem   urine culture : Gram positive " organism "  urology consult     2- urinary retention :  cont  flomax   urology consult   cont foly     3- Dysphagia : cont peg feeds   aspiration precautions     4- Lethargy : improved and now at baseline   sec to sepsis   pt also on  Benzos twice daily ..can consider to taper as o/p. 73 y/o male w/ hx of muscular dystropy  followed at [place], dysphagia s/p PEG , urinary retention sec to BPH , hx ESBL UTI , recent fowler and TOV done by urology as o/p .. was doing ok overall without fowler until he started c/o lower abd discomfort .  Fowler placed and  only in 100cc o/p and pt continued to c/o pain/ discomfort and sent to ED  . Fowler placed and 1000cc obtained    deneis any pain at this time .. no N/V/flank pain   pt was found to meet sepsis criteria in ED..    1- sepsis sec to UTI in setting of urinary retention   hx of ESBL   ID input appreciated : cont vanco  , and will change to ampicilin upon dc   blood culture: noted ..? contaminant .. f/u repeat   urology input appreciated     2- urinary retention :  cont  flomax   urology consult noted and appreciated   cont fowler     3- Dysphagia : cont peg feeds   aspiration precautions   pt was planned for MBS .. will arrange prior to dc     4- Lethargy : improved and now at baseline   sec to sepsis   pt also on  Benzos twice daily ..can consider to taper as o/p.

## 2019-11-20 NOTE — DIETITIAN INITIAL EVALUATION ADULT. - PHYSICAL APPEARANCE
underweight/other (specify) Ht: 74 inches , Wt: 137lbs, BMI: 18.1kg/m2, IBW: 190lbs +/- 10%, %IBW: 72%  Edema: none, Skin: free of pressure injuries per nursing flow sheets    Nutrition focused physical exam conducted with verbal consent from patient, pt noted with severe muscle wasting of temples, clavicles, severe fat depletion of orbitals, buccal region, triceps.

## 2019-11-20 NOTE — PROGRESS NOTE ADULT - ASSESSMENT
74M PMHx of an unknown neurologic disorder in the process of being worked up and BPH leading to urinary retention who was sent to the ED from rehab for fevers and abdominal pain. He was found to have sepsis 2/2 UTI with a fowler that was not functioning on arrival. Because of his past history of coag negative staph in his urine in the past, will cover gram positives until urine gram stain is back.     Sepsis 2/2 UTI  - d/c meropenem  - on standing vancomycin  - Follow up cultures  - Monitor for fevers  - Trend WBCs  - unclear significance of GPCC - two sets but ? same stick and likely procurement contaminant  hilario change to po amoxicillin when ready for discharge ( enteric) but first waiting repeat Blood cultures

## 2019-11-20 NOTE — DIETITIAN INITIAL EVALUATION ADULT. - ADD RECOMMEND
1) Continue nocturnal feeds of Jevity 1.5 @ 90ml/hr x 13hrs (5pm-6am). Regimen at goal provides 1170ml total volume, 1756Kcal, 74.6gm protein and 889ml free water. 2) Consider current diet as tolerated, defer consistency to team. Consider SLP consult to success potential for diet upgrade. 3) RD to provide Mighty Shakes (honey thick) TID to supplement PO intake (provides 200Kcal and 6gm protein per serving). 4) Monitor tolerance and adequacy of PO intake and ability to reduce/ discontinue EN.

## 2019-11-20 NOTE — PROGRESS NOTE ADULT - SUBJECTIVE AND OBJECTIVE BOX
Patient is a 74y old  Male who presents with a chief complaint of   Being followed by ID for        Interval history:  No other acute events      ROS:  No cough,SOB,CP  No N/V/D  No abd pain  No urinary complaints  No HA  No joint or limb pain  No other complaints    PAST MEDICAL & SURGICAL HISTORY:  Urinary retention  H/O muscular dystrophy  Scrotal swelling  Psychiatric disorder  No significant past surgical history    Allergies    No Known Allergies    Intolerances      Antimicrobials:    vancomycin  IVPB 1000 milliGRAM(s) IV Intermittent every 24 hours    MEDICATIONS  (STANDING):  artificial  tears Solution 1 Drop(s) Both EYES two times a day  diazepam    Tablet 5 milliGRAM(s) Oral every 12 hours  folic acid 1 milliGRAM(s) Oral daily  heparin  Injectable 5000 Unit(s) SubCutaneous every 12 hours  lactobacillus acidophilus 1 Tablet(s) Oral two times a day  tamsulosin 0.8 milliGRAM(s) Oral at bedtime  vancomycin  IVPB 1000 milliGRAM(s) IV Intermittent every 24 hours      Vital Signs Last 24 Hrs  T(C): 36.6 (11-20-19 @ 14:11), Max: 36.8 (11-19-19 @ 21:41)  T(F): 97.8 (11-20-19 @ 14:11), Max: 98.2 (11-19-19 @ 21:41)  HR: 90 (11-20-19 @ 14:11) (77 - 95)  BP: 103/67 (11-20-19 @ 14:11) (95/60 - 105/66)  BP(mean): --  RR: 18 (11-20-19 @ 14:11) (18 - 18)  SpO2: 99% (11-20-19 @ 14:11) (93% - 99%)    Physical Exam:    Constitutional well preserved,comfortable,pleasant    HEENT PERRLA EOMI,No pallor or icterus    No oral exudate or erythema    Neck supple no JVD or LN    Chest Good AE,CTA    CVS RRR S1 S2 WNl No murmur or rub or gallop    Abd soft BS normal No tenderness no masses    Ext No cyanosis clubbing or edema    IV site no erythema tenderness or discharge    Joints no swelling or LOM    CNS AAO X 3 no focal    Lab Data:                          9.9    8.56  )-----------( 165      ( 20 Nov 2019 07:41 )             31.3       11-20    138  |  100  |  23  ----------------------------<  146<H>  3.4<L>   |  29  |  0.72    Ca    8.5      20 Nov 2019 05:51  Phos  2.7     11-20  Mg     1.7     11-20    TPro  6.0  /  Alb  3.1<L>  /  TBili  0.3  /  DBili  x   /  AST  14  /  ALT  12  /  AlkPhos  73  11-19          .Blood Blood-Peripheral  11-18-19   Growth in anaerobic bottle: Gram Positive Cocci in Clusters  --  Blood Culture PCR      .Urine Catheterized  11-18-19   >100,000 CFU/ml Enterococcus faecalis  --  Enterococcus faecalis        WBC Count: 8.56 (11-20-19 @ 07:41)  WBC Count: 12.39 (11-19-19 @ 09:09)  WBC Count: 20.37 (11-18-19 @ 06:34) Patient is a 74y old  Male who presents with a chief complaint of   Being followed by ID for        Interval history:  pt feeling improved  awaiting  input regarding fowler  repeat BCs ordered for positive blood cultures  No other acute events      PAST MEDICAL & SURGICAL HISTORY:  Urinary retention  H/O muscular dystrophy  Scrotal swelling  Psychiatric disorder  No significant past surgical history    Allergies    No Known Allergies    Intolerances      Antimicrobials:    vancomycin  IVPB 1000 milliGRAM(s) IV Intermittent every 24 hours    MEDICATIONS  (STANDING):  artificial  tears Solution 1 Drop(s) Both EYES two times a day  diazepam    Tablet 5 milliGRAM(s) Oral every 12 hours  folic acid 1 milliGRAM(s) Oral daily  heparin  Injectable 5000 Unit(s) SubCutaneous every 12 hours  lactobacillus acidophilus 1 Tablet(s) Oral two times a day  tamsulosin 0.8 milliGRAM(s) Oral at bedtime  vancomycin  IVPB 1000 milliGRAM(s) IV Intermittent every 24 hours      Vital Signs Last 24 Hrs  T(C): 36.6 (11-20-19 @ 14:11), Max: 36.8 (11-19-19 @ 21:41)  T(F): 97.8 (11-20-19 @ 14:11), Max: 98.2 (11-19-19 @ 21:41)  HR: 90 (11-20-19 @ 14:11) (77 - 95)  BP: 103/67 (11-20-19 @ 14:11) (95/60 - 105/66)  BP(mean): --  RR: 18 (11-20-19 @ 14:11) (18 - 18)  SpO2: 99% (11-20-19 @ 14:11) (93% - 99%)    Physical Exam:    Constitutional  wasted    HEENT PERRLA EOMI,No pallor or icterus    No oral exudate or erythema    Neck supple no JVD or LN    Chest Good AE,CTA    CVS RRR S1 S2    Abd soft BS normal No tenderness  PEG    Ext No cyanosis clubbing or edema    IV site no erythema tenderness or discharge    Joints no swelling or LOM      Lab Data:                          9.9    8.56  )-----------( 165      ( 20 Nov 2019 07:41 )             31.3       11-20    138  |  100  |  23  ----------------------------<  146<H>  3.4<L>   |  29  |  0.72    Ca    8.5      20 Nov 2019 05:51  Phos  2.7     11-20  Mg     1.7     11-20    TPro  6.0  /  Alb  3.1<L>  /  TBili  0.3  /  DBili  x   /  AST  14  /  ALT  12  /  AlkPhos  73  11-19          .Blood Blood-Peripheral  11-18-19   Growth in anaerobic bottle: Gram Positive Cocci in Clusters  --  Blood Culture PCR      .Urine Catheterized  11-18-19   >100,000 CFU/ml Enterococcus faecalis  --  Enterococcus faecalis    Culture - Urine (11.18.19 @ 10:02)    -  Levofloxacin: R >4    -  Nitrofurantoin: S <=32 Should not be used to treat pyelonephritis.    -  Tetra/Doxy: R >8    -  Vancomycin: S 1    -  Ampicillin: S <=2 Predicts results to ampicillin/sulbactam, amoxacillin-clavulanate and  piperacillin-tazobactam.    -  Ciprofloxacin: R >2    Specimen Source: .Urine Catheterized    Culture Results:   >100,000 CFU/ml Enterococcus faecalis    Organism Identification: Enterococcus faecalis    Organism: Enterococcus faecalis    Method Type: LILLY        WBC Count: 8.56 (11-20-19 @ 07:41)  WBC Count: 12.39 (11-19-19 @ 09:09)  WBC Count: 20.37 (11-18-19 @ 06:34)

## 2019-11-20 NOTE — PROGRESS NOTE ADULT - SUBJECTIVE AND OBJECTIVE BOX
Patient is a 74y old  Male who presents with a chief complaint of retention/febrile uti (2019 18:32)                                                               INTERVAL HPI/OVERNIGHT EVENTS:    REVIEW OF SYSTEMS:     CONSTITUTIONAL: No weakness, fevers or chills  EYES/ENT: No visual changes , no ear ache   NECK: No pain or stiffness  RESPIRATORY: No cough, wheezing,  No shortness of breath  CARDIOVASCULAR: No chest pain or palpitations  GASTROINTESTINAL: No abdominal pain  . No nausea, vomiting, or hematemesis; No diarrhea or constipation. No melena or hematochezia.  GENITOURINARY: No dysuria, frequency or hematuria  NEUROLOGICAL: No numbness or weakness  SKIN: No itching, burning, rashes, or lesions                                                                                                                                                                                                                                                                                 Medications:  MEDICATIONS  (STANDING):  artificial  tears Solution 1 Drop(s) Both EYES two times a day  diazepam    Tablet 5 milliGRAM(s) Oral every 12 hours  folic acid 1 milliGRAM(s) Oral daily  heparin  Injectable 5000 Unit(s) SubCutaneous every 12 hours  lactobacillus acidophilus 1 Tablet(s) Oral two times a day  tamsulosin 0.8 milliGRAM(s) Oral at bedtime  vancomycin  IVPB 1000 milliGRAM(s) IV Intermittent every 24 hours    MEDICATIONS  (PRN):  acetaminophen   Tablet .. 650 milliGRAM(s) Oral every 6 hours PRN Temp greater or equal to 38C (100.4F), Mild Pain (1 - 3)       Allergies    No Known Allergies    Intolerances      Vital Signs Last 24 Hrs  T(C): 36.3 (2019 18:12), Max: 36.8 (2019 21:41)  T(F): 97.3 (2019 18:12), Max: 98.2 (2019 21:41)  HR: 87 (2019 18:12) (77 - 95)  BP: 114/66 (2019 18:12) (95/60 - 114/66)  BP(mean): --  RR: 18 (2019 18:12) (18 - 18)  SpO2: 99% (2019 18:12) (93% - 99%)  CAPILLARY BLOOD GLUCOSE           @ 07:01  -   @ 07:00  --------------------------------------------------------  IN: 1950 mL / OUT: 1150 mL / NET: 800 mL     @ 07:01  -   @ 21:20  --------------------------------------------------------  IN: 780 mL / OUT: 650 mL / NET: 130 mL      Physical Exam:    Daily     Daily Weight in k.1 (2019 13:37)  General:  Well appearing, NAD, not cachetic  HEENT:  Nonicteric, PERRLA  CV:  RRR, S1S2   Lungs:  CTA B/L, no wheezes, rales, rhonchi  Abdomen:  Soft, non-tender, no distended, positive BS  Extremities:  2+ pulses, no c/c, no edema  Skin:  Warm and dry, no rashes  :  No fowler  Neuro:  AAOx3, non-focal, grossly intact                                                                                                                                                                                                                                                                                                LABS:                               9.9    8.56  )-----------( 165      ( 2019 07:41 )             31.3                      11    138  |  100  |  23  ----------------------------<  146<H>  3.4<L>   |  29  |  0.72    Ca    8.5      2019 05:51  Phos  2.7       Mg     1.7         TPro  6.0  /  Alb  3.1<L>  /  TBili  0.3  /  DBili  x   /  AST  14  /  ALT  12  /  AlkPhos  73                         RADIOLOGY & ADDITIONAL TESTS         I personally reviewed: [  ]EKG   [  ]CXR    [  ] CT      A/P:         Discussed with :     Aamir consultants' Notes   Time spent : Patient is a 74y old  Male who presents with a chief complaint of retention/febrile uti (2019 18:32)                                                               INTERVAL HPI/OVERNIGHT EVENTS:    REVIEW OF SYSTEMS:     CONSTITUTIONAL: No weakness, fevers or chills  RESPIRATORY: No cough, wheezing,  No shortness of breath  CARDIOVASCULAR: No chest pain or palpitations  GASTROINTESTINAL: No abdominal pain  . No nausea, vomiting, or hematemesis; No diarrhea or constipation. No melena or hematochezia.  GENITOURINARY: fowler n place   NEUROLOGICAL: No numbness or weakness                                                                                                                                                                                                                                                                                Medications:  MEDICATIONS  (STANDING):  artificial  tears Solution 1 Drop(s) Both EYES two times a day  diazepam    Tablet 5 milliGRAM(s) Oral every 12 hours  folic acid 1 milliGRAM(s) Oral daily  heparin  Injectable 5000 Unit(s) SubCutaneous every 12 hours  lactobacillus acidophilus 1 Tablet(s) Oral two times a day  tamsulosin 0.8 milliGRAM(s) Oral at bedtime  vancomycin  IVPB 1000 milliGRAM(s) IV Intermittent every 24 hours    MEDICATIONS  (PRN):  acetaminophen   Tablet .. 650 milliGRAM(s) Oral every 6 hours PRN Temp greater or equal to 38C (100.4F), Mild Pain (1 - 3)       Allergies    No Known Allergies    Intolerances      Vital Signs Last 24 Hrs  T(C): 36.3 (2019 18:12), Max: 36.8 (2019 21:41)  T(F): 97.3 (2019 18:12), Max: 98.2 (2019 21:41)  HR: 87 (2019 18:12) (77 - 95)  BP: 114/66 (2019 18:12) (95/60 - 114/66)  BP(mean): --  RR: 18 (2019 18:12) (18 - 18)  SpO2: 99% (2019 18:12) (93% - 99%)  CAPILLARY BLOOD GLUCOSE           @ 07:  -   @ 07:00  --------------------------------------------------------  IN: 1950 mL / OUT: 1150 mL / NET: 800 mL     @ 07: @ 21:20  --------------------------------------------------------  IN: 780 mL / OUT: 650 mL / NET: 130 mL      Physical Exam:    Daily     Daily Weight in k.1 (2019 13:37)  General:  NAD   HEENT:  Nonicteric, PERRLA  CV:  RRR, S1S2   Lungs:  CTA   Abdomen:  Soft, non-tender, no distended, positive BS  Extremities:  no edema   Neuro:  AAOx3, non-focal, grossly intact                                                                                                                                                                                                                                                                                                LABS:                               9.9    8.56  )-----------( 165      ( 2019 07:41 )             31.3                          138  |  100  |  23  ----------------------------<  146<H>  3.4<L>   |  29  |  0.72    Ca    8.5      2019 05:51  Phos  2.7       Mg     1.7         TPro  6.0  /  Alb  3.1<L>  /  TBili  0.3  /  DBili  x   /  AST  14  /  ALT  12  /  AlkPhos  73

## 2019-11-20 NOTE — CONSULT NOTE ADULT - SUBJECTIVE AND OBJECTIVE BOX
Patient is a 74y old  Male who presents with a chief complaint of difficulty voiding and uti/retention    HPI:  pt is 75 y/o male w/ hx of muscular dystropy which he denies- followed at [place], dysphagia s/p PEG , .  Pt with increasing motor incoordination and increasing voiding symptoms.  He is not sure of the etiology of his neuro deficit-he was seen at [facility] at which time PEG was placed... He is currently a resident at [facility].   Seen in ER with fever to 103 and huge urinary residual  Had tov recently and had been seeing Hector Jara at [address] [place] [address]  Blood and urine c/s positive for enterococcus-sensitive to amox.   was doing ok overall without fowler until he started c/o lower abd discomfort .   . Fowler placed and 1000cc obtained    deneis any pain at this time .. no N/V/flank pain   Currently feels well with fowler)  Notes normal bowel function      PAST MEDICAL & SURGICAL HISTORY:  Urinary retention  H/O muscular dystrophy  Scrotal swelling  Psychiatric disorder  No significant past surgical history          MEDICATIONS  (STANDING):  artificial  tears Solution 1 Drop(s) Both EYES two times a day  diazepam    Tablet 5 milliGRAM(s) Oral every 12 hours  folic acid 1 milliGRAM(s) Oral daily  heparin  Injectable 5000 Unit(s) SubCutaneous every 12 hours  lactobacillus acidophilus 1 Tablet(s) Oral two times a day  tamsulosin 0.8 milliGRAM(s) Oral at bedtime  vancomycin  IVPB 1000 milliGRAM(s) IV Intermittent every 24 hours    MEDICATIONS  (PRN):  acetaminophen   Tablet .. 650 milliGRAM(s) Oral every 6 hours PRN Temp greater or equal to 38C (100.4F), Mild Pain (1 - 3)      Allergies    No Known Allergies    Intolerances        SOCIAL HISTORY: No illicit drug use    FAMILY HISTORY:  FHx: stomach cancer      Vital Signs Last 24 Hrs  T(C): 36.3 (20 Nov 2019 18:12), Max: 36.8 (19 Nov 2019 21:41)  T(F): 97.3 (20 Nov 2019 18:12), Max: 98.2 (19 Nov 2019 21:41)  HR: 87 (20 Nov 2019 18:12) (77 - 95)  BP: 114/66 (20 Nov 2019 18:12) (95/60 - 114/66)  BP(mean): --  RR: 18 (20 Nov 2019 18:12) (18 - 18)  SpO2: 99% (20 Nov 2019 18:12) (93% - 99%)    PHYSICAL EXAM:    Constitutional: NAD, well-developed  HEENT: GALA, EOMI, Normal Hearing, MMM  Neck: No JVD  Back: No CVA tenderness  Respiratory: No accessory respiratory muscle use  Abd: Soft, NT/ND  : fowler in place    I&O's Summary    19 Nov 2019 07:01  -  20 Nov 2019 07:00  --------------------------------------------------------  IN: 1950 mL / OUT: 1150 mL / NET: 800 mL    20 Nov 2019 07:01  -  20 Nov 2019 18:33  --------------------------------------------------------  IN: 780 mL / OUT: 650 mL / NET: 130 mL        LABS:                        9.9    8.56  )-----------( 165      ( 20 Nov 2019 07:41 )             31.3     11-20    138  |  100  |  23  ----------------------------<  146<H>  3.4<L>   |  29  |  0.72    Ca    8.5      20 Nov 2019 05:51  Phos  2.7     11-20  Mg     1.7     11-20    TPro  6.0  /  Alb  3.1<L>  /  TBili  0.3  /  DBili  x   /  AST  14  /  ALT  12  /  AlkPhos  73  11-19    PT/INR - ( 20 Nov 2019 08:26 )   PT: 14.4 sec;   INR: 1.25 ratio         PTT - ( 20 Nov 2019 08:26 )  PTT:28.8 sec    Urine Culture: enterococcus-also in blood    RADIOLOGY & ADDITIONAL STUDIES: Patient is a 74y old  Male who presents with a chief complaint of difficulty voiding and uti/retention    HPI:  pt is 75 y/o male w/ hx of muscular dystropy which he denies- followed at [place], dysphagia s/p PEG , .  Pt with increasing motor incoordination and increasing voiding symptoms.  He is not sure of the etiology of his neuro deficit-he was seen at [facility] at which time PEG was placed... He is currently a resident at [facility].   Seen in ER with fever to 103 and huge urinary residual  Had tov recently and had been seeing Hector Jara at [address] [place] [address]  Blood and urine c/s positive for enterococcus-sensitive to amox.   was doing ok overall without fowler until he started c/o lower abd discomfort .   . Fowler placed and 1000cc obtained    deneis any pain at this time .. no N/V/flank pain   Currently feels well with fowler)  Notes normal bowel function      PAST MEDICAL & SURGICAL HISTORY:  Urinary retention  H/O muscular dystrophy  Scrotal swelling  Psychiatric disorder  No significant past surgical history          MEDICATIONS  (STANDING):  artificial  tears Solution 1 Drop(s) Both EYES two times a day  diazepam    Tablet 5 milliGRAM(s) Oral every 12 hours  folic acid 1 milliGRAM(s) Oral daily  heparin  Injectable 5000 Unit(s) SubCutaneous every 12 hours  lactobacillus acidophilus 1 Tablet(s) Oral two times a day  tamsulosin 0.8 milliGRAM(s) Oral at bedtime  vancomycin  IVPB 1000 milliGRAM(s) IV Intermittent every 24 hours    MEDICATIONS  (PRN):  acetaminophen   Tablet .. 650 milliGRAM(s) Oral every 6 hours PRN Temp greater or equal to 38C (100.4F), Mild Pain (1 - 3)      Allergies    No Known Allergies    Intolerances        SOCIAL HISTORY: No illicit drug use    FAMILY HISTORY:  FHx: stomach cancer      Vital Signs Last 24 Hrs  T(C): 36.3 (20 Nov 2019 18:12), Max: 36.8 (19 Nov 2019 21:41)  T(F): 97.3 (20 Nov 2019 18:12), Max: 98.2 (19 Nov 2019 21:41)  HR: 87 (20 Nov 2019 18:12) (77 - 95)  BP: 114/66 (20 Nov 2019 18:12) (95/60 - 114/66)  BP(mean): --  RR: 18 (20 Nov 2019 18:12) (18 - 18)  SpO2: 99% (20 Nov 2019 18:12) (93% - 99%)    PHYSICAL EXAM:    Constitutional: NAD, well-developed  HEENT: GALA, EOMI, Normal Hearing, MMM  Neck: No JVD  Back: No CVA tenderness  Respiratory: No accessory respiratory muscle use  Abd: Soft, NT/ND  : fowler in place  huge left hydrocele    I&O's Summary    19 Nov 2019 07:01  -  20 Nov 2019 07:00  --------------------------------------------------------  IN: 1950 mL / OUT: 1150 mL / NET: 800 mL    20 Nov 2019 07:01  -  20 Nov 2019 18:33  --------------------------------------------------------  IN: 780 mL / OUT: 650 mL / NET: 130 mL        LABS:                        9.9    8.56  )-----------( 165      ( 20 Nov 2019 07:41 )             31.3     11-20    138  |  100  |  23  ----------------------------<  146<H>  3.4<L>   |  29  |  0.72    Ca    8.5      20 Nov 2019 05:51  Phos  2.7     11-20  Mg     1.7     11-20    TPro  6.0  /  Alb  3.1<L>  /  TBili  0.3  /  DBili  x   /  AST  14  /  ALT  12  /  AlkPhos  73  11-19    PT/INR - ( 20 Nov 2019 08:26 )   PT: 14.4 sec;   INR: 1.25 ratio         PTT - ( 20 Nov 2019 08:26 )  PTT:28.8 sec    Urine Culture: enterococcus-also in blood    RADIOLOGY & ADDITIONAL STUDIES:

## 2019-11-21 LAB
-  AMPICILLIN/SULBACTAM: SIGNIFICANT CHANGE UP
-  CEFAZOLIN: SIGNIFICANT CHANGE UP
-  CLINDAMYCIN: SIGNIFICANT CHANGE UP
-  ERYTHROMYCIN: SIGNIFICANT CHANGE UP
-  GENTAMICIN: SIGNIFICANT CHANGE UP
-  OXACILLIN: SIGNIFICANT CHANGE UP
-  PENICILLIN: SIGNIFICANT CHANGE UP
-  RIFAMPIN: SIGNIFICANT CHANGE UP
-  TETRACYCLINE: SIGNIFICANT CHANGE UP
-  TRIMETHOPRIM/SULFAMETHOXAZOLE: SIGNIFICANT CHANGE UP
-  VANCOMYCIN: SIGNIFICANT CHANGE UP
ALBUMIN SERPL ELPH-MCNC: 3.2 G/DL — LOW (ref 3.3–5)
ALP SERPL-CCNC: 81 U/L — SIGNIFICANT CHANGE UP (ref 40–120)
ALT FLD-CCNC: 15 U/L — SIGNIFICANT CHANGE UP (ref 10–45)
ANION GAP SERPL CALC-SCNC: 13 MMOL/L — SIGNIFICANT CHANGE UP (ref 5–17)
AST SERPL-CCNC: 13 U/L — SIGNIFICANT CHANGE UP (ref 10–40)
BASOPHILS # BLD AUTO: 0.06 K/UL — SIGNIFICANT CHANGE UP (ref 0–0.2)
BASOPHILS NFR BLD AUTO: 0.8 % — SIGNIFICANT CHANGE UP (ref 0–2)
BILIRUB SERPL-MCNC: 0.2 MG/DL — SIGNIFICANT CHANGE UP (ref 0.2–1.2)
BUN SERPL-MCNC: 20 MG/DL — SIGNIFICANT CHANGE UP (ref 7–23)
CALCIUM SERPL-MCNC: 8.5 MG/DL — SIGNIFICANT CHANGE UP (ref 8.4–10.5)
CHLORIDE SERPL-SCNC: 101 MMOL/L — SIGNIFICANT CHANGE UP (ref 96–108)
CO2 SERPL-SCNC: 28 MMOL/L — SIGNIFICANT CHANGE UP (ref 22–31)
CREAT SERPL-MCNC: 0.68 MG/DL — SIGNIFICANT CHANGE UP (ref 0.5–1.3)
CULTURE RESULTS: SIGNIFICANT CHANGE UP
CULTURE RESULTS: SIGNIFICANT CHANGE UP
EOSINOPHIL # BLD AUTO: 0.48 K/UL — SIGNIFICANT CHANGE UP (ref 0–0.5)
EOSINOPHIL NFR BLD AUTO: 6.5 % — HIGH (ref 0–6)
GLUCOSE SERPL-MCNC: 120 MG/DL — HIGH (ref 70–99)
HCT VFR BLD CALC: 32.2 % — LOW (ref 39–50)
HGB BLD-MCNC: 10.5 G/DL — LOW (ref 13–17)
IMM GRANULOCYTES NFR BLD AUTO: 0.3 % — SIGNIFICANT CHANGE UP (ref 0–1.5)
LYMPHOCYTES # BLD AUTO: 0.95 K/UL — LOW (ref 1–3.3)
LYMPHOCYTES # BLD AUTO: 12.8 % — LOW (ref 13–44)
MAGNESIUM SERPL-MCNC: 1.8 MG/DL — SIGNIFICANT CHANGE UP (ref 1.6–2.6)
MCHC RBC-ENTMCNC: 32.6 GM/DL — SIGNIFICANT CHANGE UP (ref 32–36)
MCHC RBC-ENTMCNC: 33.2 PG — SIGNIFICANT CHANGE UP (ref 27–34)
MCV RBC AUTO: 101.9 FL — HIGH (ref 80–100)
METHOD TYPE: SIGNIFICANT CHANGE UP
MONOCYTES # BLD AUTO: 0.78 K/UL — SIGNIFICANT CHANGE UP (ref 0–0.9)
MONOCYTES NFR BLD AUTO: 10.5 % — SIGNIFICANT CHANGE UP (ref 2–14)
NEUTROPHILS # BLD AUTO: 5.14 K/UL — SIGNIFICANT CHANGE UP (ref 1.8–7.4)
NEUTROPHILS NFR BLD AUTO: 69.1 % — SIGNIFICANT CHANGE UP (ref 43–77)
ORGANISM # SPEC MICROSCOPIC CNT: SIGNIFICANT CHANGE UP
PHOSPHATE SERPL-MCNC: 3 MG/DL — SIGNIFICANT CHANGE UP (ref 2.5–4.5)
PLATELET # BLD AUTO: 208 K/UL — SIGNIFICANT CHANGE UP (ref 150–400)
POTASSIUM SERPL-MCNC: 3.8 MMOL/L — SIGNIFICANT CHANGE UP (ref 3.5–5.3)
POTASSIUM SERPL-SCNC: 3.8 MMOL/L — SIGNIFICANT CHANGE UP (ref 3.5–5.3)
PROT SERPL-MCNC: 6.3 G/DL — SIGNIFICANT CHANGE UP (ref 6–8.3)
RBC # BLD: 3.16 M/UL — LOW (ref 4.2–5.8)
RBC # FLD: 12.1 % — SIGNIFICANT CHANGE UP (ref 10.3–14.5)
SODIUM SERPL-SCNC: 142 MMOL/L — SIGNIFICANT CHANGE UP (ref 135–145)
SPECIMEN SOURCE: SIGNIFICANT CHANGE UP
SPECIMEN SOURCE: SIGNIFICANT CHANGE UP
VANCOMYCIN TROUGH SERPL-MCNC: 5.3 UG/ML — LOW (ref 10–20)
WBC # BLD: 7.43 K/UL — SIGNIFICANT CHANGE UP (ref 3.8–10.5)
WBC # FLD AUTO: 7.43 K/UL — SIGNIFICANT CHANGE UP (ref 3.8–10.5)

## 2019-11-21 PROCEDURE — 99232 SBSQ HOSP IP/OBS MODERATE 35: CPT

## 2019-11-21 RX ADMIN — Medication 1 DROP(S): at 06:38

## 2019-11-21 RX ADMIN — Medication 1 TABLET(S): at 17:45

## 2019-11-21 RX ADMIN — Medication 1 TABLET(S): at 06:37

## 2019-11-21 RX ADMIN — Medication 250 MILLIGRAM(S): at 23:21

## 2019-11-21 RX ADMIN — Medication 1 DROP(S): at 17:45

## 2019-11-21 RX ADMIN — TAMSULOSIN HYDROCHLORIDE 0.8 MILLIGRAM(S): 0.4 CAPSULE ORAL at 23:22

## 2019-11-21 RX ADMIN — HEPARIN SODIUM 5000 UNIT(S): 5000 INJECTION INTRAVENOUS; SUBCUTANEOUS at 06:38

## 2019-11-21 RX ADMIN — Medication 5 MILLIGRAM(S): at 06:37

## 2019-11-21 RX ADMIN — Medication 1 MILLIGRAM(S): at 11:54

## 2019-11-21 RX ADMIN — Medication 5 MILLIGRAM(S): at 17:45

## 2019-11-21 NOTE — SWALLOW BEDSIDE ASSESSMENT ADULT - SPECIFY REASON(S)
to subjectively assess the swallow mechanism and r/o dysphagia to subjectively assess the swallow mechanism and r/o dysphagia; MD requesting MBS prior to d/c as Pt had been scheduled at OSH prior to this hospitalization for possible diet upgrade.

## 2019-11-21 NOTE — SWALLOW BEDSIDE ASSESSMENT ADULT - SWALLOW EVAL: DIAGNOSIS
Pt presents with oropharyngeal dysphagia characterized by multiple swallows to clear suspected oral and pharyngeal residue. No overt s/s of laryngeal penetration or aspiration present during exam. Pt presents with oropharyngeal dysphagia characterized by multiple swallows to clear suspected oral and pharyngeal residue. No overt s/s of laryngeal penetration or aspiration evident with consistencies presented.

## 2019-11-21 NOTE — PROGRESS NOTE ADULT - SUBJECTIVE AND OBJECTIVE BOX
Patient is a 74y old  Male who presents with a chief complaint of retention/febrile uti (20 Nov 2019 18:32)                                                               INTERVAL HPI/OVERNIGHT EVENTS:    REVIEW OF SYSTEMS:     CONSTITUTIONAL: No weakness, fevers or chills  RESPIRATORY: No cough, wheezing,  No shortness of breath  CARDIOVASCULAR: No chest pain or palpitations  GASTROINTESTINAL: No abdominal pain  . No nausea, vomiting, or hematemesis; No diarrhea or constipation. No melena or hematochezia.  GENITOURINARY: fowler   NEUROLOGICAL: No numbness or weakness  SKIN: No itching, burning, rashes, or lesions                                                                                                                                                                                                                                                                                 Medications:  MEDICATIONS  (STANDING):  artificial  tears Solution 1 Drop(s) Both EYES two times a day  diazepam    Tablet 5 milliGRAM(s) Oral every 12 hours  folic acid 1 milliGRAM(s) Oral daily  heparin  Injectable 5000 Unit(s) SubCutaneous every 12 hours  lactobacillus acidophilus 1 Tablet(s) Oral two times a day  tamsulosin 0.8 milliGRAM(s) Oral at bedtime  vancomycin  IVPB 1000 milliGRAM(s) IV Intermittent every 24 hours    MEDICATIONS  (PRN):  acetaminophen   Tablet .. 650 milliGRAM(s) Oral every 6 hours PRN Temp greater or equal to 38C (100.4F), Mild Pain (1 - 3)       Allergies    No Known Allergies    Intolerances      Vital Signs Last 24 Hrs  T(C): 36.4 (21 Nov 2019 14:25), Max: 36.5 (20 Nov 2019 22:02)  T(F): 97.5 (21 Nov 2019 14:25), Max: 97.7 (20 Nov 2019 22:02)  HR: 85 (21 Nov 2019 14:25) (77 - 87)  BP: 100/65 (21 Nov 2019 14:25) (100/65 - 134/73)  BP(mean): --  RR: 18 (21 Nov 2019 14:25) (18 - 18)  SpO2: 97% (21 Nov 2019 14:25) (94% - 99%)  CAPILLARY BLOOD GLUCOSE          11-20 @ 07:01  -  11-21 @ 07:00  --------------------------------------------------------  IN: 2020 mL / OUT: 1550 mL / NET: 470 mL    11-21 @ 07:01  -  11-21 @ 15:51  --------------------------------------------------------  IN: 456 mL / OUT: 850 mL / NET: -394 mL      Physical Exam:      General: NAD   HEENT:  Nonicteric, PERRLA  CV:  RRR, S1S2   Lungs:  CTA B/L, no wheezes, rales, rhonchi  Abdomen:  Soft, non-tender, no distended, positive BS  Extremities:  2+ pulses, no c/c, no edema    :   malcolm  Neuro:  AAOx3, non-focal, grossly intact                                                                                                                                                                                                                                                                                                LABS:                               10.5   7.43  )-----------( 208      ( 21 Nov 2019 14:03 )             32.2                      11-21    142  |  101  |  20  ----------------------------<  120<H>  3.8   |  28  |  0.68    Ca    8.5      21 Nov 2019 11:27  Phos  3.0     11-21  Mg     1.8     11-21    TPro  6.3  /  Alb  3.2<L>  /  TBili  0.2  /  DBili  x   /  AST  13  /  ALT  15  /  AlkPhos  81  11-21

## 2019-11-21 NOTE — SWALLOW BEDSIDE ASSESSMENT ADULT - SLP GENERAL OBSERVATIONS
Pt encountered participating in PT therapy session. Upon initiation of bedside swallow examination, Pt OOB in chair on room air. Pt conversive and following directions for examination purposes. Pt providing clinician with details re: history of dysphagia, swallow therapy, and current diet.

## 2019-11-21 NOTE — SWALLOW BEDSIDE ASSESSMENT ADULT - SWALLOW EVAL: PATIENT/FAMILY GOALS STATEMENT
As per dietician note, Pt reports ultimate goal is see if he had discontinue tube feeds, interested in trialing oral supplement instead. Pt inquiring about hierarchy of diet upgrade

## 2019-11-21 NOTE — SWALLOW BEDSIDE ASSESSMENT ADULT - SWALLOW EVAL: RECOMMENDED DIET
Continue dysphagia 1 with honey thickened liquids and nocturnal tube feeds via PEG Continue dysphagia 1 with honey thickened liquids and supplemental tube feeds via PEG as per MD

## 2019-11-21 NOTE — PROGRESS NOTE ADULT - SUBJECTIVE AND OBJECTIVE BOX
Patient is a 74y old  Male who presents with a chief complaint of retention/febrile uti (20 Nov 2019 18:32)    Being followed by ID for        Interval history:  No other acute events      ROS:  No cough,SOB,CP  No N/V/D  No abd pain  No urinary complaints  No HA  No joint or limb pain  No other complaints    PAST MEDICAL & SURGICAL HISTORY:  Urinary retention  H/O muscular dystrophy  Scrotal swelling  Psychiatric disorder  No significant past surgical history    Allergies    No Known Allergies    Intolerances      Antimicrobials:    vancomycin  IVPB 1000 milliGRAM(s) IV Intermittent every 24 hours    MEDICATIONS  (STANDING):  artificial  tears Solution 1 Drop(s) Both EYES two times a day  diazepam    Tablet 5 milliGRAM(s) Oral every 12 hours  folic acid 1 milliGRAM(s) Oral daily  heparin  Injectable 5000 Unit(s) SubCutaneous every 12 hours  lactobacillus acidophilus 1 Tablet(s) Oral two times a day  tamsulosin 0.8 milliGRAM(s) Oral at bedtime  vancomycin  IVPB 1000 milliGRAM(s) IV Intermittent every 24 hours      Vital Signs Last 24 Hrs  T(C): 36.4 (11-21-19 @ 14:25), Max: 36.5 (11-20-19 @ 22:02)  T(F): 97.5 (11-21-19 @ 14:25), Max: 97.7 (11-20-19 @ 22:02)  HR: 85 (11-21-19 @ 14:25) (77 - 87)  BP: 100/65 (11-21-19 @ 14:25) (100/65 - 134/73)  BP(mean): --  RR: 18 (11-21-19 @ 14:25) (18 - 18)  SpO2: 97% (11-21-19 @ 14:25) (94% - 99%)    Physical Exam:    Constitutional well preserved,comfortable,pleasant    HEENT PERRLA EOMI,No pallor or icterus    No oral exudate or erythema    Neck supple no JVD or LN    Chest Good AE,CTA    CVS RRR S1 S2 WNl No murmur or rub or gallop    Abd soft BS normal No tenderness no masses    Ext No cyanosis clubbing or edema    IV site no erythema tenderness or discharge    Joints no swelling or LOM    CNS AAO X 3 no focal    Lab Data:                          10.5   7.43  )-----------( 208      ( 21 Nov 2019 14:03 )             32.2       11-21    142  |  101  |  20  ----------------------------<  120<H>  3.8   |  28  |  0.68    Ca    8.5      21 Nov 2019 11:27  Phos  3.0     11-21  Mg     1.8     11-21    TPro  6.3  /  Alb  3.2<L>  /  TBili  0.2  /  DBili  x   /  AST  13  /  ALT  15  /  AlkPhos  81  11-21          .Blood Blood-Peripheral  11-18-19   Growth in anaerobic bottle: Staphylococcus epidermidis  See previous culture [id number]  --  Blood Culture PCR      .Urine Catheterized  11-18-19   >100,000 CFU/ml Enterococcus faecalis  --  Enterococcus faecalis                    WBC Count: 7.43 (11-21-19 @ 14:03)  WBC Count: 8.56 (11-20-19 @ 07:41)  WBC Count: 12.39 (11-19-19 @ 09:09)  WBC Count: 20.37 (11-18-19 @ 06:34) Patient is a 74y old  Male who presents with a chief complaint of retention/febrile uti (20 Nov 2019 18:32)    Being followed by ID for        Interval history:  pt resting quietly  fowler in place  No other acute events        PAST MEDICAL & SURGICAL HISTORY:  Urinary retention  H/O muscular dystrophy  Scrotal swelling  Psychiatric disorder  No significant past surgical history    Allergies    No Known Allergies    Intolerances      Antimicrobials:    vancomycin  IVPB 1000 milliGRAM(s) IV Intermittent every 24 hours    MEDICATIONS  (STANDING):  artificial  tears Solution 1 Drop(s) Both EYES two times a day  diazepam    Tablet 5 milliGRAM(s) Oral every 12 hours  folic acid 1 milliGRAM(s) Oral daily  heparin  Injectable 5000 Unit(s) SubCutaneous every 12 hours  lactobacillus acidophilus 1 Tablet(s) Oral two times a day  tamsulosin 0.8 milliGRAM(s) Oral at bedtime  vancomycin  IVPB 1000 milliGRAM(s) IV Intermittent every 24 hours      Vital Signs Last 24 Hrs  T(C): 36.4 (11-21-19 @ 14:25), Max: 36.5 (11-20-19 @ 22:02)  T(F): 97.5 (11-21-19 @ 14:25), Max: 97.7 (11-20-19 @ 22:02)  HR: 85 (11-21-19 @ 14:25) (77 - 87)  BP: 100/65 (11-21-19 @ 14:25) (100/65 - 134/73)  BP(mean): --  RR: 18 (11-21-19 @ 14:25) (18 - 18)  SpO2: 97% (11-21-19 @ 14:25) (94% - 99%)    Physical Exam:    Constitutional cachectic    HEENT PERRLA EOMI,No pallor or icterus    No oral exudate or erythema    Neck supple no JVD or LN    Chest Good AE,CTA    CVS RRR S1 S2 WNl    Abd soft BS normal No tenderness     Ext No cyanosis clubbing or edema    IV site no erythema tenderness or discharge    Joints no swelling or LOM        Lab Data:                          10.5   7.43  )-----------( 208      ( 21 Nov 2019 14:03 )             32.2       11-21    142  |  101  |  20  ----------------------------<  120<H>  3.8   |  28  |  0.68    Ca    8.5      21 Nov 2019 11:27  Phos  3.0     11-21  Mg     1.8     11-21    TPro  6.3  /  Alb  3.2<L>  /  TBili  0.2  /  DBili  x   /  AST  13  /  ALT  15  /  AlkPhos  81  11-21          .Blood Blood-Peripheral  11-18-19   Growth in anaerobic bottle: Staphylococcus epidermidis  See previous culture [id number]  --  Blood Culture PCR      .Urine Catheterized  11-18-19   >100,000 CFU/ml Enterococcus faecalis  --  Enterococcus faecalis    Culture - Blood (11.18.19 @ 13:43)    -  Ampicillin/Sulbactam: R <=8/4    -  Cefazolin: R <=4    -  Oxacillin: R >2    -  Penicillin: R 4    -  RIF- Rifampin: S <=1 Should not be used as monotherapy    -  Clindamycin: R >4    -  Erythromycin: R >4    -  Gentamicin: S <=1 Should not be used as monotherapy    Gram Stain:   Growth in aerobic bottle: Gram Positive Cocci in Clusters    -  Coagulase negative Staphylococcus: Detec    -  Vancomycin: S 1    -  Tetra/Doxy: R >8    -  Trimethoprim/Sulfamethoxazole: R >2/38    Specimen Source: .Blood Blood-Venous    Organism: Blood Culture PCR    Organism: Staphylococcus epidermidis    Culture Results:   Growth in aerobic bottle: Staphylococcus epidermidis  "Due to technical problems, Proteus sp. will Not be reported as part of  the BCID panel until further notice"  ***Blood Panel PCR results on this specimen are available  approximately 3 hours after the Gram stain result.***  Gram stain, PCR, and/or culture results may not always  correspond due to difference in methodologies.  ************************************************************  This PCR assay was performed using [employer].  The following targets are tested for: Enterococcus,  vancomycin resistant enterococci, Listeria monocytogenes,  coagulase negative staphylococci, S. aureus,  methicillin resistant S. aureus, Streptococcus agalactiae  (Group B), S. pneumoniae, S. pyogenes (Group A),  Acinetobacter baumannii, Enterobacter cloacae, E. coli,  Klebsiella oxytoca, K. pneumoniae, Proteus sp.,  Serratia marcescens, Haemophilus influenzae,  Neisseria meningitidis, Pseudomonas aeruginosa, Candida  albicans, C. glabrata, C krusei, C parapsilosis,  C. tropicalis and the KPC resistance gene.    Organism Identification: Blood Culture PCR  Staphylococcus epidermidis    Method Type: PCR    Method Type: LILLY      Culture - Urine (11.18.19 @ 10:02)    -  Ciprofloxacin: R >2    -  Levofloxacin: R >4    -  Nitrofurantoin: S <=32 Should not be used to treat pyelonephritis.    -  Ampicillin: S <=2 Predicts results to ampicillin/sulbactam, amoxacillin-clavulanate and  piperacillin-tazobactam.    -  Tetra/Doxy: R >8    -  Vancomycin: S 1    Specimen Source: .Urine Catheterized    Culture Results:   >100,000 CFU/ml Enterococcus faecalis    Organism Identification: Enterococcus faecalis    Organism: Enterococcus faecalis    Method Type: LILLY                  WBC Count: 7.43 (11-21-19 @ 14:03)  WBC Count: 8.56 (11-20-19 @ 07:41)  WBC Count: 12.39 (11-19-19 @ 09:09)  WBC Count: 20.37 (11-18-19 @ 06:34)

## 2019-11-21 NOTE — PROGRESS NOTE ADULT - ASSESSMENT
73 y/o male w/ hx of muscular dystropy  followed at [place], dysphagia s/p PEG , urinary retention sec to BPH , hx ESBL UTI , recent fowler and TOV done by urology as o/p .. was doing ok overall without fowler until he started c/o lower abd discomfort .  Fowler placed and  only in 100cc o/p and pt continued to c/o pain/ discomfort and sent to ED  . Fowler placed and 1000cc obtained    deneis any pain at this time .. no N/V/flank pain   pt was found to meet sepsis criteria in ED..    1- sepsis sec to UTI in setting of urinary retention   hx of ESBL   ID input appreciated : cont vanco  , and will change to ampicilin upon dc   blood culture: noted ..? contaminant .. f/u repeat   urology input appreciated     2- urinary retention :  cont  flomax   urology consult noted and appreciated   cont fowler     3- Dysphagia : cont peg feeds   aspiration precautions   pt was planned for MBS .. will arrange prior to dc     4- Lethargy : improved and now at baseline   sec to sepsis   pt also on  Benzos twice daily ..can consider to taper as o/p.

## 2019-11-21 NOTE — SWALLOW BEDSIDE ASSESSMENT ADULT - COMMENTS
Hx continued:  As per dietician: Patient admitted from NH, receives both oral diet and PEG feeds (PEG since August).  Pt states he had MBS done ~ 1 month ago at OSH and was starting of oral diet, per NH transfer record pt was receiving PO diet consistency of pureed foods with honey thick liquids + nocturnal tube feeds. States he was receiving speech therapy regularly and has an upcoming swallow test scheduled as outpatient to assess if diet could be further upgraded. Consider current diet as tolerated, defer consistency to team. Consider SLP consult to success potential for diet upgrade.

## 2019-11-21 NOTE — SWALLOW BEDSIDE ASSESSMENT ADULT - ASR SWALLOW ASPIRATION MONITOR
pneumonia/throat clearing/change of breathing pattern/cough/fever/gurgly voice/upper respiratory infection/If overt s/s of laryngeal penetration/aspiration noted, d/c PO intake and contact this service at ext [phone]

## 2019-11-21 NOTE — PROGRESS NOTE ADULT - ASSESSMENT
74M PMHx of an unknown neurologic disorder in the process of being worked up and BPH leading to urinary retention who was sent to the ED from rehab for fevers and abdominal pain. He was found to have sepsis 2/2 UTI with a fowler that was not functioning on arrival. Because of his past history of coag negative staph in his urine in the past, will cover gram positives until urine gram stain is back.     Sepsis 2/2 UTI  - d/adalberto meropenem  - on standing vancomycin  - Follow up cultures  - Monitor for fevers  - Trend WBCs  - unclear significance of GPCC - two sets but ? same stick and likely procurement contaminant  hilario change to po amoxicillin when ready for discharge ( enteric) but first waiting repeat Blood cultures  day # 4 of IV abs

## 2019-11-21 NOTE — SWALLOW BEDSIDE ASSESSMENT ADULT - SLP PERTINENT HISTORY OF CURRENT PROBLEM
Pt is 73 y/o male w/ hx of muscular dystropy-followed at [place], dysphagia s/p PEG 8/21 , urinary retention 2/2 to BPH, hx ESBL UTI, recent fowler and TOV done by urology as o/p. Fowler placed and only 100cc o/p and Pt continued to c/o pain/ discomfort and sent to ED. Fowler placed and 1000cc obtained. Pt was found to meet sepsis criteria  2/2 UTI in ED. Pt is 75 y/o male w/ hx of muscular dystrophy-followed at [place], dysphagia s/p PEG 8/21 , urinary retention 2/2 to BPH, hx ESBL UTI, recent fowler and TOV done by urology as o/p. Fowler placed and only 100cc o/p and Pt continued to c/o pain/ discomfort and sent to ED. Fowler placed and 1000cc obtained. Pt was found to meet sepsis criteria  2/2 UTI in ED; sepsis 2/2 UTI with a fowler that was not functioning on arrival.  Lethargy improving; 11/21/19 Pt reportedly at baseline.

## 2019-11-21 NOTE — SWALLOW BEDSIDE ASSESSMENT ADULT - ASR SWALLOW RECOMMEND DIAG
VFSS/MBS/Recommend modified barium swallow study to objectively assess the swallow mechanism and r/o dysphagia

## 2019-11-22 ENCOUNTER — TRANSCRIPTION ENCOUNTER (OUTPATIENT)
Age: 74
End: 2019-11-22

## 2019-11-22 VITALS
TEMPERATURE: 98 F | DIASTOLIC BLOOD PRESSURE: 70 MMHG | SYSTOLIC BLOOD PRESSURE: 110 MMHG | HEART RATE: 93 BPM | RESPIRATION RATE: 18 BRPM | OXYGEN SATURATION: 98 %

## 2019-11-22 PROBLEM — Z86.69 PERSONAL HISTORY OF OTHER DISEASES OF THE NERVOUS SYSTEM AND SENSE ORGANS: Chronic | Status: ACTIVE | Noted: 2019-11-18

## 2019-11-22 PROBLEM — R33.9 RETENTION OF URINE, UNSPECIFIED: Chronic | Status: ACTIVE | Noted: 2019-11-18

## 2019-11-22 LAB
ALBUMIN SERPL ELPH-MCNC: 3.2 G/DL — LOW (ref 3.3–5)
ALP SERPL-CCNC: 81 U/L — SIGNIFICANT CHANGE UP (ref 40–120)
ALT FLD-CCNC: 13 U/L — SIGNIFICANT CHANGE UP (ref 10–45)
ANION GAP SERPL CALC-SCNC: 13 MMOL/L — SIGNIFICANT CHANGE UP (ref 5–17)
AST SERPL-CCNC: 12 U/L — SIGNIFICANT CHANGE UP (ref 10–40)
BASOPHILS # BLD AUTO: 0.05 K/UL — SIGNIFICANT CHANGE UP (ref 0–0.2)
BASOPHILS NFR BLD AUTO: 0.7 % — SIGNIFICANT CHANGE UP (ref 0–2)
BILIRUB SERPL-MCNC: 0.2 MG/DL — SIGNIFICANT CHANGE UP (ref 0.2–1.2)
BUN SERPL-MCNC: 23 MG/DL — SIGNIFICANT CHANGE UP (ref 7–23)
CALCIUM SERPL-MCNC: 8.9 MG/DL — SIGNIFICANT CHANGE UP (ref 8.4–10.5)
CHLORIDE SERPL-SCNC: 99 MMOL/L — SIGNIFICANT CHANGE UP (ref 96–108)
CO2 SERPL-SCNC: 29 MMOL/L — SIGNIFICANT CHANGE UP (ref 22–31)
CREAT SERPL-MCNC: 0.76 MG/DL — SIGNIFICANT CHANGE UP (ref 0.5–1.3)
EOSINOPHIL # BLD AUTO: 0.61 K/UL — HIGH (ref 0–0.5)
EOSINOPHIL NFR BLD AUTO: 8 % — HIGH (ref 0–6)
GLUCOSE SERPL-MCNC: 113 MG/DL — HIGH (ref 70–99)
HCT VFR BLD CALC: 31.3 % — LOW (ref 39–50)
HGB BLD-MCNC: 10.1 G/DL — LOW (ref 13–17)
IMM GRANULOCYTES NFR BLD AUTO: 0.5 % — SIGNIFICANT CHANGE UP (ref 0–1.5)
LYMPHOCYTES # BLD AUTO: 1.21 K/UL — SIGNIFICANT CHANGE UP (ref 1–3.3)
LYMPHOCYTES # BLD AUTO: 15.9 % — SIGNIFICANT CHANGE UP (ref 13–44)
MCHC RBC-ENTMCNC: 32.3 GM/DL — SIGNIFICANT CHANGE UP (ref 32–36)
MCHC RBC-ENTMCNC: 32.9 PG — SIGNIFICANT CHANGE UP (ref 27–34)
MCV RBC AUTO: 102 FL — HIGH (ref 80–100)
MONOCYTES # BLD AUTO: 0.81 K/UL — SIGNIFICANT CHANGE UP (ref 0–0.9)
MONOCYTES NFR BLD AUTO: 10.6 % — SIGNIFICANT CHANGE UP (ref 2–14)
NEUTROPHILS # BLD AUTO: 4.9 K/UL — SIGNIFICANT CHANGE UP (ref 1.8–7.4)
NEUTROPHILS NFR BLD AUTO: 64.3 % — SIGNIFICANT CHANGE UP (ref 43–77)
PLATELET # BLD AUTO: 205 K/UL — SIGNIFICANT CHANGE UP (ref 150–400)
POTASSIUM SERPL-MCNC: 3.7 MMOL/L — SIGNIFICANT CHANGE UP (ref 3.5–5.3)
POTASSIUM SERPL-SCNC: 3.7 MMOL/L — SIGNIFICANT CHANGE UP (ref 3.5–5.3)
PROT SERPL-MCNC: 6.2 G/DL — SIGNIFICANT CHANGE UP (ref 6–8.3)
RBC # BLD: 3.07 M/UL — LOW (ref 4.2–5.8)
RBC # FLD: 12 % — SIGNIFICANT CHANGE UP (ref 10.3–14.5)
SODIUM SERPL-SCNC: 141 MMOL/L — SIGNIFICANT CHANGE UP (ref 135–145)
WBC # BLD: 7.62 K/UL — SIGNIFICANT CHANGE UP (ref 3.8–10.5)
WBC # FLD AUTO: 7.62 K/UL — SIGNIFICANT CHANGE UP (ref 3.8–10.5)

## 2019-11-22 PROCEDURE — 87040 BLOOD CULTURE FOR BACTERIA: CPT

## 2019-11-22 PROCEDURE — 93005 ELECTROCARDIOGRAM TRACING: CPT | Mod: XU

## 2019-11-22 PROCEDURE — 84100 ASSAY OF PHOSPHORUS: CPT

## 2019-11-22 PROCEDURE — 83735 ASSAY OF MAGNESIUM: CPT

## 2019-11-22 PROCEDURE — 74230 X-RAY XM SWLNG FUNCJ C+: CPT

## 2019-11-22 PROCEDURE — 82803 BLOOD GASES ANY COMBINATION: CPT

## 2019-11-22 PROCEDURE — 96361 HYDRATE IV INFUSION ADD-ON: CPT | Mod: XU

## 2019-11-22 PROCEDURE — 97161 PT EVAL LOW COMPLEX 20 MIN: CPT

## 2019-11-22 PROCEDURE — 97116 GAIT TRAINING THERAPY: CPT

## 2019-11-22 PROCEDURE — 92611 MOTION FLUOROSCOPY/SWALLOW: CPT

## 2019-11-22 PROCEDURE — 80053 COMPREHEN METABOLIC PANEL: CPT

## 2019-11-22 PROCEDURE — 97112 NEUROMUSCULAR REEDUCATION: CPT

## 2019-11-22 PROCEDURE — 82330 ASSAY OF CALCIUM: CPT

## 2019-11-22 PROCEDURE — 84132 ASSAY OF SERUM POTASSIUM: CPT

## 2019-11-22 PROCEDURE — 87581 M.PNEUMON DNA AMP PROBE: CPT

## 2019-11-22 PROCEDURE — 71045 X-RAY EXAM CHEST 1 VIEW: CPT

## 2019-11-22 PROCEDURE — 87186 SC STD MICRODIL/AGAR DIL: CPT

## 2019-11-22 PROCEDURE — 85730 THROMBOPLASTIN TIME PARTIAL: CPT

## 2019-11-22 PROCEDURE — 85014 HEMATOCRIT: CPT

## 2019-11-22 PROCEDURE — 92610 EVALUATE SWALLOWING FUNCTION: CPT

## 2019-11-22 PROCEDURE — 85610 PROTHROMBIN TIME: CPT

## 2019-11-22 PROCEDURE — 74230 X-RAY XM SWLNG FUNCJ C+: CPT | Mod: 26

## 2019-11-22 PROCEDURE — 81001 URINALYSIS AUTO W/SCOPE: CPT

## 2019-11-22 PROCEDURE — 99285 EMERGENCY DEPT VISIT HI MDM: CPT | Mod: 25

## 2019-11-22 PROCEDURE — 83605 ASSAY OF LACTIC ACID: CPT

## 2019-11-22 PROCEDURE — 82435 ASSAY OF BLOOD CHLORIDE: CPT

## 2019-11-22 PROCEDURE — 80048 BASIC METABOLIC PNL TOTAL CA: CPT

## 2019-11-22 PROCEDURE — 84295 ASSAY OF SERUM SODIUM: CPT

## 2019-11-22 PROCEDURE — 87150 DNA/RNA AMPLIFIED PROBE: CPT

## 2019-11-22 PROCEDURE — 87486 CHLMYD PNEUM DNA AMP PROBE: CPT

## 2019-11-22 PROCEDURE — 96365 THER/PROPH/DIAG IV INF INIT: CPT | Mod: XU

## 2019-11-22 PROCEDURE — 84145 PROCALCITONIN (PCT): CPT

## 2019-11-22 PROCEDURE — 99232 SBSQ HOSP IP/OBS MODERATE 35: CPT

## 2019-11-22 PROCEDURE — 51702 INSERT TEMP BLADDER CATH: CPT

## 2019-11-22 PROCEDURE — 87633 RESP VIRUS 12-25 TARGETS: CPT

## 2019-11-22 PROCEDURE — 80202 ASSAY OF VANCOMYCIN: CPT

## 2019-11-22 PROCEDURE — 87086 URINE CULTURE/COLONY COUNT: CPT

## 2019-11-22 PROCEDURE — 87798 DETECT AGENT NOS DNA AMP: CPT

## 2019-11-22 PROCEDURE — 85027 COMPLETE CBC AUTOMATED: CPT

## 2019-11-22 PROCEDURE — 82947 ASSAY GLUCOSE BLOOD QUANT: CPT

## 2019-11-22 RX ORDER — TAMSULOSIN HYDROCHLORIDE 0.4 MG/1
2 CAPSULE ORAL
Qty: 0 | Refills: 0 | DISCHARGE
Start: 2019-11-22

## 2019-11-22 RX ORDER — TAMSULOSIN HYDROCHLORIDE 0.4 MG/1
1 CAPSULE ORAL
Qty: 0 | Refills: 0 | DISCHARGE

## 2019-11-22 RX ADMIN — Medication 1 TABLET(S): at 06:17

## 2019-11-22 RX ADMIN — Medication 1 DROP(S): at 06:17

## 2019-11-22 RX ADMIN — Medication 1 MILLIGRAM(S): at 12:10

## 2019-11-22 RX ADMIN — Medication 5 MILLIGRAM(S): at 06:17

## 2019-11-22 NOTE — SWALLOW VFSS/MBS ASSESSMENT ADULT - NS SWALLOW VFSS REC ASPIR MON
change of breathing pattern/cough/gurgly voice/*If evident, report to MD immediately/fever/pneumonia/throat clearing/upper respiratory infection cough/fever/throat clearing/change of breathing pattern/gurgly voice/upper respiratory infection/*If evident, report to MD immediately and consider reevaluation of swallowing/pneumonia

## 2019-11-22 NOTE — DISCHARGE NOTE PROVIDER - CARE PROVIDER_API CALL
Hector Jara)  Urology  [address] [place] [address]  Phone: [phone]  Fax: [phone]  Follow Up Time:     ROBBI King  Phone: (   )    -  Fax: (   )    -  Follow Up Time:

## 2019-11-22 NOTE — SWALLOW VFSS/MBS ASSESSMENT ADULT - PHARYNGEAL PHASE COMMENTS
Penetrated material cleared with cued throat clear and repeat swallow Multiple swallows 3-4 required to decrease pharyngeal residue Multiple swallows 3-4 required to effectively decrease pharyngeal residue; repeat swallows were not all directly viewed during flouroscopy given time constraints. Multiple swallows 3-4 required to effectively decrease pharyngeal residue; repeat swallows were not all directly viewed during fluoroscopy given time constraints.

## 2019-11-22 NOTE — DISCHARGE NOTE NURSING/CASE MANAGEMENT/SOCIAL WORK - PATIENT PORTAL LINK FT
You can access the FollowMyHealth Patient Portal offered by [facility] by registering at the following website: [email or web address]. By joining [facility]’s FollowMyHealth portal, you will also be able to view your health information using other applications (apps) compatible with our system.

## 2019-11-22 NOTE — PROVIDER CONTACT NOTE (OTHER) - SITUATION
Pt vancomycin trough drawn pre 3rd dose as orders, and the level of vancomycin in the bloodstream was not therapeutic (5.3)

## 2019-11-22 NOTE — SWALLOW VFSS/MBS ASSESSMENT ADULT - RECOMMENDED CONSISTENCY
Continue the dysphagia 1 diet with honey thickened liquids with intermittent throat clear and repeat swallows between trials.

## 2019-11-22 NOTE — PROGRESS NOTE ADULT - SUBJECTIVE AND OBJECTIVE BOX
Patient is a 74y old  Male who presents with a chief complaint of retention/febrile uti (20 Nov 2019 18:32)    Being followed by ID for        Interval history:  No other acute events      ROS:  No cough,SOB,CP  No N/V/D  No abd pain  No urinary complaints  No HA  No joint or limb pain  No other complaints    PAST MEDICAL & SURGICAL HISTORY:  Urinary retention  H/O muscular dystrophy  Scrotal swelling  Psychiatric disorder  No significant past surgical history    Allergies    No Known Allergies    Intolerances      Antimicrobials:    vancomycin  IVPB 1000 milliGRAM(s) IV Intermittent every 24 hours    MEDICATIONS  (STANDING):  artificial  tears Solution 1 Drop(s) Both EYES two times a day  diazepam    Tablet 5 milliGRAM(s) Oral every 12 hours  folic acid 1 milliGRAM(s) Oral daily  heparin  Injectable 5000 Unit(s) SubCutaneous every 12 hours  lactobacillus acidophilus 1 Tablet(s) Oral two times a day  tamsulosin 0.8 milliGRAM(s) Oral at bedtime  vancomycin  IVPB 1000 milliGRAM(s) IV Intermittent every 24 hours      Vital Signs Last 24 Hrs  T(C): 36.3 (11-22-19 @ 04:47), Max: 36.6 (11-21-19 @ 21:20)  T(F): 97.4 (11-22-19 @ 04:47), Max: 97.8 (11-21-19 @ 21:20)  HR: 77 (11-22-19 @ 04:47) (71 - 85)  BP: 113/68 (11-22-19 @ 04:47) (100/65 - 113/68)  BP(mean): --  RR: 18 (11-22-19 @ 04:47) (18 - 18)  SpO2: 96% (11-22-19 @ 04:47) (96% - 97%)    Physical Exam:    Constitutional well preserved,comfortable,pleasant    HEENT PERRLA EOMI,No pallor or icterus    No oral exudate or erythema    Neck supple no JVD or LN    Chest Good AE,CTA    CVS RRR S1 S2 WNl No murmur or rub or gallop    Abd soft BS normal No tenderness no masses    Ext No cyanosis clubbing or edema    IV site no erythema tenderness or discharge    Joints no swelling or LOM    CNS AAO X 3 no focal    Lab Data:                          10.1   7.62  )-----------( 205      ( 22 Nov 2019 08:18 )             31.3       11-22    141  |  99  |  23  ----------------------------<  113<H>  3.7   |  29  |  0.76    Ca    8.9      22 Nov 2019 06:58  Phos  3.0     11-21  Mg     1.8     11-21    TPro  6.2  /  Alb  3.2<L>  /  TBili  0.2  /  DBili  x   /  AST  12  /  ALT  13  /  AlkPhos  81  11-22          .Blood Blood  11-20-19   No growth to date.  --  --      .Blood Blood-Peripheral  11-18-19   Growth in anaerobic bottle: Staphylococcus epidermidis  See previous culture [id number]  --  Blood Culture PCR  Staphylococcus epidermidis      .Urine Catheterized  11-18-19   >100,000 CFU/ml Enterococcus faecalis  --  Enterococcus faecalis                Vancomycin Level, Trough: 5.3 ug/mL (11-21-19 @ 22:53)      WBC Count: 7.62 (11-22-19 @ 08:18)  WBC Count: 7.43 (11-21-19 @ 14:03)  WBC Count: 8.56 (11-20-19 @ 07:41)  WBC Count: 12.39 (11-19-19 @ 09:09)  WBC Count: 20.37 (11-18-19 @ 06:34) Patient is a 74y old  Male who presents with a chief complaint of retention/febrile uti (20 Nov 2019 18:32)    Being followed by ID for        Interval history:  pt feeling improved  No other acute events        PAST MEDICAL & SURGICAL HISTORY:  Urinary retention  H/O muscular dystrophy  Scrotal swelling  Psychiatric disorder  No significant past surgical history    Allergies    No Known Allergies    Intolerances      Antimicrobials:    vancomycin  IVPB 1000 milliGRAM(s) IV Intermittent every 24 hours    MEDICATIONS  (STANDING):  artificial  tears Solution 1 Drop(s) Both EYES two times a day  diazepam    Tablet 5 milliGRAM(s) Oral every 12 hours  folic acid 1 milliGRAM(s) Oral daily  heparin  Injectable 5000 Unit(s) SubCutaneous every 12 hours  lactobacillus acidophilus 1 Tablet(s) Oral two times a day  tamsulosin 0.8 milliGRAM(s) Oral at bedtime  vancomycin  IVPB 1000 milliGRAM(s) IV Intermittent every 24 hours      Vital Signs Last 24 Hrs  T(C): 36.3 (11-22-19 @ 04:47), Max: 36.6 (11-21-19 @ 21:20)  T(F): 97.4 (11-22-19 @ 04:47), Max: 97.8 (11-21-19 @ 21:20)  HR: 77 (11-22-19 @ 04:47) (71 - 85)  BP: 113/68 (11-22-19 @ 04:47) (100/65 - 113/68)  BP(mean): --  RR: 18 (11-22-19 @ 04:47) (18 - 18)  SpO2: 96% (11-22-19 @ 04:47) (96% - 97%)    Physical Exam:    Constitutional cacchectic    HEENT PERRLA EOMI,No pallor or icterus    No oral exudate or erythema    Neck supple no JVD or LN    Chest Good AE,CTA    CVS RRR S1 S2 WNl     Abd soft BS normal No tenderness     Ext No cyanosis clubbing or edema    IV site no erythema tenderness or discharge    Joints no swelling or LOM    CNS AAO X 3 no focal    Lab Data:                          10.1   7.62  )-----------( 205      ( 22 Nov 2019 08:18 )             31.3       11-22    141  |  99  |  23  ----------------------------<  113<H>  3.7   |  29  |  0.76    Ca    8.9      22 Nov 2019 06:58  Phos  3.0     11-21  Mg     1.8     11-21    TPro  6.2  /  Alb  3.2<L>  /  TBili  0.2  /  DBili  x   /  AST  12  /  ALT  13  /  AlkPhos  81  11-22          .Blood Blood  11-20-19   No growth to date.  --  --      .Blood Blood-Peripheral  11-18-19   Growth in anaerobic bottle: Staphylococcus epidermidis  See previous culture [id number]  --  Blood Culture PCR  Staphylococcus epidermidis      .Urine Catheterized  11-18-19   >100,000 CFU/ml Enterococcus faecalis  --  Enterococcus faecalis          Vancomycin Level, Trough: 5.3 ug/mL (11-21-19 @ 22:53)      WBC Count: 7.62 (11-22-19 @ 08:18)  WBC Count: 7.43 (11-21-19 @ 14:03)  WBC Count: 8.56 (11-20-19 @ 07:41)  WBC Count: 12.39 (11-19-19 @ 09:09)  WBC Count: 20.37 (11-18-19 @ 06:34)

## 2019-11-22 NOTE — SWALLOW VFSS/MBS ASSESSMENT ADULT - SPECIFY REASON(S)
to subjectively assess the swallow mechanism and r/o dysphagia; MD requesting MBS prior to d/c as Pt had been scheduled at OSH prior to this hospitalization for possible diet upgrade.

## 2019-11-22 NOTE — SWALLOW VFSS/MBS ASSESSMENT ADULT - LARYNGEAL PENETRATION DURING THE SWALLOW - SILENT
shallow; appeared to be cleared upon subsequent trials/Trace/Mild bolus appeared to intermix with saliva/Mild Mild/Trace Moderate/Mild/when provided as nectar thickened liquid wash following mech soft trial without cued repeat swallows prior to sip of nectart thickened liquid; penetrated material appeared to be cleared with volitional throat clear. Mild/Trace/only evident when utilized as liquid was following soft solid trial

## 2019-11-22 NOTE — PROGRESS NOTE ADULT - ASSESSMENT
74M PMHx of an unknown neurologic disorder in the process of being worked up and BPH leading to urinary retention who was sent to the ED from rehab for fevers and abdominal pain. He was found to have sepsis 2/2 UTI with a fowler that was not functioning on arrival. Because of his past history of coag negative staph in his urine in the past, will cover gram positives until urine gram stain is back.     Sepsis 2/2 UTI  - d/adalberto meropenem  - on standing vancomycin  - Follow up cultures  - Monitor for fevers  - Trend WBCs  - unclear significance of GPCC - two sets but ? same stick and likely procurement contaminant- follow up culutres negative  OK to change to po amoxicilin to complete course

## 2019-11-22 NOTE — DISCHARGE NOTE PROVIDER - PROVIDER TOKENS
PROVIDER:[TOKEN:[[id number]]],FREE:[LAST:[Janas],PHONE:[(   )    -],FAX:[(   )    -],ADDRESS:[[id number]]]

## 2019-11-22 NOTE — SWALLOW VFSS/MBS ASSESSMENT ADULT - RECOMMENDED FEEDING/EATING TECHNIQUES
maintain upright posture during/after eating for 30 mins/oral hygiene/small sips/bites/crush medication (when feasible)/allow for swallow between intakes

## 2019-11-22 NOTE — SWALLOW VFSS/MBS ASSESSMENT ADULT - ORAL PHASE
within functional limits bolus appeared to intermix with saliva with a-p spillage evident/Residue in oral cavity/Laryngeal penetration before swallow - silent/Incomplete tongue to palate contact/within functional limits within functional limits/mild oral residue vs piecemeal swallow/Incomplete tongue to palate contact/Uncontrolled bolus / spillover in norman-pharynx Reduced anterior - posterior transport/mild/Within functional limits/Residue in oral cavity Uncontrolled bolus / spillover in norman-pharynx/Incomplete tongue to palate contact/within functional limits Incomplete tongue to palate contact/Uncontrolled bolus / spillover in norman-pharynx Delayed oral transit time/Reduced anterior - posterior transport

## 2019-11-22 NOTE — SWALLOW VFSS/MBS ASSESSMENT ADULT - ROSENBEK'S PENETRATION ASPIRATION SCALE
(3) contrast remains above the vocal cords, visible residue remains (penetration)/material appeared to clear during subsequent puree trial when Pt cued to throat clear and repeat swallow (3) contrast remains above the vocal cords, visible residue remains (penetration) ./(1) no aspiration, contrast does not enter airway although laryngeal penetration not evident during soft solid trial; it was subsequently noted on thin liquid wash in presence [facility]./(1) no aspiration, contrast does not enter airway

## 2019-11-22 NOTE — SWALLOW VFSS/MBS ASSESSMENT ADULT - SUCCESSFUL STRATEGIES TRIALED DURING PROCEDURE
cued throat clear and repeat swallow multiple swallows 3-4 decreased pharyngeal residue in absence of laryngeal penetration penetrated material appeared to be cleared with volitional throat clear and repeat swallow cued throat clear and repeat swallow; chin tuck posture may reduce amount/frequency of penetration penetrated material appeared to be cleared with volitional throat clear and repeat swallow/chin tuck

## 2019-11-22 NOTE — PROVIDER CONTACT NOTE (OTHER) - ASSESSMENT
A&Ox4. Safety checks completed every hour. Pts safety maintained. IV site assessed every 2 hours and remain within defined limits. Pt able to turn and position self. PAS stockings in place. Pain management effective. Pt voiding adequately via fowler. Pt w/ LUQ PEG running tube feeds. All VSS. No s/s of distress. Tolerating diet. Call bell within reach. Will continue to monitor.

## 2019-11-22 NOTE — SWALLOW VFSS/MBS ASSESSMENT ADULT - COMMENTS
Hx continued:  As per dietician: Patient admitted from NH, receives both oral diet and PEG feeds (PEG since August).  Pt states he had MBS done ~ 1 month ago at OSH and was starting of oral diet, per NH transfer record pt was receiving PO diet consistency of pureed foods with honey thick liquids + nocturnal tube feeds. States he was receiving speech therapy regularly and has an upcoming swallow test scheduled as outpatient to assess if diet could be further upgraded. Consider current diet as tolerated, defer consistency to team. Consider SLP consult to access potential for diet upgrade.

## 2019-11-22 NOTE — DISCHARGE NOTE PROVIDER - HOSPITAL COURSE
73 y/o male w/ hx of muscular dystropy  followed at [facility], dysphagia s/p PEG , urinary retention sec to BPH , hx ESBL UTI , recent fowler and TOV done by urology as o/p .. was doing ok overall without fowler until he started c/o lower abd discomfort Fowler placed and  only in 100cc o/p and pt continued to c/o pain/ discomfort and sent to ED . Fowler placed and 1000cc obtained     Pt found to be septic in ED; in setting of urinary retention.   Because of his past history of coag negative staph in his urine in the past, started vancomycin; Seen by ID.  unclear significance of GPCC  in blood clx. likely procurement contaminant; repeat blood clx NTD. sepsis resolved; Abx switched to po amoxicillin on discharge to complete 7 day RX. Seen by Urology; Advised to continue Fowler and Urodynamics as outpatient.  Pt with Dysphagia and supplemental feeds via PEG; s/p MBS recommended to continue Dysphagia diet and swallow therapy upon discharge; Discharged to [facility].                     Continue the dysphagia 1 diet with honey thickened liquids with intermittent throat clear and repeat swallows between trials.    allow for swallow between intakes; crush medication (when feasible); maintain upright posture during/after eating for 30 mins; oral hygiene; small sips/bites    no assistance needed    yes  Maintain aspiration precautions.    change of breathing pattern; cough; gurgly voice; fever; pneumonia; throat clearing; upper respiratory infection; *If evident, report to MD immediately and consider reevaluation of swallowing        rehabilitation facility    Swallowing therapy post d/c to improve oropharyngeal skills and instruct pt on safe swallowing strategies to determine candidacy for diet upgrade; ie chin tuck posture with cued throat clear and repeat swallow on small sips of thin liquids; repeat swallows (3-4) with mechanical soft texture prior to next intake. 75 y/o male w/ hx of muscular dystropy  followed at [place], dysphagia s/p PEG , urinary retention sec to BPH , hx ESBL UTI , recent fowler and TOV done by urology as o/p .. was doing ok overall without fowler until he started c/o lower abd discomfort Fowler placed and  only in 100cc o/p and pt continued to c/o pain/ discomfort and sent to ED . Fowler placed and 1000cc obtained     Pt found to be septic in ED; in setting of urinary retention.   Because of his past history of coag negative staph in his urine in the past, started vancomycin; Seen by ID.  unclear significance of GPCC  in blood clx. likely procurement contaminant; repeat blood clx NTD. sepsis resolved; Abx switched to po amoxicillin on discharge to complete 7 day RX. Seen by Urology; Advised to continue Fowler and Urodynamics as outpatient.  Pt with Dysphagia and supplemental feeds via PEG; s/p MBS recommended to continue Dysphagia diet and swallow therapy upon discharge; Discharged to [facility].

## 2019-11-22 NOTE — PROGRESS NOTE ADULT - ASSESSMENT
75 y/o male w/ hx of " muscular dystropy "  followed at [place], dysphagia s/p PEG , urinary retention sec to BPH , hx ESBL UTI , recent fowler and TOV done by urology as o/p .. was doing ok overall without fowler until he started c/o lower abd discomfort .  Fowler placed and  only in 100cc o/p and pt continued to c/o pain/ discomfort and sent to ED  . Fowler placed and 1000cc obtained    deneis any pain at this time .. no N/V/flank pain   pt was found to meet sepsis criteria in ED..    1- sepsis sec to UTI in setting of urinary retention   hx of ESBL   ID input appreciated : cont vanco  , and will change to amoxicilin : discussed with Dr. Palmer..   blood culture: staph epi : griffinley contaminant : repeated : neg       2- urinary retention :  cont  flomax   urology input appreciated : cont fowler and flomax . f/u with Dr. Jara as o/p.     3- Dysphagia : cont peg feeds   aspiration precautions   MBS: showed penetration but no aspiration as long as pt is freqently guided to follow instructions .. may cont same diet with honey thickend as ordered... swllow therapy at rehab and consider re attempt MBS in few months     4- Lethargy : improved and now at baseline   sec to sepsis   pt also on  Benzos twice daily ..can consider to taper as o/p.     discussed with pt at length   with O/P provider Dr. King   with np  with ID and CM

## 2019-11-22 NOTE — DISCHARGE NOTE PROVIDER - INSTRUCTIONS
Dysphagia 1 diet  ( pureed food ) with honey thick liquids with PEG feeds from 5 PM to 6 AM daily ( Jevity 1.5 @ 90 ml/hr)

## 2019-11-22 NOTE — DISCHARGE NOTE PROVIDER - NSDCCPCAREPLAN_GEN_ALL_CORE_FT
PRINCIPAL DISCHARGE DIAGNOSIS  Diagnosis: Sepsis  Assessment and Plan of Treatment: Take all antibiotics as ordered.  Call you Health care provider upon arrival home to make a one week follow up appointment.  If you develop fever, chills, malaise, or change in mental status call your Health Care Provider or go to the Emergency Department.  Nutrition is important, eat small frequent meals to help ensure you get adequate calories.  Do not stay in bed all day!  Increase your activity daily as tolerated.        SECONDARY DISCHARGE DIAGNOSES  Diagnosis: Muscular dystrophy  Assessment and Plan of Treatment: [facility]  Follow up with Neurologist at [place]    Diagnosis: Urinary retention  Assessment and Plan of Treatment: [facility] fowler  Follow up with Urologist    Diagnosis: Acute UTI  Assessment and Plan of Treatment: complete antibiotics    Diagnosis: Dysphagia  Assessment and Plan of Treatment: Continue the dysphagia 1 diet with honey thickened liquids with intermittent throat clear and repeat swallows between trials.  allow for swallow between intakes; crush medication (when feasible); maintain upright posture during/after eating for 30 mins; oral hygiene; small sips/bites;   Maintain aspiration precautions.  Swallowing therapy post d/c to improve oropharyngeal skills and instruct pt on safe swallowing strategies to determine candidacy for diet upgrade; ie chin tuck posture with cued throat clear and repeat swallow on small sips of thin liquids; repeat swallows (3-4) with mechanical soft texture prior to next intake.

## 2019-11-22 NOTE — SWALLOW VFSS/MBS ASSESSMENT ADULT - UNSUCCESSFUL STRATEGIES TRIALED DURING PROCEDURE
chin tuck head turn to the left/Of note, on trial of nectar thickened liquid wash with mod valleculae residue, laryngeal penetration was evident as reported in Trial 5.

## 2019-11-22 NOTE — SWALLOW VFSS/MBS ASSESSMENT ADULT - SLP PERTINENT HISTORY OF CURRENT PROBLEM
Pt is 73 y/o male w/ hx of muscular dystrophy-followed at [facility], dysphagia s/p PEG 8/21 , urinary retention 2/2 to BPH, hx ESBL UTI, recent fowler and TOV done by urology as o/p. Fowler placed and only 100cc o/p and Pt continued to c/o pain/ discomfort and sent to ED. Fowler placed and 1000cc obtained. Pt was found to meet sepsis criteria  2/2 UTI in ED; sepsis 2/2 UTI with a fowler that was not functioning on arrival.  Lethargy improving; 11/21/19 Pt reportedly at baseline.

## 2019-11-22 NOTE — SWALLOW VFSS/MBS ASSESSMENT ADULT - ORAL PREPARATORY PHASE
Functional although decreased rotary mastication secondary to missing dentition./Functional reduced rotary mastication; delayed

## 2019-11-22 NOTE — SWALLOW VFSS/MBS ASSESSMENT ADULT - DIAGNOSTIC IMPRESSIONS
Pt presents with oropharyngeal dysphagia primarily characterized by pharyngeal residue (most evident in valleculae) and laryngeal penetration with various textures provided.  Laryngeal penetration with thin liquids and nectar thickened liquids with chin tuck persisted when utilized as liquid wash following soft and soft solid trials in attempt to clear pharyngeal residue. Therefore, multiple swallows (3-4) were effective in decreasing pharyngeal residue rather than a liquid wash.  Penetrated material appeared to be effectively cleared with cued cough and repeat swallow.  Chin tuck posture appeared to decrease laryngeal penetration.  Aspiration was not evident with any consistency presented.  Disorders: reduced lingual strength/ROM/Rate of motion, reduced BOT to posterior pharyngeal wall contact, reduced epiglottic retroflexion, reduced laryngeal closure, reduced pharyngeal propulsion and reduced supraglottic sensation.  Formation, control and transfer of bolus were mildly impaired with decreased rotary mastication in setting of missing dentition. Pt presents with oropharyngeal dysphagia primarily characterized by pharyngeal residue (most evident in valleculae) and laryngeal penetration with various textures provided.  Laryngeal penetration with thin liquids and nectar thickened liquids with chin tuck persisted when utilized as liquid wash following soft and soft solid trials in attempt to clear pharyngeal residue. Therefore, multiple swallows (3-4) were effective in decreasing pharyngeal residue rather than a liquid wash.  Penetrated material appeared to be effectively cleared with cued throat clear and repeat swallow.  Chin tuck posture appeared to decrease laryngeal penetration.  Aspiration was not evident with any consistency presented.  Disorders: reduced lingual strength/ROM/Rate of motion, reduced BOT to posterior pharyngeal wall contact, reduced epiglottic retroflexion, reduced laryngeal closure, reduced pharyngeal propulsion and reduced supraglottic sensation.  Formation, control and transfer of bolus were mildly impaired with decreased rotary mastication in setting of missing dentition.

## 2019-11-22 NOTE — DISCHARGE NOTE PROVIDER - NSDCFUSCHEDAPPT_GEN_ALL_CORE_FT
SHANTANU CHARLES ; 12/11/2019 ; NPP PhysMed [address] [facility]  SHANTANU CHARLES ; 12/23/2019 ; NPP Urology [address] [facility]  SHANTANU CHARLES ; 01/28/2020 ; NPP Gastro [address] [facility] SHANTANU CHARLES ; 12/11/2019 ; NPP PhysMed [address] [facility]  SHANTANU CHARLES ; 12/23/2019 ; NPP Urology [address] [facility]  SHANTANU CHARLES ; 01/28/2020 ; NPP Gastro [address] [facility]

## 2019-11-22 NOTE — SWALLOW VFSS/MBS ASSESSMENT ADULT - ADDITIONAL INFORMATION
Pt seen in radiology this am for MBS/VFSS.  Pt tolerated upright position in GUSTAVO chair; Pt denied pain. Pt awake/alert and cooperative.

## 2019-11-22 NOTE — SWALLOW VFSS/MBS ASSESSMENT ADULT - RESIDUE IN VALLECULAE
Moderate Mild Moderate/residue increased to severe amount when mech soft trials provided sequentially.

## 2019-11-22 NOTE — PROGRESS NOTE ADULT - SUBJECTIVE AND OBJECTIVE BOX
Patient is a 74y old  Male who presents with a chief complaint of retention/febrile uti (20 Nov 2019 18:32)                                                               INTERVAL HPI/OVERNIGHT EVENTS:    REVIEW OF SYSTEMS:     CONSTITUTIONAL: No weakness, fevers or chills  RESPIRATORY: No cough, wheezing,  No shortness of breath  CARDIOVASCULAR: No chest pain or palpitations  GASTROINTESTINAL: No abdominal pain  . No nausea, vomiting, or hematemesis; No diarrhea or constipation. No melena or hematochezia.  GENITOURINARY: No dysuria, frequency or hematuria  NEUROLOGICAL: No numbness or weakness  SKIN: No itching, burning, rashes, or lesions                                                                                                                                                                                                                                                                                 Medications:  MEDICATIONS  (STANDING):  artificial  tears Solution 1 Drop(s) Both EYES two times a day  diazepam    Tablet 5 milliGRAM(s) Oral every 12 hours  folic acid 1 milliGRAM(s) Oral daily  heparin  Injectable 5000 Unit(s) SubCutaneous every 12 hours  lactobacillus acidophilus 1 Tablet(s) Oral two times a day  tamsulosin 0.8 milliGRAM(s) Oral at bedtime  vancomycin  IVPB 1000 milliGRAM(s) IV Intermittent every 24 hours    MEDICATIONS  (PRN):  acetaminophen   Tablet .. 650 milliGRAM(s) Oral every 6 hours PRN Temp greater or equal to 38C (100.4F), Mild Pain (1 - 3)       Allergies    No Known Allergies    Intolerances      Vital Signs Last 24 Hrs  T(C): 36.3 (22 Nov 2019 04:47), Max: 36.6 (21 Nov 2019 21:20)  T(F): 97.4 (22 Nov 2019 04:47), Max: 97.8 (21 Nov 2019 21:20)  HR: 77 (22 Nov 2019 04:47) (71 - 85)  BP: 113/68 (22 Nov 2019 04:47) (100/65 - 113/68)  BP(mean): --  RR: 18 (22 Nov 2019 04:47) (18 - 18)  SpO2: 96% (22 Nov 2019 04:47) (96% - 97%)  CAPILLARY BLOOD GLUCOSE          11-21 @ 07:01  -  11-22 @ 07:00  --------------------------------------------------------  IN: 2221 mL / OUT: 2225 mL / NET: -4 mL    11-22 @ 07:01  -  11-22 @ 13:26  --------------------------------------------------------  IN: 0 mL / OUT: 300 mL / NET: -300 mL      Physical Exam:    General:  cachectic   HEENT:  Nonicteric, PERRLA  CV:  RRR, S1S2   Lungs:  CTA B/L, no wheezes, rales, rhonchi  Abdomen:  Soft, non-tender, no distended, positive BS  Extremities:  2+ pulses, no c/c, no edema  fowler in place   Neuro:  AAOx3, non-focal, grossly intact                                                                                                                                                                                                                                                                                                LABS:                               10.1   7.62  )-----------( 205      ( 22 Nov 2019 08:18 )             31.3                      11-22    141  |  99  |  23  ----------------------------<  113<H>  3.7   |  29  |  0.76    Ca    8.9      22 Nov 2019 06:58  Phos  3.0     11-21  Mg     1.8     11-21    TPro  6.2  /  Alb  3.2<L>  /  TBili  0.2  /  DBili  x   /  AST  12  /  ALT  13  /  AlkPhos  81  11-22

## 2019-11-25 LAB
CULTURE RESULTS: SIGNIFICANT CHANGE UP
CULTURE RESULTS: SIGNIFICANT CHANGE UP
SPECIMEN SOURCE: SIGNIFICANT CHANGE UP
SPECIMEN SOURCE: SIGNIFICANT CHANGE UP

## 2019-12-04 ENCOUNTER — OUTPATIENT (OUTPATIENT)
Dept: OUTPATIENT SERVICES | Facility: HOSPITAL | Age: 74
LOS: 1 days | End: 2019-12-04
Payer: MEDICARE

## 2019-12-04 ENCOUNTER — APPOINTMENT (OUTPATIENT)
Dept: UROLOGY | Facility: CLINIC | Age: 74
End: 2019-12-04
Payer: MEDICARE

## 2019-12-04 VITALS
SYSTOLIC BLOOD PRESSURE: 108 MMHG | TEMPERATURE: 98 F | RESPIRATION RATE: 18 BRPM | HEART RATE: 103 BPM | DIASTOLIC BLOOD PRESSURE: 72 MMHG

## 2019-12-04 DIAGNOSIS — R35.0 FREQUENCY OF MICTURITION: ICD-10-CM

## 2019-12-04 PROCEDURE — 99213 OFFICE O/P EST LOW 20 MIN: CPT | Mod: 25

## 2019-12-04 PROCEDURE — 51701 INSERT BLADDER CATHETER: CPT

## 2019-12-04 NOTE — ASSESSMENT
[FreeTextEntry1] : This pt had a TOV in November. We removed his catheter. Subsequently, he voided every hour in the night and had a PVR of 1000cc when he was admitted to [facility] with UTI. \par He has had a catheter change today. Pt will RTO in 1 month when he is stronger.  \par When pt RTO, he will schedule him for a UDS test.

## 2019-12-04 NOTE — PHYSICAL EXAM
[General Appearance - Well Nourished] : well nourished [Normal Appearance] : normal appearance [General Appearance - Well Developed] : well developed [Abdomen Soft] : soft [Well Groomed] : well groomed [General Appearance - In No Acute Distress] : no acute distress [Abdomen Tenderness] : non-tender [Costovertebral Angle Tenderness] : no ~M costovertebral angle tenderness [Urethral Meatus] : meatus normal [Scrotum] : the scrotum was normal [Testes Mass (___cm)] : there were no testicular masses [Urinary Bladder Findings] : the bladder was normal on palpation [No Prostate Nodules] : no prostate nodules [] : no respiratory distress [Edema] : no peripheral edema [Respiration, Rhythm And Depth] : normal respiratory rhythm and effort [Oriented To Time, Place, And Person] : oriented to person, place, and time [Exaggerated Use Of Accessory Muscles For Inspiration] : no accessory muscle use [Mood] : the mood was normal [Not Anxious] : not anxious [Affect] : the affect was normal [No Focal Deficits] : no focal deficits [Normal Station and Gait] : the gait and station were normal for the patient's age [No Palpable Adenopathy] : no palpable adenopathy

## 2019-12-04 NOTE — HISTORY OF PRESENT ILLNESS
[FreeTextEntry1] : 74 year old male presents for catheter change. \par The pt had a Hx of BPH. \par Hospitalized in early August after slipping off stool. Furniture fell on him and it was 36 hours before he was rescued and taken to the hospital. Transferred to Rehab and has been doing three types of PT. An indwelling catheter was inserted \par Pt has been taken off the feeding tub and he is currently eating on his own. His appetite is good.  \par He is able to walk without assistance, but uses a cane. \par Pt is concerned about developing an infection from having the catheter inserted. His catheter has gotten blocked in the past. \par \par This pt had a TOV in November. We removed his catheter. Subsequently, he voided every hour in the night and had a PVR of 1000cc when he was admitted to [facility] with UTI. \par He has had a catheter change today. Pt will RTO in 1 month when he is stronger.  \par When pt RTO, he will schedule him for a UDS test.

## 2019-12-10 DIAGNOSIS — R33.9 RETENTION OF URINE, UNSPECIFIED: ICD-10-CM

## 2019-12-13 ENCOUNTER — EMERGENCY (EMERGENCY)
Facility: HOSPITAL | Age: 74
LOS: 1 days | Discharge: HOME | End: 2019-12-13
Attending: EMERGENCY MEDICINE
Payer: MEDICARE

## 2019-12-13 VITALS
RESPIRATION RATE: 16 BRPM | SYSTOLIC BLOOD PRESSURE: 108 MMHG | HEART RATE: 101 BPM | OXYGEN SATURATION: 98 % | DIASTOLIC BLOOD PRESSURE: 69 MMHG | TEMPERATURE: 97 F

## 2019-12-13 VITALS
TEMPERATURE: 98 F | RESPIRATION RATE: 18 BRPM | SYSTOLIC BLOOD PRESSURE: 102 MMHG | DIASTOLIC BLOOD PRESSURE: 56 MMHG | HEART RATE: 80 BPM | OXYGEN SATURATION: 96 %

## 2019-12-13 LAB
APPEARANCE UR: ABNORMAL
BACTERIA # UR AUTO: NEGATIVE — SIGNIFICANT CHANGE UP
BILIRUB UR-MCNC: NEGATIVE — SIGNIFICANT CHANGE UP
COLOR SPEC: ABNORMAL
DIFF PNL FLD: ABNORMAL
EPI CELLS # UR: 1 /HPF — SIGNIFICANT CHANGE UP
GLUCOSE UR QL: NEGATIVE — SIGNIFICANT CHANGE UP
HYALINE CASTS # UR AUTO: 1 /LPF — SIGNIFICANT CHANGE UP (ref 0–2)
KETONES UR-MCNC: SIGNIFICANT CHANGE UP
LEUKOCYTE ESTERASE UR-ACNC: ABNORMAL
NITRITE UR-MCNC: NEGATIVE — SIGNIFICANT CHANGE UP
PH UR: 6 — SIGNIFICANT CHANGE UP (ref 5–8)
PROT UR-MCNC: ABNORMAL
RBC CASTS # UR COMP ASSIST: 337 /HPF — HIGH (ref 0–4)
SP GR SPEC: 1.02 — SIGNIFICANT CHANGE UP (ref 1.01–1.02)
UROBILINOGEN FLD QL: NEGATIVE — SIGNIFICANT CHANGE UP
WBC UR QL: 65 /HPF — HIGH (ref 0–5)

## 2019-12-13 PROCEDURE — 99283 EMERGENCY DEPT VISIT LOW MDM: CPT | Mod: GC

## 2019-12-13 NOTE — ED ADULT NURSE NOTE - OBJECTIVE STATEMENT
74 yr old male to ED via EMS from [facility]. Pt awake alert and orientedx3. Resp even and nonlab Cooperative and compliant Pt's catheter leaking Recently changed x9 days ago With recurrent change of foleyb Denies blood in fowler Denies fever or chills. Denies chest pain or sob. Denies abs pain

## 2019-12-13 NOTE — ED PROVIDER NOTE - NS ED ROS FT
Review of Systems    Constitutional: (-) fever, (-) chills, (-) fatigue  HEENT: (-) sore throat, (-) hearing loss, (-) nasal congestion  Cardiovascular: (-) chest pain, (-) syncope  Respiratory: (-) cough, (-) shortness of breath  Gastrointestinal: (-) vomiting, (-) diarrhea, (-) abdominal pain  Genitourinary: (+) suprapubic pressure, (-) hematuria  Musculoskeletal: (-) neck pain, (-) back pain, (-) joint pain  Integumentary: (-) rash, (-) edema, (-) wound  Neurological: (-) headache, (-) altered mental status    Except as documented in the HPI, all other systems are negative.

## 2019-12-13 NOTE — ED PROVIDER NOTE - PATIENT PORTAL LINK FT
You can access the FollowMyHealth Patient Portal offered by [facility] by registering at the following website: [email or web address]. By joining [facility]’s FollowMyHealth portal, you will also be able to view your health information using other applications (apps) compatible with our system.

## 2019-12-13 NOTE — ED ADULT NURSE REASSESSMENT NOTE - NS ED NURSE REASSESS COMMENT FT1
Spencer replaced. 18F Cude used. Two RNs at bedside. Sterile technique maintained. Pt tolerated procedure well. 120mL clear yellow urine output. Comfort and safety measures maintained.

## 2019-12-13 NOTE — ED PROVIDER NOTE - PHYSICAL EXAMINATION
VITAL SIGNS: I have reviewed nursing notes and confirm.  CONSTITUTIONAL: non-toxic, well appearing  SKIN: no rash, no petechiae.  EYES: pink conjunctiva, anicteric  NECK: Supple; no meningismus, no JVD  CARD: RRR, no murmurs, equal radial pulses bilaterally 2+  RESP: CTAB, no respiratory distress  ABD: Soft, bladder distended, nontender, abdomen non-distended, no peritoneal signs, no CVA tenderness.  PEG in place.  EXT: Normal ROM x4. No edema. No calf tenderness  NEURO: Alert, oriented. Neuro exam nonfocal, equal strength bilaterally  PSYCH: Cooperative, appropriate.

## 2019-12-13 NOTE — ED PROVIDER NOTE - OBJECTIVE STATEMENT
74 year old male with PMH BPH with chronic indwelling fowler, dysphagia with PEG presents with decreased urine output from fowler x 6 hours.  Pt reports suprapubic pressure and decreased urine output.  Pt history of similar episodes in the past, relieved with flushing catheter/exchanging fowler. Denies any fever, abdominal pain, hematuria, vomiting, chest pain, shortness of breath, or edema. Pt reports urine draining around catheter site. Fowler last changed 9 days ago per pt.  Pt follows with Dr. Jara for urology, states next appointment is "in a few weeks."  Denies any additional complaints.

## 2019-12-13 NOTE — ED PROVIDER NOTE - CLINICAL SUMMARY MEDICAL DECISION MAKING FREE TEXT BOX
Marlene-PGY1: 74 year old male with PMH BPH with chronic indwelling fowler, dysphagia with PEG presents with decreased urine output from fowler x 6 hours. No fevers, hematuria, vomiting, or flank pain. Patient afebrile, nontoxic appearing, in NAD.  Plan includes flushing fowler catheter and if no relief of obstruction, will exchange fowler. Marlene-PGY1: 74 year old male with PMH BPH with chronic indwelling fowler, dysphagia with PEG presents with decreased urine output from fowler x 6 hours. No fevers, hematuria, vomiting, or flank pain. Patient afebrile, nontoxic appearing, in NAD.  Plan includes flushing fowler catheter and if no relief of obstruction, will exchange fowler.    IVON Greene MD: 74 year old male with PMH BPH with chronic indwelling fowler, dysphagia with PEG presents with decreased urine output from fowler x 6 hours.  Pt reports suprapubic pressure and decreased urine output.  Pt history of similar episodes in the past, relieved with flushing catheter/exchanging fowler. Denies any fever, abdominal pain, hematuria, vomiting, chest pain, shortness of breath, or edema. Pt reports urine draining around catheter site. Fowler last changed 9 days ago per pt.  Pt follows with Dr. Jara for urology, states next appointment is "in a few weeks."  Denies any additional complaints. Plan: try to flush fowler, reinflate balloon. If no improvement, will change and chech u/a and ucx. Denies signs/sx of uti or infection at this time

## 2019-12-13 NOTE — ED PROVIDER NOTE - NSFOLLOWUPINSTRUCTIONS_ED_ALL_ED_FT
You were seen today for obstruction in your Spencer catheter.  Your catheter was exchanged with normal urine output.    Follow up with your urologist as scheduled.    Return immediately with any new or worsening symptoms including fever, bloody urine, abdominal pain, back pain, vomiting, chest pain, shortness of breath, lightheadedness, or any additional concerns.

## 2019-12-13 NOTE — ED PROVIDER NOTE - PROGRESS NOTE DETAILS
Marlene-PGY1: Spencer changed by RN to 18 [place] coude catheter with normal clear urine output.  Pt comfortable in NAD. Will prepare for DC with PCP follow up.

## 2019-12-14 PROCEDURE — 99283 EMERGENCY DEPT VISIT LOW MDM: CPT | Mod: 25

## 2019-12-14 PROCEDURE — 87086 URINE CULTURE/COLONY COUNT: CPT

## 2019-12-14 PROCEDURE — 51702 INSERT TEMP BLADDER CATH: CPT

## 2019-12-14 PROCEDURE — 81001 URINALYSIS AUTO W/SCOPE: CPT

## 2019-12-16 ENCOUNTER — APPOINTMENT (OUTPATIENT)
Dept: PHYSICAL MEDICINE AND REHAB | Facility: CLINIC | Age: 74
End: 2019-12-16
Payer: MEDICARE

## 2019-12-16 ENCOUNTER — APPOINTMENT (OUTPATIENT)
Dept: SPEECH THERAPY | Facility: CLINIC | Age: 74
End: 2019-12-16

## 2019-12-16 VITALS
HEIGHT: 73 IN | OXYGEN SATURATION: 96 % | SYSTOLIC BLOOD PRESSURE: 105 MMHG | HEART RATE: 99 BPM | BODY MASS INDEX: 16.83 KG/M2 | DIASTOLIC BLOOD PRESSURE: 72 MMHG | WEIGHT: 127 LBS

## 2019-12-16 DIAGNOSIS — M75.41 IMPINGEMENT SYNDROME OF RIGHT SHOULDER: ICD-10-CM

## 2019-12-16 LAB
CULTURE RESULTS: SIGNIFICANT CHANGE UP
SPECIMEN SOURCE: SIGNIFICANT CHANGE UP

## 2019-12-16 PROCEDURE — 99204 OFFICE O/P NEW MOD 45 MIN: CPT | Mod: GC

## 2019-12-17 NOTE — ASSESSMENT
[FreeTextEntry1] : Mr. Patel presents with right neck and shoulder pain. Based on his history of chronic, insidious shoulder pain and stiffness and positive impingement signs on physical exam, it is likely that he has a chronic rotator cuff tendinopathy of the right shoulder. MRI cervical and thoracic spine (-)cord compression.\par \par Plan:\par - XR of right shoulder ordered. \par - Will start Nabumetone 500mg once daily for 2 weeks\par - Patient currently receiving home PT, will write RX for outpatient PT for shoulder, emphasizing ultrasound and shoulder strengthening, to be initiated when patient graduates from home, see Rx. PT also needs continued therapy to work on balance. \par - RTC in 4 weeks for XR results and follow up. \par - Follow up with neurology for ongoing neuromuscular work up. \par

## 2019-12-17 NOTE — DATA REVIEWED
[MRI] : MRI [FreeTextEntry1] : EXAM:  MR SPINE CERVICAL  \par PROCEDURE DATE:  Aug 21 2019 \par INTERPRETATION:  INDICATION:  Wasting. Rule out cervical myelopathy. \par Rhabdomyolysis.\par TECHNIQUE:  Sagittal T1 weighted, T2 weighted and STIR images of the \par cervical spine as well as axial T2 weighted images were obtained.  \par COMPARISON EXAMINATION: None.\par FINDINGS:\par VERTEBRAL BODIES AND DISCS: Vertebral bodies are normal in height and \par signal intensity. Multilevel disc desiccation.\par ALIGNMENT:  No subluxations.\par C2-C3 LEVEL:  Normal.\par C3-C4 LEVEL:  Mild left facet arthrosis and left foraminal narrowing. \par C4-C5 LEVEL:  Mild disc bulge with bilateral facet arthrosis and \par foraminal narrowing.\par C5-C6 LEVEL:  Bilateral facet arthrosis and foraminal narrowing.\par C6-C7 LEVEL:  Normal.\par C7-T1 LEVEL:   Normal.\par SPINAL CORD: Normal.\par SPINAL CANAL:  No other intradural or extradural defects are seen.\par MISCELLANEOUS:  None.  \par IMPRESSION:  \par Mild cervical spondylosis with multilevel foraminal narrowing..\par No significant spinal stenosis or spinal cord abnormality.\par \par EXAM:  MR SPINE THORACIC \par PROCEDURE DATE:  Aug 23 2019 \par INTERPRETATION:  Clinical indication: Weakness. Upper motor neuron signs.\par MRI of the thoracic spine was performed using sagittal T1-T2 and STIR \par sequence. Axial T1 and T2-weighted sequences were performed.\par Mild scoliosis is seen.\par Increased kyphosis is seen.\par The vertebral body height alignment and marrow signal appear normal\par Prominent disc bulge is seen at the C5-6 level. Please refer to MRI \par cervical spine performed on August 21, 2019.\par Disc desiccation is seen involving the T1-2, T2-3, T3-4, C4-5, T5-6, \par T6-7, T7-8, T8-9 and T9-10 levels. These findings are secondary to \par degenerative changes.\par There are no abnormal disc herniations or significant central or neural \par foraminal stenosis seen.\par The spinal cord demonstrates normal signal and caliber.\par Evaluation of the paraspinal soft tissues appear unremarkable\par The visualized portions of the lungs appear normal\par There is evidence of abnormal areas of T1 and T2 prolongation seen \par involving the liver. Clinical correlation and dedicated imaging of the \par liver with ultrasound or CT scan can be done for further evaluation.\par Impression: Degenerative changes as described above.\par The spinal cord demonstrates normal signal and caliber.\par \par \par

## 2019-12-17 NOTE — HISTORY OF PRESENT ILLNESS
[FreeTextEntry1] : Mr. Patel is a pleasant 74 year old man here for initial consultation of right sided neck and shoulder pain. \par \par He has significant medical history of generalized weakness and muscle atrophy that started around June 2019. He does not yet have a diagnosis but there is concern for a neuromuscular disease. He adamantly denies any history of muscular dystrophy that has been documented elsewhere in the EMR. He was hospitalized in August 2019 at [facility] after he fell and was pinned to the ground by furniture for 36 hours. During hospital stay he was diagnosed with dysphagia s/p PEG, eventually upgraded to dysphagia 1 with honey thick liquids. He has multiple subspecialty follow ups including Dr. Garibay Neurology. An EMG by Dr. Sanchez at [facility] from 10/31/19 showed sensorimotor peripheral neuropathy with no evidence of myopathy. Also has chronic BPH, with chronic indwelling catheter and follows with urology Dr. Jara. \par \par Right shoulder pain:\par - Location: anterior/lateral shoulder\par - Onset: >2 years ago, insidious\par - Palliative: No pain at rest, better with heat, does not take any medication for pain\par - Quality: Sharp, stabbing, tight\par - Radiation: None\par - Severity: 8-10/10; worse at night when in bed\par - Timing: Intermittent, worse with overhead arm movements\par \par Neck Pain:\par - Location: Right worse than left\par - Onset: ~3-4 months, insidious\par - Palliative: Better with rest; does not take any medication for pain\par - Quality: Tight, stiff\par - Radiation: None\par - Severity: Moderate\par - Timing: Intermittent, worse with head turning\par \par Also endorses chronic low back pain of 10-20 years duration.

## 2019-12-17 NOTE — SOCIAL HISTORY
[FreeTextEntry6] : Before functional decline in mid 2019 patient was independent with ambulation and ADLs, worked in [place].  [FreeTextEntry1] : Patient lives in [facility] since d/c from [facility]. Ambulates with RW (earlier this year was independent with ambulation and ADLs)

## 2019-12-17 NOTE — ED POST DISCHARGE NOTE - DETAILS
LVM for pt to return call to ED. Patient w/ chronic fowler, called to assess symptoms and need to treat - Lilia Pelletier PA-C lvm to call back admin line - Lata Ventura PA-C 12/19/19: left message with [phone] call back will send certified letter

## 2019-12-17 NOTE — REVIEW OF SYSTEMS
[Difficulty Walking] : difficulty walking [Patient Intake Form Reviewed] : Patient intake form was reviewed [Negative] : Gastrointestinal [FreeTextEntry8] : Chronic indwelling fowler cathether [FreeTextEntry9] : As per HPI [de-identified] : numbness/tingling of right middle finger

## 2019-12-17 NOTE — PHYSICAL EXAM
[FreeTextEntry1] : Gen - NAD, Comfortable, Cachexia\par HEENT - NCAT, EOMI, MMM\par Neck - Supple, No limited ROM\par Pulm - Breathing comfortably, good chest expansion\par Cardiovascular - Well perfused. \par Abdomen - Soft, NT/ND\par Extremities - No C/C/E, No calf tenderness; Muscle wasting bilateral UE/LE\par Neuro-\par    Cognitive - AAOx4\par    Communication - Fluent, No dysarthria\par    Attention: Intact \par    Judgement: Good evidence of judgement    \par    Cranial Nerves - CN 2-12 intact\par    Motor - \par                   LEFT    UE - ShAB 5/5, EF 5/5, EE 5/5, WE 5/5,  5/5\par                   RIGHT UE - ShAB 5/5, EF 5/5, EE 5/5, WE 5/5,  5/5\par                   LEFT    LE - HF 4/5, KE 5/5, DF 5/5, PF 5/5\par                   RIGHT LE - HF 4/5, KE 5/5, DF 4/5, PF 5/5   \par    Sensory - Intact to LT\par    Reflexes - DTR diminished but symmetrical bilaterally \par    Coordination - FTN intact\par    Tone - normal\par    Proprioception - intact\par Psychiatric - Mood stable, Affect WNL\par Skin:  all skin intact  \par Gait: Slow with decreased step and stride length. Right foot mildly overpronates. Ambulating with RW. No trendelenberg bilaterally. No foot drop. Toe walk intact, ?difficulty with heel walk on left\par \par Shoulder:\par - Inspection: No gross deformity bilaterally\par - Palpation: Point tenderness on the lateral/anterior right shoulder\par - ROM: \par   Abduction: Full on left, 130 deg Right\par   Flexion: Full on left, 120 deg Right\par   Internal Rotation: Full bilaterally\par   External Rotation: Full on left, 70 deg on right\par - Special Tests:\par   Mildly positive Yokums and Crawford on the right\par   Pain with lowering right arm at 90 deg shoulder flexion \par   + Crepitus on the right\par \par Neck:\par - Inspection: No gross deformity or curvature\par - Palpation: + tenderness of anterior trapezius bilaterally, right worse than left. No tenderness to palpation of cervical paraspinal muscles.\par - ROM: 30 deg rotation bilaterally\par - Negative spurlings\par

## 2019-12-18 RX ORDER — DRAINAGE BAG
EACH MISCELLANEOUS
Qty: 2 | Refills: 11 | Status: ACTIVE | OUTPATIENT
Start: 2019-12-18

## 2019-12-18 RX ORDER — MEDICAL SUPPLY, MISCELLANEOUS
EACH MISCELLANEOUS
Qty: 2 | Refills: 11 | Status: ACTIVE | OUTPATIENT
Start: 2019-12-18

## 2019-12-23 ENCOUNTER — APPOINTMENT (OUTPATIENT)
Dept: UROLOGY | Facility: CLINIC | Age: 74
End: 2019-12-23

## 2020-01-02 ENCOUNTER — APPOINTMENT (OUTPATIENT)
Dept: UROLOGY | Facility: CLINIC | Age: 75
End: 2020-01-02
Payer: MEDICARE

## 2020-01-02 ENCOUNTER — CLINICAL ADVICE (OUTPATIENT)
Age: 75
End: 2020-01-02

## 2020-01-02 ENCOUNTER — OUTPATIENT (OUTPATIENT)
Dept: OUTPATIENT SERVICES | Facility: HOSPITAL | Age: 75
LOS: 1 days | End: 2020-01-02
Payer: MEDICARE

## 2020-01-02 VITALS
WEIGHT: 127 LBS | TEMPERATURE: 98.1 F | BODY MASS INDEX: 16.83 KG/M2 | SYSTOLIC BLOOD PRESSURE: 109 MMHG | HEIGHT: 73 IN | DIASTOLIC BLOOD PRESSURE: 70 MMHG | RESPIRATION RATE: 16 BRPM | HEART RATE: 98 BPM

## 2020-01-02 DIAGNOSIS — R35.0 FREQUENCY OF MICTURITION: ICD-10-CM

## 2020-01-02 PROCEDURE — 99213 OFFICE O/P EST LOW 20 MIN: CPT | Mod: 25

## 2020-01-02 PROCEDURE — 51702 INSERT TEMP BLADDER CATH: CPT

## 2020-01-02 NOTE — HISTORY OF PRESENT ILLNESS
[FreeTextEntry1] : SHANTANU CHARLES is a 74 year year old male being seen for an follow up following catheter change. \par \par

## 2020-01-02 NOTE — ASSESSMENT
[FreeTextEntry1] : SHANTANU CHARLES is a 74 year year old male being seen for an follow up following Spencer catheter change. \par \par Patient may follow up this month for a urodynamics test. He should discuss the results with Dr. Jara.

## 2020-01-02 NOTE — PHYSICAL EXAM
[Abdomen Tenderness] : non-tender [Abdomen Soft] : soft [] : no respiratory distress [Respiration, Rhythm And Depth] : normal respiratory rhythm and effort [Exaggerated Use Of Accessory Muscles For Inspiration] : no accessory muscle use [Oriented To Time, Place, And Person] : oriented to person, place, and time [FreeTextEntry1] : weak and wobbly [No Focal Deficits] : no focal deficits

## 2020-01-02 NOTE — ADDENDUM
[FreeTextEntry1] : This note was authored by Chikis Peterson, working as scribe for Dr. Avery Pearson.\par \par I, Dr. Avery Pearson, have have reviewed the content of this note and confirm it is true and accurate. I personally performed the history and physical examination and made all the decisions. \par \par 01/02/2020

## 2020-01-07 DIAGNOSIS — R33.9 RETENTION OF URINE, UNSPECIFIED: ICD-10-CM

## 2020-01-07 DIAGNOSIS — N40.1 BENIGN PROSTATIC HYPERPLASIA WITH LOWER URINARY TRACT SYMPTOMS: ICD-10-CM

## 2020-01-13 ENCOUNTER — RX RENEWAL (OUTPATIENT)
Age: 75
End: 2020-01-13

## 2020-01-13 RX ORDER — TAMSULOSIN HYDROCHLORIDE 0.4 MG/1
0.4 CAPSULE ORAL
Qty: 60 | Refills: 3 | Status: ACTIVE | COMMUNITY
Start: 2020-01-10 | End: 1900-01-01

## 2020-01-15 ENCOUNTER — APPOINTMENT (OUTPATIENT)
Dept: UROLOGY | Facility: CLINIC | Age: 75
End: 2020-01-15

## 2020-01-28 ENCOUNTER — APPOINTMENT (OUTPATIENT)
Dept: GASTROENTEROLOGY | Facility: CLINIC | Age: 75
End: 2020-01-28
Payer: MEDICARE

## 2020-01-28 VITALS
HEIGHT: 73 IN | WEIGHT: 128 LBS | BODY MASS INDEX: 16.96 KG/M2 | HEART RATE: 100 BPM | DIASTOLIC BLOOD PRESSURE: 70 MMHG | OXYGEN SATURATION: 98 % | SYSTOLIC BLOOD PRESSURE: 120 MMHG

## 2020-01-28 DIAGNOSIS — Z93.1 GASTROSTOMY STATUS: ICD-10-CM

## 2020-01-28 PROCEDURE — 99204 OFFICE O/P NEW MOD 45 MIN: CPT

## 2020-01-28 NOTE — PHYSICAL EXAM
[General Appearance - Alert] : alert [General Appearance - In No Acute Distress] : in no acute distress [Sclera] : the sclera and conjunctiva were normal [Outer Ear] : the ears and nose were normal in appearance [Neck Appearance] : the appearance of the neck was normal [Auscultation Breath Sounds / Voice Sounds] : lungs were clear to auscultation bilaterally [Apical Impulse] : the apical impulse was normal [Heart Rate And Rhythm] : heart rate was normal and rhythm regular [Bowel Sounds] : normal bowel sounds [Abdomen Soft] : soft [Abdomen Tenderness] : non-tender [] : no hepato-splenomegaly [Abdomen Mass (___ Cm)] : no abdominal mass palpated [Abnormal Walk] : normal gait [Skin Color & Pigmentation] : normal skin color and pigmentation [Oriented To Time, Place, And Person] : oriented to person, place, and time

## 2020-01-29 ENCOUNTER — FORM ENCOUNTER (OUTPATIENT)
Age: 75
End: 2020-01-29

## 2020-01-30 ENCOUNTER — APPOINTMENT (OUTPATIENT)
Dept: RADIOLOGY | Facility: IMAGING CENTER | Age: 75
End: 2020-01-30
Payer: MEDICARE

## 2020-01-30 ENCOUNTER — OUTPATIENT (OUTPATIENT)
Dept: OUTPATIENT SERVICES | Facility: HOSPITAL | Age: 75
LOS: 1 days | End: 2020-01-30
Payer: MEDICARE

## 2020-01-30 ENCOUNTER — APPOINTMENT (OUTPATIENT)
Dept: UROLOGY | Facility: CLINIC | Age: 75
End: 2020-01-30
Payer: MEDICARE

## 2020-01-30 ENCOUNTER — OUTPATIENT (OUTPATIENT)
Dept: OUTPATIENT SERVICES | Facility: HOSPITAL | Age: 75
LOS: 1 days | End: 2020-01-30

## 2020-01-30 ENCOUNTER — APPOINTMENT (OUTPATIENT)
Dept: RADIOLOGY | Facility: IMAGING CENTER | Age: 75
End: 2020-01-30

## 2020-01-30 DIAGNOSIS — M75.41 IMPINGEMENT SYNDROME OF RIGHT SHOULDER: ICD-10-CM

## 2020-01-30 PROCEDURE — 73030 X-RAY EXAM OF SHOULDER: CPT

## 2020-01-30 PROCEDURE — 51702 INSERT TEMP BLADDER CATH: CPT

## 2020-01-30 PROCEDURE — 73030 X-RAY EXAM OF SHOULDER: CPT | Mod: 26,RT

## 2020-01-31 NOTE — ASSESSMENT
[FreeTextEntry1] : 75yo M [facility] BPH presents for evaluation of his diagnosis of oropharyngeal dysphagia s/p PEG placement.\par \par \par \par Plan:\par \par Discussed repeat speech/swallow study for reevaluation of dysphagia. Will consider removal of PEG based on speech/swallow and neurology evaluations.  \par \par \par

## 2020-01-31 NOTE — HISTORY OF PRESENT ILLNESS
[de-identified] : 73yo M [facility] BPH presents for evaluation of his diagnosis of oropharyngeal dysphagia s/p PEG placement. Patient states in summer of 2019 he had experienced imbalance and fell on the floor of his apartment. Because his hands were stuck under the couch, he was unable to get up. A day later an ambulance came and brought him into [facility]. He was found to have septic and cachectic, and was diagnosed with aspiration pneumonia. He was unable to pass speech/swallow evaluation,and was diagnosed with oropharyngeal dysphagia and had a PEG placed. He was then discharged to rehab facility and underwent PT, OT and speech therapy where he stated he made progress and when reevaluated was told he was able to eat pureed food and honey-thick liquids. He denies ever having low appetite or dysphagia. He stated once in a while he will experience a capsule or solid food getting stuck in the back of his throat but then he will use techniques he learned from swallow therapy to help with this. He states he is able to eat hamburgers and pizza, and has steadily gained his weight back. His last use of the PEG was in 11/2019. He does not believe that he needs the PEG tube at this time.\par \par Of note, he was evaluated by neurology and stated the EMG test noted weakness in his extremities and was given a diagnosis of peripheral neuropathy. He stated he has an appointment with neurologist  at [facility].

## 2020-02-03 ENCOUNTER — APPOINTMENT (OUTPATIENT)
Dept: UROLOGY | Facility: CLINIC | Age: 75
End: 2020-02-03
Payer: MEDICARE

## 2020-02-03 PROCEDURE — 99213 OFFICE O/P EST LOW 20 MIN: CPT

## 2020-02-03 NOTE — CHART NOTE - NSCHARTNOTEFT_GEN_A_CORE
Upon Nutritional Assessment by the Registered Dietitian your patient was determined to meet criteria / has evidence of the following diagnosis/diagnoses:          [ ]  Mild Protein Calorie Malnutrition        [ ]  Moderate Protein Calorie Malnutrition        [x ] Severe Protein Calorie Malnutrition        [ ] Unspecified Protein Calorie Malnutrition        [x ] Underweight / BMI <19        [ ] Morbid Obesity / BMI > 40      Findings as based on:  [x ] Comprehensive nutrition assessment   [ x] Nutrition Focused Physical Exam  [ x] Other: severe muscle loss, severe fat depletion, previous significant wt loss, BMI 18.1Kg/m2.       Nutrition Plan/Recommendations:    see initial nutrition assessment in documents for recommendations       PROVIDER Section:     By signing this assessment you are acknowledging and agree with the diagnosis/diagnoses assigned by the Registered Dietitian    Comments: 2

## 2020-02-03 NOTE — PHYSICAL EXAM
[Abdomen Tenderness] : non-tender [Abdomen Soft] : soft [] : no respiratory distress [Exaggerated Use Of Accessory Muscles For Inspiration] : no accessory muscle use [Oriented To Time, Place, And Person] : oriented to person, place, and time [Respiration, Rhythm And Depth] : normal respiratory rhythm and effort [No Focal Deficits] : no focal deficits [FreeTextEntry1] : weak and wobbly

## 2020-02-03 NOTE — HISTORY OF PRESENT ILLNESS
[FreeTextEntry1] : SHANTANU CHARLES is a 74 year year old male being seen for an follow up following catheter change. \par \par 2/3/20: Patient presents today for a follow up He reports discomfort around catheter with drainage around the catheter and passive drainage into the collection bag. \par \par

## 2020-02-03 NOTE — ASSESSMENT
[FreeTextEntry1] : 2/3/20: Patient presents today for a follow up He reports discomfort around catheter with drainage around the catheter and passive drainage into the collection bag. \par \par The patient produced a urine sample which will be sent for urinalysis, urine cytology, and urine culture.\par Blood work today included comprehensive metabolic panel, CBC, PSA, and testosterone.\par \par I prescribed Bactrim today. \par \par RTC in 1 month.

## 2020-02-03 NOTE — ADDENDUM
[FreeTextEntry1] : This note was authored by Ashley Feliz working as a scribe for Dr. Avery Pearson.\par \par I, Dr. Avery Pearson, have reviewed the content of this note and confirm it is true and accurate. I personally performed the history and physical examination and made all the decisions.\par

## 2020-02-04 ENCOUNTER — APPOINTMENT (OUTPATIENT)
Dept: PHYSICAL MEDICINE AND REHAB | Facility: CLINIC | Age: 75
End: 2020-02-04

## 2020-02-20 ENCOUNTER — FORM ENCOUNTER (OUTPATIENT)
Age: 75
End: 2020-02-20

## 2020-02-21 ENCOUNTER — OUTPATIENT (OUTPATIENT)
Dept: OUTPATIENT SERVICES | Facility: HOSPITAL | Age: 75
LOS: 1 days | Discharge: HOME | End: 2020-02-21

## 2020-02-21 ENCOUNTER — APPOINTMENT (OUTPATIENT)
Dept: RADIOLOGY | Facility: HOSPITAL | Age: 75
End: 2020-02-21
Payer: MEDICARE

## 2020-02-21 ENCOUNTER — OUTPATIENT (OUTPATIENT)
Dept: OUTPATIENT SERVICES | Facility: HOSPITAL | Age: 75
LOS: 1 days | End: 2020-02-21

## 2020-02-21 ENCOUNTER — APPOINTMENT (OUTPATIENT)
Dept: SPEECH THERAPY | Facility: HOSPITAL | Age: 75
End: 2020-02-21
Payer: MEDICARE

## 2020-02-21 DIAGNOSIS — R13.10 DYSPHAGIA, UNSPECIFIED: ICD-10-CM

## 2020-02-21 PROCEDURE — 74230 X-RAY XM SWLNG FUNCJ C+: CPT | Mod: 26

## 2020-02-24 ENCOUNTER — APPOINTMENT (OUTPATIENT)
Dept: NEUROLOGY | Facility: CLINIC | Age: 75
End: 2020-02-24
Payer: MEDICARE

## 2020-02-24 VITALS
DIASTOLIC BLOOD PRESSURE: 70 MMHG | HEART RATE: 105 BPM | HEIGHT: 73 IN | SYSTOLIC BLOOD PRESSURE: 108 MMHG | WEIGHT: 138 LBS | BODY MASS INDEX: 18.29 KG/M2

## 2020-02-24 DIAGNOSIS — Z82.3 FAMILY HISTORY OF STROKE: ICD-10-CM

## 2020-02-24 DIAGNOSIS — Z80.0 FAMILY HISTORY OF MALIGNANT NEOPLASM OF DIGESTIVE ORGANS: ICD-10-CM

## 2020-02-24 DIAGNOSIS — Z87.39 PERSONAL HISTORY OF OTHER DISEASES OF THE MUSCULOSKELETAL SYSTEM AND CONNECTIVE TISSUE: ICD-10-CM

## 2020-02-24 PROCEDURE — 99215 OFFICE O/P EST HI 40 MIN: CPT

## 2020-02-25 ENCOUNTER — APPOINTMENT (OUTPATIENT)
Dept: UROLOGY | Facility: CLINIC | Age: 75
End: 2020-02-25
Payer: MEDICARE

## 2020-02-25 ENCOUNTER — OUTPATIENT (OUTPATIENT)
Dept: OUTPATIENT SERVICES | Facility: HOSPITAL | Age: 75
LOS: 1 days | End: 2020-02-25
Payer: MEDICARE

## 2020-02-25 VITALS
HEART RATE: 92 BPM | RESPIRATION RATE: 16 BRPM | TEMPERATURE: 98 F | SYSTOLIC BLOOD PRESSURE: 116 MMHG | DIASTOLIC BLOOD PRESSURE: 75 MMHG

## 2020-02-25 DIAGNOSIS — R35.0 FREQUENCY OF MICTURITION: ICD-10-CM

## 2020-02-25 PROBLEM — Z87.39 HISTORY OF RHABDOMYOLYSIS: Status: RESOLVED | Noted: 2020-02-25 | Resolved: 2020-02-25

## 2020-02-25 PROBLEM — Z80.0 FAMILY HISTORY OF MALIGNANT NEOPLASM OF STOMACH: Status: ACTIVE | Noted: 2020-02-25

## 2020-02-25 PROBLEM — Z82.3 FAMILY HISTORY OF STROKE: Status: ACTIVE | Noted: 2020-02-25

## 2020-02-25 PROCEDURE — 51702 INSERT TEMP BLADDER CATH: CPT

## 2020-02-25 NOTE — DISCUSSION/SUMMARY
[FreeTextEntry1] : Opinion\par \par I do not have a clear diagnosis at the present time as he has multiple symptoms and signs which indicate history of pharyngeal dysphagia, features of stiffness, rigidity, mild spasticity including tremor,Parkinsonism and bilateral Babinski sign and peripheral sensorimotor neuropathy. Thus he has multisystem involvement and I suspect systems degeneration without any cognitive language memory dysfunction. 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 I therefore advised him to secure the special lab tests undertaken at [facility] and again back to Dr. Siddiqi who has meticulously investigated the patient and to remain under the care of and alternatively has a choice of returning back to my office after securing the lab tests. Extensive education and counseling was provided and was advised to continue his care under the gastroenterologist, urologist and his internist Dr. Britton.\par \par The patient understands and we'll proceed with my advice

## 2020-02-25 NOTE — HISTORY OF PRESENT ILLNESS
[FreeTextEntry1] : Mr. Patel is a 74-year-old  male seeking neurological second opinion and has been evaluated by fever neurologists and multi-specialities.\par \par Background history-the patient stated that he was evaluated by Dr. Sulema Siddiqi at [facility] in October of 2019 and suggested the diagnosis of distal sensory motor peripheral neuropathy without any evidence of demyelinating process or muscle disease. He had an accidental fall and was admitted to [facility] in August of 2019 including rhabdomyolysis and was later treated in a nursing home but he stated that he was extensively investigated and there was no abnormality recorded in the brain and the spine. He however had dysphagia and ultimately required a PEG and urinary tract infection requiring Spencer catheter. He was reevaluated at her hospital and special tests were sent to [facility] and was advised that the workup was negative and he is to return back to Dr. Siddiqi for further investigation and care.\par \par Current complaints the patient's major concern is problems with balance which she believes started insidiously in the summer of 2019 and was able to walk with a cane and transitioned from a wheelchair and is currently using her walker. He has dysphagia however he is able to swallow both solids and liquids. He has a Spencer catheter which he believes was inserted for convenience while he was in the hospital.\par \par Review of systems did not reveal any headache diplopia dysarthria difficulty in breathing and denies any history of tingling or numbness in the hands and feet but I noted that he has bilateral hand tremor, tenderness in the leg particularly on the left and has sebohric dermatitis and slowness of movements. He denied any chest pain or shortness of breath there is no abdominal pain and denied any focal motor weakness. There is no bowel dysfunction.\par \par He has not had any toxic habits a single has no children and is retired with a family history of stomach cancer and stroke. I reviewed his medications and allergies and medical records.

## 2020-02-25 NOTE — DATA REVIEWED
[de-identified] : Electrophysiologic studies by Dr. Sanchez at [facility] revealed distal sensory motor peripheral neuropathy which appears axonal in nature without any features of demyelination an EMG was unremarkable except for distal neuropathic changes without any evidence of myopathy. [de-identified] : The patient had cervical and thoracic spine MRI which is unremarkable for any compressive disease. Historically he had MRI of the brain and was advised that it was unremarkable and there is no history of stroke mass lesion atrophy. There are several motility test and was advised that he has pharyngeal dysphagia and ultimately a PEG was inserted. He was also evaluated by urologist with diagnostic studies and has been on Spencer catheter. [de-identified] : There are several consultations reports including Dr. Lawrence we have summarized he's physical limitations without any clear evidence of diagnostic clarification.\par \par I also reviewed his lab tests and besides high sedimentation rate and CRP but no significant abnormalities recorded however I do not have the [facility] lab test reports and the patient promised to secure it from  .

## 2020-02-25 NOTE — REVIEW OF SYSTEMS
[Feeling Poorly] : feeling poorly [Poor Coordination] : poor coordination [Leg Weakness] : leg weakness [Difficulty Walking] : difficulty walking [Ataxia] : ataxia [As Noted in HPI] : as noted in HPI [Negative] : Heme/Lymph [Memory Lapses or Loss] : no memory loss [Confused or Disoriented] : no confusion [Changed Thought Patterns] : no change in thought patterns [Difficulty with Language] : no ~M difficulty with language [Decr. Concentrating Ability] : no decrease in concentrating ability [Facial Weakness] : no facial weakness [Repeating Questions] : no repeated questioning about recent events [Arm Weakness] : no arm weakness [Difficulty Writing] : no difficulty writing [Difficulties in Speech] : no speech difficulties [Hand Weakness] : no hand weakness [Abnormal Sensation] : no abnormal sensation [Numbness] : no numbness [Tingling] : no tingling [Dizziness] : no dizziness [Hypersensitivity] : no hypersensitivity [Seizures] : no convulsions [Fainting] : no fainting [Cluster Headache] : no cluster headache [Lightheadedness] : no lightheadedness [Vertigo] : no vertigo [Tension Headache] : no tension-type headache [Migraine Headache] : no migraine headache [Inability to Walk] : able to walk [Limping] : not limping [Frequent Falls] : not falling

## 2020-02-25 NOTE — PHYSICAL EXAM
[FreeTextEntry1] : Gen. examination-vital signs are recorded and unremarkable. There is no carotid bruit, thyromegaly or lymphadenopathy. Chest is clear heart sounds are normal. Abdomen is soft and there is no tenderness. Pedal pulsations are normal. He has a PEG tube and a Spencer catheter. Straight leg raising test is negative. Spine is without tenderness but has mild kyphosis. There is no cervical spine tenderness or muscle spasm. There is no hypertrophy of the peripheral nerves.\par \par Neurological examination-\par \par The patient is a very pleasant cooperative and does not exhibit any memory language or cognitive dysfunction. Even though he was told of dementia I do not find any problems with his memory intellectual abilities and he noted it is history precisely and appears to have good insight and judgment. He does not appear anxious or depressed and there has been no psychiatric history.\par \par Cranial nerve examination revealed mild asymmetry of the palpebral fissures but there is no extraocular movement abnormality in both supranuclear and intranuclear movements. Convergence is present and pupils are symmetric brisk. Optic disc are normal and visual fields are full. He has a keeping face facial muscle atrophy including atrophy the temporalis muscle but the patient stated that this is that he has always been. There is minimal and Myerson sign. There are functions are intact with good gag reflex midline tongue. Facial muscles appear symmetric in normal with minimal facial muscle weakness.\par \par Motor tone is present and surprisingly his motor strength is 5/5 in both upper and lower extremity with minimal cogwheeling rigidity in the upper extremities and spasticity in lower extremity mixed with minimal rigidity. His finger-to-nose and heel-to-shin testing is slow but not clumsy. Deep tendon reflexes are 2+ symmetric in the upper extremities and knees there is ankle reflexes are absent and there is bilateral Babinski sign. Sensory evaluation is normal except for decreased vibration and tactile sensation in the toes position sense is normal and there is no dermatomal sensory involvement. He walks with a loading walker and Romberg sign is negative. His neck extensor muscles are slightly injected.

## 2020-02-27 ENCOUNTER — APPOINTMENT (OUTPATIENT)
Dept: SPEECH THERAPY | Facility: CLINIC | Age: 75
End: 2020-02-27

## 2020-02-27 ENCOUNTER — APPOINTMENT (OUTPATIENT)
Dept: SPEECH THERAPY | Facility: CLINIC | Age: 75
End: 2020-02-27
Payer: MEDICARE

## 2020-02-27 DIAGNOSIS — N40.1 BENIGN PROSTATIC HYPERPLASIA WITH LOWER URINARY TRACT SYMPTOMS: ICD-10-CM

## 2020-02-27 DIAGNOSIS — N32.89 OTHER SPECIFIED DISORDERS OF BLADDER: ICD-10-CM

## 2020-02-27 DIAGNOSIS — R33.9 RETENTION OF URINE, UNSPECIFIED: ICD-10-CM

## 2020-02-27 PROCEDURE — 92526 ORAL FUNCTION THERAPY: CPT | Mod: GN

## 2020-03-04 DIAGNOSIS — R13.12 DYSPHAGIA, OROPHARYNGEAL PHASE: ICD-10-CM

## 2020-03-09 ENCOUNTER — APPOINTMENT (OUTPATIENT)
Dept: SPEECH THERAPY | Facility: CLINIC | Age: 75
End: 2020-03-09

## 2020-03-10 ENCOUNTER — APPOINTMENT (OUTPATIENT)
Dept: GASTROENTEROLOGY | Facility: CLINIC | Age: 75
End: 2020-03-10
Payer: MEDICARE

## 2020-03-10 VITALS
BODY MASS INDEX: 18.02 KG/M2 | DIASTOLIC BLOOD PRESSURE: 60 MMHG | SYSTOLIC BLOOD PRESSURE: 132 MMHG | HEART RATE: 89 BPM | HEIGHT: 73 IN | OXYGEN SATURATION: 98 % | TEMPERATURE: 97.8 F | RESPIRATION RATE: 15 BRPM | WEIGHT: 136 LBS

## 2020-03-10 PROCEDURE — 99213 OFFICE O/P EST LOW 20 MIN: CPT

## 2020-03-10 NOTE — HISTORY OF PRESENT ILLNESS
[de-identified] : Improvement in speech and swallow evaluation. Pt has not used PEG tube since Nov. 2019. Discussed case with Speech pathology.

## 2020-03-24 ENCOUNTER — OUTPATIENT (OUTPATIENT)
Dept: OUTPATIENT SERVICES | Facility: HOSPITAL | Age: 75
LOS: 1 days | End: 2020-03-24
Payer: MEDICARE

## 2020-03-24 ENCOUNTER — APPOINTMENT (OUTPATIENT)
Dept: UROLOGY | Facility: CLINIC | Age: 75
End: 2020-03-24

## 2020-03-24 ENCOUNTER — APPOINTMENT (OUTPATIENT)
Dept: UROLOGY | Facility: CLINIC | Age: 75
End: 2020-03-24
Payer: MEDICARE

## 2020-03-24 VITALS
HEIGHT: 73 IN | WEIGHT: 136 LBS | HEART RATE: 91 BPM | SYSTOLIC BLOOD PRESSURE: 110 MMHG | DIASTOLIC BLOOD PRESSURE: 71 MMHG | TEMPERATURE: 97.7 F | BODY MASS INDEX: 18.02 KG/M2

## 2020-03-24 DIAGNOSIS — R35.0 FREQUENCY OF MICTURITION: ICD-10-CM

## 2020-03-24 PROCEDURE — 51702 INSERT TEMP BLADDER CATH: CPT

## 2020-03-26 ENCOUNTER — APPOINTMENT (OUTPATIENT)
Dept: OTOLARYNGOLOGY | Facility: CLINIC | Age: 75
End: 2020-03-26

## 2020-03-26 DIAGNOSIS — R33.9 RETENTION OF URINE, UNSPECIFIED: ICD-10-CM

## 2020-04-07 ENCOUNTER — INPATIENT (INPATIENT)
Facility: HOSPITAL | Age: 75
LOS: 7 days | Discharge: NURSING, REHAB OR LONG-TERM CARE | End: 2020-04-15
Attending: PEDIATRICS | Admitting: PEDIATRICS
Payer: MEDICARE

## 2020-04-07 VITALS
RESPIRATION RATE: 16 BRPM | DIASTOLIC BLOOD PRESSURE: 69 MMHG | OXYGEN SATURATION: 98 % | HEART RATE: 97 BPM | TEMPERATURE: 99 F | SYSTOLIC BLOOD PRESSURE: 117 MMHG

## 2020-04-07 PROCEDURE — 71045 X-RAY EXAM CHEST 1 VIEW: CPT | Mod: 26

## 2020-04-07 RX ORDER — TETANUS TOXOID, REDUCED DIPHTHERIA TOXOID AND ACELLULAR PERTUSSIS VACCINE, ADSORBED 5; 2.5; 8; 8; 2.5 [IU]/.5ML; [IU]/.5ML; UG/.5ML; UG/.5ML; UG/.5ML
0.5 SUSPENSION INTRAMUSCULAR ONCE
Refills: 0 | Status: COMPLETED | OUTPATIENT
Start: 2020-04-07 | End: 2020-04-07

## 2020-04-07 NOTE — ED PROVIDER NOTE - OBJECTIVE STATEMENT
74 year old male with PMH BPH with chronic indwelling fowler, muscle dystrophy presenting for evaluation after being found on the floor by [facility]. patient reports that he has been feeling weak for the last 2 days, he noted to have tremors. He denies falling on to the floor, instead he slowing placed himself there but was too weak to get up. He believes he was on the grown for several hours. He denies recent travel or sick contact, fever, chills, cough, SOB, chest pain, HA, dizziness, neck stiffness, abdominal pain, N/V/D, syncope. 74 year old male with PMH BPH with chronic indwelling fowler, muscle dystrophy presenting for evaluation after being found on the floor by [facility]. patient reports that he has been feeling weak for the last 2 days, he noted to have tremors. He denies falling on to the floor, instead he slowing placed himself there but was too weak to get up. He believes he was on the grown for several hours. He denies recent travel or sick contact, fever, chills, cough, SOB, chest pain, HA, dizziness, neck stiffness, abdominal pain, N/V/D, syncope.    Patient's Home Health Aid:  [phone] (Ramesh)

## 2020-04-07 NOTE — ED PROVIDER NOTE - CARE PLAN
Principal Discharge DX:	Lethargy  Secondary Diagnosis:	Facial trauma Principal Discharge DX:	UTI (urinary tract infection)  Secondary Diagnosis:	Gait abnormality

## 2020-04-07 NOTE — ED PROVIDER NOTE - ATTENDING CONTRIBUTION TO CARE
Farida Pope M.D: 74M w/ pmh bph, chronic fowler, bipolar, muscular dystrophy arrives w/ complaints of 2 days of progressively worsening weakness, unable to get out of bed. notes he slid out of bed the last two days and was too weak to get off the floor, HHA had to help him, unsure how long he was on the ground. denies trauma, but has evidence of trauma. no cough no sob no f/c no cp no abd pain. on exam pt with scrapes to left cheek, redness to right ear, chest wall and pelvis stable no ctl spine tenderness moving all extremities, noted to be febrile rectally, tachycardic, lungs ctab, abd soft ntnd. concern for infectious etiology of weakness, will culture, check urine. given evidence of trauma will evaluate for injury with ct head, c-spine, cxr, pelvis xr. also concern for rhabdo given unknown down time will check ck. will update tdap. will likely require admission given inability to ambulate due to weakness.

## 2020-04-07 NOTE — ED PROVIDER NOTE - ENMT, MLM
face: large abrasion over left maxillary region. large abrasion to the helix of right ear. Mouth: no subluxed teeth, no through and through lac to tongue

## 2020-04-07 NOTE — ED PROVIDER NOTE - CLINICAL SUMMARY MEDICAL DECISION MAKING FREE TEXT BOX
74 year old male with PMH BPH with chronic indwelling fowler presenting with weakness and facial trauma s/p falling on floor?? plan for routine labs, including CK UA, urine cx, CT head/face/cspine, cxr, pelvis xray

## 2020-04-07 NOTE — ED PROVIDER NOTE - CARDIAC, MLM
Normal rate, regular rhythm.  Heart sounds S1, S2.  No murmurs, rubs or gallops. no chest wall tenderness, crepitus, bony deformities.

## 2020-04-07 NOTE — ED ADULT NURSE NOTE - OBJECTIVE STATEMENT
Pt arrives to Ed s/p "fall" at home.  Pt reports he rolled softly out of bed and did not hit any part of his body hard.  Pt observed to have dark abrasion and bruising to left cheek which pt states is due to "laying on his cheek for a long time on the floor." Pt also observed to have redness to his right ear with some bleeding which pt reports is a "burst blister."  Pt states he went to ground because of weakness.  Pt has Spencer with leg bag in place.  Pt reports pain to bilateral elbows when moved by PA during assessment.  PA assessed ear, no hearing loss present.

## 2020-04-07 NOTE — ED ADULT NURSE NOTE - INTERVENTIONS DEFINITIONS
Monitor for mental status changes and reorient to person, place, and time/Reinforce activity limits and safety measures with patient and family/[place] to call system/Physically safe environment: no spills, clutter or unnecessary equipment/Call bell, personal items and telephone within reach/Instruct patient to call for assistance/Stretcher in lowest position, wheels locked, appropriate side rails in place

## 2020-04-07 NOTE — ED ADULT TRIAGE NOTE - CHIEF COMPLAINT QUOTE
Pt w/ hx of psychiatric disorder,  muscular dystrophy, urinary retention w/ indwelling fowler recently discharged from rehab where his PEG tube was removed, Pt lives alone found by HHA on the floor today Pt reports he rolled off the couch and too weak to get up,  abrasion noted to Pt's left cheek bone

## 2020-04-08 DIAGNOSIS — N39.0 URINARY TRACT INFECTION, SITE NOT SPECIFIED: ICD-10-CM

## 2020-04-08 DIAGNOSIS — A41.9 SEPSIS, UNSPECIFIED ORGANISM: ICD-10-CM

## 2020-04-08 DIAGNOSIS — Z29.9 ENCOUNTER FOR PROPHYLACTIC MEASURES, UNSPECIFIED: ICD-10-CM

## 2020-04-08 LAB
ALBUMIN SERPL ELPH-MCNC: 4.1 G/DL — SIGNIFICANT CHANGE UP (ref 3.3–5)
ALP SERPL-CCNC: 112 U/L — SIGNIFICANT CHANGE UP (ref 40–120)
ALT FLD-CCNC: 44 U/L — HIGH (ref 4–41)
ANION GAP SERPL CALC-SCNC: 17 MMO/L — HIGH (ref 7–14)
APPEARANCE UR: SIGNIFICANT CHANGE UP
AST SERPL-CCNC: 183 U/L — HIGH (ref 4–40)
BACTERIA # UR AUTO: HIGH
BASE EXCESS BLDV CALC-SCNC: 3 MMOL/L — SIGNIFICANT CHANGE UP
BASOPHILS # BLD AUTO: 0.07 K/UL — SIGNIFICANT CHANGE UP (ref 0–0.2)
BASOPHILS NFR BLD AUTO: 0.3 % — SIGNIFICANT CHANGE UP (ref 0–2)
BASOPHILS NFR SPEC: 0 % — SIGNIFICANT CHANGE UP (ref 0–2)
BILIRUB SERPL-MCNC: 0.6 MG/DL — SIGNIFICANT CHANGE UP (ref 0.2–1.2)
BILIRUB UR-MCNC: NEGATIVE — SIGNIFICANT CHANGE UP
BLASTS # FLD: 0 % — SIGNIFICANT CHANGE UP (ref 0–0)
BLOOD GAS VENOUS - CREATININE: 1.41 MG/DL — HIGH (ref 0.5–1.3)
BLOOD GAS VENOUS - FIO2: 21 — SIGNIFICANT CHANGE UP
BLOOD UR QL VISUAL: HIGH
BUN SERPL-MCNC: 22 MG/DL — SIGNIFICANT CHANGE UP (ref 7–23)
CALCIUM SERPL-MCNC: 9.4 MG/DL — SIGNIFICANT CHANGE UP (ref 8.4–10.5)
CHLORIDE BLDV-SCNC: 98 MMOL/L — SIGNIFICANT CHANGE UP (ref 96–108)
CHLORIDE SERPL-SCNC: 94 MMOL/L — LOW (ref 98–107)
CK SERPL-CCNC: HIGH U/L (ref 30–200)
CO2 SERPL-SCNC: 26 MMOL/L — SIGNIFICANT CHANGE UP (ref 22–31)
COLOR SPEC: YELLOW — SIGNIFICANT CHANGE UP
CREAT SERPL-MCNC: 1.43 MG/DL — HIGH (ref 0.5–1.3)
EOSINOPHIL # BLD AUTO: 0.05 K/UL — SIGNIFICANT CHANGE UP (ref 0–0.5)
EOSINOPHIL NFR BLD AUTO: 0.2 % — SIGNIFICANT CHANGE UP (ref 0–6)
EOSINOPHIL NFR FLD: 0 % — SIGNIFICANT CHANGE UP (ref 0–6)
GAS PNL BLDV: 137 MMOL/L — SIGNIFICANT CHANGE UP (ref 136–146)
GLUCOSE BLDV-MCNC: 121 MG/DL — HIGH (ref 70–99)
GLUCOSE SERPL-MCNC: 125 MG/DL — HIGH (ref 70–99)
GLUCOSE UR-MCNC: NEGATIVE — SIGNIFICANT CHANGE UP
HCO3 BLDV-SCNC: 26 MMOL/L — SIGNIFICANT CHANGE UP (ref 20–27)
HCT VFR BLD CALC: 39.8 % — SIGNIFICANT CHANGE UP (ref 39–50)
HCT VFR BLDV CALC: 40.3 % — SIGNIFICANT CHANGE UP (ref 39–51)
HGB BLD-MCNC: 13.3 G/DL — SIGNIFICANT CHANGE UP (ref 13–17)
HGB BLDV-MCNC: 13.1 G/DL — SIGNIFICANT CHANGE UP (ref 13–17)
HYALINE CASTS # UR AUTO: NEGATIVE — SIGNIFICANT CHANGE UP
IMM GRANULOCYTES NFR BLD AUTO: 1.5 % — SIGNIFICANT CHANGE UP (ref 0–1.5)
KETONES UR-MCNC: SIGNIFICANT CHANGE UP
LACTATE BLDV-MCNC: 2.2 MMOL/L — HIGH (ref 0.5–2)
LEUKOCYTE ESTERASE UR-ACNC: SIGNIFICANT CHANGE UP
LYMPHOCYTES # BLD AUTO: 0.64 K/UL — LOW (ref 1–3.3)
LYMPHOCYTES # BLD AUTO: 2.5 % — LOW (ref 13–44)
LYMPHOCYTES NFR SPEC AUTO: 3.5 % — LOW (ref 13–44)
MCHC RBC-ENTMCNC: 32.2 PG — SIGNIFICANT CHANGE UP (ref 27–34)
MCHC RBC-ENTMCNC: 33.4 % — SIGNIFICANT CHANGE UP (ref 32–36)
MCV RBC AUTO: 96.4 FL — SIGNIFICANT CHANGE UP (ref 80–100)
METAMYELOCYTES # FLD: 0 % — SIGNIFICANT CHANGE UP (ref 0–1)
MONOCYTES # BLD AUTO: 1.59 K/UL — HIGH (ref 0–0.9)
MONOCYTES NFR BLD AUTO: 6.1 % — SIGNIFICANT CHANGE UP (ref 2–14)
MONOCYTES NFR BLD: 2.6 % — SIGNIFICANT CHANGE UP (ref 2–9)
MYELOCYTES NFR BLD: 0 % — SIGNIFICANT CHANGE UP (ref 0–0)
NEUTROPHIL AB SER-ACNC: 90.4 % — HIGH (ref 43–77)
NEUTROPHILS # BLD AUTO: 23.14 K/UL — HIGH (ref 1.8–7.4)
NEUTROPHILS NFR BLD AUTO: 89.4 % — HIGH (ref 43–77)
NEUTS BAND # BLD: 2.6 % — SIGNIFICANT CHANGE UP (ref 0–6)
NITRITE UR-MCNC: NEGATIVE — SIGNIFICANT CHANGE UP
NRBC # FLD: 0 K/UL — SIGNIFICANT CHANGE UP (ref 0–0)
OTHER - HEMATOLOGY %: 0 — SIGNIFICANT CHANGE UP
PCO2 BLDV: 46 MMHG — SIGNIFICANT CHANGE UP (ref 41–51)
PH BLDV: 7.4 PH — SIGNIFICANT CHANGE UP (ref 7.32–7.43)
PH UR: 6 — SIGNIFICANT CHANGE UP (ref 5–8)
PLATELET # BLD AUTO: 209 K/UL — SIGNIFICANT CHANGE UP (ref 150–400)
PLATELET COUNT - ESTIMATE: NORMAL — SIGNIFICANT CHANGE UP
PMV BLD: 11 FL — SIGNIFICANT CHANGE UP (ref 7–13)
PO2 BLDV: 34 MMHG — LOW (ref 35–40)
POIKILOCYTOSIS BLD QL AUTO: SLIGHT — SIGNIFICANT CHANGE UP
POLYCHROMASIA BLD QL SMEAR: SLIGHT — SIGNIFICANT CHANGE UP
POTASSIUM BLDV-SCNC: 3.2 MMOL/L — LOW (ref 3.4–4.5)
POTASSIUM SERPL-MCNC: 3.2 MMOL/L — LOW (ref 3.5–5.3)
POTASSIUM SERPL-SCNC: 3.2 MMOL/L — LOW (ref 3.5–5.3)
PROMYELOCYTES # FLD: 0 % — SIGNIFICANT CHANGE UP (ref 0–0)
PROT SERPL-MCNC: 7.6 G/DL — SIGNIFICANT CHANGE UP (ref 6–8.3)
PROT UR-MCNC: 200 — HIGH
RBC # BLD: 4.13 M/UL — LOW (ref 4.2–5.8)
RBC # FLD: 13.2 % — SIGNIFICANT CHANGE UP (ref 10.3–14.5)
RBC CASTS # UR COMP ASSIST: >50 — HIGH (ref 0–?)
SAO2 % BLDV: 60.1 % — SIGNIFICANT CHANGE UP (ref 60–85)
SARS-COV-2 RNA SPEC QL NAA+PROBE: SIGNIFICANT CHANGE UP
SODIUM SERPL-SCNC: 137 MMOL/L — SIGNIFICANT CHANGE UP (ref 135–145)
SP GR SPEC: 1.02 — SIGNIFICANT CHANGE UP (ref 1–1.04)
SQUAMOUS # UR AUTO: SIGNIFICANT CHANGE UP
UROBILINOGEN FLD QL: NORMAL — SIGNIFICANT CHANGE UP
VARIANT LYMPHS # BLD: 0.9 % — SIGNIFICANT CHANGE UP
WBC # BLD: 25.89 K/UL — HIGH (ref 3.8–10.5)
WBC # FLD AUTO: 25.89 K/UL — HIGH (ref 3.8–10.5)
WBC UR QL: >50 — HIGH (ref 0–?)

## 2020-04-08 PROCEDURE — 70450 CT HEAD/BRAIN W/O DYE: CPT | Mod: 26

## 2020-04-08 PROCEDURE — 99232 SBSQ HOSP IP/OBS MODERATE 35: CPT

## 2020-04-08 PROCEDURE — 72125 CT NECK SPINE W/O DYE: CPT | Mod: 26

## 2020-04-08 PROCEDURE — 70486 CT MAXILLOFACIAL W/O DYE: CPT | Mod: 26

## 2020-04-08 PROCEDURE — 72170 X-RAY EXAM OF PELVIS: CPT | Mod: 26

## 2020-04-08 RX ORDER — HEPARIN SODIUM 5000 [USP'U]/ML
5000 INJECTION INTRAVENOUS; SUBCUTANEOUS EVERY 12 HOURS
Refills: 0 | Status: DISCONTINUED | OUTPATIENT
Start: 2020-04-08 | End: 2020-04-15

## 2020-04-08 RX ORDER — ERTAPENEM SODIUM 1 G/1
1000 INJECTION, POWDER, LYOPHILIZED, FOR SOLUTION INTRAMUSCULAR; INTRAVENOUS EVERY 24 HOURS
Refills: 0 | Status: DISCONTINUED | OUTPATIENT
Start: 2020-04-09 | End: 2020-04-10

## 2020-04-08 RX ORDER — ACETAMINOPHEN 500 MG
2 TABLET ORAL
Qty: 0 | Refills: 0 | DISCHARGE

## 2020-04-08 RX ORDER — TEMAZEPAM 15 MG/1
15 CAPSULE ORAL AT BEDTIME
Refills: 0 | Status: DISCONTINUED | OUTPATIENT
Start: 2020-04-08 | End: 2020-04-15

## 2020-04-08 RX ORDER — AMOXICILLIN 250 MG/5ML
20 SUSPENSION, RECONSTITUTED, ORAL (ML) ORAL
Qty: 0 | Refills: 0 | DISCHARGE

## 2020-04-08 RX ORDER — LACTOBACILLUS ACIDOPHILUS 100MM CELL
1 CAPSULE ORAL
Qty: 0 | Refills: 0 | DISCHARGE

## 2020-04-08 RX ORDER — ERTAPENEM SODIUM 1 G/1
1000 INJECTION, POWDER, LYOPHILIZED, FOR SOLUTION INTRAMUSCULAR; INTRAVENOUS ONCE
Refills: 0 | Status: COMPLETED | OUTPATIENT
Start: 2020-04-08 | End: 2020-04-08

## 2020-04-08 RX ORDER — VANCOMYCIN HCL 1 G
1000 VIAL (EA) INTRAVENOUS EVERY 12 HOURS
Refills: 0 | Status: DISCONTINUED | OUTPATIENT
Start: 2020-04-09 | End: 2020-04-09

## 2020-04-08 RX ORDER — VANCOMYCIN HCL 1 G
VIAL (EA) INTRAVENOUS
Refills: 0 | Status: DISCONTINUED | OUTPATIENT
Start: 2020-04-08 | End: 2020-04-09

## 2020-04-08 RX ORDER — TAMSULOSIN HYDROCHLORIDE 0.4 MG/1
0.8 CAPSULE ORAL AT BEDTIME
Refills: 0 | Status: DISCONTINUED | OUTPATIENT
Start: 2020-04-08 | End: 2020-04-15

## 2020-04-08 RX ORDER — ERTAPENEM SODIUM 1 G/1
INJECTION, POWDER, LYOPHILIZED, FOR SOLUTION INTRAMUSCULAR; INTRAVENOUS
Refills: 0 | Status: DISCONTINUED | OUTPATIENT
Start: 2020-04-08 | End: 2020-04-10

## 2020-04-08 RX ORDER — DIAZEPAM 5 MG
5 TABLET ORAL EVERY 12 HOURS
Refills: 0 | Status: DISCONTINUED | OUTPATIENT
Start: 2020-04-08 | End: 2020-04-15

## 2020-04-08 RX ORDER — DIAZEPAM 5 MG
1 TABLET ORAL
Qty: 0 | Refills: 0 | DISCHARGE

## 2020-04-08 RX ORDER — SODIUM CHLORIDE 9 MG/ML
1000 INJECTION INTRAMUSCULAR; INTRAVENOUS; SUBCUTANEOUS
Refills: 0 | Status: DISCONTINUED | OUTPATIENT
Start: 2020-04-08 | End: 2020-04-11

## 2020-04-08 RX ORDER — CEFTRIAXONE 500 MG/1
1000 INJECTION, POWDER, FOR SOLUTION INTRAMUSCULAR; INTRAVENOUS ONCE
Refills: 0 | Status: COMPLETED | OUTPATIENT
Start: 2020-04-08 | End: 2020-04-08

## 2020-04-08 RX ORDER — ACETAMINOPHEN 500 MG
650 TABLET ORAL EVERY 4 HOURS
Refills: 0 | Status: DISCONTINUED | OUTPATIENT
Start: 2020-04-08 | End: 2020-04-12

## 2020-04-08 RX ORDER — SODIUM CHLORIDE 9 MG/ML
1000 INJECTION INTRAMUSCULAR; INTRAVENOUS; SUBCUTANEOUS ONCE
Refills: 0 | Status: COMPLETED | OUTPATIENT
Start: 2020-04-08 | End: 2020-04-08

## 2020-04-08 RX ORDER — ACETAMINOPHEN 500 MG
650 TABLET ORAL EVERY 4 HOURS
Refills: 0 | Status: DISCONTINUED | OUTPATIENT
Start: 2020-04-08 | End: 2020-04-15

## 2020-04-08 RX ORDER — VANCOMYCIN HCL 1 G
1000 VIAL (EA) INTRAVENOUS ONCE
Refills: 0 | Status: COMPLETED | OUTPATIENT
Start: 2020-04-08 | End: 2020-04-08

## 2020-04-08 RX ORDER — ACETAMINOPHEN 500 MG
650 TABLET ORAL ONCE
Refills: 0 | Status: COMPLETED | OUTPATIENT
Start: 2020-04-08 | End: 2020-04-08

## 2020-04-08 RX ADMIN — Medication 650 MILLIGRAM(S): at 23:38

## 2020-04-08 RX ADMIN — SODIUM CHLORIDE 1000 MILLILITER(S): 9 INJECTION INTRAMUSCULAR; INTRAVENOUS; SUBCUTANEOUS at 02:37

## 2020-04-08 RX ADMIN — CEFTRIAXONE 100 MILLIGRAM(S): 500 INJECTION, POWDER, FOR SOLUTION INTRAMUSCULAR; INTRAVENOUS at 02:37

## 2020-04-08 RX ADMIN — Medication 5 MILLIGRAM(S): at 17:33

## 2020-04-08 RX ADMIN — TAMSULOSIN HYDROCHLORIDE 0.8 MILLIGRAM(S): 0.4 CAPSULE ORAL at 21:36

## 2020-04-08 RX ADMIN — TETANUS TOXOID, REDUCED DIPHTHERIA TOXOID AND ACELLULAR PERTUSSIS VACCINE, ADSORBED 0.5 MILLILITER(S): 5; 2.5; 8; 8; 2.5 SUSPENSION INTRAMUSCULAR at 00:56

## 2020-04-08 RX ADMIN — SODIUM CHLORIDE 50 MILLILITER(S): 9 INJECTION INTRAMUSCULAR; INTRAVENOUS; SUBCUTANEOUS at 12:20

## 2020-04-08 RX ADMIN — Medication 250 MILLIGRAM(S): at 15:53

## 2020-04-08 RX ADMIN — ERTAPENEM SODIUM 120 MILLIGRAM(S): 1 INJECTION, POWDER, LYOPHILIZED, FOR SOLUTION INTRAMUSCULAR; INTRAVENOUS at 14:32

## 2020-04-08 NOTE — H&P ADULT - NSHPPHYSICALEXAM_GEN_ALL_CORE
Vital Signs Last 24 Hrs  T(C): 37.1 (08 Apr 2020 09:06), Max: 38.4 (08 Apr 2020 00:05)  T(F): 98.8 (08 Apr 2020 09:06), Max: 101.1 (08 Apr 2020 00:05)  HR: 85 (08 Apr 2020 09:06) (74 - 97)  BP: 122/50 (08 Apr 2020 09:06) (103/39 - 122/50)  RR: 19 (08 Apr 2020 09:06) (15 - 19)  SpO2: 100% (08 Apr 2020 09:06) (97% - 100%)

## 2020-04-08 NOTE — ED ADULT NURSE REASSESSMENT NOTE - NS ED NURSE REASSESS COMMENT FT1
pt awake, a/ox3, vitally stable in NAD, ice chips provided, Spencer leg bag changed to gravity drainage bag- pt states he gets his Spencer replaced every 4 weeks, pt had last replacement 2 weeks ago- pt noted to have abrasion to L check R upper thigh, R. ear and redness to sacral area but skin intact- pt comfortable at this time, on cardiac monitor in NAD- will continue to monitor

## 2020-04-08 NOTE — ED ADULT NURSE REASSESSMENT NOTE - NS ED NURSE REASSESS COMMENT FT1
Patient noted to have blanchable redness on sacrum, no open sore noted at this time. Leg bag secured, patient repositioned for comfort.

## 2020-04-08 NOTE — H&P ADULT - NSHPLABSRESULTS_GEN_ALL_CORE
LABS:                         13.3   25.89 )-----------( 209      ( 2020 23:45 )             39.8     04-    137  |  94<L>  |  22  ----------------------------<  125<H>  3.2<L>   |  26  |  1.43<H>    Ca    9.4      2020 23:45    TPro  7.6  /  Alb  4.1  /  TBili  0.6  /  DBili  x   /  AST  183<H>  /  ALT  44<H>  /  AlkPhos  112  -      Urinalysis Basic - ( 2020 23:30 )    Color: YELLOW / Appearance: Lt TURBID / S.017 / pH: 6.0  Gluc: NEGATIVE / Ketone: SMALL  / Bili: NEGATIVE / Urobili: NORMAL   Blood: LARGE / Protein: 200 / Nitrite: NEGATIVE   Leuk Esterase: LARGE / RBC: >50 / WBC >50   Sq Epi: OCC / Non Sq Epi: x / Bacteria: MANY    CARDIAC MARKERS ( 2020 23:45 )  x     / x     / 77532 u/L / x     / x          RADIOLOGY  & ADDITIONAL TESTS: Reviewed.     < from: CT No Cont (20 @ 00:34) >    IMPRESSION:  Head CT: No intracranial bleeding, mass effect, or acute displaced calvarium fracture. If there are new or persistent symptoms, consider further evaluation with MRI provided no contraindications.  Maxillofacial CT: No acute displaced maxillofacial bone fracture.  Cervical spine CT: Interval mild indeterminate age compression deformity of the superior endplate of C7 and T3 with area of sclerosis likely subacute to chronic in nature. No retropulsion. Correlate with point tenderness and consider further evaluation with MR if there is concern for subtle osseous, ligamentous or cord injury. Multilevel spondylotic changes as described.    DAVIDA THORPE M.D., RADIOLOGY RESIDENT  This document has been electronically signed.  BRIAN BUTLER M.D., ATTENDING RADIOLOGIST  This document has been electronically signed. 2020  1:19AM    < end of copied text >

## 2020-04-08 NOTE — H&P ADULT - ASSESSMENT
74 year old male with PMH BPH with chronic indwelling fowler, muscle dystrophy (follows at [place]), hx ESBL and coag neg staph UTI, presents with 2 days of weakness, and found on floor by home health aid.

## 2020-04-08 NOTE — PROVIDER CONTACT NOTE (OTHER) - BACKGROUND
Pt came from home; pt had fallen at home; pt recently left Assisted Living facility and returned home with private hire HHA.

## 2020-04-08 NOTE — H&P ADULT - PROBLEM SELECTOR PLAN 1
2 days of weakness, found on floor by HHA. Here Tmax 101.1. WBC 25.  - Hemodynamically stable, s/p 1L NS  - Blood and urine cultures drawn  - S/p ceftriaxone in ED, hx ESBL and coag neg staph in urine, will start meropenem and vancomycin, narrow once cultures return  - COVID19 also sent, f/u results

## 2020-04-08 NOTE — H&P ADULT - HISTORY OF PRESENT ILLNESS
74 year old male with PMH BPH with chronic indwelling fowler, muscle dystrophy presenting for evaluation after being found on the floor by HHA. patient reports that he has been feeling weak for the last  2 days, he noted to have tremors. He denies falling on to the floor, instead he slowing placed himself there but was too weak to get up. He believes he was on the grown for several hours. He denies  recent travel or sick contact, fever, chills, cough, SOB, chest pain, HA, dizziness, neck stiffness, abdominal pain, N/V/D, syncope.  Patient's Home Health Aid:  [phone] (Ramesh)    Pharmacy: [facility]

## 2020-04-08 NOTE — PROVIDER CONTACT NOTE (OTHER) - ASSESSMENT
Pt reports that he recently left the [facility] in [place] as he could no longer afford the monthly rent.  Pt states that prior to Assisted Living, pt was at [facility] for rehab-states he was discharged from there on 12/11/19; also states his Medicaid is not active.  Pt states that he had private hire HHA while at the [facility], states he continues to have his private hire HHA at home-hours vary.  AMADO educated pt about possibility of SNF days being renewed as he has not been in a SNF or hospital since that date.  AMADO discussed safe d/c planning with pt.  Pt initially states he would like to go home upon d/c as he does not know if rehab will help him.  Informed pt of SW availability and d/c planning options.

## 2020-04-08 NOTE — ED ADULT NURSE REASSESSMENT NOTE - NS ED NURSE REASSESS COMMENT FT1
00:00 - Labs drawn and sent by RN. Pt leg bag discarded and new bag placed to existing catheter.  Rectal temperature performed with RN and PCA at bedside.  Pt has blanchable erythema to sacral area.  Pt taken to CT.

## 2020-04-08 NOTE — ED ADULT NURSE REASSESSMENT NOTE - NS ED NURSE REASSESS COMMENT FT1
pt tolerated breakfast with no difficulty- handoff given to SHAYAN Silveira in ESSU1- pt in NAD at time of transport - all belongings moved with patient

## 2020-04-09 DIAGNOSIS — N39.0 URINARY TRACT INFECTION, SITE NOT SPECIFIED: ICD-10-CM

## 2020-04-09 LAB
-  AMIKACIN: SIGNIFICANT CHANGE UP
-  AMPICILLIN/SULBACTAM: SIGNIFICANT CHANGE UP
-  AMPICILLIN: SIGNIFICANT CHANGE UP
-  AZTREONAM: SIGNIFICANT CHANGE UP
-  CEFAZOLIN: SIGNIFICANT CHANGE UP
-  CEFEPIME: SIGNIFICANT CHANGE UP
-  CEFOXITIN: SIGNIFICANT CHANGE UP
-  CEFTRIAXONE: SIGNIFICANT CHANGE UP
-  CIPROFLOXACIN: SIGNIFICANT CHANGE UP
-  GENTAMICIN: SIGNIFICANT CHANGE UP
-  IMIPENEM: SIGNIFICANT CHANGE UP
-  LEVOFLOXACIN: SIGNIFICANT CHANGE UP
-  MEROPENEM: SIGNIFICANT CHANGE UP
-  NITROFURANTOIN: SIGNIFICANT CHANGE UP
-  PIPERACILLIN/TAZOBACTAM: SIGNIFICANT CHANGE UP
-  TIGECYCLINE: SIGNIFICANT CHANGE UP
-  TOBRAMYCIN: SIGNIFICANT CHANGE UP
-  TRIMETHOPRIM/SULFAMETHOXAZOLE: SIGNIFICANT CHANGE UP
ALBUMIN SERPL ELPH-MCNC: 2.9 G/DL — LOW (ref 3.3–5)
ALP SERPL-CCNC: 90 U/L — SIGNIFICANT CHANGE UP (ref 40–120)
ALT FLD-CCNC: 45 U/L — HIGH (ref 4–41)
ANION GAP SERPL CALC-SCNC: 15 MMO/L — HIGH (ref 7–14)
AST SERPL-CCNC: 114 U/L — HIGH (ref 4–40)
BASOPHILS # BLD AUTO: 0.06 K/UL — SIGNIFICANT CHANGE UP (ref 0–0.2)
BASOPHILS NFR BLD AUTO: 0.4 % — SIGNIFICANT CHANGE UP (ref 0–2)
BILIRUB SERPL-MCNC: 0.4 MG/DL — SIGNIFICANT CHANGE UP (ref 0.2–1.2)
BUN SERPL-MCNC: 31 MG/DL — HIGH (ref 7–23)
CALCIUM SERPL-MCNC: 8.6 MG/DL — SIGNIFICANT CHANGE UP (ref 8.4–10.5)
CHLORIDE SERPL-SCNC: 100 MMOL/L — SIGNIFICANT CHANGE UP (ref 98–107)
CK SERPL-CCNC: 4489 U/L — HIGH (ref 30–200)
CO2 SERPL-SCNC: 22 MMOL/L — SIGNIFICANT CHANGE UP (ref 22–31)
CREAT SERPL-MCNC: 1.22 MG/DL — SIGNIFICANT CHANGE UP (ref 0.5–1.3)
EOSINOPHIL # BLD AUTO: 0.02 K/UL — SIGNIFICANT CHANGE UP (ref 0–0.5)
EOSINOPHIL NFR BLD AUTO: 0.1 % — SIGNIFICANT CHANGE UP (ref 0–6)
GLUCOSE SERPL-MCNC: 125 MG/DL — HIGH (ref 70–99)
HCT VFR BLD CALC: 34.7 % — LOW (ref 39–50)
HGB BLD-MCNC: 11 G/DL — LOW (ref 13–17)
IMM GRANULOCYTES NFR BLD AUTO: 0.7 % — SIGNIFICANT CHANGE UP (ref 0–1.5)
LYMPHOCYTES # BLD AUTO: 0.81 K/UL — LOW (ref 1–3.3)
LYMPHOCYTES # BLD AUTO: 5.1 % — LOW (ref 13–44)
MCHC RBC-ENTMCNC: 31.1 PG — SIGNIFICANT CHANGE UP (ref 27–34)
MCHC RBC-ENTMCNC: 31.7 % — LOW (ref 32–36)
MCV RBC AUTO: 98 FL — SIGNIFICANT CHANGE UP (ref 80–100)
METHOD TYPE: SIGNIFICANT CHANGE UP
MONOCYTES # BLD AUTO: 1.54 K/UL — HIGH (ref 0–0.9)
MONOCYTES NFR BLD AUTO: 9.7 % — SIGNIFICANT CHANGE UP (ref 2–14)
NEUTROPHILS # BLD AUTO: 13.29 K/UL — HIGH (ref 1.8–7.4)
NEUTROPHILS NFR BLD AUTO: 84 % — HIGH (ref 43–77)
NRBC # FLD: 0 K/UL — SIGNIFICANT CHANGE UP (ref 0–0)
PLATELET # BLD AUTO: 142 K/UL — LOW (ref 150–400)
PMV BLD: 11.1 FL — SIGNIFICANT CHANGE UP (ref 7–13)
POTASSIUM SERPL-MCNC: 3.3 MMOL/L — LOW (ref 3.5–5.3)
POTASSIUM SERPL-SCNC: 3.3 MMOL/L — LOW (ref 3.5–5.3)
PROT SERPL-MCNC: 6.1 G/DL — SIGNIFICANT CHANGE UP (ref 6–8.3)
RBC # BLD: 3.54 M/UL — LOW (ref 4.2–5.8)
RBC # FLD: 13.3 % — SIGNIFICANT CHANGE UP (ref 10.3–14.5)
SODIUM SERPL-SCNC: 137 MMOL/L — SIGNIFICANT CHANGE UP (ref 135–145)
WBC # BLD: 16.46 K/UL — HIGH (ref 3.8–10.5)
WBC # FLD AUTO: 16.46 K/UL — HIGH (ref 3.8–10.5)

## 2020-04-09 PROCEDURE — 99232 SBSQ HOSP IP/OBS MODERATE 35: CPT

## 2020-04-09 RX ORDER — MOXIFLOXACIN HYDROCHLORIDE TABLETS, 400 MG 400 MG/1
1 TABLET, FILM COATED ORAL
Qty: 10 | Refills: 0
Start: 2020-04-09 | End: 2020-04-13

## 2020-04-09 RX ORDER — POTASSIUM CHLORIDE 20 MEQ
40 PACKET (EA) ORAL ONCE
Refills: 0 | Status: COMPLETED | OUTPATIENT
Start: 2020-04-09 | End: 2020-04-09

## 2020-04-09 RX ORDER — ZINC OXIDE 200 MG/G
1 OINTMENT TOPICAL
Refills: 0 | Status: DISCONTINUED | OUTPATIENT
Start: 2020-04-09 | End: 2020-04-10

## 2020-04-09 RX ADMIN — ZINC OXIDE 1 APPLICATION(S): 200 OINTMENT TOPICAL at 18:52

## 2020-04-09 RX ADMIN — Medication 5 MILLIGRAM(S): at 07:21

## 2020-04-09 RX ADMIN — Medication 40 MILLIEQUIVALENT(S): at 12:47

## 2020-04-09 RX ADMIN — TAMSULOSIN HYDROCHLORIDE 0.8 MILLIGRAM(S): 0.4 CAPSULE ORAL at 21:42

## 2020-04-09 RX ADMIN — Medication 5 MILLIGRAM(S): at 17:23

## 2020-04-09 RX ADMIN — Medication 250 MILLIGRAM(S): at 07:24

## 2020-04-09 RX ADMIN — ERTAPENEM SODIUM 120 MILLIGRAM(S): 1 INJECTION, POWDER, LYOPHILIZED, FOR SOLUTION INTRAMUSCULAR; INTRAVENOUS at 12:49

## 2020-04-09 NOTE — PROGRESS NOTE ADULT - PROBLEM SELECTOR PLAN 1
Received Vancomycin and Ceftriaxone in ED and then started on Ertapenem.  Continue gentle hydration with IV NS at 50 cc/hour  Continue IV antibiotics and then can change to oral for discharge.  Evaluated by PT and too weak for discharge home due to risk of fall.  COVID negative so will evaluate for discharge to rehab facility

## 2020-04-09 NOTE — PHYSICAL THERAPY INITIAL EVALUATION ADULT - PERTINENT HX OF CURRENT PROBLEM, REHAB EVAL
This is a 74 year old male with PMH BPH with chronic indwelling fowler, muscle dystrophy (follows at [place]), hx ESBL and coag neg staph UTI, presents with 2 days of weakness, and found on floor by home health aid.

## 2020-04-09 NOTE — PHYSICAL THERAPY INITIAL EVALUATION ADULT - GENERAL OBSERVATIONS, REHAB EVAL
Patient received semi supine in a stretcher , (+) tele monitor, (+) fowler , (+) scrotal swelling ,in no apparent distress.

## 2020-04-09 NOTE — PHYSICAL THERAPY INITIAL EVALUATION ADULT - ACTIVE RANGE OF MOTION EXAMINATION, REHAB EVAL
except R shoulder flexion 0-90 degrees/bilateral  lower extremity Active ROM was WFL (within functional limits)/bilateral upper extremity Active ROM was WFL (within functional limits)

## 2020-04-09 NOTE — PROGRESS NOTE ADULT - SUBJECTIVE AND OBJECTIVE BOX
Patient is a 74y old  Male who presents with a chief complaint of Weakness x 2 days (2020 10:05)      SUBJECTIVE / OVERNIGHT EVENTS: No acute events overnight.    MEDICATIONS  (STANDING):  diazepam    Tablet 5 milliGRAM(s) Oral every 12 hours  ertapenem  IVPB      ertapenem  IVPB 1000 milliGRAM(s) IV Intermittent every 24 hours  heparin  Injectable 5000 Unit(s) SubCutaneous every 12 hours  sodium chloride 0.9%. 1000 milliLiter(s) (50 mL/Hr) IV Continuous <Continuous>  tamsulosin 0.8 milliGRAM(s) Oral at bedtime  zinc oxide 40% Ointment 1 Application(s) Topical two times a day    MEDICATIONS  (PRN):  acetaminophen   Tablet .. 650 milliGRAM(s) Oral every 4 hours PRN Temp greater or equal to 38.5C (101.3F)  acetaminophen  Suppository .. 650 milliGRAM(s) Rectal every 4 hours PRN Temp greater or equal to 38.5C (101.3F)  artificial tears (preservative free) Ophthalmic Solution 1 Drop(s) Both EYES two times a day PRN Dry Eyes  temazepam 15 milliGRAM(s) Oral at bedtime PRN Insomnia    VITALS  T(C): 36.5 (20 @ 16:05), Max: 37.8 (20 @ 19:40)  HR: 73 (20 @ 16:05) (63 - 73)  BP: 93/57 (20 @ 16:05) (93/57 - 108/63)  RR: 18 (20 @ 16:05) (18 - 18)  SpO2: 100% (20 @ 16:05) (97% - 100%)  CAPILLARY BLOOD GLUCOSE        I&O's Summary    2020 07:01  -  2020 18:00  --------------------------------------------------------  IN: 480 mL / OUT: 1500 mL / NET: -1020 mL        PHYSICAL EXAM:  GENERAL: not in distress, on room air  EYES: open, sclera clear b/l  CHEST/LUNG: normal respiratory effort, not tachypneic, speaking in full sentences without difficulty  HEART: Not tachycardic, no lower extremity edema b/l  ABDOMEN: Soft, Nontender, Nondistended  EXTREMITIES: Warm and well perfused  NEUROLOGY: awake, alert, responds to Qs appropriately, no gross deficits  PSYCH: calm, cooperative  SKIN: No visible rashes or lesions    LABS:                        11.0   16.46 )-----------( 142      ( 2020 06:30 )             34.7     04-    137  |  100  |  31<H>  ----------------------------<  125<H>  3.3<L>   |  22  |  1.22    Ca    8.6      2020 06:30    TPro  6.1  /  Alb  2.9<L>  /  TBili  0.4  /  DBili  x   /  AST  114<H>  /  ALT  45<H>  /  AlkPhos  90  04-09      CARDIAC MARKERS ( 2020 06:30 )  x     / x     / 4489 u/L / x     / x      CARDIAC MARKERS ( 2020 23:45 )  x     / x     / 80653 u/L / x     / x          Urinalysis Basic - ( 2020 23:30 )    Color: YELLOW / Appearance: Lt TURBID / S.017 / pH: 6.0  Gluc: NEGATIVE / Ketone: SMALL  / Bili: NEGATIVE / Urobili: NORMAL   Blood: LARGE / Protein: 200 / Nitrite: NEGATIVE   Leuk Esterase: LARGE / RBC: >50 / WBC >50   Sq Epi: OCC / Non Sq Epi: x / Bacteria: MANY    Culture - Urine (19 @ 10:02)    -  Levofloxacin: R >4    -  Nitrofurantoin: S <=32 Should not be used to treat pyelonephritis.    -  Tetra/Doxy: R >8    -  Vancomycin: S 1    -  Ampicillin: S <=2 Predicts results to ampicillin/sulbactam, amoxacillin-clavulanate and  piperacillin-tazobactam.    -  Ciprofloxacin: R >2    Specimen Source: .Urine Catheterized    Culture Results:   >100,000 CFU/ml Enterococcus faecalis    Organism Identification: Enterococcus faecalis    Organism: Enterococcus faecalis    Method Type: LILLY        RADIOLOGY & ADDITIONAL TESTS:

## 2020-04-09 NOTE — CHART NOTE - NSCHARTNOTEFT_GEN_A_CORE
Contacted by [facility]NGlenis, patient's fowler was not draining and patient had pelvic discomfort.  Chronic Fowler cathether, patient's last catheter change was approximately 2 weeks ago ([facility]) with a 16 Fr.      Fowler changed with an 18Fr Fowler cathether, small clot dislodged and immediately drained 900 cc of pink urine.  No eryn blood noted after fowler change.  Draining well.  Patient had immediate relief of pelvic discomfort.    Scrotum noted to be edematous and excoriated.  Patient stated that prior fowler cathether leaks.      Elevated scrotum and ordered desitin cream BID to affected site.       Advised SHAYAN Galvin of above and plan.

## 2020-04-09 NOTE — PROGRESS NOTE ADULT - ASSESSMENT
73 yo M with PMH BPH with chronic indwelling catheter presents with weakness and fall at home where he was found on floor by HHA. Found to have UTI with enterococcus faecalis and rhadbdomyolysis.  COVID negative

## 2020-04-10 ENCOUNTER — TRANSCRIPTION ENCOUNTER (OUTPATIENT)
Age: 75
End: 2020-04-10

## 2020-04-10 LAB
-  AMPICILLIN: SIGNIFICANT CHANGE UP
-  CIPROFLOXACIN: SIGNIFICANT CHANGE UP
-  LEVOFLOXACIN: SIGNIFICANT CHANGE UP
-  NITROFURANTOIN: SIGNIFICANT CHANGE UP
-  TETRACYCLINE: SIGNIFICANT CHANGE UP
-  VANCOMYCIN: SIGNIFICANT CHANGE UP
ALBUMIN SERPL ELPH-MCNC: 3 G/DL — LOW (ref 3.3–5)
ALP SERPL-CCNC: 79 U/L — SIGNIFICANT CHANGE UP (ref 40–120)
ALT FLD-CCNC: 47 U/L — HIGH (ref 4–41)
ANION GAP SERPL CALC-SCNC: 9 MMO/L — SIGNIFICANT CHANGE UP (ref 7–14)
AST SERPL-CCNC: 84 U/L — HIGH (ref 4–40)
BILIRUB SERPL-MCNC: 0.3 MG/DL — SIGNIFICANT CHANGE UP (ref 0.2–1.2)
BUN SERPL-MCNC: 29 MG/DL — HIGH (ref 7–23)
CALCIUM SERPL-MCNC: 8.8 MG/DL — SIGNIFICANT CHANGE UP (ref 8.4–10.5)
CHLORIDE SERPL-SCNC: 102 MMOL/L — SIGNIFICANT CHANGE UP (ref 98–107)
CO2 SERPL-SCNC: 28 MMOL/L — SIGNIFICANT CHANGE UP (ref 22–31)
CREAT SERPL-MCNC: 0.99 MG/DL — SIGNIFICANT CHANGE UP (ref 0.5–1.3)
CULTURE RESULTS: SIGNIFICANT CHANGE UP
GLUCOSE SERPL-MCNC: 110 MG/DL — HIGH (ref 70–99)
GRAM STN FLD: SIGNIFICANT CHANGE UP
HCT VFR BLD CALC: 34.1 % — LOW (ref 39–50)
HGB BLD-MCNC: 11 G/DL — LOW (ref 13–17)
MAGNESIUM SERPL-MCNC: 1.9 MG/DL — SIGNIFICANT CHANGE UP (ref 1.6–2.6)
MCHC RBC-ENTMCNC: 31.6 PG — SIGNIFICANT CHANGE UP (ref 27–34)
MCHC RBC-ENTMCNC: 32.3 % — SIGNIFICANT CHANGE UP (ref 32–36)
MCV RBC AUTO: 98 FL — SIGNIFICANT CHANGE UP (ref 80–100)
METHOD TYPE: SIGNIFICANT CHANGE UP
METHOD TYPE: SIGNIFICANT CHANGE UP
MSSA DNA SPEC QL NAA+PROBE: SIGNIFICANT CHANGE UP
NRBC # FLD: 0 K/UL — SIGNIFICANT CHANGE UP (ref 0–0)
ORGANISM # SPEC MICROSCOPIC CNT: SIGNIFICANT CHANGE UP
PHOSPHATE SERPL-MCNC: 2.7 MG/DL — SIGNIFICANT CHANGE UP (ref 2.5–4.5)
PLATELET # BLD AUTO: 147 K/UL — LOW (ref 150–400)
PMV BLD: 10.9 FL — SIGNIFICANT CHANGE UP (ref 7–13)
POTASSIUM SERPL-MCNC: 3.7 MMOL/L — SIGNIFICANT CHANGE UP (ref 3.5–5.3)
POTASSIUM SERPL-SCNC: 3.7 MMOL/L — SIGNIFICANT CHANGE UP (ref 3.5–5.3)
PROT SERPL-MCNC: 6.3 G/DL — SIGNIFICANT CHANGE UP (ref 6–8.3)
RBC # BLD: 3.48 M/UL — LOW (ref 4.2–5.8)
RBC # FLD: 13.2 % — SIGNIFICANT CHANGE UP (ref 10.3–14.5)
SODIUM SERPL-SCNC: 139 MMOL/L — SIGNIFICANT CHANGE UP (ref 135–145)
SPECIMEN SOURCE: SIGNIFICANT CHANGE UP
WBC # BLD: 9.99 K/UL — SIGNIFICANT CHANGE UP (ref 3.8–10.5)
WBC # FLD AUTO: 9.99 K/UL — SIGNIFICANT CHANGE UP (ref 3.8–10.5)

## 2020-04-10 PROCEDURE — 99232 SBSQ HOSP IP/OBS MODERATE 35: CPT

## 2020-04-10 PROCEDURE — 99223 1ST HOSP IP/OBS HIGH 75: CPT

## 2020-04-10 RX ORDER — POTASSIUM CHLORIDE 20 MEQ
20 PACKET (EA) ORAL ONCE
Refills: 0 | Status: COMPLETED | OUTPATIENT
Start: 2020-04-10 | End: 2020-04-10

## 2020-04-10 RX ORDER — MAGNESIUM OXIDE 400 MG ORAL TABLET 241.3 MG
400 TABLET ORAL
Refills: 0 | Status: COMPLETED | OUTPATIENT
Start: 2020-04-10 | End: 2020-04-11

## 2020-04-10 RX ORDER — VANCOMYCIN HCL 1 G
1000 VIAL (EA) INTRAVENOUS ONCE
Refills: 0 | Status: COMPLETED | OUTPATIENT
Start: 2020-04-10 | End: 2020-04-10

## 2020-04-10 RX ORDER — CEFAZOLIN SODIUM 1 G
2000 VIAL (EA) INJECTION EVERY 8 HOURS
Refills: 0 | Status: DISCONTINUED | OUTPATIENT
Start: 2020-04-10 | End: 2020-04-15

## 2020-04-10 RX ORDER — ZINC OXIDE 200 MG/G
1 OINTMENT TOPICAL
Refills: 0 | Status: DISCONTINUED | OUTPATIENT
Start: 2020-04-10 | End: 2020-04-15

## 2020-04-10 RX ADMIN — ZINC OXIDE 1 APPLICATION(S): 200 OINTMENT TOPICAL at 19:30

## 2020-04-10 RX ADMIN — Medication 20 MILLIEQUIVALENT(S): at 12:47

## 2020-04-10 RX ADMIN — Medication 100 MILLIGRAM(S): at 13:54

## 2020-04-10 RX ADMIN — Medication 5 MILLIGRAM(S): at 05:37

## 2020-04-10 RX ADMIN — Medication 5 MILLIGRAM(S): at 19:29

## 2020-04-10 RX ADMIN — Medication 250 MILLIGRAM(S): at 14:23

## 2020-04-10 RX ADMIN — MAGNESIUM OXIDE 400 MG ORAL TABLET 400 MILLIGRAM(S): 241.3 TABLET ORAL at 19:30

## 2020-04-10 NOTE — DISCHARGE NOTE PROVIDER - NSDCACTIVITY_GEN_ALL_CORE
Do not drive or operate machinery/Walking - Indoors allowed/No heavy lifting/straining/Showering allowed

## 2020-04-10 NOTE — DISCHARGE NOTE NURSING/CASE MANAGEMENT/SOCIAL WORK - PATIENT PORTAL LINK FT
You can access the FollowMyHealth Patient Portal offered by [facility] by registering at the following website: [email or web address]. By joining [employer]’s FollowMyHealth portal, you will also be able to view your health information using other applications (apps) compatible with our system.

## 2020-04-10 NOTE — CONSULT NOTE ADULT - SUBJECTIVE AND OBJECTIVE BOX
HPI:  74 year old male with PMH BPH with chronic indwelling fowler, muscle dystrophy presenting for evaluation after being found on the floor by HHA. patient reports that he has been feeling weak for the last  2 days, he noted to have tremors. He denies falling on to the floor, instead he slowing placed himself there but was too weak to get up. He believes he was on the grown for several hours. He denies  recent travel or sick contact, fever, chills, cough, SOB, chest pain, HA, dizziness, neck stiffness, abdominal pain, N/V/D, syncope.    Now with MSSA bacteremia      PAST MEDICAL & SURGICAL HISTORY:  Urinary retention  H/O muscular dystrophy  Scrotal swelling  Psychiatric disorder  No significant past surgical history      Allergies    No Known Allergies    Intolerances        ANTIMICROBIALS:  ertapenem  IVPB    ertapenem  IVPB 1000 every 24 hours      OTHER MEDS:  acetaminophen   Tablet .. 650 milliGRAM(s) Oral every 4 hours PRN  acetaminophen  Suppository .. 650 milliGRAM(s) Rectal every 4 hours PRN  artificial tears (preservative free) Ophthalmic Solution 1 Drop(s) Both EYES two times a day PRN  diazepam    Tablet 5 milliGRAM(s) Oral every 12 hours  heparin  Injectable 5000 Unit(s) SubCutaneous every 12 hours  magnesium oxide 400 milliGRAM(s) Oral two times a day with meals  potassium chloride    Tablet ER 20 milliEquivalent(s) Oral once  sodium chloride 0.9%. 1000 milliLiter(s) IV Continuous <Continuous>  tamsulosin 0.8 milliGRAM(s) Oral at bedtime  temazepam 15 milliGRAM(s) Oral at bedtime PRN  zinc oxide 40% Ointment 1 Application(s) Topical two times a day      SOCIAL HISTORY:    FAMILY HISTORY:  FHx: stomach cancer      REVIEW OF SYSTEMS  [  ] ROS unobtainable because:    [ x ] All other systems negative except as noted below:	    Constitutional:  [ ] fever [ ] chills  [ ] weight loss  [ x] weakness  Skin:  [ ] rash [ ] phlebitis	  Eyes: [ ] icterus [ ] pain  [ ] discharge	  ENMT: [ ] sore throat  [ ] thrush [ ] ulcers [ ] exudates  Respiratory: [ ] dyspnea [ ] hemoptysis [ ] cough [ ] sputum	  Cardiovascular:  [ ] chest pain [ ] palpitations [ ] edema	  Gastrointestinal:  [ ] nausea [ ] vomiting [ ] diarrhea [ ] constipation [ ] pain	  Genitourinary:  [ ] dysuria [ ] frequency [ ] hematuria [ ] discharge [ ] flank pain  [ ] incontinence  Musculoskeletal:  [ ] myalgias [ ] arthralgias [ ] arthritis  [ ] back pain  Neurological:  [ ] headache [ ] seizures  [ ] confusion/altered mental status  Psychiatric:  [ ] anxiety [ ] depression	  Hematology/Lymphatics:  [ ] lymphadenopathy  Endocrine:  [ ] adrenal [ ] thyroid  Allergic/Immunologic:	 [ ] transplant [ ] seasonal    PHYSICAL EXAM:  General: [ ] non-toxic  HEAD/EYES: [ ] PERRL [ ] white sclera [ ] icterus  ENT:  [ ] normal [ ] supple [ ] thrush [ ] pharyngeal exudate  Cardiovascular:   [ ] murmur [ ] normal [ ] PPM/AICD  Respiratory:  [ ] clear to ausculation bilaterally  GI:  [ ] soft, non-tender, normal bowel sounds  :  [ ] fowler [ ] no CVA tenderness   Musculoskeletal:  [ ] no synovitis  Neurologic:  [ ] non-focal exam   Skin:  [ ] no rash  Lymph: [ ] no lymphadenopathy  Psychiatric:  [ ] appropriate affect [ ] alert & oriented  Lines:  [ ] no phlebitis [ ] central line          Drug Dosing Weight  Height (cm): 183.1 (08 Apr 2020 17:28)  Weight (kg): 62.142 (08 Apr 2020 17:28)  BMI (kg/m2): 18.5 (08 Apr 2020 17:28)  BSA (m2): 1.82 (08 Apr 2020 17:28)    Vital Signs Last 24 Hrs  T(F): 97.9 (04-10-20 @ 08:39), Max: 101.1 (04-08-20 @ 00:05)    Vital Signs Last 24 Hrs  HR: 60 (04-10-20 @ 08:39) (60 - 76)  BP: 88/50 (04-10-20 @ 08:39) (88/50 - 101/54)  RR: 17 (04-10-20 @ 08:39)  SpO2: 100% (04-10-20 @ 08:39) (100% - 100%)  Wt(kg): --                          11.0   9.99  )-----------( 147      ( 10 Apr 2020 07:30 )             34.1       04-10    139  |  102  |  29<H>  ----------------------------<  110<H>  3.7   |  28  |  0.99    Ca    8.8      10 Apr 2020 07:30  Phos  2.7     04-10  Mg     1.9     04-10    TPro  6.3  /  Alb  3.0<L>  /  TBili  0.3  /  DBili  x   /  AST  84<H>  /  ALT  47<H>  /  AlkPhos  79  04-10          MICROBIOLOGY:  Culture - Blood (04.08.20 @ 05:47)    -  Staphylococcus aureus: Detec Any isolate of Staphylococcus aureus from a blood culture is NOT considered a contaminant.    Gram Stain:   Growth in aerobic bottle: Gram Positive Cocci in Clusters    Specimen Source: .Blood Blood-Venous    Organism: Blood Culture PCR    Culture Results:   Growth in aerobic bottle: Gram Positive Cocci in Clusters  "Due to technical problems, Proteus sp. will Not be reported as part of  the BCID panel until further notice"  ***Blood Panel PCR results on this specimen are available  approximately 3 hours after the Gram stain result.***  Gram stain, PCR, and/or culture results may not always  correspond due to difference in methodologies.  ************************************************************  This PCR assay was performed using [employer].  The following targets are tested for: Enterococcus,  vancomycin resistant enterococci, Listeria monocytogenes,  coagulase negative staphylococci, S. aureus,  methicillin resistant S. aureus, Streptococcus agalactiae  (Group B), S. pneumoniae, S.pyogenes (Group A),  Acinetobacter baumannii, Enterobacter cloacae, E. coli,  Klebsiella oxytoca, K. pneumoniae, Proteus sp.,  Serratia marcescens, Haemophilus influenzae,  Neisseria meningitidis, Pseudomonas aeruginosa, Candida  albicans, C. glabrata, C krusei, C parapsilosis,  C. tropicalis and the KPC resistance gene.    Organism Identification: Blood Culture PCR    Method Type: PCR      RADIOLOGY:    < from: Xray Chest 1 View- PORTABLE-Routine (04.07.20 @ 23:32) >  IMPRESSION:  No focal consolidation.  Three rounded objects overlying the mediastinum consistent with possible impacted pills in the lower esophagus.    < end of copied text > HPI:  74 year old male with PMH BPH with chronic indwelling fowler, peripheral neuropathy presenting for evaluation after being found on the floor by HHA. patient reports that he has been feeling weak for the last  2 days, he noted to have tremors. He denies falling on to the floor, instead he slowing placed himself there but was too weak to get up. He believes he was on the grown for several hours. He denies  recent travel or sick contact, fever, chills, cough, SOB, chest pain, HA, dizziness, neck stiffness, abdominal pain, N/V/D, syncope.    Now with MSSA bacteremia    States he had been weak at home.  He had been crawling on floor.  He developed a wound to the left side of his face.           PAST MEDICAL & SURGICAL HISTORY:  Urinary retention  peripheral neuroaptrhy  Scrotal swelling  Psychiatric disorder  No significant past surgical history      Allergies    No Known Allergies    Intolerances        ANTIMICROBIALS:  ertapenem  IVPB    ertapenem  IVPB 1000 every 24 hours      OTHER MEDS:  acetaminophen   Tablet .. 650 milliGRAM(s) Oral every 4 hours PRN  acetaminophen  Suppository .. 650 milliGRAM(s) Rectal every 4 hours PRN  artificial tears (preservative free) Ophthalmic Solution 1 Drop(s) Both EYES two times a day PRN  diazepam    Tablet 5 milliGRAM(s) Oral every 12 hours  heparin  Injectable 5000 Unit(s) SubCutaneous every 12 hours  magnesium oxide 400 milliGRAM(s) Oral two times a day with meals  potassium chloride    Tablet ER 20 milliEquivalent(s) Oral once  sodium chloride 0.9%. 1000 milliLiter(s) IV Continuous <Continuous>  tamsulosin 0.8 milliGRAM(s) Oral at bedtime  temazepam 15 milliGRAM(s) Oral at bedtime PRN  zinc oxide 40% Ointment 1 Application(s) Topical two times a day      SOCIAL HISTORY:  Lives alone  denies drug use  no travel    FAMILY HISTORY:  FHx: stomach cancer      REVIEW OF SYSTEMS  [  ] ROS unobtainable because:    [ x ] All other systems negative except as noted below:	    Constitutional:  [ ] fever [ ] chills  [ ] weight loss  [ x] weakness  Skin:  [ ] rash [ ] phlebitis	  Eyes: [ ] icterus [ ] pain  [ ] discharge	  ENMT: [ ] sore throat  [ ] thrush [ ] ulcers [ ] exudates  Respiratory: [ ] dyspnea [ ] hemoptysis [ ] cough [ ] sputum	  Cardiovascular:  [ ] chest pain [ ] palpitations [ ] edema	  Gastrointestinal:  [ ] nausea [ ] vomiting [ ] diarrhea [ ] constipation [ ] pain	  Genitourinary:  [ ] dysuria [ ] frequency [ ] hematuria [ ] discharge [ ] flank pain  [ ] incontinence  Musculoskeletal:  [ ] myalgias [ ] arthralgias [ ] arthritis  [ ] back pain  Neurological:  [ ] headache [ ] seizures  [ ] confusion/altered mental status  Psychiatric:  [ ] anxiety [ ] depression	  Hematology/Lymphatics:  [ ] lymphadenopathy  Endocrine:  [ ] adrenal [ ] thyroid  Allergic/Immunologic:	 [ ] transplant [ ] seasonal    PHYSICAL EXAM:  General: [x ] non-toxic  HEAD/EYES: [ ] PERRL [x ] white sclera [ ] icterus  ENT:  [ ] normal [ ] supple [ ] thrush [ x]abrasion to left face  Cardiovascular:   [ ] murmur [x ] normal [ ] PPM/AICD  Respiratory:  [x ] clear to ausculation bilaterally  GI:  [x ] soft, non-tender, normal bowel sounds  :  [ x] fowler [ x] no CVA tenderness   Musculoskeletal:  [x ] no synovitis  Neurologic:  [ ] non-focal exam   Skin:  [ ] no rash  Lymph: [ x] no lymphadenopathy  Psychiatric:  [x ] appropriate affect [ ] alert & oriented  Lines:  x[ ] no phlebitis [ ] central line          Drug Dosing Weight  Height (cm): 183.1 (08 Apr 2020 17:28)  Weight (kg): 62.142 (08 Apr 2020 17:28)  BMI (kg/m2): 18.5 (08 Apr 2020 17:28)  BSA (m2): 1.82 (08 Apr 2020 17:28)    Vital Signs Last 24 Hrs  T(F): 97.9 (04-10-20 @ 08:39), Max: 101.1 (04-08-20 @ 00:05)    Vital Signs Last 24 Hrs  HR: 60 (04-10-20 @ 08:39) (60 - 76)  BP: 88/50 (04-10-20 @ 08:39) (88/50 - 101/54)  RR: 17 (04-10-20 @ 08:39)  SpO2: 100% (04-10-20 @ 08:39) (100% - 100%)  Wt(kg): --                          11.0   9.99  )-----------( 147      ( 10 Apr 2020 07:30 )             34.1       04-10    139  |  102  |  29<H>  ----------------------------<  110<H>  3.7   |  28  |  0.99    Ca    8.8      10 Apr 2020 07:30  Phos  2.7     04-10  Mg     1.9     04-10    TPro  6.3  /  Alb  3.0<L>  /  TBili  0.3  /  DBili  x   /  AST  84<H>  /  ALT  47<H>  /  AlkPhos  79  04-10          MICROBIOLOGY:  Culture - Blood (04.08.20 @ 05:47)    -  Staphylococcus aureus: Detec Any isolate of Staphylococcus aureus from a blood culture is NOT considered a contaminant.    Gram Stain:   Growth in aerobic bottle: Gram Positive Cocci in Clusters    Specimen Source: .Blood Blood-Venous    Organism: Blood Culture PCR    Culture Results:   Growth in aerobic bottle: Gram Positive Cocci in Clusters  "Due to technical problems, Proteus sp. will Not be reported as part of  the BCID panel until further notice"  ***Blood Panel PCR results on this specimen are available  approximately 3 hours after the Gram stain result.***  Gram stain, PCR, and/or culture results may not always  correspond due to difference in methodologies.  ************************************************************  This PCR assay was performed using [employer].  The following targets are tested for: Enterococcus,  vancomycin resistant enterococci, Listeria monocytogenes,  coagulase negative staphylococci, S. aureus,  methicillin resistant S. aureus, Streptococcus agalactiae  (Group B), S. pneumoniae, S.pyogenes (Group A),  Acinetobacter baumannii, Enterobacter cloacae, E. coli,  Klebsiella oxytoca, K. pneumoniae, Proteus sp.,  Serratia marcescens, Haemophilus influenzae,  Neisseria meningitidis, Pseudomonas aeruginosa, Candida  albicans, C. glabrata, C krusei, C parapsilosis,  C. tropicalis and the KPC resistance gene.    Organism Identification: Blood Culture PCR    Method Type: PCR      RADIOLOGY:    < from: Xray Chest 1 View- PORTABLE-Routine (04.07.20 @ 23:32) >  IMPRESSION:  No focal consolidation.  Three rounded objects overlying the mediastinum consistent with possible impacted pills in the lower esophagus.    < end of copied text >

## 2020-04-10 NOTE — DISCHARGE NOTE PROVIDER - NSDCFUADDAPPT_GEN_ALL_CORE_FT
Home care needed for fowler cathether evaluation.  Flush fowler cathether daily with 10 cc sterile normal saline.   If fowler cathether is clogged (not draining urine), flush with 50 cc sterile normal saline.

## 2020-04-10 NOTE — DISCHARGE NOTE PROVIDER - CARE PROVIDER_API CALL
Avery Pearson)  Urology  [address] [place] [address], [place] [address]  Phone: [phone]  Fax: [phone]  Follow Up Time: Avery Pearson)  Urology  [address] [place] [address], [address]  [place] [address]  Phone: [phone]  Fax: [phone]  Follow Up Time:     Liang Dillon)  Infectious Disease  [address] [place] [address]  Phone: [phone]  Fax: [phone]  Follow Up Time:

## 2020-04-10 NOTE — DISCHARGE NOTE PROVIDER - NSDCMRMEDTOKEN_GEN_ALL_CORE_FT
Cipro 500 mg oral tablet: 1 tab(s) orally every 12 hours   diazePAM 5 mg oral tablet: 1 tab(s) orally every 12 hours, hold for oversedation/lethargy   flurazepam 15 mg oral capsule: 1 cap(s) orally once a day (at bedtime)  ocular lubricant ophthalmic solution: 1 drop(s) to each affected eye 2 times a day  tamsulosin 0.4 mg oral capsule: 2 cap(s) orally once a day (at bedtime) acetaminophen 325 mg oral tablet: 2 tab(s) orally every 6 hours, As Needed - 3), Moderate Pain (4 - 6), Severe Pain (7 - 10)  ceFAZolin 2 g intravenous injection: 2 gram(s) intravenous every 8 hours via PICC line  diazePAM 5 mg oral tablet: 1 tab(s) orally every 12 hours, hold for oversedation/lethargy   flurazepam 15 mg oral capsule: 1 cap(s) orally once a day (at bedtime)  ocular lubricant ophthalmic solution: 1 drop(s) to each affected eye 2 times a day  Probiotic Formula oral capsule: 1 cap(s) orally 2 times a day  tamsulosin 0.4 mg oral capsule: 2 cap(s) orally once a day (at bedtime)  zinc oxide 20% topical ointment: 1 application topically 2 times a day

## 2020-04-10 NOTE — DISCHARGE NOTE PROVIDER - NSDCFUSCHEDAPPT_GEN_ALL_CORE_FT
SHANTANU CHARLES ; 04/21/2020 ; [facility] [address] [facility]  SHANTANU CHARLES ; 04/21/2020 ; [facility] [address] [place] [address] SHANTANU CHARLES ; 04/21/2020 ; [facility] [address] [facility]  SHANTANU CHARLES ; 04/21/2020 ; [facility] Urology [address] [place] [address]

## 2020-04-10 NOTE — DISCHARGE NOTE PROVIDER - NSDCCPCAREPLAN_GEN_ALL_CORE_FT
PRINCIPAL DISCHARGE DIAGNOSIS  Diagnosis: UTI (urinary tract infection)  Assessment and Plan of Treatment: Advised to follow up with PCP and Urololgy ([facility] - Dr. Pearson) for clinical evlauation and fowler catheter exchange.  Continue outpatient meds Cipro 500 BID x 5 days.  Do not participate in excessive exercise while on medication.      SECONDARY DISCHARGE DIAGNOSES  Diagnosis: Gait abnormality  Assessment and Plan of Treatment: PRINCIPAL DISCHARGE DIAGNOSIS  Diagnosis: UTI (urinary tract infection)  Assessment and Plan of Treatment: Advised to follow up with PCP and Urololgy ([facility] - Dr. Pearson) for clinical evlauation and fowler catheter exchange. Completed oral antibiotics. Fowler cath care as per protocol.      SECONDARY DISCHARGE DIAGNOSES  Diagnosis: Bacteremia due to methicillin susceptible Staphylococcus aureus (MSSA)  Assessment and Plan of Treatment: - Blood Cultures -positive for Staph Aureus, 4/11 repeat blood cultures- came back negative  - ID consulted- started Cefazolin every 8 hours x 4weeks thru 5/09/20  - Please check CBC with diff, CMP, ESR, CRP weekly and fax results to [phone]. Continue PICC care as per protocol  - 4/13 ECHO- EF 55%. Normal left ventricular systolic function  - Follow up with PCP or ID Dr Dillon in 4 weeks for further management    Diagnosis: Gait abnormality  Assessment and Plan of Treatment: - you had fall at home, follow safety / fall precautions  - Maintain a safe environment. Do not change positions quickly. Particpate in program recommended by physical therapy

## 2020-04-10 NOTE — DISCHARGE NOTE PROVIDER - NSDCDCMDCOMP_GEN_ALL_CORE
..Reason for Call:  Other     Detailed comments: OT called from [employer] she said the patients blood pressure was 129/59     Phone Number Patient can be reached at: [phone]    Best Time: anytime    Can we leave a detailed message on this number? YES    Call taken on 7/18/2018 at 1:39 PM by Leonarda Gonzalez       This document is complete and the patient is ready for discharge.

## 2020-04-10 NOTE — DISCHARGE NOTE PROVIDER - HOSPITAL COURSE
74 year old male, BPH, Chronic indwelling fowler, muscular dystrophy, history of difficulty eating.  Found on floor by HHA.         CPK Improving 90883 ---> 4489 with fluids    GM + cocci in blood culture    Klebsielle and enterococcus in urine        Pt. c/o pelvic discomfort - fowler changed 4/9/20 to an 18 Fr Fowler cathether which drained 900 cc pink urine and immediate relief of discomfort by patient. 74 year old male, BPH, Chronic indwelling fowler, muscular dystrophy, history of difficulty eating.  Found on floor by HHA at home. COVID 19- negative        UTI / MSSA Bacteremia    - 4/8 UCx- Enterococcus/ Klebsiella, BCx -positive for Staph Aureus, 4/11 repeat blood cultures- NTD    - ID consulted- started Cefazolin q8hrs x 4wks thru 5/09/20    - 4/13 ECHO- EF 55%. Normal left ventricular systolic function    - PT -Rehab        Rhabdomyolysis     - CPK Improving 80578 ---> 4489 with fluids, gentle hydration with IV NS at 50 cc/hour        Fall at home    - CTH/neck/face - no acute changes. Interval mild indeterminate age compression deformity of the superior endplate of C7 and T3 with area of sclerosis likely subacute to chronic in nature. No retropulsion        Pt. c/o pelvic discomfort - fowler changed 4/9/20 to an 18 Fr Fowler cathether which drained 900 cc pink urine and immediate relief of discomfort by patient.    [facility]

## 2020-04-10 NOTE — CONSULT NOTE ADULT - ASSESSMENT
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    1) MSSA bacteremia    Focus is not clear      focus of mssa bacteremia is rare- but can be seen in pt with chronic fowler    ? soft tissue focus    Change antibiotic to Cefazolin 2 gram iv q 8    Repeat blood culture times two in am    Eventual transthoracic echo    Will likely need 4 weeks of antibiotics    2) Bacteruria  polymicrobial   ? colonization in pt with chronic fowler    3) Leukocytosis Elevated to 25 at admission  Secondary to infection  responding to abx    continue to monitor 74 year odl with bipolar, muscular dystrophy BPH with fowler  Presents s/p fall with leukocytosis.    1) MSSA bacteremia    Focus is not clear      focus of mssa bacteremia is rare- but can be seen in pt with chronic fowler    ? soft tissue focus- wound to left face    Change antibiotic to Cefazolin 2 gram iv q 8    Repeat blood culture times two in am    Eventual transthoracic echo    Will likely need 4 weeks of antibiotics    2) Bacteruria  polymicrobial   ? colonization in pt with chronic fowler    Klebsiella will be covered by cefazolin    Cefazolin will not be covered by cefazolin (or ertapenem ) - less likely to be a pathogen.  Give vancomycin times one      3) Leukocytosis Elevated to 25 at admission  Secondary to infection  responding to abx    continue to monitor\    4) Peripheral neuropathy  pt denies history of muscular dystrophy    States he was told he has peripheral neuropathy of unknown etiology    outpt neurology eval

## 2020-04-10 NOTE — DISCHARGE NOTE PROVIDER - PROVIDER TOKENS
PROVIDER:[TOKEN:[[id number]]] PROVIDER:[TOKEN:[[id number]]],PROVIDER:[TOKEN:[[id number]]]

## 2020-04-10 NOTE — DISCHARGE NOTE PROVIDER - CARE PROVIDERS DIRECT ADDRESSES
[email or web address]

## 2020-04-10 NOTE — DISCHARGE NOTE PROVIDER - NSDCCAREPROVSEEN_GEN_ALL_CORE_FT
[facility] Medicine ACP, Team  Sadie Dietz MD [facility] Medicine ACP, Team  MD Mikey Sandoval Sona

## 2020-04-10 NOTE — PROGRESS NOTE ADULT - PROBLEM SELECTOR PLAN 1
Received Vancomycin and Ceftriaxone in ED and then started on Ertapenem and Vancomycin was discontinued.  Initial urine culture showed enterococcus but final culture also showed Klebsiella and then blood culture came back positive for Staph Aureus.  Seen by ID and antibiotics changed to Cefazolin and Vanco restarted.  Repeat blood cultures today. He is now afebrile with normal WBC.  Continue gentle hydration with IV NS at 50 cc/hour  Evaluated by PT and too weak for discharge home due to risk of fall.  COVID negative so will evaluate for discharge to rehab facility

## 2020-04-10 NOTE — PROGRESS NOTE ADULT - SUBJECTIVE AND OBJECTIVE BOX
Patient is a 74y old  Male who presents with a chief complaint of Weakness x 2 days (2020 10:05)      SUBJECTIVE / OVERNIGHT EVENTS: No acute events overnight.    MEDICATIONS  (STANDING):  ceFAZolin   IVPB 2000 milliGRAM(s) IV Intermittent every 8 hours  diazepam    Tablet 5 milliGRAM(s) Oral every 12 hours  heparin  Injectable 5000 Unit(s) SubCutaneous every 12 hours  magnesium oxide 400 milliGRAM(s) Oral two times a day with meals  sodium chloride 0.9%. 1000 milliLiter(s) (50 mL/Hr) IV Continuous <Continuous>  tamsulosin 0.8 milliGRAM(s) Oral at bedtime  zinc oxide 20% Ointment 1 Application(s) Topical two times a day    MEDICATIONS  (PRN):  acetaminophen   Tablet .. 650 milliGRAM(s) Oral every 4 hours PRN Temp greater or equal to 38.5C (101.3F)  acetaminophen  Suppository .. 650 milliGRAM(s) Rectal every 4 hours PRN Temp greater or equal to 38.5C (101.3F)  artificial tears (preservative free) Ophthalmic Solution 1 Drop(s) Both EYES two times a day PRN Dry Eyes  temazepam 15 milliGRAM(s) Oral at bedtime PRN Insomnia    Vital Signs Last 24 Hrs  T(C): 36.7 (10 Apr 2020 13:21), Max: 36.9 (2020 21:42)  T(F): 98.1 (10 Apr 2020 13:21), Max: 98.4 (2020 21:42)  HR: 85 (10 Apr 2020 13:21) (60 - 85)  BP: 99/50 (10 Apr 2020 13:21) (88/50 - 101/54)  BP(mean): --  RR: 20 (10 Apr 2020 13:21) (17 - 20)  SpO2: 99% (10 Apr 2020 13:21) (99% - 100%)    I&O's Summary    2020 07:01  -  2020 18:00  --------------------------------------------------------  IN: 480 mL / OUT: 1500 mL / NET: -1020 mL        PHYSICAL EXAM:  GENERAL: not in distress, on room air  HEENT: Bruise/contusion on left forehead s/p fall at home  EYES: open, sclera clear b/l  CHEST/LUNG: normal respiratory effort, not tachypneic, speaking in full sentences without difficulty  HEART: Not tachycardic, no lower extremity edema b/l  ABDOMEN: Soft, Nontender, Nondistended  EXTREMITIES: Warm and well perfused  NEUROLOGY: awake, alert, responds to Qs appropriately, no gross deficits  PSYCH: calm, cooperative  SKIN: No visible rashes or lesions    LABS:                                   11.0   9.99  )-----------( 147      ( 10 Apr 2020 07:30 )             34.1     04-10    139  |  102  |  29<H>  ----------------------------<  110<H>  3.7   |  28  |  0.99    Ca    8.8      10 Apr 2020 07:30  Phos  2.7     04-10  Mg     1.9     04-10    TPro  6.3  /  Alb  3.0<L>  /  TBili  0.3  /  DBili  x   /  AST  84<H>  /  ALT  47<H>  /  AlkPhos  79  04-10    Culture - Blood (20 @ 05:47)    -  Staphylococcus aureus: Detec Any isolate of Staphylococcus aureus from a blood culture is NOT considered a contaminant.    Gram Stain:   Growth in aerobic bottle: Gram Positive Cocci in Clusters    Specimen Source: .Blood Blood-Venous    Organism: Blood Culture PCR    Culture Results:   Growth in aerobic bottle: Gram Positive Cocci in Clusters  "Due to technical problems, Proteus sp. will Not be reported as part of  the BCID panel until further notice"  ***Blood Panel PCR results on this specimen are available  approximately 3 hours after the Gram stain result.***  Gram stain, PCR, and/or culture results may not always  correspond due to difference in methodologies.  ************************************************************  This PCR assay was performed using [employer].  The following targets are tested for: Enterococcus,  vancomycin resistant enterococci, Listeria monocytogenes,  coagulase negative staphylococci, S. aureus,  methicillin resistant S. aureus, Streptococcus agalactiae  (Group B), S. pneumoniae, S.pyogenes (Group A),  Acinetobacter baumannii, Enterobacter cloacae, E. coli,  Klebsiella oxytoca, K. pneumoniae, Proteus sp.,  Serratia marcescens, Haemophilus influenzae,  Neisseria meningitidis, Pseudomonas aeruginosa, Candida  albicans, C. glabrata, C krusei, C parapsilosis,  C. tropicalis and the KPC resistance gene.    Organism Identification: Blood Culture PCR    Method Type: PCR        Urinalysis Basic - ( 2020 23:30 )    Color: YELLOW / Appearance: Lt TURBID / S.017 / pH: 6.0  Gluc: NEGATIVE / Ketone: SMALL  / Bili: NEGATIVE / Urobili: NORMAL   Blood: LARGE / Protein: 200 / Nitrite: NEGATIVE   Leuk Esterase: LARGE / RBC: >50 / WBC >50   Sq Epi: OCC / Non Sq Epi: x / Bacteria: MANY    Culture - Urine (20 @ 05:45)    -  Amikacin: S <=16    -  Ampicillin: R >16 These ampicillin results predict results for amoxicillin    -  Ampicillin/Sulbactam: S <=8/4 Enterobacter, Citrobacter, and Serratia may develop resistance during prolonged therapy (3-4 days)    -  Cefazolin: S <=8 (MIC_CL_[id number]) For uncomplicated UTI with K. pneumoniae, E. coli, or P. mirablis: LILLY <=16 is sensitive and LILLY >=32 is resistant. This also predicts results for oral agents cefaclor, cefdinir, cefpodoxime, cefprozil, cefuroxime axetil, cephalexin and locarbef for uncomplicated UTI. Note that some isolates may be susceptible to these agents while testing resistant to cefazolin.    -  Cefepime: S <=4    -  Cefoxitin: S <=8    -  Ceftriaxone: S <=1 Enterobacter, Citrobacter, and Serratia may develop resistance during prolonged therapy    -  Aztreonam: S <=4    -  Ciprofloxacin: S <=1    -  Gentamicin: S <=4    -  Imipenem: S <=1    -  Levofloxacin: S <=2    -  Meropenem: S <=1    -  Nitrofurantoin: S <=32 Should not be used to treat pyelonephritis    -  Piperacillin/Tazobactam: S <=16    -  Tigecycline: S <=2    -  Tobramycin: S <=4    -  Trimethoprim/Sulfamethoxazole: S <=/    Specimen Source: .Urine Clean Catch (Midstream)    Culture Results:   >100,000 CFU/ml Klebsiella pneumoniae  >100,000 CFU/ml Enterococcus species    Organism Identification: Klebsiella pneumoniae    Organism: Klebsiella pneumoniae    Method Type: LILLY            RADIOLOGY & ADDITIONAL TESTS:

## 2020-04-11 DIAGNOSIS — A41.01 SEPSIS DUE TO METHICILLIN SUSCEPTIBLE STAPHYLOCOCCUS AUREUS: ICD-10-CM

## 2020-04-11 DIAGNOSIS — T83.511D INFECTION AND INFLAMMATORY REACTION DUE TO INDWELLING URETHRAL CATHETER, SUBSEQUENT ENCOUNTER: ICD-10-CM

## 2020-04-11 LAB
-  AMPICILLIN/SULBACTAM: SIGNIFICANT CHANGE UP
-  CEFAZOLIN: SIGNIFICANT CHANGE UP
-  CLINDAMYCIN: SIGNIFICANT CHANGE UP
-  ERYTHROMYCIN: SIGNIFICANT CHANGE UP
-  GENTAMICIN: SIGNIFICANT CHANGE UP
-  OXACILLIN: SIGNIFICANT CHANGE UP
-  RIFAMPIN: SIGNIFICANT CHANGE UP
-  TETRACYCLINE: SIGNIFICANT CHANGE UP
-  TRIMETHOPRIM/SULFAMETHOXAZOLE: SIGNIFICANT CHANGE UP
-  VANCOMYCIN: SIGNIFICANT CHANGE UP
ALBUMIN SERPL ELPH-MCNC: 3.3 G/DL — SIGNIFICANT CHANGE UP (ref 3.3–5)
ALP SERPL-CCNC: 87 U/L — SIGNIFICANT CHANGE UP (ref 40–120)
ALT FLD-CCNC: 39 U/L — SIGNIFICANT CHANGE UP (ref 4–41)
ANION GAP SERPL CALC-SCNC: 12 MMO/L — SIGNIFICANT CHANGE UP (ref 7–14)
AST SERPL-CCNC: 53 U/L — HIGH (ref 4–40)
BILIRUB SERPL-MCNC: 0.3 MG/DL — SIGNIFICANT CHANGE UP (ref 0.2–1.2)
BUN SERPL-MCNC: 19 MG/DL — SIGNIFICANT CHANGE UP (ref 7–23)
CALCIUM SERPL-MCNC: 8.8 MG/DL — SIGNIFICANT CHANGE UP (ref 8.4–10.5)
CHLORIDE SERPL-SCNC: 98 MMOL/L — SIGNIFICANT CHANGE UP (ref 98–107)
CO2 SERPL-SCNC: 26 MMOL/L — SIGNIFICANT CHANGE UP (ref 22–31)
CREAT SERPL-MCNC: 0.86 MG/DL — SIGNIFICANT CHANGE UP (ref 0.5–1.3)
GLUCOSE SERPL-MCNC: 109 MG/DL — HIGH (ref 70–99)
GRAM STN FLD: SIGNIFICANT CHANGE UP
GRAM STN FLD: SIGNIFICANT CHANGE UP
HCT VFR BLD CALC: 33.4 % — LOW (ref 39–50)
HGB BLD-MCNC: 11 G/DL — LOW (ref 13–17)
MAGNESIUM SERPL-MCNC: 1.8 MG/DL — SIGNIFICANT CHANGE UP (ref 1.6–2.6)
MCHC RBC-ENTMCNC: 31.8 PG — SIGNIFICANT CHANGE UP (ref 27–34)
MCHC RBC-ENTMCNC: 32.9 % — SIGNIFICANT CHANGE UP (ref 32–36)
MCV RBC AUTO: 96.5 FL — SIGNIFICANT CHANGE UP (ref 80–100)
METHOD TYPE: SIGNIFICANT CHANGE UP
NRBC # FLD: 0 K/UL — SIGNIFICANT CHANGE UP (ref 0–0)
PHOSPHATE SERPL-MCNC: 2.7 MG/DL — SIGNIFICANT CHANGE UP (ref 2.5–4.5)
PLATELET # BLD AUTO: 176 K/UL — SIGNIFICANT CHANGE UP (ref 150–400)
PMV BLD: 10.6 FL — SIGNIFICANT CHANGE UP (ref 7–13)
POTASSIUM SERPL-MCNC: 3.5 MMOL/L — SIGNIFICANT CHANGE UP (ref 3.5–5.3)
POTASSIUM SERPL-SCNC: 3.5 MMOL/L — SIGNIFICANT CHANGE UP (ref 3.5–5.3)
PROT SERPL-MCNC: 6.2 G/DL — SIGNIFICANT CHANGE UP (ref 6–8.3)
RBC # BLD: 3.46 M/UL — LOW (ref 4.2–5.8)
RBC # FLD: 13.2 % — SIGNIFICANT CHANGE UP (ref 10.3–14.5)
SODIUM SERPL-SCNC: 136 MMOL/L — SIGNIFICANT CHANGE UP (ref 135–145)
VANCOMYCIN FLD-MCNC: 5.1 UG/ML — SIGNIFICANT CHANGE UP
WBC # BLD: 8.58 K/UL — SIGNIFICANT CHANGE UP (ref 3.8–10.5)
WBC # FLD AUTO: 8.58 K/UL — SIGNIFICANT CHANGE UP (ref 3.8–10.5)

## 2020-04-11 PROCEDURE — 99232 SBSQ HOSP IP/OBS MODERATE 35: CPT

## 2020-04-11 RX ADMIN — Medication 100 MILLIGRAM(S): at 13:46

## 2020-04-11 RX ADMIN — Medication 5 MILLIGRAM(S): at 19:03

## 2020-04-11 RX ADMIN — Medication 5 MILLIGRAM(S): at 06:53

## 2020-04-11 RX ADMIN — TAMSULOSIN HYDROCHLORIDE 0.8 MILLIGRAM(S): 0.4 CAPSULE ORAL at 22:36

## 2020-04-11 RX ADMIN — ZINC OXIDE 1 APPLICATION(S): 200 OINTMENT TOPICAL at 19:03

## 2020-04-11 RX ADMIN — Medication 100 MILLIGRAM(S): at 00:13

## 2020-04-11 RX ADMIN — TAMSULOSIN HYDROCHLORIDE 0.8 MILLIGRAM(S): 0.4 CAPSULE ORAL at 00:13

## 2020-04-11 RX ADMIN — ZINC OXIDE 1 APPLICATION(S): 200 OINTMENT TOPICAL at 06:52

## 2020-04-11 RX ADMIN — Medication 100 MILLIGRAM(S): at 06:52

## 2020-04-11 RX ADMIN — MAGNESIUM OXIDE 400 MG ORAL TABLET 400 MILLIGRAM(S): 241.3 TABLET ORAL at 13:46

## 2020-04-11 RX ADMIN — HEPARIN SODIUM 5000 UNIT(S): 5000 INJECTION INTRAVENOUS; SUBCUTANEOUS at 06:53

## 2020-04-11 NOTE — PROGRESS NOTE ADULT - SUBJECTIVE AND OBJECTIVE BOX
Patient is a 74y old  Male who presents with a chief complaint of Weakness x 2 days (11 Apr 2020 15:05)      SUBJECTIVE / OVERNIGHT EVENTS: No acute events overnight. He is sitting up in bed eating lunch. Still feels very weak and unable to change positions himself in bed. He denies any fevers or chills. Denies cough or SOB.     MEDICATIONS  (STANDING):  ceFAZolin   IVPB 2000 milliGRAM(s) IV Intermittent every 8 hours  diazepam    Tablet 5 milliGRAM(s) Oral every 12 hours  heparin  Injectable 5000 Unit(s) SubCutaneous every 12 hours  tamsulosin 0.8 milliGRAM(s) Oral at bedtime  zinc oxide 20% Ointment 1 Application(s) Topical two times a day    MEDICATIONS  (PRN):  acetaminophen   Tablet .. 650 milliGRAM(s) Oral every 4 hours PRN Temp greater or equal to 38.5C (101.3F)  acetaminophen  Suppository .. 650 milliGRAM(s) Rectal every 4 hours PRN Temp greater or equal to 38.5C (101.3F)  artificial tears (preservative free) Ophthalmic Solution 1 Drop(s) Both EYES two times a day PRN Dry Eyes  temazepam 15 milliGRAM(s) Oral at bedtime PRN Insomnia      T(C): 36.7 (04-11-20 @ 00:08), Max: 36.7 (04-11-20 @ 00:08)  HR: 65 (04-11-20 @ 00:08) (65 - 74)  BP: 104/52 (04-11-20 @ 00:08) (104/52 - 106/56)  RR: 18 (04-11-20 @ 00:08) (18 - 19)  SpO2: 100% (04-11-20 @ 00:08) (100% - 100%)  CAPILLARY BLOOD GLUCOSE        I&O's Summary    10 Apr 2020 07:01  -  11 Apr 2020 07:00  --------------------------------------------------------  IN: 0 mL / OUT: 1000 mL / NET: -1000 mL        PHYSICAL EXAM:  GENERAL: not in distress sitting up in bed, on room air  EYES: open, sclera clear b/l; contusion over face from slipping to floor prior to admission   CHEST/LUNG: normal respiratory effort, not tachypneic, speaking in full sentences without difficulty  HEART: Not tachycardic, no lower extremity edema b/l  ABDOMEN: Soft, Nontender, Nondistended  EXTREMITIES: Warm and well perfused  : fowler catheter with clear yellow urine  NEUROLOGY: awake, alert, responds to Qs appropriately, no gross deficits  PSYCH: calm, cooperative  skin: contusion over face     LABS:                        11.0   8.58  )-----------( 176      ( 11 Apr 2020 06:40 )             33.4     04-11    136  |  98  |  19  ----------------------------<  109<H>  3.5   |  26  |  0.86    Ca    8.8      11 Apr 2020 06:40  Phos  2.7     04-11  Mg     1.8     04-11    TPro  6.2  /  Alb  3.3  /  TBili  0.3  /  DBili  x   /  AST  53<H>  /  ALT  39  /  AlkPhos  87  04-11              RADIOLOGY & ADDITIONAL TESTS:

## 2020-04-11 NOTE — PROGRESS NOTE ADULT - PROBLEM SELECTOR PLAN 2
Continue with cefazolin: s/w ID, if follow up blood cultures are negative (cx done today) then he could have PICC or midline placement with plan for 4 weeks of cefazolin and outpatient Echo.

## 2020-04-11 NOTE — PROGRESS NOTE ADULT - ASSESSMENT
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    1) MSSA bacteremia    Focus is not clear      focus of mssa bacteremia is rare- but can be seen in pt with chronic fowler    ? soft tissue focus- wound to left face    Change antibiotic to Cefazolin 2 gram iv q 8    Repeat blood culture times two- ordered    Eventual transthoracic echo    Will likely need 4 weeks of antibiotics    2) Bacteruria  polymicrobial   ? colonization in pt with chronic fowler    Klebsiella will be covered by cefazolin    Cefazolin will not be covered by cefazolin (or ertapenem ) - less likely to be a pathogen.  Give vancomycin times one      3) Leukocytosis Elevated to 25 at admission  Secondary to infection  responding to abx    continue to monitor    4) Peripheral neuropathy  pt denies history of muscular dystrophy    States he was told he has peripheral neuropathy of unknown etiology    outpt neurology eval    Discussed with pt's health care contact- Zamzam

## 2020-04-11 NOTE — PROGRESS NOTE ADULT - ASSESSMENT
73 y/o M with muscle dystrophy admitted with weakness and found to have rhabdomyolysis and MSSA bacteremia along with Klebsiella in the urine (chronic fowler catheter).

## 2020-04-11 NOTE — PROGRESS NOTE ADULT - SUBJECTIVE AND OBJECTIVE BOX
Follow Up:      Inverval History/ROS:Patient is a 74y old  Male who presents with a chief complaint of Weakness x 2 days (10 Apr 2020 17:31)    no fever  No events      Allergies    No Known Allergies    Intolerances        ANTIMICROBIALS:  ceFAZolin   IVPB 2000 every 8 hours      OTHER MEDS:  acetaminophen   Tablet .. 650 milliGRAM(s) Oral every 4 hours PRN  acetaminophen  Suppository .. 650 milliGRAM(s) Rectal every 4 hours PRN  artificial tears (preservative free) Ophthalmic Solution 1 Drop(s) Both EYES two times a day PRN  diazepam    Tablet 5 milliGRAM(s) Oral every 12 hours  heparin  Injectable 5000 Unit(s) SubCutaneous every 12 hours  tamsulosin 0.8 milliGRAM(s) Oral at bedtime  temazepam 15 milliGRAM(s) Oral at bedtime PRN  zinc oxide 20% Ointment 1 Application(s) Topical two times a day      Vital Signs Last 24 Hrs  T(C): 36.7 (11 Apr 2020 00:08), Max: 36.7 (11 Apr 2020 00:08)  T(F): 98.1 (11 Apr 2020 00:08), Max: 98.1 (11 Apr 2020 00:08)  HR: 65 (11 Apr 2020 00:08) (65 - 74)  BP: 104/52 (11 Apr 2020 00:08) (104/52 - 106/56)  BP(mean): --  RR: 18 (11 Apr 2020 00:08) (18 - 19)  SpO2: 100% (11 Apr 2020 00:08) (100% - 100%)    PHYSICAL EXAM:  General: [x ] non-toxic  HEAD/EYES: [ ] PERRL [x ] white sclera [ ] icterus  ENT:  [ ] normal x[ ] supple [ ] thrush [ ] pharyngeal exudate  Cardiovascular:   [ ] murmur [ x] normal [ ] PPM/AICD  Respiratory:  [x ] clear to ausculation bilaterally  GI:  [ x] soft, non-tender, normal bowel sounds  :  [ ] fowler [x ] no CVA tenderness   Musculoskeletal:  [ ] no synovitis  Neurologic:  [ ] non-focal exam   Skin:  [x ] left facial wound  Lymph: [ ] no lymphadenopathy  Psychiatric:  [ ] appropriate affect [ x] alert & oriented  Lines:  [ x] no phlebitis [ ] central line                                11.0   8.58  )-----------( 176      ( 11 Apr 2020 06:40 )             33.4       04-11    136  |  98  |  19  ----------------------------<  109<H>  3.5   |  26  |  0.86    Ca    8.8      11 Apr 2020 06:40  Phos  2.7     04-11  Mg     1.8     04-11    TPro  6.2  /  Alb  3.3  /  TBili  0.3  /  DBili  x   /  AST  53<H>  /  ALT  39  /  AlkPhos  87  04-11          MICROBIOLOGY:Culture Results:   Growth in anaerobic bottle: Gram Positive Cocci in Clusters (04-08-20 @ 05:47)  Culture Results:   Growth in aerobic bottle: Staphylococcus aureus  "Due to technical problems, Proteus sp. will Not be reported as part of  the BCID panel until further notice"  ***Blood Panel PCR results on this specimen are available  approximately 3 hours after the Gram stain result.***  Gram stain, PCR, and/or culture results may not always  correspond due to difference in methodologies.  ************************************************************  This PCR assay was performed using [employer].  The following targets are tested for: Enterococcus,  vancomycin resistant enterococci, Listeria monocytogenes,  coagulase negative staphylococci, S. aureus,  methicillin resistant S. aureus, Streptococcus agalactiae  (Group B), S. pneumoniae, S. pyogenes (Group A),  Acinetobacter baumannii, Enterobacter cloacae, E. coli,  Klebsiella oxytoca, K. pneumoniae, Proteus sp.,  Serratia marcescens, Haemophilus influenzae,  Neisseria meningitidis, Pseudomonas aeruginosa, Candida  albicans, C. glabrata, C krusei, C parapsilosis,  C. tropicalis and the KPC resistance gene.  Growth in anaerobic bottle: Gram Positive Cocci in Clusters (04-08-20 @ 05:47)  Culture Results:   >100,000 CFU/ml Klebsiella pneumoniae  >100,000 CFU/ml Enterococcus faecalis (04-08-20 @ 05:14)      RADIOLOGY:

## 2020-04-12 DIAGNOSIS — R74.8 ABNORMAL LEVELS OF OTHER SERUM ENZYMES: ICD-10-CM

## 2020-04-12 LAB
ALBUMIN SERPL ELPH-MCNC: 3.1 G/DL — LOW (ref 3.3–5)
ALP SERPL-CCNC: 97 U/L — SIGNIFICANT CHANGE UP (ref 40–120)
ALT FLD-CCNC: 31 U/L — SIGNIFICANT CHANGE UP (ref 4–41)
ANION GAP SERPL CALC-SCNC: 9 MMO/L — SIGNIFICANT CHANGE UP (ref 7–14)
AST SERPL-CCNC: 39 U/L — SIGNIFICANT CHANGE UP (ref 4–40)
BILIRUB SERPL-MCNC: 0.3 MG/DL — SIGNIFICANT CHANGE UP (ref 0.2–1.2)
BUN SERPL-MCNC: 16 MG/DL — SIGNIFICANT CHANGE UP (ref 7–23)
CALCIUM SERPL-MCNC: 9 MG/DL — SIGNIFICANT CHANGE UP (ref 8.4–10.5)
CHLORIDE SERPL-SCNC: 97 MMOL/L — LOW (ref 98–107)
CK SERPL-CCNC: 783 U/L — HIGH (ref 30–200)
CO2 SERPL-SCNC: 30 MMOL/L — SIGNIFICANT CHANGE UP (ref 22–31)
CREAT SERPL-MCNC: 0.8 MG/DL — SIGNIFICANT CHANGE UP (ref 0.5–1.3)
CULTURE RESULTS: SIGNIFICANT CHANGE UP
GLUCOSE SERPL-MCNC: 104 MG/DL — HIGH (ref 70–99)
HCT VFR BLD CALC: 33.6 % — LOW (ref 39–50)
HGB BLD-MCNC: 11 G/DL — LOW (ref 13–17)
MAGNESIUM SERPL-MCNC: 1.7 MG/DL — SIGNIFICANT CHANGE UP (ref 1.6–2.6)
MCHC RBC-ENTMCNC: 31.6 PG — SIGNIFICANT CHANGE UP (ref 27–34)
MCHC RBC-ENTMCNC: 32.7 % — SIGNIFICANT CHANGE UP (ref 32–36)
MCV RBC AUTO: 96.6 FL — SIGNIFICANT CHANGE UP (ref 80–100)
NRBC # FLD: 0 K/UL — SIGNIFICANT CHANGE UP (ref 0–0)
ORGANISM # SPEC MICROSCOPIC CNT: SIGNIFICANT CHANGE UP
PHOSPHATE SERPL-MCNC: 3.4 MG/DL — SIGNIFICANT CHANGE UP (ref 2.5–4.5)
PLATELET # BLD AUTO: 200 K/UL — SIGNIFICANT CHANGE UP (ref 150–400)
PMV BLD: 10.5 FL — SIGNIFICANT CHANGE UP (ref 7–13)
POTASSIUM SERPL-MCNC: 3.5 MMOL/L — SIGNIFICANT CHANGE UP (ref 3.5–5.3)
POTASSIUM SERPL-SCNC: 3.5 MMOL/L — SIGNIFICANT CHANGE UP (ref 3.5–5.3)
PROT SERPL-MCNC: 6.3 G/DL — SIGNIFICANT CHANGE UP (ref 6–8.3)
RBC # BLD: 3.48 M/UL — LOW (ref 4.2–5.8)
RBC # FLD: 13 % — SIGNIFICANT CHANGE UP (ref 10.3–14.5)
SODIUM SERPL-SCNC: 136 MMOL/L — SIGNIFICANT CHANGE UP (ref 135–145)
SPECIMEN SOURCE: SIGNIFICANT CHANGE UP
VANCOMYCIN FLD-MCNC: 3 UG/ML — SIGNIFICANT CHANGE UP
WBC # BLD: 9.09 K/UL — SIGNIFICANT CHANGE UP (ref 3.8–10.5)
WBC # FLD AUTO: 9.09 K/UL — SIGNIFICANT CHANGE UP (ref 3.8–10.5)

## 2020-04-12 PROCEDURE — 99232 SBSQ HOSP IP/OBS MODERATE 35: CPT

## 2020-04-12 RX ORDER — ACETAMINOPHEN 500 MG
650 TABLET ORAL EVERY 4 HOURS
Refills: 0 | Status: DISCONTINUED | OUTPATIENT
Start: 2020-04-12 | End: 2020-04-15

## 2020-04-12 RX ADMIN — ZINC OXIDE 1 APPLICATION(S): 200 OINTMENT TOPICAL at 16:28

## 2020-04-12 RX ADMIN — Medication 100 MILLIGRAM(S): at 01:00

## 2020-04-12 RX ADMIN — Medication 5 MILLIGRAM(S): at 08:10

## 2020-04-12 RX ADMIN — HEPARIN SODIUM 5000 UNIT(S): 5000 INJECTION INTRAVENOUS; SUBCUTANEOUS at 08:32

## 2020-04-12 RX ADMIN — Medication 100 MILLIGRAM(S): at 22:30

## 2020-04-12 RX ADMIN — TAMSULOSIN HYDROCHLORIDE 0.8 MILLIGRAM(S): 0.4 CAPSULE ORAL at 22:30

## 2020-04-12 RX ADMIN — Medication 100 MILLIGRAM(S): at 08:32

## 2020-04-12 RX ADMIN — Medication 650 MILLIGRAM(S): at 23:16

## 2020-04-12 RX ADMIN — Medication 100 MILLIGRAM(S): at 16:27

## 2020-04-12 RX ADMIN — ZINC OXIDE 1 APPLICATION(S): 200 OINTMENT TOPICAL at 08:32

## 2020-04-12 RX ADMIN — Medication 5 MILLIGRAM(S): at 18:04

## 2020-04-12 NOTE — PROGRESS NOTE ADULT - SUBJECTIVE AND OBJECTIVE BOX
Patient is a 74y old  Male who presents with a chief complaint of Weakness x 2 days (11 Apr 2020 16:06)      SUBJECTIVE / OVERNIGHT EVENTS: No acute events overnight. Still feels weak: concerned how he will receive antibiotics at home but does not want to go to rehab. Wonders if his HCP who is a NP may be able to help administer the cefazolin at home.     MEDICATIONS  (STANDING):  ceFAZolin   IVPB 2000 milliGRAM(s) IV Intermittent every 8 hours  diazepam    Tablet 5 milliGRAM(s) Oral every 12 hours  heparin  Injectable 5000 Unit(s) SubCutaneous every 12 hours  tamsulosin 0.8 milliGRAM(s) Oral at bedtime  zinc oxide 20% Ointment 1 Application(s) Topical two times a day    MEDICATIONS  (PRN):  acetaminophen   Tablet .. 650 milliGRAM(s) Oral every 4 hours PRN Temp greater or equal to 38.5C (101.3F)  acetaminophen  Suppository .. 650 milliGRAM(s) Rectal every 4 hours PRN Temp greater or equal to 38.5C (101.3F)  artificial tears (preservative free) Ophthalmic Solution 1 Drop(s) Both EYES two times a day PRN Dry Eyes  temazepam 15 milliGRAM(s) Oral at bedtime PRN Insomnia      T(C): 36.8 (04-12-20 @ 04:12), Max: 36.8 (04-12-20 @ 04:12)  HR: 76 (04-12-20 @ 04:12) (76 - 79)  BP: 110/65 (04-12-20 @ 04:12) (110/65 - 120/69)  RR: 16 (04-12-20 @ 04:12) (16 - 16)  SpO2: 100% (04-12-20 @ 04:12) (100% - 100%)  CAPILLARY BLOOD GLUCOSE        I&O's Summary      PHYSICAL EXAM:  GENERAL: not in distress, sitting up in bed on room air  EYES: open, sclera clear b/l  CHEST/LUNG: normal respiratory effort, not tachypneic, speaking in full sentences without difficulty  HEART: Not tachycardic, no lower extremity edema b/l  ABDOMEN: Soft, Nontender, Nondistended  EXTREMITIES: Warm and well perfused  NEUROLOGY: awake, alert, responds to Qs appropriately, no gross deficits  PSYCH: calm, cooperative  SKIN: R cheek abrasion; anterior chest wall lesion 2 cm that is chronic for over 1 year    LABS:                        11.0   9.09  )-----------( 200      ( 12 Apr 2020 06:25 )             33.6     04-12    136  |  97<L>  |  16  ----------------------------<  104<H>  3.5   |  30  |  0.80    Ca    9.0      12 Apr 2020 06:25  Phos  3.4     04-12  Mg     1.7     04-12    TPro  6.3  /  Alb  3.1<L>  /  TBili  0.3  /  DBili  x   /  AST  39  /  ALT  31  /  AlkPhos  97  04-12      CARDIAC MARKERS ( 12 Apr 2020 06:25 )  x     / x     / 783 u/L / x     / x              RADIOLOGY & ADDITIONAL TESTS:

## 2020-04-12 NOTE — PROGRESS NOTE ADULT - ASSESSMENT
73 y/o M with BPH and chronic fowler and muscle dystrophy admitted with weakness and found to have MSSA bacteremia, UTI and elevated CPK that has been improving since admission.

## 2020-04-12 NOTE — PROGRESS NOTE ADULT - PROBLEM SELECTOR PLAN 1
Currently repeat blood cultures from 4/11/20 negative. s/w Kali BROWN, iv team will be in on weekdays for PICC or midline to allow pt to continue to receive duration of 4 weeks of cefazolin. Pt had wanted to be discharged home instead of rehab before blood cultures showed MSSA, will see he could have antibiotics from home.

## 2020-04-13 LAB
ANION GAP SERPL CALC-SCNC: 17 MMO/L — HIGH (ref 7–14)
APTT BLD: 30 SEC — SIGNIFICANT CHANGE UP (ref 27.5–36.3)
BUN SERPL-MCNC: 15 MG/DL — SIGNIFICANT CHANGE UP (ref 7–23)
CALCIUM SERPL-MCNC: 9 MG/DL — SIGNIFICANT CHANGE UP (ref 8.4–10.5)
CHLORIDE SERPL-SCNC: 99 MMOL/L — SIGNIFICANT CHANGE UP (ref 98–107)
CO2 SERPL-SCNC: 24 MMOL/L — SIGNIFICANT CHANGE UP (ref 22–31)
CREAT SERPL-MCNC: 0.82 MG/DL — SIGNIFICANT CHANGE UP (ref 0.5–1.3)
CULTURE RESULTS: SIGNIFICANT CHANGE UP
GLUCOSE SERPL-MCNC: 101 MG/DL — HIGH (ref 70–99)
HCT VFR BLD CALC: 33.8 % — LOW (ref 39–50)
HGB BLD-MCNC: 11.3 G/DL — LOW (ref 13–17)
INR BLD: 1.14 — SIGNIFICANT CHANGE UP (ref 0.88–1.17)
MCHC RBC-ENTMCNC: 32.2 PG — SIGNIFICANT CHANGE UP (ref 27–34)
MCHC RBC-ENTMCNC: 33.4 % — SIGNIFICANT CHANGE UP (ref 32–36)
MCV RBC AUTO: 96.3 FL — SIGNIFICANT CHANGE UP (ref 80–100)
NRBC # FLD: 0 K/UL — SIGNIFICANT CHANGE UP (ref 0–0)
PLATELET # BLD AUTO: 241 K/UL — SIGNIFICANT CHANGE UP (ref 150–400)
PMV BLD: 10.7 FL — SIGNIFICANT CHANGE UP (ref 7–13)
POTASSIUM SERPL-MCNC: 3.6 MMOL/L — SIGNIFICANT CHANGE UP (ref 3.5–5.3)
POTASSIUM SERPL-SCNC: 3.6 MMOL/L — SIGNIFICANT CHANGE UP (ref 3.5–5.3)
PROTHROM AB SERPL-ACNC: 13 SEC — SIGNIFICANT CHANGE UP (ref 9.8–13.1)
RBC # BLD: 3.51 M/UL — LOW (ref 4.2–5.8)
RBC # FLD: 12.9 % — SIGNIFICANT CHANGE UP (ref 10.3–14.5)
SODIUM SERPL-SCNC: 140 MMOL/L — SIGNIFICANT CHANGE UP (ref 135–145)
SPECIMEN SOURCE: SIGNIFICANT CHANGE UP
WBC # BLD: 10.49 K/UL — SIGNIFICANT CHANGE UP (ref 3.8–10.5)
WBC # FLD AUTO: 10.49 K/UL — SIGNIFICANT CHANGE UP (ref 3.8–10.5)

## 2020-04-13 PROCEDURE — 99232 SBSQ HOSP IP/OBS MODERATE 35: CPT | Mod: CS

## 2020-04-13 PROCEDURE — 93306 TTE W/DOPPLER COMPLETE: CPT | Mod: 26

## 2020-04-13 PROCEDURE — 99232 SBSQ HOSP IP/OBS MODERATE 35: CPT

## 2020-04-13 RX ADMIN — Medication 5 MILLIGRAM(S): at 06:35

## 2020-04-13 RX ADMIN — ZINC OXIDE 1 APPLICATION(S): 200 OINTMENT TOPICAL at 14:29

## 2020-04-13 RX ADMIN — Medication 100 MILLIGRAM(S): at 23:11

## 2020-04-13 RX ADMIN — Medication 650 MILLIGRAM(S): at 23:11

## 2020-04-13 RX ADMIN — Medication 100 MILLIGRAM(S): at 06:35

## 2020-04-13 RX ADMIN — Medication 650 MILLIGRAM(S): at 16:01

## 2020-04-13 RX ADMIN — Medication 5 MILLIGRAM(S): at 17:56

## 2020-04-13 RX ADMIN — Medication 100 MILLIGRAM(S): at 15:39

## 2020-04-13 RX ADMIN — ZINC OXIDE 1 APPLICATION(S): 200 OINTMENT TOPICAL at 06:35

## 2020-04-13 RX ADMIN — TAMSULOSIN HYDROCHLORIDE 0.8 MILLIGRAM(S): 0.4 CAPSULE ORAL at 23:11

## 2020-04-13 NOTE — PROGRESS NOTE ADULT - SUBJECTIVE AND OBJECTIVE BOX
Follow Up:      Inverval History/ROS:Patient is a 74y old  Male who presents with a chief complaint of Weakness x 2 days (13 Apr 2020 14:47)    Feels okay.  No fever    Allergies    No Known Allergies    Intolerances        ANTIMICROBIALS:  ceFAZolin   IVPB 2000 every 8 hours      OTHER MEDS:  acetaminophen   Tablet .. 650 milliGRAM(s) Oral every 4 hours PRN  acetaminophen  Suppository .. 650 milliGRAM(s) Rectal every 4 hours PRN  artificial tears (preservative free) Ophthalmic Solution 1 Drop(s) Both EYES two times a day PRN  diazepam    Tablet 5 milliGRAM(s) Oral every 12 hours  heparin  Injectable 5000 Unit(s) SubCutaneous every 12 hours  tamsulosin 0.8 milliGRAM(s) Oral at bedtime  temazepam 15 milliGRAM(s) Oral at bedtime PRN  zinc oxide 20% Ointment 1 Application(s) Topical two times a day      Vital Signs Last 24 Hrs  T(C): 36.8 (13 Apr 2020 14:50), Max: 36.8 (13 Apr 2020 14:50)  T(F): 98.2 (13 Apr 2020 14:50), Max: 98.2 (13 Apr 2020 14:50)  HR: 75 (13 Apr 2020 14:50) (71 - 78)  BP: 105/64 (13 Apr 2020 14:50) (103/65 - 107/58)  BP(mean): --  RR: 17 (13 Apr 2020 14:50) (16 - 17)  SpO2: 100% (13 Apr 2020 14:50) (98% - 100%)    PHYSICAL EXAM:  General: [x ] non-toxic  HEAD/EYES: [ ] PERRL [x ] white sclera [ ] icterus  ENT:  [ ] normal [x ] supple [ ] thrush [ ] pharyngeal exudate  Cardiovascular:   [ ] murmur [ x] normal [ ] PPM/AICD  Respiratory:  [ x] clear to ausculation bilaterally  GI:  [ x] soft, non-tender, normal bowel sounds  :  [ ] fowler [ ] no CVA tenderness   Musculoskeletal:  [ ] no synovitis  Neurologic:  [x ] non-focal exam   Skin:  [ x] no rash  Lymph: [ ] no lymphadenopathy  Psychiatric:  [ ] appropriate affect [x ] alert & oriented  Lines:  [x ] no phlebitis [ ] central line                                11.3   10.49 )-----------( 241      ( 13 Apr 2020 08:00 )             33.8       04-13    140  |  99  |  15  ----------------------------<  101<H>  3.6   |  24  |  0.82    Ca    9.0      13 Apr 2020 08:00  Phos  3.4     04-12  Mg     1.7     04-12    TPro  6.3  /  Alb  3.1<L>  /  TBili  0.3  /  DBili  x   /  AST  39  /  ALT  31  /  AlkPhos  97  04-12          MICROBIOLOGY:Culture Results:   No growth to date. (04-11-20 @ 14:47)  Culture Results:   No growth to date. (04-11-20 @ 14:47)  Culture Results:   No growth to date. (04-11-20 @ 08:48)  Culture Results:   No growth to date. (04-11-20 @ 08:48)  Culture Results:   >100,000 CFU/ml Klebsiella pneumoniae  >100,000 CFU/ml Enterococcus faecalis (04-08-20 @ 05:14)  Culture Results:   Growth in anaerobic bottle: Staphylococcus aureus  See previous culture [id number] (04-08-20 @ 02:17)  Culture Results:   Growth in aerobic and anaerobic bottles: Staphylococcus aureus  "Due to technical problems, Proteus sp. will Not be reported as part of  the BCID panel until further notice"  ***Blood Panel PCR results on this specimen are available  approximately 3 hours after the Gram stain result.***  Gram stain, PCR, and/or culture results may not always  correspond due to difference in methodologies.  ************************************************************  This PCR assay was performed using [employer].  The following targets are tested for: Enterococcus,  vancomycin resistant enterococci, Listeria monocytogenes,  coagulase negative staphylococci, S. aureus,  methicillin resistant S. aureus, Streptococcus agalactiae  (Group B), S. pneumoniae, S. pyogenes (Group A),  Acinetobacter baumannii, Enterobacter cloacae, E. coli,  Klebsiella oxytoca, K. pneumoniae, Proteus sp.,  Serratia marcescens, Haemophilus influenzae,  Neisseria meningitidis, Pseudomonas aeruginosa, Candida  albicans, C. glabrata, C krusei, C parapsilosis,  C. tropicalis and the KPC resistance gene. (04-08-20 @ 02:17)      RADIOLOGY:

## 2020-04-13 NOTE — PROGRESS NOTE ADULT - PROBLEM SELECTOR PROBLEM 2
DVT prophylaxis
Sepsis due to methicillin susceptible Staphylococcus aureus, unspecified whether acute organ dysfunction present

## 2020-04-13 NOTE — PROGRESS NOTE ADULT - PROBLEM SELECTOR PROBLEM 1
Sepsis due to methicillin susceptible Staphylococcus aureus, unspecified whether acute organ dysfunction present

## 2020-04-13 NOTE — CHART NOTE - NSCHARTNOTEFT_GEN_A_CORE
PRE-INTERVENTIONAL RADIOLOGY PROCEDURE NOTE      Patient Age:     Patient Gender: Male    Procedure: PICC line placement    Diagnosis/Indication: IV antibiotics x4 weeks for MSSA Bacteremia & UTI    Interventional Radiology Attending Physician: Dr Rivera    Ordering Attending Physician: Dr Lyons    Pertinent Medical History: MSSA Bacteremia, BPH, Spencer    Pertinent labs:                      11.3   10.49 )-----------( 241      ( 13 Apr 2020 08:00 )             33.8       04-13    140  |  99  |  15  ----------------------------<  101<H>  3.6   |  24  |  0.82    Ca    9.0      13 Apr 2020 08:00  Phos  3.4     04-12  Mg     1.7     04-12    TPro  6.3  /  Alb  3.1<L>  /  TBili  0.3  /  DBili  x   /  AST  39  /  ALT  31  /  AlkPhos  97  04-12      PT/INR - ( 13 Apr 2020 08:00 )   PT: 13.0 SEC;   INR: 1.14          PTT - ( 13 Apr 2020 08:00 )  PTT:30.0 SEC        Patient and Family Aware ? Yes

## 2020-04-13 NOTE — PROGRESS NOTE ADULT - SUBJECTIVE AND OBJECTIVE BOX
Patient is a 74y old  Male who presents with a chief complaint of Weakness x 2 days (11 Apr 2020 16:06)      SUBJECTIVE / OVERNIGHT EVENTS: No acute events overnight. Still feels weak: concerned how he will receive antibiotics at home but does not want to go to rehab. Wonders if his HCP who is a NP may be able to help administer the cefazolin at home.     MEDICATIONS  (STANDING):  ceFAZolin   IVPB 2000 milliGRAM(s) IV Intermittent every 8 hours  diazepam    Tablet 5 milliGRAM(s) Oral every 12 hours  heparin  Injectable 5000 Unit(s) SubCutaneous every 12 hours  tamsulosin 0.8 milliGRAM(s) Oral at bedtime  zinc oxide 20% Ointment 1 Application(s) Topical two times a day    MEDICATIONS  (PRN):  acetaminophen   Tablet .. 650 milliGRAM(s) Oral every 4 hours PRN Temp greater or equal to 38C (100.4F), Mild Pain (1 - 3), Moderate Pain (4 - 6), Severe Pain (7 - 10)  acetaminophen  Suppository .. 650 milliGRAM(s) Rectal every 4 hours PRN Temp greater or equal to 38.5C (101.3F)  artificial tears (preservative free) Ophthalmic Solution 1 Drop(s) Both EYES two times a day PRN Dry Eyes  temazepam 15 milliGRAM(s) Oral at bedtime PRN Insomnia    Vital Signs Last 24 Hrs  T(C): 36.2 (13 Apr 2020 06:45), Max: 36.7 (12 Apr 2020 22:49)  T(F): 97.2 (13 Apr 2020 06:45), Max: 98 (12 Apr 2020 22:49)  HR: 71 (13 Apr 2020 06:45) (71 - 78)  BP: 107/58 (13 Apr 2020 06:45) (103/65 - 107/58)  BP(mean): --  RR: 16 (13 Apr 2020 06:45) (16 - 17)  SpO2: 98% (13 Apr 2020 06:45) (98% - 100%)      PHYSICAL EXAM:  GENERAL: not in distress, sitting up in bed on room air eating lunch  EYES: open, sclera clear b/l  CHEST/LUNG: normal respiratory effort, not tachypneic, speaking in full sentences without difficulty  HEART: Not tachycardic, no lower extremity edema b/l  ABDOMEN: Soft, Nontender, Nondistended  EXTREMITIES: Warm and well perfused  NEUROLOGY: awake, alert, responds to Qs appropriately  PSYCH: calm, cooperative  SKIN: R cheek abrasion; anterior chest wall lesion 2 cm that is chronic for over 1 year    LABS:                                   11.3   10.49 )-----------( 241      ( 13 Apr 2020 08:00 )             33.8   04-12    136  |  97<L>  |  16  ----------------------------<  104<H>  3.5   |  30  |  0.80    Ca    9.0      12 Apr 2020 06:25  Phos  3.4     04-12  Mg     1.7     04-12    TPro  6.3  /  Alb  3.1<L>  /  TBili  0.3  /  DBili  x   /  AST  39  /  ALT  31  /  AlkPhos  97  04-12    CARDIAC MARKERS ( 12 Apr 2020 06:25 )  x     / x     / 783 u/L / x     / x        COVID-19 PCR: NotDetec (08 Apr 2020 06:00)              RADIOLOGY & ADDITIONAL TESTS:

## 2020-04-13 NOTE — PROGRESS NOTE ADULT - ASSESSMENT
73 y/o M with BPH and chronic fowler and muscle dystrophy admitted with weakness and found to have MSSA bacteremia, UTI and elevated CPK that has been improving since admission.     #MSSA bacteremia:  --Currently repeat blood cultures from 4/11/20 negative (48 hours later today).    --Plan for PICC placement tomorrow to allow pt to continue to receive duration of 4 weeks of cefazolin per ID team. Pt had wanted to be discharged home instead of rehab before blood cultures showed MSSA, will see he could have antibiotics at home.  --ID following closely  --case management involved with regard to discharge planning and need for prolonged IV antibiotics  --COVID PCR negative    #DVT prophylaxis  --Continue with Lovenox prophylaxis.    #elevated CPK  --Continued decline during hospitalization s/p episode of lying on floor and unable to get up.    #Skin  --continue topical skin care 73 y/o M with BPH and chronic fowler and muscle dystrophy admitted with weakness and found to have MSSA bacteremia, UTI and elevated CPK that has been improving since admission.     #MSSA bacteremia, unclear focus, may be due to chronic fowler:  --Currently repeat blood cultures from 4/11/20 negative (48 hours later today).    --Plan for PICC placement tomorrow to allow pt to continue to receive duration of 4 weeks of cefazolin per ID team. Pt had wanted to be discharged home instead of rehab before blood cultures showed MSSA, will see he could have antibiotics at home.  --transthoracic echo ordered as requested by ID team  --ID following closely  --case management involved with regard to discharge planning and need for prolonged IV antibiotics  --COVID PCR negative    #DVT prophylaxis  --Continue with Lovenox prophylaxis.    #elevated CPK  --Continued decline during hospitalization s/p episode of lying on floor and unable to get up.    #Soft tissue lesion on left face  --continue topical skin care    # History of peripheral neuropathy  --outpatient neurology evaluation

## 2020-04-13 NOTE — PROGRESS NOTE ADULT - SUBJECTIVE AND OBJECTIVE BOX
Follow Up:      Inverval History/ROS:Patient is a 74y old  Male who presents with a chief complaint of Weakness x 2 days (12 Apr 2020 17:13)    No fever  No events      Allergies    No Known Allergies    Intolerances        ANTIMICROBIALS:  ceFAZolin   IVPB 2000 every 8 hours      OTHER MEDS:  acetaminophen   Tablet .. 650 milliGRAM(s) Oral every 4 hours PRN  acetaminophen  Suppository .. 650 milliGRAM(s) Rectal every 4 hours PRN  artificial tears (preservative free) Ophthalmic Solution 1 Drop(s) Both EYES two times a day PRN  diazepam    Tablet 5 milliGRAM(s) Oral every 12 hours  heparin  Injectable 5000 Unit(s) SubCutaneous every 12 hours  tamsulosin 0.8 milliGRAM(s) Oral at bedtime  temazepam 15 milliGRAM(s) Oral at bedtime PRN  zinc oxide 20% Ointment 1 Application(s) Topical two times a day      Vital Signs Last 24 Hrs  T(C): 36.2 (13 Apr 2020 06:45), Max: 36.7 (12 Apr 2020 22:49)  T(F): 97.2 (13 Apr 2020 06:45), Max: 98 (12 Apr 2020 22:49)  HR: 71 (13 Apr 2020 06:45) (71 - 78)  BP: 107/58 (13 Apr 2020 06:45) (103/65 - 107/58)  BP(mean): --  RR: 16 (13 Apr 2020 06:45) (16 - 17)  SpO2: 98% (13 Apr 2020 06:45) (98% - 100%)    PHYSICAL EXAM:  General: [x ] non-toxic  HEAD/EYES: [ ] PERRL [x ] white sclera [ ] icterus  ENT:  [ ] normal [ ] supple [ ] thrush [ ] pharyngeal exudate  Cardiovascular:   [ ] murmur [ x] normal [ ] PPM/AICD  Respiratory:  [ x] clear to ausculation bilaterally  GI:  [x ] soft, non-tender, normal bowel sounds  :  [ ] fowler [ ] no CVA tenderness   Musculoskeletal:  [ ] no synovitis  Neurologic:  [ ] non-focal exam   Skin:  x[ ] no rash  Lymph: [ ] no lymphadenopathy  Psychiatric:  [ ] appropriate affect [x ] alert & oriented  Lines:  [x ] no phlebitis [ ] central line                                11.3   10.49 )-----------( 241      ( 13 Apr 2020 08:00 )             33.8       04-12    136  |  97<L>  |  16  ----------------------------<  104<H>  3.5   |  30  |  0.80    Ca    9.0      12 Apr 2020 06:25  Phos  3.4     04-12  Mg     1.7     04-12    TPro  6.3  /  Alb  3.1<L>  /  TBili  0.3  /  DBili  x   /  AST  39  /  ALT  31  /  AlkPhos  97  04-12          MICROBIOLOGY:Culture Results:   No growth to date. (04-11-20 @ 14:47)  Culture Results:   No growth to date. (04-11-20 @ 14:47)  Culture Results:   No growth to date. (04-11-20 @ 08:48)  Culture Results:   No growth to date. (04-11-20 @ 08:48)  Culture Results:   >100,000 CFU/ml Klebsiella pneumoniae  >100,000 CFU/ml Enterococcus faecalis (04-08-20 @ 05:14)  Culture Results:   Growth in anaerobic bottle: Staphylococcus aureus  See previous culture [id number] (04-08-20 @ 02:17)  Culture Results:   Growth in aerobic and anaerobic bottles: Staphylococcus aureus  "Due to technical problems, Proteus sp. will Not be reported as part of  the BCID panel until further notice"  ***Blood Panel PCR results on this specimen are available  approximately 3 hours after the Gram stain result.***  Gram stain, PCR, and/or culture results may not always  correspond due to difference in methodologies.  ************************************************************  This PCR assay was performed using [employer].  The following targets are tested for: Enterococcus,  vancomycin resistant enterococci, Listeria monocytogenes,  coagulase negative staphylococci, S. aureus,  methicillin resistant S. aureus, Streptococcus agalactiae  (Group B), S. pneumoniae, S. pyogenes (Group A),  Acinetobacter baumannii, Enterobacter cloacae, E. coli,  Klebsiella oxytoca, K. pneumoniae, Proteus sp.,  Serratia marcescens, Haemophilus influenzae,  Neisseria meningitidis, Pseudomonas aeruginosa, Candida  albicans, C. glabrata, C krusei, C parapsilosis,  C. tropicalis and the KPC resistance gene. (04-08-20 @ 02:17)    Culture - Blood in AM (04.11.20 @ 14:47)    Specimen Source: .Blood Blood-Peripheral    Culture Results:   No growth to date.        RADIOLOGY:

## 2020-04-13 NOTE — PROGRESS NOTE ADULT - ASSESSMENT
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    1) MSSA bacteremia    Focus is not clear      focus of mssa bacteremia is rare- but can be seen in pt with chronic fowler    ? soft tissue focus- wound to left face    Cefazolin 2 gram iv q 8    Repeat blood culture times two- without growth to date    transthoracic echo    Will likely need 4 weeks of antibiotics- can place picc or midline when repeat cultures are without growth for 48 hours      2) Bacteruria  polymicrobial   s/p treatment      3) Leukocytosis Elevated to 25 at admission  Secondary to infection  responding to abx    continue to monitor    4) Peripheral neuropathy  pt denies history of muscular dystrophy    States he was told he has peripheral neuropathy of unknown etiology    outpt neurology eval

## 2020-04-14 PROCEDURE — 99232 SBSQ HOSP IP/OBS MODERATE 35: CPT

## 2020-04-14 PROCEDURE — 36573 INSJ PICC RS&I 5 YR+: CPT

## 2020-04-14 RX ADMIN — Medication 650 MILLIGRAM(S): at 22:52

## 2020-04-14 RX ADMIN — Medication 100 MILLIGRAM(S): at 14:12

## 2020-04-14 RX ADMIN — Medication 5 MILLIGRAM(S): at 17:11

## 2020-04-14 RX ADMIN — TAMSULOSIN HYDROCHLORIDE 0.8 MILLIGRAM(S): 0.4 CAPSULE ORAL at 22:46

## 2020-04-14 RX ADMIN — Medication 5 MILLIGRAM(S): at 07:06

## 2020-04-14 RX ADMIN — Medication 100 MILLIGRAM(S): at 22:45

## 2020-04-14 RX ADMIN — Medication 100 MILLIGRAM(S): at 07:05

## 2020-04-14 RX ADMIN — ZINC OXIDE 1 APPLICATION(S): 200 OINTMENT TOPICAL at 17:11

## 2020-04-14 NOTE — PROGRESS NOTE ADULT - SUBJECTIVE AND OBJECTIVE BOX
Follow Up:      Inverval History/ROS:Patient is a 74y old  Male who presents with a chief complaint of Weakness x 2 days (13 Apr 2020 16:36)    No fever   No pain      Allergies    No Known Allergies    Intolerances        ANTIMICROBIALS:  ceFAZolin   IVPB 2000 every 8 hours      OTHER MEDS:  acetaminophen   Tablet .. 650 milliGRAM(s) Oral every 4 hours PRN  acetaminophen  Suppository .. 650 milliGRAM(s) Rectal every 4 hours PRN  artificial tears (preservative free) Ophthalmic Solution 1 Drop(s) Both EYES two times a day PRN  diazepam    Tablet 5 milliGRAM(s) Oral every 12 hours  heparin  Injectable 5000 Unit(s) SubCutaneous every 12 hours  tamsulosin 0.8 milliGRAM(s) Oral at bedtime  temazepam 15 milliGRAM(s) Oral at bedtime PRN  zinc oxide 20% Ointment 1 Application(s) Topical two times a day      Vital Signs Last 24 Hrs  T(C): 36.6 (14 Apr 2020 12:22), Max: 36.9 (13 Apr 2020 23:15)  T(F): 97.8 (14 Apr 2020 12:22), Max: 98.5 (13 Apr 2020 23:15)  HR: 70 (14 Apr 2020 12:22) (70 - 80)  BP: 109/59 (14 Apr 2020 12:22) (109/59 - 124/64)  BP(mean): --  RR: 18 (14 Apr 2020 12:22) (18 - 18)  SpO2: 97% (14 Apr 2020 12:22) (97% - 98%)    PHYSICAL EXAM:  General: [x ] non-toxic  HEAD/EYES: [ ] PERRL [x ] white sclera [ ] icterus  ENT:  [ ] normal [x ] supple [ ] thrush [ ] pharyngeal exudate  Cardiovascular:   [ ] murmur [ x] normal [ ] PPM/AICD  Respiratory:  [ ] clear to ausculation bilaterally  GI:  [x ] soft, non-tender, normal bowel sounds  :  [ ] fowler [ ] no CVA tenderness   Musculoskeletal:  [ ] no synovitis  Neurologic:  [ ] non-focal exam   Skin:  [ x] no rash  Lymph: [ ] no lymphadenopathy  Psychiatric:  [ ] appropriate affect [ x alert & oriented  Lines:  [x ] no phlebitis [ ] central line                                11.3   10.49 )-----------( 241      ( 13 Apr 2020 08:00 )             33.8       04-13    140  |  99  |  15  ----------------------------<  101<H>  3.6   |  24  |  0.82    Ca    9.0      13 Apr 2020 08:00            MICROBIOLOGY:Culture Results:   No growth to date. (04-11-20 @ 14:47)  Culture Results:   No growth to date. (04-11-20 @ 14:47)  Culture Results:   No growth to date. (04-11-20 @ 08:48)  Culture Results:   No growth to date. (04-11-20 @ 08:48)  Culture Results:   >100,000 CFU/ml Klebsiella pneumoniae  >100,000 CFU/ml Enterococcus faecalis (04-08-20 @ 05:14)  Culture Results:   Growth in anaerobic bottle: Staphylococcus aureus  See previous culture [id number] (04-08-20 @ 02:17)  Culture Results:   Growth in aerobic and anaerobic bottles: Staphylococcus aureus  "Due to technical problems, Proteus sp. will Not be reported as part of  the BCID panel until further notice"  ***Blood Panel PCR results on this specimen are available  approximately 3 hours after the Gram stain result.***  Gram stain, PCR, and/or culture results may not always  correspond due to difference in methodologies.  ************************************************************  This PCR assay was performed using [employer].  The following targets are tested for: Enterococcus,  vancomycin resistant enterococci, Listeria monocytogenes,  coagulase negative staphylococci, S. aureus,  methicillin resistant S. aureus, Streptococcus agalactiae  (Group B), S. pneumoniae, S. pyogenes (Group A),  Acinetobacter baumannii, Enterobacter cloacae, E. coli,  Klebsiella oxytoca, K. pneumoniae, Proteus sp.,  Serratia marcescens, Haemophilus influenzae,  Neisseria meningitidis, Pseudomonas aeruginosa, Candida  albicans, C. glabrata, C krusei, C parapsilosis,  C. tropicalis and the KPC resistance gene. (04-08-20 @ 02:17)      RADIOLOGY:

## 2020-04-14 NOTE — PROGRESS NOTE ADULT - SUBJECTIVE AND OBJECTIVE BOX
Patient is a 74y old  Male who presents with a chief complaint of Weakness x 2 days (11 Apr 2020 16:06)      SUBJECTIVE / OVERNIGHT EVENTS: No acute events overnight. Still feels weak: concerned how he will receive antibiotics at home but does not want to go to rehab. Asked me to call girlfriend Zamzam while in the room, we spoke on speakerphone. Zamzam who is a NP reports that she is in agreement that patient should go to rehab as she will not be able to provide RN services (abx administration) for him.  Patient reports he is fearful of coronavirus and the news reports that coronavirus is worse as nursing homes then in hospitals therefore he would like to stay at [facility].      MEDICATIONS  (STANDING):  ceFAZolin   IVPB 2000 milliGRAM(s) IV Intermittent every 8 hours  diazepam    Tablet 5 milliGRAM(s) Oral every 12 hours  heparin  Injectable 5000 Unit(s) SubCutaneous every 12 hours  tamsulosin 0.8 milliGRAM(s) Oral at bedtime  zinc oxide 20% Ointment 1 Application(s) Topical two times a day    MEDICATIONS  (PRN):  acetaminophen   Tablet .. 650 milliGRAM(s) Oral every 4 hours PRN Temp greater or equal to 38C (100.4F), Mild Pain (1 - 3), Moderate Pain (4 - 6), Severe Pain (7 - 10)  acetaminophen  Suppository .. 650 milliGRAM(s) Rectal every 4 hours PRN Temp greater or equal to 38.5C (101.3F)  artificial tears (preservative free) Ophthalmic Solution 1 Drop(s) Both EYES two times a day PRN Dry Eyes  temazepam 15 milliGRAM(s) Oral at bedtime PRN Insomnia    Vital Signs Last 24 Hrs  T(C): 36.6 (14 Apr 2020 12:22), Max: 36.9 (13 Apr 2020 23:15)  T(F): 97.8 (14 Apr 2020 12:22), Max: 98.5 (13 Apr 2020 23:15)  HR: 72 (14 Apr 2020 17:10) (70 - 80)  BP: 112/58 (14 Apr 2020 17:10) (109/59 - 124/64)  RR: 18 (14 Apr 2020 12:22) (18 - 18)  SpO2: 97% (14 Apr 2020 12:22) (97% - 98%)      PHYSICAL EXAM:  GENERAL: not in distress, sitting up in bed on room air eating lunch  EYES: open, sclera clear b/l  CHEST/LUNG: normal respiratory effort, not tachypneic, speaking in full sentences without difficulty  HEART: no lower extremity edema b/l  EXTREMITIES: no edema  NEUROLOGY: awake, alert, responds to Qs appropriately  PSYCH: calm, cooperative  SKIN: R cheek abrasion; anterior chest wall lesion 2 cm that is chronic for over 1 year          .  LABS:                         11.3   10.49 )-----------( 241      ( 13 Apr 2020 08:00 )             33.8     04-13    140  |  99  |  15  ----------------------------<  101<H>  3.6   |  24  |  0.82    Ca    9.0      13 Apr 2020 08:00      PT/INR - ( 13 Apr 2020 08:00 )   PT: 13.0 SEC;   INR: 1.14          PTT - ( 13 Apr 2020 08:00 )  PTT:30.0 SEC          RADIOLOGY, EKG & ADDITIONAL TESTS: Reviewed.

## 2020-04-14 NOTE — PROGRESS NOTE ADULT - ASSESSMENT
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    1) MSSA bacteremia    Focus is not clear      focus of mssa bacteremia is rare- but can be seen in pt with chronic fowler    ? soft tissue focus- wound to left face    Cefazolin 2 gram iv q 8 through 5/9/2020  Weekly CBC / BMP- can fax results to [phone]    Repeat blood culture times two- without growth to date    transthoracic echo    Will likely need 4 weeks of antibiotics- can place picc or midline when repeat cultures are without growth for 48 hours      2) Bacteruria  polymicrobial   s/p treatment      3) Leukocytosis Elevated to 25 at admission  Secondary to infection  responding to abx    continue to monitor    4) Peripheral neuropathy  pt denies history of muscular dystrophy    States he was told he has peripheral neuropathy of unknown etiology    outpt neurology eval

## 2020-04-14 NOTE — PROGRESS NOTE ADULT - ASSESSMENT
75 y/o M with BPH and chronic fowler and muscle dystrophy admitted with weakness and found to have MSSA bacteremia, UTI and elevated CPK that has been improving since admission. Plan is for 4 weeks of IV abx, awaiting placement at rehab for both IV abx and PT, however patient would much prefer to stay in hospital or go home. Risks benefits reviewed. Patient to talk further to SW, patient would like clear guidance on what the specific rehab center he will be admitted to is doing to prevent him from ba coronavirus.    #MSSA bacteremia, unclear focus, may be due to chronic fowler:  --Currently repeat blood cultures from 4/11/20 negative   --PICC placement   --transthoracic echo reviewed  --ID following closely  --case management involved with regard to discharge planning and need for prolonged IV antibiotics  --COVID PCR negative    #DVT prophylaxis  --Continue with Lovenox prophylaxis.    #elevated CPK  --Continued decline during hospitalization s/p episode of lying on floor and unable to get up.    #Soft tissue lesion on left face  --continue topical skin care    # History of peripheral neuropathy  --outpatient neurology evaluation

## 2020-04-15 VITALS
TEMPERATURE: 98 F | DIASTOLIC BLOOD PRESSURE: 47 MMHG | RESPIRATION RATE: 18 BRPM | OXYGEN SATURATION: 98 % | HEART RATE: 73 BPM | SYSTOLIC BLOOD PRESSURE: 106 MMHG

## 2020-04-15 PROCEDURE — 99232 SBSQ HOSP IP/OBS MODERATE 35: CPT | Mod: CS

## 2020-04-15 PROCEDURE — 99232 SBSQ HOSP IP/OBS MODERATE 35: CPT

## 2020-04-15 RX ORDER — ZINC OXIDE 200 MG/G
1 OINTMENT TOPICAL
Qty: 0 | Refills: 0 | DISCHARGE
Start: 2020-04-15

## 2020-04-15 RX ORDER — DIAZEPAM 5 MG
5 TABLET ORAL EVERY 12 HOURS
Refills: 0 | Status: DISCONTINUED | OUTPATIENT
Start: 2020-04-15 | End: 2020-04-15

## 2020-04-15 RX ORDER — LOPERAMIDE HCL 2 MG
2 TABLET ORAL ONCE
Refills: 0 | Status: DISCONTINUED | OUTPATIENT
Start: 2020-04-15 | End: 2020-04-15

## 2020-04-15 RX ORDER — CEFAZOLIN SODIUM 1 G
2 VIAL (EA) INJECTION
Qty: 0 | Refills: 0 | DISCHARGE
Start: 2020-04-15 | End: 2020-05-09

## 2020-04-15 RX ORDER — ACETAMINOPHEN 500 MG
2 TABLET ORAL
Qty: 0 | Refills: 0 | DISCHARGE
Start: 2020-04-15

## 2020-04-15 RX ORDER — TEMAZEPAM 15 MG/1
15 CAPSULE ORAL AT BEDTIME
Refills: 0 | Status: DISCONTINUED | OUTPATIENT
Start: 2020-04-15 | End: 2020-04-15

## 2020-04-15 RX ADMIN — Medication 5 MILLIGRAM(S): at 06:38

## 2020-04-15 RX ADMIN — ZINC OXIDE 1 APPLICATION(S): 200 OINTMENT TOPICAL at 06:39

## 2020-04-15 RX ADMIN — Medication 100 MILLIGRAM(S): at 14:36

## 2020-04-15 RX ADMIN — Medication 100 MILLIGRAM(S): at 06:36

## 2020-04-15 RX ADMIN — Medication 5 MILLIGRAM(S): at 19:17

## 2020-04-15 RX ADMIN — ZINC OXIDE 1 APPLICATION(S): 200 OINTMENT TOPICAL at 19:17

## 2020-04-15 NOTE — PROGRESS NOTE ADULT - SUBJECTIVE AND OBJECTIVE BOX
Follow Up:      Inverval History/ROS:Patient is a 74y old  Male who presents with a chief complaint of Weakness x 2 days (14 Apr 2020 19:03)    No fever  No events      Allergies    No Known Allergies    Intolerances        ANTIMICROBIALS:  ceFAZolin   IVPB 2000 every 8 hours      OTHER MEDS:  acetaminophen   Tablet .. 650 milliGRAM(s) Oral every 4 hours PRN  acetaminophen  Suppository .. 650 milliGRAM(s) Rectal every 4 hours PRN  artificial tears (preservative free) Ophthalmic Solution 1 Drop(s) Both EYES two times a day PRN  diazepam    Tablet 5 milliGRAM(s) Oral every 12 hours  heparin  Injectable 5000 Unit(s) SubCutaneous every 12 hours  tamsulosin 0.8 milliGRAM(s) Oral at bedtime  temazepam 15 milliGRAM(s) Oral at bedtime PRN  zinc oxide 20% Ointment 1 Application(s) Topical two times a day      Vital Signs Last 24 Hrs  T(C): 36.9 (15 Apr 2020 09:30), Max: 36.9 (15 Apr 2020 09:30)  T(F): 98.4 (15 Apr 2020 09:30), Max: 98.4 (15 Apr 2020 09:30)  HR: 76 (15 Apr 2020 09:30) (72 - 84)  BP: 112/62 (15 Apr 2020 09:30) (110/84 - 120/82)  BP(mean): --  RR: 18 (15 Apr 2020 09:30) (18 - 18)  SpO2: 98% (15 Apr 2020 09:30) (98% - 98%)    PHYSICAL EXAM:  General: [x ] non-toxic  HEAD/EYES: [ ] PERRL [x ] white sclera [ ] icterus  ENT:  [ ] normal [x ] supple [ ] thrush [ ] pharyngeal exudate  Cardiovascular:   [ ] murmur [ x] normal [ ] PPM/AICD  Respiratory:  [x ] clear to ausculation bilaterally  GI:  [ x] soft, non-tender, normal bowel sounds  :  [ ] fowler [x ] no CVA tenderness   Musculoskeletal:  [x ] no synovitis  Neurologic:  [ ] non-focal exam   Skin:  [ x] no rash  Lymph: [x ] no lymphadenopathy  Psychiatric:  [ ] appropriate affect [ ] alert & oriented  Lines:  [ x] no phlebitis [ ] central line                          MICROBIOLOGY:Culture Results:   No growth to date. (04-11-20 @ 14:47)  Culture Results:   No growth to date. (04-11-20 @ 14:47)  Culture Results:   No growth to date. (04-11-20 @ 08:48)  Culture Results:   No growth to date. (04-11-20 @ 08:48)      RADIOLOGY:

## 2020-04-15 NOTE — PROGRESS NOTE ADULT - REASON FOR ADMISSION
Weakness x 2 days

## 2020-04-15 NOTE — PROGRESS NOTE ADULT - ASSESSMENT
75 y/o M with BPH and chronic fowler and muscle dystrophy admitted with weakness and found to have MSSA bacteremia, UTI and elevated CPK that has been improving since admission. Plan is for 4 weeks of IV abx, awaiting placement at rehab for both IV abx and PT, however patient would much prefer to stay in hospital or go home. Risks benefits reviewed.  patient would like clear guidance on what the specific rehab center he will be admitted to is doing to prevent him from ba coronavirus.  Patient to talk further to AMADO.  Patient is developing pressure ulcer, will consult wound care, attempt to move patietn out of stetcher and onto hospital bed, will ensure PT and patient to get up out of bed.    #MSSA bacteremia, unclear focus, may be due to chronic fowler:  --Currently repeat blood cultures from 4/11/20 negative   --PICC placement   --transthoracic echo reviewed  --ID following closely  --case management involved with regard to discharge planning and need for prolonged IV antibiotics  --COVID PCR negative    #Pressure Ulcer  --wound care, attempt to move patient out of stretcher and onto hospital bed, will ensure PT and patient to get up out of bed.    #DVT prophylaxis  --Continue with Lovenox prophylaxis.    #elevated CPK  --Continued decline during hospitalization s/p episode of lying on floor and unable to get up.    #Soft tissue lesion on left face  --continue topical skin care    # History of peripheral neuropathy  --outpatient neurology evaluation

## 2020-04-15 NOTE — PROGRESS NOTE ADULT - ASSESSMENT
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    1) MSSA bacteremia    Focus is not clear      focus of mssa bacteremia is rare- but can be seen in pt with chronic fowler    ? soft tissue focus- wound to left face    Cefazolin 2 gram iv q 8 through 5/9/2020  Weekly CBC / BMP- can fax results to [phone]    Repeat blood culture times two- without growth to date    transthoracic echo without obvious vegetation      2) Bacteruria  polymicrobial   s/p treatment    3) Leukocytosis Elevated to 25 at admission  Secondary to infection  responding to abx    continue to monitor    4) Peripheral neuropathy  pt denies history of muscular dystrophy    States he was told he has peripheral neuropathy of unknown etiology    outpt neurology eval    Can follow up as outpt at end of course [phone]

## 2020-04-15 NOTE — PROGRESS NOTE ADULT - SUBJECTIVE AND OBJECTIVE BOX
Patient is a 74y old  Male who presents with a chief complaint of Weakness x 2 days (11 Apr 2020 16:06)      SUBJECTIVE / OVERNIGHT EVENTS: No acute events overnight. Patient on ER stretcher since admission and developed pressure ulcer, being managed with Desitin reports pain with sitting up to eat. Has not been out of bed and to chair since weekend.    MEDICATIONS  (STANDING):  ceFAZolin   IVPB 2000 milliGRAM(s) IV Intermittent every 8 hours  diazepam    Tablet 5 milliGRAM(s) Oral every 12 hours  heparin  Injectable 5000 Unit(s) SubCutaneous every 12 hours  tamsulosin 0.8 milliGRAM(s) Oral at bedtime  zinc oxide 20% Ointment 1 Application(s) Topical two times a day    MEDICATIONS  (PRN):  acetaminophen   Tablet .. 650 milliGRAM(s) Oral every 4 hours PRN Temp greater or equal to 38C (100.4F), Mild Pain (1 - 3), Moderate Pain (4 - 6), Severe Pain (7 - 10)  acetaminophen  Suppository .. 650 milliGRAM(s) Rectal every 4 hours PRN Temp greater or equal to 38.5C (101.3F)  artificial tears (preservative free) Ophthalmic Solution 1 Drop(s) Both EYES two times a day PRN Dry Eyes  temazepam 15 milliGRAM(s) Oral at bedtime PRN Insomnia    Vital Signs Last 24 Hrs  T(C): 36.9 (15 Apr 2020 09:30), Max: 36.9 (15 Apr 2020 09:30)  T(F): 98.4 (15 Apr 2020 09:30), Max: 98.4 (15 Apr 2020 09:30)  HR: 76 (15 Apr 2020 09:30) (76 - 84)  BP: 112/62 (15 Apr 2020 09:30) (110/84 - 120/82)  RR: 18 (15 Apr 2020 09:30) (18 - 18)  SpO2: 98% (15 Apr 2020 09:30) (98% - 98%)  	    PHYSICAL EXAM:  GENERAL: not in distress, on room air   EYES: open, sclera clear b/l  CHEST/LUNG: normal respiratory effort, not tachypneic, speaking in full sentences without difficulty  HEART: no lower extremity edema b/l  EXTREMITIES: no edema  NEUROLOGY: awake, alert, responds to Qs appropriately  PSYCH: calm, cooperative  SKIN: R cheek abrasion; anterior chest wall lesion 2 cm that is chronic for over 1 year; sacral area with erythema/bruised skin, overlying desitin has been applied.       Blood Cx from 4/11/20: NGTD      RADIOLOGY, EKG & ADDITIONAL TESTS: Reviewed.

## 2020-04-16 LAB
CULTURE RESULTS: SIGNIFICANT CHANGE UP
SPECIMEN SOURCE: SIGNIFICANT CHANGE UP

## 2020-04-21 ENCOUNTER — APPOINTMENT (OUTPATIENT)
Dept: UROLOGY | Facility: CLINIC | Age: 75
End: 2020-04-21

## 2020-06-23 ENCOUNTER — INPATIENT (INPATIENT)
Facility: HOSPITAL | Age: 75
LOS: 6 days | Discharge: NURSING, REHAB OR LONG-TERM CARE | DRG: 371 | End: 2020-06-30
Attending: INTERNAL MEDICINE | Admitting: INTERNAL MEDICINE
Payer: MEDICARE

## 2020-06-23 VITALS
HEART RATE: 91 BPM | RESPIRATION RATE: 20 BRPM | SYSTOLIC BLOOD PRESSURE: 99 MMHG | TEMPERATURE: 98 F | OXYGEN SATURATION: 99 % | DIASTOLIC BLOOD PRESSURE: 62 MMHG

## 2020-06-23 DIAGNOSIS — R50.9 FEVER, UNSPECIFIED: ICD-10-CM

## 2020-06-23 LAB
ALBUMIN SERPL ELPH-MCNC: 3.3 G/DL — SIGNIFICANT CHANGE UP (ref 3.3–5)
ALP SERPL-CCNC: 93 U/L — SIGNIFICANT CHANGE UP (ref 40–120)
ALT FLD-CCNC: 8 U/L — LOW (ref 10–45)
ANION GAP SERPL CALC-SCNC: 12 MMOL/L — SIGNIFICANT CHANGE UP (ref 5–17)
APPEARANCE UR: CLEAR — SIGNIFICANT CHANGE UP
APTT BLD: 30.1 SEC — SIGNIFICANT CHANGE UP (ref 27.5–36.3)
AST SERPL-CCNC: 10 U/L — SIGNIFICANT CHANGE UP (ref 10–40)
BACTERIA # UR AUTO: ABNORMAL
BASE EXCESS BLDV CALC-SCNC: 3.8 MMOL/L — HIGH (ref -2–2)
BASE EXCESS BLDV CALC-SCNC: 5.3 MMOL/L — HIGH (ref -2–2)
BASOPHILS # BLD AUTO: 0.06 K/UL — SIGNIFICANT CHANGE UP (ref 0–0.2)
BASOPHILS NFR BLD AUTO: 0.2 % — SIGNIFICANT CHANGE UP (ref 0–2)
BILIRUB SERPL-MCNC: 0.6 MG/DL — SIGNIFICANT CHANGE UP (ref 0.2–1.2)
BILIRUB UR-MCNC: NEGATIVE — SIGNIFICANT CHANGE UP
BUN SERPL-MCNC: 19 MG/DL — SIGNIFICANT CHANGE UP (ref 7–23)
CA-I SERPL-SCNC: 1.11 MMOL/L — LOW (ref 1.12–1.3)
CA-I SERPL-SCNC: 1.15 MMOL/L — SIGNIFICANT CHANGE UP (ref 1.12–1.3)
CALCIUM SERPL-MCNC: 8.8 MG/DL — SIGNIFICANT CHANGE UP (ref 8.4–10.5)
CHLORIDE BLDV-SCNC: 101 MMOL/L — SIGNIFICANT CHANGE UP (ref 96–108)
CHLORIDE BLDV-SCNC: 103 MMOL/L — SIGNIFICANT CHANGE UP (ref 96–108)
CHLORIDE SERPL-SCNC: 97 MMOL/L — SIGNIFICANT CHANGE UP (ref 96–108)
CO2 BLDV-SCNC: 29 MMOL/L — SIGNIFICANT CHANGE UP (ref 22–30)
CO2 BLDV-SCNC: 32 MMOL/L — HIGH (ref 22–30)
CO2 SERPL-SCNC: 24 MMOL/L — SIGNIFICANT CHANGE UP (ref 22–31)
COLOR SPEC: YELLOW — SIGNIFICANT CHANGE UP
CREAT SERPL-MCNC: 0.76 MG/DL — SIGNIFICANT CHANGE UP (ref 0.5–1.3)
DIFF PNL FLD: ABNORMAL
EOSINOPHIL # BLD AUTO: 0.06 K/UL — SIGNIFICANT CHANGE UP (ref 0–0.5)
EOSINOPHIL NFR BLD AUTO: 0.2 % — SIGNIFICANT CHANGE UP (ref 0–6)
EPI CELLS # UR: 0 /HPF — SIGNIFICANT CHANGE UP
GAS PNL BLDV: 130 MMOL/L — LOW (ref 135–145)
GAS PNL BLDV: 135 MMOL/L — SIGNIFICANT CHANGE UP (ref 135–145)
GAS PNL BLDV: SIGNIFICANT CHANGE UP
GLUCOSE BLDV-MCNC: 101 MG/DL — HIGH (ref 70–99)
GLUCOSE BLDV-MCNC: 99 MG/DL — SIGNIFICANT CHANGE UP (ref 70–99)
GLUCOSE SERPL-MCNC: 103 MG/DL — HIGH (ref 70–99)
GLUCOSE UR QL: NEGATIVE — SIGNIFICANT CHANGE UP
HCO3 BLDV-SCNC: 28 MMOL/L — SIGNIFICANT CHANGE UP (ref 21–29)
HCO3 BLDV-SCNC: 31 MMOL/L — HIGH (ref 21–29)
HCT VFR BLD CALC: 35.2 % — LOW (ref 39–50)
HCT VFR BLDA CALC: 30 % — LOW (ref 39–50)
HCT VFR BLDA CALC: 36 % — LOW (ref 39–50)
HGB BLD CALC-MCNC: 11.6 G/DL — LOW (ref 13–17)
HGB BLD CALC-MCNC: 9.7 G/DL — LOW (ref 13–17)
HGB BLD-MCNC: 11.2 G/DL — LOW (ref 13–17)
HYALINE CASTS # UR AUTO: 0 /LPF — SIGNIFICANT CHANGE UP (ref 0–2)
IMM GRANULOCYTES NFR BLD AUTO: 0.7 % — SIGNIFICANT CHANGE UP (ref 0–1.5)
INR BLD: 1.56 RATIO — HIGH (ref 0.88–1.16)
KETONES UR-MCNC: NEGATIVE — SIGNIFICANT CHANGE UP
LACTATE BLDV-MCNC: 0.9 MMOL/L — SIGNIFICANT CHANGE UP (ref 0.7–2)
LACTATE BLDV-MCNC: 0.9 MMOL/L — SIGNIFICANT CHANGE UP (ref 0.7–2)
LACTATE BLDV-MCNC: 2.9 MMOL/L — HIGH (ref 0.7–2)
LEUKOCYTE ESTERASE UR-ACNC: ABNORMAL
LYMPHOCYTES # BLD AUTO: 0.82 K/UL — LOW (ref 1–3.3)
LYMPHOCYTES # BLD AUTO: 3 % — LOW (ref 13–44)
MCHC RBC-ENTMCNC: 31.5 PG — SIGNIFICANT CHANGE UP (ref 27–34)
MCHC RBC-ENTMCNC: 31.8 GM/DL — LOW (ref 32–36)
MCV RBC AUTO: 98.9 FL — SIGNIFICANT CHANGE UP (ref 80–100)
MONOCYTES # BLD AUTO: 2.24 K/UL — HIGH (ref 0–0.9)
MONOCYTES NFR BLD AUTO: 8.3 % — SIGNIFICANT CHANGE UP (ref 2–14)
NEUTROPHILS # BLD AUTO: 23.52 K/UL — HIGH (ref 1.8–7.4)
NEUTROPHILS NFR BLD AUTO: 87.6 % — HIGH (ref 43–77)
NITRITE UR-MCNC: POSITIVE
NRBC # BLD: 0 /100 WBCS — SIGNIFICANT CHANGE UP (ref 0–0)
OTHER CELLS CSF MANUAL: 11 ML/DL — LOW (ref 18–22)
PCO2 BLDV: 41 MMHG — SIGNIFICANT CHANGE UP (ref 35–50)
PCO2 BLDV: 52 MMHG — HIGH (ref 35–50)
PH BLDV: 7.39 — SIGNIFICANT CHANGE UP (ref 7.35–7.45)
PH BLDV: 7.44 — SIGNIFICANT CHANGE UP (ref 7.35–7.45)
PH UR: 6 — SIGNIFICANT CHANGE UP (ref 5–8)
PLATELET # BLD AUTO: 255 K/UL — SIGNIFICANT CHANGE UP (ref 150–400)
PO2 BLDV: 27 MMHG — SIGNIFICANT CHANGE UP (ref 25–45)
PO2 BLDV: 48 MMHG — HIGH (ref 25–45)
POTASSIUM BLDV-SCNC: 3.1 MMOL/L — LOW (ref 3.5–5.3)
POTASSIUM BLDV-SCNC: 3.2 MMOL/L — LOW (ref 3.5–5.3)
POTASSIUM SERPL-MCNC: 3.4 MMOL/L — LOW (ref 3.5–5.3)
POTASSIUM SERPL-SCNC: 3.4 MMOL/L — LOW (ref 3.5–5.3)
PROT SERPL-MCNC: 6.6 G/DL — SIGNIFICANT CHANGE UP (ref 6–8.3)
PROT UR-MCNC: ABNORMAL
PROTHROM AB SERPL-ACNC: 18 SEC — HIGH (ref 10–12.9)
RBC # BLD: 3.56 M/UL — LOW (ref 4.2–5.8)
RBC # FLD: 14 % — SIGNIFICANT CHANGE UP (ref 10.3–14.5)
RBC CASTS # UR COMP ASSIST: 5 /HPF — HIGH (ref 0–4)
SAO2 % BLDV: 45 % — LOW (ref 67–88)
SAO2 % BLDV: 84 % — SIGNIFICANT CHANGE UP (ref 67–88)
SARS-COV-2 RNA SPEC QL NAA+PROBE: SIGNIFICANT CHANGE UP
SODIUM SERPL-SCNC: 133 MMOL/L — LOW (ref 135–145)
SP GR SPEC: 1.02 — SIGNIFICANT CHANGE UP (ref 1.01–1.02)
UROBILINOGEN FLD QL: NEGATIVE — SIGNIFICANT CHANGE UP
WBC # BLD: 26.9 K/UL — HIGH (ref 3.8–10.5)
WBC # FLD AUTO: 26.9 K/UL — HIGH (ref 3.8–10.5)
WBC UR QL: 522 /HPF — HIGH (ref 0–5)

## 2020-06-23 PROCEDURE — 71045 X-RAY EXAM CHEST 1 VIEW: CPT | Mod: 26

## 2020-06-23 PROCEDURE — 99285 EMERGENCY DEPT VISIT HI MDM: CPT | Mod: CS,GC

## 2020-06-23 RX ORDER — ACETAMINOPHEN 500 MG
650 TABLET ORAL EVERY 6 HOURS
Refills: 0 | Status: DISCONTINUED | OUTPATIENT
Start: 2020-06-23 | End: 2020-06-30

## 2020-06-23 RX ORDER — POTASSIUM CHLORIDE 20 MEQ
20 PACKET (EA) ORAL ONCE
Refills: 0 | Status: COMPLETED | OUTPATIENT
Start: 2020-06-23 | End: 2020-06-24

## 2020-06-23 RX ORDER — VANCOMYCIN HCL 1 G
125 VIAL (EA) INTRAVENOUS EVERY 6 HOURS
Refills: 0 | Status: DISCONTINUED | OUTPATIENT
Start: 2020-06-23 | End: 2020-06-30

## 2020-06-23 RX ORDER — FAMOTIDINE 10 MG/ML
20 INJECTION INTRAVENOUS DAILY
Refills: 0 | Status: DISCONTINUED | OUTPATIENT
Start: 2020-06-23 | End: 2020-06-30

## 2020-06-23 RX ORDER — TAMSULOSIN HYDROCHLORIDE 0.4 MG/1
0.4 CAPSULE ORAL AT BEDTIME
Refills: 0 | Status: DISCONTINUED | OUTPATIENT
Start: 2020-06-23 | End: 2020-06-24

## 2020-06-23 RX ORDER — PIPERACILLIN AND TAZOBACTAM 4; .5 G/20ML; G/20ML
3.38 INJECTION, POWDER, LYOPHILIZED, FOR SOLUTION INTRAVENOUS ONCE
Refills: 0 | Status: COMPLETED | OUTPATIENT
Start: 2020-06-23 | End: 2020-06-23

## 2020-06-23 RX ORDER — HEPARIN SODIUM 5000 [USP'U]/ML
5000 INJECTION INTRAVENOUS; SUBCUTANEOUS EVERY 12 HOURS
Refills: 0 | Status: DISCONTINUED | OUTPATIENT
Start: 2020-06-23 | End: 2020-06-30

## 2020-06-23 RX ORDER — SODIUM CHLORIDE 9 MG/ML
1000 INJECTION, SOLUTION INTRAVENOUS ONCE
Refills: 0 | Status: COMPLETED | OUTPATIENT
Start: 2020-06-23 | End: 2020-06-23

## 2020-06-23 RX ORDER — SODIUM CHLORIDE 9 MG/ML
1000 INJECTION INTRAMUSCULAR; INTRAVENOUS; SUBCUTANEOUS ONCE
Refills: 0 | Status: COMPLETED | OUTPATIENT
Start: 2020-06-23 | End: 2020-06-23

## 2020-06-23 RX ORDER — METRONIDAZOLE 500 MG
500 TABLET ORAL EVERY 8 HOURS
Refills: 0 | Status: DISCONTINUED | OUTPATIENT
Start: 2020-06-23 | End: 2020-06-24

## 2020-06-23 RX ORDER — SODIUM CHLORIDE 9 MG/ML
1000 INJECTION INTRAMUSCULAR; INTRAVENOUS; SUBCUTANEOUS
Refills: 0 | Status: DISCONTINUED | OUTPATIENT
Start: 2020-06-23 | End: 2020-06-29

## 2020-06-23 RX ADMIN — SODIUM CHLORIDE 1000 MILLILITER(S): 9 INJECTION, SOLUTION INTRAVENOUS at 13:53

## 2020-06-23 RX ADMIN — PIPERACILLIN AND TAZOBACTAM 200 GRAM(S): 4; .5 INJECTION, POWDER, LYOPHILIZED, FOR SOLUTION INTRAVENOUS at 13:53

## 2020-06-23 RX ADMIN — Medication 125 MILLIGRAM(S): at 21:18

## 2020-06-23 RX ADMIN — Medication 125 MILLIGRAM(S): at 15:52

## 2020-06-23 RX ADMIN — SODIUM CHLORIDE 75 MILLILITER(S): 9 INJECTION INTRAMUSCULAR; INTRAVENOUS; SUBCUTANEOUS at 21:31

## 2020-06-23 RX ADMIN — Medication 100 MILLIGRAM(S): at 21:19

## 2020-06-23 RX ADMIN — SODIUM CHLORIDE 1000 MILLILITER(S): 9 INJECTION INTRAMUSCULAR; INTRAVENOUS; SUBCUTANEOUS at 15:51

## 2020-06-23 RX ADMIN — TAMSULOSIN HYDROCHLORIDE 0.4 MILLIGRAM(S): 0.4 CAPSULE ORAL at 21:18

## 2020-06-23 NOTE — ED PROVIDER NOTE - CLINICAL SUMMARY MEDICAL DECISION MAKING FREE TEXT BOX
suzanne -75 male with recent admission for urosepsis complicated buy cdiff resolved sent to [facility] for rehab contracted covid -- now with 2 days new fever 101 and loose diarrhea-- no abd pain no cough sat 100 - will tx empirically for urosepsis and cdiff-- and admit

## 2020-06-23 NOTE — ED PROVIDER NOTE - OBJECTIVE STATEMENT
74 yo M hx muscular dystrophy, COVID, c diff, presenting with 2 days of fever, diarrhea and UTI. Denies cough or SOB. Endorses cramping abdominal pain that improves after bowel movements, states the diarrhea has been "uncontrollable."

## 2020-06-23 NOTE — H&P ADULT - PROBLEM SELECTOR PLAN 1
denies any now after fowler  UA is pos : will wait for c/s   -given recurrent c diff and fowler cath : will hold off on IV abx till ID sees him

## 2020-06-23 NOTE — H&P ADULT - NSHPLABSRESULTS_GEN_ALL_CORE
11.2   26.90 )-----------( 255      ( 23 Jun 2020 14:15 )             35.2     06-23    133<L>  |  97  |  19  ----------------------------<  103<H>  3.4<L>   |  24  |  0.76    Ca    8.8      23 Jun 2020 14:15    TPro  6.6  /  Alb  3.3  /  TBili  0.6  /  DBili  x   /  AST  10  /  ALT  8<L>  /  AlkPhos  93  06-23

## 2020-06-23 NOTE — ED ADULT NURSE NOTE - ABDOMEN
[General Appearance - Alert] : alert [General Appearance - In No Acute Distress] : in no acute distress [Auscultation Breath Sounds / Voice Sounds] : lungs were clear to auscultation bilaterally [Heart Rate And Rhythm] : heart rate was normal and rhythm regular [Heart Sounds] : normal S1 and S2 [Heart Sounds Gallop] : no gallops [Murmurs] : no murmurs [Heart Sounds Pericardial Friction Rub] : no pericardial rub [Full Pulse] : the pedal pulses are present [Edema] : there was no peripheral edema [Bowel Sounds] : normal bowel sounds [Abdomen Soft] : soft [Abdomen Tenderness] : non-tender [] : no hepato-splenomegaly nondistended/soft [Abdomen Mass (___ Cm)] : no abdominal mass palpated [Abnormal Walk] : normal gait [Nail Clubbing] : no clubbing  or cyanosis of the fingernails [Musculoskeletal - Swelling] : no joint swelling seen [Motor Tone] : muscle strength and tone were normal [Oriented To Time, Place, And Person] : oriented to person, place, and time [Impaired Insight] : insight and judgment were intact [Affect] : the affect was normal

## 2020-06-23 NOTE — H&P ADULT - HISTORY OF PRESENT ILLNESS
76 yo M hx muscular dystrophy, COVID, c diff, presenting with 2 days of fever, diarrhea and UTI. Denies cough or SOB. Endorses cramping abdominal pain that improves after bowel movements, states the diarrhea has been "uncontrollable."  pt. has several admission to hospital and has been diagnosed with cdiff x 2 in recent past, diarrhea started 2 days ago and it is watery , also had fowler cath in ED for retention. he was given vanco orally for cdiff in past .   he came from [facility], but don't want to go back there

## 2020-06-23 NOTE — ED ADULT NURSE NOTE - BREATHING
Cardiology Consultation Note          [id number], [place], [address] [facility]         Phone [phone]; Fax [phone]            2018 11:13 AM  None  :  1948   MRN:  [id number]       Reason for consult: OBED to r/o endocarditis   Admission Diagnosis: Sepsis [facility])    ASSESSMENT/RECOMMENDATIONS:   1)Bactremia with unknown source: + blood cultures  bottlesx2  + alpha strep. -started on Keflex   - TTE (18): No obvious mass, vegetation, or thrombus noted. EF 55%-60% no regional wall motion abnormalities. - Plan for OBED this afternoon - NPO. Pt denies cardiac history, esophageal surgeries, or dysphagia. Procedure explained the the patient, she understands and accepts at this time.    -d/w Dr Lauryn Bland- will hold on oral contrast for CT of abdomen until after OBED is completed     2) Intractable headache: per primary team/ neurology                  Pt personally seen and examined. Chart reviewed. Agree with advanced NP's history, exam and  A/P with changes/additons. OBED Consent:    Patient seen and examined for OBED. RIBA of procedure explained to patient. Risks include but not limited to, need for conscious sedation, bleeding, esophageal tear/rupture, VT, VF,need for emergent surgery and death. Patient denies dysphagia/Cancer of Upper GI/throat/PRAVIN/Esophaageal varices;ulcers;webs/Cervical spine problems/Chest radiation. All the patient's questions were answered to their satisfaction. Patient expresses understanding and agrees to undergo procedure. Discussed with patient/nursing    Keke Cristina MD, Corinne  is a 78 y.o. female I am seeing for OBED to r/o endocarditis. Pt went to the ED 1 week ago for intractable HA and strep bacteremia. Pt has unknown source of infection. Pt currently asymptomatic. Denies SOB, CP, Palpitations, or lightheadedness. Denies any previous cardiac history.          Allergies   Allergen Reactions    Morphine Rash         No past medical history on file. Past Surgical History:   Procedure Laterality Date    HX GYN      Hysterectomy        . Home Medications:  Prior to Admission Medications   Prescriptions Last Dose Informant Patient Reported? Taking? butalbital-acetaminophen-caff (FIORICET) -40 mg per capsule 8/12/2018 at pm Self No Yes   Sig: Take 1 Cap by mouth every four (4) hours as needed for Pain. cephALEXin (KEFLEX) 500 mg capsule 8/12/2018 at pm Self No Yes   Sig: Take 1 Cap by mouth three (3) times daily for 7 days. cyanocobalamin (VITAMIN B-12) 1,000 mcg tablet 8/6/2018 at Unknown time Self Yes Yes   Sig: Take 1,000 mcg by mouth daily. fexofenadine-pseudoephedrine (ALLEGRA-D 24 HOUR) 180-240 mg per tablet 8/6/2018 at Unknown time Self Yes Yes   Sig: Take 1 Tab by mouth daily. metoclopramide HCl (REGLAN) 5 mg tablet  Self No Yes   Sig: Take 1 Tab by mouth every six (6) hours as needed for Nausea for up to 10 days. ondansetron (ZOFRAN ODT) 4 mg disintegrating tablet  Self No Yes   Sig: Take 1 Tab by mouth every eight (8) hours as needed for Nausea.       Facility-Administered Medications: None       Hospital Medications:  Current Facility-Administered Medications   Medication Dose Route Frequency    iohexol (OMNIPAQUE) solution 30 mL  30 mL Oral ONCE    iopamidol (ISOVUE-370) 76 % injection 100 mL  100 mL IntraVENous RAD ONCE    predniSONE (DELTASONE) tablet 20 mg  20 mg Oral DAILY WITH BREAKFAST    ketorolac (TORADOL) injection 15 mg  15 mg IntraVENous Q6H PRN    cefTRIAXone (ROCEPHIN) 2 g in 0.9% sodium chloride (MBP/ADV) 50 mL  2 g IntraVENous Q24H    butalbital-acetaminophen-caffeine (FIORICET, ESGIC) -40 mg per tablet 1 Tab  1 Tab Oral Q4H PRN    0.9% sodium chloride with KCl 20 mEq/L infusion   IntraVENous CONTINUOUS    sodium chloride (NS) flush 5-10 mL  5-10 mL IntraVENous Q8H    sodium chloride (NS) flush 5-10 mL  5-10 mL IntraVENous PRN    HYDROcodone-acetaminophen (NORCO) 5-325 mg per tablet 1 Tab  1 Tab Oral Q4H PRN    HYDROmorphone (PF) (DILAUDID) injection 0.5 mg  0.5 mg IntraVENous Q4H PRN    naloxone (NARCAN) injection 0.4 mg  0.4 mg IntraVENous PRN    diphenhydrAMINE (BENADRYL) injection 12.5 mg  12.5 mg IntraVENous Q4H PRN    ondansetron (ZOFRAN) injection 4 mg  4 mg IntraVENous Q6H PRN    docusate sodium (COLACE) capsule 100 mg  100 mg Oral BID    enoxaparin (LOVENOX) injection 40 mg  40 mg SubCUTAneous Q24H          OBJECTIVE       Laboratory and Imaging have been reviewed and are notable for    Chest Xray 8/8/18 no acute findings      Diagnostic Tests:     No results for input(s): CPK, CKMB, TROIQ in the last 72 hours. No lab exists for component: CKQMB, CPKMB  Recent Labs      08/15/18   0340  08/14/18   0326  08/13/18   1544  08/13/18   1242   NA  136  135*  137   --    K  3.9  3.6  4.0   --    CO2  25  27  28   --    BUN  12  10  12   --    CREA  0.76  0.82  0.89   --    GLU  105*  111*  109*   --    PHOS   --   2.7   --    --    MG   --   2.0   --    --    WBC  13.7*  20.6*   --   19.1*   HGB  11.5  12.2   --   14.8   HCT  34.2*  36.2   --   43.4   PLT  310  321   --   365         Cardiac work up to date:  Echo 8/2018 LVEF 55 % to 60 %. No WMA. LAE. Mild TR                   Social History:  Social History   Substance Use Topics    Smoking status: Never Smoker    Smokeless tobacco: Never Used    Alcohol use No       Family History:  Family History   Problem Relation Age of Onset    Diabetes Mother     Heart Attack Brother        Review of Symptoms:  A comprehensive review of systems was negative except for that written in the HPI.     Physical Exam:      Visit Vitals    /60 (BP 1 Location: Right arm, BP Patient Position: At rest)    Pulse 73    Temp 99.5 °F (37.5 °C)    Resp 17    Ht 5' 8\" (1.727 m)    Wt 214 lb (97.1 kg)    SpO2 95%    BMI 32.54 kg/m2     General Appearance:  Well developed, well nourished,alert and oriented x 3, and individual in no acute distress. EENT/Mouth:   Hearing grossly normal.Normal oral mucosa,no scleral icterus     Neck: Supple, no JVD    Chest:   Lungs clear to auscultation bilaterally,no rales rhonchi or wheezing   Cardiovascular:  Regular rate and rhythm, S1, S2 normal, no  Murmur, no rubs or gallop. Abdomen:   Soft, non-tender, bowel sounds are active. Extremities: No edema bilaterally. Pulses detected, no varicosities   Skin: Warm and dry. No bruising  Neuro  Moves all extermities and neurologically intact                                                       I have discussed the diagnosis with the patient and the intended plan as seen in the above orders. Questions were answered concerning future plans. I have discussed medication side effects and warnings with the patient as well. Eileen Jolleyjosé miguel Friend is in agreement to the plan listed above and wishes to proceed. Thank you for this consult.       Genia Buenrostro NP spontaneous

## 2020-06-23 NOTE — H&P ADULT - NSHPPHYSICALEXAM_GEN_ALL_CORE
Vital Signs Last 24 Hrs  T(C): 38.4 (23 Jun 2020 13:00), Max: 38.4 (23 Jun 2020 13:00)  T(F): 101.1 (23 Jun 2020 13:00), Max: 101.1 (23 Jun 2020 13:00)  HR: 84 (23 Jun 2020 18:22) (78 - 93)  BP: 113/57 (23 Jun 2020 18:22) (97/54 - 113/57)  BP(mean): 67 (23 Jun 2020 15:49) (67 - 79)  RR: 14 (23 Jun 2020 18:22) (13 - 22)  SpO2: 100% (23 Jun 2020 18:22) (98% - 100%) pt. seen and examined, NAD    Vital Signs Last 24 Hrs  T(C): 38.4 (23 Jun 2020 13:00), Max: 38.4 (23 Jun 2020 13:00)  T(F): 101.1 (23 Jun 2020 13:00), Max: 101.1 (23 Jun 2020 13:00)  HR: 84 (23 Jun 2020 18:22) (78 - 93)  BP: 113/57 (23 Jun 2020 18:22) (97/54 - 113/57)  BP(mean): 67 (23 Jun 2020 15:49) (67 - 79)  RR: 14 (23 Jun 2020 18:22) (13 - 22)  SpO2: 100% (23 Jun 2020 18:22) (98% - 100%)    heent: nc/at, no pallor   neck: supple, no JVD  lungs: B/L clear, no w/r/r  heart: s1s2 nml  abd: soft, NABS, NT/ND  ext: no e/c/c, pulses 1+  neuro: aaox3, no focal deficit  skin: warm ., no rash

## 2020-06-23 NOTE — H&P ADULT - GIT GEN HX ROS MEA POS PC
"ED Provider Note    CHIEF COMPLAINT  Chief Complaint   Patient presents with   • Chest Pain   • Leg Swelling       HPI  Morales Olivier is a 67 y.o. male here for evaluation of chest pain and leg swelling.  The patient states that he was at his family practice doctor's office today, when he developed some substernal chest pain, that did not radiate to his back.  Patient states that he does not have any shortness of breath, or vomiting, but did have some nausea.  Patient states that he has no current abdominal pain.  He does have some lower leg swelling and is \"not sure why this is just starting.\"  His doctor sent him here for admission and cardiac rule out.  Nothing seems alleviate exacerbate his symptoms, and he denies any history of the same.      ROS  See HPI for further details, o/w negative.     PAST MEDICAL HISTORY   has a past medical history of Atrial fibrillation (HCC), Bronchitis, CAD (coronary artery disease), Gout, Heart valve disease, Hypertension, Open wound (2009), Pacemaker, Personal history of venous thrombosis and embolism (), PVC (premature ventricular contraction) (2019), Renal disorder, and Type II or unspecified type diabetes mellitus without mention of complication, not stated as uncontrolled.    SOCIAL HISTORY  Social History     Tobacco Use   • Smoking status: Former Smoker     Packs/day: 0.00     Last attempt to quit: 1981     Years since quittin.1   • Smokeless tobacco: Never Used   Substance and Sexual Activity   • Alcohol use: No   • Drug use: No   • Sexual activity: Not on file       Family History  No bleeding disorders    SURGICAL HISTORY   has a past surgical history that includes mitral valve replace (3/14/08); maze procedure (3/14/08); angiogram (7/3/2009); angiogram (2009); cath removal (2009); thrombectomy (2009); embolectomy (2009); irrigation & debridement general (2009); wide excision (2009); other cardiac surgery (); " other abdominal surgery; mitral valve replace (3/8/2017); and lopez (3/8/2017).    CURRENT MEDICATIONS  Home Medications     Reviewed by Richie Franco (Pharmacy Tech) on 02/28/20 at 1427  Med List Status: Complete   Medication Last Dose Status   allopurinol (ZYLOPRIM) 100 MG Tab 2/28/2020 Active   metFORMIN (GLUCOPHAGE) 500 MG Tab 2/28/2020 Active   metoprolol SR (TOPROL XL) 50 MG TABLET SR 24 HR 2/28/2020 Active   tamsulosin (FLOMAX) 0.4 MG capsule 2/28/2020 Active   warfarin (COUMADIN) 2 MG Tab 2/26/2020 Active   warfarin (COUMADIN) 5 MG Tab 2/27/2020 Active                ALLERGIES  No Known Allergies    REVIEW OF SYSTEMS  See HPI for further details. Review of systems as above, otherwise all other systems are negative.     PHYSICAL EXAM  Constitutional: Well developed, well nourished. No acute distress.  HEENT: Normocephalic, atraumatic. Posterior pharynx clear and moist.  Eyes:  EOMI. Normal sclera.  Neck: Supple, Full range of motion, nontender.  Chest/Pulmonary: clear to ausculation. Symmetrical expansion.   Cardio: Regular rate and rhythm with no murmur.   Abdomen: Distended at baseline, nontender no peritoneal signs. No guarding. No palpable masses.  Musculoskeletal: No deformity, 1+ edema, neurovascular intact distally.  Neuro: Clear speech, appropriate, cooperative, cranial nerves II-XII grossly intact.  Psych: Normal mood and affect  Skin;  Warm, dry.  Multiple excoriations noted to the arms/legs and torso, c/w scabies.     Results for orders placed or performed during the hospital encounter of 02/28/20   CBC w/ Differential   Result Value Ref Range    WBC 5.7 4.8 - 10.8 K/uL    RBC 4.05 (L) 4.70 - 6.10 M/uL    Hemoglobin 13.0 (L) 14.0 - 18.0 g/dL    Hematocrit 39.6 (L) 42.0 - 52.0 %    MCV 97.8 81.4 - 97.8 fL    MCH 32.1 27.0 - 33.0 pg    MCHC 32.8 (L) 33.7 - 35.3 g/dL    RDW 53.1 (H) 35.9 - 50.0 fL    Platelet Count 135 (L) 164 - 446 K/uL    MPV 10.5 9.0 - 12.9 fL    Neutrophils-Polys 70.00 44.00 -  72.00 %    Lymphocytes 14.30 (L) 22.00 - 41.00 %    Monocytes 10.10 0.00 - 13.40 %    Eosinophils 4.40 0.00 - 6.90 %    Basophils 0.70 0.00 - 1.80 %    Immature Granulocytes 0.50 0.00 - 0.90 %    Nucleated RBC 0.00 /100 WBC    Neutrophils (Absolute) 4.02 1.82 - 7.42 K/uL    Lymphs (Absolute) 0.82 (L) 1.00 - 4.80 K/uL    Monos (Absolute) 0.58 0.00 - 0.85 K/uL    Eos (Absolute) 0.25 0.00 - 0.51 K/uL    Baso (Absolute) 0.04 0.00 - 0.12 K/uL    Immature Granulocytes (abs) 0.03 0.00 - 0.11 K/uL    NRBC (Absolute) 0.00 K/uL   Complete Metabolic Panel (CMP)   Result Value Ref Range    Sodium 141 135 - 145 mmol/L    Potassium 4.4 3.6 - 5.5 mmol/L    Chloride 108 96 - 112 mmol/L    Co2 26 20 - 33 mmol/L    Anion Gap 7.0 0.0 - 11.9    Glucose 107 (H) 65 - 99 mg/dL    Bun 22 8 - 22 mg/dL    Creatinine 1.40 0.50 - 1.40 mg/dL    Calcium 8.8 8.5 - 10.5 mg/dL    AST(SGOT) 16 12 - 45 U/L    ALT(SGPT) 11 2 - 50 U/L    Alkaline Phosphatase 56 30 - 99 U/L    Total Bilirubin 0.4 0.1 - 1.5 mg/dL    Albumin 3.7 3.2 - 4.9 g/dL    Total Protein 6.1 6.0 - 8.2 g/dL    Globulin 2.4 1.9 - 3.5 g/dL    A-G Ratio 1.5 g/dL   Troponin STAT   Result Value Ref Range    Troponin T 26 (H) 6 - 19 ng/L   ESTIMATED GFR   Result Value Ref Range    GFR If African American >60 >60 mL/min/1.73 m 2    GFR If Non  51 (A) >60 mL/min/1.73 m 2   PT/INR   Result Value Ref Range    PT 34.3 (H) 12.0 - 14.6 sec    INR 3.23 (H) 0.87 - 1.13     DX-CHEST-PORTABLE (1 VIEW)   Final Result         80      I informed radiology that Dr. Cabral final read was incomplete. They will let him know.     PROCEDURES     MEDICAL RECORD  I have reviewed patient's medical record and pertinent results are listed.    COURSE & MEDICAL DECISION MAKING  I have reviewed any medical record information, laboratory studies and radiographic results as noted above.    2:50 PM  At this time, the patient will be admitted to Dr. Sultana for further evaluation and treatment.  Patient's  initial cardiac enzymes were noted to be 26, and he was treated with permethrin cream here in the department.    FINAL IMPRESSION  1. Chest pain, unspecified type     2. Scabies         Electronically signed by: Kaushal Henry D.O., 2/28/2020 2:36 PM         diarrhea

## 2020-06-23 NOTE — ED PROVIDER NOTE - PHYSICAL EXAMINATION
VITALS: reviewed  GEN: NAD, A & O x 4  HEAD/EYES: NCAT, EOMI, anicteric sclerae, no conjunctival pallor  ENT: mucus membranes moist, oropharynx WNL, trachea midline  RESP: lungs CTA with equal breath sounds bilaterally, chest wall nontender and atraumatic  CV: heart with reg rhythm S1, S2, distal pulses intact and symmetric bilaterally  ABDOMEN: soft, nondistended, nontender, no palpable masses  : no CVAT  MSK: extremities atraumatic and nontender, no edema, no asymmetry.   SKIN: warm, dry, no rash, no bruising, no cyanosis. color appropriate for ethnicity  NEURO: alert, mentating appropriately, no facial asymmetry. gross sensation, motor, coordination are intact  PSYCH: Affect appropriate

## 2020-06-23 NOTE — ED CLERICAL - NS ED CLERK NOTE PRE-ARRIVAL INFORMATION; ADDITIONAL PRE-ARRIVAL INFORMATION
CC/Reason For referral: Septic shock; Covid positive for two months with no symptoms; C-Diff positive; Has a coda cathter;  Pt Full code contact Urologist Dr. Pearson per Dr. Tobar  Preferred Consultant(if applicable):  Who admits for you (if needed):  Do you have documents you would like to fax over?  Would you still like to speak to an ED attending? Yes

## 2020-06-23 NOTE — ED ADULT NURSE NOTE - OBJECTIVE STATEMENT
Pt is a 76 y/o male pmhx of muscular dystrophy presents to the ED from [facility] complaining of fever & diarrhea x 2 days. In April he was admitted to [facility] for urosepsis and and CDiff and was sent to a nursing home after. He was COVID + a month ago. He presents with chronic fowler, draining dark yellow urine. He denies shortness of breath, chest pain, n/v, abdominal pain. Pt placed on cardiac monitor, code sepsis followed as per hospital policy. Pt is a 76 y/o male pmhx of muscular dystrophy presents to the ED from [facility] complaining of fever & diarrhea x 2 days. In April he was admitted to [facility] for urosepsis and and CDiff and was sent to a nursing home after. He was COVID + a month ago. He presents with chronic fowler, draining dark yellow urine. Stage II pressure ulcer on coccyx, duoderm placed on pressure ulcer. Pt cleaned, turned & repositioned for safety. He denies shortness of breath, chest pain, n/v, abdominal pain. Pt placed on cardiac monitor, code sepsis followed as per hospital policy.

## 2020-06-23 NOTE — H&P ADULT - PROBLEM SELECTOR PLAN 2
recurrent cdiff (x 2 in recent past)  -started on vanco orally q 6 hrs and flagyl q 8   -house ID consult in am

## 2020-06-24 DIAGNOSIS — R33.9 RETENTION OF URINE, UNSPECIFIED: ICD-10-CM

## 2020-06-24 DIAGNOSIS — F99 MENTAL DISORDER, NOT OTHERWISE SPECIFIED: ICD-10-CM

## 2020-06-24 DIAGNOSIS — R10.9 UNSPECIFIED ABDOMINAL PAIN: ICD-10-CM

## 2020-06-24 DIAGNOSIS — A04.72 ENTEROCOLITIS DUE TO CLOSTRIDIUM DIFFICILE, NOT SPECIFIED AS RECURRENT: ICD-10-CM

## 2020-06-24 DIAGNOSIS — Z86.69 PERSONAL HISTORY OF OTHER DISEASES OF THE NERVOUS SYSTEM AND SENSE ORGANS: ICD-10-CM

## 2020-06-24 LAB
ALBUMIN SERPL ELPH-MCNC: 2.6 G/DL — LOW (ref 3.3–5)
ALP SERPL-CCNC: 72 U/L — SIGNIFICANT CHANGE UP (ref 40–120)
ALT FLD-CCNC: 6 U/L — LOW (ref 10–45)
ANION GAP SERPL CALC-SCNC: 8 MMOL/L — SIGNIFICANT CHANGE UP (ref 5–17)
AST SERPL-CCNC: 7 U/L — LOW (ref 10–40)
BILIRUB SERPL-MCNC: 0.4 MG/DL — SIGNIFICANT CHANGE UP (ref 0.2–1.2)
BUN SERPL-MCNC: 15 MG/DL — SIGNIFICANT CHANGE UP (ref 7–23)
C DIFF GDH STL QL: POSITIVE — SIGNIFICANT CHANGE UP
C DIFF GDH STL QL: SIGNIFICANT CHANGE UP
CALCIUM SERPL-MCNC: 7.7 MG/DL — LOW (ref 8.4–10.5)
CHLORIDE SERPL-SCNC: 105 MMOL/L — SIGNIFICANT CHANGE UP (ref 96–108)
CO2 SERPL-SCNC: 25 MMOL/L — SIGNIFICANT CHANGE UP (ref 22–31)
CREAT SERPL-MCNC: 0.67 MG/DL — SIGNIFICANT CHANGE UP (ref 0.5–1.3)
GLUCOSE SERPL-MCNC: 81 MG/DL — SIGNIFICANT CHANGE UP (ref 70–99)
HCT VFR BLD CALC: 31 % — LOW (ref 39–50)
HGB BLD-MCNC: 9.7 G/DL — LOW (ref 13–17)
MCHC RBC-ENTMCNC: 31.3 GM/DL — LOW (ref 32–36)
MCHC RBC-ENTMCNC: 31.4 PG — SIGNIFICANT CHANGE UP (ref 27–34)
MCV RBC AUTO: 100.3 FL — HIGH (ref 80–100)
NRBC # BLD: 0 /100 WBCS — SIGNIFICANT CHANGE UP (ref 0–0)
PLATELET # BLD AUTO: 209 K/UL — SIGNIFICANT CHANGE UP (ref 150–400)
POTASSIUM SERPL-MCNC: 3.1 MMOL/L — LOW (ref 3.5–5.3)
POTASSIUM SERPL-SCNC: 3.1 MMOL/L — LOW (ref 3.5–5.3)
PROT SERPL-MCNC: 5.3 G/DL — LOW (ref 6–8.3)
RBC # BLD: 3.09 M/UL — LOW (ref 4.2–5.8)
RBC # FLD: 14.1 % — SIGNIFICANT CHANGE UP (ref 10.3–14.5)
SODIUM SERPL-SCNC: 138 MMOL/L — SIGNIFICANT CHANGE UP (ref 135–145)
WBC # BLD: 16.62 K/UL — HIGH (ref 3.8–10.5)
WBC # FLD AUTO: 16.62 K/UL — HIGH (ref 3.8–10.5)

## 2020-06-24 PROCEDURE — 99222 1ST HOSP IP/OBS MODERATE 55: CPT

## 2020-06-24 RX ORDER — DIAZEPAM 5 MG
5 TABLET ORAL
Refills: 0 | Status: DISCONTINUED | OUTPATIENT
Start: 2020-06-24 | End: 2020-06-30

## 2020-06-24 RX ORDER — TAMSULOSIN HYDROCHLORIDE 0.4 MG/1
0.8 CAPSULE ORAL AT BEDTIME
Refills: 0 | Status: DISCONTINUED | OUTPATIENT
Start: 2020-06-24 | End: 2020-06-30

## 2020-06-24 RX ORDER — DIAZEPAM 5 MG
5 TABLET ORAL ONCE
Refills: 0 | Status: DISCONTINUED | OUTPATIENT
Start: 2020-06-24 | End: 2020-06-24

## 2020-06-24 RX ORDER — FLURAZEPAM HCL 15 MG
1 CAPSULE ORAL
Qty: 0 | Refills: 0 | DISCHARGE

## 2020-06-24 RX ORDER — POTASSIUM CHLORIDE 20 MEQ
40 PACKET (EA) ORAL ONCE
Refills: 0 | Status: COMPLETED | OUTPATIENT
Start: 2020-06-24 | End: 2020-06-24

## 2020-06-24 RX ADMIN — Medication 125 MILLIGRAM(S): at 11:05

## 2020-06-24 RX ADMIN — FAMOTIDINE 20 MILLIGRAM(S): 10 INJECTION INTRAVENOUS at 13:04

## 2020-06-24 RX ADMIN — Medication 125 MILLIGRAM(S): at 18:39

## 2020-06-24 RX ADMIN — TAMSULOSIN HYDROCHLORIDE 0.8 MILLIGRAM(S): 0.4 CAPSULE ORAL at 21:27

## 2020-06-24 RX ADMIN — Medication 125 MILLIGRAM(S): at 13:05

## 2020-06-24 RX ADMIN — Medication 100 MILLIGRAM(S): at 13:05

## 2020-06-24 RX ADMIN — Medication 100 MILLIGRAM(S): at 05:18

## 2020-06-24 RX ADMIN — Medication 5 MILLIGRAM(S): at 00:13

## 2020-06-24 RX ADMIN — Medication 20 MILLIEQUIVALENT(S): at 00:13

## 2020-06-24 RX ADMIN — Medication 40 MILLIEQUIVALENT(S): at 06:55

## 2020-06-24 RX ADMIN — Medication 5 MILLIGRAM(S): at 21:27

## 2020-06-24 RX ADMIN — Medication 125 MILLIGRAM(S): at 03:00

## 2020-06-24 NOTE — PHARMACOTHERAPY INTERVENTION NOTE - COMMENTS
Confirmed home medications with patient and pharmacy, updated in Outpatient Medication Review. Suggest to increase tamsulosin to home dose of 0.8 mg once daily at bedtime. Discussed with NP.    Audi Luis PharmD  Pager number: [phone]

## 2020-06-24 NOTE — CONSULT NOTE ADULT - ASSESSMENT
75 year old with muscular dystrophy , recent MSSA bacteremia and COVID, presnets with diarrhea.,    1) C diff  Recurrent    Continue po vancomycin 125 mg po q 6 for 4 weeks  then vancoymcin 125 mg po q12 for 1 one  then vancoymcin 125 mg po daily for 1 week  then vancoymcin 125 mg po every other day for one week     Can stop IV flagyl    2) Leukocytosis  Liely due to C diff   Recent MSSa infection  Await blood cultures  Monitor    3) Chronic Spencer  Outpt urology follow up

## 2020-06-24 NOTE — CONSULT NOTE ADULT - SUBJECTIVE AND OBJECTIVE BOX
HPI:  74 yo M hx muscular dystrophy, COVID, c diff, presenting with 2 days of fever, diarrhea and UTI. Denies cough or SOB. Endorses cramping abdominal pain that improves after bowel movements, states the diarrhea has been "uncontrollable."  pt. has several admission to hospital and has been diagnosed with cdiff x 2 in recent past, diarrhea started 2 days ago and it is watery , also had fowler cath in ED for retention. he was given vanco orally for cdiff in past .   he came from [facility], but don't want to go back there (2020 20:14)      PAST MEDICAL & SURGICAL HISTORY:  Urinary retention  H/O muscular dystrophy  Scrotal swelling  Psychiatric disorder  No significant past surgical history      Allergies    No Known Allergies    Intolerances        ANTIMICROBIALS:  metroNIDAZOLE  IVPB 500 every 8 hours  vancomycin    Solution 125 every 6 hours      OTHER MEDS:  acetaminophen   Tablet .. 650 milliGRAM(s) Oral every 6 hours PRN  famotidine    Tablet 20 milliGRAM(s) Oral daily  heparin   Injectable 5000 Unit(s) SubCutaneous every 12 hours  sodium chloride 0.9%. 1000 milliLiter(s) IV Continuous <Continuous>  tamsulosin 0.4 milliGRAM(s) Oral at bedtime      SOCIAL HISTORY:    FAMILY HISTORY:  FHx: stomach cancer      REVIEW OF SYSTEMS  [  ] ROS unobtainable because:    [  ] All other systems negative except as noted below:	    Constitutional:  [ ] fever [ ] chills  [ ] weight loss  [ ] weakness  Skin:  [ ] rash [ ] phlebitis	  Eyes: [ ] icterus [ ] pain  [ ] discharge	  ENMT: [ ] sore throat  [ ] thrush [ ] ulcers [ ] exudates  Respiratory: [ ] dyspnea [ ] hemoptysis [ ] cough [ ] sputum	  Cardiovascular:  [ ] chest pain [ ] palpitations [ ] edema	  Gastrointestinal:  [ ] nausea [ ] vomiting [ ] diarrhea [ ] constipation [ ] pain	  Genitourinary:  [ ] dysuria [ ] frequency [ ] hematuria [ ] discharge [ ] flank pain  [ ] incontinence  Musculoskeletal:  [ ] myalgias [ ] arthralgias [ ] arthritis  [ ] back pain  Neurological:  [ ] headache [ ] seizures  [ ] confusion/altered mental status  Psychiatric:  [ ] anxiety [ ] depression	  Hematology/Lymphatics:  [ ] lymphadenopathy  Endocrine:  [ ] adrenal [ ] thyroid  Allergic/Immunologic:	 [ ] transplant [ ] seasonal    PHYSICAL EXAM:  General: [ ] non-toxic  HEAD/EYES: [ ] PERRL [ ] white sclera [ ] icterus  ENT:  [ ] normal [ ] supple [ ] thrush [ ] pharyngeal exudate  Cardiovascular:   [ ] murmur [ ] normal [ ] PPM/AICD  Respiratory:  [ ] clear to ausculation bilaterally  GI:  [ ] soft, non-tender, normal bowel sounds  :  [ ] fowler [ ] no CVA tenderness   Musculoskeletal:  [ ] no synovitis  Neurologic:  [ ] non-focal exam   Skin:  [ ] no rash  Lymph: [ ] no lymphadenopathy  Psychiatric:  [ ] appropriate affect [ ] alert & oriented  Lines:  [ ] no phlebitis [ ] central line          Drug Dosing Weight  Height (cm): 183.1 (2020 17:28)  Weight (kg): 62.142 (2020 17:28)  BMI (kg/m2): 18.5 (2020 17:28)  BSA (m2): 1.82 (2020 17:28)    Vital Signs Last 24 Hrs  T(F): 97.9 (20 @ 13:15), Max: 101.1 (20 @ 13:00)    Vital Signs Last 24 Hrs  HR: 83 (20 @ 13:15) (70 - 88)  BP: 109/66 (20 @ 13:15) (96/58 - 113/57)  RR: 16 (20 @ 13:15)  SpO2: 98% (20 @ 13:15) (98% - 100%)  Wt(kg): --                          9.7    16.62 )-----------( 209      ( 2020 06:00 )             31.0           138  |  105  |  15  ----------------------------<  81  3.1<L>   |  25  |  0.67    Ca    7.7<L>      2020 06:00    TPro  5.3<L>  /  Alb  2.6<L>  /  TBili  0.4  /  DBili  x   /  AST  7<L>  /  ALT  6<L>  /  AlkPhos  72        Urinalysis Basic - ( 2020 14:31 )    Color: Yellow / Appearance: Clear / S.023 / pH: x  Gluc: x / Ketone: Negative  / Bili: Negative / Urobili: Negative   Blood: x / Protein: 100 mg/dL / Nitrite: Positive   Leuk Esterase: Large / RBC: 5 /hpf /  /HPF   Sq Epi: x / Non Sq Epi: 0 /hpf / Bacteria: Moderate        MICROBIOLOGY:    RADIOLOGY: HPI:  74 yo M hx   ·	muscular dystrophy  ·	COVID  ·	c diff    He was admitted with a facial wound with MSSA bacteremia.   He completed antibiotics in early May.     He was at skilled nursing facility. During that stay- he was diagnosed with COVID.     He was diagnosed with C diff twice in the past - usually after     He presented from skilled nursing facility with fever to 101 and two days of profuse watery diarrhea.    WBC elevated at presentation.       Endorses cramping abdominal pain that improves after bowel movements, states the diarrhea has been "uncontrollable."    Feels better today.       PAST MEDICAL & SURGICAL HISTORY:  Urinary retention- crhonic fowler since 2019  H/O muscular dystrophy  Scrotal swelling  Psychiatric disorder        Allergies    No Known Allergies    Intolerances        ANTIMICROBIALS:  metroNIDAZOLE  IVPB 500 every 8 hours  vancomycin    Solution 125 every 6 hours      OTHER MEDS:  acetaminophen   Tablet .. 650 milliGRAM(s) Oral every 6 hours PRN  famotidine    Tablet 20 milliGRAM(s) Oral daily  heparin   Injectable 5000 Unit(s) SubCutaneous every 12 hours  sodium chloride 0.9%. 1000 milliLiter(s) IV Continuous <Continuous>  tamsulosin 0.4 milliGRAM(s) Oral at bedtime      SOCIAL HISTORY:  no tobacco  no travel  Tx from skilled nursing facility      FAMILY HISTORY:  FHx: stomach cancer- father- dieda t 63  Mother  "old age" in 80s        REVIEW OF SYSTEMS  [  ] ROS unobtainable because:    [x  ] All other systems negative except as noted below:	    Constitutional:  x[ ] fever [ ] chills  [ ] weight loss  [ ] weakness  Skin:  [ ] rash [ ] phlebitis	  Eyes: [ ] icterus [ ] pain  [ ] discharge	  ENMT: [ ] sore throat  [ ] thrush [ ] ulcers [ ] exudates  Respiratory: [ ] dyspnea [ ] hemoptysis [ ] cough [ ] sputum	  Cardiovascular:  [ ] chest pain [ ] palpitations [ ] edema	  Gastrointestinal:  [ ] nausea [ ] vomiting [x ] diarrhea [ ] constipation [ ] pain	  Genitourinary:  [ ] dysuria [ ] frequency [ ] hematuria [ ] discharge [ ] flank pain  [ ] incontinence  Musculoskeletal:  [ ] myalgias [ ] arthralgias [ ] arthritis  [ ] back pain  Neurological:  [ ] headache [ ] seizures  [ ] confusion/altered mental status  Psychiatric:  [ ] anxiety [ ] depression	  Hematology/Lymphatics:  [ ] lymphadenopathy  Endocrine:  [ ] adrenal [ ] thyroid  Allergic/Immunologic:	 [ ] transplant [ ] seasonal    PHYSICAL EXAM:  General: [x ] non-toxic  HEAD/EYES: [ ] PERRL [x ] white sclera [ ] icterus  ENT:  [ ] normal [ x] supple [ ] thrush [ ] pharyngeal exudate  Cardiovascular:   [ ] murmur [x ] normal [ ] PPM/AICD  Respiratory:  [x ] clear to ausculation bilaterally  GI:  [ x] soft, non-tender, normal bowel sounds  :  [ ] fowler [x ] no CVA tenderness   Musculoskeletal:  [x ] no synovitis  Neurologic:  [x ] non-focal exam   Skin:  [ x] no rash  Lymph: [ ] no lymphadenopathy  Psychiatric:  [ x] appropriate affect [ ] alert & oriented  Lines:  [x ] no phlebitis [ ] central line          Drug Dosing Weight  Height (cm): 183.1 (2020 17:28)  Weight (kg): 62.142 (2020 17:28)  BMI (kg/m2): 18.5 (2020 17:28)  BSA (m2): 1.82 (2020 17:28)    Vital Signs Last 24 Hrs  T(F): 97.9 (20 @ 13:15), Max: 101.1 (20 @ 13:00)    Vital Signs Last 24 Hrs  HR: 83 (20 @ 13:15) (70 - 88)  BP: 109/66 (20 @ 13:15) (96/58 - 113/57)  RR: 16 (20 @ 13:15)  SpO2: 98% (20 @ 13:15) (98% - 100%)  Wt(kg): --                          9.7    16.62 )-----------( 209      ( 2020 06:00 )             31.0           138  |  105  |  15  ----------------------------<  81  3.1<L>   |  25  |  0.67    Ca    7.7<L>      2020 06:00    TPro  5.3<L>  /  Alb  2.6<L>  /  TBili  0.4  /  DBili  x   /  AST  7<L>  /  ALT  6<L>  /  AlkPhos  72        Urinalysis Basic - ( 2020 14:31 )    Color: Yellow / Appearance: Clear / S.023 / pH: x  Gluc: x / Ketone: Negative  / Bili: Negative / Urobili: Negative   Blood: x / Protein: 100 mg/dL / Nitrite: Positive   Leuk Esterase: Large / RBC: 5 /hpf /  /HPF   Sq Epi: x / Non Sq Epi: 0 /hpf / Bacteria: Moderate        MICROBIOLOGY:  Clostridium difficile GDH Interpretation: Positive for toxigenic C. Difficile.  This specimen is positive for C.  Difficile glutamate dehydrogenase (GDH) antigen and positive for C.  Difficile Toxins A & B, by EIA.  GDH is a highly sensitive marker for C.  Difficile that is produced in largeamounts by all C. Difficile strains,  both toxigenic and nontoxigenic.  This assay has not been validated as a  test of cure.  The results of this assay should always be interpreted in  conjunction with patient's clinical history. (20 @ 11:57)        RADIOLOGY:    < from: Xray Chest 1 View- PORTABLE-Urgent (20 @ 13:38) >  EXAM:  XR CHEST PORTABLE URGENT 1V                            PROCEDURE DATE:  2020            INTERPRETATION:  A single chest x-ray was obtained on 2020.    Indication: Rule out pneumonia.    Impression:    The heart is normal in size. The lungs appear to be clear. No pleural effusion. No pneumothorax. No acute bony pathology could be identified.    < end of copied text >

## 2020-06-24 NOTE — PROGRESS NOTE ADULT - SUBJECTIVE AND OBJECTIVE BOX
Patient is a 75y old  Male who presents with a chief complaint of abdominal pain and diarrhea (2020 13:16)      INTERVAL HPI/OVERNIGHT EVENTS:  T(C): 36.6 (20 @ 13:15), Max: 37.2 (20 @ 21:06)  HR: 83 (20 @ 13:15) (70 - 88)  BP: 109/66 (20 @ 13:15) (96/58 - 109/66)  RR: 16 (20 @ 13:15) (16 - 17)  SpO2: 98% (20 @ 13:15) (98% - 100%)  Wt(kg): --  I&O's Summary    2020 07:  -  2020 07:00  --------------------------------------------------------  IN: 0 mL / OUT: 1450 mL / NET: -1450 mL    2020 07:  -  2020 20:19  --------------------------------------------------------  IN: 2800 mL / OUT: 1800 mL / NET: 1000 mL        PAST MEDICAL & SURGICAL HISTORY:  Urinary retention  H/O muscular dystrophy  Scrotal swelling  Psychiatric disorder  No significant past surgical history      SOCIAL HISTORY  Alcohol:  Tobacco:  Illicit substance use:    FAMILY HISTORY:    REVIEW OF SYSTEMS:  CONSTITUTIONAL: No fever, weight loss, or fatigue  EYES: No eye pain, visual disturbances, or discharge  ENMT:  No difficulty hearing, tinnitus, vertigo; No sinus or throat pain  NECK: No pain or stiffness  RESPIRATORY: No cough, wheezing, chills or hemoptysis; No shortness of breath  CARDIOVASCULAR: No chest pain, palpitations, dizziness, or leg swelling  GASTROINTESTINAL: No abdominal or epigastric pain. No nausea, vomiting, or hematemesis; No diarrhea or constipation. No melena or hematochezia.  GENITOURINARY: No dysuria, frequency, hematuria, or incontinence  NEUROLOGICAL: No headaches, memory loss, loss of strength, numbness, or tremors  SKIN: No itching, burning, rashes, or lesions   LYMPH NODES: No enlarged glands  ENDOCRINE: No heat or cold intolerance; No hair loss  MUSCULOSKELETAL: No joint pain or swelling; No muscle, back, or extremity pain  PSYCHIATRIC: No depression, anxiety, mood swings, or difficulty sleeping  HEME/LYMPH: No easy bruising, or bleeding gums  ALLERY AND IMMUNOLOGIC: No hives or eczema    RADIOLOGY & ADDITIONAL TESTS:    Imaging Personally Reviewed:  [ ] YES  [ ] NO    Consultant(s) Notes Reviewed:  [ ] YES  [ ] NO    PHYSICAL EXAM:  GENERAL: NAD, well-groomed, well-developed  HEAD:  Atraumatic, Normocephalic  EYES: EOMI, PERRLA, conjunctiva and sclera clear  ENMT: No tonsillar erythema, exudates, or enlargement; Moist mucous membranes, Good dentition, No lesions  NECK: Supple, No JVD, Normal thyroid  NERVOUS SYSTEM:  Alert & Oriented X3, Good concentration; Motor Strength 5/5 B/L upper and lower extremities; DTRs 2+ intact and symmetric  CHEST/LUNG: Clear to percussion bilaterally; No rales, rhonchi, wheezing, or rubs  HEART: Regular rate and rhythm; No murmurs, rubs, or gallops  ABDOMEN: Soft, Nontender, Nondistended; Bowel sounds present  EXTREMITIES:  2+ Peripheral Pulses, No clubbing, cyanosis, or edema  LYMPH: No lymphadenopathy noted  SKIN: No rashes or lesions    LABS:                        9.7    16.62 )-----------( 209      ( 2020 06:00 )             31.0     -    138  |  105  |  15  ----------------------------<  81  3.1<L>   |  25  |  0.67    Ca    7.7<L>      2020 06:00    TPro  5.3<L>  /  Alb  2.6<L>  /  TBili  0.4  /  DBili  x   /  AST  7<L>  /  ALT  6<L>  /  AlkPhos  72  06-24    PT/INR - ( 2020 14:15 )   PT: 18.0 sec;   INR: 1.56 ratio         PTT - ( 2020 14:15 )  PTT:30.1 sec  Urinalysis Basic - ( 2020 14:31 )    Color: Yellow / Appearance: Clear / S.023 / pH: x  Gluc: x / Ketone: Negative  / Bili: Negative / Urobili: Negative   Blood: x / Protein: 100 mg/dL / Nitrite: Positive   Leuk Esterase: Large / RBC: 5 /hpf /  /HPF   Sq Epi: x / Non Sq Epi: 0 /hpf / Bacteria: Moderate      CAPILLARY BLOOD GLUCOSE            Urinalysis Basic - ( 2020 14:31 )    Color: Yellow / Appearance: Clear / S.023 / pH: x  Gluc: x / Ketone: Negative  / Bili: Negative / Urobili: Negative   Blood: x / Protein: 100 mg/dL / Nitrite: Positive   Leuk Esterase: Large / RBC: 5 /hpf /  /HPF   Sq Epi: x / Non Sq Epi: 0 /hpf / Bacteria: Moderate        MEDICATIONS  (STANDING):  diazepam    Tablet 5 milliGRAM(s) Oral two times a day  famotidine    Tablet 20 milliGRAM(s) Oral daily  heparin   Injectable 5000 Unit(s) SubCutaneous every 12 hours  sodium chloride 0.9%. 1000 milliLiter(s) (75 mL/Hr) IV Continuous <Continuous>  tamsulosin 0.8 milliGRAM(s) Oral at bedtime  vancomycin    Solution 125 milliGRAM(s) Oral every 6 hours    MEDICATIONS  (PRN):  acetaminophen   Tablet .. 650 milliGRAM(s) Oral every 6 hours PRN Temp greater or equal to 38C (100.4F), Moderate Pain (4 - 6)      Care Discussed with Consultants/Other Providers [ ] YES  [ ] NO Patient is a 75y old  Male who presents with a chief complaint of abdominal pain and diarrhea (2020 13:16)    pt. seen and examined, feeling little better, diarrhea still +  INTERVAL HPI/OVERNIGHT EVENTS:  T(C): 36.6 (20 @ 13:15), Max: 37.2 (20 @ 21:06)  HR: 83 (20 @ 13:15) (70 - 88)  BP: 109/66 (20 @ 13:15) (96/58 - 109/66)  RR: 16 (20 @ 13:15) (16 - 17)  SpO2: 98% (20 @ 13:15) (98% - 100%)  Wt(kg): --  I&O's Summary    2020 07:01  -  2020 07:00  --------------------------------------------------------  IN: 0 mL / OUT: 1450 mL / NET: -1450 mL    2020 07:01  -  2020 20:19  --------------------------------------------------------  IN: 2800 mL / OUT: 1800 mL / NET: 1000 mL        PAST MEDICAL & SURGICAL HISTORY:  Urinary retention  H/O muscular dystrophy  Scrotal swelling  Psychiatric disorder  No significant past surgical history      SOCIAL HISTORY  Alcohol:  Tobacco:  Illicit substance use:    FAMILY HISTORY:    REVIEW OF SYSTEMS:  CONSTITUTIONAL: No fever, weight loss, or fatigue  EYES: No eye pain, visual disturbances, or discharge  ENMT:  No difficulty hearing, tinnitus, vertigo; No sinus or throat pain  NECK: No pain or stiffness  RESPIRATORY: No cough, wheezing, chills or hemoptysis; No shortness of breath  CARDIOVASCULAR: No chest pain, palpitations, dizziness, or leg swelling  GASTROINTESTINAL: No abdominal or epigastric pain. No nausea, vomiting, or hematemesis; No diarrhea or constipation. No melena or hematochezia.  GENITOURINARY: No dysuria, frequency, hematuria, or incontinence  NEUROLOGICAL: No headaches, memory loss, loss of strength, numbness, or tremors  SKIN: No itching, burning, rashes, or lesions   LYMPH NODES: No enlarged glands  ENDOCRINE: No heat or cold intolerance; No hair loss  MUSCULOSKELETAL: No joint pain or swelling; No muscle, back, or extremity pain  PSYCHIATRIC: No depression, anxiety, mood swings, or difficulty sleeping  HEME/LYMPH: No easy bruising, or bleeding gums  ALLERY AND IMMUNOLOGIC: No hives or eczema    RADIOLOGY & ADDITIONAL TESTS:    Imaging Personally Reviewed:  [ ] YES  [ ] NO    Consultant(s) Notes Reviewed:  [ ] YES  [ ] NO    PHYSICAL EXAM:  GENERAL: NAD, well-groomed, well-developed  HEAD:  Atraumatic, Normocephalic  EYES: EOMI, PERRLA, conjunctiva and sclera clear  ENMT: No tonsillar erythema, exudates, or enlargement; Moist mucous membranes, Good dentition, No lesions  NECK: Supple, No JVD, Normal thyroid  NERVOUS SYSTEM:  Alert & Oriented X3, Good concentration; Motor Strength 5/5 B/L upper and lower extremities; DTRs 2+ intact and symmetric  CHEST/LUNG: Clear to percussion bilaterally; No rales, rhonchi, wheezing, or rubs  HEART: Regular rate and rhythm; No murmurs, rubs, or gallops  ABDOMEN: Soft, Nontender, Nondistended; Bowel sounds present  EXTREMITIES:  2+ Peripheral Pulses, No clubbing, cyanosis, or edema  LYMPH: No lymphadenopathy noted  SKIN: No rashes or lesions    LABS:                        9.7    16.62 )-----------( 209      ( 2020 06:00 )             31.0     06-24    138  |  105  |  15  ----------------------------<  81  3.1<L>   |  25  |  0.67    Ca    7.7<L>      2020 06:00    TPro  5.3<L>  /  Alb  2.6<L>  /  TBili  0.4  /  DBili  x   /  AST  7<L>  /  ALT  6<L>  /  AlkPhos  72  06-24    PT/INR - ( 2020 14:15 )   PT: 18.0 sec;   INR: 1.56 ratio         PTT - ( 2020 14:15 )  PTT:30.1 sec  Urinalysis Basic - ( 2020 14:31 )    Color: Yellow / Appearance: Clear / S.023 / pH: x  Gluc: x / Ketone: Negative  / Bili: Negative / Urobili: Negative   Blood: x / Protein: 100 mg/dL / Nitrite: Positive   Leuk Esterase: Large / RBC: 5 /hpf /  /HPF   Sq Epi: x / Non Sq Epi: 0 /hpf / Bacteria: Moderate      CAPILLARY BLOOD GLUCOSE            Urinalysis Basic - ( 2020 14:31 )    Color: Yellow / Appearance: Clear / S.023 / pH: x  Gluc: x / Ketone: Negative  / Bili: Negative / Urobili: Negative   Blood: x / Protein: 100 mg/dL / Nitrite: Positive   Leuk Esterase: Large / RBC: 5 /hpf /  /HPF   Sq Epi: x / Non Sq Epi: 0 /hpf / Bacteria: Moderate        MEDICATIONS  (STANDING):  diazepam    Tablet 5 milliGRAM(s) Oral two times a day  famotidine    Tablet 20 milliGRAM(s) Oral daily  heparin   Injectable 5000 Unit(s) SubCutaneous every 12 hours  sodium chloride 0.9%. 1000 milliLiter(s) (75 mL/Hr) IV Continuous <Continuous>  tamsulosin 0.8 milliGRAM(s) Oral at bedtime  vancomycin    Solution 125 milliGRAM(s) Oral every 6 hours    MEDICATIONS  (PRN):  acetaminophen   Tablet .. 650 milliGRAM(s) Oral every 6 hours PRN Temp greater or equal to 38C (100.4F), Moderate Pain (4 - 6)      Care Discussed with Consultants/Other Providers [ ] YES  [ ] NO

## 2020-06-25 LAB
-  AMIKACIN: SIGNIFICANT CHANGE UP
-  AMPICILLIN: SIGNIFICANT CHANGE UP
-  AZTREONAM: SIGNIFICANT CHANGE UP
-  CEFEPIME: SIGNIFICANT CHANGE UP
-  CEFTAZIDIME: SIGNIFICANT CHANGE UP
-  CIPROFLOXACIN: SIGNIFICANT CHANGE UP
-  CIPROFLOXACIN: SIGNIFICANT CHANGE UP
-  COAGULASE NEGATIVE STAPHYLOCOCCUS: SIGNIFICANT CHANGE UP
-  DAPTOMYCIN: SIGNIFICANT CHANGE UP
-  GENTAMICIN: SIGNIFICANT CHANGE UP
-  IMIPENEM: SIGNIFICANT CHANGE UP
-  LEVOFLOXACIN: SIGNIFICANT CHANGE UP
-  LEVOFLOXACIN: SIGNIFICANT CHANGE UP
-  LINEZOLID: SIGNIFICANT CHANGE UP
-  MEROPENEM: SIGNIFICANT CHANGE UP
-  NITROFURANTOIN: SIGNIFICANT CHANGE UP
-  PIPERACILLIN/TAZOBACTAM: SIGNIFICANT CHANGE UP
-  TETRACYCLINE: SIGNIFICANT CHANGE UP
-  TOBRAMYCIN: SIGNIFICANT CHANGE UP
-  VANCOMYCIN: SIGNIFICANT CHANGE UP
ANION GAP SERPL CALC-SCNC: 9 MMOL/L — SIGNIFICANT CHANGE UP (ref 5–17)
BUN SERPL-MCNC: 10 MG/DL — SIGNIFICANT CHANGE UP (ref 7–23)
CALCIUM SERPL-MCNC: 8.6 MG/DL — SIGNIFICANT CHANGE UP (ref 8.4–10.5)
CHLORIDE SERPL-SCNC: 103 MMOL/L — SIGNIFICANT CHANGE UP (ref 96–108)
CO2 SERPL-SCNC: 26 MMOL/L — SIGNIFICANT CHANGE UP (ref 22–31)
CREAT SERPL-MCNC: 0.74 MG/DL — SIGNIFICANT CHANGE UP (ref 0.5–1.3)
CULTURE RESULTS: SIGNIFICANT CHANGE UP
GLUCOSE SERPL-MCNC: 96 MG/DL — SIGNIFICANT CHANGE UP (ref 70–99)
GRAM STN FLD: SIGNIFICANT CHANGE UP
HCT VFR BLD CALC: 31.1 % — LOW (ref 39–50)
HGB BLD-MCNC: 10 G/DL — LOW (ref 13–17)
MCHC RBC-ENTMCNC: 31.8 PG — SIGNIFICANT CHANGE UP (ref 27–34)
MCHC RBC-ENTMCNC: 32.2 GM/DL — SIGNIFICANT CHANGE UP (ref 32–36)
MCV RBC AUTO: 99 FL — SIGNIFICANT CHANGE UP (ref 80–100)
METHOD TYPE: SIGNIFICANT CHANGE UP
NRBC # BLD: 0 /100 WBCS — SIGNIFICANT CHANGE UP (ref 0–0)
ORGANISM # SPEC MICROSCOPIC CNT: SIGNIFICANT CHANGE UP
PLATELET # BLD AUTO: 230 K/UL — SIGNIFICANT CHANGE UP (ref 150–400)
POTASSIUM SERPL-MCNC: 3.2 MMOL/L — LOW (ref 3.5–5.3)
POTASSIUM SERPL-SCNC: 3.2 MMOL/L — LOW (ref 3.5–5.3)
RBC # BLD: 3.14 M/UL — LOW (ref 4.2–5.8)
RBC # FLD: 13.9 % — SIGNIFICANT CHANGE UP (ref 10.3–14.5)
SODIUM SERPL-SCNC: 138 MMOL/L — SIGNIFICANT CHANGE UP (ref 135–145)
SPECIMEN SOURCE: SIGNIFICANT CHANGE UP
WBC # BLD: 9.87 K/UL — SIGNIFICANT CHANGE UP (ref 3.8–10.5)
WBC # FLD AUTO: 9.87 K/UL — SIGNIFICANT CHANGE UP (ref 3.8–10.5)

## 2020-06-25 PROCEDURE — 99232 SBSQ HOSP IP/OBS MODERATE 35: CPT

## 2020-06-25 RX ORDER — PIPERACILLIN AND TAZOBACTAM 4; .5 G/20ML; G/20ML
3.38 INJECTION, POWDER, LYOPHILIZED, FOR SOLUTION INTRAVENOUS ONCE
Refills: 0 | Status: COMPLETED | OUTPATIENT
Start: 2020-06-25 | End: 2020-06-25

## 2020-06-25 RX ORDER — POTASSIUM CHLORIDE 20 MEQ
40 PACKET (EA) ORAL EVERY 4 HOURS
Refills: 0 | Status: COMPLETED | OUTPATIENT
Start: 2020-06-25 | End: 2020-06-25

## 2020-06-25 RX ORDER — PIPERACILLIN AND TAZOBACTAM 4; .5 G/20ML; G/20ML
3.38 INJECTION, POWDER, LYOPHILIZED, FOR SOLUTION INTRAVENOUS EVERY 8 HOURS
Refills: 0 | Status: DISCONTINUED | OUTPATIENT
Start: 2020-06-25 | End: 2020-06-25

## 2020-06-25 RX ADMIN — Medication 5 MILLIGRAM(S): at 09:01

## 2020-06-25 RX ADMIN — FAMOTIDINE 20 MILLIGRAM(S): 10 INJECTION INTRAVENOUS at 12:42

## 2020-06-25 RX ADMIN — TAMSULOSIN HYDROCHLORIDE 0.8 MILLIGRAM(S): 0.4 CAPSULE ORAL at 21:09

## 2020-06-25 RX ADMIN — HEPARIN SODIUM 5000 UNIT(S): 5000 INJECTION INTRAVENOUS; SUBCUTANEOUS at 05:33

## 2020-06-25 RX ADMIN — PIPERACILLIN AND TAZOBACTAM 25 GRAM(S): 4; .5 INJECTION, POWDER, LYOPHILIZED, FOR SOLUTION INTRAVENOUS at 05:33

## 2020-06-25 RX ADMIN — Medication 125 MILLIGRAM(S): at 17:42

## 2020-06-25 RX ADMIN — SODIUM CHLORIDE 75 MILLILITER(S): 9 INJECTION INTRAMUSCULAR; INTRAVENOUS; SUBCUTANEOUS at 09:01

## 2020-06-25 RX ADMIN — Medication 125 MILLIGRAM(S): at 12:42

## 2020-06-25 RX ADMIN — PIPERACILLIN AND TAZOBACTAM 200 GRAM(S): 4; .5 INJECTION, POWDER, LYOPHILIZED, FOR SOLUTION INTRAVENOUS at 05:30

## 2020-06-25 RX ADMIN — Medication 125 MILLIGRAM(S): at 05:33

## 2020-06-25 RX ADMIN — Medication 40 MILLIEQUIVALENT(S): at 09:01

## 2020-06-25 RX ADMIN — Medication 40 MILLIEQUIVALENT(S): at 12:42

## 2020-06-25 RX ADMIN — Medication 5 MILLIGRAM(S): at 17:42

## 2020-06-25 RX ADMIN — Medication 125 MILLIGRAM(S): at 02:30

## 2020-06-25 NOTE — PROGRESS NOTE ADULT - SUBJECTIVE AND OBJECTIVE BOX
Follow Up:      Inverval History/ROS:Patient is a 75y old  Male who presents with a chief complaint of abdominal pain and diarrhea (2020 20:18)    No fever    Allergies    No Known Allergies    Intolerances        ANTIMICROBIALS:  piperacillin/tazobactam IVPB.. 3.375 every 8 hours  vancomycin    Solution 125 every 6 hours      OTHER MEDS:  acetaminophen   Tablet .. 650 milliGRAM(s) Oral every 6 hours PRN  diazepam    Tablet 5 milliGRAM(s) Oral two times a day  famotidine    Tablet 20 milliGRAM(s) Oral daily  heparin   Injectable 5000 Unit(s) SubCutaneous every 12 hours  potassium chloride    Tablet ER 40 milliEquivalent(s) Oral every 4 hours  sodium chloride 0.9%. 1000 milliLiter(s) IV Continuous <Continuous>  tamsulosin 0.8 milliGRAM(s) Oral at bedtime      Vital Signs Last 24 Hrs  T(C): 36.7 (2020 04:48), Max: 36.7 (2020 04:48)  T(F): 98 (2020 04:48), Max: 98 (2020 04:48)  HR: 72 (2020 04:48) (72 - 85)  BP: 98/57 (2020 04:48) (98/57 - 109/66)  BP(mean): --  RR: 18 (2020 04:48) (16 - 18)  SpO2: 95% (2020 04:48) (95% - 98%)    PHYSICAL EXAM:  General: [ ] non-toxic  HEAD/EYES: [ ] PERRL [ ] white sclera [ ] icterus  ENT:  [ ] normal [ ] supple [ ] thrush [ ] pharyngeal exudate  Cardiovascular:   [ ] murmur [ ] normal [ ] PPM/AICD  Respiratory:  [ ] clear to ausculation bilaterally  GI:  [ ] soft, non-tender, normal bowel sounds  :  [ ] fowler [ ] no CVA tenderness   Musculoskeletal:  [ ] no synovitis  Neurologic:  [ ] non-focal exam   Skin:  [ ] no rash  Lymph: [ ] no lymphadenopathy  Psychiatric:  [ ] appropriate affect [ ] alert & oriented  Lines:  [ ] no phlebitis [ ] central line                                10.0   9.87  )-----------( 230      ( 2020 06:15 )             31.1       06-25    138  |  103  |  10  ----------------------------<  96  3.2<L>   |  26  |  0.74    Ca    8.6      2020 06:13    TPro  5.3<L>  /  Alb  2.6<L>  /  TBili  0.4  /  DBili  x   /  AST  7<L>  /  ALT  6<L>  /  AlkPhos  72  06-24      Urinalysis Basic - ( 2020 14:31 )    Color: Yellow / Appearance: Clear / S.023 / pH: x  Gluc: x / Ketone: Negative  / Bili: Negative / Urobili: Negative   Blood: x / Protein: 100 mg/dL / Nitrite: Positive   Leuk Esterase: Large / RBC: 5 /hpf /  /HPF   Sq Epi: x / Non Sq Epi: 0 /hpf / Bacteria: Moderate        MICROBIOLOGY:Culture Results:   No growth to date. (20 @ 18:17)  Culture Results:   >100,000 CFU/ml Gram Negative Rods  >100,000 CFU/ml Enterococcus species (20 @ 18:13)  Culture Results:   No growth to date. (20 @ 18:13)      RADIOLOGY: Follow Up:      Inverval History/ROS:Patient is a 75y old  Male who presents with a chief complaint of abdominal pain and diarrhea (2020 20:18)    No fever    Allergies    No Known Allergies    Intolerances        ANTIMICROBIALS:  piperacillin/tazobactam IVPB.. 3.375 every 8 hours  vancomycin    Solution 125 every 6 hours      OTHER MEDS:  acetaminophen   Tablet .. 650 milliGRAM(s) Oral every 6 hours PRN  diazepam    Tablet 5 milliGRAM(s) Oral two times a day  famotidine    Tablet 20 milliGRAM(s) Oral daily  heparin   Injectable 5000 Unit(s) SubCutaneous every 12 hours  potassium chloride    Tablet ER 40 milliEquivalent(s) Oral every 4 hours  sodium chloride 0.9%. 1000 milliLiter(s) IV Continuous <Continuous>  tamsulosin 0.8 milliGRAM(s) Oral at bedtime      Vital Signs Last 24 Hrs  T(C): 36.7 (2020 04:48), Max: 36.7 (2020 04:48)  T(F): 98 (2020 04:48), Max: 98 (2020 04:48)  HR: 72 (2020 04:48) (72 - 85)  BP: 98/57 (2020 04:48) (98/57 - 109/66)  BP(mean): --  RR: 18 (2020 04:48) (16 - 18)  SpO2: 95% (2020 04:48) (95% - 98%)    PHYSICAL EXAM:  General: [x ] non-toxic  HEAD/EYES: [ ] PERRL [x ] white sclera [ ] icterus  ENT:  [ ] normal [x ] supple [ ] thrush [ ] pharyngeal exudate  Cardiovascular:   [ ] murmur [ x] normal [ ] PPM/AICD  Respiratory:  [ ]x clear to ausculation bilaterally  GI:  [x ] soft, non-tender, normal bowel sounds  :  [ ] fowler [ x] no CVA tenderness   Musculoskeletal:  [ ] no synovitis  Neurologic:  x[ ] non-focal exam   Skin:  [ x] no rash  Lymph: [ ] no lymphadenopathy  Psychiatric:  [ x] appropriate affect [ ] alert & oriented  Lines:  [x ] no phlebitis [ ] central line                                10.0   9.87  )-----------( 230      ( 2020 06:15 )             31.1       06-25    138  |  103  |  10  ----------------------------<  96  3.2<L>   |  26  |  0.74    Ca    8.6      2020 06:13    TPro  5.3<L>  /  Alb  2.6<L>  /  TBili  0.4  /  DBili  x   /  AST  7<L>  /  ALT  6<L>  /  AlkPhos  72        Urinalysis Basic - ( 2020 14:31 )    Color: Yellow / Appearance: Clear / S.023 / pH: x  Gluc: x / Ketone: Negative  / Bili: Negative / Urobili: Negative   Blood: x / Protein: 100 mg/dL / Nitrite: Positive   Leuk Esterase: Large / RBC: 5 /hpf /  /HPF   Sq Epi: x / Non Sq Epi: 0 /hpf / Bacteria: Moderate        MICROBIOLOGY:Culture Results:   No growth to date. (20 @ 18:17)  Culture Results:   >100,000 CFU/ml Gram Negative Rods  >100,000 CFU/ml Enterococcus species (20 @ 18:13)  Culture Results:   No growth to date. (20 @ 18:13)      RADIOLOGY:

## 2020-06-25 NOTE — PROGRESS NOTE ADULT - SUBJECTIVE AND OBJECTIVE BOX
Patient is a 75y old  Male who presents with a chief complaint of abdominal pain and diarrhea (25 Jun 2020 10:23)      INTERVAL HPI/OVERNIGHT EVENTS:  T(C): 36.7 (06-25-20 @ 14:57), Max: 36.7 (06-25-20 @ 04:48)  HR: 79 (06-25-20 @ 14:57) (72 - 85)  BP: 122/73 (06-25-20 @ 14:57) (98/57 - 122/73)  RR: 18 (06-25-20 @ 14:57) (16 - 18)  SpO2: 99% (06-25-20 @ 14:57) (95% - 99%)  Wt(kg): --  I&O's Summary    24 Jun 2020 07:01  -  25 Jun 2020 07:00  --------------------------------------------------------  IN: 2800 mL / OUT: 3200 mL / NET: -400 mL    25 Jun 2020 07:01  -  25 Jun 2020 20:50  --------------------------------------------------------  IN: 1075 mL / OUT: 2200 mL / NET: -1125 mL        PAST MEDICAL & SURGICAL HISTORY:  Urinary retention  H/O muscular dystrophy  Scrotal swelling  Psychiatric disorder  No significant past surgical history      SOCIAL HISTORY  Alcohol:  Tobacco:  Illicit substance use:    FAMILY HISTORY:    REVIEW OF SYSTEMS:  CONSTITUTIONAL: No fever, weight loss, or fatigue  EYES: No eye pain, visual disturbances, or discharge  ENMT:  No difficulty hearing, tinnitus, vertigo; No sinus or throat pain  NECK: No pain or stiffness  RESPIRATORY: No cough, wheezing, chills or hemoptysis; No shortness of breath  CARDIOVASCULAR: No chest pain, palpitations, dizziness, or leg swelling  GASTROINTESTINAL: No abdominal or epigastric pain. No nausea, vomiting, or hematemesis; No diarrhea or constipation. No melena or hematochezia.  GENITOURINARY: No dysuria, frequency, hematuria, or incontinence  NEUROLOGICAL: No headaches, memory loss, loss of strength, numbness, or tremors  SKIN: No itching, burning, rashes, or lesions   LYMPH NODES: No enlarged glands  ENDOCRINE: No heat or cold intolerance; No hair loss  MUSCULOSKELETAL: No joint pain or swelling; No muscle, back, or extremity pain  PSYCHIATRIC: No depression, anxiety, mood swings, or difficulty sleeping  HEME/LYMPH: No easy bruising, or bleeding gums  ALLERY AND IMMUNOLOGIC: No hives or eczema    RADIOLOGY & ADDITIONAL TESTS:    Imaging Personally Reviewed:  [ ] YES  [ ] NO    Consultant(s) Notes Reviewed:  [ ] YES  [ ] NO    PHYSICAL EXAM:  GENERAL: NAD, well-groomed, well-developed  HEAD:  Atraumatic, Normocephalic  EYES: EOMI, PERRLA, conjunctiva and sclera clear  ENMT: No tonsillar erythema, exudates, or enlargement; Moist mucous membranes, Good dentition, No lesions  NECK: Supple, No JVD, Normal thyroid  NERVOUS SYSTEM:  Alert & Oriented X3, Good concentration; Motor Strength 5/5 B/L upper and lower extremities; DTRs 2+ intact and symmetric  CHEST/LUNG: Clear to percussion bilaterally; No rales, rhonchi, wheezing, or rubs  HEART: Regular rate and rhythm; No murmurs, rubs, or gallops  ABDOMEN: Soft, Nontender, Nondistended; Bowel sounds present  EXTREMITIES:  2+ Peripheral Pulses, No clubbing, cyanosis, or edema  LYMPH: No lymphadenopathy noted  SKIN: No rashes or lesions    LABS:                        10.0   9.87  )-----------( 230      ( 25 Jun 2020 06:15 )             31.1     06-25    138  |  103  |  10  ----------------------------<  96  3.2<L>   |  26  |  0.74    Ca    8.6      25 Jun 2020 06:13    TPro  5.3<L>  /  Alb  2.6<L>  /  TBili  0.4  /  DBili  x   /  AST  7<L>  /  ALT  6<L>  /  AlkPhos  72  06-24        CAPILLARY BLOOD GLUCOSE                MEDICATIONS  (STANDING):  diazepam    Tablet 5 milliGRAM(s) Oral two times a day  famotidine    Tablet 20 milliGRAM(s) Oral daily  heparin   Injectable 5000 Unit(s) SubCutaneous every 12 hours  sodium chloride 0.9%. 1000 milliLiter(s) (75 mL/Hr) IV Continuous <Continuous>  tamsulosin 0.8 milliGRAM(s) Oral at bedtime  vancomycin    Solution 125 milliGRAM(s) Oral every 6 hours    MEDICATIONS  (PRN):  acetaminophen   Tablet .. 650 milliGRAM(s) Oral every 6 hours PRN Temp greater or equal to 38C (100.4F), Moderate Pain (4 - 6)      Care Discussed with Consultants/Other Providers [ ] YES  [ ] NO Patient is a 75y old  Male who presents with a chief complaint of abdominal pain and diarrhea (25 Jun 2020 10:23)    pt. seen and examined , diarrhea better     INTERVAL HPI/OVERNIGHT EVENTS:  T(C): 36.7 (06-25-20 @ 14:57), Max: 36.7 (06-25-20 @ 04:48)  HR: 79 (06-25-20 @ 14:57) (72 - 85)  BP: 122/73 (06-25-20 @ 14:57) (98/57 - 122/73)  RR: 18 (06-25-20 @ 14:57) (16 - 18)  SpO2: 99% (06-25-20 @ 14:57) (95% - 99%)  Wt(kg): --  I&O's Summary    24 Jun 2020 07:01  -  25 Jun 2020 07:00  --------------------------------------------------------  IN: 2800 mL / OUT: 3200 mL / NET: -400 mL    25 Jun 2020 07:01  -  25 Jun 2020 20:50  --------------------------------------------------------  IN: 1075 mL / OUT: 2200 mL / NET: -1125 mL        PAST MEDICAL & SURGICAL HISTORY:  Urinary retention  H/O muscular dystrophy  Scrotal swelling  Psychiatric disorder  No significant past surgical history      SOCIAL HISTORY  Alcohol:  Tobacco:  Illicit substance use:    FAMILY HISTORY:    REVIEW OF SYSTEMS:  CONSTITUTIONAL: No fever, weight loss, or fatigue  EYES: No eye pain, visual disturbances, or discharge  ENMT:  No difficulty hearing, tinnitus, vertigo; No sinus or throat pain  NECK: No pain or stiffness  RESPIRATORY: No cough, wheezing, chills or hemoptysis; No shortness of breath  CARDIOVASCULAR: No chest pain, palpitations, dizziness, or leg swelling  GASTROINTESTINAL: No abdominal or epigastric pain. No nausea, vomiting, or hematemesis; No diarrhea or constipation. No melena or hematochezia.  GENITOURINARY: No dysuria, frequency, hematuria, or incontinence  NEUROLOGICAL: No headaches, memory loss, loss of strength, numbness, or tremors  SKIN: No itching, burning, rashes, or lesions   LYMPH NODES: No enlarged glands  ENDOCRINE: No heat or cold intolerance; No hair loss  MUSCULOSKELETAL: No joint pain or swelling; No muscle, back, or extremity pain  PSYCHIATRIC: No depression, anxiety, mood swings, or difficulty sleeping  HEME/LYMPH: No easy bruising, or bleeding gums  ALLERY AND IMMUNOLOGIC: No hives or eczema    RADIOLOGY & ADDITIONAL TESTS:    Imaging Personally Reviewed:  [ ] YES  [ ] NO    Consultant(s) Notes Reviewed:  [ ] YES  [ ] NO    PHYSICAL EXAM:  GENERAL: NAD, well-groomed, well-developed  HEAD:  Atraumatic, Normocephalic  EYES: EOMI, PERRLA, conjunctiva and sclera clear  ENMT: No tonsillar erythema, exudates, or enlargement; Moist mucous membranes, Good dentition, No lesions  NECK: Supple, No JVD, Normal thyroid  NERVOUS SYSTEM:  Alert & Oriented X3, Good concentration; Motor Strength 5/5 B/L upper and lower extremities; DTRs 2+ intact and symmetric  CHEST/LUNG: Clear to percussion bilaterally; No rales, rhonchi, wheezing, or rubs  HEART: Regular rate and rhythm; No murmurs, rubs, or gallops  ABDOMEN: Soft, Nontender, Nondistended; Bowel sounds present  EXTREMITIES:  2+ Peripheral Pulses, No clubbing, cyanosis, or edema  LYMPH: No lymphadenopathy noted  SKIN: No rashes or lesions    LABS:                        10.0   9.87  )-----------( 230      ( 25 Jun 2020 06:15 )             31.1     06-25    138  |  103  |  10  ----------------------------<  96  3.2<L>   |  26  |  0.74    Ca    8.6      25 Jun 2020 06:13    TPro  5.3<L>  /  Alb  2.6<L>  /  TBili  0.4  /  DBili  x   /  AST  7<L>  /  ALT  6<L>  /  AlkPhos  72  06-24        CAPILLARY BLOOD GLUCOSE                MEDICATIONS  (STANDING):  diazepam    Tablet 5 milliGRAM(s) Oral two times a day  famotidine    Tablet 20 milliGRAM(s) Oral daily  heparin   Injectable 5000 Unit(s) SubCutaneous every 12 hours  sodium chloride 0.9%. 1000 milliLiter(s) (75 mL/Hr) IV Continuous <Continuous>  tamsulosin 0.8 milliGRAM(s) Oral at bedtime  vancomycin    Solution 125 milliGRAM(s) Oral every 6 hours    MEDICATIONS  (PRN):  acetaminophen   Tablet .. 650 milliGRAM(s) Oral every 6 hours PRN Temp greater or equal to 38C (100.4F), Moderate Pain (4 - 6)      Care Discussed with Consultants/Other Providers [ ] YES  [ ] NO

## 2020-06-25 NOTE — PHYSICAL THERAPY INITIAL EVALUATION ADULT - PASSIVE RANGE OF MOTION EXAMINATION, REHAB EVAL
bilateral upper extremity Passive ROM was WFL (within functional limits)/no Passive ROM deficits were identified/bilateral lower extremity Passive ROM was WFL (within functional limits)

## 2020-06-25 NOTE — PROGRESS NOTE ADULT - ASSESSMENT
75 year old with muscular dystrophy , recent MSSA bacteremia and COVID, presnets with diarrhea.,    1) C diff  Recurrent    Continue po vancomycin 125 mg po q 6 for 4 weeks  then vancoymcin 125 mg po q12 for 1 one  then vancoymcin 125 mg po daily for 1 week  then vancoymcin 125 mg po every other day for one week         2) Leukocytosis  Liely due to C diff   Recent MSSa infection  Blood cultures without growth to date.    Monitor    3) Chronic Spencer  Outpt urology follow up    Urine culture is polymicrobial- this is more suggestive of colonization.  His clinical presentation of diarrhea with fever is suggestive fo C diff as cause of his sepsis presentation.    He is at risk for UTI- but treating now would not prevent that risk and would risk worsening his C diff. 75 year old with muscular dystrophy , recent MSSA bacteremia and COVID presnets with diarrhea.,    1) C diff  Recurrent    Continue po vancomycin 125 mg po q 6 for 4 weeks  then vancoymcin 125 mg po q12 for 1 one  then vancoymcin 125 mg po daily for 1 week  then vancoymcin 125 mg po every other day for one week       2) Leukocytosis  Liely due to C diff   Recent MSSa infection  Blood cultures with coag neg staph in one bottle - likely contaminant      3) Chronic Spencer  Outpt urology follow up    Urine culture is polymicrobial- this is more suggestive of colonization.  His clinical presentation of diarrhea with fever is suggestive fo C diff as cause of his sepsis presentation.    He is at risk for UTI- but treating now would not prevent that risk and would risk worsening his C diff.

## 2020-06-25 NOTE — PHYSICAL THERAPY INITIAL EVALUATION ADULT - PERTINENT HX OF CURRENT PROBLEM, REHAB EVAL
Pt is a 76 y/o M with PMH of muscular dystrophy, COVID, & c-diff. He presented to [facility] with 2 days of fever, diarrhea and UTI. Denied cough or SOB. Endorsed cramping abdominal pain that improves after bowel movements. Pt has had several admission to hospital and has been diagnosed with cdiff x 2 in recent past. Diarrhea started 2 days ago and it is watery. He came from [facility].

## 2020-06-25 NOTE — PHYSICAL THERAPY INITIAL EVALUATION ADULT - PLANNED THERAPY INTERVENTIONS, PT EVAL
transfer training/1. GOAL: Pt will be able to negotiate 10 steps +HR independently with reciprocal pattern in 3 weeks./gait training/bed mobility training

## 2020-06-25 NOTE — CHART NOTE - NSCHARTNOTEFT_GEN_A_CORE
Informed by nursing of BCx result of gram positive cocci in cluster.     Gram stain revealed - coag neg staph  Likely contaminant    Will repeat Bcx.    Dr. Nelson informed    Valery Holm  Dept of Medicine  [id number]

## 2020-06-25 NOTE — PHYSICAL THERAPY INITIAL EVALUATION ADULT - ADDITIONAL COMMENTS
Pt admitted from [facility]. He lives alone in a private home (has not been there since beginning of April). There are 3 steps to enter and another 10 to the upstairs bedroom. Pt ambulates straight cane or rolling walker (owns both). Also owns shower chair, commode, and wheelchair.

## 2020-06-26 LAB
ANION GAP SERPL CALC-SCNC: 9 MMOL/L — SIGNIFICANT CHANGE UP (ref 5–17)
BUN SERPL-MCNC: 5 MG/DL — LOW (ref 7–23)
CALCIUM SERPL-MCNC: 8.8 MG/DL — SIGNIFICANT CHANGE UP (ref 8.4–10.5)
CHLORIDE SERPL-SCNC: 103 MMOL/L — SIGNIFICANT CHANGE UP (ref 96–108)
CO2 SERPL-SCNC: 28 MMOL/L — SIGNIFICANT CHANGE UP (ref 22–31)
CREAT SERPL-MCNC: 0.66 MG/DL — SIGNIFICANT CHANGE UP (ref 0.5–1.3)
CULTURE RESULTS: SIGNIFICANT CHANGE UP
GLUCOSE SERPL-MCNC: 90 MG/DL — SIGNIFICANT CHANGE UP (ref 70–99)
HCT VFR BLD CALC: 33.4 % — LOW (ref 39–50)
HGB BLD-MCNC: 10.9 G/DL — LOW (ref 13–17)
MAGNESIUM SERPL-MCNC: 2 MG/DL — SIGNIFICANT CHANGE UP (ref 1.6–2.6)
MCHC RBC-ENTMCNC: 32.2 PG — SIGNIFICANT CHANGE UP (ref 27–34)
MCHC RBC-ENTMCNC: 32.6 GM/DL — SIGNIFICANT CHANGE UP (ref 32–36)
MCV RBC AUTO: 98.5 FL — SIGNIFICANT CHANGE UP (ref 80–100)
NRBC # BLD: 0 /100 WBCS — SIGNIFICANT CHANGE UP (ref 0–0)
ORGANISM # SPEC MICROSCOPIC CNT: SIGNIFICANT CHANGE UP
ORGANISM # SPEC MICROSCOPIC CNT: SIGNIFICANT CHANGE UP
PLATELET # BLD AUTO: 256 K/UL — SIGNIFICANT CHANGE UP (ref 150–400)
POTASSIUM SERPL-MCNC: 3.7 MMOL/L — SIGNIFICANT CHANGE UP (ref 3.5–5.3)
POTASSIUM SERPL-SCNC: 3.7 MMOL/L — SIGNIFICANT CHANGE UP (ref 3.5–5.3)
RBC # BLD: 3.39 M/UL — LOW (ref 4.2–5.8)
RBC # FLD: 13.6 % — SIGNIFICANT CHANGE UP (ref 10.3–14.5)
SODIUM SERPL-SCNC: 140 MMOL/L — SIGNIFICANT CHANGE UP (ref 135–145)
SPECIMEN SOURCE: SIGNIFICANT CHANGE UP
WBC # BLD: 7.49 K/UL — SIGNIFICANT CHANGE UP (ref 3.8–10.5)
WBC # FLD AUTO: 7.49 K/UL — SIGNIFICANT CHANGE UP (ref 3.8–10.5)

## 2020-06-26 RX ADMIN — Medication 125 MILLIGRAM(S): at 22:25

## 2020-06-26 RX ADMIN — HEPARIN SODIUM 5000 UNIT(S): 5000 INJECTION INTRAVENOUS; SUBCUTANEOUS at 05:45

## 2020-06-26 RX ADMIN — TAMSULOSIN HYDROCHLORIDE 0.8 MILLIGRAM(S): 0.4 CAPSULE ORAL at 22:25

## 2020-06-26 RX ADMIN — Medication 125 MILLIGRAM(S): at 05:51

## 2020-06-26 RX ADMIN — Medication 125 MILLIGRAM(S): at 17:18

## 2020-06-26 RX ADMIN — FAMOTIDINE 20 MILLIGRAM(S): 10 INJECTION INTRAVENOUS at 10:22

## 2020-06-26 RX ADMIN — Medication 5 MILLIGRAM(S): at 17:18

## 2020-06-26 RX ADMIN — SODIUM CHLORIDE 75 MILLILITER(S): 9 INJECTION INTRAMUSCULAR; INTRAVENOUS; SUBCUTANEOUS at 22:24

## 2020-06-26 RX ADMIN — Medication 125 MILLIGRAM(S): at 10:22

## 2020-06-26 RX ADMIN — Medication 125 MILLIGRAM(S): at 00:24

## 2020-06-26 RX ADMIN — Medication 5 MILLIGRAM(S): at 05:45

## 2020-06-26 NOTE — PROGRESS NOTE ADULT - SUBJECTIVE AND OBJECTIVE BOX
Patient is a 75y old  Male who presents with a chief complaint of abdominal pain and diarrhea (25 Jun 2020 20:50)    pt. seen and examined, clinically better   diarrhea improving  INTERVAL HPI/OVERNIGHT EVENTS:  T(C): 36.7 (06-26-20 @ 21:08), Max: 36.9 (06-26-20 @ 12:55)  HR: 83 (06-26-20 @ 21:08) (72 - 94)  BP: 104/60 (06-26-20 @ 21:08) (104/60 - 127/79)  RR: 19 (06-26-20 @ 21:08) (18 - 19)  SpO2: 96% (06-26-20 @ 21:08) (96% - 100%)  Wt(kg): --  I&O's Summary    25 Jun 2020 07:01  -  26 Jun 2020 07:00  --------------------------------------------------------  IN: 2695 mL / OUT: 4250 mL / NET: -1555 mL    26 Jun 2020 07:01  -  26 Jun 2020 21:39  --------------------------------------------------------  IN: 2040 mL / OUT: 2500 mL / NET: -460 mL        PAST MEDICAL & SURGICAL HISTORY:  Urinary retention  H/O muscular dystrophy  Scrotal swelling  Psychiatric disorder  No significant past surgical history      SOCIAL HISTORY  Alcohol:  Tobacco:  Illicit substance use:    FAMILY HISTORY:    REVIEW OF SYSTEMS:  CONSTITUTIONAL: No fever, weight loss, or fatigue  EYES: No eye pain, visual disturbances, or discharge  ENMT:  No difficulty hearing, tinnitus, vertigo; No sinus or throat pain  NECK: No pain or stiffness  RESPIRATORY: No cough, wheezing, chills or hemoptysis; No shortness of breath  CARDIOVASCULAR: No chest pain, palpitations, dizziness, or leg swelling  GASTROINTESTINAL: No abdominal or epigastric pain. No nausea, vomiting, or hematemesis; No diarrhea or constipation. No melena or hematochezia.  GENITOURINARY: No dysuria, frequency, hematuria, or incontinence  NEUROLOGICAL: No headaches, memory loss, loss of strength, numbness, or tremors  SKIN: No itching, burning, rashes, or lesions   LYMPH NODES: No enlarged glands  ENDOCRINE: No heat or cold intolerance; No hair loss  MUSCULOSKELETAL: No joint pain or swelling; No muscle, back, or extremity pain  PSYCHIATRIC: No depression, anxiety, mood swings, or difficulty sleeping  HEME/LYMPH: No easy bruising, or bleeding gums  ALLERY AND IMMUNOLOGIC: No hives or eczema    RADIOLOGY & ADDITIONAL TESTS:    Imaging Personally Reviewed:  [ ] YES  [ ] NO    Consultant(s) Notes Reviewed:  [ ] YES  [ ] NO    PHYSICAL EXAM:  GENERAL: NAD, well-groomed, well-developed  HEAD:  Atraumatic, Normocephalic  EYES: EOMI, PERRLA, conjunctiva and sclera clear  ENMT: No tonsillar erythema, exudates, or enlargement; Moist mucous membranes, Good dentition, No lesions  NECK: Supple, No JVD, Normal thyroid  NERVOUS SYSTEM:  Alert & Oriented X3, Good concentration; Motor Strength 5/5 B/L upper and lower extremities; DTRs 2+ intact and symmetric  CHEST/LUNG: Clear to percussion bilaterally; No rales, rhonchi, wheezing, or rubs  HEART: Regular rate and rhythm; No murmurs, rubs, or gallops  ABDOMEN: Soft, Nontender, Nondistended; Bowel sounds present  EXTREMITIES:  2+ Peripheral Pulses, No clubbing, cyanosis, or edema  LYMPH: No lymphadenopathy noted  SKIN: No rashes or lesions    LABS:                        10.9   7.49  )-----------( 256      ( 26 Jun 2020 06:42 )             33.4     06-26    140  |  103  |  5<L>  ----------------------------<  90  3.7   |  28  |  0.66    Ca    8.8      26 Jun 2020 06:42  Mg     2.0     06-26          CAPILLARY BLOOD GLUCOSE                MEDICATIONS  (STANDING):  diazepam    Tablet 5 milliGRAM(s) Oral two times a day  famotidine    Tablet 20 milliGRAM(s) Oral daily  heparin   Injectable 5000 Unit(s) SubCutaneous every 12 hours  sodium chloride 0.9%. 1000 milliLiter(s) (75 mL/Hr) IV Continuous <Continuous>  tamsulosin 0.8 milliGRAM(s) Oral at bedtime  vancomycin    Solution 125 milliGRAM(s) Oral every 6 hours    MEDICATIONS  (PRN):  acetaminophen   Tablet .. 650 milliGRAM(s) Oral every 6 hours PRN Temp greater or equal to 38C (100.4F), Moderate Pain (4 - 6)      Care Discussed with Consultants/Other Providers [ ] YES  [ ] NO

## 2020-06-26 NOTE — PROVIDER CONTACT NOTE (CRITICAL VALUE NOTIFICATION) - SITUATION
Urine culture >100,000 pseudinomas aeruginosa and Enterococcus [place]
Blood culture preliminary report: gram+ cocci in clusters

## 2020-06-27 LAB
ANION GAP SERPL CALC-SCNC: 9 MMOL/L — SIGNIFICANT CHANGE UP (ref 5–17)
BUN SERPL-MCNC: 7 MG/DL — SIGNIFICANT CHANGE UP (ref 7–23)
CALCIUM SERPL-MCNC: 8.8 MG/DL — SIGNIFICANT CHANGE UP (ref 8.4–10.5)
CHLORIDE SERPL-SCNC: 104 MMOL/L — SIGNIFICANT CHANGE UP (ref 96–108)
CO2 SERPL-SCNC: 28 MMOL/L — SIGNIFICANT CHANGE UP (ref 22–31)
CREAT SERPL-MCNC: 0.77 MG/DL — SIGNIFICANT CHANGE UP (ref 0.5–1.3)
GLUCOSE SERPL-MCNC: 96 MG/DL — SIGNIFICANT CHANGE UP (ref 70–99)
HCT VFR BLD CALC: 32.1 % — LOW (ref 39–50)
HGB BLD-MCNC: 10.2 G/DL — LOW (ref 13–17)
MCHC RBC-ENTMCNC: 31.7 PG — SIGNIFICANT CHANGE UP (ref 27–34)
MCHC RBC-ENTMCNC: 31.8 GM/DL — LOW (ref 32–36)
MCV RBC AUTO: 99.7 FL — SIGNIFICANT CHANGE UP (ref 80–100)
NRBC # BLD: 0 /100 WBCS — SIGNIFICANT CHANGE UP (ref 0–0)
PLATELET # BLD AUTO: 257 K/UL — SIGNIFICANT CHANGE UP (ref 150–400)
POTASSIUM SERPL-MCNC: 3.7 MMOL/L — SIGNIFICANT CHANGE UP (ref 3.5–5.3)
POTASSIUM SERPL-SCNC: 3.7 MMOL/L — SIGNIFICANT CHANGE UP (ref 3.5–5.3)
RBC # BLD: 3.22 M/UL — LOW (ref 4.2–5.8)
RBC # FLD: 13.6 % — SIGNIFICANT CHANGE UP (ref 10.3–14.5)
SODIUM SERPL-SCNC: 141 MMOL/L — SIGNIFICANT CHANGE UP (ref 135–145)
WBC # BLD: 7.56 K/UL — SIGNIFICANT CHANGE UP (ref 3.8–10.5)
WBC # FLD AUTO: 7.56 K/UL — SIGNIFICANT CHANGE UP (ref 3.8–10.5)

## 2020-06-27 RX ADMIN — Medication 5 MILLIGRAM(S): at 06:08

## 2020-06-27 RX ADMIN — Medication 125 MILLIGRAM(S): at 18:22

## 2020-06-27 RX ADMIN — Medication 125 MILLIGRAM(S): at 11:25

## 2020-06-27 RX ADMIN — Medication 5 MILLIGRAM(S): at 18:22

## 2020-06-27 RX ADMIN — Medication 125 MILLIGRAM(S): at 23:01

## 2020-06-27 RX ADMIN — SODIUM CHLORIDE 75 MILLILITER(S): 9 INJECTION INTRAMUSCULAR; INTRAVENOUS; SUBCUTANEOUS at 20:15

## 2020-06-27 RX ADMIN — TAMSULOSIN HYDROCHLORIDE 0.8 MILLIGRAM(S): 0.4 CAPSULE ORAL at 23:01

## 2020-06-27 RX ADMIN — SODIUM CHLORIDE 75 MILLILITER(S): 9 INJECTION INTRAMUSCULAR; INTRAVENOUS; SUBCUTANEOUS at 06:07

## 2020-06-27 RX ADMIN — Medication 125 MILLIGRAM(S): at 06:07

## 2020-06-27 RX ADMIN — FAMOTIDINE 20 MILLIGRAM(S): 10 INJECTION INTRAVENOUS at 11:25

## 2020-06-27 NOTE — CHART NOTE - NSCHARTNOTEFT_GEN_A_CORE
Upon Nutritional Assessment by the Registered Dietitian your patient was determined to meet criteria / has evidence of the following diagnosis/diagnoses:          [ ]  Mild Protein Calorie Malnutrition        [ ]  Moderate Protein Calorie Malnutrition        [x ] Severe Protein Calorie Malnutrition        [ ] Unspecified Protein Calorie Malnutrition        [ ] Underweight / BMI <19        [ ] Morbid Obesity / BMI > 40      Findings as based on:  [ x] Comprehensive nutrition assessment   [ x] Nutrition Focused Physical Exam: moderate-severe muscle mass and body fat loss  [ ] Other:       Nutrition Plan/Recommendations:  Continue diet as ordered, pt declined snacks/supplements, provided high calorie, high protein nutrition therapy education        PROVIDER Section:     By signing this assessment you are acknowledging and agree with the diagnosis/diagnoses assigned by the Registered Dietitian    Comments:

## 2020-06-27 NOTE — DIETITIAN INITIAL EVALUATION ADULT. - OTHER INFO
Diet PTA: Pt reports appetite had been stable while at rehab but stated he was often given large portions of food which he could not finish, pt stated he was able to eat >50% usually and was ordered for TwoCal supplements (since discontinued per pt request), geoff 2 packets daily and liquid protein supplement 4 times per day which pt stated interfered with his appetite. Pt ordered for a regular diet at rehab, no formal dietary restrictions. Pt previously receiving tube feeding via PEG but stated this has since been discontinued as of 11/2019 and PEG removed.    Weight: Pt reports history of weight loss within the past 1.5 years from  lbs-> low of 118 lbs, able to gain weight to previous admission weight 137 lbs (11/2019), noted outpatient weight 2/24/20 138 lbs. Pt seems to have lost 10 lbs (7%) within the past 4 months to last noted rehab weight 128 lbs (no date noted), pt stated he last recalls weight of 124.8 lbs. Noted weights in house 119.9 lbs (6/25), recent weight 114.6 lbs (6/27), ? accuracy of in house weights, obtain new weight via zeroed bed scale vs standing scale as able.     Pt with no food allergies, noted to be taking vitamin C at rehab as well as probiotic. No N+V, pt admitted with diarrhea-pt stated previously he had no control with BMs, stated this has improved in house-noted last BM yesterday. Pt with history of dysphagia S/P swallowing therapy, now back to regular diet and pt denies difficulty chewing/swallowing currently.     Diet education: RD reviewed high calorie, high protein nutrition therapy with overall goal for weight gain, discussed consuming smaller more frequent meals with nutrient dense snacks in between and emphasis on protein containing foods, adding additional fat to meals as appropriate.

## 2020-06-27 NOTE — DIETITIAN INITIAL EVALUATION ADULT. - REASON INDICATOR FOR ASSESSMENT
Pt seen for nutrition consult for assessment. Pt with PMH including muscular dystrophy though pt denies this, AYSHA HURSTdiff admitted with 2 days fever, diarrhea, and UTI. Pt seen for nutrition consult for assessment. Pt with PMH including muscular dystrophy though pt denies this, NATALI, C.diff admitted with 2 days fever, diarrhea, and UTI. C.diff positive

## 2020-06-27 NOTE — DIETITIAN INITIAL EVALUATION ADULT. - ENERGY INTAKE
Good (>75%)/Pt reports eating well with good appetite for dinner last night and breakfast this morning RD observed tray 100% consumed, pt generally noted with 100% po intake per flowsheets, noted pt had been NPO/clear liquid diet for 2 days

## 2020-06-27 NOTE — DIETITIAN INITIAL EVALUATION ADULT. - ADD RECOMMEND
1. Continue to encourage po intake and obtain/honor food preferences as able, RD reviewed nutrient dense snacks available in house-declined at this time, 2. Monitor PO intake, diet tolerance, weight trends, labs, and skin integrity, 3. Malnutrition alert placed in chart, medical team informed of need to sign

## 2020-06-27 NOTE — PROGRESS NOTE ADULT - SUBJECTIVE AND OBJECTIVE BOX
Patient is a 75y old  Male who presents with a chief complaint of abdominal pain and diarrhea (26 Jun 2020 21:39)    pt. seen and examined     INTERVAL HPI/OVERNIGHT EVENTS:  T(C): 36.6 (06-27-20 @ 04:55), Max: 36.7 (06-26-20 @ 21:08)  HR: 59 (06-27-20 @ 04:55) (59 - 83)  BP: 125/69 (06-27-20 @ 04:55) (104/60 - 125/69)  RR: 18 (06-27-20 @ 04:55) (18 - 19)  SpO2: 98% (06-27-20 @ 04:55) (96% - 98%)  Wt(kg): --  I&O's Summary    26 Jun 2020 07:01  -  27 Jun 2020 07:00  --------------------------------------------------------  IN: 2940 mL / OUT: 2500 mL / NET: 440 mL    27 Jun 2020 07:01  -  27 Jun 2020 21:01  --------------------------------------------------------  IN: 1191 mL / OUT: 1400 mL / NET: -209 mL        PAST MEDICAL & SURGICAL HISTORY:  Urinary retention  H/O muscular dystrophy  Scrotal swelling  Psychiatric disorder  No significant past surgical history      SOCIAL HISTORY  Alcohol:  Tobacco:  Illicit substance use:    FAMILY HISTORY:    REVIEW OF SYSTEMS:  CONSTITUTIONAL: No fever, weight loss, or fatigue  EYES: No eye pain, visual disturbances, or discharge  ENMT:  No difficulty hearing, tinnitus, vertigo; No sinus or throat pain  NECK: No pain or stiffness  RESPIRATORY: No cough, wheezing, chills or hemoptysis; No shortness of breath  CARDIOVASCULAR: No chest pain, palpitations, dizziness, or leg swelling  GASTROINTESTINAL: No abdominal or epigastric pain. No nausea, vomiting, or hematemesis; No diarrhea or constipation. No melena or hematochezia.  GENITOURINARY: No dysuria, frequency, hematuria, or incontinence  NEUROLOGICAL: No headaches, memory loss, loss of strength, numbness, or tremors  SKIN: No itching, burning, rashes, or lesions   LYMPH NODES: No enlarged glands  ENDOCRINE: No heat or cold intolerance; No hair loss  MUSCULOSKELETAL: No joint pain or swelling; No muscle, back, or extremity pain  PSYCHIATRIC: No depression, anxiety, mood swings, or difficulty sleeping  HEME/LYMPH: No easy bruising, or bleeding gums  ALLERY AND IMMUNOLOGIC: No hives or eczema    RADIOLOGY & ADDITIONAL TESTS:    Imaging Personally Reviewed:  [ ] YES  [ ] NO    Consultant(s) Notes Reviewed:  [ ] YES  [ ] NO    PHYSICAL EXAM:  GENERAL: NAD, well-groomed, well-developed  HEAD:  Atraumatic, Normocephalic  EYES: EOMI, PERRLA, conjunctiva and sclera clear  ENMT: No tonsillar erythema, exudates, or enlargement; Moist mucous membranes, Good dentition, No lesions  NECK: Supple, No JVD, Normal thyroid  NERVOUS SYSTEM:  Alert & Oriented X3, Good concentration; Motor Strength 5/5 B/L upper and lower extremities; DTRs 2+ intact and symmetric  CHEST/LUNG: Clear to percussion bilaterally; No rales, rhonchi, wheezing, or rubs  HEART: Regular rate and rhythm; No murmurs, rubs, or gallops  ABDOMEN: Soft, Nontender, Nondistended; Bowel sounds present  EXTREMITIES:  2+ Peripheral Pulses, No clubbing, cyanosis, or edema  LYMPH: No lymphadenopathy noted  SKIN: No rashes or lesions    LABS:                        10.2   7.56  )-----------( 257      ( 27 Jun 2020 07:10 )             32.1     06-27    141  |  104  |  7   ----------------------------<  96  3.7   |  28  |  0.77    Ca    8.8      27 Jun 2020 07:10  Mg     2.0     06-26          CAPILLARY BLOOD GLUCOSE                MEDICATIONS  (STANDING):  diazepam    Tablet 5 milliGRAM(s) Oral two times a day  famotidine    Tablet 20 milliGRAM(s) Oral daily  heparin   Injectable 5000 Unit(s) SubCutaneous every 12 hours  sodium chloride 0.9%. 1000 milliLiter(s) (75 mL/Hr) IV Continuous <Continuous>  tamsulosin 0.8 milliGRAM(s) Oral at bedtime  vancomycin    Solution 125 milliGRAM(s) Oral every 6 hours    MEDICATIONS  (PRN):  acetaminophen   Tablet .. 650 milliGRAM(s) Oral every 6 hours PRN Temp greater or equal to 38C (100.4F), Moderate Pain (4 - 6)      Care Discussed with Consultants/Other Providers [ ] YES  [ ] NO

## 2020-06-27 NOTE — DIETITIAN INITIAL EVALUATION ADULT. - OBTAIN CURRENT WEIGHT
March 10, 2017      Km Ventura MD  [facility]  [place] [address]           [facility]  [address] [place] [address]  [place] [address]  Phone: [phone]          Patient: Kaiden Moreno   MR Number: [id number]   YOB: 1945   Date of Visit: 3/10/2017       Dear Dr. Km Ventura:    Thank you for referring Kaiden Moreno to me for evaluation. Attached you will find relevant portions of my assessment and plan of care.    If you have questions, please do not hesitate to call me. I look forward to following Kaiden Moreno along with you.    Sincerely,    Avni Lang MD    Enclosure  CC:  No Recipients    If you would like to receive this communication electronically, please contact [email or web address] or [phone] to request more information on [employer] access.    For providers and/or their staff who would like to refer a patient to [facility], please contact us through our one-stop-shop provider referral line, Dagoberto Avila, at [phone].    If you feel you have received this communication in error or would no longer like to receive these types of communications, please e-mail [email or web address]          Via zeroed bed scale or standing scale if able/yes

## 2020-06-28 LAB
CULTURE RESULTS: SIGNIFICANT CHANGE UP
SPECIMEN SOURCE: SIGNIFICANT CHANGE UP

## 2020-06-28 RX ADMIN — Medication 125 MILLIGRAM(S): at 17:59

## 2020-06-28 RX ADMIN — Medication 125 MILLIGRAM(S): at 05:14

## 2020-06-28 RX ADMIN — SODIUM CHLORIDE 75 MILLILITER(S): 9 INJECTION INTRAMUSCULAR; INTRAVENOUS; SUBCUTANEOUS at 20:19

## 2020-06-28 RX ADMIN — FAMOTIDINE 20 MILLIGRAM(S): 10 INJECTION INTRAVENOUS at 13:18

## 2020-06-28 RX ADMIN — TAMSULOSIN HYDROCHLORIDE 0.8 MILLIGRAM(S): 0.4 CAPSULE ORAL at 23:06

## 2020-06-28 RX ADMIN — Medication 5 MILLIGRAM(S): at 17:59

## 2020-06-28 RX ADMIN — Medication 125 MILLIGRAM(S): at 13:18

## 2020-06-28 RX ADMIN — Medication 125 MILLIGRAM(S): at 23:06

## 2020-06-28 RX ADMIN — Medication 5 MILLIGRAM(S): at 05:14

## 2020-06-28 NOTE — PROGRESS NOTE ADULT - SUBJECTIVE AND OBJECTIVE BOX
Patient is a 75y old  Male who presents with a chief complaint of abdominal pain and diarrhea (27 Jun 2020 21:01)      INTERVAL HPI/OVERNIGHT EVENTS:  T(C): 36.7 (06-28-20 @ 05:00), Max: 36.8 (06-27-20 @ 21:07)  HR: 60 (06-28-20 @ 05:00) (60 - 78)  BP: 124/69 (06-28-20 @ 05:00) (120/65 - 124/69)  RR: 18 (06-28-20 @ 05:00) (18 - 18)  SpO2: 97% (06-28-20 @ 05:00) (97% - 97%)  Wt(kg): --  I&O's Summary    27 Jun 2020 07:01  -  28 Jun 2020 07:00  --------------------------------------------------------  IN: 2091 mL / OUT: 4620 mL / NET: -2529 mL    28 Jun 2020 07:01  -  28 Jun 2020 17:22  --------------------------------------------------------  IN: 600 mL / OUT: 900 mL / NET: -300 mL        PAST MEDICAL & SURGICAL HISTORY:  Urinary retention  H/O muscular dystrophy  Scrotal swelling  Psychiatric disorder  No significant past surgical history      SOCIAL HISTORY  Alcohol:  Tobacco:  Illicit substance use:    FAMILY HISTORY:    REVIEW OF SYSTEMS:  CONSTITUTIONAL: No fever, weight loss, or fatigue  EYES: No eye pain, visual disturbances, or discharge  ENMT:  No difficulty hearing, tinnitus, vertigo; No sinus or throat pain  NECK: No pain or stiffness  RESPIRATORY: No cough, wheezing, chills or hemoptysis; No shortness of breath  CARDIOVASCULAR: No chest pain, palpitations, dizziness, or leg swelling  GASTROINTESTINAL: No abdominal or epigastric pain. No nausea, vomiting, or hematemesis; No diarrhea or constipation. No melena or hematochezia.  GENITOURINARY: No dysuria, frequency, hematuria, or incontinence  NEUROLOGICAL: No headaches, memory loss, loss of strength, numbness, or tremors  SKIN: No itching, burning, rashes, or lesions   LYMPH NODES: No enlarged glands  ENDOCRINE: No heat or cold intolerance; No hair loss  MUSCULOSKELETAL: No joint pain or swelling; No muscle, back, or extremity pain  PSYCHIATRIC: No depression, anxiety, mood swings, or difficulty sleeping  HEME/LYMPH: No easy bruising, or bleeding gums  ALLERY AND IMMUNOLOGIC: No hives or eczema    RADIOLOGY & ADDITIONAL TESTS:    Imaging Personally Reviewed:  [ ] YES  [ ] NO    Consultant(s) Notes Reviewed:  [ ] YES  [ ] NO    PHYSICAL EXAM:  GENERAL: NAD, well-groomed, well-developed  HEAD:  Atraumatic, Normocephalic  EYES: EOMI, PERRLA, conjunctiva and sclera clear  ENMT: No tonsillar erythema, exudates, or enlargement; Moist mucous membranes, Good dentition, No lesions  NECK: Supple, No JVD, Normal thyroid  NERVOUS SYSTEM:  Alert & Oriented X3, Good concentration; Motor Strength 5/5 B/L upper and lower extremities; DTRs 2+ intact and symmetric  CHEST/LUNG: Clear to percussion bilaterally; No rales, rhonchi, wheezing, or rubs  HEART: Regular rate and rhythm; No murmurs, rubs, or gallops  ABDOMEN: Soft, Nontender, Nondistended; Bowel sounds present  EXTREMITIES:  2+ Peripheral Pulses, No clubbing, cyanosis, or edema  LYMPH: No lymphadenopathy noted  SKIN: No rashes or lesions    LABS:                        10.2   7.56  )-----------( 257      ( 27 Jun 2020 07:10 )             32.1     06-27    141  |  104  |  7   ----------------------------<  96  3.7   |  28  |  0.77    Ca    8.8      27 Jun 2020 07:10          CAPILLARY BLOOD GLUCOSE                MEDICATIONS  (STANDING):  diazepam    Tablet 5 milliGRAM(s) Oral two times a day  famotidine    Tablet 20 milliGRAM(s) Oral daily  heparin   Injectable 5000 Unit(s) SubCutaneous every 12 hours  sodium chloride 0.9%. 1000 milliLiter(s) (75 mL/Hr) IV Continuous <Continuous>  tamsulosin 0.8 milliGRAM(s) Oral at bedtime  vancomycin    Solution 125 milliGRAM(s) Oral every 6 hours    MEDICATIONS  (PRN):  acetaminophen   Tablet .. 650 milliGRAM(s) Oral every 6 hours PRN Temp greater or equal to 38C (100.4F), Moderate Pain (4 - 6)      Care Discussed with Consultants/Other Providers [ ] YES  [ ] NO

## 2020-06-29 DIAGNOSIS — F41.1 GENERALIZED ANXIETY DISORDER: ICD-10-CM

## 2020-06-29 DIAGNOSIS — F43.22 ADJUSTMENT DISORDER WITH ANXIETY: ICD-10-CM

## 2020-06-29 PROCEDURE — 99223 1ST HOSP IP/OBS HIGH 75: CPT

## 2020-06-29 PROCEDURE — 99232 SBSQ HOSP IP/OBS MODERATE 35: CPT

## 2020-06-29 RX ADMIN — Medication 5 MILLIGRAM(S): at 05:30

## 2020-06-29 RX ADMIN — Medication 125 MILLIGRAM(S): at 18:20

## 2020-06-29 RX ADMIN — Medication 125 MILLIGRAM(S): at 11:19

## 2020-06-29 RX ADMIN — Medication 125 MILLIGRAM(S): at 05:31

## 2020-06-29 RX ADMIN — TAMSULOSIN HYDROCHLORIDE 0.8 MILLIGRAM(S): 0.4 CAPSULE ORAL at 22:51

## 2020-06-29 RX ADMIN — Medication 5 MILLIGRAM(S): at 18:24

## 2020-06-29 RX ADMIN — FAMOTIDINE 20 MILLIGRAM(S): 10 INJECTION INTRAVENOUS at 11:19

## 2020-06-29 NOTE — PROGRESS NOTE ADULT - PROBLEM SELECTOR PROBLEM 4
H/O muscular dystrophy

## 2020-06-29 NOTE — PROGRESS NOTE ADULT - PROBLEM SELECTOR PROBLEM 3
Urinary retention

## 2020-06-29 NOTE — PROGRESS NOTE ADULT - SUBJECTIVE AND OBJECTIVE BOX
Follow Up:      Inverval History/ROS:Patient is a 75y old  Male who presents with a chief complaint of abdominal pain and diarrhea (28 Jun 2020 17:22)    No fever  No events  Denies abd pain  Diarrhea has resolved    Allergies    No Known Allergies    Intolerances        ANTIMICROBIALS:  vancomycin    Solution 125 every 6 hours      OTHER MEDS:  acetaminophen   Tablet .. 650 milliGRAM(s) Oral every 6 hours PRN  diazepam    Tablet 5 milliGRAM(s) Oral two times a day  famotidine    Tablet 20 milliGRAM(s) Oral daily  heparin   Injectable 5000 Unit(s) SubCutaneous every 12 hours  tamsulosin 0.8 milliGRAM(s) Oral at bedtime      Vital Signs Last 24 Hrs  T(C): 36.4 (29 Jun 2020 04:05), Max: 36.4 (29 Jun 2020 04:05)  T(F): 97.6 (29 Jun 2020 04:05), Max: 97.6 (29 Jun 2020 04:05)  HR: 64 (29 Jun 2020 04:05) (64 - 95)  BP: 108/64 (29 Jun 2020 04:05) (108/64 - 123/69)  BP(mean): --  RR: 18 (29 Jun 2020 04:05) (18 - 18)  SpO2: 97% (29 Jun 2020 04:05) (94% - 97%)    PHYSICAL EXAM:  General: [x ] non-toxic  HEAD/EYES: [ ] PERRL [x ] white sclera [ ] icterus  ENT:  [ ] normal [ x] supple [ ] thrush [ ] pharyngeal exudate  Cardiovascular:   [ ] murmur [x ] normal [ ] PPM/AICD  Respiratory:  [ x] clear to ausculation bilaterally  GI:  [x ] soft, non-tender, normal bowel sounds  :  [ ] fowler [ ] no CVA tenderness   Musculoskeletal:  [ ] no synovitis  Neurologic:  [ ] non-focal exam   Skin:  [ x] no rash  Lymph: [ ] no lymphadenopathy  Psychiatric:  [ ] appropriate affect x[ ] alert & oriented  Lines:  [ x] no phlebitis [ ] central line                          MICROBIOLOGY:Culture Results:   No growth to date. (06-25-20 @ 21:51)  Culture Results:   No growth to date. (06-25-20 @ 21:51)  Culture Results:   No Growth Final (06-23-20 @ 18:17)  Culture Results:   >100,000 CFU/ml Pseudomonas aeruginosa  >100,000 CFU/ml Enterococcus faecalis (vancomycin resistant) (06-23-20 @ 18:13)  Culture Results:   Growth in aerobic bottle: Staphylococcus epidermidis  Coag Negative Staphylococcus  Single set isolate, possible contaminant. Contact  Microbiology if susceptibility testing clinically  indicated.  "Due to technical problems, Proteus sp. will Not be reported as part of  the BCID panel until further notice"  ***Blood Panel PCR results on this specimen are available  approximately 3 hours after the Gram stain result.***  Gram stain, PCR, and/or culture results may not always  correspond due to difference in methodologies.  ************************************************************  This PCR assay was performed using [employer].  The following targets are tested for: Enterococcus,  vancomycin resistant enterococci, Listeria monocytogenes,  coagulase negative staphylococci, S. aureus,  methicillin resistant S. aureus, Streptococcus agalactiae  (Group B), S. pneumoniae, S. pyogenes (Group A),  Acinetobacter baumannii, Enterobacter cloacae, E. coli,  Klebsiella oxytoca, K. pneumoniae, Proteus sp.,  Serratia marcescens, Haemophilus influenzae,  Neisseria meningitidis, Pseudomonas aeruginosa, Candida  albicans, C. glabrata, C krusei, C parapsilosis,  C. tropicalis and the KPC resistance gene. (06-23-20 @ 18:13)      RADIOLOGY:

## 2020-06-29 NOTE — BEHAVIORAL HEALTH ASSESSMENT NOTE - OTHER PAST PSYCHIATRIC HISTORY (INCLUDE DETAILS REGARDING ONSET, COURSE OF ILLNESS, INPATIENT/OUTPATIENT TREATMENT)
per previous  notes in Aug 2019 at that time was being followed for delirium; with h/o unspecified depression and anxiety

## 2020-06-29 NOTE — BEHAVIORAL HEALTH ASSESSMENT NOTE - ACTIVATING EVENTS/STRESSORS
Non-compliant or not receiving treatment/Acute medical problem/Current or pending social isolation Acute medical problem

## 2020-06-29 NOTE — PROGRESS NOTE ADULT - PROBLEM SELECTOR PLAN 1
denies any now after fowler  UA is pos : will wait for c/s   -given recurrent c diff and fowler cath : will hold off on IV abx   ID agrees with plan
denies any now after fowler  UA is pos : will wait for c/s   ID agrees with plan
denies any now after fowler  UA is pos : will wait for c/s   -given recurrent c diff and fowler cath : will hold off on IV abx   ID agrees with plan
denies any now after fowler  UA is pos : will wait for c/s   -given recurrent c diff and fowler cath : will hold off on IV abx till ID sees him

## 2020-06-29 NOTE — BEHAVIORAL HEALTH ASSESSMENT NOTE - NSBHCHARTREVIEWINVESTIGATE_PSY_A_CORE FT
< from: 12 Lead ECG (06.23.20 @ 13:46) >    Ventricular Rate 89 BPM    Atrial Rate 89 BPM    P-R Interval 144 ms    QRS Duration 104 ms    Q-T Interval 372 ms    QTC Calculation(Bezet) 452 ms    P Axis 84 degrees    R Axis 86 degrees    T Axis 84 degrees    Diagnosis Line NORMAL SINUS RHYTHM  NORMAL ECG    Confirmed by KEYON HUMPHREYS ADAM ([id number]) on 6/29/2020 8:52:48 AM    < end of copied text >

## 2020-06-29 NOTE — BEHAVIORAL HEALTH ASSESSMENT NOTE - SUMMARY
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  Psychiatry consulted to assess for medication management, anxiety.  Patient seen and evaluated, awake and alert oriented x 3, reports has been taking Valium long term for anxiety and "off label use" prescribed by Dr. Britton, however has unable to f/u and obtain a script due to being in and out of the hospital and rehabs.  Patient denies feeling depressed, reports vague anxiety symptoms.  Denies SI/HI, AVH.  Reports looking forward to discharge from the hospital to [facility].  Patient had previously taking Valium 5mg QID per [facility] records last filled in 1/2020, At this time, would continue Valium 5mg BID as prescribed.  Patient does not meet criteria for inpt psychiatric hospitalization.  Patient psychiatrically cleared to go to subacute rehab. 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  Psychiatry consulted to assess for medication management, anxiety.  Patient seen and evaluated, awake and alert oriented x 3, reports has been taking Valium long term for anxiety and "off label use" prescribed by Dr. Britton, however has unable to f/u and obtain a script due to being in and out of the hospital and rehabs.  Patient denies feeling depressed, reports vague anxiety symptoms.  Denies SI/HI, AVH.  Reports looking forward to discharge from the hospital to [facility].  Patient had previously taking Valium 5mg QID per [facility] records last filled in 1/2020, At this time, would continue Valium 5mg BID as prescribed.  Patient does not meet criteria for inpt psychiatric hospitalization.  Patient psychiatrically cleared to go to subacute rehab.

## 2020-06-29 NOTE — PROGRESS NOTE ADULT - ATTENDING COMMENTS
pt. seen by ID and psych   cleared for d/c to rehab
urine c/s pos for VRE and psudomonas , though clinically afebrile, and wbc nml  as per ID could be colonization.   blood c/s pos for coagulase neg staph : likely contaminant .   need ID f/u in am : to make sure he don't need any abx , then can be d/c to rehab
d/c planning to rehab

## 2020-06-29 NOTE — PROGRESS NOTE ADULT - PROBLEM SELECTOR PLAN 3
s/p fowler  -c/w flomax
s/p fowler  -c/w flomax
s/p fowler  -c/w flomax   -urology consult ( house) in am

## 2020-06-29 NOTE — PROGRESS NOTE ADULT - PROBLEM SELECTOR PLAN 5
c/w present meds  -stable
seen by psych   -no manic episodes now   -cleared for d/c
c/w present meds  -stable
c/w present meds  -stable

## 2020-06-29 NOTE — BEHAVIORAL HEALTH ASSESSMENT NOTE - NSBHCHARTREVIEWVS_PSY_A_CORE FT
Vital Signs Last 24 Hrs  T(C): 36.4 (29 Jun 2020 04:05), Max: 36.4 (29 Jun 2020 04:05)  T(F): 97.6 (29 Jun 2020 04:05), Max: 97.6 (29 Jun 2020 04:05)  HR: 64 (29 Jun 2020 04:05) (64 - 95)  BP: 108/64 (29 Jun 2020 04:05) (108/64 - 123/69)  BP(mean): --  RR: 18 (29 Jun 2020 04:05) (18 - 18)  SpO2: 97% (29 Jun 2020 04:05) (94% - 97%)

## 2020-06-29 NOTE — BEHAVIORAL HEALTH ASSESSMENT NOTE - HPI (INCLUDE ILLNESS QUALITY, SEVERITY, DURATION, TIMING, CONTEXT, MODIFYING FACTORS, ASSOCIATED SIGNS AND SYMPTOMS)
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 with c/o 2 days of fever, diarrhea and UTI, found to have sepsis, C diff +.  Psychiatry consulted to assess for medication management, anxiety.    Patient seen and evaluated, awake and alert oriented, states has been sick since about end of Jan into February, has been "institutionalized" since then going back and forth from the [facility] hospital to rehab.  He reports being previously prescribed Valium by Dr. Britton, however has not seen him since about February due to being in the hospital.  He denies mood sx, endorses some vague anxiety symptoms.  He currently denies SI/HI, AVH, or thoughts of paranoia.  He denies having any previously psychiatric hospitalizations, Patient denies use of any illicit substances or alcohol, reports that his GI symptoms have improved and states he is looking forward to discharge to [facility].  He reports sleep and appetite are not impaired.     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  Psychiatry consulted to assess for medication management, anxiety.    Patient seen and evaluated, awake and alert oriented, states has been sick since about end of Jan into February, has been "institutionalized" since then going back and forth from the [facility] to rehab.  He reports being previously prescribed Valium by Dr. Britton, however has not seen him since about February due to being in the hospital.  He denies mood sx, endorses some vague anxiety symptoms.  He currently denies SI/HI, AVH, or thoughts of paranoia.  He denies having any previously psychiatric hospitalizations, Patient denies use of any illicit substances or alcohol, reports that his GI symptoms have improved and states he is looking forward to discharge to [facility].  He reports sleep and appetite are not impaired.     [facility] reference #: [id number]

## 2020-06-29 NOTE — PROGRESS NOTE ADULT - PROBLEM SELECTOR PLAN 2
recurrent cdiff (x 2 in recent past)  -started on vanco orally q 6 hrs and flagyl q 8   -house ID consult done   -d/c flagyl

## 2020-06-29 NOTE — BEHAVIORAL HEALTH ASSESSMENT NOTE - NSBHCONSULTFOLLOWAFTERCARE_PSY_A_CORE FT
per primary team  [phone]  [phone]

## 2020-06-29 NOTE — PROGRESS NOTE ADULT - PROBLEM SELECTOR PROBLEM 5
Psychiatric disorder

## 2020-06-29 NOTE — BEHAVIORAL HEALTH ASSESSMENT NOTE - RISK ASSESSMENT
Low Acute Suicide Risk Risk factors: acute/chronic medical issues, chronic pain, not receiving treatment,    Protective factors: no current SIIP/HIIP, no h/o SA/SIB, no h/o psych admissions, no access to weapons, no active substance abuse, no psychosis, domiciled, social supports, positive therapeutic relationship    Overall, pt is a low risk of harm to self/others and does not require psychiatric admission for safety and stabilization.

## 2020-06-29 NOTE — PROGRESS NOTE ADULT - ASSESSMENT
75 year old with muscular dystrophy , recent MSSA bacteremia and COVID presnets with diarrhea.,    1) C diff  Recurrent    Continue po vancomycin 125 mg po q 6 for 4 weeks  then vancoymcin 125 mg po q12 for 1 one  then vancoymcin 125 mg po daily for 1 week  then vancoymcin 125 mg po every other day for one week       2) Leukocytosis  Liely due to C diff   Recent MSSa infection  Blood cultures with coag neg staph in one bottle - likely contaminant      3) Chronic Spencer  Outpt urology follow up    Urine culture is polymicrobial- this is more suggestive of colonization.  His clinical presentation of diarrhea with fever is suggestive fo C diff as cause of his sepsis presentation.    He is at risk for UTI- but treating now would not prevent that risk and would risk worsening his C diff.

## 2020-06-29 NOTE — BEHAVIORAL HEALTH ASSESSMENT NOTE - CASE SUMMARY
Pt is a 75 y/o SWM with hx of anxiety, here for C Diff, awaiting transfer to [facility]. pt denies acute symptoms, no si/hi, and sleep and appetite fair. has always taken valium by his outside doctor. no manic or psychotic symptoms present. denies substance abuse issues. no inpt psychiatric admissions. awaiting [facility] placement following medical clearance. Pt psychiatrically cleared for [facility] following medical clearance.

## 2020-06-29 NOTE — PROGRESS NOTE ADULT - SUBJECTIVE AND OBJECTIVE BOX
Patient is a 75y old  Male who presents with a chief complaint of abdominal pain and diarrhea (29 Jun 2020 15:21)    pt. seen and examined, denies any c/o  diarrhea improved     INTERVAL HPI/OVERNIGHT EVENTS:  T(C): 36.3 (06-29-20 @ 21:03), Max: 36.4 (06-29-20 @ 04:05)  HR: 79 (06-29-20 @ 21:03) (64 - 79)  BP: 105/62 (06-29-20 @ 21:03) (105/62 - 108/64)  RR: 17 (06-29-20 @ 21:03) (17 - 18)  SpO2: 98% (06-29-20 @ 21:03) (97% - 98%)  Wt(kg): --  I&O's Summary    28 Jun 2020 07:01  -  29 Jun 2020 07:00  --------------------------------------------------------  IN: 2209 mL / OUT: 3550 mL / NET: -1341 mL    29 Jun 2020 07:01  -  29 Jun 2020 23:46  --------------------------------------------------------  IN: 480 mL / OUT: 1450 mL / NET: -970 mL        PAST MEDICAL & SURGICAL HISTORY:  Urinary retention  H/O muscular dystrophy  Scrotal swelling  Psychiatric disorder  No significant past surgical history      SOCIAL HISTORY  Alcohol:  Tobacco:  Illicit substance use:    FAMILY HISTORY:    REVIEW OF SYSTEMS:  CONSTITUTIONAL: No fever, weight loss, or fatigue  EYES: No eye pain, visual disturbances, or discharge  ENMT:  No difficulty hearing, tinnitus, vertigo; No sinus or throat pain  NECK: No pain or stiffness  RESPIRATORY: No cough, wheezing, chills or hemoptysis; No shortness of breath  CARDIOVASCULAR: No chest pain, palpitations, dizziness, or leg swelling  GASTROINTESTINAL: No abdominal or epigastric pain. No nausea, vomiting, or hematemesis; No diarrhea or constipation. No melena or hematochezia.  GENITOURINARY: No dysuria, frequency, hematuria, or incontinence  NEUROLOGICAL: No headaches, memory loss, loss of strength, numbness, or tremors  SKIN: No itching, burning, rashes, or lesions   LYMPH NODES: No enlarged glands  ENDOCRINE: No heat or cold intolerance; No hair loss  MUSCULOSKELETAL: No joint pain or swelling; No muscle, back, or extremity pain  PSYCHIATRIC: No depression, anxiety, mood swings, or difficulty sleeping  HEME/LYMPH: No easy bruising, or bleeding gums  ALLERY AND IMMUNOLOGIC: No hives or eczema    RADIOLOGY & ADDITIONAL TESTS:    Imaging Personally Reviewed:  [ ] YES  [ ] NO    Consultant(s) Notes Reviewed:  [ ] YES  [ ] NO    PHYSICAL EXAM:  GENERAL: NAD, well-groomed, well-developed  HEAD:  Atraumatic, Normocephalic  EYES: EOMI, PERRLA, conjunctiva and sclera clear  ENMT: No tonsillar erythema, exudates, or enlargement; Moist mucous membranes, Good dentition, No lesions  NECK: Supple, No JVD, Normal thyroid  NERVOUS SYSTEM:  Alert & Oriented X3, Good concentration; Motor Strength 5/5 B/L upper and lower extremities; DTRs 2+ intact and symmetric  CHEST/LUNG: Clear to percussion bilaterally; No rales, rhonchi, wheezing, or rubs  HEART: Regular rate and rhythm; No murmurs, rubs, or gallops  ABDOMEN: Soft, Nontender, Nondistended; Bowel sounds present  EXTREMITIES:  2+ Peripheral Pulses, No clubbing, cyanosis, or edema  LYMPH: No lymphadenopathy noted  SKIN: No rashes or lesions    LABS:              CAPILLARY BLOOD GLUCOSE                MEDICATIONS  (STANDING):  diazepam    Tablet 5 milliGRAM(s) Oral two times a day  famotidine    Tablet 20 milliGRAM(s) Oral daily  heparin   Injectable 5000 Unit(s) SubCutaneous every 12 hours  tamsulosin 0.8 milliGRAM(s) Oral at bedtime  vancomycin    Solution 125 milliGRAM(s) Oral every 6 hours    MEDICATIONS  (PRN):  acetaminophen   Tablet .. 650 milliGRAM(s) Oral every 6 hours PRN Temp greater or equal to 38C (100.4F), Moderate Pain (4 - 6)      Care Discussed with Consultants/Other Providers [ ] YES  [ ] NO

## 2020-06-30 ENCOUNTER — TRANSCRIPTION ENCOUNTER (OUTPATIENT)
Age: 75
End: 2020-06-30

## 2020-06-30 VITALS
DIASTOLIC BLOOD PRESSURE: 64 MMHG | OXYGEN SATURATION: 97 % | TEMPERATURE: 98 F | SYSTOLIC BLOOD PRESSURE: 100 MMHG | HEART RATE: 80 BPM | RESPIRATION RATE: 16 BRPM

## 2020-06-30 LAB
ANION GAP SERPL CALC-SCNC: 8 MMOL/L — SIGNIFICANT CHANGE UP (ref 5–17)
BUN SERPL-MCNC: 11 MG/DL — SIGNIFICANT CHANGE UP (ref 7–23)
CALCIUM SERPL-MCNC: 9.2 MG/DL — SIGNIFICANT CHANGE UP (ref 8.4–10.5)
CHLORIDE SERPL-SCNC: 103 MMOL/L — SIGNIFICANT CHANGE UP (ref 96–108)
CO2 SERPL-SCNC: 29 MMOL/L — SIGNIFICANT CHANGE UP (ref 22–31)
CREAT SERPL-MCNC: 0.71 MG/DL — SIGNIFICANT CHANGE UP (ref 0.5–1.3)
CULTURE RESULTS: SIGNIFICANT CHANGE UP
CULTURE RESULTS: SIGNIFICANT CHANGE UP
GLUCOSE SERPL-MCNC: 108 MG/DL — HIGH (ref 70–99)
HCT VFR BLD CALC: 33.8 % — LOW (ref 39–50)
HGB BLD-MCNC: 10.9 G/DL — LOW (ref 13–17)
MAGNESIUM SERPL-MCNC: 2.1 MG/DL — SIGNIFICANT CHANGE UP (ref 1.6–2.6)
MCHC RBC-ENTMCNC: 31.9 PG — SIGNIFICANT CHANGE UP (ref 27–34)
MCHC RBC-ENTMCNC: 32.2 GM/DL — SIGNIFICANT CHANGE UP (ref 32–36)
MCV RBC AUTO: 98.8 FL — SIGNIFICANT CHANGE UP (ref 80–100)
NRBC # BLD: 0 /100 WBCS — SIGNIFICANT CHANGE UP (ref 0–0)
PLATELET # BLD AUTO: 287 K/UL — SIGNIFICANT CHANGE UP (ref 150–400)
POTASSIUM SERPL-MCNC: 3.6 MMOL/L — SIGNIFICANT CHANGE UP (ref 3.5–5.3)
POTASSIUM SERPL-SCNC: 3.6 MMOL/L — SIGNIFICANT CHANGE UP (ref 3.5–5.3)
RBC # BLD: 3.42 M/UL — LOW (ref 4.2–5.8)
RBC # FLD: 13.8 % — SIGNIFICANT CHANGE UP (ref 10.3–14.5)
SARS-COV-2 RNA SPEC QL NAA+PROBE: SIGNIFICANT CHANGE UP
SODIUM SERPL-SCNC: 140 MMOL/L — SIGNIFICANT CHANGE UP (ref 135–145)
SPECIMEN SOURCE: SIGNIFICANT CHANGE UP
SPECIMEN SOURCE: SIGNIFICANT CHANGE UP
WBC # BLD: 10.61 K/UL — HIGH (ref 3.8–10.5)
WBC # FLD AUTO: 10.61 K/UL — HIGH (ref 3.8–10.5)

## 2020-06-30 PROCEDURE — 83605 ASSAY OF LACTIC ACID: CPT

## 2020-06-30 PROCEDURE — 87150 DNA/RNA AMPLIFIED PROBE: CPT

## 2020-06-30 PROCEDURE — 85730 THROMBOPLASTIN TIME PARTIAL: CPT

## 2020-06-30 PROCEDURE — 85610 PROTHROMBIN TIME: CPT

## 2020-06-30 PROCEDURE — 97530 THERAPEUTIC ACTIVITIES: CPT

## 2020-06-30 PROCEDURE — 87086 URINE CULTURE/COLONY COUNT: CPT

## 2020-06-30 PROCEDURE — 84295 ASSAY OF SERUM SODIUM: CPT

## 2020-06-30 PROCEDURE — 87186 SC STD MICRODIL/AGAR DIL: CPT

## 2020-06-30 PROCEDURE — 80048 BASIC METABOLIC PNL TOTAL CA: CPT

## 2020-06-30 PROCEDURE — 81001 URINALYSIS AUTO W/SCOPE: CPT

## 2020-06-30 PROCEDURE — 93005 ELECTROCARDIOGRAM TRACING: CPT

## 2020-06-30 PROCEDURE — 80053 COMPREHEN METABOLIC PANEL: CPT

## 2020-06-30 PROCEDURE — U0003: CPT

## 2020-06-30 PROCEDURE — 99285 EMERGENCY DEPT VISIT HI MDM: CPT | Mod: 25

## 2020-06-30 PROCEDURE — 85014 HEMATOCRIT: CPT

## 2020-06-30 PROCEDURE — 82803 BLOOD GASES ANY COMBINATION: CPT

## 2020-06-30 PROCEDURE — 71045 X-RAY EXAM CHEST 1 VIEW: CPT

## 2020-06-30 PROCEDURE — 87324 CLOSTRIDIUM AG IA: CPT

## 2020-06-30 PROCEDURE — 83735 ASSAY OF MAGNESIUM: CPT

## 2020-06-30 PROCEDURE — 85027 COMPLETE CBC AUTOMATED: CPT

## 2020-06-30 PROCEDURE — 84132 ASSAY OF SERUM POTASSIUM: CPT

## 2020-06-30 PROCEDURE — 82947 ASSAY GLUCOSE BLOOD QUANT: CPT

## 2020-06-30 PROCEDURE — 96374 THER/PROPH/DIAG INJ IV PUSH: CPT

## 2020-06-30 PROCEDURE — 99232 SBSQ HOSP IP/OBS MODERATE 35: CPT

## 2020-06-30 PROCEDURE — 87449 NOS EACH ORGANISM AG IA: CPT

## 2020-06-30 PROCEDURE — 87040 BLOOD CULTURE FOR BACTERIA: CPT

## 2020-06-30 PROCEDURE — 82435 ASSAY OF BLOOD CHLORIDE: CPT

## 2020-06-30 PROCEDURE — 97161 PT EVAL LOW COMPLEX 20 MIN: CPT

## 2020-06-30 PROCEDURE — 82330 ASSAY OF CALCIUM: CPT

## 2020-06-30 PROCEDURE — 97116 GAIT TRAINING THERAPY: CPT

## 2020-06-30 RX ORDER — FAMOTIDINE 10 MG/ML
1 INJECTION INTRAVENOUS
Qty: 0 | Refills: 0 | DISCHARGE
Start: 2020-06-30

## 2020-06-30 RX ORDER — ACETAMINOPHEN 500 MG
2 TABLET ORAL
Qty: 0 | Refills: 0 | DISCHARGE
Start: 2020-06-30

## 2020-06-30 RX ADMIN — Medication 125 MILLIGRAM(S): at 04:54

## 2020-06-30 RX ADMIN — FAMOTIDINE 20 MILLIGRAM(S): 10 INJECTION INTRAVENOUS at 11:20

## 2020-06-30 RX ADMIN — Medication 125 MILLIGRAM(S): at 00:00

## 2020-06-30 RX ADMIN — Medication 5 MILLIGRAM(S): at 04:54

## 2020-06-30 RX ADMIN — Medication 125 MILLIGRAM(S): at 11:20

## 2020-06-30 NOTE — PROGRESS NOTE ADULT - ASSESSMENT
75 year old with muscular dystrophy , recent MSSA bacteremia and COVID, presnets with diarrhea.,    1) C diff  Recurrent    Continue po vancomycin 125 mg po q 6 for 4 weeks  then vancoymcin 125 mg po q12 for 1 one  then vancoymcin 125 mg po daily for 1 week  then vancoymcin 125 mg po every other day for one week       2) Leukocytosis  Over 20 at presentation, improved to 10  Liely due to C diff   Recent MSSa infection  Blood cultures with coag neg staph in one bottle - likely contaminant      3) Chronic Spencer  Outpt urology follow up    Urine culture is polymicrobial- this is more suggestive of colonization.  His clinical presentation of diarrhea with fever is suggestive fo C diff as cause of his sepsis presentation.    He is at risk for UTI- but treating now would not prevent that risk and would risk worsening his C diff.      Clinically improved. No ID contraindication to discharge.

## 2020-06-30 NOTE — DISCHARGE NOTE PROVIDER - HOSPITAL COURSE
76 y/o M PMHx of muscular dystrophy, COVID, c diff, presenting with 2 days of fever, diarrhea and UTI. Denies cough or SOB. Endorses cramping abdominal pain that improves after bowel movements, states the diarrhea has been "uncontrollable."    pt. has several admission to hospital and has been diagnosed with C.diff x 2 in recent past, diarrhea started 2 days ago and it is watery , also had fowler cath in ED for retention. he was given vanco orally for cdiff in past .     He came from [facility], but don't want to go back there.     Pt here with C.diff. On PO Vanco     Continue po vancomycin 125 mg po q 6 for 4 weeks    then vancoymcin 125 mg po q12 for 1 one    then vancoymcin 125 mg po daily for 1 week    then vancoymcin 125 mg po every other day for one week             Pt also with + UA. Pt has history of recurrent C. diff and chronic fowler. Asx bacteruria. Will monitor off ABX despite Leukocytosis, likely from C.Diff.     Pt medically cleared for discharge to rehab facility.

## 2020-06-30 NOTE — PROGRESS NOTE ADULT - REASON FOR ADMISSION
abdominal pain and diarrhea

## 2020-06-30 NOTE — DISCHARGE NOTE PROVIDER - NSDCMRMEDTOKEN_GEN_ALL_CORE_FT
acetaminophen 325 mg oral tablet: 2 tab(s) orally every 6 hours, As needed, Temp greater or equal to 38C (100.4F), Moderate Pain (4 - 6)  diazePAM 5 mg oral tablet: 1 tab(s) orally every 12 hours, hold for oversedation/lethargy   famotidine 20 mg oral tablet: 1 tab(s) orally once a day  ocular lubricant ophthalmic solution: 1 drop(s) to each affected eye 2 times a day  Probiotic Formula oral capsule: 1 cap(s) orally 2 times a day  tamsulosin 0.4 mg oral capsule: 2 cap(s) orally once a day (at bedtime)  vancomycin 25 mg/mL oral liquid: 5 milliliter(s) orally 4 times a day

## 2020-06-30 NOTE — DISCHARGE NOTE NURSING/CASE MANAGEMENT/SOCIAL WORK - PATIENT PORTAL LINK FT
You can access the FollowMyHealth Patient Portal offered by [facility] by registering at the following website: [email or web address]. By joining [facility]’s FollowMyHealth portal, you will also be able to view your health information using other applications (apps) compatible with our system.

## 2020-06-30 NOTE — PROGRESS NOTE ADULT - SUBJECTIVE AND OBJECTIVE BOX
Follow Up:      Inverval History/ROS:Patient is a 75y old  Male who presents with a chief complaint of abdominal pain and diarrhea (29 Jun 2020 23:46)    No fever   No events  No diarrhea.   Pain has resolved.       Allergies    No Known Allergies    Intolerances        ANTIMICROBIALS:  vancomycin    Solution 125 every 6 hours      OTHER MEDS:  acetaminophen   Tablet .. 650 milliGRAM(s) Oral every 6 hours PRN  diazepam    Tablet 5 milliGRAM(s) Oral two times a day  famotidine    Tablet 20 milliGRAM(s) Oral daily  heparin   Injectable 5000 Unit(s) SubCutaneous every 12 hours  tamsulosin 0.8 milliGRAM(s) Oral at bedtime      Vital Signs Last 24 Hrs  T(C): 36.6 (30 Jun 2020 04:27), Max: 36.6 (30 Jun 2020 04:27)  T(F): 97.8 (30 Jun 2020 04:27), Max: 97.8 (30 Jun 2020 04:27)  HR: 69 (30 Jun 2020 04:27) (69 - 79)  BP: 116/67 (30 Jun 2020 04:27) (105/62 - 116/67)  BP(mean): --  RR: 17 (30 Jun 2020 04:27) (17 - 17)  SpO2: 99% (30 Jun 2020 04:27) (98% - 99%)    PHYSICAL EXAM:  General: [x ] non-toxic  HEAD/EYES: [ ] PERRL [x ] white sclera [ ] icterus  ENT:  [ ] normal x[ ] supple [ ] thrush [ ] pharyngeal exudate  Cardiovascular:   [ ] murmur [x ] normal [ ] PPM/AICD  Respiratory:  [x ] clear to ausculation bilaterally  GI:  [x ] soft, non-tender, normal bowel sounds  :  [ ] fowler [ x] no CVA tenderness   Musculoskeletal:  [ ] no synovitis  Neurologic:  [ ] non-focal exam   Skin:  [x ] no rash  Lymph: [ ] no lymphadenopathy  Psychiatric:  [ ] appropriate affect [x ] alert & oriented  Lines:  [x ] no phlebitis [ ] central line                                10.9   10.61 )-----------( 287      ( 30 Jun 2020 05:17 )             33.8       06-30    140  |  103  |  11  ----------------------------<  108<H>  3.6   |  29  |  0.71    Ca    9.2      30 Jun 2020 05:17  Mg     2.1     06-30            MICROBIOLOGY:Culture Results:   No growth to date. (06-25-20 @ 21:51)  Culture Results:   No growth to date. (06-25-20 @ 21:51)  Culture Results:   No Growth Final (06-23-20 @ 18:17)  Culture Results:   >100,000 CFU/ml Pseudomonas aeruginosa  >100,000 CFU/ml Enterococcus faecalis (vancomycin resistant) (06-23-20 @ 18:13)  Culture Results:   Growth in aerobic bottle: Staphylococcus epidermidis  Coag Negative Staphylococcus  Single set isolate, possible contaminant. Contact  Microbiology if susceptibility testing clinically  indicated.  "Due to technical problems, Proteus sp. will Not be reported as part of  the BCID panel until further notice"  ***Blood Panel PCR results on this specimen are available  approximately 3 hours after the Gram stain result.***  Gram stain, PCR, and/or culture results may not always  correspond due to difference in methodologies.  ************************************************************  This PCR assay was performed using amSTATZ.  The following targets are tested for: Enterococcus,  vancomycin resistant enterococci, Listeria monocytogenes,  coagulase negative staphylococci, S. aureus,  methicillin resistant S. aureus, Streptococcus agalactiae  (Group B), S. pneumoniae, S. pyogenes (Group A),  Acinetobacter baumannii, Enterobacter cloacae, E. coli,  Klebsiella oxytoca, K. pneumoniae, Proteus sp.,  Serratia marcescens, Haemophilus influenzae,  Neisseria meningitidis, Pseudomonas aeruginosa, Candida  albicans, C. glabrata, C krusei, C parapsilosis,  C. tropicalis and the KPC resistance gene. (06-23-20 @ 18:13)      RADIOLOGY:

## 2020-06-30 NOTE — DISCHARGE NOTE PROVIDER - NSDCCPCAREPLAN_GEN_ALL_CORE_FT
PRINCIPAL DISCHARGE DIAGNOSIS  Diagnosis: Diarrhea associated with pseudomembranous colitis  Assessment and Plan of Treatment: Continue with PO Vancomycin as prescribed.

## 2020-06-30 NOTE — PROGRESS NOTE ADULT - PROVIDER SPECIALTY LIST ADULT
Infectious Disease
Infectious Disease
Internal Medicine
Infectious Disease

## 2020-06-30 NOTE — CHART NOTE - NSCHARTNOTEFT_GEN_A_CORE
Nutrition Follow Up Note    Source: EMR,     Per chart, pt is a "75 year old with muscular dystrophy , recent MSSA bacteremia and COVID, presents with diarrhea." Chart reviewed, events noted. C Diff positive. Leukocytosis, Likely due to C diff. Discharge planning in progress.    Diet : Low Fiber    Patient reports :     PO intake : 100%     Source for PO intake: flowsheets     Enteral/Parenteral Nutrition: none    GI: Last BM  per chart, diarrhea improving per chart, noted with C Diff    Daily Weight in k.6 (), Weight in k.9 (), Weight in k.6 (), Weight in k (), Weight in k (), Weight in k.4 () - Weight fluctuations noted    MEDICATIONS  (STANDING):  diazepam    Tablet 5 milliGRAM(s) Oral two times a day  famotidine    Tablet 20 milliGRAM(s) Oral daily  heparin   Injectable 5000 Unit(s) SubCutaneous every 12 hours  tamsulosin 0.8 milliGRAM(s) Oral at bedtime  vancomycin    Solution 125 milliGRAM(s) Oral every 6 hours    MEDICATIONS  (PRN):  acetaminophen   Tablet .. 650 milliGRAM(s) Oral every 6 hours PRN Temp greater or equal to 38C (100.4F), Moderate Pain (4 - 6)    Pertinent Labs:  @ 05:17: Na 140, BUN 11, Cr 0.71, <H>, K+ 3.6, Phos --, Mg 2.1, Alk Phos --, ALT/SGPT --, AST/SGOT --, HbA1c --    Skin per nursing documentation: IAD, no pressure injuries  Edema per nursing documentation: 4+ scrotum    Estimated Needs:   [x ] no change since previous assessment  [ ] recalculated:     Previous Nutrition Diagnosis: Severe chronic malnutrition  Nutrition Diagnosis is: ongoing    New Nutrition Diagnosis: none at this time     Interventions:     Recommend  1)     Continue monitoring and evaluating:   - Labs: blood glucose, electrolytes, renal indices  - GI function and BMs   - Nutritional intake, weight trends, skin integrity    RD remains available PRN and will follow up per protocol.  Carina Sevilla RD CDN    Pager# [phone] Nutrition Follow Up Note    Source: EMR, patient    Per chart, pt is a "75 year old with muscular dystrophy , recent MSSA bacteremia and COVID, presents with diarrhea." Chart reviewed, events noted. C Diff positive. Leukocytosis, Likely due to C diff. Discharge planning in progress.    Diet : Low Fiber    Patient seen resting in bed. Reports good appetite and intake. Tolerating current diet order without issue.     PO intake : 100%     Source for PO intake: flowsheets, patient     Enteral/Parenteral Nutrition: none    GI: Noted with C Diff, Last BM , reports diarrhea is improving; however, pt does express concern that he had many BMs previously and now has not had a BM in 2 days    Daily Weight in k.6 (), Weight in k.9 (), Weight in k.6 (), Weight in k (), Weight in k (), Weight in k.4 () - Weight fluctuations noted    MEDICATIONS  (STANDING):  diazepam    Tablet 5 milliGRAM(s) Oral two times a day  famotidine    Tablet 20 milliGRAM(s) Oral daily  heparin   Injectable 5000 Unit(s) SubCutaneous every 12 hours  tamsulosin 0.8 milliGRAM(s) Oral at bedtime  vancomycin    Solution 125 milliGRAM(s) Oral every 6 hours    MEDICATIONS  (PRN):  acetaminophen   Tablet .. 650 milliGRAM(s) Oral every 6 hours PRN Temp greater or equal to 38C (100.4F), Moderate Pain (4 - 6)    Pertinent Labs:  @ 05:17: Na 140, BUN 11, Cr 0.71, <H>, K+ 3.6, Phos --, Mg 2.1, Alk Phos --, ALT/SGPT --, AST/SGOT --, HbA1c --    Skin per nursing documentation: IAD, no pressure injuries  Edema per nursing documentation: 4+ scrotum    Estimated Needs:   [x ] no change since previous assessment  [ ] recalculated:     Previous Nutrition Diagnosis: Severe chronic malnutrition  Nutrition Diagnosis is: ongoing    New Nutrition Diagnosis: none at this time     Interventions: Reviewed components of low fiber diet, discussing purpose is to reduce GI distress and diarrhea. Written materials offered and declined    Recommend  1) Consider liberalizing diet to Regular given improvement in diarrhea and pts concern of no BM in 2 days  2) Reinforce nutrition education PRN; RD availability made known    Continue monitoring and evaluating:   - Labs: blood glucose, electrolytes, renal indices  - GI function and BMs   - Nutritional intake, weight trends, skin integrity    RD remains available PRN and will follow up per protocol.  Carina Sevilla RD CDN    Pager# [phone] Nutrition Follow Up Note  Initial Malnutrition Follow Up     Source: EMR, patient    Per chart, pt is a "75 year old with muscular dystrophy , recent MSSA bacteremia and COVID, presents with diarrhea." Chart reviewed, events noted. C Diff positive. Leukocytosis, Likely due to C diff. Discharge planning in progress.    Diet : Low Fiber    Patient seen resting in bed. Reports good appetite and intake. Tolerating current diet order without issue.     PO intake : 100%     Source for PO intake: flowsheets, patient     Enteral/Parenteral Nutrition: none    GI: Noted with C Diff, Last BM , reports diarrhea is improving; however, pt does express concern that he had many BMs previously and now has not had a BM in 2 days    Daily Weight in k.6 (-), Weight in k.9 (-), Weight in k.6 (), Weight in k (), Weight in k (-), Weight in k.4 () - Weight fluctuations noted    MEDICATIONS  (STANDING):  diazepam    Tablet 5 milliGRAM(s) Oral two times a day  famotidine    Tablet 20 milliGRAM(s) Oral daily  heparin   Injectable 5000 Unit(s) SubCutaneous every 12 hours  tamsulosin 0.8 milliGRAM(s) Oral at bedtime  vancomycin    Solution 125 milliGRAM(s) Oral every 6 hours    MEDICATIONS  (PRN):  acetaminophen   Tablet .. 650 milliGRAM(s) Oral every 6 hours PRN Temp greater or equal to 38C (100.4F), Moderate Pain (4 - 6)    Pertinent Labs:  @ 05:17: Na 140, BUN 11, Cr 0.71, <H>, K+ 3.6, Phos --, Mg 2.1, Alk Phos --, ALT/SGPT --, AST/SGOT --, HbA1c --    Skin per nursing documentation: IAD, no pressure injuries  Edema per nursing documentation: 4+ scrotum    Estimated Needs:   [x ] no change since previous assessment  [ ] recalculated:     Previous Nutrition Diagnosis: Severe chronic malnutrition  Nutrition Diagnosis is: ongoing, addressed with POdiet, supplements declined    New Nutrition Diagnosis: none at this time     Interventions: Reviewed components of low fiber diet, discussing purpose is to reduce GI distress and diarrhea. Written materials offered and declined. oral nutrition supplements to optimize nutritional status also offered and declined; availability made known. Nutrition care plan in progress    Recommend  1) Consider liberalizing diet to Regular given improvement in diarrhea and pts concern of no BM in 2 days  2) Reinforce nutrition education PRN; RD availability made known    Continue monitoring and evaluating:   - Labs: blood glucose, electrolytes, renal indices  - GI function and BMs   - Nutritional intake, weight trends, skin integrity    RD remains available PRN and will follow up per protocol.  Carina Sevilla RD CDN    Pager# [phone]

## 2020-07-06 ENCOUNTER — INPATIENT (INPATIENT)
Facility: HOSPITAL | Age: 75
LOS: 6 days | Discharge: HOME | DRG: 699 | End: 2020-07-13
Attending: INTERNAL MEDICINE | Admitting: INTERNAL MEDICINE
Payer: MEDICARE

## 2020-07-06 VITALS
RESPIRATION RATE: 20 BRPM | DIASTOLIC BLOOD PRESSURE: 62 MMHG | SYSTOLIC BLOOD PRESSURE: 109 MMHG | TEMPERATURE: 100 F | OXYGEN SATURATION: 97 % | HEART RATE: 88 BPM

## 2020-07-06 DIAGNOSIS — A49.8 OTHER BACTERIAL INFECTIONS OF UNSPECIFIED SITE: ICD-10-CM

## 2020-07-06 DIAGNOSIS — R10.30 LOWER ABDOMINAL PAIN, UNSPECIFIED: ICD-10-CM

## 2020-07-06 DIAGNOSIS — G71.00 MUSCULAR DYSTROPHY, UNSPECIFIED: ICD-10-CM

## 2020-07-06 DIAGNOSIS — N39.0 URINARY TRACT INFECTION, SITE NOT SPECIFIED: ICD-10-CM

## 2020-07-06 DIAGNOSIS — T83.9XXA UNSPECIFIED COMPLICATION OF GENITOURINARY PROSTHETIC DEVICE, IMPLANT AND GRAFT, INITIAL ENCOUNTER: ICD-10-CM

## 2020-07-06 DIAGNOSIS — R65.10 SYSTEMIC INFLAMMATORY RESPONSE SYNDROME (SIRS) OF NON-INFECTIOUS ORIGIN WITHOUT ACUTE ORGAN DYSFUNCTION: ICD-10-CM

## 2020-07-06 LAB
ALBUMIN SERPL ELPH-MCNC: 3.5 G/DL — SIGNIFICANT CHANGE UP (ref 3.3–5)
ALP SERPL-CCNC: 93 U/L — SIGNIFICANT CHANGE UP (ref 40–120)
ALT FLD-CCNC: 9 U/L — LOW (ref 10–45)
ANION GAP SERPL CALC-SCNC: 13 MMOL/L — SIGNIFICANT CHANGE UP (ref 5–17)
APPEARANCE UR: ABNORMAL
APTT BLD: 28.9 SEC — SIGNIFICANT CHANGE UP (ref 27.5–35.5)
AST SERPL-CCNC: 11 U/L — SIGNIFICANT CHANGE UP (ref 10–40)
BACTERIA # UR AUTO: ABNORMAL
BASOPHILS # BLD AUTO: 0.63 K/UL — HIGH (ref 0–0.2)
BASOPHILS NFR BLD AUTO: 2.6 % — HIGH (ref 0–2)
BILIRUB SERPL-MCNC: 0.4 MG/DL — SIGNIFICANT CHANGE UP (ref 0.2–1.2)
BILIRUB UR-MCNC: NEGATIVE — SIGNIFICANT CHANGE UP
BUN SERPL-MCNC: 15 MG/DL — SIGNIFICANT CHANGE UP (ref 7–23)
CALCIUM SERPL-MCNC: 9.4 MG/DL — SIGNIFICANT CHANGE UP (ref 8.4–10.5)
CHLORIDE SERPL-SCNC: 98 MMOL/L — SIGNIFICANT CHANGE UP (ref 96–108)
CO2 SERPL-SCNC: 25 MMOL/L — SIGNIFICANT CHANGE UP (ref 22–31)
COLOR SPEC: SIGNIFICANT CHANGE UP
CREAT SERPL-MCNC: 0.9 MG/DL — SIGNIFICANT CHANGE UP (ref 0.5–1.3)
CULTURE RESULTS: SIGNIFICANT CHANGE UP
DIFF PNL FLD: ABNORMAL
EOSINOPHIL # BLD AUTO: 0 K/UL — SIGNIFICANT CHANGE UP (ref 0–0.5)
EOSINOPHIL NFR BLD AUTO: 0 % — SIGNIFICANT CHANGE UP (ref 0–6)
EPI CELLS # UR: 0 /HPF — SIGNIFICANT CHANGE UP
GAS PNL BLDV: SIGNIFICANT CHANGE UP
GLUCOSE SERPL-MCNC: 112 MG/DL — HIGH (ref 70–99)
GLUCOSE UR QL: NEGATIVE — SIGNIFICANT CHANGE UP
HCT VFR BLD CALC: 35.3 % — LOW (ref 39–50)
HGB BLD-MCNC: 11.5 G/DL — LOW (ref 13–17)
HYALINE CASTS # UR AUTO: 0 /LPF — SIGNIFICANT CHANGE UP (ref 0–2)
INR BLD: 1.32 RATIO — HIGH (ref 0.88–1.16)
KETONES UR-MCNC: NEGATIVE — SIGNIFICANT CHANGE UP
LEUKOCYTE ESTERASE UR-ACNC: ABNORMAL
LYMPHOCYTES # BLD AUTO: 0.41 K/UL — LOW (ref 1–3.3)
LYMPHOCYTES # BLD AUTO: 1.7 % — LOW (ref 13–44)
MCHC RBC-ENTMCNC: 31.5 PG — SIGNIFICANT CHANGE UP (ref 27–34)
MCHC RBC-ENTMCNC: 32.6 GM/DL — SIGNIFICANT CHANGE UP (ref 32–36)
MCV RBC AUTO: 96.7 FL — SIGNIFICANT CHANGE UP (ref 80–100)
MONOCYTES # BLD AUTO: 0.84 K/UL — SIGNIFICANT CHANGE UP (ref 0–0.9)
MONOCYTES NFR BLD AUTO: 3.5 % — SIGNIFICANT CHANGE UP (ref 2–14)
NEUTROPHILS # BLD AUTO: 22.24 K/UL — HIGH (ref 1.8–7.4)
NEUTROPHILS NFR BLD AUTO: 92.2 % — HIGH (ref 43–77)
NITRITE UR-MCNC: POSITIVE
PH UR: 6.5 — SIGNIFICANT CHANGE UP (ref 5–8)
PLATELET # BLD AUTO: 361 K/UL — SIGNIFICANT CHANGE UP (ref 150–400)
POTASSIUM SERPL-MCNC: 3.4 MMOL/L — LOW (ref 3.5–5.3)
POTASSIUM SERPL-SCNC: 3.4 MMOL/L — LOW (ref 3.5–5.3)
PROT SERPL-MCNC: 6.8 G/DL — SIGNIFICANT CHANGE UP (ref 6–8.3)
PROT UR-MCNC: SIGNIFICANT CHANGE UP
PROTHROM AB SERPL-ACNC: 15 SEC — HIGH (ref 10.6–13.6)
RBC # BLD: 3.65 M/UL — LOW (ref 4.2–5.8)
RBC # FLD: 13.5 % — SIGNIFICANT CHANGE UP (ref 10.3–14.5)
RBC CASTS # UR COMP ASSIST: 6 /HPF — HIGH (ref 0–4)
SARS-COV-2 RNA SPEC QL NAA+PROBE: SIGNIFICANT CHANGE UP
SODIUM SERPL-SCNC: 136 MMOL/L — SIGNIFICANT CHANGE UP (ref 135–145)
SP GR SPEC: 1.01 — LOW (ref 1.01–1.02)
SPECIMEN SOURCE: SIGNIFICANT CHANGE UP
UROBILINOGEN FLD QL: NEGATIVE — SIGNIFICANT CHANGE UP
WBC # BLD: 24.12 K/UL — HIGH (ref 3.8–10.5)
WBC # FLD AUTO: 24.12 K/UL — HIGH (ref 3.8–10.5)
WBC UR QL: 608 /HPF — HIGH (ref 0–5)

## 2020-07-06 PROCEDURE — 93010 ELECTROCARDIOGRAM REPORT: CPT

## 2020-07-06 PROCEDURE — 74177 CT ABD & PELVIS W/CONTRAST: CPT | Mod: 26

## 2020-07-06 PROCEDURE — 93010 ELECTROCARDIOGRAM REPORT: CPT | Mod: GC

## 2020-07-06 PROCEDURE — 99222 1ST HOSP IP/OBS MODERATE 55: CPT | Mod: GC

## 2020-07-06 PROCEDURE — 71045 X-RAY EXAM CHEST 1 VIEW: CPT | Mod: 26

## 2020-07-06 PROCEDURE — 99223 1ST HOSP IP/OBS HIGH 75: CPT

## 2020-07-06 PROCEDURE — 99285 EMERGENCY DEPT VISIT HI MDM: CPT | Mod: CS,GC

## 2020-07-06 RX ORDER — POTASSIUM CHLORIDE 20 MEQ
40 PACKET (EA) ORAL ONCE
Refills: 0 | Status: COMPLETED | OUTPATIENT
Start: 2020-07-06 | End: 2020-07-06

## 2020-07-06 RX ORDER — FAMOTIDINE 10 MG/ML
20 INJECTION INTRAVENOUS DAILY
Refills: 0 | Status: DISCONTINUED | OUTPATIENT
Start: 2020-07-06 | End: 2020-07-13

## 2020-07-06 RX ORDER — VANCOMYCIN HCL 1 G
125 VIAL (EA) INTRAVENOUS EVERY 6 HOURS
Refills: 0 | Status: DISCONTINUED | OUTPATIENT
Start: 2020-07-06 | End: 2020-07-13

## 2020-07-06 RX ORDER — DIAZEPAM 5 MG
5 TABLET ORAL EVERY 12 HOURS
Refills: 0 | Status: COMPLETED | OUTPATIENT
Start: 2020-07-06 | End: 2020-07-13

## 2020-07-06 RX ORDER — ENOXAPARIN SODIUM 100 MG/ML
40 INJECTION SUBCUTANEOUS DAILY
Refills: 0 | Status: DISCONTINUED | OUTPATIENT
Start: 2020-07-06 | End: 2020-07-13

## 2020-07-06 RX ORDER — LACTOBACILLUS ACIDOPHILUS 100MM CELL
1 CAPSULE ORAL
Refills: 0 | Status: DISCONTINUED | OUTPATIENT
Start: 2020-07-06 | End: 2020-07-13

## 2020-07-06 RX ORDER — TAMSULOSIN HYDROCHLORIDE 0.4 MG/1
0.8 CAPSULE ORAL AT BEDTIME
Refills: 0 | Status: DISCONTINUED | OUTPATIENT
Start: 2020-07-06 | End: 2020-07-13

## 2020-07-06 RX ORDER — SODIUM CHLORIDE 9 MG/ML
1000 INJECTION, SOLUTION INTRAVENOUS ONCE
Refills: 0 | Status: COMPLETED | OUTPATIENT
Start: 2020-07-06 | End: 2020-07-06

## 2020-07-06 RX ORDER — SODIUM CHLORIDE 9 MG/ML
1000 INJECTION, SOLUTION INTRAVENOUS
Refills: 0 | Status: DISCONTINUED | OUTPATIENT
Start: 2020-07-06 | End: 2020-07-13

## 2020-07-06 RX ORDER — PIPERACILLIN AND TAZOBACTAM 4; .5 G/20ML; G/20ML
3.38 INJECTION, POWDER, LYOPHILIZED, FOR SOLUTION INTRAVENOUS ONCE
Refills: 0 | Status: COMPLETED | OUTPATIENT
Start: 2020-07-06 | End: 2020-07-06

## 2020-07-06 RX ORDER — POTASSIUM CHLORIDE 20 MEQ
10 PACKET (EA) ORAL ONCE
Refills: 0 | Status: COMPLETED | OUTPATIENT
Start: 2020-07-06 | End: 2020-07-06

## 2020-07-06 RX ADMIN — Medication 1 TABLET(S): at 08:52

## 2020-07-06 RX ADMIN — Medication 1 DROP(S): at 06:40

## 2020-07-06 RX ADMIN — PIPERACILLIN AND TAZOBACTAM 200 GRAM(S): 4; .5 INJECTION, POWDER, LYOPHILIZED, FOR SOLUTION INTRAVENOUS at 01:04

## 2020-07-06 RX ADMIN — Medication 5 MILLIGRAM(S): at 08:36

## 2020-07-06 RX ADMIN — FAMOTIDINE 20 MILLIGRAM(S): 10 INJECTION INTRAVENOUS at 12:24

## 2020-07-06 RX ADMIN — Medication 1 TABLET(S): at 17:27

## 2020-07-06 RX ADMIN — SODIUM CHLORIDE 100 MILLILITER(S): 9 INJECTION, SOLUTION INTRAVENOUS at 06:39

## 2020-07-06 RX ADMIN — Medication 125 MILLIGRAM(S): at 12:24

## 2020-07-06 RX ADMIN — Medication 125 MILLIGRAM(S): at 17:27

## 2020-07-06 RX ADMIN — Medication 5 MILLIGRAM(S): at 22:01

## 2020-07-06 RX ADMIN — Medication 40 MILLIEQUIVALENT(S): at 06:40

## 2020-07-06 RX ADMIN — TAMSULOSIN HYDROCHLORIDE 0.8 MILLIGRAM(S): 0.4 CAPSULE ORAL at 22:01

## 2020-07-06 RX ADMIN — SODIUM CHLORIDE 1000 MILLILITER(S): 9 INJECTION, SOLUTION INTRAVENOUS at 00:52

## 2020-07-06 RX ADMIN — Medication 1 DROP(S): at 17:28

## 2020-07-06 RX ADMIN — Medication 125 MILLIGRAM(S): at 23:54

## 2020-07-06 RX ADMIN — Medication 50 MILLIEQUIVALENT(S): at 06:39

## 2020-07-06 RX ADMIN — Medication 125 MILLIGRAM(S): at 06:40

## 2020-07-06 NOTE — H&P ADULT - NSHPSOCIALHISTORY_GEN_ALL_CORE
Social History:    Marital Status: (  ) , ( x ) Single, (  ) , (  ) , (  )   # of Children: None  Lives with: ( x ) alone, (  ) children, (  ) spouse, (  ) parents, (  ) siblings, (  ) friends, (  ) other:   Occupation:     Substance Use/Illicit Drugs: (  ) never used vs other:   Tobacco Usage: ( x ) never smoked, (  ) former smoker, (  ) current smoker and Total Pack-Years:   Last Alcohol Usage/Frequency/Amount/Withdrawal/Hx of Abuse:  Years ago  Foreign travel:   Animal exposure:

## 2020-07-06 NOTE — PHYSICAL THERAPY INITIAL EVALUATION ADULT - PERTINENT HX OF CURRENT PROBLEM, REHAB EVAL
6/23/20 + blood cx gram + cocci in clusters ; + CDiff 6/24/20 ; COVID (not detected  6/29, 7/6/20) ; WBC 24.12 ; CXR unchanged linear atelectasisiRML ; EKG NSR , IRBBB

## 2020-07-06 NOTE — H&P ADULT - NSHPPOASURGSITEINCISION_GEN_ALL_CORE
[facility]    [address] [place] [address]    Phone:  [phone]    Fax:  [phone]       Thank You for choosing us for your health care visit. We are glad to serve you and happy to provide you with this summary of your visit. Please help us to ensure we have accurate records. If you find anything that needs to be changed, please let our staff know as soon as possible.          Your Demographic Information     Patient Name Sex     Easton Pittman Male 1969       Ethnic Group Patient Race    Not of  or  Origin White      Your Visit Details     Date & Time Provider Department    2017 8:00 AM RENE Surg Nurse [facility]      Your Upcoming Appointment*(Max 10)     2017 10:30 AM CST   Office Visit with Nash Ramesh MD   [facility] ([facility]    [address] [place] [address]   [phone]            2017 11:20 AM CST   Office Visit with Darien Devine MD   [facility] ([facility])    [id number] [facility] [id number]   [phone]              Your Vitals Were     Smoking Status                   Never Smoker           Medications Prescribed or Re-Ordered Today     None      Your Current Medications Are        Disp Refills Start End    amoxicillin-clavulanate (AUGMENTIN) 875-125 MG per tablet 14 tablet 0 2017    Sig - Route: Take 1 tablet by mouth 2 times daily for 7 days. - Oral    Class: Eprescribe    Cosign for Ordering: Required by Nash Ramesh MD    HYDROcodone-acetaminophen (NORCO) 5-325 MG per tablet 15 tablet 0 2017     Sig - Route: Take 2 tablets by mouth every 4 hours as needed for Pain (1-2 pills ). - Oral    traZODONE (DESYREL) 50 MG tablet 90 tablet 1 2017     Sig - Route: Take 1 tablet by  mouth nightly as needed for Sleep. - Oral    Class: Eprescribe      Allergies     No Known Allergies            Patient Instructions     None       no

## 2020-07-06 NOTE — PHYSICAL THERAPY INITIAL EVALUATION ADULT - IMPAIRMENTS CONTRIBUTING IMPAIRED BED MOBILITY, REHAB EVAL
decreased flexibility/decreased strength/decreased endurance , moves very slowly ; sat on EOb x 5 min cg of 1/impaired postural control/impaired balance

## 2020-07-06 NOTE — H&P ADULT - NSHPPHYSICALEXAM_GEN_ALL_CORE
PHYSICAL EXAM:   GENERAL: Alert. Not confused. No acute distress. +cachectic. Not obese.  HEAD:  Atraumatic. Normocephalic. +Temporal wasting  EYES: EOMI. PERRLA. Normal conjunctiva/sclera.  ENT: Neck supple. No JVD. Moist oral mucosa. Not edentulous. No thrush.  LYMPH: Normal supraclavicular/cervical lymph nodes.   CARDIAC: Not tachy, Not soco. Regular rhythm. Not irregularly irregular. S1. S2. No murmur. No rub. No distant heart sounds.  LUNG/CHEST: CTAB. BS equal bilaterally. No wheezes. No rales. No rhonchi.  ABDOMEN: Soft. No tenderness. No distension. No fluid wave. Normal bowel sounds.  BACK: No midline/vertebral tenderness. No flank tenderness.  VASCULAR: +2 b/l radial or ulnar pulses. Palpable DP pulses.  EXTREMITIES:  No clubbing. No cyanosis. No edema. Moving all 4.  NEUROLOGY: A&Ox3. 4/5 strength b/l UE, 3/5 strength b/l LE. Cranial nerves intact. Normal speech. Sensation intact.  PSYCH: Normal behavior. Flat affect.  SKIN: No jaundice. No erythema. No rash/lesion.  Vascular Access:     ICU Vital Signs Last 24 Hrs  T(C): 37.8 (06 Jul 2020 00:45), Max: 37.8 (06 Jul 2020 00:45)  T(F): 100 (06 Jul 2020 00:45), Max: 100 (06 Jul 2020 00:45)  HR: 88 (06 Jul 2020 00:45) (88 - 88)  BP: 109/62 (06 Jul 2020 00:45) (109/62 - 109/62)  BP(mean): --  ABP: --  ABP(mean): --  RR: 20 (06 Jul 2020 00:45) (20 - 20)  SpO2: 97% (06 Jul 2020 00:45) (97% - 97%)      I&O's Summary

## 2020-07-06 NOTE — ED ADULT NURSE NOTE - OBJECTIVE STATEMENT
75 yr old M arrived to the ED via ambulance from assisted living ([facility]) c/o abd distention and his catheter not draining. HX strokes, COVID, chronic fowler due to BPH, cdiff (currently on oral vancomycin). pt states his fowler stop draining this afternoon and now he has abd distension and discomfort. pt feel if his fowler is changed hell "put out about 500ml". pt endorses feeling generally well otherwise. upon assessment pt is a&ox4, speaking clearly, answering questions and following commands. lungs clear sarah, respirations are even and unlabored. lower abd is mildly distended and painful to touch. pt denies fever, chills, nausea, vomiting, chest pain and sob.

## 2020-07-06 NOTE — ED PROVIDER NOTE - OBJECTIVE STATEMENT
75 y M h/o musc dist, bph requiring indwelling fowler, recent covid, prior urosepsis, recent d/c from hospital after treatment for cdiff (PO vanc).  BIBEMS from SNF after reported to have no d/c in fowler a/w lower abd pain since mid-afternoon.  Alerted staff but EMS was called later in the evening when patient's pain became severe.  No back pain.  No f/c.  No n/v.  States symptoms c/w prior e/o urinary retention.  Scrotal swelling at baseline as per patient.  Fowler changed during last admission ~10 days ago.

## 2020-07-06 NOTE — PHYSICAL THERAPY INITIAL EVALUATION ADULT - LEVEL OF INDEPENDENCE: SIT/STAND, REHAB EVAL
of 1 person with std by of transport as transport present to take to Ct scan/moderate assist (50% patients effort)/minimum assist (75% patients effort)

## 2020-07-06 NOTE — ED PROVIDER NOTE - CLINICAL SUMMARY MEDICAL DECISION MAKING FREE TEXT BOX
Acute urinary retention in pt c indwelling fowler catheter.  No overt s/s of sepsis though there is presence of whitish d/c at meatus.  No e/o nec fasc / susan's.  Firm tender to palpation suprapubic mass in keeping c urinary retention.  Will change fowler, check UA/Cx, labs for Cr, reassess.  Likely will need abx for urinary tract infection in addition to PO vanco.  Hopeful d/c assuming benign clinical course and workup.  --BMM

## 2020-07-06 NOTE — PHYSICAL THERAPY INITIAL EVALUATION ADULT - GAIT DEVIATIONS NOTED, PT EVAL
increased time in double stance/decreased weight-shifting ability/decreased lexis/decreased stride length/decreased step length

## 2020-07-06 NOTE — PHYSICAL THERAPY INITIAL EVALUATION ADULT - DISCHARGE DISPOSITION, PT EVAL
return to subacute rehab to increase fxl mobility , strength, balance, endurance, fall prevention education continued ; if pt goes home needs assist all fxl activity and adl's pt understood/rehabilitation facility

## 2020-07-06 NOTE — PHYSICAL THERAPY INITIAL EVALUATION ADULT - IMPAIRMENTS FOUND, PT EVAL
joint integrity and mobility/aerobic capacity/endurance/gait, locomotion, and balance/muscle strength

## 2020-07-06 NOTE — H&P ADULT - PROBLEM SELECTOR PLAN 2
- f/u blood and ur cx  - will hold off further antibiotics for now given hx of cdiff  - trend temperature, leukocytosis  - low threshold for empirically starting abx  - consider ID consult  - IVF  - VSq4h - f/u blood and ur cx  - will get CT a/p to eval for a source of infection  - will hold off further antibiotics for now given hx of cdiff and observe  - trend temperature, leukocytosis  - low threshold for empirically starting abx  - consider ID consult in AM  - IVF  - VSq4h

## 2020-07-06 NOTE — H&P ADULT - PROBLEM SELECTOR PLAN 3
- fowler replaced with large urine output, c/w urinary obstruction  - pt has mild cr elevation above baseline  - trend Cr

## 2020-07-06 NOTE — PHYSICAL THERAPY INITIAL EVALUATION ADULT - ADDITIONAL COMMENTS
pt came from Subacute rehab ; pt has been in and out of places x 10 months per pt ; last fall until dec was at [facility] then went to independent living facility x few months then went home beginning of April or MAy but only for 7 days (functioning with amb with std cane per pt independent then ) then back into hospital and rehab ; pt owns a rolling walker , std cane, commode, w/c (but has not needed to use ) , shower chair ; in his home has steps with HR to bedroom level , outside has 3 steps into side entrance w/o HR

## 2020-07-06 NOTE — H&P ADULT - ASSESSMENT
75M c hx likely muscular dystrophy of unknown etiology c/b rhabdomyolysis, PEG reversed, homebound, reported hx of COVID positive outpt, anxiety, BPH, s/p chronic fowler MSSA bacteremia (Apr '20), recent hospitalization (6/23-6/30/20) for UTI and Cdiff on PO vanc taper, pw SIRS and abd 2/2 dislodged fowler

## 2020-07-06 NOTE — PHYSICAL THERAPY INITIAL EVALUATION ADULT - GAIT DISTANCE, PT EVAL
pt transfer from bed to stretcher w/ RW 5-6 feet min-mod of1 min -mod unsteady intially then min/5 feet

## 2020-07-06 NOTE — H&P ADULT - NSHPLABSRESULTS_GEN_ALL_CORE
Personally reviewed labs.   Personally reviewed imaging.                         11.5   24.12 )-----------( 361      ( 2020 00:51 )             35.3       07-06    136  |  98  |  15  ----------------------------<  112<H>  3.4<L>   |  25  |  0.90    Ca    9.4      2020 00:51    TPro  6.8  /  Alb  3.5  /  TBili  0.4  /  DBili  x   /  AST  11  /  ALT  9<L>  /  AlkPhos  93  07-            LIVER FUNCTIONS - ( 2020 00:51 )  Alb: 3.5 g/dL / Pro: 6.8 g/dL / ALK PHOS: 93 U/L / ALT: 9 U/L / AST: 11 U/L / GGT: x             PT/INR - ( 2020 00:55 )   PT: 15.0 sec;   INR: 1.32 ratio         PTT - ( 2020 00:55 )  PTT:28.9 sec    Urinalysis Basic - ( 2020 00:51 )    Color: Light Yellow / Appearance: Slightly Turbid / S.009 / pH: x  Gluc: x / Ketone: Negative  / Bili: Negative / Urobili: Negative   Blood: x / Protein: Trace / Nitrite: Positive   Leuk Esterase: Large / RBC: 6 /hpf /  /HPF   Sq Epi: x / Non Sq Epi: 0 /hpf / Bacteria: Moderate

## 2020-07-06 NOTE — PHYSICAL THERAPY INITIAL EVALUATION ADULT - LEVEL OF INDEPENDENCE: GAIT, REHAB EVAL
of1 and std by of transport aide/moderate assist (50% patients effort)/minimum assist (75% patients effort)

## 2020-07-06 NOTE — CONSULT NOTE ADULT - ASSESSMENT
#PENDING RECS. PLEASE WAIT FOR FINAL RECS AFTER DISCUSSION WITH ATTENDING#      ASSESSMENT:  75M c hx likely muscular dystrophy of unknown etiology c/b rhabdomyolysis, PEG reversed, homebound, reported hx of COVID positive outpt, anxiety, BPH, s/p chronic fowler MSSA bacteremia (Apr '20), hx of C.diff*2 recent past, recent hospitalization (6/23-6/30/20) for UTI and Cdiff on PO vanc taper, presents from [facility] rehab with abd pain resolved by changing Fowler. Ypiq=378 at ED. Noticed increased leukocytosis from 9 to 24. Cr slightly increased from 0.66 to 0.9. Albumin=3.5 unchanged. UA positive for pyuria and bacteruria. C.diff positive on 6/1 but negative on 7/2, currently on Vanc PO taper. Primary started on Zosyn for sepsis treatment.    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 PLEASE WAIT FOR FINAL RECS AFTER DISCUSSION WITH ATTENDING#    ASSESSMENT:  75M c hx likely muscular dystrophy of unknown etiology c/b rhabdomyolysis, PEG reversed, homebound, reported hx of COVID positive outpt, anxiety, BPH, s/p chronic fowler MSSA bacteremia (Apr '20), hx of C.diff*2 recent past, recent hospitalization (6/23-6/30/20) for UTI and Cdiff on PO vanc taper, presents from [facility] rehab with abd pain resolved by changing Fowler. No more diarrhea for a week, now has formed stool. Klqr=672 at ED. Noticed increased leukocytosis from 9 to 24. Cr slightly increased from 0.66 to 0.9. Albumin=3.5 unchanged. UA positive for pyuria and bacteruria. C.diff positive on 6/1 but negative on 7/2, currently on Vanc PO taper.    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 PLEASE WAIT FOR FINAL RECS AFTER DISCUSSION WITH ATTENDING#    ASSESSMENT:  75M c hx likely muscular dystrophy of unknown etiology c/b rhabdomyolysis, PEG reversed, homebound, reported hx of COVID positive outpt, anxiety, BPH, s/p chronic fowler MSSA bacteremia (Apr '20), hx of C.diff*2 recent past, recent hospitalization (6/23-6/30/20) for UTI and Cdiff on PO vanc taper, presents from [facility] rehab with abd pain resolved by changing Fowler. No more diarrhea for a week, now has formed stool. Pkbk=750 at ED. Noticed increased leukocytosis from 9 to 24. Cr slightly increased from 0.66 to 0.9. Albumin=3.5 unchanged. UA positive for pyuria and bacteruria. C.diff positive on 6/1 but negative on 7/2, currently on Vanc PO taper.    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  #On Chronic Fowler, UA with colonization    RECOMMENDATIONS:  - Please obtain peripheral Blood culture*2  - Continue with PO Vanc taper  - Can hold off IV antibiotics for now  - Contact plus isolation for C.diff  Hardik Morris MD, PGY4  Fellow, Infectious Diseases  Pager: [phone]  If no response, after 5pm and on Weekends: Call [phone] ASSESSMENT:  75M c hx likely muscular dystrophy of unknown etiology c/b rhabdomyolysis, PEG reversed, homebound, reported hx of COVID positive outpt, anxiety, BPH, s/p chronic fowler MSSA bacteremia (Apr '20), hx of C.diff*2 recent past, recent hospitalization (6/23-6/30/20) for UTI and Cdiff on PO vanc taper, presents from [facility] rehab with abd pain resolved by changing Fowler. No more diarrhea for a week, now has formed stool. Ojqj=788 at ED. Noticed increased leukocytosis from 9 to 24. Cr slightly increased from 0.66 to 0.9. Albumin=3.5 unchanged. UA positive for pyuria and bacteruria. C.diff positive on 6/1 but negative on 7/2, currently on Vanc PO taper.    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 Isauro Morris MD, PGY4  Fellow, Infectious Diseases  Pager: [phone]  If no response, after 5pm and on Weekends: Call [phone] ASSESSMENT:  75M c hx likely muscular dystrophy of unknown etiology c/b rhabdomyolysis, PEG reversed, homebound, reported hx of COVID positive outpt, anxiety, BPH, s/p chronic fowler MSSA bacteremia (Apr '20), hx of C.diff*2 recent past, recent hospitalization (6/23-6/30/20) for UTI and Cdiff on PO vanc taper, presents from [facility] rehab with abd pain resolved by changing Fowler. No more diarrhea for a week, now has formed stool. Ouzn=113 at ED. Noticed increased leukocytosis from 9 to 24. Cr slightly increased from 0.66 to 0.9. Albumin=3.5 unchanged. UA positive for pyuria and bacteruria. C.diff positive on 6/1 but negative on 7/2, currently on Vanc PO taper.    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  #On Chronic Fowler, UA with colonization    RECOMMENDATIONS:  - Please obtain peripheral Blood culture*2  - Leukocytosis could be from reaction (pain 2/2 malfunction Fowler), please repeat CBC  - Clinical picture does not look like severe C.diff (no significant VERONICA and albumin level unchanged), imaging would be of low yield at this point for megacolon  - Continue with PO Vanc taper (continue from 6/24, PO vancomycin 125 mg po q 6 for 4 weeks, then vancoymcin 125 mg po q12 for 1 week, then vancoymcin 125 mg po daily for 1 week, then vancoymcin 125 mg po every other day for one week)  - Can hold off IV antibiotics for now, if spikes a fever, can start Zosyn  - No need isolation given no more diarrhea and last C.diff result was negative    Case d/w Dr. Isauro Morris MD, PGY4  Fellow, Infectious Diseases  Pager: [phone]  If no response, after 5pm and on Weekends: Call [phone]

## 2020-07-06 NOTE — PHYSICAL THERAPY INITIAL EVALUATION ADULT - IMPAIRED TRANSFERS: SIT/STAND, REHAB EVAL
decreased flexibility/decreased strength/decreased endurance , sit-stand intially leaning back but then able to reposition feet and regain balance min/mod of1 9std by of transport Aide )/impaired balance/impaired postural control

## 2020-07-06 NOTE — ED PROVIDER NOTE - PROGRESS NOTE DETAILS
fowler placed with several hundred cc of UOP, pt reports relief of symptoms Soft formed stool noted in patient's diaper on arrival.  Leukocytosis noted; patient states diarrhea has essentially resolved and stools are now formed.  Prior ucx demonstrated vre sensitive to zosyn; will give fluids given fever/tachycardia (does not require 30 cc/kg as per clinical judgment).  Cultures drawn/sent.  Plan to admit for urosepsis.  --BMM

## 2020-07-06 NOTE — H&P ADULT - NSHPREVIEWOFSYSTEMS_GEN_ALL_CORE
REVIEW OF SYSTEMS:  CONSTITUTIONAL: +weakness. No fevers. No chills. No rigors. +weight loss. No night sweats. No poor appetite.  EYES: No visual changes. No eye pain.  ENT: No hearing difficulty. No vertigo. No dysphagia. No sore throat. No Sinusitis/rhinorrhea.   NECK: No pain. No stiffness/rigidity.  CARDIAC: No chest pain. No palpitations. No lightheadedness. No syncope.  RESPIRATORY: No cough. No SOB. No hemoptysis.  GASTROINTESTINAL: +abdominal pain. No nausea. No vomiting. No hematemesis. No diarrhea. No constipation. No melena. No hematochezia.  GENITOURINARY: chronic fowler. +cloudy urine  NEUROLOGICAL: No numbness/tingling. No focal weakness. No urinary or fecal incontinence. No headache. +unsteady gait.  BACK: No back pain. No flank pain.  EXTREMITIES: No lower extremity edema. Full ROM. No joint pain.  SKIN: No rashes. No itching. No other lesions.  PSYCHIATRIC: No depression. +anxiety.   ALLERGIC: No lip swelling. No hives.  All other review of systems is negative unless indicated above.  Unless indicated above, unable to assess ROS 2/2

## 2020-07-06 NOTE — CONSULT NOTE ADULT - SUBJECTIVE AND OBJECTIVE BOX
Patient is a 75y old  Male who presents with a chief complaint of abd pain (2020 02:56)    HPI:  75M c hx likely muscular dystrophy of unknown etiology c/b rhabdomyolysis, PEG reversed, homebound, reported hx of COVID positive outpt, anxiety, BPH, s/p chronic fowler MSSA bacteremia (), hx of C.diff*2 recent past, recent hospitalization (-20) for UTI and Cdiff on PO vanc taper, presents from [facility] rehab with abd pain.    Pt states his diarrhea has resolved and is now well formed. Pt states he was in his new usual state of health when he started to get abdominal/suprapubic pain. pt also noticed his fowler stopped putting out urine. pt states a similar situation has happened in the past that resolved when his fowler was changed. Pt states that the pain at the rehab continued to get worse, when he was sent to the hospital. Pt denies the fowler was pulled or dislodged, and is not sure why it keeps getting blocked. Pt denies fevers, chills, URI symptoms, SOB, CP. Pt normally ambulates with a walker, but has more recently started using a wheelchair.    Reviewed recent hospital chart:  C.diff Postive on , Negative on   Last urine culture on  grew VRE(faecalis)+Pseudomonas  Last positive BCx on  grew MSSA (there was a S.epididimis in [place] may be contamination)  COVID positive in  but multiple repeats including this admission are negative    WBC  =26.9  =9.15  7=24.12 (Neutrophil=92.9%)    Cr  =0.76  =0.66  =0.9    Albumin  =3.3  7/=3.5    VS: Tm 100, P 88, /62, R 20, 97% RA  In the ED received zosyn, LR 1L (2020 02:56)     prior hospital charts reviewed [  ]  primary team notes reviewed [  ]  other consultant notes reviewed [  ]  PAST MEDICAL & SURGICAL HISTORY:  Urinary retention  H/O muscular dystrophy  Scrotal swelling  Psychiatric disorder  No significant past surgical history    Allergies  No Known Allergies    ANTIMICROBIALS (past 90 days)  MEDICATIONS  (STANDING):  piperacillin/tazobactam IVPB.   200 mL/Hr IV Intermittent (20 @ 01:04)    vancomycin    Solution   125 milliGRAM(s) Oral (20 @ 06:40)      ANTIMICROBIALS:    vancomycin    Solution 125 every 6 hours    OTHER MEDS: MEDICATIONS  (STANDING):  diazepam    Tablet 5 every 12 hours  enoxaparin Injectable 40 daily  famotidine    Tablet 20 daily  tamsulosin 0.8 at bedtime    SOCIAL HISTORY:   hx smoking  non-smoker    FAMILY HISTORY:  FHx: stomach cancer    REVIEW OF SYSTEMS  [  ] ROS unobtainable because:    [  ] All other systems negative except as noted below:	    Constitutional:  [ ] fever [ ] chills  [ ] weight loss  [ ] weakness  Skin:  [ ] rash [ ] phlebitis	  Eyes: [ ] icterus [ ] pain  [ ] discharge	  ENMT: [ ] sore throat  [ ] thrush [ ] ulcers [ ] exudates  Respiratory: [ ] dyspnea [ ] hemoptysis [ ] cough [ ] sputum	  Cardiovascular:  [ ] chest pain [ ] palpitations [ ] edema	  Gastrointestinal:  [ ] nausea [ ] vomiting [ ] diarrhea [ ] constipation [ ] pain	  Genitourinary:  [ ] dysuria [ ] frequency [ ] hematuria [ ] discharge [ ] flank pain  [ ] incontinence  Musculoskeletal:  [ ] myalgias [ ] arthralgias [ ] arthritis  [ ] back pain  Neurological:  [ ] headache [ ] seizures  [ ] confusion/altered mental status  Psychiatric:  [ ] anxiety [ ] depression	  Hematology/Lymphatics:  [ ] lymphadenopathy  Endocrine:  [ ] adrenal [ ] thyroid  Allergic/Immunologic:	 [ ] transplant [ ] seasonal    Vital Signs Last 24 Hrs  T(F): 98.4 (20 @ 04:45), Max: 100 (20 @ 00:45)  Vital Signs Last 24 Hrs  HR: 79 (20 @ 04:45) (79 - 88)  BP: 107/67 (20 @ 04:45) (107/67 - 109/62)  RR: 18 (20 @ 04:45)  SpO2: 98% (20 @ 04:45) (97% - 98%)  Wt(kg): --    EXAM:  Constitutional: Not in acute distress  Eyes: pupils bilaterally reactive to light. No icterus.  Oral cavity: Clear, no lesions  Neck: No neck vein distension noted  RS: Chest clear to auscultation bilaterally. No wheeze/rhonchi/crepitations.  CVS: S1, S2 heard. Regular rate and rhythm. No murmurs/rubs/gallops.  Abdomen: Soft. No guarding/rigidity/tenderness.  : No acute abnormalities  Extremities: Warm. No pedal edema  Skin: No lesions noted  Vascular: No evidence of phlebitis  Neuro: Alert, oriented to time/place/person                          11.5   24.12 )-----------( 361      ( 2020 00:51 )             35.3         07-06    136  |  98  |  15  ----------------------------<  112<H>  3.4<L>   |  25  |  0.90    Ca    9.4      2020 00:51    TPro  6.8  /  Alb  3.5  /  TBili  0.4  /  DBili  x   /  AST  11  /  ALT  9<L>  /  AlkPhos  93  07-06    Urinalysis Basic - ( 2020 00:51 )    Color: Light Yellow / Appearance: Slightly Turbid / S.009 / pH: x  Gluc: x / Ketone: Negative  / Bili: Negative / Urobili: Negative   Blood: x / Protein: Trace / Nitrite: Positive   Leuk Esterase: Large / RBC: 6 /hpf /  /HPF   Sq Epi: x / Non Sq Epi: 0 /hpf / Bacteria: Moderate    MICROBIOLOGY:  Culture - Blood (20 @ 21:51)    Specimen Source: .Blood Blood-Venous    Culture Results:   No Growth Final    Culture - Urine (20 @ 18:13)    -  Aztreonam: S <=4    -  Amikacin: S <=16    -  Ceftazidime: S 4    -  Cefepime: S <=4    -  Ciprofloxacin: R >2    -  Ciprofloxacin: R >2    -  Ampicillin: S <=2 Predicts results to ampicillin/sulbactam, amoxacillin-clavulanate and  piperacillin-tazobactam.    -  Vancomycin: R >16    -  Tobramycin: S <=4    -  Gentamicin: S <=4    -  Daptomycin: S 1    -  Linezolid: S <=1    -  Imipenem: I 4    -  Levofloxacin: R >4    -  Levofloxacin: R >4    -  Meropenem: I 4    -  Nitrofurantoin: S <=32 Should not be used to treat pyelonephritis.    -  Piperacillin/Tazobactam: S <=16    -  Tetra/Doxy: R >8    Specimen Source: .Urine Clean Catch (Midstream)    Culture Results:   >100,000 CFU/ml Pseudomonas aeruginosa  >100,000 CFU/ml Enterococcus faecalis (vancomycin resistant)    Organism Identification: Pseudomonas aeruginosa  Enterococcus faecalis (vancomycin resistant)    Organism: Pseudomonas aeruginosa    Organism: Enterococcus faecalis (vancomycin resistant)    Method Type: LILLY    Method Type: LILLY    Culture - Blood (20 @ 02:17)    Gram Stain:   Growth in anaerobic bottle: Gram Positive Cocci in Clusters    Specimen Source: .Blood Blood-Peripheral    Culture Results:   Growth in anaerobic bottle: Staphylococcus aureus  See previous culture [id number]      RADIOLOGY:  CXR clear    OTHER TESTS: Patient is a 75y old  Male who presents with a chief complaint of abd pain (2020 02:56)    HPI:  75M c hx likely muscular dystrophy of unknown etiology c/b rhabdomyolysis, PEG reversed, homebound, reported hx of COVID positive outpt, anxiety, BPH, s/p chronic fowler MSSA bacteremia (), hx of C.diff*2 recent past, recent hospitalization (-20) for UTI and Cdiff on PO vanc taper, was sent to ED by [facility] rehab due to extreme abd pain. Abd pain resolved immediately by changing Fowler in the ED with >500cc urine output. ID consulted for leukocytosis.    Pt denies fevers, chills, URI symptoms, SOB, CP. Pt normally ambulates with a walker, but has more recently started using a wheelchair. He denies N/V/D/Dysuria. His diarrhea resolved a week ago, now has formed stool, he had BM for the past 2 days.    Reviewed recent hospital chart:  C.diff Postive on , Negative on   Last urine culture on  grew VRE(faecalis)+Pseudomonas  Last positive BCx on  grew MSSA (there was a S.epididimis in [place] may be contamination)  COVID positive in  but multiple repeats including this admission are negative    WBC  =26.9  =9.15  =24.12 (Neutrophil=92.9%)    Cr  =0.76  =0.66  =0.9    Albumin  =3.3  =3.5    VS: Tm 100, P 88, /62, R 20, 97% RA  In the ED received zosyn, LR 1L (2020 02:56)     prior hospital charts reviewed [ V ]  primary team notes reviewed [ V ]    PAST MEDICAL & SURGICAL HISTORY:  Urinary retention  H/O muscular dystrophy  Scrotal swelling  Psychiatric disorder  No significant past surgical history    Allergies  No Known Allergies    ANTIMICROBIALS (past 90 days)  MEDICATIONS  (STANDING):  piperacillin/tazobactam IVPB.   200 mL/Hr IV Intermittent (20 @ 01:04)    vancomycin    Solution   125 milliGRAM(s) Oral (20 @ 06:40)      ANTIMICROBIALS:    vancomycin    Solution 125 every 6 hours    OTHER MEDS: MEDICATIONS  (STANDING):  diazepam    Tablet 5 every 12 hours  enoxaparin Injectable 40 daily  famotidine    Tablet 20 daily  tamsulosin 0.8 at bedtime    SOCIAL HISTORY:  From NH. No toxic behavior.     FAMILY HISTORY:  FHx: stomach cancer    REVIEW OF SYSTEMS  [  ] ROS unobtainable because:    [  ] All other systems negative except as noted below:	    Constitutional:  [- ] fever [- ] chills  [ ] weight loss  [- ] weakness  Skin:  [ ] rash [ ] phlebitis	  Eyes: [ ] icterus [ ] pain  [ ] discharge	  ENMT: [ ] sore throat  [ ] thrush [ ] ulcers [ ] exudates  Respiratory: [- ] dyspnea [ ] hemoptysis [- ] cough [ ] sputum	  Cardiovascular:  [- ] chest pain [ ] palpitations [ ] edema	  Gastrointestinal:  [- ] nausea [- ] vomiting [- ] diarrhea [ ] constipation [ ] pain	  Genitourinary:  [- ] dysuria [ ] frequency [ ] hematuria [ ] discharge [ ] flank pain  [ ] incontinence  Musculoskeletal:  [ ] myalgias [ ] arthralgias [ ] arthritis  [ ] back pain  Neurological:  [ ] headache [ ] seizures  [ ] confusion/altered mental status  Psychiatric:  [+ ] anxiety [ ] depression	  Hematology/Lymphatics:  [ ] lymphadenopathy  Endocrine:  [ ] adrenal [ ] thyroid  Allergic/Immunologic:	 [ ] transplant [ ] seasonal    Vital Signs Last 24 Hrs  T(F): 98.4 (20 @ 04:45), Max: 100 (20 @ 00:45)  Vital Signs Last 24 Hrs  HR: 79 (20 @ 04:45) (79 - 88)  BP: 107/67 (20 @ 04:45) (107/67 - 109/62)  RR: 18 (20 @ 04:45)  SpO2: 98% (20 @ 04:45) (97% - 98%)  Wt(kg): --    EXAM:  GA: NAD, talkative  HEENT: temporal wasting  CV: RRR  Lungs: CTAB  Abd: soft, normoactive BS+, nontender  Fowler in place, clear urine  Ext: no edema  There is a sacral ulcer, but covered with powder/cream, can't visualized clearly                          11.5   24.12 )-----------( 361      ( 2020 00:51 )             35.3         07-06    136  |  98  |  15  ----------------------------<  112<H>  3.4<L>   |  25  |  0.90    Ca    9.4      2020 00:51    TPro  6.8  /  Alb  3.5  /  TBili  0.4  /  DBili  x   /  AST  11  /  ALT  9<L>  /  AlkPhos  93  07-    Urinalysis Basic - ( 2020 00:51 )    Color: Light Yellow / Appearance: Slightly Turbid / S.009 / pH: x  Gluc: x / Ketone: Negative  / Bili: Negative / Urobili: Negative   Blood: x / Protein: Trace / Nitrite: Positive   Leuk Esterase: Large / RBC: 6 /hpf /  /HPF   Sq Epi: x / Non Sq Epi: 0 /hpf / Bacteria: Moderate    MICROBIOLOGY:  Culture - Blood (20 @ 21:51)    Specimen Source: .Blood Blood-Venous    Culture Results:   No Growth Final    Culture - Urine (20 @ 18:13)    -  Aztreonam: S <=4    -  Amikacin: S <=16    -  Ceftazidime: S 4    -  Cefepime: S <=4    -  Ciprofloxacin: R >2    -  Ciprofloxacin: R >2    -  Ampicillin: S <=2 Predicts results to ampicillin/sulbactam, amoxacillin-clavulanate and  piperacillin-tazobactam.    -  Vancomycin: R >16    -  Tobramycin: S <=4    -  Gentamicin: S <=4    -  Daptomycin: S 1    -  Linezolid: S <=1    -  Imipenem: I 4    -  Levofloxacin: R >4    -  Levofloxacin: R >4    -  Meropenem: I 4    -  Nitrofurantoin: S <=32 Should not be used to treat pyelonephritis.    -  Piperacillin/Tazobactam: S <=16    -  Tetra/Doxy: R >8    Specimen Source: .Urine Clean Catch (Midstream)    Culture Results:   >100,000 CFU/ml Pseudomonas aeruginosa  >100,000 CFU/ml Enterococcus faecalis (vancomycin resistant)    Organism Identification: Pseudomonas aeruginosa  Enterococcus faecalis (vancomycin resistant)    Organism: Pseudomonas aeruginosa    Organism: Enterococcus faecalis (vancomycin resistant)    Method Type: LILLY    Method Type: LILLY    Culture - Blood (20 @ 02:17)    Gram Stain:   Growth in anaerobic bottle: Gram Positive Cocci in Clusters    Specimen Source: .Blood Blood-Peripheral    Culture Results:   Growth in anaerobic bottle: Staphylococcus aureus  See previous culture [id number]    RADIOLOGY:  CXR clear Patient is a 75y old  Male who presents with a chief complaint of abd pain (2020 02:56)    HPI:  75M c hx likely muscular dystrophy of unknown etiology c/b rhabdomyolysis, PEG reversed, homebound, reported hx of COVID positive outpt, anxiety, BPH, s/p chronic fowler MSSA bacteremia (), hx of C.diff*2 recent past, recent hospitalization (-20) for UTI and Cdiff on PO vanc taper, was sent to ED by [facility] rehab due to extreme abd pain. Abd pain resolved immediately by changing Fowler in the ED with >500cc urine output. ID consulted for leukocytosis.    Pt denies fevers, chills, URI symptoms, SOB, CP. Pt normally ambulates with a walker, but has more recently started using a wheelchair. He denies N/V/D/Dysuria. His diarrhea resolved a week ago, now has formed stool, he had BM for the past 2 days.    Last admission from - for increased diarrhea and C.diff. Before hospitalization, he already had 2 episodes of C.diff. C.diff was positive on  and , but negative on . He was started on C.diff taper from  (PO vancomycin 125 mg po q 6 for 4 weeks, then vancoymcin 125 mg po q12 for 1 week, then vancoymcin 125 mg po daily for 1 week, then vancoymcin 125 mg po every other day for one week).     In April, he had facial wound (resolved already) and MSSA bacteremia. He was treated with Cefazolin and last dose was in early May.    Reviewed recent hospital chart:  C.diff Postive on ,  Negative on   Last urine culture on  grew VRE(faecalis)+Pseudomonas  Last positive BCx on  grew MSSA (there was a S.epididimis in [place] may be contamination)  COVID positive in  but multiple repeats including this admission are negative    WBC  =26.9  =9.15  =24.12 (Neutrophil=92.9%)    Cr  =0.76  =0.66  =0.9    Albumin  6/23=3.3  7/6=3.5    VS: Tm 100, P 88, /62, R 20, 97% RA  In the ED received zosyn, LR 1L (2020 02:56)     prior hospital charts reviewed [ V ]  primary team notes reviewed [ V ]    PAST MEDICAL & SURGICAL HISTORY:  Urinary retention  H/O muscular dystrophy  Scrotal swelling  Psychiatric disorder  No significant past surgical history    Allergies  No Known Allergies    ANTIMICROBIALS (past 90 days)  MEDICATIONS  (STANDING):  piperacillin/tazobactam IVPB.   200 mL/Hr IV Intermittent (20 @ 01:04)    vancomycin    Solution   125 milliGRAM(s) Oral (20 @ 06:40)      ANTIMICROBIALS:    vancomycin    Solution 125 every 6 hours    OTHER MEDS: MEDICATIONS  (STANDING):  diazepam    Tablet 5 every 12 hours  enoxaparin Injectable 40 daily  famotidine    Tablet 20 daily  tamsulosin 0.8 at bedtime    SOCIAL HISTORY:  From NH. No toxic behavior.     FAMILY HISTORY:  FHx: stomach cancer    REVIEW OF SYSTEMS  [  ] ROS unobtainable because:    [  ] All other systems negative except as noted below:	    Constitutional:  [- ] fever [- ] chills  [ ] weight loss  [- ] weakness  Skin:  [ ] rash [ ] phlebitis	  Eyes: [ ] icterus [ ] pain  [ ] discharge	  ENMT: [ ] sore throat  [ ] thrush [ ] ulcers [ ] exudates  Respiratory: [- ] dyspnea [ ] hemoptysis [- ] cough [ ] sputum	  Cardiovascular:  [- ] chest pain [ ] palpitations [ ] edema	  Gastrointestinal:  [- ] nausea [- ] vomiting [- ] diarrhea [ ] constipation [ ] pain	  Genitourinary:  [- ] dysuria [ ] frequency [ ] hematuria [ ] discharge [ ] flank pain  [ ] incontinence  Musculoskeletal:  [ ] myalgias [ ] arthralgias [ ] arthritis  [ ] back pain  Neurological:  [ ] headache [ ] seizures  [ ] confusion/altered mental status  Psychiatric:  [+ ] anxiety [ ] depression	  Hematology/Lymphatics:  [ ] lymphadenopathy  Endocrine:  [ ] adrenal [ ] thyroid  Allergic/Immunologic:	 [ ] transplant [ ] seasonal    Vital Signs Last 24 Hrs  T(F): 98.4 (20 @ 04:45), Max: 100 (20 @ 00:45)  Vital Signs Last 24 Hrs  HR: 79 (20 @ 04:45) (79 - 88)  BP: 107/67 (20 @ 04:45) (107/67 - 109/62)  RR: 18 (20 @ 04:45)  SpO2: 98% (20 @ 04:45) (97% - 98%)  Wt(kg): --    EXAM:  GA: NAD, talkative  HEENT: temporal wasting  CV: RRR  Lungs: CTAB  Abd: soft, normoactive BS+, nontender  Fowler in place, clear urine  Ext: no edema  There is a sacral ulcer, but covered with powder/cream, can't visualized clearly                          11.5   24.12 )-----------( 361      ( 2020 00:51 )             35.3         07-    136  |  98  |  15  ----------------------------<  112<H>  3.4<L>   |  25  |  0.90    Ca    9.4      2020 00:51    TPro  6.8  /  Alb  3.5  /  TBili  0.4  /  DBili  x   /  AST  11  /  ALT  9<L>  /  AlkPhos  93  07-06    Urinalysis Basic - ( 2020 00:51 )    Color: Light Yellow / Appearance: Slightly Turbid / S.009 / pH: x  Gluc: x / Ketone: Negative  / Bili: Negative / Urobili: Negative   Blood: x / Protein: Trace / Nitrite: Positive   Leuk Esterase: Large / RBC: 6 /hpf /  /HPF   Sq Epi: x / Non Sq Epi: 0 /hpf / Bacteria: Moderate    MICROBIOLOGY:  Culture - Blood (20 @ 21:51)    Specimen Source: .Blood Blood-Venous    Culture Results:   No Growth Final    Culture - Urine (20 @ 18:13)    -  Aztreonam: S <=4    -  Amikacin: S <=16    -  Ceftazidime: S 4    -  Cefepime: S <=4    -  Ciprofloxacin: R >2    -  Ciprofloxacin: R >2    -  Ampicillin: S <=2 Predicts results to ampicillin/sulbactam, amoxacillin-clavulanate and  piperacillin-tazobactam.    -  Vancomycin: R >16    -  Tobramycin: S <=4    -  Gentamicin: S <=4    -  Daptomycin: S 1    -  Linezolid: S <=1    -  Imipenem: I 4    -  Levofloxacin: R >4    -  Levofloxacin: R >4    -  Meropenem: I 4    -  Nitrofurantoin: S <=32 Should not be used to treat pyelonephritis.    -  Piperacillin/Tazobactam: S <=16    -  Tetra/Doxy: R >8    Specimen Source: .Urine Clean Catch (Midstream)    Culture Results:   >100,000 CFU/ml Pseudomonas aeruginosa  >100,000 CFU/ml Enterococcus faecalis (vancomycin resistant)    Organism Identification: Pseudomonas aeruginosa  Enterococcus faecalis (vancomycin resistant)    Organism: Pseudomonas aeruginosa    Organism: Enterococcus faecalis (vancomycin resistant)    Method Type: LILLY    Method Type: LILLY    Culture - Blood (20 @ 02:17)    Gram Stain:   Growth in anaerobic bottle: Gram Positive Cocci in Clusters    Specimen Source: .Blood Blood-Peripheral    Culture Results:   Growth in anaerobic bottle: Staphylococcus aureus  See previous culture [id number]    RADIOLOGY:  CXR clear Patient is a 75y old  Male who presents with a chief complaint of abd pain (2020 02:56)    HPI:  75M c hx likely muscular dystrophy of unknown etiology c/b rhabdomyolysis, PEG reversed, homebound, reported hx of COVID positive outpt, anxiety, BPH, s/p chronic fowler MSSA bacteremia (), hx of C.diff*2 recent past, recent hospitalization (-20) for UTI and Cdiff on PO vanc taper, was sent to ED by [facility] rehab due to extreme abd pain. Abd pain resolved immediately by changing Fowler in the ED with >500cc urine output. ID consulted for leukocytosis.    Pt denies fevers, chills, URI symptoms, SOB, CP. Pt normally ambulates with a walker, but has more recently started using a wheelchair. He denies N/V/D/Dysuria. His diarrhea resolved a week ago, now has formed stool, he had BM for the past 2 days.    Last admission from - for increased diarrhea and C.diff. Before hospitalization, he already had 2 episodes of C.diff. C.diff was positive on  and , but negative on . He was started on C.diff taper from  (PO vancomycin 125 mg po q 6 for 4 weeks, then vancoymcin 125 mg po q12 for 1 week, then vancoymcin 125 mg po daily for 1 week, then vancoymcin 125 mg po every other day for one week).     In April, he had facial wound (resolved already) and MSSA bacteremia. He was treated with Cefazolin and last dose was in early May.    Reviewed recent hospital chart:  C.diff Postive on ,  Negative on   Last urine culture on  grew VRE(faecalis)+Pseudomonas  Last positive BCx on  grew MSSA (there was a S.epididimis in [place] may be contamination)  COVID positive in  but multiple repeats including this admission are negative    WBC  =26.9  =9.15  =24.12 (Neutrophil=92.9%)    Cr  =0.76  =0.66  =0.9    Albumin  6/23=3.3  7/6=3.5    VS: Tm 100, P 88, /62, R 20, 97% RA  In the ED received zosyn, LR 1L (2020 02:56)     prior hospital charts reviewed [ V ]  primary team notes reviewed [ V ]    PAST MEDICAL & SURGICAL HISTORY:  Urinary retention  H/O muscular dystrophy  Scrotal swelling  Psychiatric disorder  No significant past surgical history    Allergies  No Known Allergies    ANTIMICROBIALS (past 90 days)  MEDICATIONS  (STANDING):  piperacillin/tazobactam IVPB.   200 mL/Hr IV Intermittent (20 @ 01:04)    vancomycin    Solution   125 milliGRAM(s) Oral (20 @ 06:40)      ANTIMICROBIALS:    vancomycin    Solution 125 every 6 hours    OTHER MEDS: MEDICATIONS  (STANDING):  diazepam    Tablet 5 every 12 hours  enoxaparin Injectable 40 daily  famotidine    Tablet 20 daily  tamsulosin 0.8 at bedtime    SOCIAL HISTORY:  was in NH prior to admission but before lived alone, no pets  no smoking, alcohol or drug abuse  no recent travel    FAMILY HISTORY:  FHx: stomach cancer    REVIEW OF SYSTEMS  [  ] ROS unobtainable because:    [  ] All other systems negative except as noted below:	    Constitutional:  [- ] fever [- ] chills  [ ] weight loss  [- ] weakness  Head: no headache, no  head trauma  Skin:  [ ] rash [ ] phlebitis	  Eyes: [ ] icterus [ ] pain  [ ] discharge	  ENMT: [ ] sore throat  [ ] thrush [ ] ulcers [ ] exudates  Respiratory: [- ] dyspnea [ ] hemoptysis [- ] cough [ ] sputum	  Cardiovascular:  [- ] chest pain [ ] palpitations [ ] edema	  Gastrointestinal:  [- ] nausea [- ] vomiting [- ] diarrhea [ ] constipation [ ] pain	  Genitourinary:  [- ] dysuria [ ] frequency [ ] hematuria [ ] discharge [ ] flank pain  [ ] incontinence  Musculoskeletal:  [ ] myalgias [ ] arthralgias [ ] arthritis  [ ] back pain  Neurological:  [ ] headache [ ] seizures  [ ] confusion/altered mental status  Psychiatric:  [+ ] anxiety [ ] depression	  Hematology/Lymphatics:  [ ] lymphadenopathy  Endocrine:  [ ] adrenal [ ] thyroid  Allergic/Immunologic:	 [ ] transplant [ ] seasonal    Vital Signs Last 24 Hrs  T(F): 98.4 (20 @ 04:45), Max: 100 (20 @ 00:45)  Vital Signs Last 24 Hrs  HR: 79 (20 @ 04:45) (79 - 88)  BP: 107/67 (20 @ 04:45) (107/67 - 109/62)  RR: 18 (20 @ 04:45)  SpO2: 98% (20 @ 04:45) (97% - 98%)  Wt(kg): --    EXAM:  General: cachectic, NAD  Head: atraumatic, normocephalic  Eyes: normal sclera  ENT: no oral lesions, temporal wasting  cardio: regular S1, S2  respiratory: clear b/l, no wheezing  GI: soft, BS+, no tenderness  : no suprapubic or CVAT, fowler with clear urine  MSK: no LE edema  skin: no rash  neuro: A&O  Psych: normal affect                            11.5   24.12 )-----------( 361      ( 2020 00:51 )             35.3         07-    136  |  98  |  15  ----------------------------<  112<H>  3.4<L>   |  25  |  0.90    Ca    9.4      2020 00:51    TPro  6.8  /  Alb  3.5  /  TBili  0.4  /  DBili  x   /  AST  11  /  ALT  9<L>  /  AlkPhos  93  07-06    Urinalysis Basic - ( 2020 00:51 )    Color: Light Yellow / Appearance: Slightly Turbid / S.009 / pH: x  Gluc: x / Ketone: Negative  / Bili: Negative / Urobili: Negative   Blood: x / Protein: Trace / Nitrite: Positive   Leuk Esterase: Large / RBC: 6 /hpf /  /HPF   Sq Epi: x / Non Sq Epi: 0 /hpf / Bacteria: Moderate    MICROBIOLOGY:  Culture - Blood (20 @ 21:51)    Specimen Source: .Blood Blood-Venous    Culture Results:   No Growth Final    Culture - Urine (20 @ 18:13)    -  Aztreonam: S <=4    -  Amikacin: S <=16    -  Ceftazidime: S 4    -  Cefepime: S <=4    -  Ciprofloxacin: R >2    -  Ciprofloxacin: R >2    -  Ampicillin: S <=2 Predicts results to ampicillin/sulbactam, amoxacillin-clavulanate and  piperacillin-tazobactam.    -  Vancomycin: R >16    -  Tobramycin: S <=4    -  Gentamicin: S <=4    -  Daptomycin: S 1    -  Linezolid: S <=1    -  Imipenem: I 4    -  Levofloxacin: R >4    -  Levofloxacin: R >4    -  Meropenem: I 4    -  Nitrofurantoin: S <=32 Should not be used to treat pyelonephritis.    -  Piperacillin/Tazobactam: S <=16    -  Tetra/Doxy: R >8    Specimen Source: .Urine Clean Catch (Midstream)    Culture Results:   >100,000 CFU/ml Pseudomonas aeruginosa  >100,000 CFU/ml Enterococcus faecalis (vancomycin resistant)    Organism Identification: Pseudomonas aeruginosa  Enterococcus faecalis (vancomycin resistant)    Organism: Pseudomonas aeruginosa    Organism: Enterococcus faecalis (vancomycin resistant)    Method Type: LILLY    Method Type: LILLY    Culture - Blood (20 @ 02:17)    Gram Stain:   Growth in anaerobic bottle: Gram Positive Cocci in Clusters    Specimen Source: .Blood Blood-Peripheral    Culture Results:   Growth in anaerobic bottle: Staphylococcus aureus  See previous culture [id number]    RADIOLOGY:  CXR clear

## 2020-07-06 NOTE — H&P ADULT - HISTORY OF PRESENT ILLNESS
75M c hx likely muscular dystrophy of unknown etiology, reported COVID positive outpt, homebound,       c diff, presenting with 2 days of fever, diarrhea and UTI. Denies cough or SOB. Endorses cramping abdominal pain that improves after bowel movements, states the diarrhea has been "uncontrollable."  pt. has several admission to hospital and has been diagnosed with cdiff x 2 in recent past, diarrhea started 2 days ago and it is watery , also had fowler cath in ED for retention. he was given vanco orally for cdiff in past .   he came from [facility], but don't want to go back there 75M c hx likely muscular dystrophy of unknown etiology c/b rhabdomyolysis, PEG reversed, homebound, reported hx of COVID positive outpt, anxiety, BPH, s/p chronic fowler MSSA bacteremia (Apr '20), recent hospitalization (6/23-6/30/20) for UTI and Cdiff on PO vanc taper, presents from [facility] rehab with abd pain.    Pt states his diarrhea has resolved and is now well formed. Pt states he was in his new usual state of health when he started to get abdominal/suprapubic pain. pt also noticed his fowler stopped putting out urine. pt states a similar situation has happened in the past that resolved when his fowler was changed. Pt states that the pain at the rehab continued to get worse, when he was sent to the hospital. Pt denies the fowler was pulled or dislodged, and is not sure why it keeps getting blocked. Pt denies fevers, chills, URI symptoms, SOB, CP. Pt normally ambulates with a walker, but has more recently started using a wheelchair.    VS: Tm 100, P 88, /62, R 20, 97% RA  In the ED received zosyn, LR 1L

## 2020-07-06 NOTE — PROVIDER CONTACT NOTE (MEDICATION) - RECOMMENDATIONS
Educated pt on benefits and risks of medication. Pt verbalized understanding but refuses medication for his hospital stay.

## 2020-07-06 NOTE — PHYSICAL THERAPY INITIAL EVALUATION ADULT - PRECAUTIONS/LIMITATIONS, REHAB EVAL
fall precautions/75M c hx likely muscular dystrophy of unknown etiology c/b rhabdomyolysis, PEG reversed, homebound, reported hx of COVID positive outpt, anxiety, BPH, s/p chronic fowler MSSA bacteremia (Apr '20), hx of C.diff*2 recent past, recent hospitalization (6/23-6/30/20) for UTI and Cdiff on PO vanc taper, presents from [facility] rehab with abd pain resolved by changing Fowler. No more diarrhea for a week, now has formed stool. Wwby=261 at ED. Noticed increased leukocytosis from 9 to 24. Cr slightly increased from 0.66 to 0.9. Albumin=3.5 unchanged. UA positive for pyuria and bacteruria. C.diff positive on 6/1 but negative on 7/2, currently on Vanc PO taper.

## 2020-07-06 NOTE — ED PROVIDER NOTE - PHYSICAL EXAMINATION
GENERAL: AAOx4, GCS 15, NAD, WDWN; HEENT: MMM, no jugular venous distension, supple neck, PERRLA, EOMI, nonicteric sclera; PULM: CTA B, no crackles/rubs/rales; CV: RRR, S1S2, no MRG; ABD: Flat abdomen, NTND, no R/G/R, no CVAT.   -- trace white d/c at meatus, +scrotal swelling without erythema, tender to palpation, or crepitation.  Spencer in place.  MSK: no deformities, +2 pulses x4;  NEURO: No obvious focal deficits; PSYCH: AAOx3, clear thought and normal sensorium.

## 2020-07-06 NOTE — H&P ADULT - BIRTH SEX
Chief Complaint:   Patient presents with:  Medication Follow-Up    HPI:   This is a 48year old female presenting for follow up:  Pt had a history of hypothyroidism and here to recheck. Has been tolerating the medication well.  Last TSH was year ago, needs disease    • Migraine    • Migraines    • Mitral regurgitation     mild   • Multiple exostosis, hereditary    • Multiple exostosis, hereditary    • Raynaud phenomenon    • Rheumatic mitral stenosis    • Thyroid disease      Past Surgical History:  No date: patch Rfl: 3   SUMAtriptan Succinate (IMITREX) 100 MG Oral Tab Take one tablet at onset of migraine.  May repeat after 1 hour for max of 2 tabs daily Disp: 10 tablet Rfl: 3   PROAIR  (90 Base) MCG/ACT Inhalation Aero Soln Inhale 1 puff into the lungs Musculoskeletal: Negative for joint pain, gait problem, neck pain and neck stiffness. Skin: Negative for color change, pallor, rash and wound. Allergic/Immunologic: Negative for environmental allergies, food allergies and immunocompromised state.    Candelario Churchill present. Pulmonary/Chest: Effort normal. No stridor. No respiratory distress. She has no wheezes. She has no rales. She exhibits no tenderness. Abdominal: Soft. Bowel sounds are normal. She exhibits no distension and no mass.  There is no hepatosplenomeg BEDTIME AS NEEDED  Dispense: 30 tablet; Refill: 6    5. Tobacco dependence  -will do trial of chantix  Smoking cessation encouraged and discussed in detail.   Conservative measures discussed including disposing of all cigarettes and ashtrays was discussed w Male

## 2020-07-06 NOTE — ED ADULT NURSE REASSESSMENT NOTE - NS ED NURSE REASSESS COMMENT FT1
Spencer catheter placed with MD order. Aseptic technique maintained. Two RNs at bedside during procedure. Catheter draining cloudy yellow urine. Patient has Spencer chronically, but arrived in ER with catheter dislodged.

## 2020-07-06 NOTE — CONSULT NOTE ADULT - ATTENDING COMMENTS
75 m with muscular dystrophy c/b rhabdomyolysis, PEG reversed, BPH, recent COVID, MSSA bacteremia 4/2020, recurrent C-diff, recent admission for leukocytosis and C-diff still on vanco taper, had pseudomonas and VRE(amp sensitive) in the urine but considered colonization, now p/w suprapubic pain and fowler malfunction?, fowler was exchanged and 500 cc drained, abd pain resolved  afebrile, WBC: 24  u/a positive    lower abd/suprapubic pain due to urinary retention and fowler malfunction?  leukocytosis, ?reactive, no diarrhea and pt still on tapering vanco, ?UTI but the abd pain resolved after placing a new fowler    * check blood cultures  * repeat CBC  * s/p a dose of zosyn, pt afebrile and asymptomatic now, would hold off on antibiotics and monitor  * if fever or worsening status, start zosyn  * f/u the urine cx  * abd/pelvis CT with contrast  * c/w PO tapering vanco as planned

## 2020-07-06 NOTE — PHYSICAL THERAPY INITIAL EVALUATION ADULT - IMPAIRMENTS CONTRIBUTING TO GAIT DEVIATIONS, PT EVAL
decreased endurance , min-mod unsteady/impaired balance/impaired postural control/decreased strength

## 2020-07-06 NOTE — PHYSICAL THERAPY INITIAL EVALUATION ADULT - GENERAL OBSERVATIONS, REHAB EVAL
pt received in bed top rails up and 1 siderail HOB 40 degrees as pt just finished lunch ; Pt tell PT re PMH. HPI and social history

## 2020-07-06 NOTE — H&P ADULT - PROBLEM SELECTOR PLAN 1
- improved after fowler replaced  - likely 2/2 fowler obstruction  - will get CT abd to eval for infection, hydro

## 2020-07-07 LAB
ALBUMIN SERPL ELPH-MCNC: 3.4 G/DL — SIGNIFICANT CHANGE UP (ref 3.3–5)
ALP SERPL-CCNC: 82 U/L — SIGNIFICANT CHANGE UP (ref 40–120)
ALT FLD-CCNC: 7 U/L — LOW (ref 10–45)
ANION GAP SERPL CALC-SCNC: 8 MMOL/L — SIGNIFICANT CHANGE UP (ref 5–17)
AST SERPL-CCNC: 10 U/L — SIGNIFICANT CHANGE UP (ref 10–40)
BASOPHILS # BLD AUTO: 0.08 K/UL — SIGNIFICANT CHANGE UP (ref 0–0.2)
BASOPHILS NFR BLD AUTO: 0.8 % — SIGNIFICANT CHANGE UP (ref 0–2)
BILIRUB SERPL-MCNC: 0.3 MG/DL — SIGNIFICANT CHANGE UP (ref 0.2–1.2)
BUN SERPL-MCNC: 13 MG/DL — SIGNIFICANT CHANGE UP (ref 7–23)
CALCIUM SERPL-MCNC: 9.5 MG/DL — SIGNIFICANT CHANGE UP (ref 8.4–10.5)
CHLORIDE SERPL-SCNC: 101 MMOL/L — SIGNIFICANT CHANGE UP (ref 96–108)
CO2 SERPL-SCNC: 29 MMOL/L — SIGNIFICANT CHANGE UP (ref 22–31)
CREAT SERPL-MCNC: 0.74 MG/DL — SIGNIFICANT CHANGE UP (ref 0.5–1.3)
EOSINOPHIL # BLD AUTO: 0.48 K/UL — SIGNIFICANT CHANGE UP (ref 0–0.5)
EOSINOPHIL NFR BLD AUTO: 4.8 % — SIGNIFICANT CHANGE UP (ref 0–6)
GLUCOSE SERPL-MCNC: 103 MG/DL — HIGH (ref 70–99)
HCT VFR BLD CALC: 34.1 % — LOW (ref 39–50)
HGB BLD-MCNC: 10.7 G/DL — LOW (ref 13–17)
IMM GRANULOCYTES NFR BLD AUTO: 0.5 % — SIGNIFICANT CHANGE UP (ref 0–1.5)
LYMPHOCYTES # BLD AUTO: 1.29 K/UL — SIGNIFICANT CHANGE UP (ref 1–3.3)
LYMPHOCYTES # BLD AUTO: 12.8 % — LOW (ref 13–44)
MAGNESIUM SERPL-MCNC: 1.8 MG/DL — SIGNIFICANT CHANGE UP (ref 1.6–2.6)
MCHC RBC-ENTMCNC: 31.2 PG — SIGNIFICANT CHANGE UP (ref 27–34)
MCHC RBC-ENTMCNC: 31.4 GM/DL — LOW (ref 32–36)
MCV RBC AUTO: 99.4 FL — SIGNIFICANT CHANGE UP (ref 80–100)
MONOCYTES # BLD AUTO: 0.86 K/UL — SIGNIFICANT CHANGE UP (ref 0–0.9)
MONOCYTES NFR BLD AUTO: 8.5 % — SIGNIFICANT CHANGE UP (ref 2–14)
NEUTROPHILS # BLD AUTO: 7.33 K/UL — SIGNIFICANT CHANGE UP (ref 1.8–7.4)
NEUTROPHILS NFR BLD AUTO: 72.6 % — SIGNIFICANT CHANGE UP (ref 43–77)
NRBC # BLD: 0 /100 WBCS — SIGNIFICANT CHANGE UP (ref 0–0)
PLATELET # BLD AUTO: 328 K/UL — SIGNIFICANT CHANGE UP (ref 150–400)
POTASSIUM SERPL-MCNC: 3.7 MMOL/L — SIGNIFICANT CHANGE UP (ref 3.5–5.3)
POTASSIUM SERPL-SCNC: 3.7 MMOL/L — SIGNIFICANT CHANGE UP (ref 3.5–5.3)
PROT SERPL-MCNC: 6.5 G/DL — SIGNIFICANT CHANGE UP (ref 6–8.3)
RBC # BLD: 3.43 M/UL — LOW (ref 4.2–5.8)
RBC # FLD: 13.9 % — SIGNIFICANT CHANGE UP (ref 10.3–14.5)
SARS-COV-2 IGG SERPL QL IA: POSITIVE
SARS-COV-2 IGM SERPL IA-ACNC: 5.12 INDEX — HIGH
SODIUM SERPL-SCNC: 138 MMOL/L — SIGNIFICANT CHANGE UP (ref 135–145)
WBC # BLD: 10.09 K/UL — SIGNIFICANT CHANGE UP (ref 3.8–10.5)
WBC # FLD AUTO: 10.09 K/UL — SIGNIFICANT CHANGE UP (ref 3.8–10.5)

## 2020-07-07 PROCEDURE — 99232 SBSQ HOSP IP/OBS MODERATE 35: CPT | Mod: GC

## 2020-07-07 RX ADMIN — Medication 1 DROP(S): at 17:46

## 2020-07-07 RX ADMIN — Medication 125 MILLIGRAM(S): at 23:07

## 2020-07-07 RX ADMIN — Medication 1 TABLET(S): at 17:46

## 2020-07-07 RX ADMIN — Medication 1 DROP(S): at 04:51

## 2020-07-07 RX ADMIN — Medication 5 MILLIGRAM(S): at 10:01

## 2020-07-07 RX ADMIN — Medication 5 MILLIGRAM(S): at 20:38

## 2020-07-07 RX ADMIN — Medication 1 TABLET(S): at 10:02

## 2020-07-07 RX ADMIN — FAMOTIDINE 20 MILLIGRAM(S): 10 INJECTION INTRAVENOUS at 12:44

## 2020-07-07 RX ADMIN — Medication 125 MILLIGRAM(S): at 17:47

## 2020-07-07 RX ADMIN — ENOXAPARIN SODIUM 40 MILLIGRAM(S): 100 INJECTION SUBCUTANEOUS at 12:43

## 2020-07-07 RX ADMIN — TAMSULOSIN HYDROCHLORIDE 0.8 MILLIGRAM(S): 0.4 CAPSULE ORAL at 23:07

## 2020-07-07 RX ADMIN — Medication 125 MILLIGRAM(S): at 12:43

## 2020-07-07 RX ADMIN — Medication 125 MILLIGRAM(S): at 04:51

## 2020-07-07 NOTE — PROGRESS NOTE ADULT - PROBLEM SELECTOR PROBLEM 1
Sepsis due to methicillin susceptible Staphylococcus aureus, unspecified whether acute organ dysfunction present 0.07

## 2020-07-08 ENCOUNTER — TRANSCRIPTION ENCOUNTER (OUTPATIENT)
Age: 75
End: 2020-07-08

## 2020-07-08 LAB
ANION GAP SERPL CALC-SCNC: 10 MMOL/L — SIGNIFICANT CHANGE UP (ref 5–17)
BUN SERPL-MCNC: 14 MG/DL — SIGNIFICANT CHANGE UP (ref 7–23)
CALCIUM SERPL-MCNC: 9.4 MG/DL — SIGNIFICANT CHANGE UP (ref 8.4–10.5)
CHLORIDE SERPL-SCNC: 98 MMOL/L — SIGNIFICANT CHANGE UP (ref 96–108)
CO2 SERPL-SCNC: 29 MMOL/L — SIGNIFICANT CHANGE UP (ref 22–31)
CREAT SERPL-MCNC: 0.76 MG/DL — SIGNIFICANT CHANGE UP (ref 0.5–1.3)
GLUCOSE SERPL-MCNC: 102 MG/DL — HIGH (ref 70–99)
HCT VFR BLD CALC: 35.8 % — LOW (ref 39–50)
HGB BLD-MCNC: 11.1 G/DL — LOW (ref 13–17)
MCHC RBC-ENTMCNC: 30.8 PG — SIGNIFICANT CHANGE UP (ref 27–34)
MCHC RBC-ENTMCNC: 31 GM/DL — LOW (ref 32–36)
MCV RBC AUTO: 99.4 FL — SIGNIFICANT CHANGE UP (ref 80–100)
NRBC # BLD: 0 /100 WBCS — SIGNIFICANT CHANGE UP (ref 0–0)
PLATELET # BLD AUTO: 338 K/UL — SIGNIFICANT CHANGE UP (ref 150–400)
POTASSIUM SERPL-MCNC: 3.6 MMOL/L — SIGNIFICANT CHANGE UP (ref 3.5–5.3)
POTASSIUM SERPL-SCNC: 3.6 MMOL/L — SIGNIFICANT CHANGE UP (ref 3.5–5.3)
RBC # BLD: 3.6 M/UL — LOW (ref 4.2–5.8)
RBC # FLD: 13.6 % — SIGNIFICANT CHANGE UP (ref 10.3–14.5)
SODIUM SERPL-SCNC: 137 MMOL/L — SIGNIFICANT CHANGE UP (ref 135–145)
WBC # BLD: 12.1 K/UL — HIGH (ref 3.8–10.5)
WBC # FLD AUTO: 12.1 K/UL — HIGH (ref 3.8–10.5)

## 2020-07-08 PROCEDURE — 99232 SBSQ HOSP IP/OBS MODERATE 35: CPT

## 2020-07-08 RX ORDER — TAMSULOSIN HYDROCHLORIDE 0.4 MG/1
2 CAPSULE ORAL
Qty: 0 | Refills: 0 | DISCHARGE
Start: 2020-07-08

## 2020-07-08 RX ORDER — DIAZEPAM 5 MG
1 TABLET ORAL
Qty: 0 | Refills: 0 | DISCHARGE
Start: 2020-07-08

## 2020-07-08 RX ADMIN — Medication 1 TABLET(S): at 17:21

## 2020-07-08 RX ADMIN — TAMSULOSIN HYDROCHLORIDE 0.8 MILLIGRAM(S): 0.4 CAPSULE ORAL at 21:28

## 2020-07-08 RX ADMIN — Medication 5 MILLIGRAM(S): at 21:27

## 2020-07-08 RX ADMIN — Medication 125 MILLIGRAM(S): at 23:47

## 2020-07-08 RX ADMIN — Medication 1 DROP(S): at 05:39

## 2020-07-08 RX ADMIN — Medication 125 MILLIGRAM(S): at 05:39

## 2020-07-08 RX ADMIN — Medication 125 MILLIGRAM(S): at 17:21

## 2020-07-08 RX ADMIN — FAMOTIDINE 20 MILLIGRAM(S): 10 INJECTION INTRAVENOUS at 12:53

## 2020-07-08 RX ADMIN — Medication 125 MILLIGRAM(S): at 12:53

## 2020-07-08 RX ADMIN — Medication 1 DROP(S): at 17:21

## 2020-07-08 RX ADMIN — Medication 5 MILLIGRAM(S): at 08:28

## 2020-07-08 RX ADMIN — Medication 1 TABLET(S): at 08:28

## 2020-07-08 NOTE — DISCHARGE NOTE PROVIDER - NSDCMRMEDTOKEN_GEN_ALL_CORE_FT
acetaminophen 325 mg oral tablet: 2 tab(s) orally every 6 hours, As needed, Temp greater or equal to 38C (100.4F), Moderate Pain (4 - 6)  diazePAM 5 mg oral tablet: 1 tab(s) orally every 12 hours  famotidine 20 mg oral tablet: 1 tab(s) orally once a day  ocular lubricant ophthalmic solution: 1 drop(s) to each affected eye 2 times a day  Probiotic Formula oral capsule: 1 cap(s) orally 2 times a day  tamsulosin 0.4 mg oral capsule: 2 cap(s) orally once a day (at bedtime)  vancomycin 25 mg/mL oral liquid: 5 milliliter(s) orally 4 times a day diazePAM 5 mg oral tablet: 1 tab(s) orally every 12 hours  famotidine 20 mg oral tablet: 1 tab(s) orally once a day  ocular lubricant ophthalmic solution: 1 drop(s) to each affected eye 2 times a day  Probiotic Formula oral capsule: 1 cap(s) orally 2 times a day  tamsulosin 0.4 mg oral capsule: 2 cap(s) orally once a day (at bedtime)  vancomycin 25 mg/mL oral liquid: 5 milliliter(s) orally 4 times a day diazePAM 5 mg oral tablet: 1 tab(s) orally every 12 hours MDD:2 tabs  famotidine 20 mg oral tablet: 1 tab(s) orally once a day  lactobacillus acidophilus oral capsule: 1 cap(s) orally 2 times a day (with meals)  ocular lubricant ophthalmic solution: 1 drop(s) to each affected eye 2 times a day  tamsulosin 0.4 mg oral capsule: 2 cap(s) orally once a day (at bedtime)  vancomycin 25 mg/mL oral liquid: 5 milliliter(s) orally 4 times a dayx7 days  Then 2x/ day x7 days, then  Once a day x7 days, then stop

## 2020-07-08 NOTE — DISCHARGE NOTE PROVIDER - CARE PROVIDER_API CALL
Frank Britton  Cardiology  [facility] [place] [address]  Phone: [phone]  Fax: [phone]  Follow Up Time: Frank Britton  Cardiology  [address] [facility] [address]  [place] [address]  Phone: [phone]  Fax: [phone]  Follow Up Time:     Evangelista Velasco  Urology  [address] [place] [address]  Phone: [phone]  Fax: [phone]  Follow Up Time:

## 2020-07-08 NOTE — DISCHARGE NOTE PROVIDER - PROVIDER TOKENS
PROVIDER:[TOKEN:[[id number]]] PROVIDER:[TOKEN:[[id number]]],PROVIDER:[TOKEN:[[id number]]]

## 2020-07-08 NOTE — DISCHARGE NOTE PROVIDER - NSDCCPCAREPLAN_GEN_ALL_CORE_FT
PRINCIPAL DISCHARGE DIAGNOSIS  Diagnosis: Sepsis due to urinary tract infection  Assessment and Plan of Treatment:       SECONDARY DISCHARGE DIAGNOSES  Diagnosis: Clostridium difficile infection  Assessment and Plan of Treatment: Clostridium difficile infection    Diagnosis: Spencer catheter problem, initial encounter  Assessment and Plan of Treatment: Spencer catheter problem, initial encounter    Diagnosis: Lower abdominal pain  Assessment and Plan of Treatment: Lower abdominal pain    Diagnosis: Muscular dystrophy  Assessment and Plan of Treatment: Muscular dystrophy PRINCIPAL DISCHARGE DIAGNOSIS  Diagnosis: Sepsis due to urinary tract infection  Assessment and Plan of Treatment: You were treated with IV antibiotics in ER.   Follow up with your primary healthcare provider in 1-2 weeks. Call for appointment.   If you develop fever, chills, malaise, or change in mental status call your Health Care Provider or go to the Emergency Department.  Nutrition is important, eat small frequent meals to help ensure you get adequate calories.  Do not stay in bed all day!  Increase your activity daily as tolerated.      SECONDARY DISCHARGE DIAGNOSES  Diagnosis: Complication of Fowler catheter  Assessment and Plan of Treatment: Your fowler catheter was changed on admission (7/6/20) due to leakage of urine which may have been caused by obstruction.  Follow up Urology in 1-2 weeks.    Diagnosis: Clostridium difficile infection  Assessment and Plan of Treatment: Continue with vancomycin as prescribed.  Follow up with your primary healthcare provider.    Diagnosis: Lower abdominal pain  Assessment and Plan of Treatment: Condition improved.    Diagnosis: Muscular dystrophy  Assessment and Plan of Treatment: Follow up with your Neurologist or primary healthcare provider as discussed at your las appointment.

## 2020-07-08 NOTE — DISCHARGE NOTE PROVIDER - HOSPITAL COURSE
76 yo M with pmh likely muscular dystrophy of unknown etiology, rhabdomyolysis, PEG with reversal, anxiety, BPH, chronic fowler, MSSA bacteremia (4/2020), recent hospitalization for UTI and C-diff, reported positive covid as outpatient. Present to ED from Rehab with abdominal pain.        Found to have significant leukocytosis; +ua; subsequent urine cx with contamination, blood cx neg. Chronic fowler found to be dislodged, poss obstructed; changed in ED. given 1 dose zosyn in ED. ID consulted and recc to monitor off abx 2/2 current tx for C-diff. Leukocytosis improved, remained afebrile. Medically cleared for discharge by Dr Nelson. 76 yo M with pmh likely muscular dystrophy of unknown etiology, rhabdomyolysis, PEG with reversal, anxiety, BPH, chronic fowler, MSSA bacteremia (4/2020), recent hospitalization for UTI and C-diff, reported positive covid as outpatient. Present to ED from Rehab with abdominal pain.    Found to have significant leukocytosis; +ua; subsequent urine cx with contamination, blood cx neg. Chronic fowler found to be dislodged, poss obstructed; changed in ED. given 1 dose zosyn in ED. ID consulted and recc to monitor off abx 2/2 current tx for C-diff. Leukocytosis improved, remained afebrile. Medically cleared for discharge by Dr Nelson.        lower abd/suprapubic pain due to urinary retention and Fowler malfunction    Reactive leukocytosis, no diarrhea and pt still on tapering vancomycin for previous C-Diff    positive urine cx with>3 organisms, likely colonization, as  the abd pain resolved after placing a new Fowler    -  blood cultures negative and WBC 10    * abd/pelvis CT with chronic bladder outlet obstruction and mild b/l hydro    * no additional antibiotics needed for now    * out patient follow up with urology (Dr ERIK Velasco)    * c/w PO tapering vanco as planned (pt was discharged last admission with the plan and meds) 76 yo M with pmh likely muscular dystrophy of unknown etiology, rhabdomyolysis, PEG with reversal, anxiety, BPH, chronic fowler, MSSA bacteremia (4/2020), recent hospitalization for UTI and C-diff, reported positive covid as outpatient. Present to ED from Rehab with abdominal pain.    Found to have significant leukocytosis; +ua; subsequent urine cx with contamination, blood cx neg. Chronic fowler found to be dislodged, poss obstructed; changed in ED. given 1 dose zosyn in ED. ID consulted and recc to monitor off abx 2/2 current tx for C-diff. Leukocytosis improved, remained afebrile. Medically cleared for discharge by Dr Nelson.

## 2020-07-08 NOTE — DISCHARGE NOTE PROVIDER - NSDCFUADDINST_GEN_ALL_CORE_FT
Activity as tolerated Discharge to [facility] with home care.  Discharge with fowler in-place for chronic bladder outlet obstruction - make an appointment to follow up with your urologist.  Make appointment(s) to follow up with all of your physicians - bring all discharge paperwork including discharge medication list to follow up appointments.  Continue with the previous prescribed Vancomycin exactly as it was prescribed (Tapering)

## 2020-07-09 LAB
HCT VFR BLD CALC: 33.6 % — LOW (ref 39–50)
HGB BLD-MCNC: 10.7 G/DL — LOW (ref 13–17)
MCHC RBC-ENTMCNC: 31.4 PG — SIGNIFICANT CHANGE UP (ref 27–34)
MCHC RBC-ENTMCNC: 31.8 GM/DL — LOW (ref 32–36)
MCV RBC AUTO: 98.5 FL — SIGNIFICANT CHANGE UP (ref 80–100)
NRBC # BLD: 0 /100 WBCS — SIGNIFICANT CHANGE UP (ref 0–0)
PLATELET # BLD AUTO: 318 K/UL — SIGNIFICANT CHANGE UP (ref 150–400)
RBC # BLD: 3.41 M/UL — LOW (ref 4.2–5.8)
RBC # FLD: 13.5 % — SIGNIFICANT CHANGE UP (ref 10.3–14.5)
SARS-COV-2 RNA SPEC QL NAA+PROBE: SIGNIFICANT CHANGE UP
WBC # BLD: 10.55 K/UL — HIGH (ref 3.8–10.5)
WBC # FLD AUTO: 10.55 K/UL — HIGH (ref 3.8–10.5)

## 2020-07-09 PROCEDURE — 99222 1ST HOSP IP/OBS MODERATE 55: CPT

## 2020-07-09 PROCEDURE — 99231 SBSQ HOSP IP/OBS SF/LOW 25: CPT

## 2020-07-09 RX ADMIN — Medication 1 DROP(S): at 18:43

## 2020-07-09 RX ADMIN — FAMOTIDINE 20 MILLIGRAM(S): 10 INJECTION INTRAVENOUS at 12:27

## 2020-07-09 RX ADMIN — Medication 125 MILLIGRAM(S): at 18:43

## 2020-07-09 RX ADMIN — Medication 125 MILLIGRAM(S): at 12:27

## 2020-07-09 RX ADMIN — TAMSULOSIN HYDROCHLORIDE 0.8 MILLIGRAM(S): 0.4 CAPSULE ORAL at 21:07

## 2020-07-09 RX ADMIN — Medication 5 MILLIGRAM(S): at 08:22

## 2020-07-09 RX ADMIN — Medication 5 MILLIGRAM(S): at 21:06

## 2020-07-09 RX ADMIN — Medication 1 TABLET(S): at 18:43

## 2020-07-09 RX ADMIN — Medication 125 MILLIGRAM(S): at 06:05

## 2020-07-09 RX ADMIN — Medication 125 MILLIGRAM(S): at 23:42

## 2020-07-09 RX ADMIN — Medication 1 TABLET(S): at 08:22

## 2020-07-09 NOTE — BEHAVIORAL HEALTH ASSESSMENT NOTE - SUMMARY
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    Patient seen and evaluated, awake and alert oriented x 3. Patient denies feeling depressed, reports vague anxiety symptoms unchanged from prior history. Denies SI/HI, AVH.  Reports looking forward to discharge from the hospital to a new [facility]. Patient had previously taking Valium 5mg QID per [facility] records last filled in 1/2020. As per previous recommendation, can continue Valium 5mg BID as prescribed.  Patient does not meet criteria for inpt psychiatric hospitalization.  Patient psychiatrically cleared to go to subacute rehab.

## 2020-07-09 NOTE — DIETITIAN INITIAL EVALUATION ADULT. - ADD RECOMMEND
75M c hx likely muscular dystrophy of unknown etiology c/b rhabdomyolysis, PEG reversed, homebound, reported hx of COVID positive outpt, anxiety, BPH, s/p chronic fowler MSSA bacteremia (Apr '20), recent hospitalization (6/23-6/30/20) for UTI and Cdiff on PO vanc taper, pw SIRS and abd 2/2 dislodged fowler add geoff x2 daily; add MVI with minerals, vitamin C; encourage high BV protein intake to promote wound healing of developing sacral st I decubiti; assist with menus, tray prep, monitor/encourage PO intake and hydration as neded.

## 2020-07-09 NOTE — DIETITIAN INITIAL EVALUATION ADULT. - OTHER INFO
patient visited at breakfast. Patient feeds self with good appetite at that time, consumed 100% of Romanian toast 100% of oatmeal. Pt states   he "was hungry because I did not eat last night'. Patient appears emaciated with missing teeth,  conversationally appropriate, discussed feeling disappointed about not being accepted into assisted living. Patient states his usual weight all his life was 160  lbs maximum. As stated, he and his family were always underweight by nature, "no one ever ate too much". Patient states he dislikes those "[employer] [products", specifically   2 kcalHN, and Ensure'. When offered another alternate he dismissed the suggestion stating "I won't even be here tomorrow". Pt did accept in the past protein supplement but could not remember name. He agreed to try geoff. Patient was at a low weight 128 lbs in rehab when admitted here last month 6/2020;  recorded weight noted 137 lbs in 11/2019, patient states that 137 lbs was  " before this year when had different visits to rehab and had Covid+ and c.diff."  Pt has h/o PEG placement no longer in use.

## 2020-07-09 NOTE — DIETITIAN INITIAL EVALUATION ADULT. - PROBLEM SELECTOR PLAN 2
- f/u blood and ur cx  - will get CT a/p to eval for a source of infection  - will hold off further antibiotics for now given hx of cdiff and observe  - trend temperature, leukocytosis  - low threshold for empirically starting abx  - consider ID consult in AM  - IVF  - [id number]

## 2020-07-09 NOTE — DIETITIAN INITIAL EVALUATION ADULT. - REASON INDICATOR FOR ASSESSMENT
BMI<19. "75M c hx likely muscular dystrophy of unknown etiology c/b rhabdomyolysis, PEG reversed, homebound, reported hx of COVID positive outpt, anxiety, BPH, s/p chronic fowler MSSA bacteremia (Apr '20), recent hospitalization (6/23-6/30/20) for UTI and Cdiff on PO vanc taper, pw SIRS and abd 2/2 dislodged fowler"

## 2020-07-09 NOTE — BEHAVIORAL HEALTH ASSESSMENT NOTE - CASE SUMMARY
This is a 74-y.o. 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    I have seen and evaluated this patient myself. Chart, labs, meds reviewed. I agree with trainee's assessment and plan. Patient can proceed with recovery via NH placement (no active psychiatric obstacles at this time).

## 2020-07-09 NOTE — DIETITIAN INITIAL EVALUATION ADULT. - PHYSICAL APPEARANCE
emaciated/NFPE visually noted severe subcutaneous fat loss apparent ribs, orbital fat lowss, buccal fat loss; severe muscle wasting seen by protruding  clavicles , temporal areas

## 2020-07-09 NOTE — BEHAVIORAL HEALTH ASSESSMENT NOTE - HPI (INCLUDE ILLNESS QUALITY, SEVERITY, DURATION, TIMING, CONTEXT, MODIFYING FACTORS, ASSOCIATED SIGNS AND SYMPTOMS)
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    Patient seen and evaluated, awake and alert oriented, states has been sick since about end of Jan into February, has been "institutionalized" since then going back and forth from the [facility] to rehab.  He reports taking Valium for symptoms he does not wish to disclose. He currently denies mood sx, endorses some vague anxiety symptoms.  He currently denies SI/HI, AVH, or thoughts of paranoia.  He denies having any previously psychiatric hospitalizations, Patient denies use of any illicit substances or alcohol, reports that his GI symptoms have improved and states he is looking forward to discharge to [facility]. He expresses frustrations at previous [facility], stating it seems that administration has "poor communication" with staff, leading them to "retaliate in ways short of abuse." He denies physical harm and denies feeling unsafe. He reports sleep and appetite are not impaired.     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    Patient seen and evaluated, awake and alert oriented, states has been sick since about end of Jan into February, has been "institutionalized" since then going back and forth from the [facility] to rehab.  He reports taking Valium for symptoms he does not wish to disclose. He currently denies mood sx, endorses some vague anxiety symptoms.  He currently denies SI/HI, AVH, or thoughts of paranoia.  He denies having any previously psychiatric hospitalizations, Patient denies use of any illicit substances or alcohol, reports that his GI symptoms have improved and states he is looking forward to discharge to [facility]. He expresses frustrations at previous [facility], stating it seems that administration has "poor communication" with staff, leading them to "retaliate in ways short of abuse." He denies physical harm and denies feeling unsafe. He reports sleep and appetite are not impaired. Patient expresses being at a stage in his life where he is dependent on others, stating he is not mobile without wheelchair or walker and has had times in the past where he was not allowed access to it to use the bathroom. He looks forward to attending another [facility] and expresses disappointment at not being accepted to [facility].     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    Patient seen and evaluated, awake and alert oriented, states has been sick since about end of Jan into February, has been "institutionalized" since then going back and forth from the [facility] to rehab.  He reports taking Valium for symptoms he does not wish to disclose. He currently denies mood sx, endorses some vague anxiety symptoms.  He currently denies SI/HI, AVH, or thoughts of paranoia.  He denies having any previously psychiatric hospitalizations, Patient denies use of any illicit substances or alcohol, reports that his GI symptoms have improved and states he is looking forward to discharge to [facility]. He expresses frustrations at previous [facility], stating it seems that administration has "poor communication" with staff, leading them to "retaliate in ways short of abuse." He denies physical harm and denies feeling unsafe. He reports sleep and appetite are not impaired. Patient expresses being at a stage in his life where he is dependent on others, stating he is not mobile without wheelchair or walker and has had times in the past where he was not allowed access to it to use the bathroom. He looks forward to attending another [facility] and expresses disappointment at not being accepted to [facility].     Istop reference #: [id number]

## 2020-07-10 PROCEDURE — 99231 SBSQ HOSP IP/OBS SF/LOW 25: CPT

## 2020-07-10 RX ADMIN — Medication 5 MILLIGRAM(S): at 21:41

## 2020-07-10 RX ADMIN — Medication 1 DROP(S): at 05:16

## 2020-07-10 RX ADMIN — Medication 5 MILLIGRAM(S): at 08:42

## 2020-07-10 RX ADMIN — Medication 1 TABLET(S): at 17:26

## 2020-07-10 RX ADMIN — Medication 125 MILLIGRAM(S): at 11:58

## 2020-07-10 RX ADMIN — Medication 125 MILLIGRAM(S): at 17:26

## 2020-07-10 RX ADMIN — Medication 125 MILLIGRAM(S): at 05:16

## 2020-07-10 RX ADMIN — TAMSULOSIN HYDROCHLORIDE 0.8 MILLIGRAM(S): 0.4 CAPSULE ORAL at 21:41

## 2020-07-10 RX ADMIN — Medication 1 DROP(S): at 17:26

## 2020-07-10 RX ADMIN — FAMOTIDINE 20 MILLIGRAM(S): 10 INJECTION INTRAVENOUS at 11:57

## 2020-07-10 RX ADMIN — Medication 1 TABLET(S): at 08:42

## 2020-07-10 RX ADMIN — Medication 125 MILLIGRAM(S): at 23:39

## 2020-07-10 NOTE — CHART NOTE - NSCHARTNOTEFT_GEN_A_CORE
Nutrition Follow Up Note  Patient seen for: malnutrition follow up    Source: Comprehensive chart review and RN    Chart reviewed, events noted.     Diet: Regular    Subjective: Pt not interested in speaking to RD at this time. Per RN, pt with great appetite consuming >75% of most meals. Food preferences noted: pt likes tea more than coffee. No GI issues noted per RN.        Enteral /Parenteral Nutrition: n/a      Daily Weight in k (-), Weight in k.9 (-)  % Weight Change - no new weights since previous assessment    Pertinent Medications: MEDICATIONS  (STANDING):  artificial tears (preservative free) Ophthalmic Solution 1 Drop(s) Both EYES two times a day  diazepam    Tablet 5 milliGRAM(s) Oral every 12 hours  enoxaparin Injectable 40 milliGRAM(s) SubCutaneous daily  famotidine    Tablet 20 milliGRAM(s) Oral daily  lactated ringers. 1000 milliLiter(s) (100 mL/Hr) IV Continuous <Continuous>  lactobacillus acidophilus 1 Tablet(s) Oral two times a day with meals  tamsulosin 0.8 milliGRAM(s) Oral at bedtime  vancomycin    Solution 125 milliGRAM(s) Oral every 6 hours    MEDICATIONS  (PRN):    Pertinent Labs:   Finger Sticks:      Skin per nursing documentation: stage 1 sacral ulcer  Edema: 3+ (scrotum)    Estimated Needs:   [X] no change since previous assessment  [ ] recalculated:     Previous Nutrition Diagnosis: severe malnutrition  Nutrition Diagnosis is: ongoing; addressed with PO diet       Interventions:     Recommend  1) Continue current diet as tolerated.   2) Continue to obtain/honor food preferences as able.   3) Monitor PO intake, diet tolerance, weight trends, labs, GI function, and skin integrity.     Monitoring and Evaluation:     Continue to monitor Nutritional intake, Tolerance to diet prescription, weights, labs, skin integrity    RD remains available upon request and will follow up per protocol    Diane Ardon RD pager # [phone]

## 2020-07-11 RX ADMIN — FAMOTIDINE 20 MILLIGRAM(S): 10 INJECTION INTRAVENOUS at 10:26

## 2020-07-11 RX ADMIN — Medication 5 MILLIGRAM(S): at 21:24

## 2020-07-11 RX ADMIN — Medication 125 MILLIGRAM(S): at 10:26

## 2020-07-11 RX ADMIN — Medication 1 TABLET(S): at 10:25

## 2020-07-11 RX ADMIN — Medication 5 MILLIGRAM(S): at 10:26

## 2020-07-11 RX ADMIN — Medication 125 MILLIGRAM(S): at 05:51

## 2020-07-11 RX ADMIN — Medication 1 TABLET(S): at 17:25

## 2020-07-11 RX ADMIN — Medication 125 MILLIGRAM(S): at 23:12

## 2020-07-11 RX ADMIN — Medication 125 MILLIGRAM(S): at 17:26

## 2020-07-11 RX ADMIN — TAMSULOSIN HYDROCHLORIDE 0.8 MILLIGRAM(S): 0.4 CAPSULE ORAL at 21:24

## 2020-07-12 LAB
ANION GAP SERPL CALC-SCNC: 11 MMOL/L — SIGNIFICANT CHANGE UP (ref 5–17)
BUN SERPL-MCNC: 19 MG/DL — SIGNIFICANT CHANGE UP (ref 7–23)
CALCIUM SERPL-MCNC: 9.4 MG/DL — SIGNIFICANT CHANGE UP (ref 8.4–10.5)
CHLORIDE SERPL-SCNC: 99 MMOL/L — SIGNIFICANT CHANGE UP (ref 96–108)
CO2 SERPL-SCNC: 26 MMOL/L — SIGNIFICANT CHANGE UP (ref 22–31)
CREAT SERPL-MCNC: 0.71 MG/DL — SIGNIFICANT CHANGE UP (ref 0.5–1.3)
GLUCOSE SERPL-MCNC: 136 MG/DL — HIGH (ref 70–99)
HCT VFR BLD CALC: 35.3 % — LOW (ref 39–50)
HGB BLD-MCNC: 11.4 G/DL — LOW (ref 13–17)
MCHC RBC-ENTMCNC: 31.5 PG — SIGNIFICANT CHANGE UP (ref 27–34)
MCHC RBC-ENTMCNC: 32.3 GM/DL — SIGNIFICANT CHANGE UP (ref 32–36)
MCV RBC AUTO: 97.5 FL — SIGNIFICANT CHANGE UP (ref 80–100)
NRBC # BLD: 0 /100 WBCS — SIGNIFICANT CHANGE UP (ref 0–0)
PLATELET # BLD AUTO: 344 K/UL — SIGNIFICANT CHANGE UP (ref 150–400)
POTASSIUM SERPL-MCNC: 3.4 MMOL/L — LOW (ref 3.5–5.3)
POTASSIUM SERPL-SCNC: 3.4 MMOL/L — LOW (ref 3.5–5.3)
RBC # BLD: 3.62 M/UL — LOW (ref 4.2–5.8)
RBC # FLD: 12.9 % — SIGNIFICANT CHANGE UP (ref 10.3–14.5)
SARS-COV-2 RNA SPEC QL NAA+PROBE: SIGNIFICANT CHANGE UP
SODIUM SERPL-SCNC: 136 MMOL/L — SIGNIFICANT CHANGE UP (ref 135–145)
WBC # BLD: 7.02 K/UL — SIGNIFICANT CHANGE UP (ref 3.8–10.5)
WBC # FLD AUTO: 7.02 K/UL — SIGNIFICANT CHANGE UP (ref 3.8–10.5)

## 2020-07-12 RX ORDER — ACETAMINOPHEN 500 MG
650 TABLET ORAL ONCE
Refills: 0 | Status: COMPLETED | OUTPATIENT
Start: 2020-07-12 | End: 2020-07-12

## 2020-07-12 RX ADMIN — Medication 125 MILLIGRAM(S): at 17:31

## 2020-07-12 RX ADMIN — Medication 125 MILLIGRAM(S): at 05:05

## 2020-07-12 RX ADMIN — Medication 5 MILLIGRAM(S): at 22:03

## 2020-07-12 RX ADMIN — Medication 650 MILLIGRAM(S): at 23:35

## 2020-07-12 RX ADMIN — FAMOTIDINE 20 MILLIGRAM(S): 10 INJECTION INTRAVENOUS at 12:15

## 2020-07-12 RX ADMIN — Medication 125 MILLIGRAM(S): at 12:15

## 2020-07-12 RX ADMIN — Medication 1 TABLET(S): at 17:31

## 2020-07-12 RX ADMIN — TAMSULOSIN HYDROCHLORIDE 0.8 MILLIGRAM(S): 0.4 CAPSULE ORAL at 22:03

## 2020-07-12 RX ADMIN — Medication 125 MILLIGRAM(S): at 23:27

## 2020-07-12 RX ADMIN — Medication 1 TABLET(S): at 10:32

## 2020-07-12 RX ADMIN — Medication 5 MILLIGRAM(S): at 12:12

## 2020-07-12 NOTE — PROGRESS NOTE ADULT - ATTENDING COMMENTS
medically stable to be d/c , awaiting rehab placement
can be d/c to assisted living facility ; he is requesting [facility] [place] .  aware. but not sure they will take him with [facility]
medically stable to be d/c , now plan for d/c home with home care
medically stable to be d/c , awaiting rehab placement
medically stable to be d/c , now plan for d/c to [facility]  with home care
medically stable to be d/c , now plan for d/c to [facility]  with home care

## 2020-07-12 NOTE — PROGRESS NOTE ADULT - PROBLEM SELECTOR PLAN 1
- improved after fowler replaced  - likely 2/2 fowler obstruction  no uti  abd pain resolved

## 2020-07-12 NOTE — PROGRESS NOTE ADULT - PROBLEM SELECTOR PLAN 2
no uti  -seen by id  -no need for any abx

## 2020-07-12 NOTE — PROGRESS NOTE ADULT - PROBLEM SELECTOR PROBLEM 1
Lower abdominal pain

## 2020-07-12 NOTE — PROGRESS NOTE ADULT - PROBLEM SELECTOR PROBLEM 4
Clostridium difficile infection

## 2020-07-12 NOTE — PROGRESS NOTE ADULT - PROBLEM SELECTOR PLAN 5
- PT eval  - f/u neuro as outpt

## 2020-07-12 NOTE — PROGRESS NOTE ADULT - PROBLEM SELECTOR PLAN 4
- cont vanc q6h for now

## 2020-07-12 NOTE — PROGRESS NOTE ADULT - PROBLEM SELECTOR PROBLEM 5
Muscular dystrophy

## 2020-07-12 NOTE — PROGRESS NOTE ADULT - PROBLEM SELECTOR PROBLEM 3
Spencer catheter problem, initial encounter

## 2020-07-13 ENCOUNTER — TRANSCRIPTION ENCOUNTER (OUTPATIENT)
Age: 75
End: 2020-07-13

## 2020-07-13 VITALS
SYSTOLIC BLOOD PRESSURE: 100 MMHG | OXYGEN SATURATION: 96 % | HEART RATE: 78 BPM | RESPIRATION RATE: 18 BRPM | TEMPERATURE: 98 F | DIASTOLIC BLOOD PRESSURE: 62 MMHG

## 2020-07-13 PROCEDURE — 84132 ASSAY OF SERUM POTASSIUM: CPT

## 2020-07-13 PROCEDURE — 96374 THER/PROPH/DIAG INJ IV PUSH: CPT | Mod: XU

## 2020-07-13 PROCEDURE — 82947 ASSAY GLUCOSE BLOOD QUANT: CPT

## 2020-07-13 PROCEDURE — 85027 COMPLETE CBC AUTOMATED: CPT

## 2020-07-13 PROCEDURE — 86769 SARS-COV-2 COVID-19 ANTIBODY: CPT

## 2020-07-13 PROCEDURE — 83605 ASSAY OF LACTIC ACID: CPT

## 2020-07-13 PROCEDURE — 80053 COMPREHEN METABOLIC PANEL: CPT

## 2020-07-13 PROCEDURE — 87040 BLOOD CULTURE FOR BACTERIA: CPT

## 2020-07-13 PROCEDURE — 85610 PROTHROMBIN TIME: CPT

## 2020-07-13 PROCEDURE — 85730 THROMBOPLASTIN TIME PARTIAL: CPT

## 2020-07-13 PROCEDURE — U0003: CPT

## 2020-07-13 PROCEDURE — 83735 ASSAY OF MAGNESIUM: CPT

## 2020-07-13 PROCEDURE — 97530 THERAPEUTIC ACTIVITIES: CPT

## 2020-07-13 PROCEDURE — 71045 X-RAY EXAM CHEST 1 VIEW: CPT

## 2020-07-13 PROCEDURE — 99231 SBSQ HOSP IP/OBS SF/LOW 25: CPT

## 2020-07-13 PROCEDURE — 82330 ASSAY OF CALCIUM: CPT

## 2020-07-13 PROCEDURE — 51702 INSERT TEMP BLADDER CATH: CPT

## 2020-07-13 PROCEDURE — 84295 ASSAY OF SERUM SODIUM: CPT

## 2020-07-13 PROCEDURE — 99285 EMERGENCY DEPT VISIT HI MDM: CPT | Mod: 25

## 2020-07-13 PROCEDURE — 87086 URINE CULTURE/COLONY COUNT: CPT

## 2020-07-13 PROCEDURE — 97162 PT EVAL MOD COMPLEX 30 MIN: CPT

## 2020-07-13 PROCEDURE — 85014 HEMATOCRIT: CPT

## 2020-07-13 PROCEDURE — 97110 THERAPEUTIC EXERCISES: CPT

## 2020-07-13 PROCEDURE — 81001 URINALYSIS AUTO W/SCOPE: CPT

## 2020-07-13 PROCEDURE — 82435 ASSAY OF BLOOD CHLORIDE: CPT

## 2020-07-13 PROCEDURE — 93005 ELECTROCARDIOGRAM TRACING: CPT | Mod: XU

## 2020-07-13 PROCEDURE — 97116 GAIT TRAINING THERAPY: CPT

## 2020-07-13 PROCEDURE — 82803 BLOOD GASES ANY COMBINATION: CPT

## 2020-07-13 PROCEDURE — 74177 CT ABD & PELVIS W/CONTRAST: CPT

## 2020-07-13 PROCEDURE — 80048 BASIC METABOLIC PNL TOTAL CA: CPT

## 2020-07-13 RX ORDER — DIAZEPAM 5 MG
1 TABLET ORAL
Qty: 28 | Refills: 0
Start: 2020-07-13 | End: 2020-07-26

## 2020-07-13 RX ORDER — FAMOTIDINE 10 MG/ML
1 INJECTION INTRAVENOUS
Qty: 30 | Refills: 0
Start: 2020-07-13 | End: 2020-08-11

## 2020-07-13 RX ORDER — L.ACIDOPH/B.ANIMALIS/B.LONGUM 15B CELL
1 CAPSULE ORAL
Qty: 0 | Refills: 0 | DISCHARGE

## 2020-07-13 RX ORDER — VANCOMYCIN HCL 1 G
5 VIAL (EA) INTRAVENOUS
Qty: 0 | Refills: 0 | DISCHARGE

## 2020-07-13 RX ORDER — LACTOBACILLUS ACIDOPHILUS 100MM CELL
1 CAPSULE ORAL
Qty: 60 | Refills: 0
Start: 2020-07-13 | End: 2020-08-11

## 2020-07-13 RX ORDER — TAMSULOSIN HYDROCHLORIDE 0.4 MG/1
2 CAPSULE ORAL
Qty: 60 | Refills: 0
Start: 2020-07-13 | End: 2020-08-11

## 2020-07-13 RX ORDER — VANCOMYCIN HCL 1 G
5 VIAL (EA) INTRAVENOUS
Qty: 245 | Refills: 0
Start: 2020-07-13

## 2020-07-13 RX ADMIN — FAMOTIDINE 20 MILLIGRAM(S): 10 INJECTION INTRAVENOUS at 12:16

## 2020-07-13 RX ADMIN — Medication 1 TABLET(S): at 09:51

## 2020-07-13 RX ADMIN — Medication 125 MILLIGRAM(S): at 05:34

## 2020-07-13 RX ADMIN — Medication 5 MILLIGRAM(S): at 09:51

## 2020-07-13 RX ADMIN — Medication 125 MILLIGRAM(S): at 12:16

## 2020-07-13 NOTE — DISCHARGE NOTE NURSING/CASE MANAGEMENT/SOCIAL WORK - PATIENT PORTAL LINK FT
You can access the FollowMyHealth Patient Portal offered by [facility] by registering at the following website: [email or web address]. By joining [facility]’s FollowMyHealth portal, you will also be able to view your health information using other applications (apps) compatible with our system.

## 2020-07-13 NOTE — PROGRESS NOTE ADULT - SUBJECTIVE AND OBJECTIVE BOX
Follow Up:  leukocytosis, urinary retention    Interval History: pt afebrile and asymptomatic, WBC again 10.5    ROS:      All other systems negative    Constitutional: no fever, no chills  Eyes: no vision changes, no eye pain  ENT:  no sore throat, no rhinorrhea  Cardiovascular:  no chest pain, no palpitation  Respiratory:  no SOB, no cough  GI:  no abd pain, no vomiting, no diarrhea  urinary: no suprapubic pain, no flank pain  musculoskeletal:  no joint pain, no joint swelling  skin:  no rash  neurology:  no headache, no seizure, no change in mental status          Allergies  No Known Allergies        ANTIMICROBIALS:  vancomycin    Solution 125 every 6 hours      OTHER MEDS:  artificial tears (preservative free) Ophthalmic Solution 1 Drop(s) Both EYES two times a day  diazepam    Tablet 5 milliGRAM(s) Oral every 12 hours  enoxaparin Injectable 40 milliGRAM(s) SubCutaneous daily  famotidine    Tablet 20 milliGRAM(s) Oral daily  lactated ringers. 1000 milliLiter(s) IV Continuous <Continuous>  lactobacillus acidophilus 1 Tablet(s) Oral two times a day with meals  tamsulosin 0.8 milliGRAM(s) Oral at bedtime      Vital Signs Last 24 Hrs  T(C): 36.8 (09 Jul 2020 12:01), Max: 37 (09 Jul 2020 06:08)  T(F): 98.2 (09 Jul 2020 12:01), Max: 98.6 (09 Jul 2020 06:08)  HR: 80 (09 Jul 2020 12:01) (72 - 85)  BP: 100/62 (09 Jul 2020 12:01) (100/62 - 105/65)  BP(mean): --  RR: 18 (09 Jul 2020 12:01) (18 - 18)  SpO2: 96% (09 Jul 2020 12:01) (96% - 97%)    Physical Exam:  General:    NAD,  non toxic, cachectic  Eye: normal sclera and conjunctiva  ENT:    no oropharyngeal lesions,   no LAD,   neck supple  Cardio:     regular S1, S2  Respiratory:    clear b/l,    no wheezing  abd:     soft,   BS +,   no tenderness  :   no CVAT,  no suprapubic tenderness,  +  fowler with clear urine  Musculoskeletal:   no joint swelling,   no edema  vascular: no phlebitis, normal pulses  Skin:    no rash                          10.7   10.55 )-----------( 318      ( 09 Jul 2020 07:13 )             33.6       07-08    137  |  98  |  14  ----------------------------<  102<H>  3.6   |  29  |  0.76    Ca    9.4      08 Jul 2020 05:40            MICROBIOLOGY:  v  .Blood Blood-Peripheral  07-06-20   No growth to date.  --  --      .Urine Catheterized  07-06-20   >=3 organisms. Probable collection contamination.  --  --      .Blood Blood-Peripheral  06-25-20   No Growth Final  --  --      .Blood Blood-Venous  06-23-20   No Growth Final  --  --      .Urine Clean Catch (Midstream)  06-23-20   >100,000 CFU/ml Pseudomonas aeruginosa  >100,000 CFU/ml Enterococcus faecalis (vancomycin resistant)  --  Pseudomonas aeruginosa  Enterococcus faecalis (vancomycin resistant)                RADIOLOGY:  Images below independently visualized and reviewed personally, findings as below  < from: CT Abdomen and Pelvis w/ IV Cont (07.06.20 @ 13:53) >    IMPRESSION:     Findings suggestive of chronic bladder outlet obstruction with mild bilateral hydroureteronephrosis.        < end of copied text >  < from: CT Abdomen and Pelvis w/ IV Cont (07.06.20 @ 13:53) >    IMPRESSION:     Findings suggestive of chronic bladder outlet obstruction with mild bilateral hydroureteronephrosis.
Patient is a 75y old  Male who presents with a chief complaint of abd pain (10 Jul 2020 11:22)    pt. ton nd examined, denies any c/o    INTERVAL HPI/OVERNIGHT EVENTS:  T(C): 36.4 (07-10-20 @ 21:15), Max: 36.7 (07-10-20 @ 00:09)  HR: 84 (07-10-20 @ 21:15) (66 - 84)  BP: 99/63 (07-10-20 @ 21:15) (98/62 - 100/62)  RR: 16 (07-10-20 @ 21:15) (16 - 18)  SpO2: 97% (07-10-20 @ 21:15) (96% - 98%)  Wt(kg): --  I&O's Summary    09 Jul 2020 07:01  -  10 Jul 2020 07:00  --------------------------------------------------------  IN: 730 mL / OUT: 2000 mL / NET: -1270 mL    10 Jul 2020 07:01  -  10 Jul 2020 22:09  --------------------------------------------------------  IN: 240 mL / OUT: 400 mL / NET: -160 mL        PAST MEDICAL & SURGICAL HISTORY:  Urinary retention  H/O muscular dystrophy  Scrotal swelling  Psychiatric disorder  No significant past surgical history      SOCIAL HISTORY  Alcohol:  Tobacco:  Illicit substance use:    FAMILY HISTORY:    REVIEW OF SYSTEMS:  CONSTITUTIONAL: No fever, weight loss, or fatigue  EYES: No eye pain, visual disturbances, or discharge  ENMT:  No difficulty hearing, tinnitus, vertigo; No sinus or throat pain  NECK: No pain or stiffness  RESPIRATORY: No cough, wheezing, chills or hemoptysis; No shortness of breath  CARDIOVASCULAR: No chest pain, palpitations, dizziness, or leg swelling  GASTROINTESTINAL: No abdominal or epigastric pain. No nausea, vomiting, or hematemesis; No diarrhea or constipation. No melena or hematochezia.  GENITOURINARY: No dysuria, frequency, hematuria, or incontinence  NEUROLOGICAL: No headaches, memory loss, loss of strength, numbness, or tremors  SKIN: No itching, burning, rashes, or lesions   LYMPH NODES: No enlarged glands  ENDOCRINE: No heat or cold intolerance; No hair loss  MUSCULOSKELETAL: No joint pain or swelling; No muscle, back, or extremity pain  PSYCHIATRIC: No depression, anxiety, mood swings, or difficulty sleeping  HEME/LYMPH: No easy bruising, or bleeding gums  ALLERY AND IMMUNOLOGIC: No hives or eczema    RADIOLOGY & ADDITIONAL TESTS:    Imaging Personally Reviewed:  [ ] YES  [ ] NO    Consultant(s) Notes Reviewed:  [ ] YES  [ ] NO    PHYSICAL EXAM:  GENERAL: NAD, well-groomed, well-developed  HEAD:  Atraumatic, Normocephalic  EYES: EOMI, PERRLA, conjunctiva and sclera clear  ENMT: No tonsillar erythema, exudates, or enlargement; Moist mucous membranes, Good dentition, No lesions  NECK: Supple, No JVD, Normal thyroid  NERVOUS SYSTEM:  Alert & Oriented X3, Good concentration; Motor Strength 5/5 B/L upper and lower extremities; DTRs 2+ intact and symmetric  CHEST/LUNG: Clear to percussion bilaterally; No rales, rhonchi, wheezing, or rubs  HEART: Regular rate and rhythm; No murmurs, rubs, or gallops  ABDOMEN: Soft, Nontender, Nondistended; Bowel sounds present  EXTREMITIES:  2+ Peripheral Pulses, No clubbing, cyanosis, or edema  LYMPH: No lymphadenopathy noted  SKIN: No rashes or lesions    LABS:                        10.7   10.55 )-----------( 318      ( 09 Jul 2020 07:13 )             33.6               CAPILLARY BLOOD GLUCOSE                MEDICATIONS  (STANDING):  artificial tears (preservative free) Ophthalmic Solution 1 Drop(s) Both EYES two times a day  diazepam    Tablet 5 milliGRAM(s) Oral every 12 hours  enoxaparin Injectable 40 milliGRAM(s) SubCutaneous daily  famotidine    Tablet 20 milliGRAM(s) Oral daily  lactated ringers. 1000 milliLiter(s) (100 mL/Hr) IV Continuous <Continuous>  lactobacillus acidophilus 1 Tablet(s) Oral two times a day with meals  tamsulosin 0.8 milliGRAM(s) Oral at bedtime  vancomycin    Solution 125 milliGRAM(s) Oral every 6 hours    MEDICATIONS  (PRN):      Care Discussed with Consultants/Other Providers [ ] YES  [ ] NO
Patient is a 75y old  Male who presents with a chief complaint of abd pain (2020 11:04)    pt. seen and examined, doing fair , denies any c/o    INTERVAL HPI/OVERNIGHT EVENTS:  T(C): 36.4 (20 @ 19:46), Max: 36.8 (20 @ 20:51)  HR: 92 (20 @ 19:46) (72 - 92)  BP: 101/61 (20 @ 19:46) (100/56 - 104/64)  RR: 18 (20 @ 19:46) (17 - 18)  SpO2: 98% (20 @ 19:46) (95% - 98%)  Wt(kg): --  I&O's Summary    2020 07:01  -  2020 07:00  --------------------------------------------------------  IN: 1815 mL / OUT: 2900 mL / NET: -1085 mL    2020 07:01  -  2020 20:14  --------------------------------------------------------  IN: 790 mL / OUT: 1500 mL / NET: -710 mL        PAST MEDICAL & SURGICAL HISTORY:  Urinary retention  H/O muscular dystrophy  Scrotal swelling  Psychiatric disorder  No significant past surgical history      SOCIAL HISTORY  Alcohol:  Tobacco:  Illicit substance use:    FAMILY HISTORY:    REVIEW OF SYSTEMS:  CONSTITUTIONAL: No fever, weight loss, or fatigue  EYES: No eye pain, visual disturbances, or discharge  ENMT:  No difficulty hearing, tinnitus, vertigo; No sinus or throat pain  NECK: No pain or stiffness  RESPIRATORY: No cough, wheezing, chills or hemoptysis; No shortness of breath  CARDIOVASCULAR: No chest pain, palpitations, dizziness, or leg swelling  GASTROINTESTINAL: No abdominal or epigastric pain. No nausea, vomiting, or hematemesis; No diarrhea or constipation. No melena or hematochezia.  GENITOURINARY: No dysuria, frequency, hematuria, or incontinence  NEUROLOGICAL: No headaches, memory loss, loss of strength, numbness, or tremors  SKIN: No itching, burning, rashes, or lesions   LYMPH NODES: No enlarged glands  ENDOCRINE: No heat or cold intolerance; No hair loss  MUSCULOSKELETAL: No joint pain or swelling; No muscle, back, or extremity pain  PSYCHIATRIC: No depression, anxiety, mood swings, or difficulty sleeping  HEME/LYMPH: No easy bruising, or bleeding gums  ALLERY AND IMMUNOLOGIC: No hives or eczema    RADIOLOGY & ADDITIONAL TESTS:    Imaging Personally Reviewed:  [ ] YES  [ ] NO    Consultant(s) Notes Reviewed:  [ ] YES  [ ] NO    PHYSICAL EXAM:  GENERAL: NAD, well-groomed, well-developed  HEAD:  Atraumatic, Normocephalic  EYES: EOMI, PERRLA, conjunctiva and sclera clear  ENMT: No tonsillar erythema, exudates, or enlargement; Moist mucous membranes, Good dentition, No lesions  NECK: Supple, No JVD, Normal thyroid  NERVOUS SYSTEM:  Alert & Oriented X3, Good concentration; Motor Strength 5/5 B/L upper and lower extremities; DTRs 2+ intact and symmetric  CHEST/LUNG: Clear to percussion bilaterally; No rales, rhonchi, wheezing, or rubs  HEART: Regular rate and rhythm; No murmurs, rubs, or gallops  ABDOMEN: Soft, Nontender, Nondistended; Bowel sounds present  EXTREMITIES:  2+ Peripheral Pulses, No clubbing, cyanosis, or edema  LYMPH: No lymphadenopathy noted  SKIN: No rashes or lesions    LABS:                        10.7   10.09 )-----------( 328      ( 2020 06:36 )             34.1     07-    138  |  101  |  13  ----------------------------<  103<H>  3.7   |  29  |  0.74    Ca    9.5      2020 06:36  Mg     1.8     07-    TPro  6.5  /  Alb  3.4  /  TBili  0.3  /  DBili  x   /  AST  10  /  ALT  7<L>  /  AlkPhos  82  07-07    PT/INR - ( 2020 00:55 )   PT: 15.0 sec;   INR: 1.32 ratio         PTT - ( 2020 00:55 )  PTT:28.9 sec  Urinalysis Basic - ( 2020 00:51 )    Color: Light Yellow / Appearance: Slightly Turbid / S.009 / pH: x  Gluc: x / Ketone: Negative  / Bili: Negative / Urobili: Negative   Blood: x / Protein: Trace / Nitrite: Positive   Leuk Esterase: Large / RBC: 6 /hpf /  /HPF   Sq Epi: x / Non Sq Epi: 0 /hpf / Bacteria: Moderate      CAPILLARY BLOOD GLUCOSE            Urinalysis Basic - ( 2020 00:51 )    Color: Light Yellow / Appearance: Slightly Turbid / S.009 / pH: x  Gluc: x / Ketone: Negative  / Bili: Negative / Urobili: Negative   Blood: x / Protein: Trace / Nitrite: Positive   Leuk Esterase: Large / RBC: 6 /hpf /  /HPF   Sq Epi: x / Non Sq Epi: 0 /hpf / Bacteria: Moderate        MEDICATIONS  (STANDING):  artificial tears (preservative free) Ophthalmic Solution 1 Drop(s) Both EYES two times a day  diazepam    Tablet 5 milliGRAM(s) Oral every 12 hours  enoxaparin Injectable 40 milliGRAM(s) SubCutaneous daily  famotidine    Tablet 20 milliGRAM(s) Oral daily  lactated ringers. 1000 milliLiter(s) (100 mL/Hr) IV Continuous <Continuous>  lactobacillus acidophilus 1 Tablet(s) Oral two times a day with meals  tamsulosin 0.8 milliGRAM(s) Oral at bedtime  vancomycin    Solution 125 milliGRAM(s) Oral every 6 hours    MEDICATIONS  (PRN):      Care Discussed with Consultants/Other Providers [ ] YES  [ ] NO
Follow Up:  leukocytosis, urinary retention    Interval History: pt afebrile and asymptomatic but WBC increased to 12    ROS:      All other systems negative    Constitutional: no fever, no chills  Eyes: no vision changes, no eye pain  ENT:  no sore throat, no rhinorrhea  Cardiovascular:  no chest pain, no palpitation  Respiratory:  no SOB, no cough  GI:  no abd pain, no vomiting, no diarrhea  urinary: no suprapubic pain, no flank pain  musculoskeletal:  no joint pain, no joint swelling  skin:  no rash  neurology:  no headache, no seizure, no change in mental status            Allergies  No Known Allergies        ANTIMICROBIALS:  vancomycin    Solution 125 every 6 hours      OTHER MEDS:  artificial tears (preservative free) Ophthalmic Solution 1 Drop(s) Both EYES two times a day  diazepam    Tablet 5 milliGRAM(s) Oral every 12 hours  enoxaparin Injectable 40 milliGRAM(s) SubCutaneous daily  famotidine    Tablet 20 milliGRAM(s) Oral daily  lactated ringers. 1000 milliLiter(s) IV Continuous <Continuous>  lactobacillus acidophilus 1 Tablet(s) Oral two times a day with meals  tamsulosin 0.8 milliGRAM(s) Oral at bedtime      Vital Signs Last 24 Hrs  T(C): 36.9 (08 Jul 2020 13:02), Max: 37.1 (08 Jul 2020 00:40)  T(F): 98.5 (08 Jul 2020 13:02), Max: 98.8 (08 Jul 2020 00:40)  HR: 95 (08 Jul 2020 13:02) (76 - 95)  BP: 115/74 (08 Jul 2020 13:02) (99/60 - 115/74)  BP(mean): --  RR: 16 (08 Jul 2020 05:36) (16 - 18)  SpO2: 96% (08 Jul 2020 13:02) (96% - 98%)    Physical Exam:  General:    NAD,  non toxic, cachectic  Eye: normal sclera and conjunctiva  ENT:    no oropharyngeal lesions,   no LAD,   neck supple  Cardio:     regular S1, S2  Respiratory:    clear b/l,    no wheezing  abd:     soft,   BS +,   no tenderness  :   no CVAT,  no suprapubic tenderness,  +  fowler with clear urine  Musculoskeletal:   no joint swelling,   no edema  vascular: no phlebitis, normal pulses  Skin:    no rash                          11.1   12.10 )-----------( 338      ( 08 Jul 2020 05:40 )             35.8       07-08    137  |  98  |  14  ----------------------------<  102<H>  3.6   |  29  |  0.76    Ca    9.4      08 Jul 2020 05:40  Mg     1.8     07-07    TPro  6.5  /  Alb  3.4  /  TBili  0.3  /  DBili  x   /  AST  10  /  ALT  7<L>  /  AlkPhos  82  07-07          MICROBIOLOGY:  v  .Blood Blood-Peripheral  07-06-20   No growth to date.  --  --      .Urine Catheterized  07-06-20   >=3 organisms. Probable collection contamination.  --  --      .Blood Blood-Peripheral  06-25-20   No Growth Final  --  --      .Blood Blood-Venous  06-23-20   No Growth Final  --  --      .Urine Clean Catch (Midstream)  06-23-20   >100,000 CFU/ml Pseudomonas aeruginosa  >100,000 CFU/ml Enterococcus faecalis (vancomycin resistant)  --  Pseudomonas aeruginosa  Enterococcus faecalis (vancomycin resistant)                RADIOLOGY:  Images below independently visualized and reviewed personally, findings as below  < from: CT Abdomen and Pelvis w/ IV Cont (07.06.20 @ 13:53) >  IMPRESSION:     Findings suggestive of chronic bladder outlet obstruction with mild bilateral hydroureteronephrosis.
Follow Up:  leukocytosis, urinary retention    Interval History: pt afebrile and asymptomatic, abd/pelvis with bladder outlet obstruction and mild hydro    ROS:      All other systems negative    Constitutional: no fever, no chills  Eyes: no vision changes, no eye pain  ENT:  no sore throat, no rhinorrhea  Cardiovascular:  no chest pain, no palpitation  Respiratory:  no SOB, no cough  GI:  no abd pain, no vomiting, no diarrhea  urinary: no suprapubic pain, no flank pain  musculoskeletal:  no joint pain, no joint swelling  skin:  no rash  neurology:  no headache, no seizure, no change in mental status  psych: no anxiety, no depression         Allergies  No Known Allergies        ANTIMICROBIALS:  vancomycin    Solution 125 every 6 hours      OTHER MEDS:  artificial tears (preservative free) Ophthalmic Solution 1 Drop(s) Both EYES two times a day  diazepam    Tablet 5 milliGRAM(s) Oral every 12 hours  enoxaparin Injectable 40 milliGRAM(s) SubCutaneous daily  famotidine    Tablet 20 milliGRAM(s) Oral daily  lactated ringers. 1000 milliLiter(s) IV Continuous <Continuous>  lactobacillus acidophilus 1 Tablet(s) Oral two times a day with meals  tamsulosin 0.8 milliGRAM(s) Oral at bedtime      Vital Signs Last 24 Hrs  T(C): 36.7 (2020 09:20), Max: 36.8 (2020 12:43)  T(F): 98.1 (2020 09:20), Max: 98.3 (2020 16:51)  HR: 72 (2020 09:20) (72 - 84)  BP: 104/64 (2020 09:20) (97/60 - 104/64)  BP(mean): --  RR: 18 (2020 09:20) (17 - 18)  SpO2: 98% (2020 09:20) (95% - 98%)    Physical Exam:  General:    NAD,  non toxic, cachectic  Eye: normal sclera and conjunctiva  ENT:    no oropharyngeal lesions,   no LAD,   neck supple  Cardio:     regular S1, S2  Respiratory:    clear b/l,    no wheezing  abd:     soft,   BS +,   no tenderness  :   no CVAT,  no suprapubic tenderness,  +  fowler with clear urine  Musculoskeletal:   no joint swelling,   no edema  vascular: no phlebitis, normal pulses  Skin:    no rash                              10.7   10.09 )-----------( 328      ( 2020 06:36 )             34.1       07-07    138  |  101  |  13  ----------------------------<  103<H>  3.7   |  29  |  0.74    Ca    9.5      2020 06:36  Mg     1.8     -    TPro  6.5  /  Alb  3.4  /  TBili  0.3  /  DBili  x   /  AST  10  /  ALT  7<L>  /  AlkPhos  82  07-07      Urinalysis Basic - ( 2020 00:51 )    Color: Light Yellow / Appearance: Slightly Turbid / S.009 / pH: x  Gluc: x / Ketone: Negative  / Bili: Negative / Urobili: Negative   Blood: x / Protein: Trace / Nitrite: Positive   Leuk Esterase: Large / RBC: 6 /hpf /  /HPF   Sq Epi: x / Non Sq Epi: 0 /hpf / Bacteria: Moderate        MICROBIOLOGY:  v  .Blood Blood-Peripheral  20   No growth to date.  --  --      .Urine Catheterized  20   >=3 organisms. Probable collection contamination.  --  --      .Blood Blood-Peripheral  20   No Growth Final  --  --      .Blood Blood-Venous  20   No Growth Final  --  --      .Urine Clean Catch (Midstream)  20   >100,000 CFU/ml Pseudomonas aeruginosa  >100,000 CFU/ml Enterococcus faecalis (vancomycin resistant)  --  Pseudomonas aeruginosa  Enterococcus faecalis (vancomycin resistant)                RADIOLOGY:  Images below independently visualized and reviewed personally, findings as below  < from: CT Abdomen and Pelvis w/ IV Cont (20 @ 13:53) >  IMPRESSION:     Findings suggestive of chronic bladder outlet obstruction with mild bilateral hydroureteronephrosis.
Follow Up:  leukocytosis, urinary retention    Interval History: pt afebrile and asymptomatic, no acute events     ROS:      All other systems negative    Constitutional: no fever, no chills  Eyes: no vision changes, no eye pain  ENT:  no sore throat, no rhinorrhea  Cardiovascular:  no chest pain, no palpitation  Respiratory:  no SOB, no cough  GI:  no abd pain, no vomiting, no diarrhea  urinary: no suprapubic pain, no flank pain  musculoskeletal:  no joint pain, no joint swelling  skin:  no rash  neurology:  no headache, no seizure, no change in mental status              Allergies  No Known Allergies        ANTIMICROBIALS:  vancomycin    Solution 125 every 6 hours      OTHER MEDS:  artificial tears (preservative free) Ophthalmic Solution 1 Drop(s) Both EYES two times a day  diazepam    Tablet 5 milliGRAM(s) Oral every 12 hours  enoxaparin Injectable 40 milliGRAM(s) SubCutaneous daily  famotidine    Tablet 20 milliGRAM(s) Oral daily  lactated ringers. 1000 milliLiter(s) IV Continuous <Continuous>  lactobacillus acidophilus 1 Tablet(s) Oral two times a day with meals  tamsulosin 0.8 milliGRAM(s) Oral at bedtime      Vital Signs Last 24 Hrs  T(C): 36.6 (10 Jul 2020 04:21), Max: 36.8 (09 Jul 2020 12:01)  T(F): 97.8 (10 Jul 2020 04:21), Max: 98.2 (09 Jul 2020 12:01)  HR: 66 (10 Jul 2020 04:21) (66 - 84)  BP: 100/62 (10 Jul 2020 04:21) (99/61 - 111/71)  BP(mean): --  RR: 16 (10 Jul 2020 04:21) (16 - 18)  SpO2: 98% (10 Jul 2020 04:21) (96% - 98%)    Physical Exam:  General:    NAD,  non toxic, cachectic  Eye: normal sclera and conjunctiva  ENT:    no oropharyngeal lesions,   no LAD,   neck supple  Cardio:     regular S1, S2  Respiratory:    clear b/l,    no wheezing  abd:     soft,   BS +,   no tenderness  :   no CVAT,  no suprapubic tenderness,  +  fowler with clear urine  Musculoskeletal:   no joint swelling,   no edema  vascular: no phlebitis, normal pulses  Skin:    no rash                          10.7   10.55 )-----------( 318      ( 09 Jul 2020 07:13 )             33.6                   MICROBIOLOGY:  v  .Blood Blood-Peripheral  07-06-20   No growth to date.  --  --      .Urine Catheterized  07-06-20   >=3 organisms. Probable collection contamination.  --  --      .Blood Blood-Peripheral  06-25-20   No Growth Final  --  --      .Blood Blood-Venous  06-23-20   No Growth Final  --  --      .Urine Clean Catch (Midstream)  06-23-20   >100,000 CFU/ml Pseudomonas aeruginosa  >100,000 CFU/ml Enterococcus faecalis (vancomycin resistant)  --  Pseudomonas aeruginosa  Enterococcus faecalis (vancomycin resistant)                RADIOLOGY:  Images below independently visualized and reviewed personally, findings as below  < from: CT Abdomen and Pelvis w/ IV Cont (07.06.20 @ 13:53) >  IMPRESSION:     Findings suggestive of chronic bladder outlet obstruction with mild bilateral hydroureteronephrosis.
Follow Up:  leukocytosis, urinary retention    Interval History: pt afebrile and asymptomatic, no acute events     ROS:      All other systems negative    Constitutional: no fever, no chills  Eyes: no vision changes, no eye pain  ENT:  no sore throat, no rhinorrhea  Cardiovascular:  no chest pain, no palpitation  Respiratory:  no SOB, no cough  GI:  no abd pain, no vomiting, no diarrhea  urinary: no suprapubic pain, no flank pain  musculoskeletal:  no joint pain, no joint swelling  skin:  no rash  neurology:  no headache, no seizure, no change in mental status        Allergies  No Known Allergies        ANTIMICROBIALS:  vancomycin    Solution 125 every 6 hours      OTHER MEDS:  artificial tears (preservative free) Ophthalmic Solution 1 Drop(s) Both EYES two times a day  diazepam    Tablet 5 milliGRAM(s) Oral every 12 hours  enoxaparin Injectable 40 milliGRAM(s) SubCutaneous daily  famotidine    Tablet 20 milliGRAM(s) Oral daily  lactated ringers. 1000 milliLiter(s) IV Continuous <Continuous>  lactobacillus acidophilus 1 Tablet(s) Oral two times a day with meals  tamsulosin 0.8 milliGRAM(s) Oral at bedtime      Vital Signs Last 24 Hrs  T(C): 36.4 (13 Jul 2020 10:35), Max: 36.7 (12 Jul 2020 21:25)  T(F): 97.6 (13 Jul 2020 10:35), Max: 98.1 (12 Jul 2020 21:25)  HR: 78 (13 Jul 2020 10:35) (72 - 83)  BP: 100/62 (13 Jul 2020 10:35) (99/61 - 100/62)  BP(mean): --  RR: 18 (13 Jul 2020 10:35) (18 - 20)  SpO2: 96% (13 Jul 2020 10:35) (96% - 96%)    Physical Exam:  General:    NAD,  non toxic, cachectic  Eye: normal sclera and conjunctiva  ENT:    no oropharyngeal lesions,   no LAD,   neck supple  Cardio:     regular S1, S2  Respiratory:    clear b/l,    no wheezing  abd:     soft,   BS +,   no tenderness  :   no CVAT,  no suprapubic tenderness,  +  fowler with clear urine  Musculoskeletal:   no joint swelling,   no edema  vascular: no phlebitis, normal pulses  Skin:    no rash                          11.4   7.02  )-----------( 344      ( 12 Jul 2020 11:00 )             35.3       07-12    136  |  99  |  19  ----------------------------<  136<H>  3.4<L>   |  26  |  0.71    Ca    9.4      12 Jul 2020 11:00            MICROBIOLOGY:  v  .Blood Blood-Peripheral  07-06-20   No Growth Final  --  --      .Urine Catheterized  07-06-20   >=3 organisms. Probable collection contamination.  --  --      .Blood Blood-Peripheral  06-25-20   No Growth Final  --  --      .Blood Blood-Venous  06-23-20   No Growth Final  --  --      .Urine Clean Catch (Midstream)  06-23-20   >100,000 CFU/ml Pseudomonas aeruginosa  >100,000 CFU/ml Enterococcus faecalis (vancomycin resistant)  --  Pseudomonas aeruginosa  Enterococcus faecalis (vancomycin resistant)                RADIOLOGY:  Images below independently visualized and reviewed personally, findings as below  < from: CT Abdomen and Pelvis w/ IV Cont (07.06.20 @ 13:53) >  IMPRESSION:     Findings suggestive of chronic bladder outlet obstruction with mild bilateral hydroureteronephrosis.
Patient is a 75y old  Male who presents with a chief complaint of abd pain (08 Jul 2020 17:47)    pt. seen and examined, denies any c/o    INTERVAL HPI/OVERNIGHT EVENTS:  T(C): 36.4 (07-08-20 @ 19:37), Max: 37.1 (07-08-20 @ 00:40)  HR: 85 (07-08-20 @ 19:37) (76 - 95)  BP: 100/64 (07-08-20 @ 19:37) (99/60 - 115/74)  RR: 18 (07-08-20 @ 19:37) (16 - 18)  SpO2: 97% (07-08-20 @ 19:37) (96% - 97%)  Wt(kg): --  I&O's Summary    07 Jul 2020 07:01  -  08 Jul 2020 07:00  --------------------------------------------------------  IN: 1270 mL / OUT: 2000 mL / NET: -730 mL    08 Jul 2020 07:01  -  08 Jul 2020 22:28  --------------------------------------------------------  IN: 730 mL / OUT: 900 mL / NET: -170 mL        PAST MEDICAL & SURGICAL HISTORY:  Urinary retention  H/O muscular dystrophy  Scrotal swelling  Psychiatric disorder  No significant past surgical history      SOCIAL HISTORY  Alcohol:  Tobacco:  Illicit substance use:    FAMILY HISTORY:    REVIEW OF SYSTEMS:  CONSTITUTIONAL: No fever, weight loss, or fatigue  EYES: No eye pain, visual disturbances, or discharge  ENMT:  No difficulty hearing, tinnitus, vertigo; No sinus or throat pain  NECK: No pain or stiffness  RESPIRATORY: No cough, wheezing, chills or hemoptysis; No shortness of breath  CARDIOVASCULAR: No chest pain, palpitations, dizziness, or leg swelling  GASTROINTESTINAL: No abdominal or epigastric pain. No nausea, vomiting, or hematemesis; No diarrhea or constipation. No melena or hematochezia.  GENITOURINARY: No dysuria, frequency, hematuria, or incontinence  NEUROLOGICAL: No headaches, memory loss, loss of strength, numbness, or tremors  SKIN: No itching, burning, rashes, or lesions   LYMPH NODES: No enlarged glands  ENDOCRINE: No heat or cold intolerance; No hair loss  MUSCULOSKELETAL: No joint pain or swelling; No muscle, back, or extremity pain  PSYCHIATRIC: No depression, anxiety, mood swings, or difficulty sleeping  HEME/LYMPH: No easy bruising, or bleeding gums  ALLERY AND IMMUNOLOGIC: No hives or eczema    RADIOLOGY & ADDITIONAL TESTS:    Imaging Personally Reviewed:  [ ] YES  [ ] NO    Consultant(s) Notes Reviewed:  [ ] YES  [ ] NO    PHYSICAL EXAM:  GENERAL: NAD, well-groomed, well-developed  HEAD:  Atraumatic, Normocephalic  EYES: EOMI, PERRLA, conjunctiva and sclera clear  ENMT: No tonsillar erythema, exudates, or enlargement; Moist mucous membranes, Good dentition, No lesions  NECK: Supple, No JVD, Normal thyroid  NERVOUS SYSTEM:  Alert & Oriented X3, Good concentration; Motor Strength 5/5 B/L upper and lower extremities; DTRs 2+ intact and symmetric  CHEST/LUNG: Clear to percussion bilaterally; No rales, rhonchi, wheezing, or rubs  HEART: Regular rate and rhythm; No murmurs, rubs, or gallops  ABDOMEN: Soft, Nontender, Nondistended; Bowel sounds present  EXTREMITIES:  2+ Peripheral Pulses, No clubbing, cyanosis, or edema  LYMPH: No lymphadenopathy noted  SKIN: No rashes or lesions    LABS:                        11.1   12.10 )-----------( 338      ( 08 Jul 2020 05:40 )             35.8     07-08    137  |  98  |  14  ----------------------------<  102<H>  3.6   |  29  |  0.76    Ca    9.4      08 Jul 2020 05:40  Mg     1.8     07-07    TPro  6.5  /  Alb  3.4  /  TBili  0.3  /  DBili  x   /  AST  10  /  ALT  7<L>  /  AlkPhos  82  07-07        CAPILLARY BLOOD GLUCOSE                MEDICATIONS  (STANDING):  artificial tears (preservative free) Ophthalmic Solution 1 Drop(s) Both EYES two times a day  diazepam    Tablet 5 milliGRAM(s) Oral every 12 hours  enoxaparin Injectable 40 milliGRAM(s) SubCutaneous daily  famotidine    Tablet 20 milliGRAM(s) Oral daily  lactated ringers. 1000 milliLiter(s) (100 mL/Hr) IV Continuous <Continuous>  lactobacillus acidophilus 1 Tablet(s) Oral two times a day with meals  tamsulosin 0.8 milliGRAM(s) Oral at bedtime  vancomycin    Solution 125 milliGRAM(s) Oral every 6 hours    MEDICATIONS  (PRN):      Care Discussed with Consultants/Other Providers [ ] YES  [ ] NO
Patient is a 75y old  Male who presents with a chief complaint of abd pain (10 Jul 2020 22:09)    pt. seen and examined, no c/o    INTERVAL HPI/OVERNIGHT EVENTS:  T(C): 36.7 (07-11-20 @ 14:10), Max: 37 (07-11-20 @ 01:00)  HR: 79 (07-11-20 @ 14:10) (70 - 84)  BP: 105/60 (07-11-20 @ 14:10) (99/63 - 105/60)  RR: 18 (07-11-20 @ 14:10) (16 - 18)  SpO2: 97% (07-11-20 @ 14:10) (96% - 97%)  Wt(kg): --  I&O's Summary    10 Jul 2020 07:01  -  11 Jul 2020 07:00  --------------------------------------------------------  IN: 480 mL / OUT: 1450 mL / NET: -970 mL    11 Jul 2020 07:01  -  11 Jul 2020 20:01  --------------------------------------------------------  IN: 480 mL / OUT: 550 mL / NET: -70 mL        PAST MEDICAL & SURGICAL HISTORY:  Urinary retention  H/O muscular dystrophy  Scrotal swelling  Psychiatric disorder  No significant past surgical history      SOCIAL HISTORY  Alcohol:  Tobacco:  Illicit substance use:    FAMILY HISTORY:    REVIEW OF SYSTEMS:  CONSTITUTIONAL: No fever, weight loss, or fatigue  EYES: No eye pain, visual disturbances, or discharge  ENMT:  No difficulty hearing, tinnitus, vertigo; No sinus or throat pain  NECK: No pain or stiffness  RESPIRATORY: No cough, wheezing, chills or hemoptysis; No shortness of breath  CARDIOVASCULAR: No chest pain, palpitations, dizziness, or leg swelling  GASTROINTESTINAL: No abdominal or epigastric pain. No nausea, vomiting, or hematemesis; No diarrhea or constipation. No melena or hematochezia.  GENITOURINARY: No dysuria, frequency, hematuria, or incontinence  NEUROLOGICAL: No headaches, memory loss, loss of strength, numbness, or tremors  SKIN: No itching, burning, rashes, or lesions   LYMPH NODES: No enlarged glands  ENDOCRINE: No heat or cold intolerance; No hair loss  MUSCULOSKELETAL: No joint pain or swelling; No muscle, back, or extremity pain  PSYCHIATRIC: No depression, anxiety, mood swings, or difficulty sleeping  HEME/LYMPH: No easy bruising, or bleeding gums  ALLERY AND IMMUNOLOGIC: No hives or eczema    RADIOLOGY & ADDITIONAL TESTS:    Imaging Personally Reviewed:  [ ] YES  [ ] NO    Consultant(s) Notes Reviewed:  [ ] YES  [ ] NO    PHYSICAL EXAM:  GENERAL: NAD, well-groomed, well-developed  HEAD:  Atraumatic, Normocephalic  EYES: EOMI, PERRLA, conjunctiva and sclera clear  ENMT: No tonsillar erythema, exudates, or enlargement; Moist mucous membranes, Good dentition, No lesions  NECK: Supple, No JVD, Normal thyroid  NERVOUS SYSTEM:  Alert & Oriented X3, Good concentration; Motor Strength 5/5 B/L upper and lower extremities; DTRs 2+ intact and symmetric  CHEST/LUNG: Clear to percussion bilaterally; No rales, rhonchi, wheezing, or rubs  HEART: Regular rate and rhythm; No murmurs, rubs, or gallops  ABDOMEN: Soft, Nontender, Nondistended; Bowel sounds present  EXTREMITIES:  2+ Peripheral Pulses, No clubbing, cyanosis, or edema  LYMPH: No lymphadenopathy noted  SKIN: No rashes or lesions    LABS:              CAPILLARY BLOOD GLUCOSE                MEDICATIONS  (STANDING):  artificial tears (preservative free) Ophthalmic Solution 1 Drop(s) Both EYES two times a day  diazepam    Tablet 5 milliGRAM(s) Oral every 12 hours  enoxaparin Injectable 40 milliGRAM(s) SubCutaneous daily  famotidine    Tablet 20 milliGRAM(s) Oral daily  lactated ringers. 1000 milliLiter(s) (100 mL/Hr) IV Continuous <Continuous>  lactobacillus acidophilus 1 Tablet(s) Oral two times a day with meals  tamsulosin 0.8 milliGRAM(s) Oral at bedtime  vancomycin    Solution 125 milliGRAM(s) Oral every 6 hours    MEDICATIONS  (PRN):      Care Discussed with Consultants/Other Providers [ ] YES  [ ] NO
Patient is a 75y old  Male who presents with a chief complaint of abd pain (11 Jul 2020 20:01)      INTERVAL HPI/OVERNIGHT EVENTS:  T(C): 36.4 (07-13-20 @ 00:28), Max: 36.7 (07-12-20 @ 21:25)  HR: 83 (07-13-20 @ 00:28) (70 - 83)  BP: 100/62 (07-13-20 @ 00:28) (99/61 - 100/62)  RR: 18 (07-13-20 @ 00:28) (18 - 20)  SpO2: 96% (07-13-20 @ 00:28) (96% - 98%)  Wt(kg): --  I&O's Summary    11 Jul 2020 07:01  -  12 Jul 2020 07:00  --------------------------------------------------------  IN: 960 mL / OUT: 1625 mL / NET: -665 mL    12 Jul 2020 07:01  -  13 Jul 2020 01:37  --------------------------------------------------------  IN: 960 mL / OUT: 1000 mL / NET: -40 mL        PAST MEDICAL & SURGICAL HISTORY:  Urinary retention  H/O muscular dystrophy  Scrotal swelling  Psychiatric disorder  No significant past surgical history      SOCIAL HISTORY  Alcohol:  Tobacco:  Illicit substance use:    FAMILY HISTORY:    REVIEW OF SYSTEMS:  CONSTITUTIONAL: No fever, weight loss, or fatigue  EYES: No eye pain, visual disturbances, or discharge  ENMT:  No difficulty hearing, tinnitus, vertigo; No sinus or throat pain  NECK: No pain or stiffness  RESPIRATORY: No cough, wheezing, chills or hemoptysis; No shortness of breath  CARDIOVASCULAR: No chest pain, palpitations, dizziness, or leg swelling  GASTROINTESTINAL: No abdominal or epigastric pain. No nausea, vomiting, or hematemesis; No diarrhea or constipation. No melena or hematochezia.  GENITOURINARY: No dysuria, frequency, hematuria, or incontinence  NEUROLOGICAL: No headaches, memory loss, loss of strength, numbness, or tremors  SKIN: No itching, burning, rashes, or lesions   LYMPH NODES: No enlarged glands  ENDOCRINE: No heat or cold intolerance; No hair loss  MUSCULOSKELETAL: No joint pain or swelling; No muscle, back, or extremity pain  PSYCHIATRIC: No depression, anxiety, mood swings, or difficulty sleeping  HEME/LYMPH: No easy bruising, or bleeding gums  ALLERY AND IMMUNOLOGIC: No hives or eczema    RADIOLOGY & ADDITIONAL TESTS:    Imaging Personally Reviewed:  [ ] YES  [ ] NO    Consultant(s) Notes Reviewed:  [ ] YES  [ ] NO    PHYSICAL EXAM:  GENERAL: NAD, well-groomed, well-developed  HEAD:  Atraumatic, Normocephalic  EYES: EOMI, PERRLA, conjunctiva and sclera clear  ENMT: No tonsillar erythema, exudates, or enlargement; Moist mucous membranes, Good dentition, No lesions  NECK: Supple, No JVD, Normal thyroid  NERVOUS SYSTEM:  Alert & Oriented X3, Good concentration; Motor Strength 5/5 B/L upper and lower extremities; DTRs 2+ intact and symmetric  CHEST/LUNG: Clear to percussion bilaterally; No rales, rhonchi, wheezing, or rubs  HEART: Regular rate and rhythm; No murmurs, rubs, or gallops  ABDOMEN: Soft, Nontender, Nondistended; Bowel sounds present  EXTREMITIES:  2+ Peripheral Pulses, No clubbing, cyanosis, or edema  LYMPH: No lymphadenopathy noted  SKIN: No rashes or lesions    LABS:                        11.4   7.02  )-----------( 344      ( 12 Jul 2020 11:00 )             35.3     07-12    136  |  99  |  19  ----------------------------<  136<H>  3.4<L>   |  26  |  0.71    Ca    9.4      12 Jul 2020 11:00          CAPILLARY BLOOD GLUCOSE                MEDICATIONS  (STANDING):  artificial tears (preservative free) Ophthalmic Solution 1 Drop(s) Both EYES two times a day  diazepam    Tablet 5 milliGRAM(s) Oral every 12 hours  enoxaparin Injectable 40 milliGRAM(s) SubCutaneous daily  famotidine    Tablet 20 milliGRAM(s) Oral daily  lactated ringers. 1000 milliLiter(s) (100 mL/Hr) IV Continuous <Continuous>  lactobacillus acidophilus 1 Tablet(s) Oral two times a day with meals  tamsulosin 0.8 milliGRAM(s) Oral at bedtime  vancomycin    Solution 125 milliGRAM(s) Oral every 6 hours    MEDICATIONS  (PRN):      Care Discussed with Consultants/Other Providers [ ] YES  [ ] NO
Patient is a 75y old  Male who presents with a chief complaint of abd pain (09 Jul 2020 15:44)      INTERVAL HPI/OVERNIGHT EVENTS:  T(C): 36.8 (07-09-20 @ 19:56), Max: 37 (07-09-20 @ 06:08)  HR: 84 (07-09-20 @ 19:56) (72 - 84)  BP: 111/71 (07-09-20 @ 19:56) (100/62 - 111/71)  RR: 18 (07-09-20 @ 19:56) (18 - 18)  SpO2: 98% (07-09-20 @ 19:56) (96% - 98%)  Wt(kg): --  I&O's Summary    08 Jul 2020 07:01  -  09 Jul 2020 07:00  --------------------------------------------------------  IN: 910 mL / OUT: 1500 mL / NET: -590 mL    09 Jul 2020 07:01  -  09 Jul 2020 23:47  --------------------------------------------------------  IN: 490 mL / OUT: 1450 mL / NET: -960 mL        PAST MEDICAL & SURGICAL HISTORY:  Urinary retention  H/O muscular dystrophy  Scrotal swelling  Psychiatric disorder  No significant past surgical history      SOCIAL HISTORY  Alcohol:  Tobacco:  Illicit substance use:    FAMILY HISTORY:    REVIEW OF SYSTEMS:  CONSTITUTIONAL: No fever, weight loss, or fatigue  EYES: No eye pain, visual disturbances, or discharge  ENMT:  No difficulty hearing, tinnitus, vertigo; No sinus or throat pain  NECK: No pain or stiffness  RESPIRATORY: No cough, wheezing, chills or hemoptysis; No shortness of breath  CARDIOVASCULAR: No chest pain, palpitations, dizziness, or leg swelling  GASTROINTESTINAL: No abdominal or epigastric pain. No nausea, vomiting, or hematemesis; No diarrhea or constipation. No melena or hematochezia.  GENITOURINARY: No dysuria, frequency, hematuria, or incontinence  NEUROLOGICAL: No headaches, memory loss, loss of strength, numbness, or tremors  SKIN: No itching, burning, rashes, or lesions   LYMPH NODES: No enlarged glands  ENDOCRINE: No heat or cold intolerance; No hair loss  MUSCULOSKELETAL: No joint pain or swelling; No muscle, back, or extremity pain  PSYCHIATRIC: No depression, anxiety, mood swings, or difficulty sleeping  HEME/LYMPH: No easy bruising, or bleeding gums  ALLERY AND IMMUNOLOGIC: No hives or eczema    RADIOLOGY & ADDITIONAL TESTS:    Imaging Personally Reviewed:  [ ] YES  [ ] NO    Consultant(s) Notes Reviewed:  [ ] YES  [ ] NO    PHYSICAL EXAM:  GENERAL: NAD, well-groomed, well-developed  HEAD:  Atraumatic, Normocephalic  EYES: EOMI, PERRLA, conjunctiva and sclera clear  ENMT: No tonsillar erythema, exudates, or enlargement; Moist mucous membranes, Good dentition, No lesions  NECK: Supple, No JVD, Normal thyroid  NERVOUS SYSTEM:  Alert & Oriented X3, Good concentration; Motor Strength 5/5 B/L upper and lower extremities; DTRs 2+ intact and symmetric  CHEST/LUNG: Clear to percussion bilaterally; No rales, rhonchi, wheezing, or rubs  HEART: Regular rate and rhythm; No murmurs, rubs, or gallops  ABDOMEN: Soft, Nontender, Nondistended; Bowel sounds present  EXTREMITIES:  2+ Peripheral Pulses, No clubbing, cyanosis, or edema  LYMPH: No lymphadenopathy noted  SKIN: No rashes or lesions    LABS:                        10.7   10.55 )-----------( 318      ( 09 Jul 2020 07:13 )             33.6     07-08    137  |  98  |  14  ----------------------------<  102<H>  3.6   |  29  |  0.76    Ca    9.4      08 Jul 2020 05:40          CAPILLARY BLOOD GLUCOSE                MEDICATIONS  (STANDING):  artificial tears (preservative free) Ophthalmic Solution 1 Drop(s) Both EYES two times a day  diazepam    Tablet 5 milliGRAM(s) Oral every 12 hours  enoxaparin Injectable 40 milliGRAM(s) SubCutaneous daily  famotidine    Tablet 20 milliGRAM(s) Oral daily  lactated ringers. 1000 milliLiter(s) (100 mL/Hr) IV Continuous <Continuous>  lactobacillus acidophilus 1 Tablet(s) Oral two times a day with meals  tamsulosin 0.8 milliGRAM(s) Oral at bedtime  vancomycin    Solution 125 milliGRAM(s) Oral every 6 hours    MEDICATIONS  (PRN):      Care Discussed with Consultants/Other Providers [ ] YES  [ ] NO

## 2020-07-13 NOTE — PHARMACOTHERAPY INTERVENTION NOTE - INTERVENTION TYPE RECOOMEND
Pharmacy is calling regarding Medication interaction for the QUEtiapine (SEROQUEL) 100 MG tablet and ZIPRASIDONE 60 MG.     Medication prescribed while Inpatient.    Patient Name: Melba Vazquez  Caller Name: Patricia or any Pharmacist   Name of Facility: [facility]  Callback Number: [phone]  Additional Info: Open until 6pm - can leave a voicemail if needed  Did you confirm the message with the caller?: yes    Thank you,  Aidan Delacruz   Therapy Recommended - Additional therapy

## 2020-07-13 NOTE — PROGRESS NOTE ADULT - ASSESSMENT
75 m with muscular dystrophy c/b rhabdomyolysis, PEG reversed, BPH, recent COVID, MSSA bacteremia 4/2020, recurrent C-diff, recent admission for leukocytosis and C-diff still on vanco taper, had pseudomonas and VRE(amp sensitive) in the urine but considered colonization, now p/w suprapubic pain and fowler malfunction?, fowler was exchanged and 500 cc drained, abd pain resolved  afebrile, WBC: 24  u/a positive    lower abd/suprapubic pain due to urinary retention and fowler malfunction?  leukocytosis, ?reactive, no diarrhea and pt still on tapering vanco, improved to 10   positive urine cx with>3 organisms, likely colonization, as  the abd pain resolved after placing a new fowler    * blood cultures negative and WBC 10  * abd/pelvis CT with chronic bladder outlet obstruction and mild b/l hydro  * no further antibiotics needed for now  * monitor the WBC  * f/u with urology  * c/w PO tapering vanco as planned (pt was discharged last admission with the plan and meds)    The above assessment and plan was discussed with the primary team    Mary Jo Box MD  Pager [phone]  After 5pm and on weekends call [phone]
75M c hx likely muscular dystrophy of unknown etiology c/b rhabdomyolysis, PEG reversed, homebound, reported hx of COVID positive outpt, anxiety, BPH, s/p chronic fowler MSSA bacteremia (Apr '20), recent hospitalization (6/23-6/30/20) for UTI and Cdiff on PO vanc taper, pw SIRS and abd 2/2 dislodged fowler
75M c hx likely muscular dystrophy of unknown etiology c/b rhabdomyolysis, PEG reversed, homebound, reported hx of COVID positive outpt, anxiety, BPH, s/p chronic fowler MSSA bacteremia (Apr '20), recent hospitalization (6/23-6/30/20) for UTI and Cdiff on PO vanc taper, pw SIRS and abd 2/2 dislodged fowler
75 m with muscular dystrophy c/b rhabdomyolysis, PEG reversed, BPH, recent COVID, MSSA bacteremia 4/2020, recurrent C-diff, recent admission for leukocytosis and C-diff still on vanco taper, had pseudomonas and VRE(amp sensitive) in the urine but considered colonization, now p/w suprapubic pain and fowler malfunction?, fowler was exchanged and 500 cc drained, abd pain resolved  afebrile, WBC: 24  u/a positive    lower abd/suprapubic pain due to urinary retention and fowler malfunction?  leukocytosis, ?reactive, no diarrhea and pt still on tapering vanco, improved to 10   positive urine cx with>3 organisms, likely colonization, as  the abd pain resolved after placing a new fowler    * blood cultures negative and WBC 10  * abd/pelvis CT with chronic bladder outlet obstruction and mild b/l hydro  * no further antibiotics needed for now  * monitor the WBC  * f/u with urology  * c/w PO tapering vanco as planned (pt was discharged last admission with the plan and meds)    The above assessment and plan was discussed with the primary team    Mary Jo Box MD  Pager [phone]  After 5pm and on weekends call [phone]
75 m with muscular dystrophy c/b rhabdomyolysis, PEG reversed, BPH, recent COVID, MSSA bacteremia 4/2020, recurrent C-diff, recent admission for leukocytosis and C-diff still on vanco taper, had pseudomonas and VRE(amp sensitive) in the urine but considered colonization, now p/w suprapubic pain and fowler malfunction?, fowler was exchanged and 500 cc drained, abd pain resolved  afebrile, WBC: 24  u/a positive    lower abd/suprapubic pain due to urinary retention and fowler malfunction?  leukocytosis, ?reactive, no diarrhea and pt still on tapering vanco, improved to 10   positive urine cx with>3 organisms, likely colonization, as  the abd pain resolved after placing a new fowler    * blood cultures negative and WBC improved to 10  * abd/pelvis CT with chronic bladder outlet obstruction and mild b/l hydro  * no further antibiotics needed  * f/u with urology  * c/w PO tapering vanco as planned (pt was discharged last admission with the plan and meds)    The above assessment and plan was discussed with the primary team    Mary Jo Box MD  Pager [phone]  After 5pm and on weekends call [phone]
75 m with muscular dystrophy c/b rhabdomyolysis, PEG reversed, BPH, recent COVID, MSSA bacteremia 4/2020, recurrent C-diff, recent admission for leukocytosis and C-diff still on vanco taper, had pseudomonas and VRE(amp sensitive) in the urine but considered colonization, now p/w suprapubic pain and fowler malfunction?, fowler was exchanged and 500 cc drained, abd pain resolved  afebrile, WBC: 24  u/a positive    lower abd/suprapubic pain due to urinary retention and fowler malfunction?  leukocytosis, ?reactive, no diarrhea and pt still on tapering vanco, improved to 10   positive urine cx with>3 organisms, likely colonization, as  the abd pain resolved after placing a new fowler    * blood cultures negative and WBC improved to 7  * abd/pelvis CT with chronic bladder outlet obstruction and mild b/l hydro  * no further antibiotics needed for now  * monitor the WBC  * f/u with urology  * c/w PO tapering vanco as planned (pt was discharged last admission with the plan and meds)    The above assessment and plan was discussed with the primary team    Mary Jo Box MD  Pager [phone]  After 5pm and on weekends call [phone]
75 m with muscular dystrophy c/b rhabdomyolysis, PEG reversed, BPH, recent COVID, MSSA bacteremia 4/2020, recurrent C-diff, recent admission for leukocytosis and C-diff still on vanco taper, had pseudomonas and VRE(amp sensitive) in the urine but considered colonization, now p/w suprapubic pain and fowler malfunction?, fowler was exchanged and 500 cc drained, abd pain resolved  afebrile, WBC: 24  u/a positive    lower abd/suprapubic pain due to urinary retention and fowler malfunction?  leukocytosis, ?reactive, no diarrhea and pt still on tapering vanco, improved to 10   positive urine cx with>3 organisms, likely colonization, as  the abd pain resolved after placing a new ofwler    * blood cultures negative and WBC improved to 10, today increased to 12 but afebrile and asymptomatic  * abd/pelvis CT with chronic bladder outlet obstruction and mild b/l hydro  * no further antibiotics needed for now  * monitor the WBC  * f/u with urology  * c/w PO tapering vanco as planned (pt was discharged last admission with the plan and meds)    The above assessment and plan was discussed with the primary team    Mary Jo Box MD  Pager [phone]  After 5pm and on weekends call [phone]
75M c hx likely muscular dystrophy of unknown etiology c/b rhabdomyolysis, PEG reversed, homebound, reported hx of COVID positive outpt, anxiety, BPH, s/p chronic fowler MSSA bacteremia (Apr '20), recent hospitalization (6/23-6/30/20) for UTI and Cdiff on PO vanc taper, pw SIRS and abd 2/2 dislodged fowler

## 2020-07-13 NOTE — PROGRESS NOTE ADULT - PROVIDER SPECIALTY LIST ADULT
Infectious Disease
Internal Medicine
Infectious Disease
Internal Medicine

## 2020-07-14 RX ORDER — VANCOMYCIN HCL 1 G
1 VIAL (EA) INTRAVENOUS
Qty: 49 | Refills: 0
Start: 2020-07-14

## 2020-07-14 RX ORDER — VANCOMYCIN HCL 1 G
1 VIAL (EA) INTRAVENOUS
Qty: 0 | Refills: 0 | DISCHARGE
Start: 2020-07-14

## 2020-08-04 ENCOUNTER — INPATIENT (INPATIENT)
Facility: HOSPITAL | Age: 75
LOS: 13 days | Discharge: HOME | DRG: 698 | End: 2020-08-18
Attending: INTERNAL MEDICINE | Admitting: INTERNAL MEDICINE
Payer: MEDICARE

## 2020-08-04 VITALS
HEIGHT: 73 IN | WEIGHT: 134.04 LBS | OXYGEN SATURATION: 96 % | DIASTOLIC BLOOD PRESSURE: 60 MMHG | SYSTOLIC BLOOD PRESSURE: 106 MMHG | HEART RATE: 87 BPM | RESPIRATION RATE: 20 BRPM

## 2020-08-04 DIAGNOSIS — R31.9 HEMATURIA, UNSPECIFIED: ICD-10-CM

## 2020-08-04 LAB
ALBUMIN SERPL ELPH-MCNC: 3.6 G/DL — SIGNIFICANT CHANGE UP (ref 3.3–5)
ALP SERPL-CCNC: 115 U/L — SIGNIFICANT CHANGE UP (ref 40–120)
ALT FLD-CCNC: 14 U/L — SIGNIFICANT CHANGE UP (ref 10–45)
ANION GAP SERPL CALC-SCNC: 12 MMOL/L — SIGNIFICANT CHANGE UP (ref 5–17)
APPEARANCE UR: ABNORMAL
AST SERPL-CCNC: 16 U/L — SIGNIFICANT CHANGE UP (ref 10–40)
BACTERIA # UR AUTO: NEGATIVE — SIGNIFICANT CHANGE UP
BASOPHILS # BLD AUTO: 0.1 K/UL — SIGNIFICANT CHANGE UP (ref 0–0.2)
BASOPHILS NFR BLD AUTO: 0.7 % — SIGNIFICANT CHANGE UP (ref 0–2)
BILIRUB SERPL-MCNC: 0.2 MG/DL — SIGNIFICANT CHANGE UP (ref 0.2–1.2)
BILIRUB UR-MCNC: NEGATIVE — SIGNIFICANT CHANGE UP
BUN SERPL-MCNC: 11 MG/DL — SIGNIFICANT CHANGE UP (ref 7–23)
CALCIUM SERPL-MCNC: 9.2 MG/DL — SIGNIFICANT CHANGE UP (ref 8.4–10.5)
CHLORIDE SERPL-SCNC: 102 MMOL/L — SIGNIFICANT CHANGE UP (ref 96–108)
CO2 SERPL-SCNC: 28 MMOL/L — SIGNIFICANT CHANGE UP (ref 22–31)
COLOR SPEC: ABNORMAL
CREAT SERPL-MCNC: 0.91 MG/DL — SIGNIFICANT CHANGE UP (ref 0.5–1.3)
DIFF PNL FLD: ABNORMAL
EOSINOPHIL # BLD AUTO: 0.36 K/UL — SIGNIFICANT CHANGE UP (ref 0–0.5)
EOSINOPHIL NFR BLD AUTO: 2.5 % — SIGNIFICANT CHANGE UP (ref 0–6)
EPI CELLS # UR: 1 — SIGNIFICANT CHANGE UP
GLUCOSE SERPL-MCNC: 110 MG/DL — HIGH (ref 70–99)
GLUCOSE UR QL: NEGATIVE — SIGNIFICANT CHANGE UP
HCT VFR BLD CALC: 35.6 % — LOW (ref 39–50)
HGB BLD-MCNC: 11.4 G/DL — LOW (ref 13–17)
HYALINE CASTS # UR AUTO: 0 /LPF — SIGNIFICANT CHANGE UP (ref 0–7)
IMM GRANULOCYTES NFR BLD AUTO: 0.6 % — SIGNIFICANT CHANGE UP (ref 0–1.5)
KETONES UR-MCNC: NEGATIVE — SIGNIFICANT CHANGE UP
LEUKOCYTE ESTERASE UR-ACNC: ABNORMAL
LYMPHOCYTES # BLD AUTO: 1.21 K/UL — SIGNIFICANT CHANGE UP (ref 1–3.3)
LYMPHOCYTES # BLD AUTO: 8.3 % — LOW (ref 13–44)
MCHC RBC-ENTMCNC: 31.1 PG — SIGNIFICANT CHANGE UP (ref 27–34)
MCHC RBC-ENTMCNC: 32 GM/DL — SIGNIFICANT CHANGE UP (ref 32–36)
MCV RBC AUTO: 97.3 FL — SIGNIFICANT CHANGE UP (ref 80–100)
MONOCYTES # BLD AUTO: 1.05 K/UL — HIGH (ref 0–0.9)
MONOCYTES NFR BLD AUTO: 7.2 % — SIGNIFICANT CHANGE UP (ref 2–14)
NEUTROPHILS # BLD AUTO: 11.81 K/UL — HIGH (ref 1.8–7.4)
NEUTROPHILS NFR BLD AUTO: 80.7 % — HIGH (ref 43–77)
NITRITE UR-MCNC: NEGATIVE — SIGNIFICANT CHANGE UP
NRBC # BLD: 0 /100 WBCS — SIGNIFICANT CHANGE UP (ref 0–0)
PH UR: 7 — SIGNIFICANT CHANGE UP (ref 5–8)
PLATELET # BLD AUTO: 232 K/UL — SIGNIFICANT CHANGE UP (ref 150–400)
POTASSIUM SERPL-MCNC: 3.8 MMOL/L — SIGNIFICANT CHANGE UP (ref 3.5–5.3)
POTASSIUM SERPL-SCNC: 3.8 MMOL/L — SIGNIFICANT CHANGE UP (ref 3.5–5.3)
PROT SERPL-MCNC: 7 G/DL — SIGNIFICANT CHANGE UP (ref 6–8.3)
PROT UR-MCNC: ABNORMAL
RBC # BLD: 3.66 M/UL — LOW (ref 4.2–5.8)
RBC # FLD: 13.1 % — SIGNIFICANT CHANGE UP (ref 10.3–14.5)
RBC CASTS # UR COMP ASSIST: >50 /HPF — HIGH (ref 0–4)
SODIUM SERPL-SCNC: 142 MMOL/L — SIGNIFICANT CHANGE UP (ref 135–145)
SP GR SPEC: 1.02 — SIGNIFICANT CHANGE UP (ref 1.01–1.02)
UROBILINOGEN FLD QL: NEGATIVE — SIGNIFICANT CHANGE UP
WBC # BLD: 14.62 K/UL — HIGH (ref 3.8–10.5)
WBC # FLD AUTO: 14.62 K/UL — HIGH (ref 3.8–10.5)
WBC UR QL: >50 /HPF — HIGH (ref 0–5)

## 2020-08-04 PROCEDURE — 99223 1ST HOSP IP/OBS HIGH 75: CPT

## 2020-08-04 PROCEDURE — 99285 EMERGENCY DEPT VISIT HI MDM: CPT | Mod: CS,GC

## 2020-08-04 RX ORDER — ENOXAPARIN SODIUM 100 MG/ML
40 INJECTION SUBCUTANEOUS DAILY
Refills: 0 | Status: DISCONTINUED | OUTPATIENT
Start: 2020-08-04 | End: 2020-08-18

## 2020-08-04 RX ORDER — CEFTRIAXONE 500 MG/1
1000 INJECTION, POWDER, FOR SOLUTION INTRAMUSCULAR; INTRAVENOUS ONCE
Refills: 0 | Status: COMPLETED | OUTPATIENT
Start: 2020-08-04 | End: 2020-08-04

## 2020-08-04 RX ORDER — LIDOCAINE HCL 20 MG/ML
5 VIAL (ML) INJECTION EVERY 6 HOURS
Refills: 0 | Status: DISCONTINUED | OUTPATIENT
Start: 2020-08-04 | End: 2020-08-18

## 2020-08-04 RX ORDER — ACETAMINOPHEN 500 MG
650 TABLET ORAL EVERY 6 HOURS
Refills: 0 | Status: DISCONTINUED | OUTPATIENT
Start: 2020-08-04 | End: 2020-08-18

## 2020-08-04 RX ADMIN — CEFTRIAXONE 100 MILLIGRAM(S): 500 INJECTION, POWDER, FOR SOLUTION INTRAMUSCULAR; INTRAVENOUS at 22:12

## 2020-08-04 NOTE — H&P ADULT - NSHPREVIEWOFSYSTEMS_GEN_ALL_CORE
REVIEW OF SYSTEMS:    CONSTITUTIONAL: No fevers, chills  EYES/ENT: No visual changes;  no throat pain   NECK: No pain or stiffness  RESPIRATORY: No cough, no shortness of breath  CARDIOVASCULAR: No chest pain or palpitations  GASTROINTESTINAL: no nausea, vomiting, no abdominal pain, no BRBPR  GENITOURINARY: as per HPI  NEUROLOGICAL: no numbness, no headaches, no confusion   MUSCULOSKELETAL: no back pain, no focal weakness   SKIN: No rashes, or lesions   PSYCH: no anxiety, depression  HEME: no gum bleeding, no bruising

## 2020-08-04 NOTE — PROCEDURE NOTE - NSURITECHNIQUE_GU_A_CORE
The catheter was secured with a securement device (e.g. StatLock)/The urinary drainage system is closed at the end of the procedure/Proper hand hygiene was performed/Sterile gloves were worn for the duration of the procedure/A sterile drape was used to cover all adjacent areas/The catheter was appropriately lubricated/The collection bag is below the level of the patient and urinary bladder

## 2020-08-04 NOTE — H&P ADULT - PROBLEM SELECTOR PLAN 1
pt with leukocytosis and +UA (collected after fowler exchange) - unclear if acute UTI vs colonization in pt with chronic fowler  will treat with zosyn for now (based on prior cx data)  f/u cultures  would consult ID in AM for further eval  c/w probiotics  hematuria may be 2/2 infection vs 2/2 fowler - urine color improving currently, c/t monitor

## 2020-08-04 NOTE — ED PROVIDER NOTE - OBJECTIVE STATEMENT
76y/o M w/ h/o muscular dystrophy of unknown etiology, rhabdomyolysis, PEG with reversal, anxiety, BPH with chronic Spencer BIBEMS for hematuria. Pt states that he was discharged with 18Fr Coude on last discharge (7/8) was supposed to follow up with urologist tomorrow in office for Spencer exchange. Pt states he has been leaking urine around the Spencer since yesterday and c/o dysuria and hematuria. Denies fevers, chills, nausea, vomiting, chest pain, cough, sob, abdominal pain, back pain, AC use, fatigue, dizziness.

## 2020-08-04 NOTE — H&P ADULT - HISTORY OF PRESENT ILLNESS
75M with PMH of likely muscular dystrophy of unknown etiology c/b rhabdomyolysis, s/p PEG with reversal, homebound, anxiety, BPH with chronic fowler, recurrent C diff on PO vanc taper p/w hematuria. Pt was hospitalized end of July for fowler malfunction and retention, discharged back to [facility] with fowler in place with plan for follow up with urology tomorrow for fowler exchange. Pt has noticed intermittent leakage of urine around the fowler site for past few days, and today noted pain at meatus which pt believes is 2/2 irritation and hematuria, prompting ED visit. Denies any abdominal pain, no nausea/vomiting, no fevers or chills. All other ROS negative.   Pt has had recurrent c diff infections, currently on the last 2 days of PO vancomycin taper.

## 2020-08-04 NOTE — ED PROVIDER NOTE - CLINICAL SUMMARY MEDICAL DECISION MAKING FREE TEXT BOX
74y/o M w/ h/o muscular dystrophy of unknown etiology, rhabdomyolysis, PEG with reversal, anxiety, BPH with chronic Spencer BIBEMS for hematuria dysuria. Hemodynamcially stable with Spencer in place with svetlana transparent color with small clots. Will check basic labs, UA, UCx, urology consult. 74y/o M w/ h/o muscular dystrophy of unknown etiology, rhabdomyolysis, PEG with reversal, anxiety, BPH with chronic Spencer BIBEMS for hematuria dysuria. Hemodynamically stable with Spencer in place with svetlana transparent color with small clots. Will check basic labs, UA, UCx, urology consult.and on reevaluation: ZR

## 2020-08-04 NOTE — H&P ADULT - NSHPLABSRESULTS_GEN_ALL_CORE
Labs, imaging and EKG personally reviewed and interpreted by me.                           11.4   14.62 )-----------( 232      ( 04 Aug 2020 19:54 )             35.6     08-04    142  |  102  |  11  ----------------------------<  110<H>  3.8   |  28  |  0.91    Ca    9.2      04 Aug 2020 19:54    TPro  7.0  /  Alb  3.6  /  TBili  0.2  /  DBili  x   /  AST  16  /  ALT  14  /  AlkPhos  115  08-      Urinalysis Basic - ( 04 Aug 2020 21:19 )  Color: Orange / Appearance: Slightly Turbid / S.016 / pH: x  Gluc: x / Ketone: Negative  / Bili: Negative / Urobili: Negative   Blood: x / Protein: 300 mg/dL / Nitrite: Negative   Leuk Esterase: Moderate / RBC: >50 /hpf / WBC >50 /HPF   Sq Epi: x / Non Sq Epi: 1 / Bacteria: Negative

## 2020-08-04 NOTE — ED ADULT NURSE NOTE - NSIMPLEMENTINTERV_GEN_ALL_ED
Implemented All Fall Risk Interventions:  [place] to call system. Call bell, personal items and telephone within reach. Instruct patient to call for assistance. Room bathroom lighting operational. Non-slip footwear when patient is off stretcher. Physically safe environment: no spills, clutter or unnecessary equipment. Stretcher in lowest position, wheels locked, appropriate side rails in place. Provide visual cue, wrist band, yellow gown, etc. Monitor gait and stability. Monitor for mental status changes and reorient to person, place, and time. Review medications for side effects contributing to fall risk. Reinforce activity limits and safety measures with patient and family.

## 2020-08-04 NOTE — ED PROVIDER NOTE - NS ED ROS FT
GENERAL: denies fever, chills  HEENT: denies headaches, dizziness  CARDIAC: denies chest pain, palpitations  PULM: denies SOB, cough  GI: denies abdominal pain, nausea, vomiting, diarrhea, constipation  : +dysuria, frequency, hematuria  NEURO: denies motor weakness, sensory changes  MSK: denies joint, muscle pain  SKIN: denies new rashes, hives  HEME: denies bruising

## 2020-08-04 NOTE — PROCEDURE NOTE - NSPROCDETAILS_GEN_ALL_CORE
sterile technique, indwelling urinary device inserted/sterile technique, old cysto removed, new tube inserted

## 2020-08-04 NOTE — H&P ADULT - NSHPPHYSICALEXAM_GEN_ALL_CORE
Vital Signs Last 24 Hrs  T(C): 36.7 (04 Aug 2020 19:29), Max: 36.7 (04 Aug 2020 19:29)  T(F): 98.1 (04 Aug 2020 19:29), Max: 98.1 (04 Aug 2020 19:29)  HR: 87 (04 Aug 2020 19:29) (87 - 87)  BP: 121/75 (04 Aug 2020 19:29) (106/60 - 121/75)  BP(mean): --  RR: 17 (04 Aug 2020 19:29) (17 - 20)  SpO2: 98% (04 Aug 2020 19:29) (96% - 98%)    PHYSICAL EXAM:  GENERAL: NAD, thin/cachectic  HEAD:  Atraumatic, normocephalic  EYES: EOMI, conjunctiva and sclera clear  NECK: Supple, no JVD  CHEST/LUNG: Clear to auscultation bilaterally; no wheezing or rales  HEART: Regular rate and rhythm; no murmurs  ABDOMEN: Soft, nontender, nondistended; bowel sounds present  EXTREMITIES:  2+ Peripheral Pulses, no edema  PSYCH: calm affect, not anxious  NEUROLOGY: non-focal, AAOx3  SKIN: No rashes or lesions  MUSCULOSKELETAL: no back pain, moving all extremities

## 2020-08-04 NOTE — ED ADULT NURSE REASSESSMENT NOTE - NS ED NURSE REASSESS COMMENT FT1
Urology at bedside placed 20fr Coude, urine specimen received from new fowler catheter. approx 100cc of cloudy yellow urine noted to bag.

## 2020-08-04 NOTE — PROCEDURE NOTE - NSCOMPLICATION_GEN_A_CORE
Infusion # 4 - Reclast 5 mg q 1 year  Last dose- 9/12/18    Any invasive dental procedures in past 3 months or upcoming 3 months: denies    Recent labs? 9/4/19  Lab Results   Component Value Date    CALCIUM 10.4 09/04/2019    CALCIUM 10.4 09/04/2019     Lab Results   Component Value Date    CREATININE 1.1 09/04/2019    CREATININE 1.1 09/04/2019     Lab Results   Component Value Date    ESTGFRAFRICA >60.0 09/04/2019    ESTGFRAFRICA >60.0 09/04/2019     Lab Results   Component Value Date    EGFRNONAA 53.2 (A) 09/04/2019    EGFRNONAA 53.2 (A) 09/04/2019     Lab Results   Component Value Date    [id number] 48 06/10/2019         Last Rheumatology provider visit- Seen by Dr. Delaney on 9/4/19     Premeds? 500 mg Tylenol PO     Reclast 5 mg administered IV at a 15 minute rate per orders; see MAR and vitals for more  details. Tolerated well without adverse events, discharged and ambulatory out of clinic.     
no complications

## 2020-08-04 NOTE — ED PROVIDER NOTE - PHYSICAL EXAMINATION
GENERAL: unkempt non-toxic appearing in NAD  HEAD: normocephalic, atraumatic  HEENT: normal conjunctiva, oral mucosa moist, uvula midline, no tonsilar exudates, neck supple, no JVD  CARDIAC: regular rate and rhythm, normal S1S2, no appreciable murmurs, no peripheral edema  PULM: normal breath sounds, CTA b/l, no rales, rhonchi, wheezing  GI: abdomen nondistended, soft, nontender, no guarding, no rebound tenderness  : chronic fowler in place draining hematuria with small clots, no CVA tenderness b/l, no suprapubic tenderness/fullness  NEURO: no focal motor or sensory deficits, AAOx3  MSK: no calf tenderness b/l  SKIN: well-perfused, extremities warm, no visible rashes  PSYCH: flat affect

## 2020-08-04 NOTE — H&P ADULT - PROBLEM SELECTOR PLAN 2
retention 2/2 BPH, fowler replaced in ED by urology  c/w fowler, c/w flomax  hematuria is improving   f/u urology as outpt

## 2020-08-04 NOTE — H&P ADULT - ASSESSMENT
75M with PMH of likely muscular dystrophy of unknown etiology c/b rhabdomyolysis, s/p PEG with reversal, homebound, anxiety, BPH with chronic fowler, recurrent C diff on PO vanc taper p/w urinary leakage around fowler with hematuria s/p replacement, a/w likely UTI.

## 2020-08-04 NOTE — PROCEDURE NOTE - ADDITIONAL PROCEDURE DETAILS
UROLOGY PROGRESS NOTE:     called to see patient for fowler change.  18F fowler in place draining clear yellow urine.  fowler removed intact.  new 20F coude fowler placed under typical sterile technique.  clear yellow urine draining.  patient tolerated procedure well.  no complications.

## 2020-08-04 NOTE — ED ADULT NURSE NOTE - OBJECTIVE STATEMENT
76 y/o male history of BPH presents to the ED via EMS from [facility] in [place] c/o hematuria and burning upon urination since about 1200 today. Patient states indwelling fowler placed about 1 month ago, scheduled to have it changed tomorrow. Pt is AOx4, patent airway, clear lung sounds, soft non tender abdomen, strong peripheral pulses. Pt Denies fever, chills, n/v, weakness, abd pain, diarrhea/constipation, numbness/tingling, in no respiratory distress, no chest pain, Patient safety provided with call bell within reach and bed in the lowest position. 18G IV in left AC labs drawn and sent as ordered. 74 y/o male history of BPH presents to the ED via EMS from [facility] in [place] c/o hematuria and burning upon urination since about 1200 today. Patient states indwelling fowler placed about 1 month ago, scheduled to have it changed tomorrow. Pt is AOx4, patent airway, clear lung sounds, soft non tender abdomen, strong peripheral pulses. Pt Denies fever, chills, n/v, weakness, abd pain, diarrhea/constipation, numbness/tingling, in no respiratory distress, no chest pain, Patient safety provided with call bell within reach and bed in the lowest position. 18G IV in left AC labs drawn and sent as ordered. Urology contacted about replacing current indwelling fowler.

## 2020-08-05 ENCOUNTER — TRANSCRIPTION ENCOUNTER (OUTPATIENT)
Age: 75
End: 2020-08-05

## 2020-08-05 ENCOUNTER — APPOINTMENT (OUTPATIENT)
Dept: UROLOGY | Facility: CLINIC | Age: 75
End: 2020-08-05

## 2020-08-05 DIAGNOSIS — N40.1 BENIGN PROSTATIC HYPERPLASIA WITH LOWER URINARY TRACT SYMPTOMS: ICD-10-CM

## 2020-08-05 DIAGNOSIS — F41.9 ANXIETY DISORDER, UNSPECIFIED: ICD-10-CM

## 2020-08-05 DIAGNOSIS — N30.01 ACUTE CYSTITIS WITH HEMATURIA: ICD-10-CM

## 2020-08-05 DIAGNOSIS — Z86.69 PERSONAL HISTORY OF OTHER DISEASES OF THE NERVOUS SYSTEM AND SENSE ORGANS: ICD-10-CM

## 2020-08-05 DIAGNOSIS — A49.8 OTHER BACTERIAL INFECTIONS OF UNSPECIFIED SITE: ICD-10-CM

## 2020-08-05 LAB
ANION GAP SERPL CALC-SCNC: 11 MMOL/L — SIGNIFICANT CHANGE UP (ref 5–17)
BUN SERPL-MCNC: 11 MG/DL — SIGNIFICANT CHANGE UP (ref 7–23)
CALCIUM SERPL-MCNC: 9.3 MG/DL — SIGNIFICANT CHANGE UP (ref 8.4–10.5)
CHLORIDE SERPL-SCNC: 101 MMOL/L — SIGNIFICANT CHANGE UP (ref 96–108)
CO2 SERPL-SCNC: 30 MMOL/L — SIGNIFICANT CHANGE UP (ref 22–31)
CREAT SERPL-MCNC: 0.9 MG/DL — SIGNIFICANT CHANGE UP (ref 0.5–1.3)
GLUCOSE SERPL-MCNC: 97 MG/DL — SIGNIFICANT CHANGE UP (ref 70–99)
HCT VFR BLD CALC: 36.3 % — LOW (ref 39–50)
HGB BLD-MCNC: 11.6 G/DL — LOW (ref 13–17)
MAGNESIUM SERPL-MCNC: 2 MG/DL — SIGNIFICANT CHANGE UP (ref 1.6–2.6)
MCHC RBC-ENTMCNC: 31.8 PG — SIGNIFICANT CHANGE UP (ref 27–34)
MCHC RBC-ENTMCNC: 32 GM/DL — SIGNIFICANT CHANGE UP (ref 32–36)
MCV RBC AUTO: 99.5 FL — SIGNIFICANT CHANGE UP (ref 80–100)
NRBC # BLD: 0 /100 WBCS — SIGNIFICANT CHANGE UP (ref 0–0)
PHOSPHATE SERPL-MCNC: 4.4 MG/DL — SIGNIFICANT CHANGE UP (ref 2.5–4.5)
PLATELET # BLD AUTO: 216 K/UL — SIGNIFICANT CHANGE UP (ref 150–400)
POTASSIUM SERPL-MCNC: 3.5 MMOL/L — SIGNIFICANT CHANGE UP (ref 3.5–5.3)
POTASSIUM SERPL-SCNC: 3.5 MMOL/L — SIGNIFICANT CHANGE UP (ref 3.5–5.3)
RBC # BLD: 3.65 M/UL — LOW (ref 4.2–5.8)
RBC # FLD: 13.1 % — SIGNIFICANT CHANGE UP (ref 10.3–14.5)
SARS-COV-2 IGG SERPL QL IA: POSITIVE
SARS-COV-2 IGM SERPL IA-ACNC: 47.3 AU/ML — HIGH
SARS-COV-2 RNA SPEC QL NAA+PROBE: SIGNIFICANT CHANGE UP
SODIUM SERPL-SCNC: 142 MMOL/L — SIGNIFICANT CHANGE UP (ref 135–145)
WBC # BLD: 9.2 K/UL — SIGNIFICANT CHANGE UP (ref 3.8–10.5)
WBC # FLD AUTO: 9.2 K/UL — SIGNIFICANT CHANGE UP (ref 3.8–10.5)

## 2020-08-05 PROCEDURE — 93010 ELECTROCARDIOGRAM REPORT: CPT

## 2020-08-05 PROCEDURE — 99223 1ST HOSP IP/OBS HIGH 75: CPT

## 2020-08-05 RX ORDER — LACTOBACILLUS ACIDOPHILUS 100MM CELL
1 CAPSULE ORAL
Refills: 0 | Status: DISCONTINUED | OUTPATIENT
Start: 2020-08-05 | End: 2020-08-18

## 2020-08-05 RX ORDER — VANCOMYCIN HCL 1 G
125 VIAL (EA) INTRAVENOUS DAILY
Refills: 0 | Status: DISCONTINUED | OUTPATIENT
Start: 2020-08-05 | End: 2020-08-05

## 2020-08-05 RX ORDER — PIPERACILLIN AND TAZOBACTAM 4; .5 G/20ML; G/20ML
3.38 INJECTION, POWDER, LYOPHILIZED, FOR SOLUTION INTRAVENOUS EVERY 8 HOURS
Refills: 0 | Status: DISCONTINUED | OUTPATIENT
Start: 2020-08-05 | End: 2020-08-10

## 2020-08-05 RX ORDER — DIAZEPAM 5 MG
5 TABLET ORAL EVERY 12 HOURS
Refills: 0 | Status: DISCONTINUED | OUTPATIENT
Start: 2020-08-05 | End: 2020-08-12

## 2020-08-05 RX ORDER — VANCOMYCIN HCL 1 G
125 VIAL (EA) INTRAVENOUS
Refills: 0 | Status: COMPLETED | OUTPATIENT
Start: 2020-08-05 | End: 2020-08-12

## 2020-08-05 RX ORDER — TAMSULOSIN HYDROCHLORIDE 0.4 MG/1
0.8 CAPSULE ORAL AT BEDTIME
Refills: 0 | Status: DISCONTINUED | OUTPATIENT
Start: 2020-08-05 | End: 2020-08-18

## 2020-08-05 RX ADMIN — Medication 1 TABLET(S): at 17:53

## 2020-08-05 RX ADMIN — Medication 1 TABLET(S): at 05:53

## 2020-08-05 RX ADMIN — PIPERACILLIN AND TAZOBACTAM 25 GRAM(S): 4; .5 INJECTION, POWDER, LYOPHILIZED, FOR SOLUTION INTRAVENOUS at 14:26

## 2020-08-05 RX ADMIN — Medication 5 MILLIGRAM(S): at 05:53

## 2020-08-05 RX ADMIN — PIPERACILLIN AND TAZOBACTAM 25 GRAM(S): 4; .5 INJECTION, POWDER, LYOPHILIZED, FOR SOLUTION INTRAVENOUS at 21:28

## 2020-08-05 RX ADMIN — Medication 5 MILLIGRAM(S): at 17:53

## 2020-08-05 RX ADMIN — Medication 125 MILLIGRAM(S): at 01:15

## 2020-08-05 RX ADMIN — Medication 125 MILLIGRAM(S): at 17:54

## 2020-08-05 RX ADMIN — PIPERACILLIN AND TAZOBACTAM 25 GRAM(S): 4; .5 INJECTION, POWDER, LYOPHILIZED, FOR SOLUTION INTRAVENOUS at 05:53

## 2020-08-05 RX ADMIN — TAMSULOSIN HYDROCHLORIDE 0.8 MILLIGRAM(S): 0.4 CAPSULE ORAL at 21:28

## 2020-08-05 NOTE — PROGRESS NOTE ADULT - PROBLEM SELECTOR PLAN 6
Testicle US showed large complex left hydrocele. Urology aware. No sonographic evidence of testicular torsion.  - monitor normal

## 2020-08-05 NOTE — CONSULT NOTE ADULT - ASSESSMENT
75M with PMH of likely muscular dystrophy of unknown etiology c/b rhabdomyolysis, s/p PEG with reversal, homebound, anxiety, BPH with chronic fowler, recurrent C diff on PO vanc taper who presented with hematuria and pain around urethra. In the ED afebrile, normotensive and not tachycardic. On presentation WBC of 14.62 with 80.7% PMN.     U/A with >50 WBC and RBC.   COVID19 PCR negative.   Previous positive urine cultures from 6/23 with MDR Pseudomonas (Cefepime and Zosyn Susceptible) and VRE (Ampicillin susceptible)  Patient had fowler exchanged and given antibiotics  Resolution of leukocytosis and urethral pain after these interventions  Unclear if true UTI or leukocytosis secondary to stress response from obstruction  Favor short empiric course of antibiotics     With regards to C diff - I would continue patient at BID dosing of PO Vancomycin while on antibiotics to reduce risk of recurrence    Overall, Abnormal Urinalysis, Leukocytosis, Recurrent C diff Infection    --Recommend increasing PO Vancomycin to 125 mg PO BID (for duration while on antibiotics)  --Continue Zosyn 3.375 mg IV Q8H  --Continue to follow CBC with diff  --Continue to follow temperature curve  --Followup on preliminary urine cultures    I will continue to follow. Please feel free to contact me with any further questions.    Juvenal Berry M.D.  [facility] Division of Infectious Disease  8AM-5PM: Pager Number [phone]  After Hours (or if no response): Please contact the Infectious Diseases Office at [phone]     The above assessment and plan were discussed with medicine Susannah STEPHENSON

## 2020-08-05 NOTE — DISCHARGE NOTE NURSING/CASE MANAGEMENT/SOCIAL WORK - PATIENT PORTAL LINK FT
You can access the FollowMyHealth Patient Portal offered by [facility] by registering at the following website: [email or web address]. By joining [facility]’s FollowMyHealth portal, you will also be able to view your health information using other applications (apps) compatible with our system.

## 2020-08-05 NOTE — CONSULT NOTE ADULT - SUBJECTIVE AND OBJECTIVE BOX
Patient is a 75y old  Male who presents with a chief complaint of hematuria (04 Aug 2020 23:46)      HPI:  75M with PMH of likely muscular dystrophy of unknown etiology c/b rhabdomyolysis, s/p PEG with reversal, homebound, anxiety, BPH with chronic fowler, recurrent C diff on PO vanc taper p/w hematuria. Pt was hospitalized end of July for fowler malfunction and retention, discharged back to [facility] with fowler in place with plan for follow up with urology tomorrow for fowler exchange. Pt has noticed intermittent leakage of urine around the fowler site for past few days, and today noted pain at meatus which pt believes is 2/2 irritation and hematuria, prompting ED visit. Denies any abdominal pain, no nausea/vomiting, no fevers or chills. All other ROS negative.   Pt has had recurrent c diff infections, currently on the last 2 days of PO vancomycin taper. (04 Aug 2020 23:46)    In the ED afebrile, normotensive and not tachycardic. On presentation WBC of 14.62 with 80.7% PMN. U/A with >50 WBC and RBC. COVID19 PCR negative. Received a dose of Ceftriaxone in the ED. Switched to Zosyn        prior hospital charts reviewed [  ]  primary team notes reviewed [  ]  other consultant notes reviewed [  ]    PAST MEDICAL & SURGICAL HISTORY:  Urinary retention  H/O muscular dystrophy  Scrotal swelling  Psychiatric disorder  No significant past surgical history      Allergies  No Known Allergies    ANTIMICROBIALS (past 90 days)  MEDICATIONS  (STANDING):  cefTRIAXone   IVPB   100 mL/Hr IV Intermittent (20 @ 22:12)    piperacillin/tazobactam IVPB..   25 mL/Hr IV Intermittent (20 @ 14:26)   25 mL/Hr IV Intermittent (20 @ 05:53)    vancomycin    Solution   125 milliGRAM(s) Oral (20 @ 01:15)      ANTIMICROBIALS:    piperacillin/tazobactam IVPB.. 3.375 every 8 hours  vancomycin    Solution 125 daily    OTHER MEDS: MEDICATIONS  (STANDING):  acetaminophen   Tablet .. 650 every 6 hours PRN  diazepam    Tablet 5 every 12 hours  enoxaparin Injectable 40 daily  tamsulosin 0.8 at bedtime    SOCIAL HISTORY:   hx smoking  non-smoker    FAMILY HISTORY:  FHx: stomach cancer    REVIEW OF SYSTEMS  [  ] ROS unobtainable because:    [  ] All other systems negative except as noted below:	    Constitutional:  [ ] fever [ ] chills  [ ] weight loss  [ ] weakness  Skin:  [ ] rash [ ] phlebitis	  Eyes: [ ] icterus [ ] pain  [ ] discharge	  ENMT: [ ] sore throat  [ ] thrush [ ] ulcers [ ] exudates  Respiratory: [ ] dyspnea [ ] hemoptysis [ ] cough [ ] sputum	  Cardiovascular:  [ ] chest pain [ ] palpitations [ ] edema	  Gastrointestinal:  [ ] nausea [ ] vomiting [ ] diarrhea [ ] constipation [ ] pain	  Genitourinary:  [ ] dysuria [ ] frequency [ ] hematuria [ ] discharge [ ] flank pain  [ ] incontinence  Musculoskeletal:  [ ] myalgias [ ] arthralgias [ ] arthritis  [ ] back pain  Neurological:  [ ] headache [ ] seizures  [ ] confusion/altered mental status  Psychiatric:  [ ] anxiety [ ] depression	  Hematology/Lymphatics:  [ ] lymphadenopathy  Endocrine:  [ ] adrenal [ ] thyroid  Allergic/Immunologic:	 [ ] transplant [ ] seasonal    Vital Signs Last 24 Hrs  T(F): 97.4 (20 @ 12:27), Max: 98.1 (20 @ 19:29)  Vital Signs Last 24 Hrs  HR: 999 (20 @ 12:27) (69 - 999)  BP: 108/65 (20 @ 12:27) (98/63 - 121/75)  RR: 18 (20 @ 12:27)  SpO2: 96% (20 @ 12:27) (95% - 98%)  Wt(kg): --    PHYSICAL EXAMINATION:  General: Alert and Awake, NAD  HEENT: PERRL, EOMI, No subconjunctival hemorrhages, Oropharynx Clear, MMM  Neck: Supple, No NELIA  Cardiac: RRR, No M/R/G  Resp: CTAB, No Wh/Rh/Ra  Abdomen: NBS, NT/ND, No HSM, No rigidity or guarding  MSK: No LE edema. No stigmata of IE. No evidence of phlebitis. No evidence of synovitis.  : No fowler  Skin: No rashes or lesions. Skin is warm and dry to the touch.   Neuro: Alert and Awake. CN 2-12 Grossly intact. Moves all four extremities spontaneously.  Psych: Calm, Pleasant, Cooperative                          11.6   9.20  )-----------( 216      ( 05 Aug 2020 07:13 )             36.3     08-05    142  |  101  |  11  ----------------------------<  97  3.5   |  30  |  0.90    Ca    9.3      05 Aug 2020 07:09  Phos  4.4     08-05  Mg     2.0     08-05    TPro  7.0  /  Alb  3.6  /  TBili  0.2  /  DBili  x   /  AST  16  /  ALT  14  /  AlkPhos  115  08-04    Urinalysis Basic - ( 04 Aug 2020 21:19 )    Color: Orange / Appearance: Slightly Turbid / S.016 / pH: x  Gluc: x / Ketone: Negative  / Bili: Negative / Urobili: Negative   Blood: x / Protein: 300 mg/dL / Nitrite: Negative   Leuk Esterase: Moderate / RBC: >50 /hpf / WBC >50 /HPF   Sq Epi: x / Non Sq Epi: 1 / Bacteria: Negative    MICROBIOLOGY:                RADIOLOGY:    <The imaging below has been reviewed and visualized by me independently. Findings as detailed in report below>    EXAM:  CT ABDOMEN AND PELVIS IC                        PROCEDURE DATE:  2020   Findings suggestive of chronic bladder outlet obstruction with mild bilateral hydroureteronephrosis. Patient is a 75y old  Male who presents with a chief complaint of hematuria (04 Aug 2020 23:46)    HPI:    75M with PMH of likely muscular dystrophy of unknown etiology c/b rhabdomyolysis, s/p PEG with reversal, homebound, anxiety, BPH with chronic fowler, recurrent C diff on PO vanc taper p/w hematuria. Pt was hospitalized end of July for fowler malfunction and retention, discharged back to [facility] with fowler in place with plan for follow up with urology tomorrow for fowler exchange. Pt has noticed intermittent leakage of urine around the fowler site for past few days, and today noted pain at meatus which pt believes is 2/2 irritation and hematuria, prompting ED visit. Denies any abdominal pain, no nausea/vomiting, no fevers or chills. All other ROS negative.   Pt has had recurrent c diff infections, currently on the last 2 days of PO vancomycin taper. (04 Aug 2020 23:46)  Above reviewed and accurate    In the ED afebrile, normotensive and not tachycardic. On presentation WBC of 14.62 with 80.7% PMN. U/A with >50 WBC and RBC. COVID19 PCR negative. Received a dose of Ceftriaxone in the ED. Switched to Zosyn      prior hospital charts reviewed [ x ]  primary team notes reviewed [ x  ]  other consultant notes reviewed [ x ]    PAST MEDICAL & SURGICAL HISTORY:  Urinary retention  H/O muscular dystrophy  Scrotal swelling  Psychiatric disorder  No significant past surgical history      Allergies  No Known Allergies    ANTIMICROBIALS (past 90 days)  MEDICATIONS  (STANDING):  cefTRIAXone   IVPB   100 mL/Hr IV Intermittent (20 @ 22:12)    piperacillin/tazobactam IVPB..   25 mL/Hr IV Intermittent (20 @ 14:26)   25 mL/Hr IV Intermittent (20 @ 05:53)    vancomycin    Solution   125 milliGRAM(s) Oral (20 @ 01:15)      ANTIMICROBIALS:    piperacillin/tazobactam IVPB.. 3.375 every 8 hours  vancomycin    Solution 125 daily    OTHER MEDS: MEDICATIONS  (STANDING):  acetaminophen   Tablet .. 650 every 6 hours PRN  diazepam    Tablet 5 every 12 hours  enoxaparin Injectable 40 daily  tamsulosin 0.8 at bedtime    SOCIAL HISTORY: denies smoking, etoh use or drug use.     FAMILY HISTORY:  FHx: stomach cancer    REVIEW OF SYSTEMS  [  ] ROS unobtainable because:    [ x ] All other systems negative except as noted below:	    Constitutional:  [ ] fever [ ] chills  [ ] weight loss  [ ] weakness  Skin:  [ ] rash [ ] phlebitis	  Eyes: [ ] icterus [ ] pain  [ ] discharge	  ENMT: [ ] sore throat  [ ] thrush [ ] ulcers [ ] exudates  Respiratory: [ ] dyspnea [ ] hemoptysis [ ] cough [ ] sputum	  Cardiovascular:  [ ] chest pain [ ] palpitations [ ] edema	  Gastrointestinal:  [ ] nausea [ ] vomiting [ ] diarrhea [ ] constipation [ ] pain	  Genitourinary:  [ ] dysuria [ ] frequency [ ] hematuria [ ] discharge [ ] flank pain  [ ] incontinence +burning at urethra prior to admission  Musculoskeletal:  [ ] myalgias [ ] arthralgias [ ] arthritis  [ ] back pain  Neurological:  [ ] headache [ ] seizures  [ ] confusion/altered mental status  Psychiatric:  [ ] anxiety [ ] depression	  Hematology/Lymphatics:  [ ] lymphadenopathy  Endocrine:  [ ] adrenal [ ] thyroid  Allergic/Immunologic:	 [ ] transplant [ ] seasonal    Vital Signs Last 24 Hrs  T(F): 97.4 (20 @ 12:27), Max: 98.1 (20 @ 19:29)  Vital Signs Last 24 Hrs  HR: 999 (20 @ 12:27) (69 - 999)  BP: 108/65 (20 @ 12:27) (98/63 - 121/75)  RR: 18 (20 @ 12:27)  SpO2: 96% (20 @ 12:27) (95% - 98%)  Wt(kg): --    PHYSICAL EXAMINATION:  General: Alert and Awake, NAD, cachectic  HEENT: PERRL, EOMI  Neck: Supple  Cardiac: RRR, No M/R/G  Resp: CTAB, No Wh/Rh/Ra  Abdomen: NBS, NT/ND, No HSM, No rigidity or guarding, No CVA tenderness to palpation.   MSK: No LE edema. No stigmata of IE. No evidence of phlebitis. No evidence of synovitis.  : + fowler  Skin: No rashes or lesions. Skin is warm and dry to the touch.   Neuro: Alert and Awake. CN 2-12 Grossly intact. Moves all four extremities spontaneously.  Psych: Calm, Pleasant, Cooperative                          11.6   9.20  )-----------( 216      ( 05 Aug 2020 07:13 )             36.3     08-05    142  |  101  |  11  ----------------------------<  97  3.5   |  30  |  0.90    Ca    9.3      05 Aug 2020 07:09  Phos  4.4     08-05  Mg     2.0     08-05    TPro  7.0  /  Alb  3.6  /  TBili  0.2  /  DBili  x   /  AST  16  /  ALT  14  /  AlkPhos  115  08-04    Urinalysis Basic - ( 04 Aug 2020 21:19 )    Color: Orange / Appearance: Slightly Turbid / S.016 / pH: x  Gluc: x / Ketone: Negative  / Bili: Negative / Urobili: Negative   Blood: x / Protein: 300 mg/dL / Nitrite: Negative   Leuk Esterase: Moderate / RBC: >50 /hpf / WBC >50 /HPF   Sq Epi: x / Non Sq Epi: 1 / Bacteria: Negative    MICROBIOLOGY:    COVID-19 PCR . (20 @ 22:31)    COVID-19 PCR: NotDetec: Testing is performed using polymerase chain reaction (PCR) or  transcription mediated amplification (TMA). This COVID-19 (SARS-CoV-2)  nucleic acid amplification test was validated by [employer] and is  in use under the FDA Emergency Use Authorization (EUA) for clinical labs  CLIA-certified to perform high complexity testing. Test results should be  correlated with clinical presentation, patient history, and epidemiology.    RADIOLOGY:    <The imaging below has been reviewed and visualized by me independently. Findings as detailed in report below>    EXAM:  CT ABDOMEN AND PELVIS IC                        PROCEDURE DATE:  2020   Findings suggestive of chronic bladder outlet obstruction with mild bilateral hydroureteronephrosis.

## 2020-08-05 NOTE — PROGRESS NOTE ADULT - SUBJECTIVE AND OBJECTIVE BOX
Patient is a 75y old  Male who presents with a chief complaint of hematuria (05 Aug 2020 15:46)    pt. seen and examined, concern for his c diff   INTERVAL HPI/OVERNIGHT EVENTS:  T(C): 36.6 (20 @ 21:38), Max: 36.7 (20 @ 06:03)  HR: 82 (20 @ 21:38) (68 - 89)  BP: 102/67 (20 @ 21:38) (98/63 - 110/68)  RR: 18 (20 @ 21:38) (18 - 18)  SpO2: 96% (20 @ 21:38) (95% - 97%)  Wt(kg): --  I&O's Summary    04 Aug 2020 07  -  05 Aug 2020 07:00  --------------------------------------------------------  IN: 0 mL / OUT: 550 mL / NET: -550 mL    05 Aug 2020 07:  -  05 Aug 2020 22:58  --------------------------------------------------------  IN: 1060 mL / OUT: 900 mL / NET: 160 mL        PAST MEDICAL & SURGICAL HISTORY:  Urinary retention  H/O muscular dystrophy  Scrotal swelling  Psychiatric disorder  No significant past surgical history      SOCIAL HISTORY  Alcohol:  Tobacco:  Illicit substance use:    FAMILY HISTORY:    REVIEW OF SYSTEMS:  CONSTITUTIONAL: No fever, weight loss, or fatigue  EYES: No eye pain, visual disturbances, or discharge  ENMT:  No difficulty hearing, tinnitus, vertigo; No sinus or throat pain  NECK: No pain or stiffness  RESPIRATORY: No cough, wheezing, chills or hemoptysis; No shortness of breath  CARDIOVASCULAR: No chest pain, palpitations, dizziness, or leg swelling  GASTROINTESTINAL: No abdominal or epigastric pain. No nausea, vomiting, or hematemesis; No diarrhea or constipation. No melena or hematochezia.  GENITOURINARY: No dysuria, frequency, hematuria, or incontinence  NEUROLOGICAL: No headaches, memory loss, loss of strength, numbness, or tremors  SKIN: No itching, burning, rashes, or lesions   LYMPH NODES: No enlarged glands  ENDOCRINE: No heat or cold intolerance; No hair loss  MUSCULOSKELETAL: No joint pain or swelling; No muscle, back, or extremity pain  PSYCHIATRIC: No depression, anxiety, mood swings, or difficulty sleeping  HEME/LYMPH: No easy bruising, or bleeding gums  ALLERY AND IMMUNOLOGIC: No hives or eczema    RADIOLOGY & ADDITIONAL TESTS:    Imaging Personally Reviewed:  [ ] YES  [ ] NO    Consultant(s) Notes Reviewed:  [ ] YES  [ ] NO    PHYSICAL EXAM:  GENERAL: NAD, well-groomed, well-developed  HEAD:  Atraumatic, Normocephalic  EYES: EOMI, PERRLA, conjunctiva and sclera clear  ENMT: No tonsillar erythema, exudates, or enlargement; Moist mucous membranes, Good dentition, No lesions  NECK: Supple, No JVD, Normal thyroid  NERVOUS SYSTEM:  Alert & Oriented X3, Good concentration; Motor Strength 5/5 B/L upper and lower extremities; DTRs 2+ intact and symmetric  CHEST/LUNG: Clear to percussion bilaterally; No rales, rhonchi, wheezing, or rubs  HEART: Regular rate and rhythm; No murmurs, rubs, or gallops  ABDOMEN: Soft, Nontender, Nondistended; Bowel sounds present  EXTREMITIES:  2+ Peripheral Pulses, No clubbing, cyanosis, or edema  LYMPH: No lymphadenopathy noted  SKIN: No rashes or lesions    LABS:                        11.6   9.20  )-----------( 216      ( 05 Aug 2020 07:13 )             36.3     08-05    142  |  101  |  11  ----------------------------<  97  3.5   |  30  |  0.90    Ca    9.3      05 Aug 2020 07:09  Phos  4.4     08-05  Mg     2.0     08-05    TPro  7.0  /  Alb  3.6  /  TBili  0.2  /  DBili  x   /  AST  16  /  ALT  14  /  AlkPhos  115  08-04      Urinalysis Basic - ( 04 Aug 2020 21:19 )    Color: Orange / Appearance: Slightly Turbid / S.016 / pH: x  Gluc: x / Ketone: Negative  / Bili: Negative / Urobili: Negative   Blood: x / Protein: 300 mg/dL / Nitrite: Negative   Leuk Esterase: Moderate / RBC: >50 /hpf / WBC >50 /HPF   Sq Epi: x / Non Sq Epi: 1 / Bacteria: Negative      CAPILLARY BLOOD GLUCOSE            Urinalysis Basic - ( 04 Aug 2020 21:19 )    Color: Orange / Appearance: Slightly Turbid / S.016 / pH: x  Gluc: x / Ketone: Negative  / Bili: Negative / Urobili: Negative   Blood: x / Protein: 300 mg/dL / Nitrite: Negative   Leuk Esterase: Moderate / RBC: >50 /hpf / WBC >50 /HPF   Sq Epi: x / Non Sq Epi: 1 / Bacteria: Negative        MEDICATIONS  (STANDING):  diazepam    Tablet 5 milliGRAM(s) Oral every 12 hours  enoxaparin Injectable 40 milliGRAM(s) SubCutaneous daily  lactobacillus acidophilus 1 Tablet(s) Oral two times a day  piperacillin/tazobactam IVPB.. 3.375 Gram(s) IV Intermittent every 8 hours  tamsulosin 0.8 milliGRAM(s) Oral at bedtime  vancomycin    Solution 125 milliGRAM(s) Oral two times a day    MEDICATIONS  (PRN):  acetaminophen   Tablet .. 650 milliGRAM(s) Oral every 6 hours PRN Temp greater or equal to 38C (100.4F), Mild Pain (1 - 3), Moderate Pain (4 - 6)  lidocaine 2% Jelly 5 milliLiter(s) IntraUrethral every 6 hours PRN urethral pain      Care Discussed with Consultants/Other Providers [ ] YES  [ ] NO

## 2020-08-06 ENCOUNTER — TRANSCRIPTION ENCOUNTER (OUTPATIENT)
Age: 75
End: 2020-08-06

## 2020-08-06 LAB
HCT VFR BLD CALC: 35.1 % — LOW (ref 39–50)
HGB BLD-MCNC: 11.3 G/DL — LOW (ref 13–17)
MCHC RBC-ENTMCNC: 31.3 PG — SIGNIFICANT CHANGE UP (ref 27–34)
MCHC RBC-ENTMCNC: 32.2 GM/DL — SIGNIFICANT CHANGE UP (ref 32–36)
MCV RBC AUTO: 97.2 FL — SIGNIFICANT CHANGE UP (ref 80–100)
NRBC # BLD: 0 /100 WBCS — SIGNIFICANT CHANGE UP (ref 0–0)
PLATELET # BLD AUTO: 218 K/UL — SIGNIFICANT CHANGE UP (ref 150–400)
RBC # BLD: 3.61 M/UL — LOW (ref 4.2–5.8)
RBC # FLD: 12.9 % — SIGNIFICANT CHANGE UP (ref 10.3–14.5)
WBC # BLD: 8.89 K/UL — SIGNIFICANT CHANGE UP (ref 3.8–10.5)
WBC # FLD AUTO: 8.89 K/UL — SIGNIFICANT CHANGE UP (ref 3.8–10.5)

## 2020-08-06 PROCEDURE — 99232 SBSQ HOSP IP/OBS MODERATE 35: CPT

## 2020-08-06 RX ADMIN — Medication 1 TABLET(S): at 11:24

## 2020-08-06 RX ADMIN — PIPERACILLIN AND TAZOBACTAM 25 GRAM(S): 4; .5 INJECTION, POWDER, LYOPHILIZED, FOR SOLUTION INTRAVENOUS at 05:40

## 2020-08-06 RX ADMIN — Medication 1 TABLET(S): at 05:39

## 2020-08-06 RX ADMIN — Medication 5 MILLIGRAM(S): at 17:43

## 2020-08-06 RX ADMIN — Medication 1 TABLET(S): at 17:43

## 2020-08-06 RX ADMIN — Medication 125 MILLIGRAM(S): at 17:43

## 2020-08-06 RX ADMIN — PIPERACILLIN AND TAZOBACTAM 25 GRAM(S): 4; .5 INJECTION, POWDER, LYOPHILIZED, FOR SOLUTION INTRAVENOUS at 22:08

## 2020-08-06 RX ADMIN — Medication 5 MILLIGRAM(S): at 05:39

## 2020-08-06 RX ADMIN — PIPERACILLIN AND TAZOBACTAM 25 GRAM(S): 4; .5 INJECTION, POWDER, LYOPHILIZED, FOR SOLUTION INTRAVENOUS at 15:53

## 2020-08-06 RX ADMIN — TAMSULOSIN HYDROCHLORIDE 0.8 MILLIGRAM(S): 0.4 CAPSULE ORAL at 22:03

## 2020-08-06 RX ADMIN — Medication 125 MILLIGRAM(S): at 05:40

## 2020-08-06 NOTE — DISCHARGE NOTE PROVIDER - NSDCFUSCHEDAPPT_GEN_ALL_CORE_FT
SHANTANU CHARLES ; 08/10/2020 ; NPP [facility] [address] [facility]  SHANTANU CHARLES ; 09/02/2020 ; NPP Neuro [address] [facility] SHANTANU CHARLES ; 08/10/2020 ; NPP [facility] [address] [facility]  SHANTANU CHARLES ; 09/02/2020 ; NPP Neuro [address] [facility] SHANTANU CHARLES ; 08/18/2020 ; NP [facility] [address] [facility]  SHANTANU CHARLES ; 08/28/2020 ; NPP Urology [address] [facility]  SHANTANU CHARLES ; 09/02/2020 ; [facility]PP [facility] [place] [facility] ; 09/03/2020 ; [facility] [address] [facility] SHANTANU CHARLES ; 08/18/2020 ; NP [facility] [address] [facility]  SHANTANU CHARLES ; 08/28/2020 ; NPP Urology [address] [facility]  SHANTANU CHARLES ; 09/02/2020 ; [facility] 09/03/2020 ; \[facility]"" [facility] [address] [facility] SHANTANU CHARLES ; 08/18/2020 ; NP [facility] [address] [facility]  SHANTANU CHARLES ; 08/28/2020 ; NPP Urology [address] [facility]  SHANTANU CHARLES ; 09/02/2020 ; [facility] 09/03/2020 ; [facility] [address] [facility] SHANTANU CHARLES ; 08/18/2020 ; NP [facility] [address] [facility]  SHANTANU CHARLES ; 08/28/2020 ; NPP Urology [address] [facility]  SHANTANU CHARLES ; 09/02/2020 ; [facility] 09/03/2020 ; [facility] [address] [facility]

## 2020-08-06 NOTE — DIETITIAN INITIAL EVALUATION ADULT. - REASON INDICATOR FOR ASSESSMENT
Pt seen for nutrition consult for stage II pressure injury or greater.  Information obtained from: pt, electronic medical record

## 2020-08-06 NOTE — DISCHARGE NOTE PROVIDER - HOSPITAL COURSE
75M with PMH of likely muscular dystrophy of unknown etiology c/b rhabdomyolysis, s/p PEG with reversal, homebound, anxiety, BPH with chronic fowler, recurrent C diff on PO vanc taper p/w urinary leakage around fowler with hematuria s/p replacement, a/w likely UTI.         Acute cystitis with hematuria.  Plan: 2/2 ch fowler cath     -seen by ID    urine c/s : contamination     -as per ID c/w IV abx for now and increased his oral vanco 125 mg bid while on abx for c diff prophylaxis.         Urinary retention due to benign prostatic hyperplasia.  Plan: c/w fowler     flomax.         Clostridium difficile infection.  Plan: no diarrhea yet     -increase vanco oral bid as per ID. 75M with PMH of likely muscular dystrophy of unknown etiology c/b rhabdomyolysis, s/p PEG with reversal, homebound, anxiety, BPH with chronic fowler, recurrent C diff on PO vanc taper who presented with hematuria and pain around urethra. In the ED afebrile, normotensive and not tachycardic. On presentation WBC of 14.62 with 80.7% PMN. During hospital course, U/A with >50 WBC and RBC.  UCx with >100K pseudomonas. Patient had fowler exchanged and given antibiotics (5-day course of Zosyn) with resolution of leukocytosis and urethral pain after these interventions. Patient also completed course of PO Vanc s/p IV antibiotic course. Patient deemed medically stable for discharge back to assisted living with plan to f/u PCP in one week. 75M with PMH of likely muscular dystrophy of unknown etiology c/b rhabdomyolysis, s/p PEG with reversal, homebound, anxiety, BPH with chronic fowler, recurrent C diff on PO vanc taper who presented with hematuria and pain around urethra. In the ED afebrile, normotensive and not tachycardic. On presentation WBC of 14.62 with 80.7% PMN. During hospital course, U/A with >50 WBC and RBC.  UCx with >100K pseudomonas. Patient had fowler exchanged and given antibiotics (5-day course of Zosyn) with resolution of leukocytosis and urethral pain after these interventions. Patient also completed course of PO Vanc s/p IV antibiotic course. Patient deemed medically stable for discharge back to assisted living with plan to f/u PCP in one week.    DCP with med rec discussed with Attending of record

## 2020-08-06 NOTE — DISCHARGE NOTE PROVIDER - NSDCMRMEDTOKEN_GEN_ALL_CORE_FT
diazePAM 5 mg oral tablet: 1 tab(s) orally every 12 hours MDD:2 tabs  lactobacillus acidophilus oral capsule: 1 cap(s) orally 2 times a day (with meals)  tamsulosin 0.4 mg oral capsule: 2 cap(s) orally once a day (at bedtime)  vancomycin 125 mg oral capsule: 1 cap(s) orally x 2 more days acetaminophen 325 mg oral tablet: 2 tab(s) orally every 6 hours, As needed, Temp greater or equal to 38C (100.4F), Mild Pain (1 - 3), Moderate Pain (4 - 6)  diazePAM 5 mg oral tablet: 1 tab(s) orally every 12 hours MDD:2 tabs  lactobacillus acidophilus oral capsule: 1 cap(s) orally 2 times a day (with meals)  tamsulosin 0.4 mg oral capsule: 2 cap(s) orally once a day (at bedtime) acetaminophen 325 mg oral tablet: 2 tab(s) orally every 6 hours, As needed, Temp greater or equal to 38C (100.4F), Mild Pain (1 - 3), Moderate Pain (4 - 6)  ascorbic acid 500 mg oral tablet: 1 tab(s) orally once a day  diazePAM 5 mg oral tablet: 1 tab(s) orally every 12 hours MDD:2 tabs  lactobacillus acidophilus oral capsule: 1 cap(s) orally 2 times a day (with meals)  Multiple Vitamins oral tablet: 1 tab(s) orally once a day  tamsulosin 0.4 mg oral capsule: 2 cap(s) orally once a day (at bedtime)

## 2020-08-06 NOTE — DIETITIAN INITIAL EVALUATION ADULT. - PERTINENT MEDS FT
MEDICATIONS  (STANDING):  diazepam    Tablet 5 milliGRAM(s) Oral every 12 hours  enoxaparin Injectable 40 milliGRAM(s) SubCutaneous daily  lactobacillus acidophilus 1 Tablet(s) Oral two times a day  multivitamin 1 Tablet(s) Oral daily  piperacillin/tazobactam IVPB.. 3.375 Gram(s) IV Intermittent every 8 hours  tamsulosin 0.8 milliGRAM(s) Oral at bedtime  vancomycin    Solution 125 milliGRAM(s) Oral two times a day    MEDICATIONS  (PRN):  acetaminophen   Tablet .. 650 milliGRAM(s) Oral every 6 hours PRN Temp greater or equal to 38C (100.4F), Mild Pain (1 - 3), Moderate Pain (4 - 6)  lidocaine 2% Jelly 5 milliLiter(s) IntraUrethral every 6 hours PRN urethral pain

## 2020-08-06 NOTE — PROGRESS NOTE ADULT - SUBJECTIVE AND OBJECTIVE BOX
Follow Up:  UTI    Interval History: afebrile. denies pain around urethra/fowler catheter    REVIEW OF SYSTEMS  [  ] ROS unobtainable because:    [x  ] All other systems negative except as noted below    Constitutional:  [ ] fever [ ] chills  [ ] weight loss  [ ] weakness  Skin:  [ ] rash [ ] phlebitis	  Eyes: [ ] icterus [ ] pain  [ ] discharge	  ENMT: [ ] sore throat  [ ] thrush [ ] ulcers [ ] exudates  Respiratory: [ ] dyspnea [ ] hemoptysis [ ] cough [ ] sputum	  Cardiovascular:  [ ] chest pain [ ] palpitations [ ] edema	  Gastrointestinal:  [ ] nausea [ ] vomiting [ ] diarrhea [ ] constipation [ ] pain	  Genitourinary:  [ ] dysuria [ ] frequency [ ] hematuria [ ] discharge [ ] flank pain  [ ] incontinence  Musculoskeletal:  [ ] myalgias [ ] arthralgias [ ] arthritis  [ ] back pain  Neurological:  [ ] headache [ ] seizures  [ ] confusion/altered mental status    Allergies  No Known Allergies        ANTIMICROBIALS:  piperacillin/tazobactam IVPB.. 3.375 every 8 hours  vancomycin    Solution 125 two times a day      OTHER MEDS:  MEDICATIONS  (STANDING):  acetaminophen   Tablet .. 650 every 6 hours PRN  diazepam    Tablet 5 every 12 hours  enoxaparin Injectable 40 daily  tamsulosin 0.8 at bedtime      Vital Signs Last 24 Hrs  T(C): 36.4 (06 Aug 2020 12:07), Max: 36.6 (05 Aug 2020 21:38)  T(F): 97.5 (06 Aug 2020 12:07), Max: 97.9 (05 Aug 2020 21:38)  HR: 85 (06 Aug 2020 12:07) (63 - 85)  BP: 120/70 (06 Aug 2020 12:07) (98/62 - 120/70)  BP(mean): --  RR: 18 (06 Aug 2020 12:07) (18 - 18)  SpO2: 96% (06 Aug 2020 12:07) (96% - 96%)    PHYSICAL EXAMINATION:  General: Alert and Awake, NAD, cachectic  HEENT: PERRL, EOMI  Neck: Supple  Cardiac: RRR, No M/R/G  Resp: CTAB, No Wh/Rh/Ra  Abdomen: NBS, ND, Mild suprapubic tenderness to palpation, No HSM, No rigidity or guarding  MSK: No LE edema. No stigmata of IE. No evidence of phlebitis. No evidence of synovitis.  : + fowler  Skin: No rashes or lesions. Skin is warm and dry to the touch.   Neuro: Alert and Awake. CN 2-12 Grossly intact. Moves all four extremities spontaneously.  Psych: Calm, Pleasant, Cooperative                          11.6   9.20  )-----------( 216      ( 05 Aug 2020 07:13 )             36.3       08-05    142  |  101  |  11  ----------------------------<  97  3.5   |  30  |  0.90    Ca    9.3      05 Aug 2020 07:09  Phos  4.4     08-05  Mg     2.0     08-05    TPro  7.0  /  Alb  3.6  /  TBili  0.2  /  DBili  x   /  AST  16  /  ALT  14  /  AlkPhos  115  08-04      Urinalysis Basic - ( 04 Aug 2020 21:19 )    Color: Orange / Appearance: Slightly Turbid / S.016 / pH: x  Gluc: x / Ketone: Negative  / Bili: Negative / Urobili: Negative   Blood: x / Protein: 300 mg/dL / Nitrite: Negative   Leuk Esterase: Moderate / RBC: >50 /hpf / WBC >50 /HPF   Sq Epi: x / Non Sq Epi: 1 / Bacteria: Negative        MICROBIOLOGY:  v  .Urine Clean Catch (Midstream)  20   >100,000 CFU/ml Pseudomonas aeruginosa  --  --    RADIOLOGY:    <The imaging below has been reviewed and visualized by me independently. Findings as detailed in report below>    EXAM:  CT ABDOMEN AND PELVIS IC                        PROCEDURE DATE:  2020   Findings suggestive of chronic bladder outlet obstruction with mild bilateral hydroureteronephrosis.

## 2020-08-06 NOTE — CHART NOTE - NSCHARTNOTEFT_GEN_A_CORE
Upon Nutritional Assessment by the Registered Dietitian your patient was determined to meet criteria / has evidence of the following diagnosis/diagnoses:          [ ]  Mild Protein Calorie Malnutrition        [ ]  Moderate Protein Calorie Malnutrition        [x] Severe Protein Calorie Malnutrition        [ ] Unspecified Protein Calorie Malnutrition        [x] Underweight / BMI <19        [ ] Morbid Obesity / BMI > 40      Findings as based on:  [x] Comprehensive nutrition assessment   [ ] Nutrition Focused Physical Exam  [x] Other: h/o po intake likely meeting <75% increased nutrient needs, severe muscle/fat loss, BMI 17.7 related to increased physiological demand for nutrients with likely muscular dystrophy c/b rhabdomyolysis, recent multiple hospitalizations, wound healing    Nutrition Plan/Recommendations:    1. Continue to provide current diet order, no therapeutic diet restrictions indicated at this time.   2. Pt declines all oral nutrition supplements at this time   3. Consider addition of multivitamin and vitamin C for wound healing if no contraindications   4. Will continue to monitor nutrient intake, wt, labs, f/u per protocol    RD remains available. Senait Mcbride RD, CDN Pager: [phone]       PROVIDER Section:     By signing this assessment you are acknowledging and agree with the diagnosis/diagnoses assigned by the Registered Dietitian    Comments:

## 2020-08-06 NOTE — DIETITIAN INITIAL EVALUATION ADULT. - ADD RECOMMEND
1. Continue to provide current diet order, no therapeutic diet restrictions indicated at this time. 2. Pt declines all oral nutrition supplements at this time 3. Consider addition of multivitamin and vitamin C for wound healing if no contraindications 4. Will continue to monitor nutrient intake, wt, labs, f/u per protocol

## 2020-08-06 NOTE — DISCHARGE NOTE PROVIDER - CARE PROVIDER_API CALL
Frank Britton  Cardiology  [facility] [place] [address]  Phone: [phone]  Fax: [phone]  Follow Up Time:

## 2020-08-06 NOTE — DIETITIAN INITIAL EVALUATION ADULT. - ETIOLOGY
increased physiological demand for nutrients with likely muscular dystrophy c/b rhabdomyolysis, recent multiple hospitalizations, wound healing

## 2020-08-06 NOTE — DISCHARGE NOTE PROVIDER - NSDCCPCAREPLAN_GEN_ALL_CORE_FT
PRINCIPAL DISCHARGE DIAGNOSIS  Diagnosis: Acute cystitis with hematuria  Assessment and Plan of Treatment: HOME CARE INSTRUCTIONS  f you were prescribed antibiotics, take them exactly as your caregiver instructs you. Finish the medication even if you feel better after you have only taken some of the medication.  Drink enough water and fluids to keep your urine clear or pale yellow.  Avoid caffeine, tea, and carbonated beverages. They tend to irritate your bladder.  Empty your bladder often. Avoid holding urine for long periods of time.  After a bowel movement, women should cleanse from front to back. Use each tissue only once.  SEEK MEDICAL CARE IF:  You have back pain.  You develop a fever.  Your symptoms do not begin to resolve within 3 days.  SEEK IMMEDIATE MEDICAL CARE IF:  You have severe back pain or lower abdominal pain.  You develop chills.  You have nausea or vomiting.  You have continued burning or discomfort with urination.      SECONDARY DISCHARGE DIAGNOSES  Diagnosis: Urinary retention due to benign prostatic hyperplasia  Assessment and Plan of Treatment: Continue fowler care PRINCIPAL DISCHARGE DIAGNOSIS  Diagnosis: Acute cystitis with hematuria  Assessment and Plan of Treatment: HOME CARE INSTRUCTIONS  You completed a course of IV antibiotics  Drink enough water and fluids to keep your urine clear or pale yellow.  Avoid caffeine, tea, and carbonated beverages. They tend to irritate your bladder.  Empty your bladder often. Avoid holding urine for long periods of time.  After a bowel movement, women should cleanse from front to back. Use each tissue only once.  SEEK MEDICAL CARE IF:  You have back pain.  You develop a fever.  Your symptoms do not begin to resolve within 3 days.  SEEK IMMEDIATE MEDICAL CARE IF:  You have severe back pain or lower abdominal pain.  You develop chills.  You have nausea or vomiting.  You have continued burning or discomfort with urination.      SECONDARY DISCHARGE DIAGNOSES  Diagnosis: Urinary retention due to benign prostatic hyperplasia  Assessment and Plan of Treatment: Continue fowler care    Diagnosis: Protein calorie malnutrition  Assessment and Plan of Treatment: Protein calorie malnutrition

## 2020-08-06 NOTE — DIETITIAN INITIAL EVALUATION ADULT. - NS FNS WEIGHT USED FOR CALC
134 lbs (with consideration for energy needs to promote wt gain and increased protein needs for wound healing)/dosing

## 2020-08-06 NOTE — PROGRESS NOTE ADULT - SUBJECTIVE AND OBJECTIVE BOX
Patient is a 75y old  Male who presents with a chief complaint of hematuria (06 Aug 2020 15:31)    denies any c/o    INTERVAL HPI/OVERNIGHT EVENTS:  T(C): 36.4 (20 @ 12:07), Max: 36.6 (20 @ 21:38)  HR: 85 (20 @ 12:07) (63 - 85)  BP: 120/70 (20 @ 12:07) (98/62 - 120/70)  RR: 18 (20 @ 12:07) (18 - 18)  SpO2: 96% (20 @ 12:07) (96% - 96%)  Wt(kg): --  I&O's Summary    05 Aug 2020 07:  -  06 Aug 2020 07:00  --------------------------------------------------------  IN: 1260 mL / OUT: 900 mL / NET: 360 mL    06 Aug 2020 07:01  -  06 Aug 2020 20:06  --------------------------------------------------------  IN: 240 mL / OUT: 0 mL / NET: 240 mL        PAST MEDICAL & SURGICAL HISTORY:  Urinary retention  H/O muscular dystrophy  Scrotal swelling  Psychiatric disorder  No significant past surgical history      SOCIAL HISTORY  Alcohol:  Tobacco:  Illicit substance use:    FAMILY HISTORY:    REVIEW OF SYSTEMS:  CONSTITUTIONAL: No fever, weight loss, or fatigue  EYES: No eye pain, visual disturbances, or discharge  ENMT:  No difficulty hearing, tinnitus, vertigo; No sinus or throat pain  NECK: No pain or stiffness  RESPIRATORY: No cough, wheezing, chills or hemoptysis; No shortness of breath  CARDIOVASCULAR: No chest pain, palpitations, dizziness, or leg swelling  GASTROINTESTINAL: No abdominal or epigastric pain. No nausea, vomiting, or hematemesis; No diarrhea or constipation. No melena or hematochezia.  GENITOURINARY: No dysuria, frequency, hematuria, or incontinence  NEUROLOGICAL: No headaches, memory loss, loss of strength, numbness, or tremors  SKIN: No itching, burning, rashes, or lesions   LYMPH NODES: No enlarged glands  ENDOCRINE: No heat or cold intolerance; No hair loss  MUSCULOSKELETAL: No joint pain or swelling; No muscle, back, or extremity pain  PSYCHIATRIC: No depression, anxiety, mood swings, or difficulty sleeping  HEME/LYMPH: No easy bruising, or bleeding gums  ALLERY AND IMMUNOLOGIC: No hives or eczema    RADIOLOGY & ADDITIONAL TESTS:    Imaging Personally Reviewed:  [ ] YES  [ ] NO    Consultant(s) Notes Reviewed:  [ ] YES  [ ] NO    PHYSICAL EXAM:  GENERAL: NAD, well-groomed, well-developed  HEAD:  Atraumatic, Normocephalic  EYES: EOMI, PERRLA, conjunctiva and sclera clear  ENMT: No tonsillar erythema, exudates, or enlargement; Moist mucous membranes, Good dentition, No lesions  NECK: Supple, No JVD, Normal thyroid  NERVOUS SYSTEM:  Alert & Oriented X3, Good concentration; Motor Strength 5/5 B/L upper and lower extremities; DTRs 2+ intact and symmetric  CHEST/LUNG: Clear to percussion bilaterally; No rales, rhonchi, wheezing, or rubs  HEART: Regular rate and rhythm; No murmurs, rubs, or gallops  ABDOMEN: Soft, Nontender, Nondistended; Bowel sounds present  EXTREMITIES:  2+ Peripheral Pulses, No clubbing, cyanosis, or edema  LYMPH: No lymphadenopathy noted  SKIN: No rashes or lesions    LABS:                        11.3   8.89  )-----------( 218      ( 06 Aug 2020 15:41 )             35.1     08-05    142  |  101  |  11  ----------------------------<  97  3.5   |  30  |  0.90    Ca    9.3      05 Aug 2020 07:09  Phos  4.4     08-05  Mg     2.0     08-05        Urinalysis Basic - ( 04 Aug 2020 21:19 )    Color: Orange / Appearance: Slightly Turbid / S.016 / pH: x  Gluc: x / Ketone: Negative  / Bili: Negative / Urobili: Negative   Blood: x / Protein: 300 mg/dL / Nitrite: Negative   Leuk Esterase: Moderate / RBC: >50 /hpf / WBC >50 /HPF   Sq Epi: x / Non Sq Epi: 1 / Bacteria: Negative      CAPILLARY BLOOD GLUCOSE            Urinalysis Basic - ( 04 Aug 2020 21:19 )    Color: Orange / Appearance: Slightly Turbid / S.016 / pH: x  Gluc: x / Ketone: Negative  / Bili: Negative / Urobili: Negative   Blood: x / Protein: 300 mg/dL / Nitrite: Negative   Leuk Esterase: Moderate / RBC: >50 /hpf / WBC >50 /HPF   Sq Epi: x / Non Sq Epi: 1 / Bacteria: Negative        MEDICATIONS  (STANDING):  diazepam    Tablet 5 milliGRAM(s) Oral every 12 hours  enoxaparin Injectable 40 milliGRAM(s) SubCutaneous daily  lactobacillus acidophilus 1 Tablet(s) Oral two times a day  multivitamin 1 Tablet(s) Oral daily  piperacillin/tazobactam IVPB.. 3.375 Gram(s) IV Intermittent every 8 hours  tamsulosin 0.8 milliGRAM(s) Oral at bedtime  vancomycin    Solution 125 milliGRAM(s) Oral two times a day    MEDICATIONS  (PRN):  acetaminophen   Tablet .. 650 milliGRAM(s) Oral every 6 hours PRN Temp greater or equal to 38C (100.4F), Mild Pain (1 - 3), Moderate Pain (4 - 6)  lidocaine 2% Jelly 5 milliLiter(s) IntraUrethral every 6 hours PRN urethral pain      Care Discussed with Consultants/Other Providers [ ] YES  [ ] NO

## 2020-08-06 NOTE — DIETITIAN INITIAL EVALUATION ADULT. - PHYSICAL APPEARANCE
Pt defers Nutrition focused physical exam, however obvious visual signs of severe muscle wasting to temporal, clavicle, acromion process and severe fat loss to orbital fat pads, buccal fat pads./other (specify)/underweight/debilitated Height: 73 inches, Weight: 134 pounds (dosing wt)  BMI: 17.7 kg/m2 IBW: 184 pounds (+/-10%), %IBW: 73%  Pertinent Info: 4+ edema to scrotum noted, stage II pressure injury to sacrum noted at this time in nursing flow sheet.

## 2020-08-07 LAB
-  AMIKACIN: SIGNIFICANT CHANGE UP
-  AZTREONAM: SIGNIFICANT CHANGE UP
-  CEFEPIME: SIGNIFICANT CHANGE UP
-  CEFTAZIDIME: SIGNIFICANT CHANGE UP
-  CIPROFLOXACIN: SIGNIFICANT CHANGE UP
-  GENTAMICIN: SIGNIFICANT CHANGE UP
-  IMIPENEM: SIGNIFICANT CHANGE UP
-  LEVOFLOXACIN: SIGNIFICANT CHANGE UP
-  MEROPENEM: SIGNIFICANT CHANGE UP
-  PIPERACILLIN/TAZOBACTAM: SIGNIFICANT CHANGE UP
-  TOBRAMYCIN: SIGNIFICANT CHANGE UP
CULTURE RESULTS: SIGNIFICANT CHANGE UP
METHOD TYPE: SIGNIFICANT CHANGE UP
ORGANISM # SPEC MICROSCOPIC CNT: SIGNIFICANT CHANGE UP
ORGANISM # SPEC MICROSCOPIC CNT: SIGNIFICANT CHANGE UP
SPECIMEN SOURCE: SIGNIFICANT CHANGE UP

## 2020-08-07 PROCEDURE — 99232 SBSQ HOSP IP/OBS MODERATE 35: CPT

## 2020-08-07 RX ADMIN — Medication 1 TABLET(S): at 17:50

## 2020-08-07 RX ADMIN — PIPERACILLIN AND TAZOBACTAM 25 GRAM(S): 4; .5 INJECTION, POWDER, LYOPHILIZED, FOR SOLUTION INTRAVENOUS at 21:42

## 2020-08-07 RX ADMIN — Medication 1 TABLET(S): at 12:41

## 2020-08-07 RX ADMIN — PIPERACILLIN AND TAZOBACTAM 25 GRAM(S): 4; .5 INJECTION, POWDER, LYOPHILIZED, FOR SOLUTION INTRAVENOUS at 12:41

## 2020-08-07 RX ADMIN — Medication 125 MILLIGRAM(S): at 17:50

## 2020-08-07 RX ADMIN — Medication 125 MILLIGRAM(S): at 06:05

## 2020-08-07 RX ADMIN — TAMSULOSIN HYDROCHLORIDE 0.8 MILLIGRAM(S): 0.4 CAPSULE ORAL at 21:43

## 2020-08-07 RX ADMIN — Medication 1 TABLET(S): at 06:03

## 2020-08-07 RX ADMIN — Medication 5 MILLIGRAM(S): at 17:50

## 2020-08-07 RX ADMIN — PIPERACILLIN AND TAZOBACTAM 25 GRAM(S): 4; .5 INJECTION, POWDER, LYOPHILIZED, FOR SOLUTION INTRAVENOUS at 06:05

## 2020-08-07 NOTE — CHART NOTE - NSCHARTNOTEFT_GEN_A_CORE
Nutrition Follow Up Note  Patient seen for: initial malnutrition follow up. Chart reviewed and events noted. 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 Pt admitted with acute cystitis with hematuria.    Source:   Medical record and pt    Diet :   Regular    Patient reports: that his appetite and PO intake remain good. Pt with no recent N/V, constipation or diarrhea. Pt with last BM 8/4; no reported associated abdominal pain, pt does not wish to be on a bowel regimen. Pt still declines nutrition supplements.      PO intake :  100% of meals in-house     Source for PO intake:  Pt and nursing flowsheet    Dosing wt: 134 pounds  No new wts to address    Pertinent Medications:   diazepam    Tablet 5 milliGRAM(s) Oral every 12 hours  enoxaparin Injectable 40 milliGRAM(s) SubCutaneous daily  lactobacillus acidophilus 1 Tablet(s) Oral two times a day  multivitamin 1 Tablet(s) Oral daily  piperacillin/tazobactam IVPB.. 3.375 Gram(s) IV Intermittent every 8 hours  tamsulosin 0.8 milliGRAM(s) Oral at bedtime  vancomycin    Solution 125 milliGRAM(s) Oral two times a day    MEDICATIONS  (PRN):  acetaminophen   Tablet .. 650 milliGRAM(s) Oral every 6 hours PRN Temp greater or equal to 38C (100.4F), Mild Pain (1 - 3), Moderate Pain (4 - 6)  lidocaine 2% Jelly 5 milliLiter(s) IntraUrethral every 6 hours PRN urethral pain    Pertinent Labs: None to address    Skin per nursing documentation: Stage 2 pressure injury to sacrum  Edema per nursing documentation: +4 Scrotum     Estimated Needs:   Based on dosing  134 lbs (with consideration for energy needs to promote wt gain and increased protein needs for wound healing)  Estimated Energy Needs (35-40 kcal/kg): 6233-0668 kcal  Estimated Protein Needs (1.4-1.6 g/kg): 84.98-97.12 gm    Previous Nutrition Diagnosis: Severe, chronic malnutrition   Nutrition Diagnosis is: ongoing and being addressed with encouraged PO intake.     New Nutrition Diagnosis: N/A    Recommend  1) Continue to provide current diet order, no therapeutic diet restrictions indicated at this time.   2) Pt declines all oral nutrition supplements at this time.  3) Recommend adding multivitamin and vitamin C for wound healing if no contraindications.    Monitoring and Evaluation:   Continue to monitor Nutritional intake, Tolerance to diet prescription, weights, labs, skin integrity    RD remains available upon request and will follow up per protocol  Fina Weiner MS, RD, Pager #[phone] Nutrition Follow Up Note  Patient seen for: initial malnutrition follow up. Chart reviewed and events noted. Pt is a 74 y/o M with PMH of likely muscular dystrophy of unknown etiology c/b rhabdomyolysis, s/p PEG with reversal, homebound, anxiety, BPH with chronic fowler, recurrent C diff on PO vanc taper p/w urinary leakage around fowler with hematuria s/p replacement, a/w likely UTI. Pt admitted with acute cystitis with hematuria.    Source:   Medical record and pt    Diet :   Regular    Patient reports: that his appetite and PO intake remain good. Pt with no recent N/V, constipation or diarrhea. Pt with last BM 8/4; no reported associated abdominal pain, pt does not wish to be on a bowel regimen. Pt still declines nutrition supplements. Pt aware of his increased nutrient needs; reinforced ways to increase protein-energy intake i.e. eating snacks between meals. Good comprehension.      PO intake :  100% of meals in-house     Source for PO intake:  Pt and nursing flowsheet    Dosing wt: 134 pounds  No new wts to address    Pertinent Medications:   diazepam    Tablet 5 milliGRAM(s) Oral every 12 hours  enoxaparin Injectable 40 milliGRAM(s) SubCutaneous daily  lactobacillus acidophilus 1 Tablet(s) Oral two times a day  multivitamin 1 Tablet(s) Oral daily  piperacillin/tazobactam IVPB.. 3.375 Gram(s) IV Intermittent every 8 hours  tamsulosin 0.8 milliGRAM(s) Oral at bedtime  vancomycin    Solution 125 milliGRAM(s) Oral two times a day    MEDICATIONS  (PRN):  acetaminophen   Tablet .. 650 milliGRAM(s) Oral every 6 hours PRN Temp greater or equal to 38C (100.4F), Mild Pain (1 - 3), Moderate Pain (4 - 6)  lidocaine 2% Jelly 5 milliLiter(s) IntraUrethral every 6 hours PRN urethral pain    Pertinent Labs: None to address    Skin per nursing documentation: Stage 2 pressure injury to sacrum  Edema per nursing documentation: +4 Scrotum     Estimated Needs:   Based on dosing  134 lbs (with consideration for energy needs to promote wt gain and increased protein needs for wound healing)  Estimated Energy Needs (35-40 kcal/kg): 7812-1016 kcal  Estimated Protein Needs (1.4-1.6 g/kg): 84.98-97.12 gm    Previous Nutrition Diagnosis: Severe, chronic malnutrition   Nutrition Diagnosis is: ongoing and being addressed with encouraged PO intake.     New Nutrition Diagnosis: N/A    Recommend  1) Continue to provide current diet order, no therapeutic diet restrictions indicated at this time.   2) Pt declines all oral nutrition supplements at this time.  3) Recommend adding multivitamin and vitamin C for wound healing if no contraindications.    Monitoring and Evaluation:   Continue to monitor Nutritional intake, Tolerance to diet prescription, weights, labs, skin integrity    RD remains available upon request and will follow up per protocol  Fina Weiner MS, RD, Pager #[phone]

## 2020-08-07 NOTE — PROGRESS NOTE ADULT - SUBJECTIVE AND OBJECTIVE BOX
Patient is a 75y old  Male who presents with a chief complaint of hematuria (07 Aug 2020 17:47)      INTERVAL HPI/OVERNIGHT EVENTS:  T(C): 36.5 (08-07-20 @ 21:40), Max: 36.5 (08-07-20 @ 21:40)  HR: 69 (08-07-20 @ 21:40) (67 - 86)  BP: 107/66 (08-07-20 @ 21:40) (97/60 - 111/69)  RR: 18 (08-07-20 @ 21:40) (18 - 18)  SpO2: 98% (08-07-20 @ 21:40) (95% - 98%)  Wt(kg): --  I&O's Summary    06 Aug 2020 07:01  -  07 Aug 2020 07:00  --------------------------------------------------------  IN: 340 mL / OUT: 1800 mL / NET: -1460 mL    07 Aug 2020 07:01  -  07 Aug 2020 22:24  --------------------------------------------------------  IN: 0 mL / OUT: 800 mL / NET: -800 mL        PAST MEDICAL & SURGICAL HISTORY:  Urinary retention  H/O muscular dystrophy  Scrotal swelling  Psychiatric disorder  No significant past surgical history      SOCIAL HISTORY  Alcohol:  Tobacco:  Illicit substance use:    FAMILY HISTORY:    REVIEW OF SYSTEMS:  CONSTITUTIONAL: No fever, weight loss, or fatigue  EYES: No eye pain, visual disturbances, or discharge  ENMT:  No difficulty hearing, tinnitus, vertigo; No sinus or throat pain  NECK: No pain or stiffness  RESPIRATORY: No cough, wheezing, chills or hemoptysis; No shortness of breath  CARDIOVASCULAR: No chest pain, palpitations, dizziness, or leg swelling  GASTROINTESTINAL: No abdominal or epigastric pain. No nausea, vomiting, or hematemesis; No diarrhea or constipation. No melena or hematochezia.  GENITOURINARY: No dysuria, frequency, hematuria, or incontinence  NEUROLOGICAL: No headaches, memory loss, loss of strength, numbness, or tremors  SKIN: No itching, burning, rashes, or lesions   LYMPH NODES: No enlarged glands  ENDOCRINE: No heat or cold intolerance; No hair loss  MUSCULOSKELETAL: No joint pain or swelling; No muscle, back, or extremity pain  PSYCHIATRIC: No depression, anxiety, mood swings, or difficulty sleeping  HEME/LYMPH: No easy bruising, or bleeding gums  ALLERY AND IMMUNOLOGIC: No hives or eczema    RADIOLOGY & ADDITIONAL TESTS:    Imaging Personally Reviewed:  [ ] YES  [ ] NO    Consultant(s) Notes Reviewed:  [ ] YES  [ ] NO    PHYSICAL EXAM:  GENERAL: NAD, well-groomed, well-developed  HEAD:  Atraumatic, Normocephalic  EYES: EOMI, PERRLA, conjunctiva and sclera clear  ENMT: No tonsillar erythema, exudates, or enlargement; Moist mucous membranes, Good dentition, No lesions  NECK: Supple, No JVD, Normal thyroid  NERVOUS SYSTEM:  Alert & Oriented X3, Good concentration; Motor Strength 5/5 B/L upper and lower extremities; DTRs 2+ intact and symmetric  CHEST/LUNG: Clear to percussion bilaterally; No rales, rhonchi, wheezing, or rubs  HEART: Regular rate and rhythm; No murmurs, rubs, or gallops  ABDOMEN: Soft, Nontender, Nondistended; Bowel sounds present  EXTREMITIES:  2+ Peripheral Pulses, No clubbing, cyanosis, or edema  LYMPH: No lymphadenopathy noted  SKIN: No rashes or lesions    LABS:                        11.3   8.89  )-----------( 218      ( 06 Aug 2020 15:41 )             35.1               CAPILLARY BLOOD GLUCOSE                MEDICATIONS  (STANDING):  diazepam    Tablet 5 milliGRAM(s) Oral every 12 hours  enoxaparin Injectable 40 milliGRAM(s) SubCutaneous daily  lactobacillus acidophilus 1 Tablet(s) Oral two times a day  multivitamin 1 Tablet(s) Oral daily  piperacillin/tazobactam IVPB.. 3.375 Gram(s) IV Intermittent every 8 hours  tamsulosin 0.8 milliGRAM(s) Oral at bedtime  vancomycin    Solution 125 milliGRAM(s) Oral two times a day    MEDICATIONS  (PRN):  acetaminophen   Tablet .. 650 milliGRAM(s) Oral every 6 hours PRN Temp greater or equal to 38C (100.4F), Mild Pain (1 - 3), Moderate Pain (4 - 6)  lidocaine 2% Jelly 5 milliLiter(s) IntraUrethral every 6 hours PRN urethral pain      Care Discussed with Consultants/Other Providers [ ] YES  [ ] NO Patient is a 75y old  Male who presents with a chief complaint of hematuria (07 Aug 2020 17:47)    denies any c/o     INTERVAL HPI/OVERNIGHT EVENTS:  T(C): 36.5 (08-07-20 @ 21:40), Max: 36.5 (08-07-20 @ 21:40)  HR: 69 (08-07-20 @ 21:40) (67 - 86)  BP: 107/66 (08-07-20 @ 21:40) (97/60 - 111/69)  RR: 18 (08-07-20 @ 21:40) (18 - 18)  SpO2: 98% (08-07-20 @ 21:40) (95% - 98%)  Wt(kg): --  I&O's Summary    06 Aug 2020 07:01  -  07 Aug 2020 07:00  --------------------------------------------------------  IN: 340 mL / OUT: 1800 mL / NET: -1460 mL    07 Aug 2020 07:01  -  07 Aug 2020 22:24  --------------------------------------------------------  IN: 0 mL / OUT: 800 mL / NET: -800 mL        PAST MEDICAL & SURGICAL HISTORY:  Urinary retention  H/O muscular dystrophy  Scrotal swelling  Psychiatric disorder  No significant past surgical history      SOCIAL HISTORY  Alcohol:  Tobacco:  Illicit substance use:    FAMILY HISTORY:    REVIEW OF SYSTEMS:  CONSTITUTIONAL: No fever, weight loss, or fatigue  EYES: No eye pain, visual disturbances, or discharge  ENMT:  No difficulty hearing, tinnitus, vertigo; No sinus or throat pain  NECK: No pain or stiffness  RESPIRATORY: No cough, wheezing, chills or hemoptysis; No shortness of breath  CARDIOVASCULAR: No chest pain, palpitations, dizziness, or leg swelling  GASTROINTESTINAL: No abdominal or epigastric pain. No nausea, vomiting, or hematemesis; No diarrhea or constipation. No melena or hematochezia.  GENITOURINARY: No dysuria, frequency, hematuria, or incontinence  NEUROLOGICAL: No headaches, memory loss, loss of strength, numbness, or tremors  SKIN: No itching, burning, rashes, or lesions   LYMPH NODES: No enlarged glands  ENDOCRINE: No heat or cold intolerance; No hair loss  MUSCULOSKELETAL: No joint pain or swelling; No muscle, back, or extremity pain  PSYCHIATRIC: No depression, anxiety, mood swings, or difficulty sleeping  HEME/LYMPH: No easy bruising, or bleeding gums  ALLERY AND IMMUNOLOGIC: No hives or eczema    RADIOLOGY & ADDITIONAL TESTS:    Imaging Personally Reviewed:  [ ] YES  [ ] NO    Consultant(s) Notes Reviewed:  [ ] YES  [ ] NO    PHYSICAL EXAM:  GENERAL: NAD, well-groomed, well-developed  HEAD:  Atraumatic, Normocephalic  EYES: EOMI, PERRLA, conjunctiva and sclera clear  ENMT: No tonsillar erythema, exudates, or enlargement; Moist mucous membranes, Good dentition, No lesions  NECK: Supple, No JVD, Normal thyroid  NERVOUS SYSTEM:  Alert & Oriented X3, Good concentration; Motor Strength 5/5 B/L upper and lower extremities; DTRs 2+ intact and symmetric  CHEST/LUNG: Clear to percussion bilaterally; No rales, rhonchi, wheezing, or rubs  HEART: Regular rate and rhythm; No murmurs, rubs, or gallops  ABDOMEN: Soft, Nontender, Nondistended; Bowel sounds present  EXTREMITIES:  2+ Peripheral Pulses, No clubbing, cyanosis, or edema  LYMPH: No lymphadenopathy noted  SKIN: No rashes or lesions    LABS:                        11.3   8.89  )-----------( 218      ( 06 Aug 2020 15:41 )             35.1               CAPILLARY BLOOD GLUCOSE                MEDICATIONS  (STANDING):  diazepam    Tablet 5 milliGRAM(s) Oral every 12 hours  enoxaparin Injectable 40 milliGRAM(s) SubCutaneous daily  lactobacillus acidophilus 1 Tablet(s) Oral two times a day  multivitamin 1 Tablet(s) Oral daily  piperacillin/tazobactam IVPB.. 3.375 Gram(s) IV Intermittent every 8 hours  tamsulosin 0.8 milliGRAM(s) Oral at bedtime  vancomycin    Solution 125 milliGRAM(s) Oral two times a day    MEDICATIONS  (PRN):  acetaminophen   Tablet .. 650 milliGRAM(s) Oral every 6 hours PRN Temp greater or equal to 38C (100.4F), Mild Pain (1 - 3), Moderate Pain (4 - 6)  lidocaine 2% Jelly 5 milliLiter(s) IntraUrethral every 6 hours PRN urethral pain      Care Discussed with Consultants/Other Providers [ ] YES  [ ] NO

## 2020-08-07 NOTE — PHYSICAL THERAPY INITIAL EVALUATION ADULT - ACTIVE RANGE OF MOTION EXAMINATION, REHAB EVAL
LLE stiffness > RLE; improved with repetition, AAROM BLE progressing to AROM, limited ROM L ankle, incr L knee flex (tight hamstrings)/Left UE Active ROM was WFL (within functional limits)/Right UE Active ROM was WFL (within functional limits)/Left LE Active ROM was WFL (within functional limits)/Right LE Active ROM was WFL (within functional limits)

## 2020-08-07 NOTE — PHYSICAL THERAPY INITIAL EVALUATION ADULT - PRECAUTIONS/LIMITATIONS, REHAB EVAL
Pt has noticed intermittent leakage of urine around the fowler site for past few days, and today noted pain at meatus which pt believes is 2/2 irritation and hematuria, prompting ED visit. Denies any abdominal pain, no nausea/vomiting, no fevers or chills. Pt has had recurrent c diff infections, currently on the last 2 days of PO vancomycin taper. Hospital course: +a/w likely UTI. COVID19 PCR negative. Pt has noticed intermittent leakage of urine around the fowler site for past few days, and today noted pain at meatus which pt believes is 2/2 irritation and hematuria, prompting ED visit. Denies any abdominal pain, no nausea/vomiting, no fevers or chills. Pt has had recurrent c diff infections, currently on the last 2 days of PO vancomycin taper. Hospital course: +a/w likely UTI. COVID19 PCR negative./fall precautions

## 2020-08-07 NOTE — PHYSICAL THERAPY INITIAL EVALUATION ADULT - DISCHARGE DISPOSITION, PT EVAL
DC plan working towards return home to [facility] with home PT services, assist from [facility] 8hrs/5 days with recommendation to incr hours; recommend rolling walker, W/C for long distances, +commode, JEANNA poole

## 2020-08-07 NOTE — PROGRESS NOTE ADULT - SUBJECTIVE AND OBJECTIVE BOX
Follow Up:  UTI     Interval History: afebrile. denies any suprapubic pain    REVIEW OF SYSTEMS  [  ] ROS unobtainable because:    [ x ] All other systems negative except as noted below    Constitutional:  [ ] fever [ ] chills  [ ] weight loss  [ ] weakness  Skin:  [ ] rash [ ] phlebitis	  Eyes: [ ] icterus [ ] pain  [ ] discharge	  ENMT: [ ] sore throat  [ ] thrush [ ] ulcers [ ] exudates  Respiratory: [ ] dyspnea [ ] hemoptysis [ ] cough [ ] sputum	  Cardiovascular:  [ ] chest pain [ ] palpitations [ ] edema	  Gastrointestinal:  [ ] nausea [ ] vomiting [ ] diarrhea [ ] constipation [ ] pain	  Genitourinary:  [ ] dysuria [ ] frequency [ ] hematuria [ ] discharge [ ] flank pain  [ ] incontinence  Musculoskeletal:  [ ] myalgias [ ] arthralgias [ ] arthritis  [ ] back pain  Neurological:  [ ] headache [ ] seizures  [ ] confusion/altered mental status    Allergies  No Known Allergies        ANTIMICROBIALS:  piperacillin/tazobactam IVPB.. 3.375 every 8 hours  vancomycin    Solution 125 two times a day      OTHER MEDS:  MEDICATIONS  (STANDING):  acetaminophen   Tablet .. 650 every 6 hours PRN  diazepam    Tablet 5 every 12 hours  enoxaparin Injectable 40 daily  tamsulosin 0.8 at bedtime      Vital Signs Last 24 Hrs  T(C): 36.3 (07 Aug 2020 14:40), Max: 36.4 (06 Aug 2020 21:37)  T(F): 97.4 (07 Aug 2020 14:40), Max: 97.5 (06 Aug 2020 21:37)  HR: 86 (07 Aug 2020 14:40) (67 - 86)  BP: 100/67 (07 Aug 2020 14:40) (97/60 - 111/69)  BP(mean): --  RR: 18 (07 Aug 2020 14:40) (18 - 18)  SpO2: 95% (07 Aug 2020 14:40) (95% - 98%)    PHYSICAL EXAMINATION:  General: Alert and Awake, NAD, cachectic  HEENT: PERRL, EOMI  Neck: Supple  Cardiac: RRR, No M/R/G  Resp: CTAB, No Wh/Rh/Ra  Abdomen: NBS, ND, Minimal suprapubic tenderness to palpation, No HSM, No rigidity or guarding  MSK: No LE edema. No stigmata of IE. No evidence of phlebitis. No evidence of synovitis.  : + fowler  Skin: No rashes or lesions. Skin is warm and dry to the touch.   Neuro: Alert and Awake. CN 2-12 Grossly intact. Moves all four extremities spontaneously.  Psych: Calm, Pleasant, Cooperative                        11.3   8.89  )-----------( 218      ( 06 Aug 2020 15:41 )             35.1                   MICROBIOLOGY:  v  .Urine Clean Catch (Midstream)  08-05-20   >100,000 CFU/ml Pseudomonas aeruginosa  --  --      RADIOLOGY:    <The imaging below has been reviewed and visualized by me independently. Findings as detailed in report below>    EXAM:  CT ABDOMEN AND PELVIS IC                        PROCEDURE DATE:  07/06/2020   Findings suggestive of chronic bladder outlet obstruction with mild bilateral hydroureteronephrosis.

## 2020-08-07 NOTE — PROVIDER CONTACT NOTE (OTHER) - REASON
Skin indentation Detail Level: Detailed Depth Of Biopsy: dermis Was A Bandage Applied: Yes Size Of Lesion In Cm: 0.3 X Size Of Lesion In Cm: 0 Biopsy Type: H and E Biopsy Method: Personna blade Anesthesia Type: 1% lidocaine with epinephrine Anesthesia Volume In Cc (Will Not Render If 0): 0.5 Hemostasis: Aluminum Chloride Wound Care: Petrolatum Dressing: bandage Destruction After The Procedure: No Type Of Destruction Used: Curettage Curettage Text: The wound bed was treated with curettage after the biopsy was performed. Cryotherapy Text: The wound bed was treated with cryotherapy after the biopsy was performed. Electrodesiccation Text: The wound bed was treated with electrodesiccation after the biopsy was performed. Electrodesiccation And Curettage Text: The wound bed was treated with electrodesiccation and curettage after the biopsy was performed. Silver Nitrate Text: The wound bed was treated with silver nitrate after the biopsy was performed. Lab: -[id number] Lab Facility: 3 Consent: Written consent was obtained with patient's permission and risks were reviewed per signed consent form. Biopsy was performed with patient's verbal permission. Post-care instructions were given and reviewed in detail. Patient is to keep the biopsy site dry overnight, and then apply vaseline daily until healed. The patient was instructed to call with any questions or concerns. Notification Instructions: Patient will be notified of biopsy results. However, patient instructed to call the office if not contacted within 2 weeks. Billing Type: Third-Party Bill Information: Selecting Yes will display possible errors in your note based on the variables you have selected. This validation is only offered as a suggestion for you. PLEASE NOTE THAT THE VALIDATION TEXT WILL BE REMOVED WHEN YOU FINALIZE YOUR NOTE. IF YOU WANT TO FAX A PRELIMINARY NOTE YOU WILL NEED TO TOGGLE THIS TO 'NO' IF YOU DO NOT WANT IT IN YOUR FAXED NOTE. Size Of Lesion In Cm: 1.2 X Size Of Lesion In Cm: 0.6

## 2020-08-07 NOTE — PHYSICAL THERAPY INITIAL EVALUATION ADULT - GENERAL OBSERVATIONS, REHAB EVAL
Pt a/w hematuria with UTI, hx MD, anxiety, rhabdo. Pt received supine in bed, +IVL, +fowler (chronic), +abx runing currently. Pt is A&Ox4, particular in tasks, incr time for mobility.

## 2020-08-07 NOTE — PHYSICAL THERAPY INITIAL EVALUATION ADULT - ADDITIONAL COMMENTS
pt resides in [facility]), assist with [facility]; amb with rolling walker/W/C, +[facility] 8hrs/5 days attempting to incr to 7 days; reports hx balance issues- working on balance training with HPT

## 2020-08-07 NOTE — PHYSICAL THERAPY INITIAL EVALUATION ADULT - PERTINENT HX OF CURRENT PROBLEM, REHAB EVAL
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 Pt was hospitalized end of July for fowler malfunction and retention, discharged back to [facility] with fowler in place with plan for follow up with urology tomorrow for fowler exchange.

## 2020-08-07 NOTE — PHYSICAL THERAPY INITIAL EVALUATION ADULT - GAIT DEVIATIONS NOTED, PT EVAL
decreased step length/incr L knee flex; decr L knee extension/footDF/PF, incr time improving with mobility./decreased lexis/decreased weight-shifting ability

## 2020-08-08 DIAGNOSIS — E46 UNSPECIFIED PROTEIN-CALORIE MALNUTRITION: ICD-10-CM

## 2020-08-08 PROCEDURE — 99232 SBSQ HOSP IP/OBS MODERATE 35: CPT

## 2020-08-08 RX ADMIN — Medication 1 TABLET(S): at 05:38

## 2020-08-08 RX ADMIN — Medication 125 MILLIGRAM(S): at 18:01

## 2020-08-08 RX ADMIN — Medication 1 TABLET(S): at 18:01

## 2020-08-08 RX ADMIN — TAMSULOSIN HYDROCHLORIDE 0.8 MILLIGRAM(S): 0.4 CAPSULE ORAL at 21:28

## 2020-08-08 RX ADMIN — Medication 1 TABLET(S): at 13:20

## 2020-08-08 RX ADMIN — PIPERACILLIN AND TAZOBACTAM 25 GRAM(S): 4; .5 INJECTION, POWDER, LYOPHILIZED, FOR SOLUTION INTRAVENOUS at 13:20

## 2020-08-08 RX ADMIN — Medication 125 MILLIGRAM(S): at 05:38

## 2020-08-08 RX ADMIN — PIPERACILLIN AND TAZOBACTAM 25 GRAM(S): 4; .5 INJECTION, POWDER, LYOPHILIZED, FOR SOLUTION INTRAVENOUS at 05:37

## 2020-08-08 RX ADMIN — Medication 5 MILLIGRAM(S): at 18:01

## 2020-08-08 RX ADMIN — PIPERACILLIN AND TAZOBACTAM 25 GRAM(S): 4; .5 INJECTION, POWDER, LYOPHILIZED, FOR SOLUTION INTRAVENOUS at 21:36

## 2020-08-08 NOTE — PROGRESS NOTE ADULT - SUBJECTIVE AND OBJECTIVE BOX
Follow Up:      Interval History:    REVIEW OF SYSTEMS  [  ] ROS unobtainable because:    [  ] All other systems negative except as noted below    Constitutional:  [ ] fever [ ] chills  [ ] weight loss  [ ] weakness  Skin:  [ ] rash [ ] phlebitis	  Eyes: [ ] icterus [ ] pain  [ ] discharge	  ENMT: [ ] sore throat  [ ] thrush [ ] ulcers [ ] exudates  Respiratory: [ ] dyspnea [ ] hemoptysis [ ] cough [ ] sputum	  Cardiovascular:  [ ] chest pain [ ] palpitations [ ] edema	  Gastrointestinal:  [ ] nausea [ ] vomiting [ ] diarrhea [ ] constipation [ ] pain	  Genitourinary:  [ ] dysuria [ ] frequency [ ] hematuria [ ] discharge [ ] flank pain  [ ] incontinence  Musculoskeletal:  [ ] myalgias [ ] arthralgias [ ] arthritis  [ ] back pain  Neurological:  [ ] headache [ ] seizures  [ ] confusion/altered mental status    Allergies  No Known Allergies        ANTIMICROBIALS:  piperacillin/tazobactam IVPB.. 3.375 every 8 hours  vancomycin    Solution 125 two times a day      OTHER MEDS:  MEDICATIONS  (STANDING):  acetaminophen   Tablet .. 650 every 6 hours PRN  diazepam    Tablet 5 every 12 hours  enoxaparin Injectable 40 daily  tamsulosin 0.8 at bedtime      Vital Signs Last 24 Hrs  T(C): 36.3 (08 Aug 2020 11:31), Max: 36.5 (07 Aug 2020 21:40)  T(F): 97.3 (08 Aug 2020 11:31), Max: 97.7 (07 Aug 2020 21:40)  HR: 96 (08 Aug 2020 11:31) (69 - 96)  BP: 101/66 (08 Aug 2020 11:31) (97/61 - 107/66)  BP(mean): --  RR: 18 (08 Aug 2020 11:31) (18 - 18)  SpO2: 96% (08 Aug 2020 11:31) (96% - 98%)    PHYSICAL EXAMINATION:  General: Alert and Awake, NAD  HEENT: PERRL, EOMI  Neck: Supple  Cardiac: RRR, No M/R/G  Resp: CTAB, No Wh/Rh/Ra  Abdomen: NBS, NT/ND, No HSM, No rigidity or guarding  MSK: No LE edema. No Calf tenderness  : No fowler  Skin: No rashes or lesions. Skin is warm and dry to the touch.   Neuro: Alert and Awake. CN 2-12 Grossly intact. Moves all four extremities spontaneously.  Psych: Calm, Pleasant, Cooperative                    MICROBIOLOGY:  v  .Urine Clean Catch (Midstream)  08-05-20   >100,000 CFU/ml Pseudomonas aeruginosa (Carbapenem Resistant)  --  Pseudomonas aeruginosa (Carbapenem Resistant)                RADIOLOGY:    <The imaging below has been reviewed and visualized by me independently. Findings as detailed in report below> Follow Up: UTI    Interval History: afebrile. no urinary symptoms.     REVIEW OF SYSTEMS  [  ] ROS unobtainable because:    [ x ] All other systems negative except as noted below    Constitutional:  [ ] fever [ ] chills  [ ] weight loss  [ ] weakness  Skin:  [ ] rash [ ] phlebitis	  Eyes: [ ] icterus [ ] pain  [ ] discharge	  ENMT: [ ] sore throat  [ ] thrush [ ] ulcers [ ] exudates  Respiratory: [ ] dyspnea [ ] hemoptysis [ ] cough [ ] sputum	  Cardiovascular:  [ ] chest pain [ ] palpitations [ ] edema	  Gastrointestinal:  [ ] nausea [ ] vomiting [ ] diarrhea [ ] constipation [ ] pain	  Genitourinary:  [ ] dysuria [ ] frequency [ ] hematuria [ ] discharge [ ] flank pain  [ ] incontinence  Musculoskeletal:  [ ] myalgias [ ] arthralgias [ ] arthritis  [ ] back pain  Neurological:  [ ] headache [ ] seizures  [ ] confusion/altered mental status    Allergies  No Known Allergies        ANTIMICROBIALS:  piperacillin/tazobactam IVPB.. 3.375 every 8 hours  vancomycin    Solution 125 two times a day      OTHER MEDS:  MEDICATIONS  (STANDING):  acetaminophen   Tablet .. 650 every 6 hours PRN  diazepam    Tablet 5 every 12 hours  enoxaparin Injectable 40 daily  tamsulosin 0.8 at bedtime      Vital Signs Last 24 Hrs  T(C): 36.3 (08 Aug 2020 11:31), Max: 36.5 (07 Aug 2020 21:40)  T(F): 97.3 (08 Aug 2020 11:31), Max: 97.7 (07 Aug 2020 21:40)  HR: 96 (08 Aug 2020 11:31) (69 - 96)  BP: 101/66 (08 Aug 2020 11:31) (97/61 - 107/66)  BP(mean): --  RR: 18 (08 Aug 2020 11:31) (18 - 18)  SpO2: 96% (08 Aug 2020 11:31) (96% - 98%)    PHYSICAL EXAMINATION:  General: Alert and Awake, NAD, cachectic  HEENT: PERRL, EOMI  Neck: Supple  Cardiac: RRR, No M/R/G  Resp: CTAB, No Wh/Rh/Ra  Abdomen: NBS, ND, Minimal suprapubic tenderness to palpation, No HSM, No rigidity or guarding  MSK: No LE edema. No stigmata of IE. No evidence of phlebitis. No evidence of synovitis.  : + fowler  Skin: No rashes or lesions. Skin is warm and dry to the touch.   Neuro: Alert and Awake. CN 2-12 Grossly intact. Moves all four extremities spontaneously.  Psych: Calm, Pleasant, Cooperative    MICROBIOLOGY:  v  .Urine Clean Catch (Midstream)  08-05-20   >100,000 CFU/ml Pseudomonas aeruginosa (Carbapenem Resistant)  --  Pseudomonas aeruginosa (Carbapenem Resistant)    RADIOLOGY:    <The imaging below has been reviewed and visualized by me independently. Findings as detailed in report below>    EXAM:  CT ABDOMEN AND PELVIS IC                        PROCEDURE DATE:  07/06/2020   Findings suggestive of chronic bladder outlet obstruction with mild bilateral hydroureteronephrosis.

## 2020-08-08 NOTE — PROGRESS NOTE ADULT - SUBJECTIVE AND OBJECTIVE BOX
Patient is a 75y old  Male who presents with a chief complaint of hematuria (08 Aug 2020 16:23)    pt. seen and examined, denies any diarrhea     INTERVAL HPI/OVERNIGHT EVENTS:  T(C): 36.3 (08-08-20 @ 11:31), Max: 36.5 (08-07-20 @ 21:40)  HR: 96 (08-08-20 @ 11:31) (69 - 96)  BP: 101/66 (08-08-20 @ 11:31) (97/61 - 107/66)  RR: 18 (08-08-20 @ 11:31) (18 - 18)  SpO2: 96% (08-08-20 @ 11:31) (96% - 98%)  Wt(kg): --  I&O's Summary    07 Aug 2020 07:01  -  08 Aug 2020 07:00  --------------------------------------------------------  IN: 0 mL / OUT: 1650 mL / NET: -1650 mL    08 Aug 2020 07:01  -  08 Aug 2020 20:26  --------------------------------------------------------  IN: 480 mL / OUT: 0 mL / NET: 480 mL        PAST MEDICAL & SURGICAL HISTORY:  Urinary retention  H/O muscular dystrophy  Scrotal swelling  Psychiatric disorder  No significant past surgical history      SOCIAL HISTORY  Alcohol:  Tobacco:  Illicit substance use:    FAMILY HISTORY:    REVIEW OF SYSTEMS:  CONSTITUTIONAL: No fever, weight loss, or fatigue  EYES: No eye pain, visual disturbances, or discharge  ENMT:  No difficulty hearing, tinnitus, vertigo; No sinus or throat pain  NECK: No pain or stiffness  RESPIRATORY: No cough, wheezing, chills or hemoptysis; No shortness of breath  CARDIOVASCULAR: No chest pain, palpitations, dizziness, or leg swelling  GASTROINTESTINAL: No abdominal or epigastric pain. No nausea, vomiting, or hematemesis; No diarrhea or constipation. No melena or hematochezia.  GENITOURINARY: No dysuria, frequency, hematuria, or incontinence  NEUROLOGICAL: No headaches, memory loss, loss of strength, numbness, or tremors  SKIN: No itching, burning, rashes, or lesions   LYMPH NODES: No enlarged glands  ENDOCRINE: No heat or cold intolerance; No hair loss  MUSCULOSKELETAL: No joint pain or swelling; No muscle, back, or extremity pain  PSYCHIATRIC: No depression, anxiety, mood swings, or difficulty sleeping  HEME/LYMPH: No easy bruising, or bleeding gums  ALLERY AND IMMUNOLOGIC: No hives or eczema    RADIOLOGY & ADDITIONAL TESTS:    Imaging Personally Reviewed:  [ ] YES  [ ] NO    Consultant(s) Notes Reviewed:  [ ] YES  [ ] NO    PHYSICAL EXAM:  GENERAL: NAD, well-groomed, well-developed  HEAD:  Atraumatic, Normocephalic  EYES: EOMI, PERRLA, conjunctiva and sclera clear  ENMT: No tonsillar erythema, exudates, or enlargement; Moist mucous membranes, Good dentition, No lesions  NECK: Supple, No JVD, Normal thyroid  NERVOUS SYSTEM:  Alert & Oriented X3, Good concentration; Motor Strength 5/5 B/L upper and lower extremities; DTRs 2+ intact and symmetric  CHEST/LUNG: Clear to percussion bilaterally; No rales, rhonchi, wheezing, or rubs  HEART: Regular rate and rhythm; No murmurs, rubs, or gallops  ABDOMEN: Soft, Nontender, Nondistended; Bowel sounds present  EXTREMITIES:  2+ Peripheral Pulses, No clubbing, cyanosis, or edema  LYMPH: No lymphadenopathy noted  SKIN: No rashes or lesions    LABS:              CAPILLARY BLOOD GLUCOSE                MEDICATIONS  (STANDING):  diazepam    Tablet 5 milliGRAM(s) Oral every 12 hours  enoxaparin Injectable 40 milliGRAM(s) SubCutaneous daily  lactobacillus acidophilus 1 Tablet(s) Oral two times a day  multivitamin 1 Tablet(s) Oral daily  piperacillin/tazobactam IVPB.. 3.375 Gram(s) IV Intermittent every 8 hours  tamsulosin 0.8 milliGRAM(s) Oral at bedtime  vancomycin    Solution 125 milliGRAM(s) Oral two times a day    MEDICATIONS  (PRN):  acetaminophen   Tablet .. 650 milliGRAM(s) Oral every 6 hours PRN Temp greater or equal to 38C (100.4F), Mild Pain (1 - 3), Moderate Pain (4 - 6)  lidocaine 2% Jelly 5 milliLiter(s) IntraUrethral every 6 hours PRN urethral pain      Care Discussed with Consultants/Other Providers [ ] YES  [ ] NO

## 2020-08-09 LAB — SARS-COV-2 RNA SPEC QL NAA+PROBE: DETECTED

## 2020-08-09 RX ADMIN — Medication 125 MILLIGRAM(S): at 17:32

## 2020-08-09 RX ADMIN — Medication 650 MILLIGRAM(S): at 17:32

## 2020-08-09 RX ADMIN — Medication 1 TABLET(S): at 17:32

## 2020-08-09 RX ADMIN — PIPERACILLIN AND TAZOBACTAM 25 GRAM(S): 4; .5 INJECTION, POWDER, LYOPHILIZED, FOR SOLUTION INTRAVENOUS at 13:42

## 2020-08-09 RX ADMIN — Medication 5 MILLIGRAM(S): at 17:32

## 2020-08-09 RX ADMIN — TAMSULOSIN HYDROCHLORIDE 0.8 MILLIGRAM(S): 0.4 CAPSULE ORAL at 22:47

## 2020-08-09 RX ADMIN — Medication 125 MILLIGRAM(S): at 05:27

## 2020-08-09 RX ADMIN — PIPERACILLIN AND TAZOBACTAM 25 GRAM(S): 4; .5 INJECTION, POWDER, LYOPHILIZED, FOR SOLUTION INTRAVENOUS at 05:27

## 2020-08-09 RX ADMIN — Medication 1 TABLET(S): at 13:42

## 2020-08-09 RX ADMIN — Medication 5 MILLIGRAM(S): at 05:27

## 2020-08-09 RX ADMIN — Medication 650 MILLIGRAM(S): at 17:34

## 2020-08-09 RX ADMIN — Medication 1 TABLET(S): at 05:27

## 2020-08-09 RX ADMIN — PIPERACILLIN AND TAZOBACTAM 25 GRAM(S): 4; .5 INJECTION, POWDER, LYOPHILIZED, FOR SOLUTION INTRAVENOUS at 22:46

## 2020-08-09 NOTE — PROGRESS NOTE ADULT - SUBJECTIVE AND OBJECTIVE BOX
Patient is a 75y old  Male who presents with a chief complaint of hematuria (08 Aug 2020 20:25)      INTERVAL HPI/OVERNIGHT EVENTS:  T(C): 36.6 (08-09-20 @ 14:00), Max: 36.6 (08-09-20 @ 04:50)  HR: 86 (08-09-20 @ 14:00) (63 - 86)  BP: 100/63 (08-09-20 @ 14:00) (100/63 - 111/72)  RR: 18 (08-09-20 @ 14:00) (17 - 18)  SpO2: 96% (08-09-20 @ 14:00) (96% - 97%)  Wt(kg): --  I&O's Summary    08 Aug 2020 07:01  -  09 Aug 2020 07:00  --------------------------------------------------------  IN: 1265 mL / OUT: 2350 mL / NET: -1085 mL    09 Aug 2020 07:01  -  09 Aug 2020 15:56  --------------------------------------------------------  IN: 960 mL / OUT: 675 mL / NET: 285 mL        PAST MEDICAL & SURGICAL HISTORY:  Urinary retention  H/O muscular dystrophy  Scrotal swelling  Psychiatric disorder  No significant past surgical history      SOCIAL HISTORY  Alcohol:  Tobacco:  Illicit substance use:    FAMILY HISTORY:    REVIEW OF SYSTEMS:  CONSTITUTIONAL: No fever, weight loss, or fatigue  EYES: No eye pain, visual disturbances, or discharge  ENMT:  No difficulty hearing, tinnitus, vertigo; No sinus or throat pain  NECK: No pain or stiffness  RESPIRATORY: No cough, wheezing, chills or hemoptysis; No shortness of breath  CARDIOVASCULAR: No chest pain, palpitations, dizziness, or leg swelling  GASTROINTESTINAL: No abdominal or epigastric pain. No nausea, vomiting, or hematemesis; No diarrhea or constipation. No melena or hematochezia.  GENITOURINARY: No dysuria, frequency, hematuria, or incontinence  NEUROLOGICAL: No headaches, memory loss, loss of strength, numbness, or tremors  SKIN: No itching, burning, rashes, or lesions   LYMPH NODES: No enlarged glands  ENDOCRINE: No heat or cold intolerance; No hair loss  MUSCULOSKELETAL: No joint pain or swelling; No muscle, back, or extremity pain  PSYCHIATRIC: No depression, anxiety, mood swings, or difficulty sleeping  HEME/LYMPH: No easy bruising, or bleeding gums  ALLERY AND IMMUNOLOGIC: No hives or eczema    RADIOLOGY & ADDITIONAL TESTS:    Imaging Personally Reviewed:  [ ] YES  [ ] NO    Consultant(s) Notes Reviewed:  [ ] YES  [ ] NO    PHYSICAL EXAM:  GENERAL: NAD, well-groomed, well-developed  HEAD:  Atraumatic, Normocephalic  EYES: EOMI, PERRLA, conjunctiva and sclera clear  ENMT: No tonsillar erythema, exudates, or enlargement; Moist mucous membranes, Good dentition, No lesions  NECK: Supple, No JVD, Normal thyroid  NERVOUS SYSTEM:  Alert & Oriented X3, Good concentration; Motor Strength 5/5 B/L upper and lower extremities; DTRs 2+ intact and symmetric  CHEST/LUNG: Clear to percussion bilaterally; No rales, rhonchi, wheezing, or rubs  HEART: Regular rate and rhythm; No murmurs, rubs, or gallops  ABDOMEN: Soft, Nontender, Nondistended; Bowel sounds present  EXTREMITIES:  2+ Peripheral Pulses, No clubbing, cyanosis, or edema  LYMPH: No lymphadenopathy noted  SKIN: No rashes or lesions    LABS:              CAPILLARY BLOOD GLUCOSE                MEDICATIONS  (STANDING):  diazepam    Tablet 5 milliGRAM(s) Oral every 12 hours  enoxaparin Injectable 40 milliGRAM(s) SubCutaneous daily  lactobacillus acidophilus 1 Tablet(s) Oral two times a day  multivitamin 1 Tablet(s) Oral daily  piperacillin/tazobactam IVPB.. 3.375 Gram(s) IV Intermittent every 8 hours  tamsulosin 0.8 milliGRAM(s) Oral at bedtime  vancomycin    Solution 125 milliGRAM(s) Oral two times a day    MEDICATIONS  (PRN):  acetaminophen   Tablet .. 650 milliGRAM(s) Oral every 6 hours PRN Temp greater or equal to 38C (100.4F), Mild Pain (1 - 3), Moderate Pain (4 - 6)  lidocaine 2% Jelly 5 milliLiter(s) IntraUrethral every 6 hours PRN urethral pain      Care Discussed with Consultants/Other Providers [ ] YES  [ ] NO

## 2020-08-10 ENCOUNTER — APPOINTMENT (OUTPATIENT)
Dept: PHYSICAL MEDICINE AND REHAB | Facility: CLINIC | Age: 75
End: 2020-08-10

## 2020-08-10 RX ADMIN — Medication 1 TABLET(S): at 07:14

## 2020-08-10 RX ADMIN — Medication 125 MILLIGRAM(S): at 18:51

## 2020-08-10 RX ADMIN — Medication 650 MILLIGRAM(S): at 23:21

## 2020-08-10 RX ADMIN — Medication 650 MILLIGRAM(S): at 02:30

## 2020-08-10 RX ADMIN — Medication 1 TABLET(S): at 18:50

## 2020-08-10 RX ADMIN — Medication 1 TABLET(S): at 13:35

## 2020-08-10 RX ADMIN — TAMSULOSIN HYDROCHLORIDE 0.8 MILLIGRAM(S): 0.4 CAPSULE ORAL at 23:21

## 2020-08-10 RX ADMIN — Medication 5 MILLIGRAM(S): at 07:14

## 2020-08-10 RX ADMIN — Medication 650 MILLIGRAM(S): at 01:53

## 2020-08-10 RX ADMIN — Medication 125 MILLIGRAM(S): at 07:15

## 2020-08-10 RX ADMIN — Medication 5 MILLIGRAM(S): at 18:50

## 2020-08-10 NOTE — PROGRESS NOTE ADULT - SUBJECTIVE AND OBJECTIVE BOX
Patient is a 75y old  Male who presents with a chief complaint of hematuria (09 Aug 2020 15:56)    pt. ton nd examined, completed abx today     INTERVAL HPI/OVERNIGHT EVENTS:  T(C): 36.6 (08-10-20 @ 20:33), Max: 36.6 (08-10-20 @ 11:31)  HR: 68 (08-10-20 @ 20:33) (68 - 82)  BP: 106/61 (08-10-20 @ 20:33) (104/63 - 112/68)  RR: 18 (08-10-20 @ 20:33) (18 - 18)  SpO2: 97% (08-10-20 @ 20:33) (94% - 97%)  Wt(kg): --  I&O's Summary    09 Aug 2020 07:01  -  10 Aug 2020 07:00  --------------------------------------------------------  IN: 1080 mL / OUT: 1575 mL / NET: -495 mL    10 Aug 2020 07:01  -  10 Aug 2020 23:30  --------------------------------------------------------  IN: 660 mL / OUT: 1000 mL / NET: -340 mL        PAST MEDICAL & SURGICAL HISTORY:  Urinary retention  H/O muscular dystrophy  Scrotal swelling  Psychiatric disorder  No significant past surgical history      SOCIAL HISTORY  Alcohol:  Tobacco:  Illicit substance use:    FAMILY HISTORY:    REVIEW OF SYSTEMS:  CONSTITUTIONAL: No fever, weight loss, or fatigue  EYES: No eye pain, visual disturbances, or discharge  ENMT:  No difficulty hearing, tinnitus, vertigo; No sinus or throat pain  NECK: No pain or stiffness  RESPIRATORY: No cough, wheezing, chills or hemoptysis; No shortness of breath  CARDIOVASCULAR: No chest pain, palpitations, dizziness, or leg swelling  GASTROINTESTINAL: No abdominal or epigastric pain. No nausea, vomiting, or hematemesis; No diarrhea or constipation. No melena or hematochezia.  GENITOURINARY: No dysuria, frequency, hematuria, or incontinence  NEUROLOGICAL: No headaches, memory loss, loss of strength, numbness, or tremors  SKIN: No itching, burning, rashes, or lesions   LYMPH NODES: No enlarged glands  ENDOCRINE: No heat or cold intolerance; No hair loss  MUSCULOSKELETAL: No joint pain or swelling; No muscle, back, or extremity pain  PSYCHIATRIC: No depression, anxiety, mood swings, or difficulty sleeping  HEME/LYMPH: No easy bruising, or bleeding gums  ALLERY AND IMMUNOLOGIC: No hives or eczema    RADIOLOGY & ADDITIONAL TESTS:    Imaging Personally Reviewed:  [ ] YES  [ ] NO    Consultant(s) Notes Reviewed:  [ ] YES  [ ] NO    PHYSICAL EXAM:  GENERAL: NAD, well-groomed, well-developed  HEAD:  Atraumatic, Normocephalic  EYES: EOMI, PERRLA, conjunctiva and sclera clear  ENMT: No tonsillar erythema, exudates, or enlargement; Moist mucous membranes, Good dentition, No lesions  NECK: Supple, No JVD, Normal thyroid  NERVOUS SYSTEM:  Alert & Oriented X3, Good concentration; Motor Strength 5/5 B/L upper and lower extremities; DTRs 2+ intact and symmetric  CHEST/LUNG: Clear to percussion bilaterally; No rales, rhonchi, wheezing, or rubs  HEART: Regular rate and rhythm; No murmurs, rubs, or gallops  ABDOMEN: Soft, Nontender, Nondistended; Bowel sounds present  EXTREMITIES:  2+ Peripheral Pulses, No clubbing, cyanosis, or edema  LYMPH: No lymphadenopathy noted  SKIN: No rashes or lesions    LABS:              CAPILLARY BLOOD GLUCOSE                MEDICATIONS  (STANDING):  diazepam    Tablet 5 milliGRAM(s) Oral every 12 hours  enoxaparin Injectable 40 milliGRAM(s) SubCutaneous daily  lactobacillus acidophilus 1 Tablet(s) Oral two times a day  multivitamin 1 Tablet(s) Oral daily  tamsulosin 0.8 milliGRAM(s) Oral at bedtime  vancomycin    Solution 125 milliGRAM(s) Oral two times a day    MEDICATIONS  (PRN):  acetaminophen   Tablet .. 650 milliGRAM(s) Oral every 6 hours PRN Temp greater or equal to 38C (100.4F), Mild Pain (1 - 3), Moderate Pain (4 - 6)  lidocaine 2% Jelly 5 milliLiter(s) IntraUrethral every 6 hours PRN urethral pain      Care Discussed with Consultants/Other Providers [ ] YES  [ ] NO

## 2020-08-11 RX ORDER — ASCORBIC ACID 60 MG
500 TABLET,CHEWABLE ORAL DAILY
Refills: 0 | Status: DISCONTINUED | OUTPATIENT
Start: 2020-08-11 | End: 2020-08-18

## 2020-08-11 RX ADMIN — Medication 5 MILLIGRAM(S): at 17:18

## 2020-08-11 RX ADMIN — Medication 125 MILLIGRAM(S): at 17:19

## 2020-08-11 RX ADMIN — Medication 1 TABLET(S): at 12:30

## 2020-08-11 RX ADMIN — Medication 1 TABLET(S): at 06:02

## 2020-08-11 RX ADMIN — Medication 5 MILLIGRAM(S): at 06:02

## 2020-08-11 RX ADMIN — Medication 1 TABLET(S): at 17:19

## 2020-08-11 RX ADMIN — TAMSULOSIN HYDROCHLORIDE 0.8 MILLIGRAM(S): 0.4 CAPSULE ORAL at 22:13

## 2020-08-11 RX ADMIN — ENOXAPARIN SODIUM 40 MILLIGRAM(S): 100 INJECTION SUBCUTANEOUS at 12:30

## 2020-08-11 RX ADMIN — Medication 125 MILLIGRAM(S): at 06:02

## 2020-08-11 RX ADMIN — Medication 650 MILLIGRAM(S): at 00:00

## 2020-08-11 NOTE — PROGRESS NOTE ADULT - SUBJECTIVE AND OBJECTIVE BOX
Patient is a 75y old  Male who presents with a chief complaint of hematuria (10 Aug 2020 23:30)    pt. seen and examined, completed abx   awaiting repeat covid testing to return to assisted living facility     INTERVAL HPI/OVERNIGHT EVENTS:  T(C): 36.8 (08-11-20 @ 20:49), Max: 36.8 (08-11-20 @ 20:49)  HR: 74 (08-11-20 @ 20:49) (67 - 82)  BP: 107/65 (08-11-20 @ 20:49) (98/61 - 107/65)  RR: 18 (08-11-20 @ 20:49) (18 - 18)  SpO2: 96% (08-11-20 @ 20:49) (95% - 96%)  Wt(kg): --  I&O's Summary    10 Aug 2020 07:01  -  11 Aug 2020 07:00  --------------------------------------------------------  IN: 900 mL / OUT: 1300 mL / NET: -400 mL    11 Aug 2020 07:01  -  11 Aug 2020 22:13  --------------------------------------------------------  IN: 0 mL / OUT: 1200 mL / NET: -1200 mL        PAST MEDICAL & SURGICAL HISTORY:  Urinary retention  H/O muscular dystrophy  Scrotal swelling  Psychiatric disorder  No significant past surgical history      SOCIAL HISTORY  Alcohol:  Tobacco:  Illicit substance use:    FAMILY HISTORY:    REVIEW OF SYSTEMS:  CONSTITUTIONAL: No fever, weight loss, or fatigue  EYES: No eye pain, visual disturbances, or discharge  ENMT:  No difficulty hearing, tinnitus, vertigo; No sinus or throat pain  NECK: No pain or stiffness  RESPIRATORY: No cough, wheezing, chills or hemoptysis; No shortness of breath  CARDIOVASCULAR: No chest pain, palpitations, dizziness, or leg swelling  GASTROINTESTINAL: No abdominal or epigastric pain. No nausea, vomiting, or hematemesis; No diarrhea or constipation. No melena or hematochezia.  GENITOURINARY: No dysuria, frequency, hematuria, or incontinence  NEUROLOGICAL: No headaches, memory loss, loss of strength, numbness, or tremors  SKIN: No itching, burning, rashes, or lesions   LYMPH NODES: No enlarged glands  ENDOCRINE: No heat or cold intolerance; No hair loss  MUSCULOSKELETAL: No joint pain or swelling; No muscle, back, or extremity pain  PSYCHIATRIC: No depression, anxiety, mood swings, or difficulty sleeping  HEME/LYMPH: No easy bruising, or bleeding gums  ALLERY AND IMMUNOLOGIC: No hives or eczema    RADIOLOGY & ADDITIONAL TESTS:    Imaging Personally Reviewed:  [ ] YES  [ ] NO    Consultant(s) Notes Reviewed:  [ ] YES  [ ] NO    PHYSICAL EXAM:  GENERAL: NAD, well-groomed, well-developed  HEAD:  Atraumatic, Normocephalic  EYES: EOMI, PERRLA, conjunctiva and sclera clear  ENMT: No tonsillar erythema, exudates, or enlargement; Moist mucous membranes, Good dentition, No lesions  NECK: Supple, No JVD, Normal thyroid  NERVOUS SYSTEM:  Alert & Oriented X3, Good concentration; Motor Strength 5/5 B/L upper and lower extremities; DTRs 2+ intact and symmetric  CHEST/LUNG: Clear to percussion bilaterally; No rales, rhonchi, wheezing, or rubs  HEART: Regular rate and rhythm; No murmurs, rubs, or gallops  ABDOMEN: Soft, Nontender, Nondistended; Bowel sounds present  EXTREMITIES:  2+ Peripheral Pulses, No clubbing, cyanosis, or edema  LYMPH: No lymphadenopathy noted  SKIN: No rashes or lesions    LABS:              CAPILLARY BLOOD GLUCOSE                MEDICATIONS  (STANDING):  ascorbic acid 500 milliGRAM(s) Oral daily  diazepam    Tablet 5 milliGRAM(s) Oral every 12 hours  enoxaparin Injectable 40 milliGRAM(s) SubCutaneous daily  lactobacillus acidophilus 1 Tablet(s) Oral two times a day  multivitamin 1 Tablet(s) Oral daily  tamsulosin 0.8 milliGRAM(s) Oral at bedtime  vancomycin    Solution 125 milliGRAM(s) Oral two times a day    MEDICATIONS  (PRN):  acetaminophen   Tablet .. 650 milliGRAM(s) Oral every 6 hours PRN Temp greater or equal to 38C (100.4F), Mild Pain (1 - 3), Moderate Pain (4 - 6)  lidocaine 2% Jelly 5 milliLiter(s) IntraUrethral every 6 hours PRN urethral pain      Care Discussed with Consultants/Other Providers [ ] YES  [ ] NO

## 2020-08-11 NOTE — PROVIDER CONTACT NOTE (CRITICAL VALUE NOTIFICATION) - BACKGROUND
muscular dystrophy, COVID, c diff, presenting with 2 days of fever, diarrhea and UTI.   Dx: Sepsis 2/2   	C Diff - Vanco

## 2020-08-11 NOTE — CHART NOTE - NSCHARTNOTEFT_GEN_A_CORE
Nutrition Follow-up Note  Patient seen for: nutrition follow-up    Source of Information: Unable to conduct at face-to-face interview or nutrition-focused physical exam due to limited contact restrictions related to patient's medical condition and isolation precautions. information obtained from chart review and a phone call with the patient.    Chart reviewed, events noted. Pt was tested positive for COVID-19 on 8/9 and was transferred to [address] [place] [address], now on isolation. Pt completed course of abx yesterday.    Current Diet: Regular     Subjective Information:     Objective Information:  GI: no noted N+V, last BM 8/10  Skin: Stage II sacral, Stage I R knee  Edema: 1+ L/R leg  Weights: no updated weights available to assess at this time    Height (cm): 185.4 (08-05-20 @ 00:02)  Weight (kg): 60.8 (08-04-20 @ 19:10)  BMI (kg/m2): 17.7 (08-05-20 @ 00:02)  BSA (m2): 1.82 (08-05-20 @ 00:02)    Previous Nutrition Diagnosis: Severe malnutrition-  is being addressed with no therapeutic diet restrictions, encouraged increased protein-energy intake    New Nutrition Diagnosis: none at this time    Nutrition Care Plan:  [x] In progress   [ ] Achieved    Nutrition Interventions:  1) Please add Vitamin C to aid in wound healing, continue Multivitamin.   2) Add Mike x2 daily to provide essential amino acids.  3) Continue regular diet and monitoring of PO intake.    Monitoring and Evaluation:   Continue to monitor Nutritional intake, Tolerance to diet prescription, weights, labs, skin integrity    RD remains available upon request and will follow up per protocol Nutrition Follow-up Note  Patient seen for: nutrition follow-up    Source of Information: Unable to conduct at face-to-face interview or nutrition-focused physical exam due to limited contact restrictions related to patient's medical condition and isolation precautions. information obtained from chart review and a phone call with the patient.    Chart reviewed, events noted. Pt was tested positive for COVID-19 on 8/9 and was transferred to [address] [place] [address], now on isolation. Pt completed course of abx yesterday.    Current Diet: Regular     Subjective Information: Pt expresses some  frustrations with being on a COVID isolation unit and unable to receive a menu. Offered for nutrition services to call the pt daily to take order, however pt does not like communicating via room phone so he would prefer to receive the same meals daily. Continues to deny nutrition supplementation, he prefers to try to eat as normal to see how his bowel movements are now that one course of abx is completed. Offered Mike and explained importance of amino acid supplementation, he denied at this time.    Objective Information:  GI: no noted N+V, last BM 8/10  Skin: Stage II sacral, Stage I R knee  Edema: 1+ L/R leg  Weights: no updated weights available to assess at this time    Height (cm): 185.4 (08-05-20 @ 00:02)  Weight (kg): 60.8 (08-04-20 @ 19:10)  BMI (kg/m2): 17.7 (08-05-20 @ 00:02)  BSA (m2): 1.82 (08-05-20 @ 00:02)    Previous Nutrition Diagnosis: Severe malnutrition-  is being addressed with no therapeutic diet restrictions, encouraged increased protein-energy intake    New Nutrition Diagnosis: none at this time    Nutrition Care Plan:  [x] In progress   [ ] Achieved    Nutrition Interventions:  1) Please add Vitamin C to aid in wound healing, continue Multivitamin.   2) Pt declined Mike at this time, will ask again at a later date.   3) Continue regular diet and monitoring of PO intake.    Monitoring and Evaluation:   Continue to monitor Nutritional intake, Tolerance to diet prescription, weights, labs, skin integrity    RD remains available upon request and will follow up per protocol Nutrition Follow-up Note  Patient seen for: nutrition follow-up    Source of Information: Unable to conduct at face-to-face interview or nutrition-focused physical exam due to limited contact restrictions related to patient's medical condition and isolation precautions. information obtained from chart review and a phone call with the patient.    Chart reviewed, events noted. Pt was tested positive for COVID-19 on 8/9 and was transferred to [address] [place] [address], now on isolation. Pt completed course of abx yesterday.    Current Diet: Regular     Subjective Information: Pt expresses some  frustrations with being on a COVID isolation unit and unable to receive a menu. Offered for nutrition services to call the pt daily to take order, however pt does not like communicating via room phone so he would prefer to receive the same meals daily. Continues to deny nutrition supplementation, he prefers to try to eat as normal to see how his bowel movements are now that one course of abx is completed. Offered Mike and explained importance of amino acid supplementation, he denied at this time.    Objective Information:  GI: no noted N+V, last BM 8/10  Skin: Stage II sacral, Stage I R knee  Edema: 1+ L/R leg  Weights: no updated weights available to assess at this time    Height (cm): 185.4 (08-05-20 @ 00:02)  Weight (kg): 60.8 (08-04-20 @ 19:10)  BMI (kg/m2): 17.7 (08-05-20 @ 00:02)  BSA (m2): 1.82 (08-05-20 @ 00:02)    Previous Nutrition Diagnosis: Severe malnutrition-  is being addressed with no therapeutic diet restrictions, encouraged increased protein-energy intake    New Nutrition Diagnosis: none at this time    Nutrition Care Plan:  [] In progress   [x] Achieved: Pt with good PO intake, continues to decline supplementation offers at this time.    Nutrition Interventions:  1) Please add Vitamin C to aid in wound healing, continue Multivitamin.   2) Pt declined Mike at this time, will ask again at a later date.   3) Continue regular diet and monitoring of PO intake.    Monitoring and Evaluation:   Continue to monitor Nutritional intake, Tolerance to diet prescription, weights, labs, skin integrity    RD remains available upon request and will follow up per protocol

## 2020-08-12 LAB — SARS-COV-2 RNA SPEC QL NAA+PROBE: SIGNIFICANT CHANGE UP

## 2020-08-12 RX ORDER — DIAZEPAM 5 MG
5 TABLET ORAL EVERY 12 HOURS
Refills: 0 | Status: DISCONTINUED | OUTPATIENT
Start: 2020-08-12 | End: 2020-08-18

## 2020-08-12 RX ADMIN — Medication 500 MILLIGRAM(S): at 18:21

## 2020-08-12 RX ADMIN — Medication 125 MILLIGRAM(S): at 07:31

## 2020-08-12 RX ADMIN — Medication 5 MILLIGRAM(S): at 18:17

## 2020-08-12 RX ADMIN — Medication 1 TABLET(S): at 13:55

## 2020-08-12 RX ADMIN — Medication 1 TABLET(S): at 07:31

## 2020-08-12 RX ADMIN — Medication 5 MILLIGRAM(S): at 07:31

## 2020-08-12 RX ADMIN — TAMSULOSIN HYDROCHLORIDE 0.8 MILLIGRAM(S): 0.4 CAPSULE ORAL at 22:35

## 2020-08-12 RX ADMIN — Medication 1 TABLET(S): at 18:17

## 2020-08-12 NOTE — PROGRESS NOTE ADULT - SUBJECTIVE AND OBJECTIVE BOX
Patient is a 75y old  Male who presents with a chief complaint of hematuria (11 Aug 2020 22:13)    pt. seen and examined, no c/o    INTERVAL HPI/OVERNIGHT EVENTS:  T(C): 36.8 (08-12-20 @ 21:12), Max: 36.8 (08-12-20 @ 11:24)  HR: 70 (08-12-20 @ 21:12) (63 - 93)  BP: 100/62 (08-12-20 @ 21:12) (100/62 - 110/70)  RR: 18 (08-12-20 @ 21:12) (18 - 18)  SpO2: 97% (08-12-20 @ 21:12) (95% - 97%)  Wt(kg): --  I&O's Summary    11 Aug 2020 07:01  -  12 Aug 2020 07:00  --------------------------------------------------------  IN: 240 mL / OUT: 1700 mL / NET: -1460 mL    12 Aug 2020 07:01  -  12 Aug 2020 22:40  --------------------------------------------------------  IN: 0 mL / OUT: 700 mL / NET: -700 mL        PAST MEDICAL & SURGICAL HISTORY:  Urinary retention  H/O muscular dystrophy  Scrotal swelling  Psychiatric disorder  No significant past surgical history      SOCIAL HISTORY  Alcohol:  Tobacco:  Illicit substance use:    FAMILY HISTORY:    REVIEW OF SYSTEMS:  CONSTITUTIONAL: No fever, weight loss, or fatigue  EYES: No eye pain, visual disturbances, or discharge  ENMT:  No difficulty hearing, tinnitus, vertigo; No sinus or throat pain  NECK: No pain or stiffness  RESPIRATORY: No cough, wheezing, chills or hemoptysis; No shortness of breath  CARDIOVASCULAR: No chest pain, palpitations, dizziness, or leg swelling  GASTROINTESTINAL: No abdominal or epigastric pain. No nausea, vomiting, or hematemesis; No diarrhea or constipation. No melena or hematochezia.  GENITOURINARY: No dysuria, frequency, hematuria, or incontinence  NEUROLOGICAL: No headaches, memory loss, loss of strength, numbness, or tremors  SKIN: No itching, burning, rashes, or lesions   LYMPH NODES: No enlarged glands  ENDOCRINE: No heat or cold intolerance; No hair loss  MUSCULOSKELETAL: No joint pain or swelling; No muscle, back, or extremity pain  PSYCHIATRIC: No depression, anxiety, mood swings, or difficulty sleeping  HEME/LYMPH: No easy bruising, or bleeding gums  ALLERY AND IMMUNOLOGIC: No hives or eczema    RADIOLOGY & ADDITIONAL TESTS:    Imaging Personally Reviewed:  [ ] YES  [ ] NO    Consultant(s) Notes Reviewed:  [ ] YES  [ ] NO    PHYSICAL EXAM:  GENERAL: NAD, well-groomed, well-developed  HEAD:  Atraumatic, Normocephalic  EYES: EOMI, PERRLA, conjunctiva and sclera clear  ENMT: No tonsillar erythema, exudates, or enlargement; Moist mucous membranes, Good dentition, No lesions  NECK: Supple, No JVD, Normal thyroid  NERVOUS SYSTEM:  Alert & Oriented X3, Good concentration; Motor Strength 5/5 B/L upper and lower extremities; DTRs 2+ intact and symmetric  CHEST/LUNG: Clear to percussion bilaterally; No rales, rhonchi, wheezing, or rubs  HEART: Regular rate and rhythm; No murmurs, rubs, or gallops  ABDOMEN: Soft, Nontender, Nondistended; Bowel sounds present  EXTREMITIES:  2+ Peripheral Pulses, No clubbing, cyanosis, or edema  LYMPH: No lymphadenopathy noted  SKIN: No rashes or lesions    LABS:              CAPILLARY BLOOD GLUCOSE                MEDICATIONS  (STANDING):  ascorbic acid 500 milliGRAM(s) Oral daily  diazepam    Tablet 5 milliGRAM(s) Oral every 12 hours  enoxaparin Injectable 40 milliGRAM(s) SubCutaneous daily  lactobacillus acidophilus 1 Tablet(s) Oral two times a day  multivitamin 1 Tablet(s) Oral daily  tamsulosin 0.8 milliGRAM(s) Oral at bedtime    MEDICATIONS  (PRN):  acetaminophen   Tablet .. 650 milliGRAM(s) Oral every 6 hours PRN Temp greater or equal to 38C (100.4F), Mild Pain (1 - 3), Moderate Pain (4 - 6)  lidocaine 2% Jelly 5 milliLiter(s) IntraUrethral every 6 hours PRN urethral pain      Care Discussed with Consultants/Other Providers [ ] YES  [ ] NO

## 2020-08-13 RX ADMIN — Medication 1 TABLET(S): at 12:18

## 2020-08-13 RX ADMIN — Medication 1 TABLET(S): at 05:39

## 2020-08-13 RX ADMIN — Medication 5 MILLIGRAM(S): at 05:39

## 2020-08-13 RX ADMIN — Medication 1 TABLET(S): at 18:41

## 2020-08-13 RX ADMIN — TAMSULOSIN HYDROCHLORIDE 0.8 MILLIGRAM(S): 0.4 CAPSULE ORAL at 22:54

## 2020-08-13 RX ADMIN — Medication 500 MILLIGRAM(S): at 12:19

## 2020-08-13 RX ADMIN — Medication 5 MILLIGRAM(S): at 18:41

## 2020-08-13 NOTE — PROGRESS NOTE ADULT - SUBJECTIVE AND OBJECTIVE BOX
Patient is a 75y old  Male who presents with a chief complaint of hematuria (12 Aug 2020 22:40)    no c/o , repeat covid -19 PCR neg     INTERVAL HPI/OVERNIGHT EVENTS:  T(C): 36.6 (08-13-20 @ 21:48), Max: 36.7 (08-13-20 @ 13:46)  HR: 74 (08-13-20 @ 21:48) (68 - 80)  BP: 103/62 (08-13-20 @ 21:48) (102/66 - 109/68)  RR: 18 (08-13-20 @ 21:48) (18 - 18)  SpO2: 94% (08-13-20 @ 21:48) (94% - 97%)  Wt(kg): --  I&O's Summary    12 Aug 2020 07:01  -  13 Aug 2020 07:00  --------------------------------------------------------  IN: 0 mL / OUT: 700 mL / NET: -700 mL    13 Aug 2020 07:01  -  13 Aug 2020 21:52  --------------------------------------------------------  IN: 0 mL / OUT: 1000 mL / NET: -1000 mL        PAST MEDICAL & SURGICAL HISTORY:  Urinary retention  H/O muscular dystrophy  Scrotal swelling  Psychiatric disorder  No significant past surgical history      SOCIAL HISTORY  Alcohol:  Tobacco:  Illicit substance use:    FAMILY HISTORY:    REVIEW OF SYSTEMS:  CONSTITUTIONAL: No fever, weight loss, or fatigue  EYES: No eye pain, visual disturbances, or discharge  ENMT:  No difficulty hearing, tinnitus, vertigo; No sinus or throat pain  NECK: No pain or stiffness  RESPIRATORY: No cough, wheezing, chills or hemoptysis; No shortness of breath  CARDIOVASCULAR: No chest pain, palpitations, dizziness, or leg swelling  GASTROINTESTINAL: No abdominal or epigastric pain. No nausea, vomiting, or hematemesis; No diarrhea or constipation. No melena or hematochezia.  GENITOURINARY: No dysuria, frequency, hematuria, or incontinence  NEUROLOGICAL: No headaches, memory loss, loss of strength, numbness, or tremors  SKIN: No itching, burning, rashes, or lesions   LYMPH NODES: No enlarged glands  ENDOCRINE: No heat or cold intolerance; No hair loss  MUSCULOSKELETAL: No joint pain or swelling; No muscle, back, or extremity pain  PSYCHIATRIC: No depression, anxiety, mood swings, or difficulty sleeping  HEME/LYMPH: No easy bruising, or bleeding gums  ALLERY AND IMMUNOLOGIC: No hives or eczema    RADIOLOGY & ADDITIONAL TESTS:    Imaging Personally Reviewed:  [ ] YES  [ ] NO    Consultant(s) Notes Reviewed:  [ ] YES  [ ] NO    PHYSICAL EXAM:  GENERAL: NAD, well-groomed, well-developed  HEAD:  Atraumatic, Normocephalic  EYES: EOMI, PERRLA, conjunctiva and sclera clear  ENMT: No tonsillar erythema, exudates, or enlargement; Moist mucous membranes, Good dentition, No lesions  NECK: Supple, No JVD, Normal thyroid  NERVOUS SYSTEM:  Alert & Oriented X3, Good concentration; Motor Strength 5/5 B/L upper and lower extremities; DTRs 2+ intact and symmetric  CHEST/LUNG: Clear to percussion bilaterally; No rales, rhonchi, wheezing, or rubs  HEART: Regular rate and rhythm; No murmurs, rubs, or gallops  ABDOMEN: Soft, Nontender, Nondistended; Bowel sounds present  EXTREMITIES:  2+ Peripheral Pulses, No clubbing, cyanosis, or edema  LYMPH: No lymphadenopathy noted  SKIN: No rashes or lesions    LABS:              CAPILLARY BLOOD GLUCOSE                MEDICATIONS  (STANDING):  ascorbic acid 500 milliGRAM(s) Oral daily  diazepam    Tablet 5 milliGRAM(s) Oral every 12 hours  enoxaparin Injectable 40 milliGRAM(s) SubCutaneous daily  lactobacillus acidophilus 1 Tablet(s) Oral two times a day  multivitamin 1 Tablet(s) Oral daily  tamsulosin 0.8 milliGRAM(s) Oral at bedtime    MEDICATIONS  (PRN):  acetaminophen   Tablet .. 650 milliGRAM(s) Oral every 6 hours PRN Temp greater or equal to 38C (100.4F), Mild Pain (1 - 3), Moderate Pain (4 - 6)  lidocaine 2% Jelly 5 milliLiter(s) IntraUrethral every 6 hours PRN urethral pain      Care Discussed with Consultants/Other Providers [ ] YES  [ ] NO

## 2020-08-14 LAB — SARS-COV-2 RNA SPEC QL NAA+PROBE: SIGNIFICANT CHANGE UP

## 2020-08-14 RX ADMIN — Medication 1 TABLET(S): at 05:46

## 2020-08-14 RX ADMIN — Medication 1 TABLET(S): at 14:03

## 2020-08-14 RX ADMIN — Medication 5 MILLIGRAM(S): at 05:46

## 2020-08-14 RX ADMIN — Medication 5 MILLIGRAM(S): at 17:39

## 2020-08-14 RX ADMIN — TAMSULOSIN HYDROCHLORIDE 0.8 MILLIGRAM(S): 0.4 CAPSULE ORAL at 21:48

## 2020-08-14 RX ADMIN — Medication 1 TABLET(S): at 17:39

## 2020-08-14 RX ADMIN — Medication 500 MILLIGRAM(S): at 14:02

## 2020-08-14 NOTE — PROGRESS NOTE ADULT - SUBJECTIVE AND OBJECTIVE BOX
Patient is a 75y old  Male who presents with a chief complaint of hematuria (13 Aug 2020 21:52)    pt. seen and examined , no c/o    INTERVAL HPI/OVERNIGHT EVENTS:  T(C): 36.7 (08-14-20 @ 20:51), Max: 36.7 (08-14-20 @ 20:51)  HR: 71 (08-14-20 @ 20:51) (61 - 80)  BP: 103/67 (08-14-20 @ 20:51) (98/63 - 107/66)  RR: 18 (08-14-20 @ 20:51) (18 - 18)  SpO2: 98% (08-14-20 @ 20:51) (97% - 98%)  Wt(kg): --  I&O's Summary    13 Aug 2020 07:01  -  14 Aug 2020 07:00  --------------------------------------------------------  IN: 120 mL / OUT: 1800 mL / NET: -1680 mL    14 Aug 2020 07:01  -  14 Aug 2020 23:51  --------------------------------------------------------  IN: 100 mL / OUT: 900 mL / NET: -800 mL        PAST MEDICAL & SURGICAL HISTORY:  Urinary retention  H/O muscular dystrophy  Scrotal swelling  Psychiatric disorder  No significant past surgical history      SOCIAL HISTORY  Alcohol:  Tobacco:  Illicit substance use:    FAMILY HISTORY:    REVIEW OF SYSTEMS:  CONSTITUTIONAL: No fever, weight loss, or fatigue  EYES: No eye pain, visual disturbances, or discharge  ENMT:  No difficulty hearing, tinnitus, vertigo; No sinus or throat pain  NECK: No pain or stiffness  RESPIRATORY: No cough, wheezing, chills or hemoptysis; No shortness of breath  CARDIOVASCULAR: No chest pain, palpitations, dizziness, or leg swelling  GASTROINTESTINAL: No abdominal or epigastric pain. No nausea, vomiting, or hematemesis; No diarrhea or constipation. No melena or hematochezia.  GENITOURINARY: No dysuria, frequency, hematuria, or incontinence  NEUROLOGICAL: No headaches, memory loss, loss of strength, numbness, or tremors  SKIN: No itching, burning, rashes, or lesions   LYMPH NODES: No enlarged glands  ENDOCRINE: No heat or cold intolerance; No hair loss  MUSCULOSKELETAL: No joint pain or swelling; No muscle, back, or extremity pain  PSYCHIATRIC: No depression, anxiety, mood swings, or difficulty sleeping  HEME/LYMPH: No easy bruising, or bleeding gums  ALLERY AND IMMUNOLOGIC: No hives or eczema    RADIOLOGY & ADDITIONAL TESTS:    Imaging Personally Reviewed:  [ ] YES  [ ] NO    Consultant(s) Notes Reviewed:  [ ] YES  [ ] NO    PHYSICAL EXAM:  GENERAL: NAD, well-groomed, well-developed  HEAD:  Atraumatic, Normocephalic  EYES: EOMI, PERRLA, conjunctiva and sclera clear  ENMT: No tonsillar erythema, exudates, or enlargement; Moist mucous membranes, Good dentition, No lesions  NECK: Supple, No JVD, Normal thyroid  NERVOUS SYSTEM:  Alert & Oriented X3, Good concentration; Motor Strength 5/5 B/L upper and lower extremities; DTRs 2+ intact and symmetric  CHEST/LUNG: Clear to percussion bilaterally; No rales, rhonchi, wheezing, or rubs  HEART: Regular rate and rhythm; No murmurs, rubs, or gallops  ABDOMEN: Soft, Nontender, Nondistended; Bowel sounds present  EXTREMITIES:  2+ Peripheral Pulses, No clubbing, cyanosis, or edema  LYMPH: No lymphadenopathy noted  SKIN: No rashes or lesions    LABS:              CAPILLARY BLOOD GLUCOSE                MEDICATIONS  (STANDING):  ascorbic acid 500 milliGRAM(s) Oral daily  diazepam    Tablet 5 milliGRAM(s) Oral every 12 hours  enoxaparin Injectable 40 milliGRAM(s) SubCutaneous daily  lactobacillus acidophilus 1 Tablet(s) Oral two times a day  multivitamin 1 Tablet(s) Oral daily  tamsulosin 0.8 milliGRAM(s) Oral at bedtime    MEDICATIONS  (PRN):  acetaminophen   Tablet .. 650 milliGRAM(s) Oral every 6 hours PRN Temp greater or equal to 38C (100.4F), Mild Pain (1 - 3), Moderate Pain (4 - 6)  lidocaine 2% Jelly 5 milliLiter(s) IntraUrethral every 6 hours PRN urethral pain      Care Discussed with Consultants/Other Providers [ ] YES  [ ] NO

## 2020-08-15 RX ORDER — ACETAMINOPHEN 500 MG
2 TABLET ORAL
Qty: 0 | Refills: 0 | DISCHARGE
Start: 2020-08-15

## 2020-08-15 RX ADMIN — Medication 1 TABLET(S): at 05:23

## 2020-08-15 RX ADMIN — Medication 1 TABLET(S): at 12:33

## 2020-08-15 RX ADMIN — Medication 500 MILLIGRAM(S): at 12:33

## 2020-08-15 RX ADMIN — Medication 5 MILLIGRAM(S): at 17:47

## 2020-08-15 RX ADMIN — Medication 5 MILLIGRAM(S): at 05:23

## 2020-08-15 RX ADMIN — TAMSULOSIN HYDROCHLORIDE 0.8 MILLIGRAM(S): 0.4 CAPSULE ORAL at 21:39

## 2020-08-15 RX ADMIN — Medication 1 TABLET(S): at 17:47

## 2020-08-15 NOTE — PROGRESS NOTE ADULT - SUBJECTIVE AND OBJECTIVE BOX
Patient is a 75y old  Male who presents with a chief complaint of hematuria (14 Aug 2020 23:51)    pt. seen and examined, denies any c/o   neg covid-19     INTERVAL HPI/OVERNIGHT EVENTS:  T(C): 36.3 (08-15-20 @ 20:38), Max: 36.7 (08-15-20 @ 15:38)  HR: 71 (08-15-20 @ 20:38) (69 - 80)  BP: 99/63 (08-15-20 @ 20:38) (97/61 - 111/64)  RR: 18 (08-15-20 @ 20:38) (18 - 18)  SpO2: 97% (08-15-20 @ 20:38) (96% - 98%)  Wt(kg): --  I&O's Summary    14 Aug 2020 07:01  -  15 Aug 2020 07:00  --------------------------------------------------------  IN: 600 mL / OUT: 1200 mL / NET: -600 mL    15 Aug 2020 07:01  -  15 Aug 2020 21:55  --------------------------------------------------------  IN: 0 mL / OUT: 1100 mL / NET: -1100 mL        PAST MEDICAL & SURGICAL HISTORY:  Urinary retention  H/O muscular dystrophy  Scrotal swelling  Psychiatric disorder  No significant past surgical history      SOCIAL HISTORY  Alcohol:  Tobacco:  Illicit substance use:    FAMILY HISTORY:    REVIEW OF SYSTEMS:  CONSTITUTIONAL: No fever, weight loss, or fatigue  EYES: No eye pain, visual disturbances, or discharge  ENMT:  No difficulty hearing, tinnitus, vertigo; No sinus or throat pain  NECK: No pain or stiffness  RESPIRATORY: No cough, wheezing, chills or hemoptysis; No shortness of breath  CARDIOVASCULAR: No chest pain, palpitations, dizziness, or leg swelling  GASTROINTESTINAL: No abdominal or epigastric pain. No nausea, vomiting, or hematemesis; No diarrhea or constipation. No melena or hematochezia.  GENITOURINARY: No dysuria, frequency, hematuria, or incontinence  NEUROLOGICAL: No headaches, memory loss, loss of strength, numbness, or tremors  SKIN: No itching, burning, rashes, or lesions   LYMPH NODES: No enlarged glands  ENDOCRINE: No heat or cold intolerance; No hair loss  MUSCULOSKELETAL: No joint pain or swelling; No muscle, back, or extremity pain  PSYCHIATRIC: No depression, anxiety, mood swings, or difficulty sleeping  HEME/LYMPH: No easy bruising, or bleeding gums  ALLERY AND IMMUNOLOGIC: No hives or eczema    RADIOLOGY & ADDITIONAL TESTS:    Imaging Personally Reviewed:  [ ] YES  [ ] NO    Consultant(s) Notes Reviewed:  [ ] YES  [ ] NO    PHYSICAL EXAM:  GENERAL: NAD, well-groomed, well-developed  HEAD:  Atraumatic, Normocephalic  EYES: EOMI, PERRLA, conjunctiva and sclera clear  ENMT: No tonsillar erythema, exudates, or enlargement; Moist mucous membranes, Good dentition, No lesions  NECK: Supple, No JVD, Normal thyroid  NERVOUS SYSTEM:  Alert & Oriented X3, Good concentration; Motor Strength 5/5 B/L upper and lower extremities; DTRs 2+ intact and symmetric  CHEST/LUNG: Clear to percussion bilaterally; No rales, rhonchi, wheezing, or rubs  HEART: Regular rate and rhythm; No murmurs, rubs, or gallops  ABDOMEN: Soft, Nontender, Nondistended; Bowel sounds present  EXTREMITIES:  2+ Peripheral Pulses, No clubbing, cyanosis, or edema  LYMPH: No lymphadenopathy noted  SKIN: No rashes or lesions    LABS:              CAPILLARY BLOOD GLUCOSE                MEDICATIONS  (STANDING):  ascorbic acid 500 milliGRAM(s) Oral daily  diazepam    Tablet 5 milliGRAM(s) Oral every 12 hours  enoxaparin Injectable 40 milliGRAM(s) SubCutaneous daily  lactobacillus acidophilus 1 Tablet(s) Oral two times a day  multivitamin 1 Tablet(s) Oral daily  tamsulosin 0.8 milliGRAM(s) Oral at bedtime    MEDICATIONS  (PRN):  acetaminophen   Tablet .. 650 milliGRAM(s) Oral every 6 hours PRN Temp greater or equal to 38C (100.4F), Mild Pain (1 - 3), Moderate Pain (4 - 6)  lidocaine 2% Jelly 5 milliLiter(s) IntraUrethral every 6 hours PRN urethral pain      Care Discussed with Consultants/Other Providers [ ] YES  [ ] NO

## 2020-08-16 LAB
ANION GAP SERPL CALC-SCNC: 11 MMOL/L — SIGNIFICANT CHANGE UP (ref 5–17)
BUN SERPL-MCNC: 14 MG/DL — SIGNIFICANT CHANGE UP (ref 7–23)
CALCIUM SERPL-MCNC: 9.7 MG/DL — SIGNIFICANT CHANGE UP (ref 8.4–10.5)
CHLORIDE SERPL-SCNC: 102 MMOL/L — SIGNIFICANT CHANGE UP (ref 96–108)
CO2 SERPL-SCNC: 26 MMOL/L — SIGNIFICANT CHANGE UP (ref 22–31)
CREAT SERPL-MCNC: 0.84 MG/DL — SIGNIFICANT CHANGE UP (ref 0.5–1.3)
GLUCOSE SERPL-MCNC: 106 MG/DL — HIGH (ref 70–99)
HCT VFR BLD CALC: 38.6 % — LOW (ref 39–50)
HGB BLD-MCNC: 12.3 G/DL — LOW (ref 13–17)
MCHC RBC-ENTMCNC: 31.1 PG — SIGNIFICANT CHANGE UP (ref 27–34)
MCHC RBC-ENTMCNC: 31.9 GM/DL — LOW (ref 32–36)
MCV RBC AUTO: 97.5 FL — SIGNIFICANT CHANGE UP (ref 80–100)
NRBC # BLD: 0 /100 WBCS — SIGNIFICANT CHANGE UP (ref 0–0)
PLATELET # BLD AUTO: 294 K/UL — SIGNIFICANT CHANGE UP (ref 150–400)
POTASSIUM SERPL-MCNC: 3.4 MMOL/L — LOW (ref 3.5–5.3)
POTASSIUM SERPL-SCNC: 3.4 MMOL/L — LOW (ref 3.5–5.3)
RBC # BLD: 3.96 M/UL — LOW (ref 4.2–5.8)
RBC # FLD: 12.5 % — SIGNIFICANT CHANGE UP (ref 10.3–14.5)
SODIUM SERPL-SCNC: 139 MMOL/L — SIGNIFICANT CHANGE UP (ref 135–145)
WBC # BLD: 8.04 K/UL — SIGNIFICANT CHANGE UP (ref 3.8–10.5)
WBC # FLD AUTO: 8.04 K/UL — SIGNIFICANT CHANGE UP (ref 3.8–10.5)

## 2020-08-16 RX ORDER — POTASSIUM CHLORIDE 20 MEQ
40 PACKET (EA) ORAL ONCE
Refills: 0 | Status: COMPLETED | OUTPATIENT
Start: 2020-08-16 | End: 2020-08-16

## 2020-08-16 RX ADMIN — Medication 1 TABLET(S): at 17:11

## 2020-08-16 RX ADMIN — Medication 40 MILLIEQUIVALENT(S): at 18:27

## 2020-08-16 RX ADMIN — Medication 1 TABLET(S): at 05:49

## 2020-08-16 RX ADMIN — Medication 1 TABLET(S): at 11:27

## 2020-08-16 RX ADMIN — TAMSULOSIN HYDROCHLORIDE 0.8 MILLIGRAM(S): 0.4 CAPSULE ORAL at 21:17

## 2020-08-16 RX ADMIN — Medication 5 MILLIGRAM(S): at 17:11

## 2020-08-16 RX ADMIN — Medication 5 MILLIGRAM(S): at 05:49

## 2020-08-16 RX ADMIN — Medication 500 MILLIGRAM(S): at 11:27

## 2020-08-16 NOTE — PROGRESS NOTE ADULT - SUBJECTIVE AND OBJECTIVE BOX
Patient is a 75y old  Male who presents with a chief complaint of hematuria (15 Aug 2020 21:55)    denies any c/o    INTERVAL HPI/OVERNIGHT EVENTS:  T(C): 36.3 (08-16-20 @ 13:09), Max: 36.5 (08-16-20 @ 05:47)  HR: 84 (08-16-20 @ 13:09) (66 - 84)  BP: 107/62 (08-16-20 @ 13:09) (99/63 - 107/62)  RR: 18 (08-16-20 @ 13:09) (18 - 18)  SpO2: 95% (08-16-20 @ 13:09) (95% - 97%)  Wt(kg): --  I&O's Summary    15 Aug 2020 07:01  -  16 Aug 2020 07:00  --------------------------------------------------------  IN: 0 mL / OUT: 1500 mL / NET: -1500 mL    16 Aug 2020 07:01  -  16 Aug 2020 17:54  --------------------------------------------------------  IN: 0 mL / OUT: 800 mL / NET: -800 mL        PAST MEDICAL & SURGICAL HISTORY:  Urinary retention  H/O muscular dystrophy  Scrotal swelling  Psychiatric disorder  No significant past surgical history      SOCIAL HISTORY  Alcohol:  Tobacco:  Illicit substance use:    FAMILY HISTORY:    REVIEW OF SYSTEMS:  CONSTITUTIONAL: No fever, weight loss, or fatigue  EYES: No eye pain, visual disturbances, or discharge  ENMT:  No difficulty hearing, tinnitus, vertigo; No sinus or throat pain  NECK: No pain or stiffness  RESPIRATORY: No cough, wheezing, chills or hemoptysis; No shortness of breath  CARDIOVASCULAR: No chest pain, palpitations, dizziness, or leg swelling  GASTROINTESTINAL: No abdominal or epigastric pain. No nausea, vomiting, or hematemesis; No diarrhea or constipation. No melena or hematochezia.  GENITOURINARY: No dysuria, frequency, hematuria, or incontinence  NEUROLOGICAL: No headaches, memory loss, loss of strength, numbness, or tremors  SKIN: No itching, burning, rashes, or lesions   LYMPH NODES: No enlarged glands  ENDOCRINE: No heat or cold intolerance; No hair loss  MUSCULOSKELETAL: No joint pain or swelling; No muscle, back, or extremity pain  PSYCHIATRIC: No depression, anxiety, mood swings, or difficulty sleeping  HEME/LYMPH: No easy bruising, or bleeding gums  ALLERY AND IMMUNOLOGIC: No hives or eczema    RADIOLOGY & ADDITIONAL TESTS:    Imaging Personally Reviewed:  [ ] YES  [ ] NO    Consultant(s) Notes Reviewed:  [ ] YES  [ ] NO    PHYSICAL EXAM:  GENERAL: NAD, well-groomed, well-developed  HEAD:  Atraumatic, Normocephalic  EYES: EOMI, PERRLA, conjunctiva and sclera clear  ENMT: No tonsillar erythema, exudates, or enlargement; Moist mucous membranes, Good dentition, No lesions  NECK: Supple, No JVD, Normal thyroid  NERVOUS SYSTEM:  Alert & Oriented X3, Good concentration; Motor Strength 5/5 B/L upper and lower extremities; DTRs 2+ intact and symmetric  CHEST/LUNG: Clear to percussion bilaterally; No rales, rhonchi, wheezing, or rubs  HEART: Regular rate and rhythm; No murmurs, rubs, or gallops  ABDOMEN: Soft, Nontender, Nondistended; Bowel sounds present  EXTREMITIES:  2+ Peripheral Pulses, No clubbing, cyanosis, or edema  LYMPH: No lymphadenopathy noted  SKIN: No rashes or lesions    LABS:                        12.3   8.04  )-----------( 294      ( 16 Aug 2020 06:10 )             38.6     08-16    139  |  102  |  14  ----------------------------<  106<H>  3.4<L>   |  26  |  0.84    Ca    9.7      16 Aug 2020 06:10          CAPILLARY BLOOD GLUCOSE                MEDICATIONS  (STANDING):  ascorbic acid 500 milliGRAM(s) Oral daily  diazepam    Tablet 5 milliGRAM(s) Oral every 12 hours  enoxaparin Injectable 40 milliGRAM(s) SubCutaneous daily  lactobacillus acidophilus 1 Tablet(s) Oral two times a day  multivitamin 1 Tablet(s) Oral daily  tamsulosin 0.8 milliGRAM(s) Oral at bedtime    MEDICATIONS  (PRN):  acetaminophen   Tablet .. 650 milliGRAM(s) Oral every 6 hours PRN Temp greater or equal to 38C (100.4F), Mild Pain (1 - 3), Moderate Pain (4 - 6)  lidocaine 2% Jelly 5 milliLiter(s) IntraUrethral every 6 hours PRN urethral pain      Care Discussed with Consultants/Other Providers [ ] YES  [ ] NO

## 2020-08-17 LAB
ANION GAP SERPL CALC-SCNC: 10 MMOL/L — SIGNIFICANT CHANGE UP (ref 5–17)
BUN SERPL-MCNC: 16 MG/DL — SIGNIFICANT CHANGE UP (ref 7–23)
CALCIUM SERPL-MCNC: 9.4 MG/DL — SIGNIFICANT CHANGE UP (ref 8.4–10.5)
CHLORIDE SERPL-SCNC: 105 MMOL/L — SIGNIFICANT CHANGE UP (ref 96–108)
CO2 SERPL-SCNC: 27 MMOL/L — SIGNIFICANT CHANGE UP (ref 22–31)
CREAT SERPL-MCNC: 0.77 MG/DL — SIGNIFICANT CHANGE UP (ref 0.5–1.3)
GLUCOSE SERPL-MCNC: 104 MG/DL — HIGH (ref 70–99)
HCT VFR BLD CALC: 35.8 % — LOW (ref 39–50)
HGB BLD-MCNC: 11.3 G/DL — LOW (ref 13–17)
MCHC RBC-ENTMCNC: 30.6 PG — SIGNIFICANT CHANGE UP (ref 27–34)
MCHC RBC-ENTMCNC: 31.6 GM/DL — LOW (ref 32–36)
MCV RBC AUTO: 97 FL — SIGNIFICANT CHANGE UP (ref 80–100)
NRBC # BLD: 0 /100 WBCS — SIGNIFICANT CHANGE UP (ref 0–0)
PLATELET # BLD AUTO: 302 K/UL — SIGNIFICANT CHANGE UP (ref 150–400)
POTASSIUM SERPL-MCNC: 3.8 MMOL/L — SIGNIFICANT CHANGE UP (ref 3.5–5.3)
POTASSIUM SERPL-SCNC: 3.8 MMOL/L — SIGNIFICANT CHANGE UP (ref 3.5–5.3)
RBC # BLD: 3.69 M/UL — LOW (ref 4.2–5.8)
RBC # FLD: 12.6 % — SIGNIFICANT CHANGE UP (ref 10.3–14.5)
SODIUM SERPL-SCNC: 142 MMOL/L — SIGNIFICANT CHANGE UP (ref 135–145)
WBC # BLD: 8.6 K/UL — SIGNIFICANT CHANGE UP (ref 3.8–10.5)
WBC # FLD AUTO: 8.6 K/UL — SIGNIFICANT CHANGE UP (ref 3.8–10.5)

## 2020-08-17 RX ADMIN — Medication 1 TABLET(S): at 17:23

## 2020-08-17 RX ADMIN — Medication 1 TABLET(S): at 11:36

## 2020-08-17 RX ADMIN — Medication 5 MILLIGRAM(S): at 05:33

## 2020-08-17 RX ADMIN — TAMSULOSIN HYDROCHLORIDE 0.8 MILLIGRAM(S): 0.4 CAPSULE ORAL at 21:10

## 2020-08-17 RX ADMIN — Medication 1 TABLET(S): at 05:33

## 2020-08-17 RX ADMIN — Medication 5 MILLIGRAM(S): at 17:23

## 2020-08-17 RX ADMIN — Medication 500 MILLIGRAM(S): at 11:37

## 2020-08-17 NOTE — PROGRESS NOTE ADULT - SUBJECTIVE AND OBJECTIVE BOX
Patient is a 75y old  Male who presents with a chief complaint of hematuria (16 Aug 2020 17:53)    no c/o awaiting d/c to [facility]  INTERVAL HPI/OVERNIGHT EVENTS:  T(C): 36.7 (08-17-20 @ 21:07), Max: 36.7 (08-17-20 @ 21:07)  HR: 70 (08-17-20 @ 21:07) (64 - 72)  BP: 109/68 (08-17-20 @ 21:07) (96/60 - 109/68)  RR: 18 (08-17-20 @ 21:07) (18 - 18)  SpO2: 96% (08-17-20 @ 21:07) (96% - 97%)  Wt(kg): --  I&O's Summary    16 Aug 2020 07:01  -  17 Aug 2020 07:00  --------------------------------------------------------  IN: 0 mL / OUT: 1475 mL / NET: -1475 mL    17 Aug 2020 07:01  -  18 Aug 2020 00:50  --------------------------------------------------------  IN: 0 mL / OUT: 810 mL / NET: -810 mL        PAST MEDICAL & SURGICAL HISTORY:  Urinary retention  H/O muscular dystrophy  Scrotal swelling  Psychiatric disorder  No significant past surgical history      SOCIAL HISTORY  Alcohol:  Tobacco:  Illicit substance use:    FAMILY HISTORY:    REVIEW OF SYSTEMS:  CONSTITUTIONAL: No fever, weight loss, or fatigue  EYES: No eye pain, visual disturbances, or discharge  ENMT:  No difficulty hearing, tinnitus, vertigo; No sinus or throat pain  NECK: No pain or stiffness  RESPIRATORY: No cough, wheezing, chills or hemoptysis; No shortness of breath  CARDIOVASCULAR: No chest pain, palpitations, dizziness, or leg swelling  GASTROINTESTINAL: No abdominal or epigastric pain. No nausea, vomiting, or hematemesis; No diarrhea or constipation. No melena or hematochezia.  GENITOURINARY: No dysuria, frequency, hematuria, or incontinence  NEUROLOGICAL: No headaches, memory loss, loss of strength, numbness, or tremors  SKIN: No itching, burning, rashes, or lesions   LYMPH NODES: No enlarged glands  ENDOCRINE: No heat or cold intolerance; No hair loss  MUSCULOSKELETAL: No joint pain or swelling; No muscle, back, or extremity pain  PSYCHIATRIC: No depression, anxiety, mood swings, or difficulty sleeping  HEME/LYMPH: No easy bruising, or bleeding gums  ALLERY AND IMMUNOLOGIC: No hives or eczema    RADIOLOGY & ADDITIONAL TESTS:    Imaging Personally Reviewed:  [ ] YES  [ ] NO    Consultant(s) Notes Reviewed:  [ ] YES  [ ] NO    PHYSICAL EXAM:  GENERAL: NAD, well-groomed, well-developed  HEAD:  Atraumatic, Normocephalic  EYES: EOMI, PERRLA, conjunctiva and sclera clear  ENMT: No tonsillar erythema, exudates, or enlargement; Moist mucous membranes, Good dentition, No lesions  NECK: Supple, No JVD, Normal thyroid  NERVOUS SYSTEM:  Alert & Oriented X3, Good concentration; Motor Strength 5/5 B/L upper and lower extremities; DTRs 2+ intact and symmetric  CHEST/LUNG: Clear to percussion bilaterally; No rales, rhonchi, wheezing, or rubs  HEART: Regular rate and rhythm; No murmurs, rubs, or gallops  ABDOMEN: Soft, Nontender, Nondistended; Bowel sounds present  EXTREMITIES:  2+ Peripheral Pulses, No clubbing, cyanosis, or edema  LYMPH: No lymphadenopathy noted  SKIN: No rashes or lesions    LABS:                        11.3   8.60  )-----------( 302      ( 17 Aug 2020 06:24 )             35.8     08-17    142  |  105  |  16  ----------------------------<  104<H>  3.8   |  27  |  0.77    Ca    9.4      17 Aug 2020 06:24          CAPILLARY BLOOD GLUCOSE                MEDICATIONS  (STANDING):  ascorbic acid 500 milliGRAM(s) Oral daily  diazepam    Tablet 5 milliGRAM(s) Oral every 12 hours  enoxaparin Injectable 40 milliGRAM(s) SubCutaneous daily  lactobacillus acidophilus 1 Tablet(s) Oral two times a day  multivitamin 1 Tablet(s) Oral daily  tamsulosin 0.8 milliGRAM(s) Oral at bedtime    MEDICATIONS  (PRN):  acetaminophen   Tablet .. 650 milliGRAM(s) Oral every 6 hours PRN Temp greater or equal to 38C (100.4F), Mild Pain (1 - 3), Moderate Pain (4 - 6)  lidocaine 2% Jelly 5 milliLiter(s) IntraUrethral every 6 hours PRN urethral pain      Care Discussed with Consultants/Other Providers [ ] YES  [ ] NO

## 2020-08-17 NOTE — PROGRESS NOTE ADULT - REASON FOR ADMISSION
hematuria

## 2020-08-17 NOTE — PROGRESS NOTE ADULT - PROBLEM SELECTOR PLAN 5
PT eval   f/u neuro as outpt

## 2020-08-17 NOTE — PROGRESS NOTE ADULT - PROBLEM SELECTOR PLAN 6
sever malnutrition   -nutritional consulted / support

## 2020-08-17 NOTE — PROGRESS NOTE ADULT - PROBLEM SELECTOR PLAN 3
no diarrhea yet   -increase vanco oral bid as per ID
no diarrhea yet   off vanco now
no diarrhea yet   -increase vanco oral bid as per ID

## 2020-08-17 NOTE — PROGRESS NOTE ADULT - PROBLEM SELECTOR PROBLEM 4
Anxiety

## 2020-08-17 NOTE — PROGRESS NOTE ADULT - PROBLEM SELECTOR PLAN 1
2/2 ch fowler cath   -seen by ID  -urine c/s growing pseudomonas   -c/w zosyn
2/2 ch fowler cath   -seen by ID  -urine c/s growing pseudomonas   -c/w zosyn
2/2 ch fowler cath   -seen by ID  -urine c/s growing pseudomonas   completed zosyn
2/2 ch fowler cath   -seen by ID  seen by ID  -urine c/s growing pseudomonas   -c/w zosyn tll sensitivity comes
2/2 ch fowler cath   -seen by ID  urine c/s : contamination   -as per ID c/w IV abx for now and increased his oral vanco 125 mg bid while on abx for c diff prophylaxsis
2/2 ch fowler cath   -seen by ID  seen by ID  -urine c/s growing pseudomonas   -c/w zosyn till sensitivity comes

## 2020-08-17 NOTE — PROGRESS NOTE ADULT - ASSESSMENT
75M with PMH of likely muscular dystrophy of unknown etiology c/b rhabdomyolysis, s/p PEG with reversal, homebound, anxiety, BPH with chronic fowler, recurrent C diff on PO vanc taper p/w urinary leakage around fowler with hematuria s/p replacement, a/w likely UTI.
75M with PMH of likely muscular dystrophy of unknown etiology c/b rhabdomyolysis, s/p PEG with reversal, homebound, anxiety, BPH with chronic fowler, recurrent C diff on PO vanc taper who presented with hematuria and pain around urethra. In the ED afebrile, normotensive and not tachycardic. On presentation WBC of 14.62 with 80.7% PMN.     U/A with >50 WBC and RBC.   COVID19 PCR negative.   Previous positive urine cultures from 6/23 with MDR Pseudomonas (Cefepime and Zosyn Susceptible) and VRE (Ampicillin susceptible)    Patient had fowler exchanged and given antibiotics  Resolution of leukocytosis and urethral pain after these interventions  Unclear if true UTI or leukocytosis secondary to stress response from obstruction  UCx with >100K pseudomonas  Favor short empiric course of antibiotics     Called [facility] - susceptibilities for pseudomonas should be ready tonight     Overall, Positive Urine Culture (Pseudomonas), Abnormal Urinalysis, Leukocytosis, Recurrent C diff Infection    --Continue Vancomycin to 125 mg PO BID (for duration while on antibiotics)  --Continue Zosyn 3.375 mg IV Q8H  --Continue to follow CBC with diff  --Continue to follow temperature curve  --Followup on UCx Pseudomonas susceptibilities    I will continue to follow. Please feel free to contact me with any further questions.    Juvenal Berry M.D.  [facility] of Infectious Disease  8AM-5PM: Pager Number [phone]  After Hours (or if no response): Please contact the Infectious Diseases Office at [phone]
75M with PMH of likely muscular dystrophy of unknown etiology c/b rhabdomyolysis, s/p PEG with reversal, homebound, anxiety, BPH with chronic fowler, recurrent C diff on PO vanc taper who presented with hematuria and pain around urethra. In the ED afebrile, normotensive and not tachycardic. On presentation WBC of 14.62 with 80.7% PMN.     U/A with >50 WBC and RBC.   COVID19 PCR negative.   Previous positive urine cultures from 6/23 with MDR Pseudomonas (Cefepime and Zosyn Susceptible) and VRE (Ampicillin susceptible)    Patient had fowler exchanged and given antibiotics  Resolution of leukocytosis and urethral pain after these interventions  Unclear if true UTI or leukocytosis secondary to stress response from obstruction  UCx with >100K pseudomonas  Favor short empiric course of antibiotics     Overall, Positive Urine Culture (Pseudomonas), Abnormal Urinalysis, Leukocytosis, Recurrent C diff Infection    --Recommend checking CBC today  --Continue Vancomycin to 125 mg PO BID (for duration while on antibiotics)  --Continue Zosyn 3.375 mg IV Q8H  --Continue to follow CBC with diff  --Continue to follow temperature curve  --Followup on UCx Pseudomonas susceptibilities    I will continue to follow. Please feel free to contact me with any further questions.    Juvenal Berry M.D.  [facility] Division of Infectious Disease  8AM-5PM: Pager Number [phone]  After Hours (or if no response): Please contact the Infectious Diseases Office at [phone]     The above assessment and plan were discussed with medicine NP
75M with PMH of likely muscular dystrophy of unknown etiology c/b rhabdomyolysis, s/p PEG with reversal, homebound, anxiety, BPH with chronic fowler, recurrent C diff on PO vanc taper p/w urinary leakage around fowler with hematuria s/p replacement, a/w likely UTI.
75M with PMH of likely muscular dystrophy of unknown etiology c/b rhabdomyolysis, s/p PEG with reversal, homebound, anxiety, BPH with chronic fowler, recurrent C diff on PO vanc taper who presented with hematuria and pain around urethra. In the ED afebrile, normotensive and not tachycardic. On presentation WBC of 14.62 with 80.7% PMN.     U/A with >50 WBC and RBC.   COVID19 PCR negative.   Previous positive urine cultures from 6/23 with MDR Pseudomonas (Cefepime and Zosyn Susceptible) and VRE (Ampicillin susceptible)    Patient had fowler exchanged and given antibiotics  Resolution of leukocytosis and urethral pain after these interventions  Unclear if true UTI or leukocytosis secondary to stress response from obstruction  UCx with >100K pseudomonas  Favor 5 day course of zosyn - ending after tomorrow  Would continue PO Vanco at current dosing 72H post completion of zosyn    Overall, Positive Urine Culture (Pseudomonas), Abnormal Urinalysis, Leukocytosis, Recurrent C diff Infection    --Continue Vancomycin to 125 mg PO BID (for duration while on antibiotics) - would complete next Wed  --Continue Zosyn 3.375 mg IV Q8H - end after tomorrow's doses    I will sign off at this time. Please feel free to contact me with any further questions or concerns.    Juvenal Berry M.D.  [facility] Division of Infectious Disease  8AM-5PM: Pager Number [phone]  After Hours (or if no response): Please contact the Infectious Diseases Office at [phone]     The above assessment and plan were discussed with medicine NP

## 2020-08-17 NOTE — PROGRESS NOTE ADULT - PROBLEM SELECTOR PROBLEM 5
H/O muscular dystrophy

## 2020-08-17 NOTE — PROGRESS NOTE ADULT - PROBLEM SELECTOR PROBLEM 1
Acute cystitis with hematuria

## 2020-08-17 NOTE — PROGRESS NOTE ADULT - PROBLEM SELECTOR PROBLEM 6
Protein calorie malnutrition

## 2020-08-17 NOTE — PROGRESS NOTE ADULT - PROBLEM SELECTOR PROBLEM 2
Urinary retention due to benign prostatic hyperplasia

## 2020-08-17 NOTE — PROGRESS NOTE ADULT - PROBLEM SELECTOR PLAN 4
c/w diazepam 5mg BID

## 2020-08-17 NOTE — PROGRESS NOTE ADULT - PROBLEM SELECTOR PLAN 2
c/w fowler   flomax

## 2020-08-17 NOTE — PROGRESS NOTE ADULT - ATTENDING COMMENTS
ID f/u , if cleared by ID will plan for d/c
awaiting identification on urine c/s
c/w Iv abx till ID recommend, then d/c planning
c/w Iv abx till ID recommend
plan for d/c back to assisted living facility
plan for d/c back to assisted living facility his repeat covid-19 PCR neg
plan for d/c back to assisted living facility if covid testing neg
plan for d/c back to assisted living facility if covid testing neg
awaiting identification on urine c/s

## 2020-08-17 NOTE — PROGRESS NOTE ADULT - PROBLEM SELECTOR PROBLEM 3
Clostridium difficile infection

## 2020-08-17 NOTE — PROGRESS NOTE ADULT - PROVIDER SPECIALTY LIST ADULT
Infectious Disease
Internal Medicine
Intervent Cardiology

## 2020-08-17 NOTE — CHART NOTE - NSCHARTNOTEFT_GEN_A_CORE
Nutrition Follow Up Note    Patient seen for malnutrition follow up    Source: pt, EMR    Chart reviewed, events noted. Per chart, pt is a "75M with PMH of likely muscular dystrophy of unknown etiology c/b rhabdomyolysis, s/p PEG with reversal, homebound, anxiety, BPH with chronic fowler, recurrent C diff on PO vanc taper p/w urinary leakage around fowler with hematuria s/p replacement, a/w likely UTI. "    Diet : Diet, Regular (08-04-20 @ 23:44)    Patient denies any N+V, last BM 8/16.    PO intake: pt reports good PO intake, states he does not always consume 100% of trays in one setting but will leave non-perishable items off the tray to consume at later times. Per RN flow sheet documentation pt recently consuming % of meal trays.  Offered oral nutrition supplements, pt declined again, states he does not feel they would benefit him at this time.     Source for PO intake: pt, EMR     Enteral/Parenteral Nutrition: n/a    No updated weights available to assess at this time, will continue to monitor    Pertinent Medications: MEDICATIONS  (STANDING):  ascorbic acid 500 milliGRAM(s) Oral daily  diazepam    Tablet 5 milliGRAM(s) Oral every 12 hours  enoxaparin Injectable 40 milliGRAM(s) SubCutaneous daily  lactobacillus acidophilus 1 Tablet(s) Oral two times a day  multivitamin 1 Tablet(s) Oral daily  tamsulosin 0.8 milliGRAM(s) Oral at bedtime    MEDICATIONS  (PRN):  acetaminophen   Tablet .. 650 milliGRAM(s) Oral every 6 hours PRN Temp greater or equal to 38C (100.4F), Mild Pain (1 - 3), Moderate Pain (4 - 6)  lidocaine 2% Jelly 5 milliLiter(s) IntraUrethral every 6 hours PRN urethral pain    Pertinent Labs: 08-17 @ 06:24: Na 142, BUN 16, Cr 0.77, <H>, K+ 3.8, Phos --, Mg --, Alk Phos --, ALT/SGPT --, AST/SGOT --, HbA1c --    Finger Sticks: n/a    Skin per nursing documentation: stage II sacrum, stage I right knee  Edema per nursing documentation: +1 bilateral leg, 3+ scrotum    Estimated Needs:   [x] no change since previous assessment  [ ] recalculated:     Previous Nutrition Diagnosis: Severe malnutrition  Nutrition Diagnosis is: ongoing, care plan in progress, being addressed with PO encouragement    New Nutrition Diagnosis: n/a    Recommend  1. Continue regular diet.  2. Offered oral nutrition supplements, pt declined at this time. Provided PO encouragement with emphasis on protein foods. Offered to obtain food preferences, pt with none at this time.  3. Continue multivitamin.    Monitoring and Evaluation:     Continue to monitor nutritional intake, tolerance to diet prescription, weights, labs, skin integrity    RD remains available upon request and will follow up per protocol    Verona Herron RD, Pager # [phone]

## 2020-08-18 ENCOUNTER — APPOINTMENT (OUTPATIENT)
Dept: PHYSICAL MEDICINE AND REHAB | Facility: CLINIC | Age: 75
End: 2020-08-18

## 2020-08-18 VITALS
SYSTOLIC BLOOD PRESSURE: 104 MMHG | HEART RATE: 83 BPM | DIASTOLIC BLOOD PRESSURE: 58 MMHG | OXYGEN SATURATION: 94 % | TEMPERATURE: 98 F | RESPIRATION RATE: 16 BRPM

## 2020-08-18 PROCEDURE — 51702 INSERT TEMP BLADDER CATH: CPT

## 2020-08-18 PROCEDURE — 83735 ASSAY OF MAGNESIUM: CPT

## 2020-08-18 PROCEDURE — 97530 THERAPEUTIC ACTIVITIES: CPT

## 2020-08-18 PROCEDURE — 93005 ELECTROCARDIOGRAM TRACING: CPT

## 2020-08-18 PROCEDURE — 97110 THERAPEUTIC EXERCISES: CPT

## 2020-08-18 PROCEDURE — 99285 EMERGENCY DEPT VISIT HI MDM: CPT | Mod: 25

## 2020-08-18 PROCEDURE — 81001 URINALYSIS AUTO W/SCOPE: CPT

## 2020-08-18 PROCEDURE — 87186 SC STD MICRODIL/AGAR DIL: CPT

## 2020-08-18 PROCEDURE — 97161 PT EVAL LOW COMPLEX 20 MIN: CPT

## 2020-08-18 PROCEDURE — 80053 COMPREHEN METABOLIC PANEL: CPT

## 2020-08-18 PROCEDURE — 85027 COMPLETE CBC AUTOMATED: CPT

## 2020-08-18 PROCEDURE — 86769 SARS-COV-2 COVID-19 ANTIBODY: CPT

## 2020-08-18 PROCEDURE — 80048 BASIC METABOLIC PNL TOTAL CA: CPT

## 2020-08-18 PROCEDURE — U0003: CPT

## 2020-08-18 PROCEDURE — 36415 COLL VENOUS BLD VENIPUNCTURE: CPT

## 2020-08-18 PROCEDURE — 87086 URINE CULTURE/COLONY COUNT: CPT

## 2020-08-18 PROCEDURE — 84100 ASSAY OF PHOSPHORUS: CPT

## 2020-08-18 RX ORDER — ASCORBIC ACID 60 MG
1 TABLET,CHEWABLE ORAL
Qty: 0 | Refills: 0 | DISCHARGE
Start: 2020-08-18

## 2020-08-18 RX ADMIN — Medication 1 TABLET(S): at 05:23

## 2020-08-18 RX ADMIN — Medication 5 MILLIGRAM(S): at 05:23

## 2020-08-26 ENCOUNTER — APPOINTMENT (OUTPATIENT)
Dept: ORTHOPEDIC SURGERY | Facility: CLINIC | Age: 75
End: 2020-08-26
Payer: MEDICARE

## 2020-08-26 PROCEDURE — 99204 OFFICE O/P NEW MOD 45 MIN: CPT

## 2020-08-28 ENCOUNTER — APPOINTMENT (OUTPATIENT)
Dept: UROLOGY | Facility: CLINIC | Age: 75
End: 2020-08-28

## 2020-09-01 ENCOUNTER — APPOINTMENT (OUTPATIENT)
Dept: UROLOGY | Facility: CLINIC | Age: 75
End: 2020-09-01
Payer: MEDICARE

## 2020-09-01 ENCOUNTER — APPOINTMENT (OUTPATIENT)
Dept: UROLOGY | Facility: CLINIC | Age: 75
End: 2020-09-01

## 2020-09-01 VITALS
HEIGHT: 73 IN | WEIGHT: 136 LBS | HEART RATE: 86 BPM | SYSTOLIC BLOOD PRESSURE: 99 MMHG | DIASTOLIC BLOOD PRESSURE: 65 MMHG | BODY MASS INDEX: 18.02 KG/M2

## 2020-09-01 VITALS — TEMPERATURE: 98.3 F

## 2020-09-01 DIAGNOSIS — Z87.440 PERSONAL HISTORY OF URINARY (TRACT) INFECTIONS: ICD-10-CM

## 2020-09-01 PROCEDURE — 99214 OFFICE O/P EST MOD 30 MIN: CPT

## 2020-09-01 NOTE — ASSESSMENT
[FreeTextEntry1] : Urinary retention managed with indwelling fowler. Discussed other management options including nothing (not reocmmended, highest risk of infection and risk of renal damage) and intermittent cath (not feasible for pt due to dexterity issues and help). Will cont with fowler but try and decrease infection risk. \par --D/c flomax\par --COnt monthly cath change\par --Increase po fluid intake\par --Begin methenamine with Vit C\par --Keep cath secure at all times\par --Bowel regimen\par

## 2020-09-01 NOTE — PHYSICAL EXAM
[General Appearance - Well Developed] : well developed [General Appearance - In No Acute Distress] : no acute distress [Abdomen Soft] : soft [Abdomen Tenderness] : non-tender [Exaggerated Use Of Accessory Muscles For Inspiration] : no accessory muscle use [Oriented To Time, Place, And Person] : oriented to person, place, and time [FreeTextEntry1] : tremor

## 2020-09-01 NOTE — HISTORY OF PRESENT ILLNESS
[FreeTextEntry1] : 74yo gentleman with cc of urinary retention. Pt with hx of muscular dystrophy (he denies). He has been managed with indwelling catheter since then. He has had multiple hospitalizations for infection and issues with blockages. He was hospitalized early Aug for hematuria and pain around urethra. He has hx of recurrent c diff on and has now completed the PO vanco taper. Last catheter change was last monday due to leakage around catheter. He currently is managed with 18F coude. \par \par He denies any issues with constipation--bowels are good. He is drinking on his own (no longer with PEG) and reports drinking well. Currently [facility].

## 2020-09-02 ENCOUNTER — APPOINTMENT (OUTPATIENT)
Dept: NEUROLOGY | Facility: CLINIC | Age: 75
End: 2020-09-02
Payer: MEDICARE

## 2020-09-02 VITALS — TEMPERATURE: 96.2 F

## 2020-09-02 DIAGNOSIS — R26.0 ATAXIC GAIT: ICD-10-CM

## 2020-09-02 DIAGNOSIS — R13.14 DYSPHAGIA, PHARYNGOESOPHAGEAL PHASE: ICD-10-CM

## 2020-09-02 DIAGNOSIS — Z86.69 PERSONAL HISTORY OF OTHER DISEASES OF THE NERVOUS SYSTEM AND SENSE ORGANS: ICD-10-CM

## 2020-09-02 PROCEDURE — 99215 OFFICE O/P EST HI 40 MIN: CPT

## 2020-09-02 NOTE — DISCHARGE NOTE PROVIDER - NSDCQMPCI_CARD_ALL_CORE
No -Presented with Hb 8.2, today: 8.7  -Likely in context of recent menstrual cycle, which ended 8/31. Per pt, she has heavy bleeding  -Monitor with CBC qd  -Iron studies in am.

## 2020-09-03 ENCOUNTER — APPOINTMENT (OUTPATIENT)
Dept: PHYSICAL MEDICINE AND REHAB | Facility: CLINIC | Age: 75
End: 2020-09-03

## 2020-09-03 PROBLEM — Z86.69 HISTORY OF NEUROPATHY: Status: RESOLVED | Noted: 2020-02-25 | Resolved: 2020-09-03

## 2020-09-03 PROBLEM — R26.0 ATAXIA INVOLVING LEGS: Status: ACTIVE | Noted: 2020-02-25

## 2020-09-03 PROBLEM — R13.14 PHARYNGOESOPHAGEAL DYSPHAGIA: Status: ACTIVE | Noted: 2020-02-25

## 2020-09-03 NOTE — REVIEW OF SYSTEMS
[Feeling Poorly] : feeling poorly [Depression] : depression [Facial Weakness] : facial weakness [Arm Weakness] : arm weakness [Leg Weakness] : leg weakness [Hand Weakness] :  hand weakness [Poor Coordination] : poor coordination [Tingling] : tingling [Numbness] : numbness [Difficulty Walking] : difficulty walking [Ataxia] : ataxia [Inability to Walk] : inability to walk [As Noted in HPI] : as noted in HPI [Negative] : Endocrine [Memory Lapses or Loss] : no memory loss [Confused or Disoriented] : no confusion [Decr. Concentrating Ability] : no decrease in concentrating ability [Difficulty with Language] : no ~M difficulty with language [Changed Thought Patterns] : no change in thought patterns [Repeating Questions] : no repeated questioning about recent events [Seizures] : no convulsions [Dizziness] : no dizziness [Lightheadedness] : no lightheadedness [Fainting] : no fainting [Vertigo] : no vertigo [Cluster Headache] : no cluster headache [Migraine Headache] : no migraine headache [Tension Headache] : no tension-type headache

## 2020-09-03 NOTE — HISTORY OF PRESENT ILLNESS
[FreeTextEntry1] : Mr. Patel is a 75-year-old  male who was brought in by his formal care attendant in a wheelchair and is accompanied by some medical records and there has not been any significant interim changes since his last evaluation however he is now able to swallow and his feeding gastrostomy has been discontinued.  He remained in [facility] where he received physical therapy for 3 months and finally discharged from another rehab facility/nursing home in April 2020.  He stated that currently his balance is still poor unable to walk independently and besides Valium prescribed by Dr. Frank Britton there are no new medications pertaining to neurological systems.  His major problem is recurrent urinary tract infection induced by indwelling catheter and has been constantly evaluated by a urologist including treatment and continuation of the indwelling catheter.  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  I also advised him today that he should see a movement disorder expert in our group because of multiple features of extrapyramidal disorder.  He stated that he will consider and let me know

## 2020-09-03 NOTE — DATA REVIEWED
[de-identified] : I received several pages of medical records from [facility] where he was admitted on 8/6/2019 with a diagnosis of dysphagia of unspecified time including duodenitis and benign neoplasm gastritis and the procedure consisted of Xofigo gastroduodenoscopy and percutaneous placement of gastrostomy tube.  The procedure was undertaken on 8/21/19.  I reviewed the consultation report of Dr. Fitzgerald who noted past medical history of psychiatric disorder following a fall at midnight and was rescued by emergency service and noted to have a right ear pressure wound and partial-thickness injury and was advised local wound care.  I also reviewed the upper and GI endoscopy on 821/19 for oropharyngeal dysphagia and noted the details.  Transthoracic echocardiogram showed calcified mitral and trileaflet's and normal left ventricular systolic function.  MRI of the cervical spine was unremarkable without any spinal cord abnormalities on 821/19.  MRI of the brain on 8/20/2019 did not reveal any strokes or mass lesions but parenchymal volume loss and chronic microvascular ischemic changes were noted.  Large vessel showed normal flow void.  Thoracic spine MRI on 8/23/2019 showed degenerative changes and chest x-ray was unremarkable.  CT scan of the brain on 8/6/2019 was unremarkable including presence of parenchymal volume loss and microvascular changes.\par \par He also provided him for authorization to talk to Ms. Zamzam Hess he also provided me with some of the records of [facility] and [facility] which have already been reviewed in the original consultation.

## 2020-09-03 NOTE — DISCUSSION/SUMMARY
[FreeTextEntry1] : Opinion–the patient was originally evaluated by several neurologist including at [place] by Sulema Olivo and at [facility] and the diagnosis of severe peripheral sensorimotor axonal neuropathy was undertaken without any evidence of immune mediated polyneuropathy or paraneoplastic disease and no immune modulator or immunosuppressant therapy was advised.  He has not returned back to either facility because of long distance traveling.  I do not find any dramatic change since his last evaluation and the status of neuropathy.\par \par I do not have a clear diagnosis but I suspected multisystem degeneration as he has mild brain atrophy, features of parkinsonism mild spasticity peripheral neuropathy of undetermined etiology and initial evaluation had revealed dysphagia and mild bulbar dysfunction but it no longer exists as his PEG tube was removed.  I had a eryn discussion with the patient that I do not have any therapeutic suggestions and only careful follow-up was advised but advised him to see a movement disorder clinic expert and he stated that he will consider it and let me know.  Extensive education and counseling was provided in the presence of his caretaker.  He understands and will continue follow-up.  In the event of any change he was advised to call me immediately.  He will remain under the care of his internist .\par

## 2020-09-03 NOTE — PHYSICAL EXAM
[FreeTextEntry1] : General examination–vital signs are recorded and unremarkable.  He has significant weight loss of 134 pounds and looks emaciated.  He is cognitive language and memory functions are normal even though he is psychomotor wise slow but appears to have good insight and judgment and converses well without any delusions hallucinations or abnormal behavior.  Cranial nerve examination reveals that he has normal optic disks brisk pupils slow saccadic and pursuit eye movements with poorly sustained horizontal nystagmus and normal visual fields.  He has normal tongue and gag reflex appears to be present and he no longer has peg tube and did not show any evidence of bulbar dysfunction.  He has staring appearance with poor facial expressions.  He has tremor in the right upper and left lower extremity with mild cogwheel rigidity in the upper extremity and positive Myerson sign and mild spasticity in the lower extremity.  His reflexes are 1+ with trace ankle reflexes are almost absent and there is no Babinski sign.  He has decreased vibration pin and cold sensation and his position sense threshold is increased and rest of his neurological examination is essentially unchanged.  I did not notice any myoclonic jerks.\par \par General examination remains unchanged but there is no carotid bruit thyromegaly or lymphadenopathy.  Throat is clear.  He has a indwelling bladder catheter abdomen is soft without tenderness pedal pulsations are present and there is no significant lymphedema.

## 2020-10-26 ENCOUNTER — EMERGENCY (EMERGENCY)
Facility: HOSPITAL | Age: 75
LOS: 1 days | Discharge: HOME | End: 2020-10-26
Attending: EMERGENCY MEDICINE
Payer: MEDICARE

## 2020-10-26 VITALS
HEART RATE: 78 BPM | DIASTOLIC BLOOD PRESSURE: 70 MMHG | SYSTOLIC BLOOD PRESSURE: 114 MMHG | TEMPERATURE: 98 F | RESPIRATION RATE: 17 BRPM | OXYGEN SATURATION: 98 %

## 2020-10-26 VITALS
RESPIRATION RATE: 16 BRPM | OXYGEN SATURATION: 96 % | WEIGHT: 134.92 LBS | HEART RATE: 92 BPM | TEMPERATURE: 98 F | SYSTOLIC BLOOD PRESSURE: 138 MMHG | DIASTOLIC BLOOD PRESSURE: 78 MMHG | HEIGHT: 73 IN

## 2020-10-26 PROCEDURE — 99284 EMERGENCY DEPT VISIT MOD MDM: CPT | Mod: 25

## 2020-10-26 PROCEDURE — 51702 INSERT TEMP BLADDER CATH: CPT

## 2020-10-26 PROCEDURE — 99283 EMERGENCY DEPT VISIT LOW MDM: CPT | Mod: GC

## 2020-10-26 NOTE — ED PROVIDER NOTE - PATIENT PORTAL LINK FT
You can access the FollowMyHealth Patient Portal offered by [facility] by registering at the following website: [email or web address]. By joining [employer]’s FollowMyHealth portal, you will also be able to view your health information using other applications (apps) compatible with our system.

## 2020-10-26 NOTE — ED ADULT TRIAGE NOTE - NS ED NURSE BANDS TYPE
Symptoms:  Light headed     Symptoms started:  Today     Have you been treated in the past:  No     Does patient have a fever of >100.4 AND/OR experiencing respiratory symptoms? NA    IF YES, then ask..    Have you traveled to [place], [employer], [place], [place], Most Countries in [place], [place], [place] and [place] within 14 days of the onset of your symptom? NA    Have you had contact with a confirmed corona virus individual within 14 days of the onset of your symptoms? NA    Ask yourself is this an immediate:  If both symptoms and travel history are positive, [facility] RN team  If sign and symptom finish encounter send to RN team    Okay to leave a detailed message? Yes    Comments:  Patient is calling and said she is lightheaded and he fell over on the bed.     Name band;

## 2020-10-26 NOTE — ED ADULT NURSE NOTE - NSFALLRSKASSISTTYPE_ED_ALL_ED
Problem: Goal Outcome Summary  Goal: Goal Outcome Summary  Outcome: No Change  Pt unresponsive, opens eyes spontaneously w/o any form of understanding, extremities contracted. Vitals stable on 1 liters NC, Total lift w/ A-2. T&R Q2H. incontinent of bowel. Dressing on coccyx intact. PU on bilateral ears, blanchable around scabs, using z-flow to prevent pressure. Has moderate amt secretion, allowed to suction after vomiting and aspirating, opened mouth, otherwise clenched teeth. crackles and wheezes through lung fields. Strict NPO. Blood sugar stable. IV fluids running, continues on IV abx, LA trending down. Planning wound consult Monday, possible need for speech? G-tube intact, Tube feeding increased to goal. Contact precautions maintained. Tele SR- requesting ethics consult. continue to monitor.        Walking/Standing

## 2020-10-26 NOTE — ED ADULT TRIAGE NOTE - INTERNATIONAL TRAVEL
Breath sounds are clear, no distress present, no wheeze, rales, rhonchi or tachypnea. Normal rate and effort. No

## 2020-10-26 NOTE — ED ADULT NURSE REASSESSMENT NOTE - NS ED NURSE REASSESS COMMENT FT1
Old fowler removed after flushing first. Blood clots removed. Fowler catheter placed with MD order. 18fr coude. Aseptic technique maintained. Two RNs at bedside during procedure. Catheter draining blood tinged urine. Pt feels relief.

## 2020-10-26 NOTE — ED PROVIDER NOTE - PROGRESS NOTE DETAILS
alpesh marsh pgy2: fowler catheter exchanged with good output. pt states that he is feeling better now. will discharge home at this time with nonemergent ambulette.

## 2020-10-26 NOTE — ED PROVIDER NOTE - CONSTITUTIONAL, MLM
normal... chronically ill appearing, awake, alert, oriented to person, place, time/situation and in no apparent distress.

## 2020-10-26 NOTE — ED PROVIDER NOTE - NSFOLLOWUPINSTRUCTIONS_ED_ALL_ED_FT
you were seen in the ED today for fowler catheter complication.   your fowler catheter was exchanged.   please follow up with your urologist.   return to the emergency department for any new or worsening symptoms.

## 2020-10-26 NOTE — ED PROVIDER NOTE - OBJECTIVE STATEMENT
74yo M f likely muscular dystrophy of unknown etiology c/b rhabdomyolysis, s/p PEG with reversal, homebound, anxiety, BPH with chronic fowler, s//p c. diff presenting with fowler complication. visiting nurse came this AM and changed his fowler. states initially was having hematuria which then cleared. He emptied the bag and then has not been having any output from the fowler. having mild discomfort, wanted to be evaluated before the pain worsened. denies any f/c, cp, sob, n/v. no testicular or penile pain.

## 2020-10-26 NOTE — ED ADULT NURSE NOTE - OBJECTIVE STATEMENT
Pt is a 75 yr old male with pmh of neuropathy of unknown origin, hydrocele, and essential tremor coming in by EMS after chronic fowler catheter changed at home had no output for a few hours. Pt has had the chronic fowler for chronic retention. Catheter had bloody output at home but no urine. Patient is tender to palpation in the suprapubic region. Fowler bag has blood in it but small amounts. Fowler is a 16fr coude. Site is clean and intact. Pt does have an enlarged scrotum. Pt has had this happen in the past with which they changed the fowler and output was achieved. Pt is a/o x 3, calm and cooperative but is adamant he does not want to be admitted.

## 2020-10-26 NOTE — ED PROVIDER NOTE - CLINICAL SUMMARY MEDICAL DECISION MAKING FREE TEXT BOX
74yo M numerous comorbidities with chronic fowler at home presenting with no output from fowler. fowler changed this AM, first with hematuria that cleared, no output following. mild distention and discomfort. no other complaints. will change fowler catheter and discharge home. 74yo M numerous comorbidities with chronic fowler at home presenting with no output from fowler. fowler changed this AM, first with hematuria that cleared, no output following. mild distention and discomfort. no other complaints. will change fowler catheter and discharge home.ZR

## 2020-12-01 ENCOUNTER — NON-APPOINTMENT (OUTPATIENT)
Age: 75
End: 2020-12-01

## 2020-12-04 ENCOUNTER — NON-APPOINTMENT (OUTPATIENT)
Age: 75
End: 2020-12-04

## 2020-12-07 ENCOUNTER — NON-APPOINTMENT (OUTPATIENT)
Age: 75
End: 2020-12-07

## 2020-12-07 ENCOUNTER — EMERGENCY (EMERGENCY)
Facility: HOSPITAL | Age: 75
LOS: 1 days | Discharge: NURSING, REHAB OR LONG-TERM CARE | End: 2020-12-07
Attending: EMERGENCY MEDICINE
Payer: MEDICARE

## 2020-12-07 VITALS
HEART RATE: 79 BPM | RESPIRATION RATE: 18 BRPM | SYSTOLIC BLOOD PRESSURE: 115 MMHG | DIASTOLIC BLOOD PRESSURE: 72 MMHG | OXYGEN SATURATION: 97 % | TEMPERATURE: 98 F

## 2020-12-07 VITALS
WEIGHT: 139.99 LBS | SYSTOLIC BLOOD PRESSURE: 147 MMHG | HEART RATE: 87 BPM | RESPIRATION RATE: 16 BRPM | DIASTOLIC BLOOD PRESSURE: 77 MMHG | HEIGHT: 73 IN | OXYGEN SATURATION: 98 % | TEMPERATURE: 98 F

## 2020-12-07 PROCEDURE — 51702 INSERT TEMP BLADDER CATH: CPT

## 2020-12-07 PROCEDURE — 99284 EMERGENCY DEPT VISIT MOD MDM: CPT | Mod: 25

## 2020-12-07 PROCEDURE — 99283 EMERGENCY DEPT VISIT LOW MDM: CPT | Mod: GC

## 2020-12-07 NOTE — ED PROVIDER NOTE - PHYSICAL EXAMINATION
General: well-appearing elderly man in no acute distress  Head: normocephalic, atraumatic  Eyes: clear eyes  Mouth: moist mucous membranes  Neck: supple neck  CV: normal rate and rhythm, peripheral pulses 2+ bilaterally  Respiratory: breathing comfortably on room air  Abdomen: soft, nondistended, mild tenderness to palpation of suprapubic region  : fowler catheter in place with small amounts of eryn blood in collecting tube and bag, pt in retention on bedside ultrasound with no balloon noted in bladder  MSK: no joint deformities noted  Neuro: alert and oriented x3, speech clear, generalized weakness noted  Skin: sacral ulcer stage 2

## 2020-12-07 NOTE — ED ADULT NURSE NOTE - OBJECTIVE STATEMENT
76yo M aaox4 Hx muscular dystrophy, scrotal swelling, urinary retention, presents to Ed via EMS from [facility] c/o blood in the fowler s/p change , as per pt reports fowler was placed 15 months ago for urinary retention, and change it every 4 weeks, today a RN change his fowler and  right after the insertion started blood in the fowler, and sent to Ed for evaluation, upon assessment blood is noted in the fowler bag, denies pain at thi moment, no pain or burning, fever, n/v/d, cp, sob. As per MD Mendenhall RN removed the fowler and attempted to get a new fowler, pt is c/o urinary retention pain, unable to placed the fowler, felt resistance , MD Schuler made aware and called to urologist. Will continue to monitor. Safety and comfort measures initiated- bed placed in lowest position and side rails raised. Pt oriented to call bell system.

## 2020-12-07 NOTE — ED PROVIDER NOTE - PATIENT PORTAL LINK FT
You can access the FollowMyHealth Patient Portal offered by [facility] by registering at the following website: [email or web address]. By joining [employer]’s FollowMyHealth portal, you will also be able to view your health information using other applications (apps) compatible with our system.

## 2020-12-07 NOTE — ED ADULT NURSE NOTE - NSIMPLEMENTINTERV_GEN_ALL_ED
Implemented All Fall with Harm Risk Interventions:  [place] to call system. Call bell, personal items and telephone within reach. Instruct patient to call for assistance. Room bathroom lighting operational. Non-slip footwear when patient is off stretcher. Physically safe environment: no spills, clutter or unnecessary equipment. Stretcher in lowest position, wheels locked, appropriate side rails in place. Provide visual cue, wrist band, yellow gown, etc. Monitor gait and stability. Monitor for mental status changes and reorient to person, place, and time. Review medications for side effects contributing to fall risk. Reinforce activity limits and safety measures with patient and family. Provide visual clues: red socks.

## 2020-12-07 NOTE — ED PROVIDER NOTE - OBJECTIVE STATEMENT
75yoM PMH BPH with chronic fowler in place was sent from [facility] for hematuria and poor output after fowler catheter change this morning. Pt states similar presentation last time they had changed fowler and had fowler replaced in ED with good outflow and was discharged. He denies any abnormalities with previous fwoler. No dysuria. No fevers. He states he has some abdominal discomfort now and feels that his bladder is not emptying. He noted pain as the nurse was inflating the balloon with fowler insertion today. He denies any a/c use.

## 2020-12-07 NOTE — ED PROVIDER NOTE - PROGRESS NOTE DETAILS
Franchesca Schuler, resident MD: unsuccessful fowler catheter replacement, urology was consulted for fowler placement and is at bedside. Franchesca Schuler, resident MD: fowler placed successfully by urology with clear yellow urine output. with no hx of symptoms, will defer UA as pt has colonization. will discharge patient home at this time and arrange for transport. discussed return precautions and need for outpatient follow up. Franchesca Schuler, resident MD: fowler placed successfully by urology with clear yellow urine output. with no hx of symptoms, will defer UA as pt has colonization. will discharge patient home at this time and arrange for transport. discussed return precautions and need for outpatient follow up. spoke with staff at [facility] on [place], no need for covid test. pt has no symptoms.

## 2020-12-07 NOTE — PROCEDURE NOTE - NSURITECHNIQUE_GU_A_CORE
Sterile gloves were worn for the duration of the procedure/The site was cleaned with soap/water and sterile solution (betadine)/Proper hand hygiene was performed/A sterile drape was used to cover all adjacent areas/The catheter was secured with a securement device (e.g. StatLock)/All applicable medical record documentation is completed/The catheter was appropriately lubricated/The collection bag is below the level of the patient and urinary bladder

## 2020-12-07 NOTE — ED ADULT NURSE NOTE - CHPI ED NUR SYMPTOMS NEG
no nausea/no tingling/no weakness/no chills/no dizziness/no fever/no vomiting/no decreased eating/drinking

## 2020-12-07 NOTE — ED PROVIDER NOTE - NSFOLLOWUPINSTRUCTIONS_ED_ALL_ED_FT
You were seen an evaluated in the emergency room for blood in the urine likely due to your fowler catheter change.    Please follow up with your urologist in the next few days.    Continue all home medications as prescribed.    Return to the ED for any worsening symptoms of fever, worsening pain, no output from fowler catheter, nausea and vomiting with inability to tolerate food, or any new or concerning symptoms.    Please read all attached.

## 2020-12-07 NOTE — ED PROVIDER NOTE - NS ED ROS FT
General: no fever  Head: no headache  Eyes: no vision change  ENT: no nasal discharge/congestion, no sore throat  CV: no chest pain  Resp: no SOB, no cough  GI: no N/V, +mild abdominal distension  : no dysuria, +hematuria after fowler placement today  MSK: no new joint pain  Skin: no new rash  Neuro: no new weakness/change in sensation, chronic neuropathy

## 2020-12-07 NOTE — PROCEDURE NOTE - ADDITIONAL PROCEDURE DETAILS
called by ED. unable to replace chronic fowler.     18f coude placed easily using sterile technique. pt tolerated well. clear urine drained.

## 2020-12-07 NOTE — ED PROVIDER NOTE - ATTENDING CONTRIBUTION TO CARE
pt is a 74 y/o male here from the [facility] for traumatic fowler insertion, gets changed once a month, not on AC with prior similar episode, hematuria noted, to change fowler, if there are issues, consider  consult. no abd pain, not in retention on US at this time.

## 2020-12-21 ENCOUNTER — NON-APPOINTMENT (OUTPATIENT)
Age: 75
End: 2020-12-21

## 2020-12-23 PROBLEM — Z87.440 HISTORY OF URINARY TRACT INFECTION: Status: RESOLVED | Noted: 2020-02-03 | Resolved: 2020-12-23

## 2021-01-01 NOTE — DISCHARGE NOTE PROVIDER - NSDCQMCOGNITION_NEU_ALL_CORE
Problem: Nutrition  Goal: Progresses toward ability to receive all feeding from breast or bottle  Outcome: Outcome Met, Continue evaluating goal progress toward completion  Goal: Achieves catch up weight gain and growth consistent with birth weight percentile  Outcome: Outcome Met, Continue evaluating goal progress toward completion     Problem: Breastfeeding  Goal: Successful breastfeeding as evidenced by proper latch and adequate suck/ swallow  Outcome: Outcome Met, Continue evaluating goal progress toward completion      No difficulties

## 2021-01-05 ENCOUNTER — APPOINTMENT (OUTPATIENT)
Dept: UROLOGY | Facility: CLINIC | Age: 76
End: 2021-01-05
Payer: MEDICARE

## 2021-01-05 PROCEDURE — 99447 NTRPROF PH1/NTRNET/EHR 11-20: CPT

## 2021-01-05 NOTE — HISTORY OF PRESENT ILLNESS
[FreeTextEntry1] : Teleconsent obtained from patient. \par \par 76yo gentleman with cc of urinary retention. Pt with hx of muscular dystrophy (he denies). He has been managed with indwelling catheter since then. He has had multiple hospitalizations for infection and issues with blockages. He was hospitalized early Aug for hematuria and pain around urethra. He has hx of recurrent c diff. He was last seen by me ~3mo ago with plan made to continue monthly catheter changes and begin methenamine for UTI prevention. \par \par He has been getting monthly cath changes with home care. He reports that after each of these trials at home over the last 2mo when he was changed he has ended up in the ER. He reports that the catheter has been a coude but has not been "red" as the ones we have used. He has not been compliant with the methenamine per his home health aide. Today he reports he has not really been taking. He states he can not go in to details about it as he wants to focus on catheter. denies any issues with constipation--bowels are good. He is drinking on his own (no longer with PEG) and reports drinking well. Currently still in [facility] in recovery.

## 2021-01-08 ENCOUNTER — APPOINTMENT (OUTPATIENT)
Dept: UROLOGY | Facility: CLINIC | Age: 76
End: 2021-01-08

## 2021-01-08 ENCOUNTER — NON-APPOINTMENT (OUTPATIENT)
Age: 76
End: 2021-01-08

## 2021-01-12 ENCOUNTER — NON-APPOINTMENT (OUTPATIENT)
Age: 76
End: 2021-01-12

## 2021-01-12 ENCOUNTER — EMERGENCY (EMERGENCY)
Facility: HOSPITAL | Age: 76
LOS: 1 days | Discharge: HOME | End: 2021-01-12
Attending: EMERGENCY MEDICINE
Payer: MEDICARE

## 2021-01-12 VITALS
SYSTOLIC BLOOD PRESSURE: 120 MMHG | RESPIRATION RATE: 16 BRPM | OXYGEN SATURATION: 95 % | HEART RATE: 94 BPM | DIASTOLIC BLOOD PRESSURE: 63 MMHG | TEMPERATURE: 99 F

## 2021-01-12 VITALS
RESPIRATION RATE: 18 BRPM | HEIGHT: 73 IN | DIASTOLIC BLOOD PRESSURE: 72 MMHG | WEIGHT: 139.99 LBS | OXYGEN SATURATION: 96 % | SYSTOLIC BLOOD PRESSURE: 123 MMHG | TEMPERATURE: 98 F | HEART RATE: 90 BPM

## 2021-01-12 PROCEDURE — 99283 EMERGENCY DEPT VISIT LOW MDM: CPT | Mod: GC

## 2021-01-12 PROCEDURE — 51702 INSERT TEMP BLADDER CATH: CPT

## 2021-01-12 PROCEDURE — 99283 EMERGENCY DEPT VISIT LOW MDM: CPT | Mod: 25

## 2021-01-12 NOTE — ED ADULT NURSE NOTE - NSIMPLEMENTINTERV_GEN_ALL_ED
Implemented All Fall Risk Interventions:  [place] to call system. Call bell, personal items and telephone within reach. Instruct patient to call for assistance. Room bathroom lighting operational. Non-slip footwear when patient is off stretcher. Physically safe environment: no spills, clutter or unnecessary equipment. Stretcher in lowest position, wheels locked, appropriate side rails in place. Provide visual cue, wrist band, yellow gown, etc. Monitor gait and stability. Monitor for mental status changes and reorient to person, place, and time. Review medications for side effects contributing to fall risk. Reinforce activity limits and safety measures with patient and family.

## 2021-01-12 NOTE — ED ADULT NURSE REASSESSMENT NOTE - NS ED NURSE REASSESS COMMENT FT1
pt states feeling relief from the pressure pain in the lower abdomen, after Spencer catheter replacement.

## 2021-01-12 NOTE — ED ADULT NURSE REASSESSMENT NOTE - NS ED NURSE REASSESS COMMENT FT1
ultrasound team scan bladder, bladder was full. MD ordered to pull out existing Spencer y and replaced for a new one. Spencer catheter  w /coude  catheter used, placed using sterile technique. Second RN present to confirm sterility. Draining to gravity 600 ml came out of clear yellow urine drained. Secured w/ stat lock. Pt tolerated procedure well. Will cont to monitor.

## 2021-01-12 NOTE — ED PROVIDER NOTE - PROGRESS NOTE DETAILS
Sandra Luna MD PGY-3 POCUS shows urinary retention and fowler does not appear to be in bladder at this time. will remove fowler and replace. Sandra Luna MD PGY-3 fowler exchanged with good urine output, abdomen soft, nontender at this time.

## 2021-01-12 NOTE — ED ADULT NURSE NOTE - ED STAT RN HANDOFF DETAILS
Bedside report given to on coming nurse Wei Patient in stable condition, vital signs updated, has no complaints at this time and has been updated on care plan. Explained to patient that it is change of shift and new nurse is taking over, pt verbalized understanding.

## 2021-01-12 NOTE — ED PROVIDER NOTE - NSFOLLOWUPINSTRUCTIONS_ED_ALL_ED_FT
1. You were seen in the Emergency Room for retaining urine after having your fowler replaced  2. You were sent home with an 18F fowler coude  3. Please follow up with your doctor in 24-48 hours.  4. Return to the emergency room or seek immediate assistance for any new or concerning symptoms (such as fevers, chills, decreased urine output, blood in urine or around fowler, cloudy urine ), or if you get worse.   5. Copies of your tests were provided to you for follow-up.  You must address all your findings with your doctor.

## 2021-01-12 NOTE — ED PROVIDER NOTE - CLINICAL SUMMARY MEDICAL DECISION MAKING FREE TEXT BOX
74 yo M with PMH peripheral neuropathy (states that despite the medical record, he does NOT have muscular dystrophy), chronic urinary retention (patient unclear as to the reason) with indwelling fowler placement presents from the [facility] with decreased fowler output and blood at urethral meatus since fowler was changed today by visiting health nurse. likely displaced fowler. will get bladder scan and fowler exchange, no signs of infection at this point.

## 2021-01-12 NOTE — ED PROVIDER NOTE - PATIENT PORTAL LINK FT
You can access the FollowMyHealth Patient Portal offered by [facility] by registering at the following website: [email or web address]. By joining [employer]’s FollowMyHealth portal, you will also be able to view your health information using other applications (apps) compatible with our system.

## 2021-01-12 NOTE — ED PROVIDER NOTE - ATTENDING CONTRIBUTION TO CARE
76 yo male w/ chronic indwelling fowler p/w sluggish fowler drainage, hematuria previously.  clear urine here, though ?obstruction with full bladder.  18 FR coude fowler replaced -- now draining free and easy, patient much more comfortable.  stable for d/c, outpatient f/u.

## 2021-01-12 NOTE — ED PROVIDER NOTE - OBJECTIVE STATEMENT
76 yo M with PMH peripheral neuropathy (states that despite the medical record, he does NOT have muscular dystrophy), chronic urinary retention (patient unclear as to the reason) with indwelling fowler placement presents from the [facility] with decreased fowler output and blood at urethral meatus since fowler was changed today by visiting health nurse. Patient resides at [facility]. Endorsing suprapubic abdominal pain. No fevers, no back pain

## 2021-01-12 NOTE — ED ADULT NURSE NOTE - OBJECTIVE STATEMENT
74 y/o male  arrives to the ER complaining of obstructed Spencer catheter. Pt is A&Ox4, speaking coherently. Pt states the nurse from the [facility] facility change the Spencer catheter, and blood came out, but not urinary output. Pt states had a Spencer catheter since 2019, changing it on monthly basis. Upon arrival Spencer catheter on place, blood around the penis is seen, and patient complaint of pain on the lower part of the abdomen on palpation.    On assessment airway is patent, breathing spontaneously and unlabored, skin is dry, warm and color appropriate to race. abdomen is soft, non distended, non tender, ROM in all extremities. Pt denies SOB, chest pain, N/V/D, fevers, chills. Comfort measures provided. call bell within reach, bed locked in the lowest position. will continue to reassess . 76 y/o male  arrives to the ER complaining of obstructed Spencer catheter. Pt is A&Ox4, speaking coherently. Pt states the  visiting nurse from the [facility] facility change the Spencer catheter today ,blood came out, but not urinary output. Pt states had a Spencer catheter since 2019,  for urinary retention, pt is unclear of the reason  for indwelling Spencer placement.  Spencer is change on monthly basis. Upon arrival Spencer catheter on place, blood around the urethra meatus is seen, and patient complaint of pain on the lower part of the abdomen on palpation.    On assessment airway is patent, breathing spontaneously and unlabored, skin is dry, warm and color appropriate to race. abdomen is soft, non distended, non tender, ROM in all extremities. Pt denies SOB, chest pain, N/V/D, fevers, chills. Comfort measures provided. call bell within reach, bed locked in the lowest position. will continue to reassess .

## 2021-01-12 NOTE — ED PROVIDER NOTE - PHYSICAL EXAMINATION
PHYSICAL EXAM:   General: well-appearing, appears stated age, not in extremis   HEENT: NC/AT, airway patent  Cardiovascular: regular rate   Respiratory: nonlabored respirations  Abdominal: soft, suprapubic TTP, nondistended  Neuro: Awake, alert, interactive  Psychiatric: appropriate mood and affect.   -Sandra Luna PGY-3

## 2021-01-21 ENCOUNTER — APPOINTMENT (OUTPATIENT)
Dept: UROLOGY | Facility: CLINIC | Age: 76
End: 2021-01-21

## 2021-02-05 NOTE — PROGRESS NOTE ADULT - PROBLEM SELECTOR PROBLEM 7
Lisa Albarran should be transferred out to psychiatric care facility.   Severe protein-calorie malnutrition

## 2021-02-18 ENCOUNTER — APPOINTMENT (OUTPATIENT)
Dept: UROLOGY | Facility: CLINIC | Age: 76
End: 2021-02-18

## 2021-02-19 ENCOUNTER — APPOINTMENT (OUTPATIENT)
Dept: NEUROLOGY | Facility: CLINIC | Age: 76
End: 2021-02-19

## 2021-02-25 ENCOUNTER — APPOINTMENT (OUTPATIENT)
Dept: UROLOGY | Facility: CLINIC | Age: 76
End: 2021-02-25
Payer: MEDICARE

## 2021-02-25 ENCOUNTER — OUTPATIENT (OUTPATIENT)
Dept: OUTPATIENT SERVICES | Facility: HOSPITAL | Age: 76
LOS: 1 days | End: 2021-02-25
Payer: MEDICARE

## 2021-02-25 VITALS
TEMPERATURE: 97.3 F | SYSTOLIC BLOOD PRESSURE: 113 MMHG | HEART RATE: 85 BPM | BODY MASS INDEX: 18.02 KG/M2 | WEIGHT: 136 LBS | RESPIRATION RATE: 15 BRPM | OXYGEN SATURATION: 95 % | DIASTOLIC BLOOD PRESSURE: 75 MMHG | HEIGHT: 73 IN

## 2021-02-25 PROCEDURE — 51702 INSERT TEMP BLADDER CATH: CPT

## 2021-03-02 NOTE — PROGRESS NOTE BEHAVIORAL HEALTH - NS ED [facility] REVIEW OF ED CHART AVAILABLE LABS REVIEWED
Physical Therapy Evaluation    Visit Count: 1     Referred by: Jose Ludwig DPM; Next provider visit (if known/scheduled): none  Medical Diagnosis (from order):   Diagnosis Information      Diagnosis    729.5 (ICD-9-CM) - M79.672 (ICD-10-CM) - Left foot pain    726.70 (ICD-9-CM) - M77.42 (ICD-10-CM) - Metatarsalgia of left foot              Treatment Diagnosis: bilateral foot symptoms with increased pain/symptoms, impaired posture, impaired range of motion, impaired gait, impaired balance    Date of onset/injury: chronic  Diagnosis Precautions: head injury  Chart reviewed at time of initial evaluation (relevant co-morbidities, allergies, tests and medications listed):   Past Medical History:   Diagnosis Date   • Brain injury (CMS/HCC) 1/18/2012   • Decreased peripheral vision of both eyes 4/25/2016   • Genital herpes       SUBJECTIVE   Description of Problem and/or Mechanism of Injury: Pt reports to physical therapy for pain in bilateral feet.  He has boots that make him feel better.  He is noticing lateral weightbearing on the left foot.  He does have a callus under the 2nd met pad. He does work on concrete but does have rubber mats at certain workstations.  He does have inserts in his work boots that help him - they rise up in the middle of the insert.  Pt does have to wear steel toes at work. Pt does make sure that he has enough room in his shoes.     Pain: Intensity (0-10 scale):0    Function:  Limitations and exacerbating factors (patient reported): pain, difficulty with all activities/tasks with involved extremity  Prior level (patient reported): independent with all activities of daily living and instrumental activities of daily living  Patient Goals: reduce foot pain    Prior Treatment: no therapies in the past year for current condition. Hospitalization, home health services or skilled nursing facility in the last 30 days: No, per patient.  Home Environment/Social Support: Patient lives alone.  Patient has 
no assistance from family/friends.    Safety:  Do you feel safe at home, work and/or school? yes, per patient  Patient denies 2 or more falls or an unexplained fall with injury in the last year, further testing not required     OBJECTIVE   Foot Affected/Involved: bilateral    Posture/Observation: right toe out, mild navicular drop left      Gait Analysis: decreased right arm swing and trunk mobility, decreased dorsiflexion in terminal stance to heel rise, toe out bilateral, excessive anterior tibialis activation bilateral     Neurological Findings:   none    Range of Motion (degrees): Ankle    Left Right    Norm AROM AROM   Dorsiflexion 20°  8 15   Plantar Flexion 50°     Inversion 35°     Eversion 15°     First MTP Dorsiflexion 60° 57 52   First MTP Plantar Flexion 45° WFL WFL   [standard testing positions unless otherwise noted; Key: AROM=active range of motion; PROM=passive range of motion]   Comments:  * denotes pain    Ankle Dorsiflexion (degrees): in subtalar neutral   Left    Right     AROM PROM AROM ROM   Knee extended       Knee flexed       * denotes pain    Rear Foot (degrees): Passive Range of Motion   Norm Left Right   Inversion 20°  N N   Eversion 10°  L L   N=Normal, L=Limited, F=Functional, H=Hypermobile  Comments:  * denotes pain    Mid-tarsal Joint Mobility:   Hypermobile: bilateral    First Metatarsophalangeal (degrees): Active Range of Motion   Norm Left Right   Dorsiflexion 60°     Plantar Flexion 45°     Resting position  in-line plantarflexed   Mobility  semirigid semirigid   [standard testing positions unless otherwise noted] * denotes pain    Functional Leg Strength:    Left Right Comment/Position   Single Leg Balance 10 seconds 5 seconds    Squat   Functional valgus right    Heel Raises   Bilateral calcaneal IN   * denotes pain    Subtalar Joint Neutral: (mm)   Left Right   Rearfoot Varus 0 2   Forefoot Varus 2 2   Forefoot Valgus       Callus Pattern:   Left second met head    Palpation: 
WNL    Shoes:   • Type: sketchers  • Wear Pattern: not appreciable  • Current Orthotics/modifications to shoes: not appreciable    Initial Treatment   Initial evaluation completed.    Bracing/Orthotics:   Paient educated on use and care for orthotics form including weaning, care, cleaning, progression, fitting. Orthotics fit for sketchers shoes. Paient trials with walking, squat, standing, single leg stance. Patient reports orthotics feel good.  Measured for appropriate vasyli size XL blue with met pads bilateral and instructed in proper fit, application and use including break in period.        Skilled input: verbal instruction/cues    Initial Home Program:  * above=instructed home program    Access Code: [email or web address]  URL: [email or web address]  Date: 03/02/2021  Prepared by: Sarah Rm    Exercises  Gastroc Stretch on Wall - 1 reps - 3 sets - 30 sec hold - 1x daily - 7x weekly  Standing Soleus Stretch - 1 reps - 3 sets - 30 sec hold - 1x daily - 7x weekly  Standing Anterior Tibialis Stretch - 3 reps - 1 sets - 30 sec hold - 1x daily - 7x weekly    Writer verbally educated the patient and received verbal consent from the patient on hand placement, positioning of patient, and techniques to be performed today including clothing adjustments for techniques, therapist position for techniques as described above and how they are pertinent to the patient's plan of care.     ASSESSMENT   38 year old male patient has signs and symptoms consistent with bilateral foot pain that has reported functional limitations listed above.  Patient demonstrates a good understanding of home exercise program and equipment use.      Patient will benefit from skilled therapy and Rehabilitative potential is good based on assessment above  Predicted patient presentation: Low (stable) - Patient comorbidities and complexities, as defined above, will have little effect on progress for prescribed plan of care.    PLAN   Goals: To be 
obtained in a single visit:  1. Patient to be fit for custom made orthotics and wear full-time without difficulty.  2. Patient to verbalize/demonstrate understanding of orthotic wearing, cleaning, and wearing strategies.    The following skilled interventions to be implemented to achieve above:  Therapeutic Exercise (89187)  Orthotics Training (30897)  Splinting/Orthotics     Frequency/Duration: patient seen one time for orthotics management    patient involved in and agreed to plan of care and goals.   Attendance policy provided at time of evaluation.      Patient Education:   Who will be receiving education: patient  Are they ready to learn: yes  Preferred learning style: written, verbal, demonstration  Barriers to learning: no barriers apparent at this time   Result of initial outlined education: Verbalizes understanding and Demonstrates understanding    Therapy procedure time and total treatment time can be found documented on the Time Entry flowsheet  
Yes

## 2021-03-03 DIAGNOSIS — R35.0 FREQUENCY OF MICTURITION: ICD-10-CM

## 2021-03-03 DIAGNOSIS — R33.9 RETENTION OF URINE, UNSPECIFIED: ICD-10-CM

## 2021-03-09 ENCOUNTER — APPOINTMENT (OUTPATIENT)
Dept: UROLOGY | Facility: CLINIC | Age: 76
End: 2021-03-09

## 2021-03-19 ENCOUNTER — APPOINTMENT (OUTPATIENT)
Dept: NEUROLOGY | Facility: CLINIC | Age: 76
End: 2021-03-19
Payer: MEDICARE

## 2021-03-19 VITALS
HEART RATE: 81 BPM | WEIGHT: 140 LBS | DIASTOLIC BLOOD PRESSURE: 66 MMHG | HEIGHT: 73 IN | SYSTOLIC BLOOD PRESSURE: 116 MMHG | BODY MASS INDEX: 18.55 KG/M2

## 2021-03-19 VITALS — TEMPERATURE: 97.2 F

## 2021-03-19 PROCEDURE — 99215 OFFICE O/P EST HI 40 MIN: CPT

## 2021-03-19 RX ORDER — CARBIDOPA AND LEVODOPA 25; 100 MG/1; MG/1
25-100 TABLET ORAL
Qty: 100 | Refills: 0 | Status: ACTIVE | COMMUNITY
Start: 2021-03-19 | End: 1900-01-01

## 2021-03-20 NOTE — ASSESSMENT
[FreeTextEntry1] : Patient has all cardinal features of Parkinson's disease. He will be started on carbidopa/levodopa with gradually increasing doses as he expressed fear of possible side effects (risk-aversion is commonly seen in PD). \par \par He was reassured the potential benefits far outweigh any adverse effects. He will return in 6 weeks to reassess his progress and other neurologic problems. I had discussion with his friend and healthcare proxy, Zamzam Hess NP regarding assessment and plan.

## 2021-03-20 NOTE — PHYSICAL EXAM
[FreeTextEntry1] : No significant cognitive or communication deficits. Marked hypomimia with diminished blink. +ve glabella (Myerson sign).VF full. GALA, EOMI. Smile symmetric. Tongue protrudes in midline. Has interossei wasting. When assisted out of wheelchair has severe freezing of gait and marked impairment of postural reflexes. Has cogwheel rigidity of limbs and resting tremor of hands

## 2021-03-20 NOTE — HISTORY OF PRESENT ILLNESS
[FreeTextEntry1] : 75 yr-old man seen 6 months ago by my colleague, Dr. Phoenix Cochran who noted at that time parkinsonian features. History obtained from patient and review of EHR as well as prior admissions to [facility]. He currently is wheelchair bound, claims he uses a walker, and resides in an assisted living facility. \par \par Has hx of fall at home 2 years ago with rhabdomyolysis and EMS noted "hoarding". Has been dependent on diazepam for many years. Has urinary retention and BPH requiring indwelling fowler. Had during [facility] admissions MRI brain C-spine and T spine with no significant findings (except degenerative changes and no cord compression. Had EMG 2 years ago at [place] reporting a sensorimotor polyneuropathy (report in EMR).\par \par Has hx of left foot drop. Has noted micrographia.

## 2021-03-20 NOTE — CONSULT LETTER
[Dear  ___] : Dear ~SOM, [Consult Letter:] : I had the pleasure of evaluating your patient, [unfilled]. [Please see my note below.] : Please see my note below. [Consult Closing:] : Thank you very much for allowing me to participate in the care of this patient.  If you have any questions, please do not hesitate to contact me. [Sincerely,] : Sincerely, [FreeTextEntry2] : Frank Britton MD

## 2021-04-01 ENCOUNTER — APPOINTMENT (OUTPATIENT)
Dept: UROLOGY | Facility: CLINIC | Age: 76
End: 2021-04-01
Payer: MEDICARE

## 2021-04-01 VITALS
TEMPERATURE: 97.7 F | HEIGHT: 73 IN | DIASTOLIC BLOOD PRESSURE: 76 MMHG | WEIGHT: 140 LBS | HEART RATE: 76 BPM | OXYGEN SATURATION: 97 % | SYSTOLIC BLOOD PRESSURE: 113 MMHG | RESPIRATION RATE: 15 BRPM | BODY MASS INDEX: 18.55 KG/M2

## 2021-04-01 PROCEDURE — 51702 INSERT TEMP BLADDER CATH: CPT

## 2021-04-27 ENCOUNTER — APPOINTMENT (OUTPATIENT)
Dept: NEUROLOGY | Facility: CLINIC | Age: 76
End: 2021-04-27
Payer: MEDICARE

## 2021-04-27 VITALS
HEIGHT: 73 IN | BODY MASS INDEX: 18.55 KG/M2 | SYSTOLIC BLOOD PRESSURE: 108 MMHG | DIASTOLIC BLOOD PRESSURE: 66 MMHG | HEART RATE: 76 BPM | WEIGHT: 140 LBS

## 2021-04-27 VITALS — TEMPERATURE: 97.8 F

## 2021-04-27 PROCEDURE — 99215 OFFICE O/P EST HI 40 MIN: CPT

## 2021-04-27 NOTE — DISCUSSION/SUMMARY
[FreeTextEntry1] : This is a 75-year-old right-handed male who presents with chief complaints of gait disorder.\par Per review of records it appears that MRI of the brain and spine has been unremarkable\par He is also had extensive work-up for neuropathy\par Patient has been experiencing difficulty with walking and had a fall in 2019.  He has also developed resting tremor bradykinesia and rigidity left worse than right.\par Denies any difficulty with swallowing, no changes in memory, no hallucinations, no constipation.  Does have a urinary catheter\par \par Impression- parkinsonism, cervical dystonia, left foot posturing\par \par Plan\par I had an extensive discussion regarding the possible diagnosis with the patient and also with his healthcare proxy Zamzam Hess (over the phone)\par Trial of Sinemet, 25/100.  Patient will start by taking half a tablet once a day for a week.  If he does not have any side effects will increase it to 1 tablet 3 times a day.  Side effects were discussed in detail\par Physical therapy\par Follow-up in 6 to 8 weeks\par All questions were addressed and answered\par

## 2021-04-27 NOTE — HISTORY OF PRESENT ILLNESS
[FreeTextEntry1] : This is a 75-year-old right-handed male who presents with chief complaints of gait disorder rule out Parkinson's.  He is seen in the movement disorders clinic.  He is accompanied by his aide.  History is obtained from the patient and from review of records\par \par Patient states that he was in his usual state of health till spring 2019 where he started feeling unsure of his position and becoming off balance.  He also noticed atrophy of his legs.  In August 2019 he slipped on a magazine and was down on the floor for about 30 hours thereafter he was in [facility] and went to rehab at [facility].  Thereafter he was at [facility].  Over the last year he has had hospitalizations for Spencer catheter infection.\par \par Currently patient is noticing tremors which are mostly in his left foot and right arm.  \par He reports stiffness mostly while getting up but once he is up he is able to ambulate with a walker\par Denies any difficulty swallowing at present he did have a PEG placed while he was hospitalized\par Memory is good no hallucinations\par He has anosmia\par Some drooling\par No constipation\par He is slow in daily activities\par Had increased urinary frequency in 2019 and now has a Spencer catheter\par He is noticing changes in the joints of the fingers of his left hand

## 2021-04-27 NOTE — PHYSICAL EXAM
[FreeTextEntry1] : On exam patient is awake and alert.  He is pleasant and cooperative with the exam\par Sitting in a wheelchair the slight tilt of the neck toward the right shoulder\par Resting tremors are noticed mostly in the left foot, grade 2 some tremors are also seen in the right hand and left hand\par Bilateral bradykinesia hand opening and closing 2 on the right, unable to do on the left because of joint deformity\par Rapid alternating movements 1 on the right 3 on the left\par Finger taps 1 on the right 2 on the left\par Leg agility 1 on the right 3 on the left there is also posturing and inversion of the left foot\par Tone is slightly increased at the left wrist\par Ocular movements are intact\par Deep tendon reflexes are equal and symmetric\par He declined to walk as he does not have his walker with him

## 2021-04-27 NOTE — DATA REVIEWED
[de-identified] : The patient had cervical and thoracic spine MRI which is unremarkable for any compressive disease. Historically he had MRI of the brain and was advised that it was unremarkable and there is no history of stroke mass lesion atrophy. There are several motility test and was advised that he has pharyngeal dysphagia and ultimately a PEG was inserted. He was also evaluated by urologist with diagnostic studies and has been on Spencer catheter. [de-identified] : Electrophysiologic studies by Dr. Sanchez at [facility] revealed distal sensory motor peripheral neuropathy which appears axonal in nature without any features of demyelination an EMG was unremarkable except for distal neuropathic changes without any evidence of myopathy. [de-identified] : There are several consultations reports including Dr. Lawrence we have summarized he's physical limitations without any clear evidence of diagnostic clarification.\par \par I also reviewed his lab tests and besides high sedimentation rate and CRP but no significant abnormalities recorded however I do not have the [facility] lab test reports and the patient promised to secure it from  .

## 2021-04-29 ENCOUNTER — APPOINTMENT (OUTPATIENT)
Dept: UROLOGY | Facility: CLINIC | Age: 76
End: 2021-04-29
Payer: MEDICARE

## 2021-04-29 VITALS
HEIGHT: 73 IN | OXYGEN SATURATION: 98 % | WEIGHT: 140 LBS | TEMPERATURE: 97.8 F | RESPIRATION RATE: 16 BRPM | HEART RATE: 77 BPM | SYSTOLIC BLOOD PRESSURE: 103 MMHG | BODY MASS INDEX: 18.55 KG/M2 | DIASTOLIC BLOOD PRESSURE: 65 MMHG

## 2021-04-29 PROCEDURE — 51702 INSERT TEMP BLADDER CATH: CPT

## 2021-05-04 ENCOUNTER — APPOINTMENT (OUTPATIENT)
Dept: NEUROLOGY | Facility: CLINIC | Age: 76
End: 2021-05-04

## 2021-05-06 ENCOUNTER — EMERGENCY (EMERGENCY)
Facility: HOSPITAL | Age: 76
LOS: 1 days | Discharge: HOME | End: 2021-05-06
Attending: PERSONAL EMERGENCY RESPONSE ATTENDANT
Payer: MEDICARE

## 2021-05-06 VITALS
HEART RATE: 83 BPM | HEIGHT: 73 IN | SYSTOLIC BLOOD PRESSURE: 115 MMHG | DIASTOLIC BLOOD PRESSURE: 67 MMHG | WEIGHT: 139.99 LBS | RESPIRATION RATE: 18 BRPM | TEMPERATURE: 98 F | OXYGEN SATURATION: 96 %

## 2021-05-06 PROCEDURE — 99283 EMERGENCY DEPT VISIT LOW MDM: CPT | Mod: GC

## 2021-05-06 PROCEDURE — 99283 EMERGENCY DEPT VISIT LOW MDM: CPT

## 2021-05-06 NOTE — ED PROVIDER NOTE - PHYSICAL EXAMINATION
General: well appearing   HEENT: neck supple, anicteric sclera  Cardiovascular: Normal s1, s2, RRR  Respiratory: CTA b/l   Abdominal: mildly firm, ntnd  Extremities: No swelling in LEs   Neurologic: Non focal  Psych: Awake, alert answering questions appropriately

## 2021-05-06 NOTE — ED PROVIDER NOTE - NSFOLLOWUPINSTRUCTIONS_ED_ALL_ED_FT
You have been seen and evaluated for fowler catheter issues. The issue was resolved with unclipping of the clamp.    Please make sure to follow up with your PCP regarding this visit.    Please make sure to return to the ED for the following symptoms which include but are not limited to fever, chills, burning with urination, urinary discharge, pain at fowler catheter site or any change in baseline that is concerning.

## 2021-05-06 NOTE — ED ADULT NURSE NOTE - OBJECTIVE STATEMENT
PT is a 75 year old male, BIBA for a clogged fowler.  Pt has chronic fowler for BPH and HTN.  Pt c/o abdominal pain related to retaining.  Upon exam, this RN took fowler out of the stat lock and urine began flowing.  Pt had over 500 come out of bladder.  Pt reports complete relief.

## 2021-05-06 NOTE — ED PROVIDER NOTE - ATTENDING CONTRIBUTION TO CARE
Attending MD Mcfarlane.  Agree with above.  Pt is a 74 yo male with pmhx of clogged fowler since last night, endorsing abdominal distention.  Pt's fowler noted to be clamped.  Stat lock unclamped and now yellow urine draining ~500 cc immediately with reduction in abdominal distention and resolution of pt's discomfort. Pt denies recent fevers/chills/n/v/d.  Fowler recently replaced by Dr. Louie (1 wk ago).  Abdomen soft, non-tender.  Pt counseled that fevers/chills/recurrent retention/distention/abd'l pain.  Pt verbalizes understanding of above return precautions and follow-up plan.

## 2021-05-06 NOTE — ED PROVIDER NOTE - CLINICAL SUMMARY MEDICAL DECISION MAKING FREE TEXT BOX
76 yo M with PMH peripheral neuropathy, chronic urinary retention due to BPH with indwelling fowler placement presents from the [facility] with decreased fowler output. PE remarkable for mild firmness. Symptoms resolved w/ unclamping of statlock from fowler. Patient afebrile, asymptomatic. Will dc w/ strict return precautions.

## 2021-05-06 NOTE — ED ADULT TRIAGE NOTE - PRO INTERPRETER NEED 2
English Ongoing management You presented with shortness of breath caused by an infection in your lung You presented with shortness of breath caused by an infection in your lung from a recent aspiration event. You completed 6 days of an IV antibiotic called zosyn. Your sputum cultures grew a bacteria called klebsiella which was sensitive to the antibiotic you were on. You were switched to another antibiotic that you can take by mouth at home. Please continue to take ciprofloxacin 750 mg by mouth twice a day and metronidazole ___ for one more day. If you start to have fevers, shortness of breath, or cough up blood, please return to the emergency room immediately. You have a history of chest pain that is likely associated with your lung cancer. For this pain, you take morphine 30 mg by mouth three times a day as well as oxycodone 10 mg every four hours as needed. When you first presented to the hospital, you appeared lethargic and there was an initial concern that your pain medicine was not being appropriately excreted from your body because of your acute kidney injury. Because of this concern, you were given narcan to reverse the effects of your pain medication. Palliative was consulted to help manage your pain while you were hospitalized. When you presented to the hospital You have a history of difficulty swallowing because of a left You have a history of pulmonary embolisms for which you require medicine that You have a history of lung cancer You presented with shortness of breath caused by an infection in your lung from a recent aspiration event.  Your sputum cultures grew a bacteria called klebsiella. During your hospitalization, you were treated with 6 days of an IV antibiotic called zosyn. You were switched to oral antibiotics called ciprofloxacin and metronidazole which are also effective against the bacteria in your lungs. Please continue to take ciprofloxacin 500 mg by mouth twice a day and metronidazole 500 mg by mouth every 8 hours for one more day. If you start to have fevers, shortness of breath, or cough up blood, please return to the emergency room immediately. You have a history of chest pain that is likely associated with your lung cancer. For this pain, you took morphine 30 mg by mouth three times a day as well as oxycodone 10 mg every four hours as needed. When you first presented to the hospital, you appeared lethargic and there was an initial concern that your pain medicine was not being appropriately excreted from your body because of your acute kidney injury. Because of this concern, you were given a medication called narcan to reverse the effects of your pain medication. Palliative Medicine was consulted to help manage your pain while you were hospitalized. Please continue taking oxycontin 30 mg twice a day and oxycodone IR 10 mg every 4 hours as needed. Please follow up with your Pain management physician, Dr. Dunne, within 1 week of hospital discharge to manage your pain medication. When you presented to the hospital you were found to have an acute injury to your kidneys as shown by a level in your blood called creatinine. Throughout your hospitalization, your medications that could be affected by your reduced kidney function were held, including your tenofovir. Your creatinine was monitored and showed improvement but your creatinine level is still not at your baseline. Please follow up with your outpatient primary care physician,  _____, within 1 week of hospital discharge to check another creatinine level. You have a history of difficulty swallowing because of a left vocal cord paralysis. Your history of dysphagia likely contributed to your aspiration event leading to your infection in your lung. During your hospitalization, Speech and Swallow were consulted who recommended an x-ray cinesophagram to examine your ability to swallow. You were started on a diet consisting of soft and pureed foods to reduce your risk of aspiration. Please continue to eat soft and pureed foods. Avoid crunchy and dry foods. Please follow up at the Speech and Swallow Clinic at the [facility]. You have a history of pulmonary embolisms for which you require medicine that helps to prevent clots called eliquis. Because you seemed lethargic and had a recent aspiration event when you first presented to the hospital, your oral medications were held. You were switched to an IV medication that thins your blood called heparin. As you became more alert, you were switched back to your home dose of eliquis. Please continue taking eliquis 5 mg by mouth twice a day. You have a history of lung cancer for which you are currently undergoing chemotherapy. In the setting of your acute infection, your chemotherapy medication, Abatnif, was held. Please follow up with your outpatient Oncologist, Dr. Sigala, within 1 week of your hospital discharge. You presented with shortness of breath caused by an infection in your lung from a recent aspiration event.  Your sputum cultures grew a bacteria called klebsiella. During your hospitalization, you were treated with 6 days of an IV antibiotic called zosyn. You were switched to and completed 2 days of oral antibiotics called ciprofloxacin and metronidazole which are also effective against the bacteria in your lungs. If you start to have fevers, shortness of breath, or cough up blood, please return to the emergency room immediately. You have a history of difficulty swallowing because of a left vocal cord paralysis. Your history of dysphagia likely contributed to your aspiration event leading to your infection in your lung. During your hospitalization, Speech and Swallow were consulted who recommended an x-ray cinesophagram to examine your ability to swallow. You were started on a diet consisting of soft and pureed foods to reduce your risk of aspiration. ENT was also consulted who did a laryngoscopy and did not recommend further treatment. Please continue to eat soft and pureed foods. Avoid crunchy and dry foods. Please follow up at the Speech and Swallow Clinic at the [facility]. Please follow up with your outpatient ENT physician, Dr. Prado, within 1-2 weeks of hospital discharge. You have a history of pulmonary embolisms for which you require medicine that helps to prevent clots called eliquis. Because you seemed lethargic and had a recent aspiration event when you first presented to the hospital, your oral medications were held. You were switched to an IV medication that thins your blood called heparin. As you became more alert, you were switched back to eliquis but the dose was reduced because of your poor kidney function. Please continue taking eliquis 2.5 mg by mouth twice a day. You have a history of chest pain that is likely associated with your lung cancer. For this pain, you took morphine 30 mg by mouth three times a day as well as oxycodone 10 mg every four hours as needed. When you first presented to the hospital, you appeared lethargic and there was an initial concern that your pain medicine was not being appropriately excreted from your body because of your acute kidney injury. Because of this concern, you were given a medication called narcan to reverse the effects of your pain medication. Palliative Medicine was consulted to help manage your pain while you were hospitalized. Please continue taking oxycontin 30 mg twice a day and oxycodone IR 10 mg every 4 hours as needed. Please follow up with your Pain management physician, Dr. Marquez, on November 14th, 2018 at 4 PM at the [facility]. When you presented to the hospital you were found to have an acute injury to your kidneys as shown by a level in your blood called creatinine. Throughout your hospitalization, your medications that could be affected by your reduced kidney function were held, including your tenofovir. Your creatinine was monitored and showed improvement but your creatinine level is still not at your baseline. Please follow up with your outpatient primary care physician, Dr. Love, within 1 week of hospital discharge to check another creatinine level. You have a history of lung cancer for which you are currently undergoing chemotherapy. In the setting of your acute infection and kidney injury, your chemotherapy medication, Afatinib, was held. Please do NOT take afatinib until you see your outpatient Oncologist. Please follow up with your outpatient Oncologist, Dr. Sigala, within 1 week of your hospital discharge.

## 2021-05-06 NOTE — ED PROVIDER NOTE - PATIENT PORTAL LINK FT
You can access the FollowMyHealth Patient Portal offered by [facility] by registering at the following website: [email or web address]. By joining [employer]’s FollowMyHealth portal, you will also be able to view your health information using other applications (apps) compatible with our system.

## 2021-05-06 NOTE — ED PROVIDER NOTE - OBJECTIVE STATEMENT
74 yo M with PMH peripheral neuropathy, chronic urinary retention due to BPH with indwelling fowler placement presents from the [facility] with decreased fowler output. Patient additionally complains of suprapub 74 yo M with PMH peripheral neuropathy, chronic urinary retention due to BPH with indwelling fowler placement presents from the [facility] with decreased fowler output. Patient additionally complains of suprapubic pain w/ no other complaints. Of note, fowler was changed x 1 week ago. Otherwise ROS negative.

## 2021-05-06 NOTE — ED ADULT TRIAGE NOTE - CHIEF COMPLAINT QUOTE
Patient sent from [facility] - fowler catheter not drainage, c/o abd pain   Patient states the catheter was changed 1 week ago

## 2021-05-06 NOTE — ED PROVIDER NOTE - PROGRESS NOTE DETAILS
Adarsh PGY-2: Upon examination, fowler catheter was clamped with stat lock. Upon release, fowler drained 500cc of urine. Patient endorsed resolution of abdominal discomfort, and abdominal exam was improved w/ belly soft, ntnd

## 2021-05-06 NOTE — ED PROVIDER NOTE - NS ED ROS FT
General: no fever, chills  HENT: no nasal congestion  Eyes: no visual changes, no blurred vision  Neck: no neck pain  CV: denies chest pain, no palpitations  Resp: no difficulty breathing, no cough  Abdominal: no nausea, no vomiting, no diarrhea, +abdominal pain  MSK: no muscle aches  Neuro: no headaches  Skin: no rashes

## 2021-05-09 ENCOUNTER — EMERGENCY (EMERGENCY)
Facility: HOSPITAL | Age: 76
LOS: 1 days | Discharge: HOME | End: 2021-05-09
Attending: STUDENT IN AN ORGANIZED HEALTH CARE EDUCATION/TRAINING PROGRAM
Payer: MEDICARE

## 2021-05-09 VITALS — HEIGHT: 73 IN | WEIGHT: 139.99 LBS

## 2021-05-09 LAB
ALBUMIN SERPL ELPH-MCNC: 3.9 G/DL — SIGNIFICANT CHANGE UP (ref 3.3–5)
ALP SERPL-CCNC: 112 U/L — SIGNIFICANT CHANGE UP (ref 40–120)
ALT FLD-CCNC: 8 U/L — LOW (ref 10–45)
ANION GAP SERPL CALC-SCNC: 13 MMOL/L — SIGNIFICANT CHANGE UP (ref 5–17)
AST SERPL-CCNC: 12 U/L — SIGNIFICANT CHANGE UP (ref 10–40)
BASOPHILS # BLD AUTO: 0.1 K/UL — SIGNIFICANT CHANGE UP (ref 0–0.2)
BASOPHILS NFR BLD AUTO: 0.8 % — SIGNIFICANT CHANGE UP (ref 0–2)
BILIRUB SERPL-MCNC: 0.2 MG/DL — SIGNIFICANT CHANGE UP (ref 0.2–1.2)
BLD GP AB SCN SERPL QL: NEGATIVE — SIGNIFICANT CHANGE UP
BUN SERPL-MCNC: 12 MG/DL — SIGNIFICANT CHANGE UP (ref 7–23)
CALCIUM SERPL-MCNC: 9 MG/DL — SIGNIFICANT CHANGE UP (ref 8.4–10.5)
CHLORIDE SERPL-SCNC: 104 MMOL/L — SIGNIFICANT CHANGE UP (ref 96–108)
CO2 SERPL-SCNC: 22 MMOL/L — SIGNIFICANT CHANGE UP (ref 22–31)
CREAT SERPL-MCNC: 0.81 MG/DL — SIGNIFICANT CHANGE UP (ref 0.5–1.3)
EOSINOPHIL # BLD AUTO: 0.22 K/UL — SIGNIFICANT CHANGE UP (ref 0–0.5)
EOSINOPHIL NFR BLD AUTO: 1.7 % — SIGNIFICANT CHANGE UP (ref 0–6)
GLUCOSE SERPL-MCNC: 106 MG/DL — HIGH (ref 70–99)
HCT VFR BLD CALC: 38 % — LOW (ref 39–50)
HGB BLD-MCNC: 12.5 G/DL — LOW (ref 13–17)
IMM GRANULOCYTES NFR BLD AUTO: 0.6 % — SIGNIFICANT CHANGE UP (ref 0–1.5)
LYMPHOCYTES # BLD AUTO: 0.98 K/UL — LOW (ref 1–3.3)
LYMPHOCYTES # BLD AUTO: 7.6 % — LOW (ref 13–44)
MCHC RBC-ENTMCNC: 31.9 PG — SIGNIFICANT CHANGE UP (ref 27–34)
MCHC RBC-ENTMCNC: 32.9 GM/DL — SIGNIFICANT CHANGE UP (ref 32–36)
MCV RBC AUTO: 96.9 FL — SIGNIFICANT CHANGE UP (ref 80–100)
MONOCYTES # BLD AUTO: 0.96 K/UL — HIGH (ref 0–0.9)
MONOCYTES NFR BLD AUTO: 7.4 % — SIGNIFICANT CHANGE UP (ref 2–14)
NEUTROPHILS # BLD AUTO: 10.61 K/UL — HIGH (ref 1.8–7.4)
NEUTROPHILS NFR BLD AUTO: 81.9 % — HIGH (ref 43–77)
NRBC # BLD: 0 /100 WBCS — SIGNIFICANT CHANGE UP (ref 0–0)
OB PNL STL: POSITIVE
PLATELET # BLD AUTO: 231 K/UL — SIGNIFICANT CHANGE UP (ref 150–400)
POTASSIUM SERPL-MCNC: 3.8 MMOL/L — SIGNIFICANT CHANGE UP (ref 3.5–5.3)
POTASSIUM SERPL-SCNC: 3.8 MMOL/L — SIGNIFICANT CHANGE UP (ref 3.5–5.3)
PROT SERPL-MCNC: 7 G/DL — SIGNIFICANT CHANGE UP (ref 6–8.3)
RBC # BLD: 3.92 M/UL — LOW (ref 4.2–5.8)
RBC # FLD: 12.1 % — SIGNIFICANT CHANGE UP (ref 10.3–14.5)
RH IG SCN BLD-IMP: POSITIVE — SIGNIFICANT CHANGE UP
SODIUM SERPL-SCNC: 139 MMOL/L — SIGNIFICANT CHANGE UP (ref 135–145)
WBC # BLD: 12.95 K/UL — HIGH (ref 3.8–10.5)
WBC # FLD AUTO: 12.95 K/UL — HIGH (ref 3.8–10.5)

## 2021-05-09 PROCEDURE — 99284 EMERGENCY DEPT VISIT MOD MDM: CPT | Mod: GC

## 2021-05-09 NOTE — ED PROVIDER NOTE - NSFOLLOWUPINSTRUCTIONS_ED_ALL_ED_FT
Please follow up with your primary care doctor over the next 2-3 days for further management of your symptoms and to review your bloodwork to ensure no additional tests, imaging or bloodwork, need to be performed.    Please see the urologist, we are treating you for a urinary tract infection.     We evaluated you for bleeding today. We checked your hemoglobin and you do not need a blood transfusion at this time. Your situation can change quickly. If you develop bleeding that you are soaking through underwear, if you develop lightheadedness/dizziness/feeling like you will pass out, chest pain, shortness of breath please return to the emergency room. We recommend that you make an appointment with your gastroenterologist for repeat evaluation and to ensure the symptoms are improving.

## 2021-05-09 NOTE — ED PROVIDER NOTE - ATTENDING CONTRIBUTION TO CARE
I performed a history and physical exam of the patient and discussed their management with the resident. I reviewed the resident's note and agree with the documented findings and plan of care. My medical decision making and observations are found above.     74 yo M with PMH peripheral neuropathy, chronic urinary retention due to BPH with indwelling fowler placement presents from the [facility] with "abscess on buttock." Per EMS, patient was found to have large amount of blood under his buttock thought to be coming from ulcer wound. Patient reports chronic sacral/buttock pain that is unchanged. denies other complaints. NO fevers/chills/cp, sob, difficulty voiding. On exam, chronically ill apopearing/cachectic. cv rrr no m/r/g, lungs ctabl no resp distress abd, soft, ntnd. no rigidity/distension. +sacral/decub ulcers macerated with mild oozing blood but not obvious infected. Rectal exam without obvious blood/hemorrhaging. will f/u on imaging, labs to assess for internal abscess. not on AC. Will also workup for gi bleed given no obvious source of large bleeding source. reassess pending workup.

## 2021-05-09 NOTE — ED ADULT NURSE NOTE - NSIMPLEMENTINTERV_GEN_ALL_ED
Implemented All Fall Risk Interventions:  [place] to call system. Call bell, personal items and telephone within reach. Instruct patient to call for assistance. Room bathroom lighting operational. Non-slip footwear when patient is off stretcher. Physically safe environment: no spills, clutter or unnecessary equipment. Stretcher in lowest position, wheels locked, appropriate side rails in place. Provide visual cue, wrist band, yellow gown, etc. Monitor gait and stability. Monitor for mental status changes and reorient to person, place, and time. Review medications for side effects contributing to fall risk. Reinforce activity limits and safety measures with patient and family.

## 2021-05-09 NOTE — ED ADULT NURSE NOTE - OBJECTIVE STATEMENT
76 y/o male coming in via EMS from assisted living facility. AOx4, ambulates with walker and assistance, PMH peripheral neuropathy, BPH. Pt. reports he has had an ulcer in the past on his left buttock that was mostly healed. At assisted living, patient has been going through physical therapy due to new unknown condition that makes it difficult for the patient to walk. While at PT today, patient believes the wound was opened and began to bleed bright red blood profusely. EMS reports significant amount of blood on floor at scene. Upon arrival to [facility], red and irritated area noted to left buttock. No active bleeding noted, but small amount of dried blood noted to left upper thigh and left buttock area. Soreness noted upon palpation. Pt. denies any other complaints. Denies chest pain, SOB, weakness, abdominal pain, N/V/D. Spencer catheter in place due to BPH. 18G LFA. VSS. Will continue to reassess.

## 2021-05-09 NOTE — ED PROVIDER NOTE - CARE PLAN
Principal Discharge DX:	Urinary tract infection without hematuria, site unspecified  Secondary Diagnosis:	Gastrointestinal hemorrhage, unspecified gastrointestinal hemorrhage type

## 2021-05-09 NOTE — ED PROVIDER NOTE - CLINICAL SUMMARY MEDICAL DECISION MAKING FREE TEXT BOX
75M presenting w bleeding from possible sacral rash PE: VSS, soft nontender abd, no eryn melena, +sacral rash Ddx: Some concern for GI bleed. Pt unlikely bleeding from rash given appearance. Plan: Labs, occult, reassess. TERRENCE Flores PGY2

## 2021-05-09 NOTE — ED PROVIDER NOTE - PATIENT PORTAL LINK FT
You can access the FollowMyHealth Patient Portal offered by [facility] by registering at the following website: [email or web address]. By joining [employer]’s FollowMyHealth portal, you will also be able to view your health information using other applications (apps) compatible with our system.

## 2021-05-09 NOTE — ED PROVIDER NOTE - NS ED ROS FT
CONST: no fevers, no chills, no trauma  EYES: no pain, no blurry vision   ENT: no sore throat, no epistaxis, no rhinorrhea  CV: no chest pain, no palpitations, no orthopnea, no extremity pain or swelling  RESP: no shortness of breath, no cough, no sputum, no pleurisy, no wheezing  ABD: no abdominal pain, no nausea, no vomiting, no diarrhea, no black or bloody stool  : no dysuria, no hematuria, no frequency, no urgency  MSK: no back pain, no neck pain, no extremity pain  NEURO: no headache, no sensory disturbances, no focal weakness, no dizziness  HEME: + bleeding or bruising  SKIN: no diaphoresis, no rash

## 2021-05-09 NOTE — ED PROVIDER NOTE - NSFOLLOWUPCLINICS_GEN_ALL_ED_FT
[facility] Gastroenterology  Gastroenterology  [address] [place] [id number]  Phone: [phone]  Fax:   Follow Up Time: 4-6 Days

## 2021-05-09 NOTE — ED PROVIDER NOTE - PHYSICAL EXAMINATION
Const: Well-nourished, Well-developed, appearing stated age.  Eyes: no conjunctival injection, and symmetrical lids.  HEENT: Head NCAT, no lesions. Atraumatic external nose and ears. Moist MM.  Neck: Symmetric, trachea midline.   CVS: +S1/S2, Peripheral pulses 2+ and equal in all extremities.  RESP: Unlabored respiratory effort. Clear to auscultation bilaterally.  GI: Nontender/Nondistended, No CVA tenderness b/l.   MSK: Normocephalic/Atraumatic, Lower Extremities w/o calf tenderness or edema b/l.   Skin: +erythematous, scaling skin on the sacrum.   Neuro: CNs II-XII grossly intact. Motor & Sensation grossly intact.  Psych: Awake, Alert, & Oriented (AAO) x3. Appropriate mood and affect.    Rectal exam w SHAYAN Lynn: no hemorrhoids, no eryn melena

## 2021-05-09 NOTE — ED PROVIDER NOTE - OBJECTIVE STATEMENT
75M with pmhx of movement disorder/neuropathy presenting with CC of bleeding from possible rectal rash. Pt coming from an assisted living facility. Per patient and EMS report - large amount of blood from the floors. Patient is complaining of buttock pain. No recent f/c, n/v. Not on AC. No history of known diverticulosis.

## 2021-05-09 NOTE — ED PROVIDER NOTE - PROGRESS NOTE DETAILS
ap- spoke w/ pt rectal temp 99.5, he has pressure wound on the rectum; concern for possible intraabdominal infection, phone call to [facility], reports pt w/ hx of difficulty fowler catheter placement; agrees w/ ct scan to assess for possible intraabodminal infection, as a result, will send urine from catheter bag, and likely dc home if unremarkable. ap- pt informed of results, pt refused fowler catheter placement, states that he will follow up with Dr. Louie, pt had prior catheter replacement on 4/30, he reports that he understands that he could have worsening infection, pt states he has had prior c dif and doesn't want levoquin, pt agreeable w/ bactrim; pt is nontoxic appearing, to have urine culture; ap- spoke w/ [facility], aware of plan of care, need to follow up with GI and urology.

## 2021-05-10 VITALS
DIASTOLIC BLOOD PRESSURE: 61 MMHG | TEMPERATURE: 98 F | SYSTOLIC BLOOD PRESSURE: 106 MMHG | HEART RATE: 75 BPM | RESPIRATION RATE: 17 BRPM | OXYGEN SATURATION: 97 %

## 2021-05-10 LAB
APPEARANCE UR: ABNORMAL
BACTERIA # UR AUTO: ABNORMAL
BILIRUB UR-MCNC: NEGATIVE — SIGNIFICANT CHANGE UP
COLOR SPEC: SIGNIFICANT CHANGE UP
DIFF PNL FLD: ABNORMAL
EPI CELLS # UR: 0 /HPF — SIGNIFICANT CHANGE UP
GLUCOSE UR QL: NEGATIVE — SIGNIFICANT CHANGE UP
HCT VFR BLD CALC: 34.6 % — LOW (ref 39–50)
HGB BLD-MCNC: 11.3 G/DL — LOW (ref 13–17)
HYALINE CASTS # UR AUTO: 1 /LPF — SIGNIFICANT CHANGE UP (ref 0–2)
KETONES UR-MCNC: NEGATIVE — SIGNIFICANT CHANGE UP
LEUKOCYTE ESTERASE UR-ACNC: ABNORMAL
MCHC RBC-ENTMCNC: 31.7 PG — SIGNIFICANT CHANGE UP (ref 27–34)
MCHC RBC-ENTMCNC: 32.7 GM/DL — SIGNIFICANT CHANGE UP (ref 32–36)
MCV RBC AUTO: 97.2 FL — SIGNIFICANT CHANGE UP (ref 80–100)
NITRITE UR-MCNC: NEGATIVE — SIGNIFICANT CHANGE UP
NRBC # BLD: 0 /100 WBCS — SIGNIFICANT CHANGE UP (ref 0–0)
PH UR: 7 — SIGNIFICANT CHANGE UP (ref 5–8)
PLATELET # BLD AUTO: 222 K/UL — SIGNIFICANT CHANGE UP (ref 150–400)
PROT UR-MCNC: ABNORMAL
RBC # BLD: 3.56 M/UL — LOW (ref 4.2–5.8)
RBC # FLD: 12.1 % — SIGNIFICANT CHANGE UP (ref 10.3–14.5)
RBC CASTS # UR COMP ASSIST: 6 /HPF — HIGH (ref 0–4)
SP GR SPEC: 1.01 — SIGNIFICANT CHANGE UP (ref 1.01–1.02)
UROBILINOGEN FLD QL: NEGATIVE — SIGNIFICANT CHANGE UP
WBC # BLD: 11.04 K/UL — HIGH (ref 3.8–10.5)
WBC # FLD AUTO: 11.04 K/UL — HIGH (ref 3.8–10.5)
WBC UR QL: 197 /HPF — HIGH (ref 0–5)

## 2021-05-10 PROCEDURE — 85025 COMPLETE CBC W/AUTO DIFF WBC: CPT

## 2021-05-10 PROCEDURE — 86850 RBC ANTIBODY SCREEN: CPT

## 2021-05-10 PROCEDURE — 85027 COMPLETE CBC AUTOMATED: CPT

## 2021-05-10 PROCEDURE — 86900 BLOOD TYPING SEROLOGIC ABO: CPT

## 2021-05-10 PROCEDURE — 86901 BLOOD TYPING SEROLOGIC RH(D): CPT

## 2021-05-10 PROCEDURE — 81001 URINALYSIS AUTO W/SCOPE: CPT

## 2021-05-10 PROCEDURE — 74177 CT ABD & PELVIS W/CONTRAST: CPT | Mod: 26,MG

## 2021-05-10 PROCEDURE — G1004: CPT

## 2021-05-10 PROCEDURE — 74177 CT ABD & PELVIS W/CONTRAST: CPT

## 2021-05-10 PROCEDURE — 80053 COMPREHEN METABOLIC PANEL: CPT

## 2021-05-10 PROCEDURE — 87086 URINE CULTURE/COLONY COUNT: CPT

## 2021-05-10 PROCEDURE — 82272 OCCULT BLD FECES 1-3 TESTS: CPT

## 2021-05-10 PROCEDURE — 99284 EMERGENCY DEPT VISIT MOD MDM: CPT | Mod: 25

## 2021-05-10 RX ORDER — AZTREONAM 2 G
1 VIAL (EA) INJECTION
Qty: 14 | Refills: 0
Start: 2021-05-10 | End: 2021-05-16

## 2021-05-10 RX ORDER — CEFPODOXIME PROXETIL 100 MG
200 TABLET ORAL ONCE
Refills: 0 | Status: DISCONTINUED | OUTPATIENT
Start: 2021-05-10 | End: 2021-05-10

## 2021-05-10 RX ORDER — AZTREONAM 2 G
1 VIAL (EA) INJECTION
Qty: 14 | Refills: 0
Start: 2021-05-10 | End: 2022-07-06

## 2021-05-10 RX ADMIN — Medication 1 TABLET(S): at 02:30

## 2021-05-10 NOTE — ED ADULT NURSE REASSESSMENT NOTE - NS ED NURSE REASSESS COMMENT FT1
Patient resting comfortably in bed, on phone with family. No complaints at this time. Reoriented to call bell, call bell on top of patient within reach. Safety and comfort provided.

## 2021-05-10 NOTE — ED ADULT NURSE REASSESSMENT NOTE - NS ED NURSE REASSESS COMMENT FT1
Report received from WISAM valles at 0655 Pt AAOx3, NAD, resp nonlabored, skin warm/dry, resting comfortably in bed . Pt denies headache, dizziness, chest pain, palpitations, SOB, abd pain, n/v/d, urinary symptoms, fevers, chills, weakness at this time. Pt awaiting for ambulette . Safety maintained with call bell within reach.

## 2021-05-11 LAB
CULTURE RESULTS: SIGNIFICANT CHANGE UP
SPECIMEN SOURCE: SIGNIFICANT CHANGE UP

## 2021-05-12 NOTE — ED POST DISCHARGE NOTE - DETAILS
LVM for pt to return call to ED - Lilia Pelletier PA-C -Wayne Jett PA-C 5/14/21: [facility] with admin # for callback. call #3. will send letter - Uriah Morgan PA-C

## 2021-05-14 ENCOUNTER — APPOINTMENT (OUTPATIENT)
Dept: UROLOGY | Facility: CLINIC | Age: 76
End: 2021-05-14

## 2021-06-03 ENCOUNTER — APPOINTMENT (OUTPATIENT)
Dept: UROLOGY | Facility: CLINIC | Age: 76
End: 2021-06-03
Payer: MEDICARE

## 2021-06-03 VITALS — TEMPERATURE: 98.3 F

## 2021-06-03 PROCEDURE — 51703 INSERT BLADDER CATH COMPLEX: CPT

## 2021-06-04 NOTE — ED PROVIDER NOTE - NS_EDPROVIDERDISPOUSERTYPE_ED_A_ED
Call primary care provider for follow up after discharge/Activities as tolerated/Low salt diet/Monitor weight daily/Report signs and symptoms to primary care provider Attending Attestation (For Attendings USE Only)...

## 2021-07-01 ENCOUNTER — APPOINTMENT (OUTPATIENT)
Dept: UROLOGY | Facility: CLINIC | Age: 76
End: 2021-07-01
Payer: MEDICARE

## 2021-07-01 VITALS
RESPIRATION RATE: 16 BRPM | WEIGHT: 140 LBS | HEART RATE: 64 BPM | DIASTOLIC BLOOD PRESSURE: 68 MMHG | SYSTOLIC BLOOD PRESSURE: 108 MMHG | HEIGHT: 73 IN | TEMPERATURE: 98.3 F | BODY MASS INDEX: 18.55 KG/M2 | OXYGEN SATURATION: 97 %

## 2021-07-01 PROCEDURE — 51703 INSERT BLADDER CATH COMPLEX: CPT

## 2021-07-06 ENCOUNTER — APPOINTMENT (OUTPATIENT)
Dept: NEUROLOGY | Facility: CLINIC | Age: 76
End: 2021-07-06

## 2021-07-08 LAB
APPEARANCE: CLEAR
BACTERIA UR CULT: NORMAL
BACTERIA: NEGATIVE
BILIRUBIN URINE: NEGATIVE
BLOOD URINE: ABNORMAL
CALCIUM OXALATE CRYSTALS: ABNORMAL
COLOR: NORMAL
GLUCOSE QUALITATIVE U: NEGATIVE
HYALINE CASTS: 17 /LPF
KETONES URINE: NEGATIVE
LEUKOCYTE ESTERASE URINE: ABNORMAL
MICROSCOPIC-UA: NORMAL
NITRITE URINE: POSITIVE
PH URINE: 7
PROTEIN URINE: ABNORMAL
RED BLOOD CELLS URINE: 12 /HPF
SPECIFIC GRAVITY URINE: >=1.03
SQUAMOUS EPITHELIAL CELLS: 1 /HPF
UROBILINOGEN URINE: NORMAL
WHITE BLOOD CELLS URINE: 50 /HPF

## 2021-07-15 ENCOUNTER — APPOINTMENT (OUTPATIENT)
Dept: PHYSICAL MEDICINE AND REHAB | Facility: CLINIC | Age: 76
End: 2021-07-15
Payer: MEDICARE

## 2021-07-15 VITALS
SYSTOLIC BLOOD PRESSURE: 110 MMHG | DIASTOLIC BLOOD PRESSURE: 67 MMHG | HEART RATE: 75 BPM | TEMPERATURE: 97.2 F | OXYGEN SATURATION: 98 %

## 2021-07-15 PROCEDURE — 99214 OFFICE O/P EST MOD 30 MIN: CPT | Mod: GC

## 2021-07-15 NOTE — PHYSICAL EXAM
[FreeTextEntry1] : General: Well-developed male in no apparent distress. Patient is awake, alert, and cooperative with examination. Patient follows two-step commands.\par Extremities: Minimal pedal edema.\par \par Motor:\par Both upper extremities: Mild rigidity, resting tremor noted. Active range of motion within functional limits except\par [place] neck deformity of L 3rd and 4th digit, can only flex PIP joints to neutral position. 5/5 motor power\par \par Both lower extremities: Rigidity noted. Hip flexion/knee extension 4+-5/5 motor power. Right ankle 5/5 motor power\par Left ankle dorsiflexion 4+/5 motor power, ankle inversion/eversion 4+/5 motor power, ankle plantarflexion 5/5 motor power.\par Able to passively everted left ankle to approximately 5°\par Able to passively dorsiflexed ankle to just neutral with the knee flexed, 5-10° plantarflexion with the knee extended\par L ankle posturing in equinovarus\par \par Sensory: Intact to light touch and pinprick and both lower extremities\par \par Functional status: Patient ambulated with rolling walker with minimum assistance. Patient noted with left toe strike and foot inversion. Patient maintains his left knee in some knee flexion during stance phase. Toe drag does interfere with advancing left lower extremity\par \par Gait: short strides, toe striking of L foot

## 2021-07-15 NOTE — ASSESSMENT
[FreeTextEntry1] : Patient is a 76-year-old male history of Parkinson's disease with left equinovarus/foot drop posturing with resultant gait deviations noted above. Patient requires a custom molded AFO to prevent foot drop, equinovarus and for safe ambulation. Patient will require the AFO on a permanent basis. Prescription provided for a left custom molded AFO with [place] ankle joints, varus control, adjustable posterior stop, instep strap, full footplate, flexible toe. Prescription provided for a left Multi-Podus boots to be worn in bed for positioning. Recommended to patient and daughter that he should followup with the movement disorder neurology service and initiate his trial of Sinemet. Patient to continue outpatient physical therapy for gait training with his new AFO, see RX.\par \par I spent a total of 30 minutes on the date of the encounter evaluating and treating the patient including a discussion of treatment options. intraluminal device

## 2021-07-15 NOTE — REVIEW OF SYSTEMS
[Joint Stiffness] : joint stiffness [Muscle Weakness] : muscle weakness [Difficulty Walking] : difficulty walking [Negative] : Genitourinary [Fever] : no fever [Joint Pain] : no joint pain

## 2021-07-22 DIAGNOSIS — M20.039 SWAN-NECK DEFORMITY OF UNSPECIFIED FINGER(S): ICD-10-CM

## 2021-07-29 ENCOUNTER — APPOINTMENT (OUTPATIENT)
Dept: UROLOGY | Facility: CLINIC | Age: 76
End: 2021-07-29
Payer: MEDICARE

## 2021-07-29 VITALS
OXYGEN SATURATION: 95 % | DIASTOLIC BLOOD PRESSURE: 65 MMHG | WEIGHT: 140 LBS | TEMPERATURE: 98.2 F | SYSTOLIC BLOOD PRESSURE: 104 MMHG | HEIGHT: 73 IN | RESPIRATION RATE: 15 BRPM | HEART RATE: 75 BPM | BODY MASS INDEX: 18.55 KG/M2

## 2021-07-29 PROCEDURE — 51703 INSERT BLADDER CATH COMPLEX: CPT

## 2021-08-02 ENCOUNTER — NON-APPOINTMENT (OUTPATIENT)
Age: 76
End: 2021-08-02

## 2021-08-02 LAB
APPEARANCE: ABNORMAL
BACTERIA UR CULT: NORMAL
BACTERIA: ABNORMAL
BILIRUBIN URINE: NEGATIVE
BLOOD URINE: ABNORMAL
COLOR: YELLOW
GLUCOSE QUALITATIVE U: NEGATIVE
HYALINE CASTS: 0 /LPF
KETONES URINE: NEGATIVE
LEUKOCYTE ESTERASE URINE: ABNORMAL
MICROSCOPIC-UA: NORMAL
NITRITE URINE: POSITIVE
PH URINE: 7.5
PROTEIN URINE: ABNORMAL
RED BLOOD CELLS URINE: 125 /HPF
SPECIFIC GRAVITY URINE: 1.02
SQUAMOUS EPITHELIAL CELLS: 7 /HPF
URINE COMMENTS: NORMAL
UROBILINOGEN URINE: NORMAL
WHITE BLOOD CELLS URINE: 56 /HPF

## 2021-08-05 ENCOUNTER — NON-APPOINTMENT (OUTPATIENT)
Age: 76
End: 2021-08-05

## 2021-08-05 ENCOUNTER — APPOINTMENT (OUTPATIENT)
Dept: NEUROLOGY | Facility: CLINIC | Age: 76
End: 2021-08-05

## 2021-08-10 ENCOUNTER — NON-APPOINTMENT (OUTPATIENT)
Age: 76
End: 2021-08-10

## 2021-08-19 ENCOUNTER — APPOINTMENT (OUTPATIENT)
Dept: PHYSICAL MEDICINE AND REHAB | Facility: CLINIC | Age: 76
End: 2021-08-19
Payer: MEDICARE

## 2021-08-19 VITALS — SYSTOLIC BLOOD PRESSURE: 101 MMHG | HEART RATE: 75 BPM | OXYGEN SATURATION: 96 % | DIASTOLIC BLOOD PRESSURE: 64 MMHG

## 2021-08-19 PROCEDURE — 95874 GUIDE NERV DESTR NEEDLE EMG: CPT

## 2021-08-19 PROCEDURE — 64642 CHEMODENERV 1 EXTREMITY 1-4: CPT

## 2021-08-26 ENCOUNTER — APPOINTMENT (OUTPATIENT)
Dept: UROLOGY | Facility: CLINIC | Age: 76
End: 2021-08-26
Payer: MEDICARE

## 2021-08-26 VITALS
HEART RATE: 79 BPM | HEIGHT: 73 IN | WEIGHT: 140 LBS | BODY MASS INDEX: 18.55 KG/M2 | DIASTOLIC BLOOD PRESSURE: 65 MMHG | SYSTOLIC BLOOD PRESSURE: 101 MMHG | OXYGEN SATURATION: 96 % | RESPIRATION RATE: 15 BRPM | TEMPERATURE: 97.8 F

## 2021-08-26 PROCEDURE — 51703 INSERT BLADDER CATH COMPLEX: CPT

## 2021-08-27 LAB
APPEARANCE: ABNORMAL
BACTERIA: ABNORMAL
BILIRUBIN URINE: NEGATIVE
BLOOD URINE: ABNORMAL
COLOR: YELLOW
GLUCOSE QUALITATIVE U: NEGATIVE
HYALINE CASTS: 0 /LPF
KETONES URINE: NEGATIVE
LEUKOCYTE ESTERASE URINE: ABNORMAL
MICROSCOPIC-UA: NORMAL
NITRITE URINE: POSITIVE
PH URINE: 7.5
PROTEIN URINE: ABNORMAL
RED BLOOD CELLS URINE: 53 /HPF
SPECIFIC GRAVITY URINE: 1.02
SQUAMOUS EPITHELIAL CELLS: 1 /HPF
URINE COMMENTS: NORMAL
UROBILINOGEN URINE: NORMAL
WHITE BLOOD CELLS URINE: 496 /HPF

## 2021-08-31 ENCOUNTER — NON-APPOINTMENT (OUTPATIENT)
Age: 76
End: 2021-08-31

## 2021-08-31 LAB — BACTERIA UR CULT: NORMAL

## 2021-09-03 ENCOUNTER — APPOINTMENT (OUTPATIENT)
Dept: NEUROLOGY | Facility: CLINIC | Age: 76
End: 2021-09-03
Payer: MEDICARE

## 2021-09-03 VITALS
BODY MASS INDEX: 18.82 KG/M2 | SYSTOLIC BLOOD PRESSURE: 109 MMHG | DIASTOLIC BLOOD PRESSURE: 61 MMHG | HEIGHT: 73 IN | WEIGHT: 142 LBS | HEART RATE: 81 BPM

## 2021-09-03 PROCEDURE — 99215 OFFICE O/P EST HI 40 MIN: CPT

## 2021-09-03 NOTE — PHYSICAL EXAM
[Person] : oriented to person [Place] : oriented to place [Time] : oriented to time [Short Term Intact] : short term memory intact [Naming Objects] : no difficulty naming common objects [Fluency] : fluency intact [Cranial Nerves Optic (II)] : visual acuity intact bilaterally,  visual fields full to confrontation, pupils equal round and reactive to light [Cranial Nerves Oculomotor (III)] : extraocular motion intact [Cranial Nerves Trigeminal (V)] : facial sensation intact symmetrically [Cranial Nerves Facial (VII)] : face symmetrical [Cranial Nerves Vestibulocochlear (VIII)] : hearing was intact bilaterally [Motor Strength] : muscle strength was normal in all four extremities [Motor Handedness Right-Handed] : the patient is right hand dominant [FreeTextEntry1] : Facial expression reduced, voice ok. EOMI, no SWJ. \par Tone markedly increased in neck, less in extremities, but worse on left. Bilateral bradykinesia hand opening and closing 2 on the right, unable to do on the left because of joint deformity\par Rapid alternating movements 1 on the right 3 on the left. Finger taps 1 on the right 2 on the left\par Leg agility 1 on the right 3 on the left there is also posturing and inversion of the left foot. Needed help top stand up, retropulsion. \par Right hand resting tremor, and rare left leg resting tremor. \par

## 2021-09-03 NOTE — HISTORY OF PRESENT ILLNESS
[FreeTextEntry1] : This is a 76-year-old right-handed male who presents with chief complaints of gait disorder rule out Parkinson's. He is accompanied by his aide.  History is obtained from the patient and from review of records\par \par Patient states that he was in his usual state of health till spring 2019 where he started feeling unsure of his position and becoming off balance. He also noticed atrophy of his legs.  In August 2019 he slipped on a magazine and was down on the floor for about 30 hours thereafter he was in [facility] for rhabdomyolisis. and went to rehab at [facility].  Thereafter he was at [facility]. Over the last year he has had hospitalizations for Spencer catheter infection. 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 Paraneoplastic panel was negative. Had Botox recently for  left foot dystonia\par \par Currently patient is noticing tremors which are mostly in his left foot and right arm, for about past year. Speech ok, no face changes, no drooling or dysphagia. Handwriting smaller. Some trouble turning over. Needs help with dressing. Can use utensils. No falls since 2019. Walks with walker\par \par Memory ok, no depression or anxiety. On chronic diazepam, given by PMD. No hallucinations. No orthostasis. No constipation. Never tried levodopa. No Fhx

## 2021-09-03 NOTE — DISCUSSION/SUMMARY
[FreeTextEntry1] : This is a 76-year-old right-handed male who presents with chief complaints of gait disorder. Per review of records it appears that MRI of the brain and spine has been unremarkable He is also had extensive work-up for neuropathy\par \par Overall, I agree that the history and examination is most consistent with at least in part PD. I did not find any findings that would point to a specific atypical parkinsonian disorder. He also has left foot dystonia, which can be seen in PD. I had an extensive discussion regarding the possible diagnosis with the patient and also with his healthcare proxy Zamzam Hess (over the phone)\par \par I reiterated the previous recommendation that LD needs to be tried, as it has the highest chance of providing relief. \par

## 2021-09-03 NOTE — DATA REVIEWED
[de-identified] : Electrophysiologic studies by Dr. Sanchez at [facility] revealed distal sensory motor peripheral neuropathy which appears axonal in nature without any features of demyelination an EMG was unremarkable except for distal neuropathic changes without any evidence of myopathy. [de-identified] : The patient had cervical and thoracic spine MRI which is unremarkable for any compressive disease. Historically he had MRI of the brain and was advised that it was unremarkable and there is no history of stroke mass lesion atrophy. There are several motility test and was advised that he has pharyngeal dysphagia and ultimately a PEG was inserted. He was also evaluated by urologist with diagnostic studies and has been on Spencer catheter. [de-identified] : There are several consultations reports including Dr. Lawrence we have summarized he's physical limitations without any clear evidence of diagnostic clarification.\par \par I also reviewed his lab tests and besides high sedimentation rate and CRP but no significant abnormalities recorded however I do not have the [facility] lab test reports and the patient promised to secure it from  .

## 2021-09-23 ENCOUNTER — APPOINTMENT (OUTPATIENT)
Dept: PHYSICAL MEDICINE AND REHAB | Facility: CLINIC | Age: 76
End: 2021-09-23
Payer: MEDICARE

## 2021-09-23 VITALS
OXYGEN SATURATION: 98 % | DIASTOLIC BLOOD PRESSURE: 63 MMHG | HEART RATE: 75 BPM | TEMPERATURE: 97 F | SYSTOLIC BLOOD PRESSURE: 97 MMHG

## 2021-09-23 PROCEDURE — 99213 OFFICE O/P EST LOW 20 MIN: CPT

## 2021-09-23 NOTE — HISTORY OF PRESENT ILLNESS
[FreeTextEntry1] : Patient is a 76-year-old male history of Parkinson's disease, BPH with chronic indwelling Spencer who was last seen for a Botox injection on August 19, 2021. Patient reports that his left ankle is looser since the Botox injection, no pain complaints, no hygiene issues. Patient's been receiving PT and OT and currently ambulates with a walker with assistance in therapy. No falls reported.

## 2021-09-23 NOTE — ASSESSMENT
[FreeTextEntry1] : Patient is a 76-year-old male history of Parkinson's disease with left equinovarus/foot drop posturing 2/2 focal dystonia with excellent tone reduction noted post Botox injection. With the reduced tone, patient can move forward with his custom molded AFO to prevent foot drop, equinovarus and for safe ambulation. Patient will require the AFO on a permanent basis. Prescription provided for a left custom molded AFO with [place] ankle joints, varus control, adjustable posterior stop, instep strap, full footplate, flexible toe. Prescription provided for a left Multi-Podus boots to be worn in bed for positioning. Patient to continue outpatient physical therapy for gait training with his new AFO, see RX. Will continue the Botox injection protocol of August 19, 2021 (3 vials).\par \par I spent a total of 20 minutes on the date of the encounter evaluating and treating the patient including a discussion of treatment options.

## 2021-09-23 NOTE — PHYSICAL EXAM
[FreeTextEntry1] : General: Well-developed male in no apparent distress. Patient is awake, alert, and cooperative with examination. Patient follows two-step commands.\par Extremities: Minimal/no pedal edema.\par \par Motor:\par Both upper extremities: Mild rigidity, resting tremor noted. Active range of motion within functional limits except\par [place] neck deformity of L 3rd and 4th digit, can only flex PIP joints to neutral position. 5/5 motor power\par \par Both lower extremities: Rigidity noted. Hip flexion/knee extension 4+-5/5 motor power. Right ankle 5/5 motor power\par Left ankle dorsiflexion 4+/5 motor power, ankle inversion/eversion 4+/5 motor power, ankle plantarflexion 4/5 motor power.\par Able to passively everted left ankle to approximately 5°\par Able to passively dorsiflexed ankle to just neutral with the knee flexed, 5-10° plantarflexion with the knee extended\par L ankle posturing in equinovarus.\par ankle PF MAS= 0-1\par Ankle invertors MAS= 0-1\par \par Sensory: Intact to light touch both lower extremity

## 2021-09-30 ENCOUNTER — APPOINTMENT (OUTPATIENT)
Dept: UROLOGY | Facility: CLINIC | Age: 76
End: 2021-09-30
Payer: MEDICARE

## 2021-09-30 VITALS
TEMPERATURE: 96.1 F | DIASTOLIC BLOOD PRESSURE: 68 MMHG | HEART RATE: 77 BPM | BODY MASS INDEX: 18.82 KG/M2 | HEIGHT: 73 IN | WEIGHT: 142 LBS | RESPIRATION RATE: 17 BRPM | SYSTOLIC BLOOD PRESSURE: 100 MMHG | OXYGEN SATURATION: 100 %

## 2021-09-30 PROCEDURE — 51703 INSERT BLADDER CATH COMPLEX: CPT

## 2021-11-04 ENCOUNTER — APPOINTMENT (OUTPATIENT)
Dept: UROLOGY | Facility: CLINIC | Age: 76
End: 2021-11-04
Payer: MEDICARE

## 2021-11-04 VITALS — TEMPERATURE: 97.4 F

## 2021-11-04 PROCEDURE — 51703 INSERT BLADDER CATH COMPLEX: CPT

## 2021-11-04 RX ORDER — OXYBUTYNIN CHLORIDE 5 MG/1
5 TABLET ORAL EVERY 8 HOURS
Qty: 15 | Refills: 3 | Status: COMPLETED | COMMUNITY
Start: 2020-01-31 | End: 2021-11-04

## 2021-11-04 RX ORDER — SULFAMETHOXAZOLE AND TRIMETHOPRIM 800; 160 MG/1; MG/1
800-160 TABLET ORAL TWICE DAILY
Qty: 14 | Refills: 0 | Status: COMPLETED | COMMUNITY
Start: 2020-02-03 | End: 2021-11-04

## 2021-11-08 NOTE — CHART NOTE - NSCHARTNOTEFT_GEN_A_CORE
"LS    Pt had \"passed out\" around 6:30 this morning & states her BP readings were 82/64 after the episode.    Thank you,    Elbert MOOYD" NUTRITION SERVICES                                                                                  MALNUTRITION ALERT     Attention Health Care Provider: Upon nutritional assessment by the Registered Dietitian your patient was determined to meet criteria / has evidence of the following diagnosis/diagnoses:    [ ] Mild Protein Calorie Malnutrition   [ ] Moderate Protein Calorie Malnutrition   [X ] Severe Protein Calorie Malnutrition   [ ] Unspecified Protein Calorie Malnutrition   [ ] Underweight / BMI <19  [ ] Morbid Obesity / BMI >40    By signing this assessment you are acknowledging the diagnosis/diagnoses.       PLAN OF CARE: Refer to Initial Dietitian Evaluation or Nutrition Follow-Up Documentation for Nutritional Recommendations.

## 2021-11-17 ENCOUNTER — APPOINTMENT (OUTPATIENT)
Dept: WOUND CARE | Facility: HOSPITAL | Age: 76
End: 2021-11-17
Payer: MEDICARE

## 2021-11-17 ENCOUNTER — OUTPATIENT (OUTPATIENT)
Dept: OUTPATIENT SERVICES | Facility: HOSPITAL | Age: 76
LOS: 1 days | Discharge: HOME | End: 2021-11-17
Payer: MEDICARE

## 2021-11-17 VITALS
HEART RATE: 79 BPM | TEMPERATURE: 97.1 F | SYSTOLIC BLOOD PRESSURE: 102 MMHG | BODY MASS INDEX: 18.82 KG/M2 | HEIGHT: 73 IN | OXYGEN SATURATION: 98 % | WEIGHT: 142 LBS | RESPIRATION RATE: 18 BRPM | DIASTOLIC BLOOD PRESSURE: 64 MMHG

## 2021-11-17 VITALS
SYSTOLIC BLOOD PRESSURE: 102 MMHG | RESPIRATION RATE: 18 BRPM | WEIGHT: 142 LBS | DIASTOLIC BLOOD PRESSURE: 64 MMHG | HEIGHT: 73 IN | BODY MASS INDEX: 18.82 KG/M2 | OXYGEN SATURATION: 98 % | HEART RATE: 79 BPM | TEMPERATURE: 97.1 F

## 2021-11-17 DIAGNOSIS — L89.150 PRESSURE ULCER OF SACRAL REGION, UNSTAGEABLE: ICD-10-CM

## 2021-11-17 PROCEDURE — G0463: CPT

## 2021-11-17 PROCEDURE — 99203 OFFICE O/P NEW LOW 30 MIN: CPT

## 2021-11-17 RX ORDER — DIAZEPAM 5 MG/1
5 TABLET ORAL TWICE DAILY
Refills: 0 | Status: ACTIVE | COMMUNITY

## 2021-11-17 RX ORDER — METHENAMINE HIPPURATE 1 G/1
1 TABLET ORAL TWICE DAILY
Qty: 60 | Refills: 5 | Status: DISCONTINUED | COMMUNITY
Start: 2020-09-01 | End: 2021-11-17

## 2021-11-17 RX ORDER — TAMSULOSIN HYDROCHLORIDE 0.4 MG/1
CAPSULE ORAL
Refills: 0 | Status: DISCONTINUED | COMMUNITY
End: 2021-11-17

## 2021-11-17 RX ORDER — FOLIC ACID 0.8 MG
800 TABLET ORAL DAILY
Refills: 0 | Status: ACTIVE | COMMUNITY

## 2021-11-17 NOTE — PHYSICAL EXAM
[Normal Breath Sounds] : Normal breath sounds [Normal Heart Sounds] : normal heart sounds [Alert] : alert [Oriented to Person] : oriented to person [Oriented to Place] : oriented to place [Oriented to Time] : oriented to time [de-identified] : WD WN 77 Y/O white male in NAD [de-identified] : PERRLA; EOM intact [de-identified] : sacral area with flakey skin with some mild erythema [FreeTextEntry1] : Sacrum - left - flaky/irritated skin, erythema, and excoriations within measurements [FreeTextEntry2] : 7.0 [FreeTextEntry3] : 5.0 [FreeTextEntry4] : 0.1/flaky skin [de-identified] : Scant serosanguineous [de-identified] : Erythema/Irritation/excoriations [de-identified] : Allevyn Boarder [de-identified] : Cleansed with Normal Saline\par  [TWNoteComboBox6] : Pressure [de-identified] : Erythema [de-identified] : 100% [de-identified] : 3x Weekly

## 2021-11-17 NOTE — PLAN
[FreeTextEntry1] : off load sacral area\par allevyn 3X/week to sacral area\par return 2 weeks\par 35 minutes spent in review, evaluation and treatment\par \par

## 2021-11-17 NOTE — HISTORY OF PRESENT ILLNESS
[FreeTextEntry1] : 76 year old male presents to the [facility] with a pressure ulcer to the sacrum. Patient reports the ulcer has been recurring since he was hospitalized in August 2019 for loss of balance and muscle wasting in the lower legs. Patient was diagnosed with Parkinsons. Patient reports he spends much of the day bed ridden. Patient resides at the [facility] on [address].

## 2021-11-17 NOTE — REVIEW OF SYSTEMS
[FreeTextEntry2] : has parkinsons [FreeTextEntry7] : dysphagia [FreeTextEntry8] : BPH [FreeTextEntry9] : ataxia [de-identified] : superficial sacral ulceration [Fever] : no fever [Muscle Weakness] : muscle weakness [Difficulty Walking] : difficulty walking [Negative] : Gastrointestinal

## 2021-11-17 NOTE — ASSESSMENT
[Verbal] : Verbal [Demo] : Demo [Patient] : Patient [Good - alert, interested, motivated] : Good - alert, interested, motivated [Verbalizes knowledge/Understanding] : Verbalizes knowledge/understanding [Dressing changes] : dressing changes [Pressure relief] : pressure relief [Signs and symptoms of infection] : sign and symptoms of infection [How and When to Call] : how and when to call [Off-loading] : off-loading [Patient responsibility to plan of care] : patient responsibility to plan of care [] : Yes [Stable] : stable [Home] : Home [Not Applicable - Long Term Care/Home Health Agency] : Long Term Care/Home Health Agency: Not Applicable [FreeTextEntry2] : Restore Skin Integrity\par Infection Control\par Localized wound care\par Offloading\par Frequent turning and repositioning [FreeTextEntry3] : Initial visit [FreeTextEntry4] : Patient to f/u in 2 weeks [FreeTextEntry1] : [facility] - Provided patient and patients aid with instructions

## 2021-11-18 DIAGNOSIS — R26.0 ATAXIC GAIT: ICD-10-CM

## 2021-11-18 DIAGNOSIS — Z87.440 PERSONAL HISTORY OF URINARY (TRACT) INFECTIONS: ICD-10-CM

## 2021-11-18 DIAGNOSIS — L89.152 PRESSURE ULCER OF SACRAL REGION, STAGE 2: ICD-10-CM

## 2021-11-18 DIAGNOSIS — Z82.3 FAMILY HISTORY OF STROKE: ICD-10-CM

## 2021-11-18 DIAGNOSIS — G62.9 POLYNEUROPATHY, UNSPECIFIED: ICD-10-CM

## 2021-11-18 DIAGNOSIS — Z79.899 OTHER LONG TERM (CURRENT) DRUG THERAPY: ICD-10-CM

## 2021-11-18 DIAGNOSIS — R33.9 RETENTION OF URINE, UNSPECIFIED: ICD-10-CM

## 2021-11-18 DIAGNOSIS — G20 PARKINSON'S DISEASE: ICD-10-CM

## 2021-11-18 DIAGNOSIS — N40.1 BENIGN PROSTATIC HYPERPLASIA WITH LOWER URINARY TRACT SYMPTOMS: ICD-10-CM

## 2021-11-18 DIAGNOSIS — Z80.0 FAMILY HISTORY OF MALIGNANT NEOPLASM OF DIGESTIVE ORGANS: ICD-10-CM

## 2021-11-18 DIAGNOSIS — R26.89 OTHER ABNORMALITIES OF GAIT AND MOBILITY: ICD-10-CM

## 2021-11-18 DIAGNOSIS — Z98.890 OTHER SPECIFIED POSTPROCEDURAL STATES: ICD-10-CM

## 2021-11-22 ENCOUNTER — APPOINTMENT (OUTPATIENT)
Dept: UROLOGY | Facility: CLINIC | Age: 76
End: 2021-11-22
Payer: MEDICARE

## 2021-11-22 PROCEDURE — 99211 OFF/OP EST MAY X REQ PHY/QHP: CPT

## 2021-11-30 ENCOUNTER — OUTPATIENT (OUTPATIENT)
Dept: OUTPATIENT SERVICES | Facility: HOSPITAL | Age: 76
LOS: 1 days | Discharge: HOME | End: 2021-11-30
Payer: MEDICARE

## 2021-11-30 ENCOUNTER — APPOINTMENT (OUTPATIENT)
Dept: WOUND CARE | Facility: HOSPITAL | Age: 76
End: 2021-11-30
Payer: MEDICARE

## 2021-11-30 VITALS
HEART RATE: 78 BPM | RESPIRATION RATE: 18 BRPM | TEMPERATURE: 97.5 F | OXYGEN SATURATION: 98 % | BODY MASS INDEX: 18.82 KG/M2 | DIASTOLIC BLOOD PRESSURE: 66 MMHG | HEIGHT: 73 IN | WEIGHT: 142 LBS | SYSTOLIC BLOOD PRESSURE: 105 MMHG

## 2021-11-30 DIAGNOSIS — L89.150 PRESSURE ULCER OF SACRAL REGION, UNSTAGEABLE: ICD-10-CM

## 2021-11-30 PROCEDURE — 99213 OFFICE O/P EST LOW 20 MIN: CPT

## 2021-11-30 PROCEDURE — G0463: CPT

## 2021-11-30 RX ORDER — CLOTRIMAZOLE AND BETAMETHASONE DIPROPIONATE 10; .5 MG/G; MG/G
1-0.05 CREAM TOPICAL
Qty: 2 | Refills: 4 | Status: ACTIVE | COMMUNITY
Start: 2021-11-30 | End: 1900-01-01

## 2021-11-30 NOTE — REVIEW OF SYSTEMS
[Negative] : Heme/Lymph [FreeTextEntry2] : has parkinsons [FreeTextEntry7] : dysphagia [FreeTextEntry8] : BPH [FreeTextEntry9] : ataxia [de-identified] : superficial sacral ulceration

## 2021-11-30 NOTE — HISTORY OF PRESENT ILLNESS
[FreeTextEntry1] : 76 year old male presents to the [facility] with a pressure ulcer to the sacrum. Patient reports the ulcer has been recurring since he was hospitalized in August 2019 for loss of balance and muscle wasting in the lower legs. Patient was diagnosed with Parkinsons. Patient reports he spends much of the day bed ridden. Patient resides at the [facility] on [address].\par \par 11/30/21 much improved but still uncomfortable

## 2021-11-30 NOTE — PLAN
[FreeTextEntry1] : off load sacral area\par allevyn, lotrisone QD with DD to sacral area\par return 2 weeks\par 25 minutes spent in review, evaluation and treatment\par \par

## 2021-11-30 NOTE — PHYSICAL EXAM
[4 x 4] : 4 x 4  [Normal Breath Sounds] : Normal breath sounds [Normal Heart Sounds] : normal heart sounds [Alert] : alert [Oriented to Person] : oriented to person [Oriented to Place] : oriented to place [Oriented to Time] : oriented to time [de-identified] : WD WN 75 Y/O white male in NAD [de-identified] : PERRLA; EOM intact [de-identified] : sacral area with flakey skin with some mild erythema [FreeTextEntry1] : Sacrum - left - flaky/irritated skin, erythema, and excoriations within measurements [FreeTextEntry2] : 7.0 [FreeTextEntry3] : 6.0 [FreeTextEntry4] : 0.1/flaky skin [de-identified] : Scant Serosanguineous  [de-identified] : Erythema/Irritation/excoriations [de-identified] : Lotrisone, Dry Dressing  &  Allevyn Boarder [de-identified] : Cleansed with Normal Saline\par  [TWNoteComboBox6] : Pressure [de-identified] : Erythema [de-identified] : 100% [de-identified] : Daily

## 2021-11-30 NOTE — ASSESSMENT
[Verbal] : Verbal [Demo] : Demo [Patient] : Patient [Good - alert, interested, motivated] : Good - alert, interested, motivated [Verbalizes knowledge/Understanding] : Verbalizes knowledge/understanding [Dressing changes] : dressing changes [Pressure relief] : pressure relief [Signs and symptoms of infection] : sign and symptoms of infection [How and When to Call] : how and when to call [Off-loading] : off-loading [Patient responsibility to plan of care] : patient responsibility to plan of care [Stable] : stable [Home] : Home [Wheelchair] : Wheelchair [Not Applicable - Long Term Care/Home Health Agency] : Long Term Care/Home Health Agency: Not Applicable [] : Yes [FreeTextEntry2] : Offloading/Pressure Relief \par Frequent Turning & Positioning \par Localized Wound Care \par Infection Prevention  [FreeTextEntry4] : Pt to F/U to [facility] in 2 Weeks  [FreeTextEntry1] : [facility] - Provided patient and patients aid with instructions

## 2021-12-01 DIAGNOSIS — Z82.3 FAMILY HISTORY OF STROKE: ICD-10-CM

## 2021-12-01 DIAGNOSIS — L89.152 PRESSURE ULCER OF SACRAL REGION, STAGE 2: ICD-10-CM

## 2021-12-01 DIAGNOSIS — Z87.440 PERSONAL HISTORY OF URINARY (TRACT) INFECTIONS: ICD-10-CM

## 2021-12-01 DIAGNOSIS — R26.0 ATAXIC GAIT: ICD-10-CM

## 2021-12-01 DIAGNOSIS — Z79.899 OTHER LONG TERM (CURRENT) DRUG THERAPY: ICD-10-CM

## 2021-12-01 DIAGNOSIS — G20 PARKINSON'S DISEASE: ICD-10-CM

## 2021-12-01 DIAGNOSIS — Z98.890 OTHER SPECIFIED POSTPROCEDURAL STATES: ICD-10-CM

## 2021-12-01 DIAGNOSIS — G62.9 POLYNEUROPATHY, UNSPECIFIED: ICD-10-CM

## 2021-12-01 DIAGNOSIS — R33.9 RETENTION OF URINE, UNSPECIFIED: ICD-10-CM

## 2021-12-01 DIAGNOSIS — N40.1 BENIGN PROSTATIC HYPERPLASIA WITH LOWER URINARY TRACT SYMPTOMS: ICD-10-CM

## 2021-12-01 DIAGNOSIS — Z80.0 FAMILY HISTORY OF MALIGNANT NEOPLASM OF DIGESTIVE ORGANS: ICD-10-CM

## 2021-12-01 DIAGNOSIS — R26.89 OTHER ABNORMALITIES OF GAIT AND MOBILITY: ICD-10-CM

## 2021-12-02 ENCOUNTER — APPOINTMENT (OUTPATIENT)
Dept: UROLOGY | Facility: CLINIC | Age: 76
End: 2021-12-02
Payer: MEDICARE

## 2021-12-02 PROCEDURE — 51703 INSERT BLADDER CATH COMPLEX: CPT

## 2021-12-06 ENCOUNTER — APPOINTMENT (OUTPATIENT)
Dept: PHYSICAL MEDICINE AND REHAB | Facility: CLINIC | Age: 76
End: 2021-12-06
Payer: MEDICARE

## 2021-12-06 VITALS
OXYGEN SATURATION: 99 % | HEART RATE: 76 BPM | DIASTOLIC BLOOD PRESSURE: 70 MMHG | TEMPERATURE: 93.4 F | SYSTOLIC BLOOD PRESSURE: 107 MMHG

## 2021-12-06 DIAGNOSIS — M24.562 CONTRACTURE, LEFT KNEE: ICD-10-CM

## 2021-12-06 PROCEDURE — 99213 OFFICE O/P EST LOW 20 MIN: CPT | Mod: GC

## 2021-12-06 NOTE — H&P ADULT - CONSTITUTIONAL DETAILS
Patient: Marcos Salazar    MR Number: [id number]  YOB: 1988  Date of Visit: 12/6/2021    Clinical History:  The patient is a 34y.o. years old male with hx of epilepsy and recent breakthrough seizure. Medications reviewed. Method: The EEG was performed utilizing the international 10/20 of electrode placements of both referential and bipolar montages. The patient was awake and asleep through out the recording. Findings: The background of the EEG showed normal alpha posterior background of 8=9 HZ and amplitude of 20-40 UV. This background was symmetric, waxing and waning, and reactive with eye opening and closure. As the patient became drowsy and asleep, generalized diffuse slowing was seen through recording at 4-6 HZ. This generalized slowing was symmetric, non rhythmical, and continuous. No spike or sharp waves were seen. .     Impression: This EEG  is within normal limits. There is no evidence of epileptiform discharges, focal, or lateralizing abnormalities.       Francesca Lo MD      Board certified in clinical neurophysiology
no distress/cachectic

## 2021-12-07 NOTE — PHYSICAL EXAM
[FreeTextEntry1] : General: Well-developed male in no apparent distress. Patient is awake, alert, and cooperative with examination. Patient follows two-step commands.\par Extremities: Minimal/no pedal edema.\par \par Motor:\par Both upper extremities: Mild rigidity, resting tremor noted. Active range of motion within functional limits except\par [place] neck deformity of L 3rd and 4th digit, can only flex PIP joints to neutral position. 5/5 motor power\par \par Both lower extremities: Rigidity noted. Hip flexion/knee extension 4+-5/5 motor power. Right ankle 5/5 motor power\par Left ankle dorsiflexion 4+/5 motor power, ankle inversion/eversion 4+/5 motor power, ankle plantarflexion 4/5 motor power.\par Able to passively everted left ankle to approximately 5°\par Able to passively dorsiflexed ankle to just neutral with the knee flexed, 5-10° plantarflexion with the knee extended\par Left knee at 15-20 degrees of knee flexion upon assessing gait \par Able to PROM right knee to full extension \par L ankle posturing in equinovarus.\par ankle PF MAS= 0-1\par Ankle invertors MAS= 0-1\par \par Sensory: Intact to light touch both lower extremity

## 2021-12-07 NOTE — HISTORY OF PRESENT ILLNESS
[FreeTextEntry1] : Patient is a 76-year-old male history of Parkinson's disease, BPH with chronic indwelling Spencer with last Botox injection on August 19, 2021 with good results. Patient's been receiving PT and OT and currently ambulates with a walker with assistance in therapy. No falls reported.\par \par Patient endorsed to better movement 1 week after his Botox injection but stated that the botox starting to wear off since 1+ week. Patient stated new left ankle brace since end of September and stated that he feels his left leg is heavier but he thinks that it might be from the Botox wearing off. He denies any falls or near trips since his botox injection.

## 2021-12-07 NOTE — ASSESSMENT
[FreeTextEntry1] : Patient is a 76-year-old male history of Parkinson's disease with left equinovarus/foot drop posturing 2/2 focal dystonia with excellent tone reduction noted post Botox injection on 08/19/21. Now presenting to clinic with complains of spasticity returning on his lower extremity. Patient was last fitted for a brace for his LLE in October 2021 and stated that his brace has been helping him and does not endorse to any major issues. Upon evaluating patient, he seems to have a right knee at about 20 degrees flexion upon ambulation but able to range to full extension on PROM. LLE without significant tone upon examination today. Will try for repeat botox but with additional site targeted at the right hamstrings to decrease knee flexion during stance phase and encourage heel strike. New Botox injection protocol:\par \par Left tibialis posterior----------------- 100 units \par Left medial gastrocnemius---------- 75 units \par Left lateral gastrocnemius----------- 75 units \par Left soleus----------------------------- 50 units \par Left semitendinosus------------------ 100 units\par Left semimembranosus-------------- 100 units\par Left biceps femoris (long head)----- 100 units\par \par Total -------------------------------------600 units.\par \par I spent a total of 25 minutes on the date of the encounter evaluating and treating the patient including a discussion of treatment options.\par \par

## 2021-12-07 NOTE — REVIEW OF SYSTEMS
[Muscle Weakness] : muscle weakness [Difficulty Walking] : difficulty walking [Negative] : Gastrointestinal [Fever] : no fever [FreeTextEntry9] : +Increased right knee flexion

## 2021-12-16 ENCOUNTER — OUTPATIENT (OUTPATIENT)
Dept: OUTPATIENT SERVICES | Facility: HOSPITAL | Age: 76
LOS: 1 days | Discharge: HOME | End: 2021-12-16
Payer: MEDICARE

## 2021-12-16 ENCOUNTER — APPOINTMENT (OUTPATIENT)
Dept: WOUND CARE | Facility: HOSPITAL | Age: 76
End: 2021-12-16
Payer: MEDICARE

## 2021-12-16 DIAGNOSIS — L89.150 PRESSURE ULCER OF SACRAL REGION, UNSTAGEABLE: ICD-10-CM

## 2021-12-16 PROCEDURE — 99213 OFFICE O/P EST LOW 20 MIN: CPT

## 2021-12-16 PROCEDURE — G0463: CPT

## 2021-12-17 DIAGNOSIS — R33.9 RETENTION OF URINE, UNSPECIFIED: ICD-10-CM

## 2021-12-17 DIAGNOSIS — R26.0 ATAXIC GAIT: ICD-10-CM

## 2021-12-17 DIAGNOSIS — R26.89 OTHER ABNORMALITIES OF GAIT AND MOBILITY: ICD-10-CM

## 2021-12-17 DIAGNOSIS — G62.9 POLYNEUROPATHY, UNSPECIFIED: ICD-10-CM

## 2021-12-17 DIAGNOSIS — Z82.3 FAMILY HISTORY OF STROKE: ICD-10-CM

## 2021-12-17 DIAGNOSIS — N40.1 BENIGN PROSTATIC HYPERPLASIA WITH LOWER URINARY TRACT SYMPTOMS: ICD-10-CM

## 2021-12-17 DIAGNOSIS — G20 PARKINSON'S DISEASE: ICD-10-CM

## 2021-12-17 DIAGNOSIS — Z80.0 FAMILY HISTORY OF MALIGNANT NEOPLASM OF DIGESTIVE ORGANS: ICD-10-CM

## 2021-12-17 DIAGNOSIS — Z98.890 OTHER SPECIFIED POSTPROCEDURAL STATES: ICD-10-CM

## 2021-12-17 DIAGNOSIS — L89.152 PRESSURE ULCER OF SACRAL REGION, STAGE 2: ICD-10-CM

## 2021-12-17 DIAGNOSIS — Z79.899 OTHER LONG TERM (CURRENT) DRUG THERAPY: ICD-10-CM

## 2021-12-17 DIAGNOSIS — Z87.440 PERSONAL HISTORY OF URINARY (TRACT) INFECTIONS: ICD-10-CM

## 2021-12-21 NOTE — ED ADULT NURSE NOTE - NS ED NURSE DC INFO COMPLEXITY
Occasional aura without headache.  No specific treatment.   Simple: Patient demonstrates quick and easy understanding

## 2021-12-30 ENCOUNTER — APPOINTMENT (OUTPATIENT)
Dept: WOUND CARE | Facility: HOSPITAL | Age: 76
End: 2021-12-30

## 2022-01-01 NOTE — DIETITIAN INITIAL EVALUATION ADULT. - NUTRITION DIAGNOSIS
[de-identified] : Discussed findings of today's exam and possible causes of patient's pain.  Educated patient on their most probable diagnosis of sacroiliac dysfunction. Reviewed possible courses of treatment, and we collaboratively decided best course of treatment at this time will include ultrasound guided cortisone injection into the SI joint.  Follow up as needed.\par \par Lisette Franklin MD, EdM\par Sports Medicine PM&R\par Department of Orthopedics  yes...

## 2022-01-03 NOTE — ED ADULT NURSE NOTE - NS ED NURSE PRESS ULCER APPEAR 11
[FreeTextEntry1] : Patient is a 54-year-old  male with a history of obstructive sleep apnea wears a CPAP mask, history of obesity, status post gastric bypass surgery, has lost about 80 pounds.  Patient works as a paramedic and a  who presents presents for cardiac consultation at the recommendation of his primary care physician because of an abnormal EKG. Patient is physically active, denies any chest pain, palpitations dyspnea.  Patient is completely asymptomatic.  Patient denies orthopnea, PND or leg edema.
beefy red

## 2022-01-06 ENCOUNTER — APPOINTMENT (OUTPATIENT)
Dept: UROLOGY | Facility: CLINIC | Age: 77
End: 2022-01-06
Payer: MEDICARE

## 2022-01-06 VITALS
HEART RATE: 78 BPM | WEIGHT: 142 LBS | SYSTOLIC BLOOD PRESSURE: 117 MMHG | RESPIRATION RATE: 15 BRPM | BODY MASS INDEX: 18.82 KG/M2 | DIASTOLIC BLOOD PRESSURE: 78 MMHG | HEIGHT: 73 IN | OXYGEN SATURATION: 97 % | TEMPERATURE: 97.8 F

## 2022-01-06 PROCEDURE — 51703 INSERT BLADDER CATH COMPLEX: CPT

## 2022-01-11 NOTE — ASSESSMENT
[Verbal] : Verbal [Demo] : Demo [Patient] : Patient [Good - alert, interested, motivated] : Good - alert, interested, motivated [Verbalizes knowledge/Understanding] : Verbalizes knowledge/understanding [Dressing changes] : dressing changes [Pressure relief] : pressure relief [Signs and symptoms of infection] : sign and symptoms of infection [How and When to Call] : how and when to call [Off-loading] : off-loading [Patient responsibility to plan of care] : patient responsibility to plan of care [Stable] : stable [Home] : Home [Wheelchair] : Wheelchair [Not Applicable - Long Term Care/Home Health Agency] : Long Term Care/Home Health Agency: Not Applicable [] : No [FreeTextEntry2] : Offloading/Pressure Relief \par Frequent Turning & Positioning \par Localized Wound Care \par Infection Prevention  [FreeTextEntry3] : Excoriations opened into a ulcer [FreeTextEntry4] : Vital Signs were inadvertently deleted. All v/s were with in normal perimeters \par Pt to F/U to [facility] in 2 Weeks  [FreeTextEntry1] : [facility] - Provided patient and patients aid with instructions

## 2022-01-11 NOTE — PLAN
[FreeTextEntry1] : offload/frequent rotation/change of postion/increase standing/ambul.\par ag alginate, allevyn border\par f/u 2 wks.\par \par time spent 25 mins.

## 2022-01-11 NOTE — PHYSICAL EXAM
[Normal Thyroid] : the thyroid was normal [Normal Breath Sounds] : Normal breath sounds [Normal Heart Sounds] : normal heart sounds [Normal Rate and Rhythm] : normal rate and rhythm [Alert] : alert [Oriented to Person] : oriented to person [Oriented to Place] : oriented to place [Oriented to Time] : oriented to time [Calm] : calm [JVD] : no jugular venous distention  [Abdomen Masses] : No abdominal massess [Abdomen Tenderness] : ~T ~M No abdominal tenderness [Tender] : nontender [Enlarged] : not enlarged [de-identified] : elderly WM, NAD, alert, Ox3. [FreeTextEntry1] : Sacrum - left - flaky/irritated skin, erythema, and excoriations within measurements [FreeTextEntry2] : 1.5 [FreeTextEntry3] : 1.5 [FreeTextEntry4] : 0.1/flaky skin [de-identified] : Scant Serosanguineous  [de-identified] : Erythema/Irritation/excoriations [de-identified] : Silver Alginate Dry Dressing  &  Allevyn Boarder [de-identified] : Cleansed with Normal Saline\par  [TWNoteComboBox6] : Pressure [de-identified] : Erythema [de-identified] : 100% [de-identified] : Every other day

## 2022-01-11 NOTE — HISTORY OF PRESENT ILLNESS
[FreeTextEntry1] : 75 yo WM, with Parkinson's dis, presently receiving PT, but still with limited ambulation, here for f/u of sacral pressure ulcer. I emphasized the importance to pt and his aide for frequent standing/ambulation throughout the day and night to help in the healing and prevention of press. ulcers.

## 2022-01-11 NOTE — REVIEW OF SYSTEMS
[Negative] : Heme/Lymph [FreeTextEntry2] : has parkinsons [FreeTextEntry7] : dysphagia [FreeTextEntry8] : BPH [FreeTextEntry9] : ataxia [de-identified] : superficial sacral ulceration

## 2022-01-13 ENCOUNTER — APPOINTMENT (OUTPATIENT)
Dept: PHYSICAL MEDICINE AND REHAB | Facility: CLINIC | Age: 77
End: 2022-01-13
Payer: MEDICARE

## 2022-01-13 VITALS — TEMPERATURE: 97.2 F

## 2022-01-13 PROCEDURE — 95874 GUIDE NERV DESTR NEEDLE EMG: CPT

## 2022-01-13 PROCEDURE — 64644 CHEMODENERV 1 EXTREM 5/> MUS: CPT

## 2022-02-01 ENCOUNTER — NON-APPOINTMENT (OUTPATIENT)
Age: 77
End: 2022-02-01

## 2022-02-02 ENCOUNTER — APPOINTMENT (OUTPATIENT)
Dept: WOUND CARE | Facility: HOSPITAL | Age: 77
End: 2022-02-02
Payer: MEDICARE

## 2022-02-02 ENCOUNTER — OUTPATIENT (OUTPATIENT)
Dept: OUTPATIENT SERVICES | Facility: HOSPITAL | Age: 77
LOS: 1 days | Discharge: HOME | End: 2022-02-02
Payer: MEDICARE

## 2022-02-02 VITALS
WEIGHT: 142 LBS | DIASTOLIC BLOOD PRESSURE: 60 MMHG | BODY MASS INDEX: 18.82 KG/M2 | HEART RATE: 79 BPM | HEIGHT: 73 IN | OXYGEN SATURATION: 98 % | TEMPERATURE: 98.5 F | RESPIRATION RATE: 16 BRPM | SYSTOLIC BLOOD PRESSURE: 98 MMHG

## 2022-02-02 DIAGNOSIS — L89.152 PRESSURE ULCER OF SACRAL REGION, STAGE 2: ICD-10-CM

## 2022-02-02 PROCEDURE — 99213 OFFICE O/P EST LOW 20 MIN: CPT

## 2022-02-02 PROCEDURE — G0463: CPT

## 2022-02-02 NOTE — REVIEW OF SYSTEMS
[Negative] : Heme/Lymph [FreeTextEntry2] : has parkinsons [FreeTextEntry7] : dysphagia [FreeTextEntry8] : BPH [FreeTextEntry9] : ataxia [de-identified] : superficial sacral ulceration

## 2022-02-02 NOTE — HISTORY OF PRESENT ILLNESS
[FreeTextEntry1] : 77 yo WM, with Parkinson's dis, presently receiving PT, but still with limited ambulation, here for f/u of sacral pressure ulcer. I emphasized the importance to pt and his aide for frequent standing/ambulation throughout the day and night to help in the healing and prevention of press. ulcers.\par \par 2/2/22 sacral area closed. much improved

## 2022-02-02 NOTE — VITALS
[Pain related to present condition?] : The patient's  pain is not related to present condition. [] : No [de-identified] : 0/10

## 2022-02-02 NOTE — ASSESSMENT
[Verbal] : Verbal [Demo] : Demo [Patient] : Patient [Good - alert, interested, motivated] : Good - alert, interested, motivated [Verbalizes knowledge/Understanding] : Verbalizes knowledge/understanding [Dressing changes] : dressing changes [Pressure relief] : pressure relief [Signs and symptoms of infection] : sign and symptoms of infection [How and When to Call] : how and when to call [Off-loading] : off-loading [Patient responsibility to plan of care] : patient responsibility to plan of care [Stable] : stable [Home] : Home [Wheelchair] : Wheelchair [Not Applicable - Long Term Care/Home Health Agency] : Long Term Care/Home Health Agency: Not Applicable [] : Yes [FreeTextEntry2] : Offloading/Pressure Relief \par Frequent Turning & Positioning \par Localized Wound Care \par Infection Prevention \par F/U 2 weeks [FreeTextEntry4] : F/U 2 weeks [FreeTextEntry1] : [facility] - Provided patient and patients aid with instructions

## 2022-02-02 NOTE — PLAN
[FreeTextEntry1] : offload/frequent rotation/change of postion/increase standing/ambul.\par DD allevyn border QOD to sacral area\par f/u 2 wks.\par \par time spent 25 mins.

## 2022-02-02 NOTE — PHYSICAL EXAM
[4 x 4] : 4 x 4  [JVD] : no jugular venous distention  [Normal Thyroid] : the thyroid was normal [Normal Breath Sounds] : Normal breath sounds [Normal Heart Sounds] : normal heart sounds [Normal Rate and Rhythm] : normal rate and rhythm [Abdomen Masses] : No abdominal massess [Abdomen Tenderness] : ~T ~M No abdominal tenderness [Tender] : nontender [Enlarged] : not enlarged [Alert] : alert [Oriented to Person] : oriented to person [Oriented to Place] : oriented to place [Oriented to Time] : oriented to time [Calm] : calm [de-identified] : elderly WM, NAD, alert, Ox3. [FreeTextEntry1] : Sacrum - left - flaky/irritated skin, erythema, and excoriations within measurements [FreeTextEntry2] : 8.0 [FreeTextEntry3] : 9.5 [FreeTextEntry4] : 0.1/flaky skin [de-identified] : Scant Serosanguineous  [de-identified] : Erythema/Irritation/excoriations [de-identified] : Gertrude Segovia [de-identified] : Cleansed with Normal Saline\par  [TWNoteComboBox1] : Midline [TWNoteComboBox6] : Pressure [de-identified] : Erythema [de-identified] : 100% [de-identified] : 3x Weekly [de-identified] : Primary Dressing

## 2022-02-03 ENCOUNTER — APPOINTMENT (OUTPATIENT)
Dept: UROLOGY | Facility: CLINIC | Age: 77
End: 2022-02-03
Payer: MEDICARE

## 2022-02-03 VITALS
HEART RATE: 77 BPM | OXYGEN SATURATION: 95 % | BODY MASS INDEX: 18.82 KG/M2 | HEIGHT: 73 IN | WEIGHT: 142 LBS | RESPIRATION RATE: 13 BRPM | DIASTOLIC BLOOD PRESSURE: 64 MMHG | SYSTOLIC BLOOD PRESSURE: 94 MMHG | TEMPERATURE: 97.7 F

## 2022-02-03 DIAGNOSIS — N40.1 BENIGN PROSTATIC HYPERPLASIA WITH LOWER URINARY TRACT SYMPTOMS: ICD-10-CM

## 2022-02-03 DIAGNOSIS — Z98.890 OTHER SPECIFIED POSTPROCEDURAL STATES: ICD-10-CM

## 2022-02-03 DIAGNOSIS — Z79.899 OTHER LONG TERM (CURRENT) DRUG THERAPY: ICD-10-CM

## 2022-02-03 DIAGNOSIS — G20 PARKINSON'S DISEASE: ICD-10-CM

## 2022-02-03 DIAGNOSIS — R26.0 ATAXIC GAIT: ICD-10-CM

## 2022-02-03 DIAGNOSIS — Z80.0 FAMILY HISTORY OF MALIGNANT NEOPLASM OF DIGESTIVE ORGANS: ICD-10-CM

## 2022-02-03 DIAGNOSIS — Z82.3 FAMILY HISTORY OF STROKE: ICD-10-CM

## 2022-02-03 DIAGNOSIS — R33.9 RETENTION OF URINE, UNSPECIFIED: ICD-10-CM

## 2022-02-03 DIAGNOSIS — R26.89 OTHER ABNORMALITIES OF GAIT AND MOBILITY: ICD-10-CM

## 2022-02-03 DIAGNOSIS — L89.152 PRESSURE ULCER OF SACRAL REGION, STAGE 2: ICD-10-CM

## 2022-02-03 DIAGNOSIS — Z87.440 PERSONAL HISTORY OF URINARY (TRACT) INFECTIONS: ICD-10-CM

## 2022-02-03 DIAGNOSIS — G62.9 POLYNEUROPATHY, UNSPECIFIED: ICD-10-CM

## 2022-02-03 PROCEDURE — 51703 INSERT BLADDER CATH COMPLEX: CPT

## 2022-02-15 ENCOUNTER — NON-APPOINTMENT (OUTPATIENT)
Age: 77
End: 2022-02-15

## 2022-02-16 ENCOUNTER — OUTPATIENT (OUTPATIENT)
Dept: OUTPATIENT SERVICES | Facility: HOSPITAL | Age: 77
LOS: 1 days | Discharge: HOME | End: 2022-02-16
Payer: MEDICARE

## 2022-02-16 ENCOUNTER — APPOINTMENT (OUTPATIENT)
Dept: WOUND CARE | Facility: HOSPITAL | Age: 77
End: 2022-02-16
Payer: MEDICARE

## 2022-02-16 DIAGNOSIS — L89.152 PRESSURE ULCER OF SACRAL REGION, STAGE 2: ICD-10-CM

## 2022-02-16 PROCEDURE — 99213 OFFICE O/P EST LOW 20 MIN: CPT

## 2022-02-16 PROCEDURE — G0463: CPT

## 2022-02-17 ENCOUNTER — APPOINTMENT (OUTPATIENT)
Dept: PHYSICAL MEDICINE AND REHAB | Facility: CLINIC | Age: 77
End: 2022-02-17
Payer: MEDICARE

## 2022-02-17 DIAGNOSIS — R26.0 ATAXIC GAIT: ICD-10-CM

## 2022-02-17 DIAGNOSIS — Z82.3 FAMILY HISTORY OF STROKE: ICD-10-CM

## 2022-02-17 DIAGNOSIS — G20 PARKINSON'S DISEASE: ICD-10-CM

## 2022-02-17 DIAGNOSIS — R26.89 OTHER ABNORMALITIES OF GAIT AND MOBILITY: ICD-10-CM

## 2022-02-17 DIAGNOSIS — Z80.0 FAMILY HISTORY OF MALIGNANT NEOPLASM OF DIGESTIVE ORGANS: ICD-10-CM

## 2022-02-17 DIAGNOSIS — N40.1 BENIGN PROSTATIC HYPERPLASIA WITH LOWER URINARY TRACT SYMPTOMS: ICD-10-CM

## 2022-02-17 DIAGNOSIS — L89.152 PRESSURE ULCER OF SACRAL REGION, STAGE 2: ICD-10-CM

## 2022-02-17 DIAGNOSIS — G62.9 POLYNEUROPATHY, UNSPECIFIED: ICD-10-CM

## 2022-02-17 DIAGNOSIS — Z87.440 PERSONAL HISTORY OF URINARY (TRACT) INFECTIONS: ICD-10-CM

## 2022-02-17 DIAGNOSIS — Z98.890 OTHER SPECIFIED POSTPROCEDURAL STATES: ICD-10-CM

## 2022-02-17 DIAGNOSIS — Z79.899 OTHER LONG TERM (CURRENT) DRUG THERAPY: ICD-10-CM

## 2022-02-17 DIAGNOSIS — R33.9 RETENTION OF URINE, UNSPECIFIED: ICD-10-CM

## 2022-02-17 PROCEDURE — 99213 OFFICE O/P EST LOW 20 MIN: CPT

## 2022-02-17 NOTE — PHYSICAL EXAM
[FreeTextEntry1] : General: Well-developed male in no apparent distress. Patient is awake, alert, and cooperative with examination. Patient follows two-step commands.\par Extremities: Minimal/no pedal edema.\par \par Motor:\par Both upper extremities: Mild rigidity, resting tremor noted. Active range of motion within functional limits except\par [place] neck deformity of L 3rd and 4th digit, can only flex PIP joints to neutral position. 5/5 motor power\par \par Both lower extremities: Rigidity noted. Hip flexion/knee extension 4+-5/5 motor power. Right ankle 5/5 motor power\par Left ankle dorsiflexion 4+/5 motor power, ankle inversion/eversion 4+/5 motor power, ankle plantarflexion 4/5 motor power.\par Able to passively everted left ankle to approximately 5°\par Able to passively dorsiflexed ankle to just neutral with the knee flexed, 5-10° plantarflexion with the knee extended\par Able to PROM right knee to full extension \par L ankle posturing in less equinovarus.\par ankle PF MAS= 0-1\par Ankle invertors MAS= 1\par \par Sensory: Intact to light touch both lower extremity\par \par Functional status: Patient ambulated with rolling walker with toe strike noted on the left with his AFO. Significant foot inversion noted which appears to be coming from tibial internal rotation. LLE clearance improve with the addition of a 1/2' heel lift on the right. Patient's knee appears to be in less flexion during ambulation, approximately 10-15° during stance phase\par

## 2022-02-17 NOTE — HISTORY OF PRESENT ILLNESS
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 Patient reports that his ankle has decreased tone but he does feel that the foot is turning in more during ambulation. Patient's brace was revised and is comfortable, no skin breakdown. Patient is not sure if he's having less knee flexion during stance phase after Botox injection to the hamstrings. Patient is receiving physical therapy. No fall since last visit.

## 2022-02-17 NOTE — ASSESSMENT
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 Patient's appears to have component of tibial internal rotation contributing to his foot inversion during ambulation. Will reduced a dose to the biceps femoris long head and add the popliteus muscle to his Botox injection protocol. Prescription provided for a right heel lift- 1/2 inch to improve clearance of his left lower extremity. Will consider outer sole lift in the future. New Botox injection protocol:\par \par Left tibialis posterior----------------- 100 units \par Left medial gastrocnemius---------- 75 units \par Left lateral gastrocnemius----------- 75 units \par Left soleus----------------------------- 50 units \par Left semitendinosus------------------ 100 units\par Left semimembranosus-------------- 100 units\par Left biceps femoris (long head)----- 50 units\par Left popliteus--------------------------- 50 units\par \par Total -------------------------------------600 units.\par \par I spent a total of 25 minutes on the date of the encounter evaluating and treating the patient including a discussion of treatment options.\par \par

## 2022-03-01 ENCOUNTER — OUTPATIENT (OUTPATIENT)
Dept: OUTPATIENT SERVICES | Facility: HOSPITAL | Age: 77
LOS: 1 days | Discharge: HOME | End: 2022-03-01
Payer: MEDICARE

## 2022-03-01 ENCOUNTER — APPOINTMENT (OUTPATIENT)
Dept: WOUND CARE | Facility: HOSPITAL | Age: 77
End: 2022-03-01
Payer: MEDICARE

## 2022-03-01 VITALS
TEMPERATURE: 96.9 F | DIASTOLIC BLOOD PRESSURE: 52 MMHG | RESPIRATION RATE: 16 BRPM | WEIGHT: 142 LBS | BODY MASS INDEX: 18.82 KG/M2 | OXYGEN SATURATION: 100 % | SYSTOLIC BLOOD PRESSURE: 97 MMHG | HEIGHT: 73 IN | HEART RATE: 69 BPM

## 2022-03-01 DIAGNOSIS — L89.152 PRESSURE ULCER OF SACRAL REGION, STAGE 2: ICD-10-CM

## 2022-03-01 PROCEDURE — 99213 OFFICE O/P EST LOW 20 MIN: CPT

## 2022-03-01 PROCEDURE — G0463: CPT

## 2022-03-01 NOTE — HISTORY OF PRESENT ILLNESS
[FreeTextEntry1] : 75 yo WM, here with his aide for f/u of bilateral buttock sacral ulcers. Partial thickness loss in the dermal layer seen on both sides. I strongly emphassized the importance of standing/ambulating as much as possible throughout  the day/night to prevent the ulcers from getting worse and to help in healing.

## 2022-03-01 NOTE — REVIEW OF SYSTEMS
[Negative] : Heme/Lymph [FreeTextEntry2] : has parkinsons [FreeTextEntry7] : dysphagia [FreeTextEntry8] : BPH [FreeTextEntry9] : ataxia [de-identified] : superficial sacral ulceration

## 2022-03-01 NOTE — ASSESSMENT
[FreeTextEntry4] : Pt to F/U to [facility] in 2 weeks\par  [FreeTextEntry2] : Offloading/Pressure Relief \par Frequent Turning & Positioning \par Localized Wound Care \par Infection Prevention \par  [FreeTextEntry1] : [facility] - Provided patient and patients aid with instructions

## 2022-03-01 NOTE — PHYSICAL EXAM
[2 x 2] : 2 x 2  [JVD] : no jugular venous distention  [Normal Thyroid] : the thyroid was normal [Normal Breath Sounds] : Normal breath sounds [Normal Heart Sounds] : normal heart sounds [Normal Rate and Rhythm] : normal rate and rhythm [Abdomen Masses] : No abdominal massess [Abdomen Tenderness] : ~T ~M No abdominal tenderness [Tender] : nontender [Enlarged] : not enlarged [Alert] : alert [Oriented to Person] : oriented to person [Oriented to Place] : oriented to place [Oriented to Time] : oriented to time [Calm] : calm [de-identified] : elderly WM, NAD, alert, Ox3. [FreeTextEntry1] : Buttock -flaky/irritated skin, erythema, and excoriations within measurements [FreeTextEntry2] : 6.3 [FreeTextEntry3] : 4.0 [FreeTextEntry4] : <0.1 flaky skin [de-identified] : Scant Serosanguineous  [de-identified] : dry, flaking skin [de-identified] : Adaptic Touch & Sacral Border [de-identified] : Cleansed with Normal Saline\par \par  [TWNoteComboBox1] : Left [TWNoteComboBox6] : Pressure [de-identified] : other [de-identified] : None [de-identified] : None [de-identified] : None [de-identified] : No [de-identified] : 3x Weekly [de-identified] : False

## 2022-03-01 NOTE — PLAN
[FreeTextEntry1] : increase standing/ambulation/change of position\par adaptic, allevyn border.\par f/u 2 wks\par \par time spent 25 mns.

## 2022-03-02 DIAGNOSIS — G20 PARKINSON'S DISEASE: ICD-10-CM

## 2022-03-02 DIAGNOSIS — R33.9 RETENTION OF URINE, UNSPECIFIED: ICD-10-CM

## 2022-03-02 DIAGNOSIS — N40.1 BENIGN PROSTATIC HYPERPLASIA WITH LOWER URINARY TRACT SYMPTOMS: ICD-10-CM

## 2022-03-02 DIAGNOSIS — R26.89 OTHER ABNORMALITIES OF GAIT AND MOBILITY: ICD-10-CM

## 2022-03-02 DIAGNOSIS — Z98.890 OTHER SPECIFIED POSTPROCEDURAL STATES: ICD-10-CM

## 2022-03-02 DIAGNOSIS — Z82.3 FAMILY HISTORY OF STROKE: ICD-10-CM

## 2022-03-02 DIAGNOSIS — G62.9 POLYNEUROPATHY, UNSPECIFIED: ICD-10-CM

## 2022-03-02 DIAGNOSIS — Z80.0 FAMILY HISTORY OF MALIGNANT NEOPLASM OF DIGESTIVE ORGANS: ICD-10-CM

## 2022-03-02 DIAGNOSIS — Z79.899 OTHER LONG TERM (CURRENT) DRUG THERAPY: ICD-10-CM

## 2022-03-02 DIAGNOSIS — L89.152 PRESSURE ULCER OF SACRAL REGION, STAGE 2: ICD-10-CM

## 2022-03-02 DIAGNOSIS — R26.0 ATAXIC GAIT: ICD-10-CM

## 2022-03-02 DIAGNOSIS — Z87.440 PERSONAL HISTORY OF URINARY (TRACT) INFECTIONS: ICD-10-CM

## 2022-03-10 ENCOUNTER — APPOINTMENT (OUTPATIENT)
Dept: UROLOGY | Facility: CLINIC | Age: 77
End: 2022-03-10
Payer: MEDICARE

## 2022-03-10 VITALS
WEIGHT: 142 LBS | SYSTOLIC BLOOD PRESSURE: 97 MMHG | HEART RATE: 80 BPM | HEIGHT: 73 IN | TEMPERATURE: 97.8 F | BODY MASS INDEX: 18.82 KG/M2 | DIASTOLIC BLOOD PRESSURE: 64 MMHG | OXYGEN SATURATION: 99 % | RESPIRATION RATE: 14 BRPM

## 2022-03-10 DIAGNOSIS — G20 PARKINSON'S DISEASE: ICD-10-CM

## 2022-03-10 PROCEDURE — 51703 INSERT BLADDER CATH COMPLEX: CPT

## 2022-03-11 LAB
APPEARANCE: ABNORMAL
BACTERIA: ABNORMAL
BILIRUBIN URINE: NEGATIVE
BLOOD URINE: ABNORMAL
COLOR: ABNORMAL
GLUCOSE QUALITATIVE U: NEGATIVE
HYALINE CASTS: 0 /LPF
KETONES URINE: NEGATIVE
LEUKOCYTE ESTERASE URINE: ABNORMAL
MICROSCOPIC-UA: NORMAL
NITRITE URINE: POSITIVE
PH URINE: 8.5
PROTEIN URINE: ABNORMAL
RED BLOOD CELLS URINE: 11 /HPF
SPECIFIC GRAVITY URINE: 1.01
SQUAMOUS EPITHELIAL CELLS: 11 /HPF
TRIPLE PHOSPHATE CRYSTALS: ABNORMAL
UROBILINOGEN URINE: NORMAL
WHITE BLOOD CELLS URINE: 354 /HPF

## 2022-03-14 ENCOUNTER — NON-APPOINTMENT (OUTPATIENT)
Age: 77
End: 2022-03-14

## 2022-03-15 ENCOUNTER — APPOINTMENT (OUTPATIENT)
Dept: WOUND CARE | Facility: HOSPITAL | Age: 77
End: 2022-03-15
Payer: MEDICARE

## 2022-03-15 ENCOUNTER — OUTPATIENT (OUTPATIENT)
Dept: OUTPATIENT SERVICES | Facility: HOSPITAL | Age: 77
LOS: 1 days | Discharge: HOME | End: 2022-03-15
Payer: MEDICARE

## 2022-03-15 VITALS
TEMPERATURE: 98.4 F | HEIGHT: 73 IN | HEART RATE: 75 BPM | DIASTOLIC BLOOD PRESSURE: 54 MMHG | RESPIRATION RATE: 20 BRPM | WEIGHT: 142 LBS | BODY MASS INDEX: 18.82 KG/M2 | SYSTOLIC BLOOD PRESSURE: 98 MMHG | OXYGEN SATURATION: 95 %

## 2022-03-15 DIAGNOSIS — L89.152 PRESSURE ULCER OF SACRAL REGION, STAGE 2: ICD-10-CM

## 2022-03-15 PROCEDURE — G0463: CPT

## 2022-03-15 PROCEDURE — 99213 OFFICE O/P EST LOW 20 MIN: CPT

## 2022-03-15 RX ORDER — GENTAMICIN SULFATE 1 MG/G
0.1 CREAM TOPICAL
Qty: 30 | Refills: 2 | Status: ACTIVE | COMMUNITY
Start: 2022-03-15 | End: 1900-01-01

## 2022-03-16 DIAGNOSIS — G20 PARKINSON'S DISEASE: ICD-10-CM

## 2022-03-16 DIAGNOSIS — Z82.3 FAMILY HISTORY OF STROKE: ICD-10-CM

## 2022-03-16 DIAGNOSIS — G62.9 POLYNEUROPATHY, UNSPECIFIED: ICD-10-CM

## 2022-03-16 DIAGNOSIS — L89.152 PRESSURE ULCER OF SACRAL REGION, STAGE 2: ICD-10-CM

## 2022-03-16 DIAGNOSIS — R26.0 ATAXIC GAIT: ICD-10-CM

## 2022-03-16 DIAGNOSIS — R26.89 OTHER ABNORMALITIES OF GAIT AND MOBILITY: ICD-10-CM

## 2022-03-16 DIAGNOSIS — Z98.890 OTHER SPECIFIED POSTPROCEDURAL STATES: ICD-10-CM

## 2022-03-16 DIAGNOSIS — Z79.899 OTHER LONG TERM (CURRENT) DRUG THERAPY: ICD-10-CM

## 2022-03-16 DIAGNOSIS — Z80.0 FAMILY HISTORY OF MALIGNANT NEOPLASM OF DIGESTIVE ORGANS: ICD-10-CM

## 2022-03-16 DIAGNOSIS — R33.9 RETENTION OF URINE, UNSPECIFIED: ICD-10-CM

## 2022-03-16 DIAGNOSIS — Z87.440 PERSONAL HISTORY OF URINARY (TRACT) INFECTIONS: ICD-10-CM

## 2022-03-16 DIAGNOSIS — N40.1 BENIGN PROSTATIC HYPERPLASIA WITH LOWER URINARY TRACT SYMPTOMS: ICD-10-CM

## 2022-03-16 NOTE — HISTORY OF PRESENT ILLNESS
[FreeTextEntry1] : 77 yo WM, here with his aide for f/u of bilateral buttock sacral ulcers. Partial thickness loss in the dermal layer seen on both sides. I strongly emphassized the importance of standing/ambulating as much as possible throughout  the day/night to prevent the ulcers from getting worse and to help in healing.\par 3/15/22 left buttock stage 2 ulcer with brilliant green drainage noted

## 2022-03-16 NOTE — ASSESSMENT
[Verbal] : Verbal [Written] : Written [Patient] : Patient [Good - alert, interested, motivated] : Good - alert, interested, motivated [Verbalizes knowledge/Understanding] : Verbalizes knowledge/understanding [Skin Care] : skin care [Pressure relief] : pressure relief [Signs and symptoms of infection] : sign and symptoms of infection [How and When to Call] : how and when to call [Off-loading] : off-loading [Patient responsibility to plan of care] : patient responsibility to plan of care [Stable] : stable [Home] : Home [Wheelchair] : Wheelchair [Caregiver] : Caregiver [Dressing changes] : dressing changes [Nutrition] : nutrition [] : No [FreeTextEntry2] : Promote optimal skin integrity, offloading, infection prevention [FreeTextEntry3] : Wound presented with odor and bluish green drainage. [FreeTextEntry4] : Gentamicin cream escribed\par f/u 1 week [FreeTextEntry1] : [facility] - sent wound care instructions with patient

## 2022-03-16 NOTE — PLAN
[FreeTextEntry1] : increase standing/ambulation/change of position\par gentamicin cream,adaptic, allevyn border.QD\par f/u 1 wks\par \par time spent 25 mns.

## 2022-03-16 NOTE — PHYSICAL EXAM
[Normal Thyroid] : the thyroid was normal [Normal Breath Sounds] : Normal breath sounds [Normal Heart Sounds] : normal heart sounds [Normal Rate and Rhythm] : normal rate and rhythm [Alert] : alert [Oriented to Person] : oriented to person [Oriented to Place] : oriented to place [Oriented to Time] : oriented to time [Calm] : calm [4 x 4] : 4 x 4  [JVD] : no jugular venous distention  [Abdomen Masses] : No abdominal massess [Abdomen Tenderness] : ~T ~M No abdominal tenderness [Tender] : nontender [Enlarged] : not enlarged [de-identified] : elderly WM, NAD, alert, Ox3. [FreeTextEntry1] : Left buttock  [FreeTextEntry2] : 3.0 [FreeTextEntry3] : 3.5 [FreeTextEntry4] : <0.1 [de-identified] : small serosanguineous and bluish green [de-identified] : mild [de-identified] : mild erythema, flaky peeling skin [de-identified] : Gentamicin cream, Adaptic Touch and Sacral Border [de-identified] : NSC [de-identified] : Daily

## 2022-03-16 NOTE — VITALS
[Tender] : tender [Gnawing] : gnawing [FreeTextEntry3] : Left buttocks (5/10) [FreeTextEntry1] : position change [FreeTextEntry2] : lying on wound [FreeTextEntry4] : position change

## 2022-03-16 NOTE — REVIEW OF SYSTEMS
[Negative] : Heme/Lymph [FreeTextEntry2] : has parkinsons [FreeTextEntry7] : dysphagia [FreeTextEntry8] : BPH [FreeTextEntry9] : ataxia [de-identified] : superficial sacral ulceration

## 2022-03-17 ENCOUNTER — NON-APPOINTMENT (OUTPATIENT)
Age: 77
End: 2022-03-17

## 2022-03-17 LAB — BACTERIA UR CULT: NORMAL

## 2022-03-22 ENCOUNTER — NON-APPOINTMENT (OUTPATIENT)
Age: 77
End: 2022-03-22

## 2022-03-23 ENCOUNTER — APPOINTMENT (OUTPATIENT)
Dept: WOUND CARE | Facility: HOSPITAL | Age: 77
End: 2022-03-23
Payer: MEDICARE

## 2022-03-23 ENCOUNTER — OUTPATIENT (OUTPATIENT)
Dept: OUTPATIENT SERVICES | Facility: HOSPITAL | Age: 77
LOS: 1 days | Discharge: HOME | End: 2022-03-23
Payer: MEDICARE

## 2022-03-23 VITALS
SYSTOLIC BLOOD PRESSURE: 90 MMHG | HEART RATE: 79 BPM | HEIGHT: 73 IN | WEIGHT: 142 LBS | DIASTOLIC BLOOD PRESSURE: 51 MMHG | RESPIRATION RATE: 20 BRPM | BODY MASS INDEX: 18.82 KG/M2 | TEMPERATURE: 97.5 F | OXYGEN SATURATION: 100 %

## 2022-03-23 DIAGNOSIS — L89.152 PRESSURE ULCER OF SACRAL REGION, STAGE 2: ICD-10-CM

## 2022-03-23 PROCEDURE — G0463: CPT

## 2022-03-23 PROCEDURE — 99213 OFFICE O/P EST LOW 20 MIN: CPT

## 2022-03-24 DIAGNOSIS — Z79.899 OTHER LONG TERM (CURRENT) DRUG THERAPY: ICD-10-CM

## 2022-03-24 DIAGNOSIS — Z87.440 PERSONAL HISTORY OF URINARY (TRACT) INFECTIONS: ICD-10-CM

## 2022-03-24 DIAGNOSIS — R26.89 OTHER ABNORMALITIES OF GAIT AND MOBILITY: ICD-10-CM

## 2022-03-24 DIAGNOSIS — R33.9 RETENTION OF URINE, UNSPECIFIED: ICD-10-CM

## 2022-03-24 DIAGNOSIS — N40.1 BENIGN PROSTATIC HYPERPLASIA WITH LOWER URINARY TRACT SYMPTOMS: ICD-10-CM

## 2022-03-24 DIAGNOSIS — R26.0 ATAXIC GAIT: ICD-10-CM

## 2022-03-24 DIAGNOSIS — G62.9 POLYNEUROPATHY, UNSPECIFIED: ICD-10-CM

## 2022-03-24 DIAGNOSIS — Z80.0 FAMILY HISTORY OF MALIGNANT NEOPLASM OF DIGESTIVE ORGANS: ICD-10-CM

## 2022-03-24 DIAGNOSIS — G20 PARKINSON'S DISEASE: ICD-10-CM

## 2022-03-24 DIAGNOSIS — L89.152 PRESSURE ULCER OF SACRAL REGION, STAGE 2: ICD-10-CM

## 2022-03-24 DIAGNOSIS — Z82.3 FAMILY HISTORY OF STROKE: ICD-10-CM

## 2022-03-24 DIAGNOSIS — Z98.890 OTHER SPECIFIED POSTPROCEDURAL STATES: ICD-10-CM

## 2022-03-25 NOTE — HISTORY OF PRESENT ILLNESS
[FreeTextEntry1] : Mr. Patel returns to the center for ongoing Wound Care follow-up.\par He is pleased with his overall progress since last visit.\par Mr. Patel denies new complaints since last visit.\par He and his aid report compliance with ongoing local care regimen.\par No new areas of concern/ involvement today per their report...

## 2022-03-25 NOTE — PHYSICAL EXAM
[4 x 4] : 4 x 4  [Normal Thyroid] : the thyroid was normal [Normal Breath Sounds] : Normal breath sounds [Normal Heart Sounds] : normal heart sounds [Normal Rate and Rhythm] : normal rate and rhythm [Alert] : alert [Oriented to Person] : oriented to person [Oriented to Place] : oriented to place [Oriented to Time] : oriented to time [Calm] : calm [JVD] : no jugular venous distention  [Abdomen Masses] : No abdominal massess [Abdomen Tenderness] : ~T ~M No abdominal tenderness [Tender] : nontender [Enlarged] : not enlarged [de-identified] : elderly WM, NAD, alert, Ox3. [FreeTextEntry1] : Sacrum - left - flaky/irritated skin, erythema, and excoriations within measurements [FreeTextEntry2] : 6.3 [FreeTextEntry3] : 4.3 [FreeTextEntry4] : <0.1 flaky skin [de-identified] : Scant Serosanguineous  [de-identified] : dry, flaking skin [de-identified] : Gertrude Segovia [de-identified] : Cleansed with Normal Saline\par \par  [TWNoteComboBox1] : Midline [TWNoteComboBox6] : Pressure [de-identified] : other [de-identified] : None [de-identified] : None [de-identified] : None [de-identified] : No [de-identified] : 3x Weekly [de-identified] : Primary Dressing

## 2022-03-25 NOTE — PLAN
[FreeTextEntry1] : offload/frequent rotation/change of postion/increase standing/ambul.\par Aquaphor,DD allevyn border QOD/3X/week to sacral area\par f/u 2 wks.\par \par time spent 25 mins.

## 2022-03-25 NOTE — REVIEW OF SYSTEMS
[Negative] : Heme/Lymph [FreeTextEntry2] : has parkinsons [FreeTextEntry7] : dysphagia [FreeTextEntry8] : BPH [FreeTextEntry9] : ataxia [de-identified] : superficial sacral ulceration

## 2022-03-25 NOTE — HISTORY OF PRESENT ILLNESS
[FreeTextEntry1] : 77 yo WM, with Parkinson's dis, presently receiving PT, but still with limited ambulation, here for f/u of sacral pressure ulcer. I emphasized the importance to pt and his aide for frequent standing/ambulation throughout the day and night to help in the healing and prevention of press. ulcers.\par \par 2/2/22 sacral area closed. much improved\par 2/16/22 sacral area has come excoriations, will add aquaphor to soften up dry peeling skin .

## 2022-03-25 NOTE — REVIEW OF SYSTEMS
[Arthralgias] : arthralgias [Joint Swelling] : joint swelling [Joint Stiffness] : joint stiffness [Limb Pain] : limb pain [Limb Swelling] : limb swelling [As Noted in HPI] : as noted in HPI [Negative] : Heme/Lymph [Feeling Poorly] : not feeling poorly [Feeling Tired] : not feeling tired [Depression] : no depression [de-identified] : "Parkinson's..."

## 2022-03-25 NOTE — PLAN
[FreeTextEntry1] : As above in notation.\par Patient personally seen and examined.\par No new events since last visit.\par Vitals non-suggestive.\par Wounds clean and as described above.\par No new labs required and no imaging pending.\par Clinically, improving overall and surgically stable at present.\par To continue present local and supportive care with further education and reminders today regarding off-loading.\par RTWC in 2 to 3 weeks.\par Please note that more than 50% of time was spent in counseling and coordination of care.

## 2022-03-25 NOTE — PHYSICAL EXAM
[4 x 4] : 4 x 4  [FreeTextEntry1] : Left Buttock/sacrum- scattered excoriations/areas of fragile epithelium [FreeTextEntry2] : 8.4 [FreeTextEntry3] : 8.4 [FreeTextEntry4] : <0.1 [de-identified] : small serous/sanguineous  [de-identified] : intact skin [de-identified] : Adaptic touch/Allevyn border [de-identified] : Mechanically cleansed with Sterile gauze and 0.9% Normal Saline\par  [TWNoteComboBox5] : No [TWNoteComboBox6] : Pressure [de-identified] : No [de-identified] : other [de-identified] : None [de-identified] : None [de-identified] : None [de-identified] : No [de-identified] : Every other day [de-identified] : Primary Dressing

## 2022-03-25 NOTE — ASSESSMENT
[Dressing changes] : dressing changes [Skin Care] : skin care [Pressure relief] : pressure relief [Signs and symptoms of infection] : sign and symptoms of infection [Nutrition] : nutrition [How and When to Call] : how and when to call [Off-loading] : off-loading [Home Health] : home health [Patient responsibility to plan of care] : patient responsibility to plan of care [] : Yes [Stable] : stable [Other: ____] : [unfilled] [Wheelchair] : Wheelchair [Not Applicable - Long Term Care/Home Health Agency] : Long Term Care/Home Health Agency: Not Applicable [Verbal] : Verbal [Written] : Written [Demo] : Demo [Patient] : Patient [Caregiver] : Caregiver [Good - alert, interested, motivated] : Good - alert, interested, motivated [Verbalizes knowledge/Understanding] : Verbalizes knowledge/understanding [FreeTextEntry2] : Infection Prevention\par Promote skin integrity\par Pressure relief\par Turn and reposition at least every 2 hours\par avoid shearing/friction when changing positions\par Educate on the benefits of a increased protein/caloric intake in regards to optimal wound healing\par \par  [FreeTextEntry4] : F/U 2 weeks\par pt b/p was 90/51 mmHg, pt was in no distress during and after Wound care visit, no hypotensive symptoms to note, MD was made aware of findings. Pt b/p is generally on the lower end of the normal range. Pt was offered fluids at  but denied any and stated he would do so when back at his independent living facility.\par \par Ambika order\par  [FreeTextEntry1] : Pt provided with instructions

## 2022-03-25 NOTE — ASSESSMENT
[Verbal] : Verbal [Demo] : Demo [Patient] : Patient [Good - alert, interested, motivated] : Good - alert, interested, motivated [Verbalizes knowledge/Understanding] : Verbalizes knowledge/understanding [Dressing changes] : dressing changes [Signs and symptoms of infection] : sign and symptoms of infection [Pressure relief] : pressure relief [How and When to Call] : how and when to call [Off-loading] : off-loading [Patient responsibility to plan of care] : patient responsibility to plan of care [] : Yes [Stable] : stable [Home] : Home [Wheelchair] : Wheelchair [Not Applicable - Long Term Care/Home Health Agency] : Long Term Care/Home Health Agency: Not Applicable [FreeTextEntry2] : Offloading/Pressure Relief \par Frequent Turning & Positioning \par Localized Wound Care \par Infection Prevention \par F/U 2 weeks [FreeTextEntry4] : F/U 2 weeks\par As per MD, aquaphor to be used on dry skin\par V/S were WNL and were viewed by nurse, due to technician error they were not input into EMR. Pt discharged in stable condition.\par  [FreeTextEntry1] : [facility] - Provided patient and patients aid with instructions

## 2022-03-28 ENCOUNTER — NON-APPOINTMENT (OUTPATIENT)
Age: 77
End: 2022-03-28

## 2022-03-30 ENCOUNTER — NON-APPOINTMENT (OUTPATIENT)
Age: 77
End: 2022-03-30

## 2022-03-31 ENCOUNTER — APPOINTMENT (OUTPATIENT)
Dept: UROLOGY | Facility: CLINIC | Age: 77
End: 2022-03-31
Payer: MEDICARE

## 2022-03-31 ENCOUNTER — NON-APPOINTMENT (OUTPATIENT)
Age: 77
End: 2022-03-31

## 2022-03-31 DIAGNOSIS — R31.0 GROSS HEMATURIA: ICD-10-CM

## 2022-03-31 DIAGNOSIS — N32.89 OTHER SPECIFIED DISORDERS OF BLADDER: ICD-10-CM

## 2022-03-31 PROCEDURE — 51700 IRRIGATION OF BLADDER: CPT

## 2022-03-31 PROCEDURE — A4216: CPT | Mod: NC

## 2022-03-31 RX ORDER — ONABOTULINUMTOXINA 100 [USP'U]/1
100 INJECTION, POWDER, LYOPHILIZED, FOR SOLUTION INTRADERMAL; INTRAMUSCULAR
Qty: 6 | Refills: 0 | Status: COMPLETED | OUTPATIENT
Start: 2022-02-17 | End: 2022-03-31

## 2022-03-31 RX ORDER — ONABOTULINUMTOXINA 100 [USP'U]/1
100 INJECTION, POWDER, LYOPHILIZED, FOR SOLUTION INTRADERMAL; INTRAMUSCULAR
Qty: 6 | Refills: 0 | Status: COMPLETED | OUTPATIENT
Start: 2021-12-06 | End: 2022-03-31

## 2022-04-04 ENCOUNTER — NON-APPOINTMENT (OUTPATIENT)
Age: 77
End: 2022-04-04

## 2022-04-05 ENCOUNTER — APPOINTMENT (OUTPATIENT)
Dept: WOUND CARE | Facility: HOSPITAL | Age: 77
End: 2022-04-05
Payer: MEDICARE

## 2022-04-05 ENCOUNTER — OUTPATIENT (OUTPATIENT)
Dept: OUTPATIENT SERVICES | Facility: HOSPITAL | Age: 77
LOS: 1 days | Discharge: HOME | End: 2022-04-05
Payer: MEDICARE

## 2022-04-05 VITALS
RESPIRATION RATE: 20 BRPM | SYSTOLIC BLOOD PRESSURE: 113 MMHG | TEMPERATURE: 97 F | OXYGEN SATURATION: 96 % | BODY MASS INDEX: 18.82 KG/M2 | WEIGHT: 142 LBS | HEIGHT: 73 IN | HEART RATE: 83 BPM | DIASTOLIC BLOOD PRESSURE: 68 MMHG

## 2022-04-05 DIAGNOSIS — L89.152 PRESSURE ULCER OF SACRAL REGION, STAGE 2: ICD-10-CM

## 2022-04-05 LAB
APPEARANCE: CLEAR
BACTERIA: NEGATIVE
BILIRUBIN URINE: NEGATIVE
BLOOD URINE: ABNORMAL
COLOR: ABNORMAL
GLUCOSE QUALITATIVE U: NEGATIVE
HYALINE CASTS: 3 /LPF
KETONES URINE: NEGATIVE
LEUKOCYTE ESTERASE URINE: ABNORMAL
MICROSCOPIC-UA: NORMAL
NITRITE URINE: NEGATIVE
PH URINE: 6.5
PROTEIN URINE: ABNORMAL
RED BLOOD CELLS URINE: 2 /HPF
SPECIFIC GRAVITY URINE: 1
SQUAMOUS EPITHELIAL CELLS: 1 /HPF
UROBILINOGEN URINE: NORMAL
WHITE BLOOD CELLS URINE: 9 /HPF

## 2022-04-05 PROCEDURE — G0463: CPT

## 2022-04-05 PROCEDURE — 99213 OFFICE O/P EST LOW 20 MIN: CPT

## 2022-04-06 DIAGNOSIS — G62.9 POLYNEUROPATHY, UNSPECIFIED: ICD-10-CM

## 2022-04-06 DIAGNOSIS — Z98.890 OTHER SPECIFIED POSTPROCEDURAL STATES: ICD-10-CM

## 2022-04-06 DIAGNOSIS — N40.1 BENIGN PROSTATIC HYPERPLASIA WITH LOWER URINARY TRACT SYMPTOMS: ICD-10-CM

## 2022-04-06 DIAGNOSIS — R33.9 RETENTION OF URINE, UNSPECIFIED: ICD-10-CM

## 2022-04-06 DIAGNOSIS — Z82.3 FAMILY HISTORY OF STROKE: ICD-10-CM

## 2022-04-06 DIAGNOSIS — Z80.0 FAMILY HISTORY OF MALIGNANT NEOPLASM OF DIGESTIVE ORGANS: ICD-10-CM

## 2022-04-06 DIAGNOSIS — Z79.899 OTHER LONG TERM (CURRENT) DRUG THERAPY: ICD-10-CM

## 2022-04-06 DIAGNOSIS — G20 PARKINSON'S DISEASE: ICD-10-CM

## 2022-04-06 DIAGNOSIS — L89.152 PRESSURE ULCER OF SACRAL REGION, STAGE 2: ICD-10-CM

## 2022-04-06 DIAGNOSIS — Z87.440 PERSONAL HISTORY OF URINARY (TRACT) INFECTIONS: ICD-10-CM

## 2022-04-06 DIAGNOSIS — R26.89 OTHER ABNORMALITIES OF GAIT AND MOBILITY: ICD-10-CM

## 2022-04-06 DIAGNOSIS — R26.0 ATAXIC GAIT: ICD-10-CM

## 2022-04-06 NOTE — ASSESSMENT
[Verbal] : Verbal [Written] : Written [Demo] : Demo [Patient] : Patient [Caregiver] : Caregiver [Good - alert, interested, motivated] : Good - alert, interested, motivated [Verbalizes knowledge/Understanding] : Verbalizes knowledge/understanding [Dressing changes] : dressing changes [Skin Care] : skin care [Pressure relief] : pressure relief [Signs and symptoms of infection] : sign and symptoms of infection [Nutrition] : nutrition [How and When to Call] : how and when to call [Off-loading] : off-loading [Patient responsibility to plan of care] : patient responsibility to plan of care [] : Yes [FreeTextEntry2] : Promote Skin Integrity \par S/S of Infection \par Offloading Compliance \par Pressure Relief Strategies\par Nutrition\par F/U 1 month  [FreeTextEntry4] : MD spoke with pt's emergency contact regarding alternative pressure relief methods via telephone during visit. \par Pt states he would like to be seen by Nutrition during the next appointment. Nutritional consult was submitted 3/28/22. Pt also requests to return to [facility] in 1 month due to "other conflicting appointments". MD aware.\par Pt educated to continue pressure relief methods to the sacral area and to change positions frequently. \par F/U 1 month

## 2022-04-06 NOTE — HISTORY OF PRESENT ILLNESS
[FreeTextEntry1] : 77 yo WM, here with his aide for f/u of bilateral buttock sacral ulcers. Partial thickness loss in the dermal layer seen on both sides. I strongly emphassized the importance of standing/ambulating as much as possible throughout  the day/night to prevent the ulcers from getting worse and to help in healing.\par 3/15/22 left buttock stage 2 ulcer with brilliant green drainage noted\par 4/5/22 ulcer closed. Using protective dressing, recommended egg crate to help redistribute pressure.

## 2022-04-06 NOTE — PLAN
[FreeTextEntry1] : increase standing/ambulation/change of position\par ,adaptic, allevyn border.3X/week\par f/u 1 month\par \par time spent 25 mns.

## 2022-04-06 NOTE — PHYSICAL EXAM
[4 x 4] : 4 x 4  [Normal Thyroid] : the thyroid was normal [Normal Breath Sounds] : Normal breath sounds [Normal Heart Sounds] : normal heart sounds [Normal Rate and Rhythm] : normal rate and rhythm [Alert] : alert [Oriented to Person] : oriented to person [Oriented to Place] : oriented to place [Oriented to Time] : oriented to time [Calm] : calm [JVD] : no jugular venous distention  [Abdomen Masses] : No abdominal massess [Abdomen Tenderness] : ~T ~M No abdominal tenderness [Tender] : nontender [Enlarged] : not enlarged [de-identified] : elderly WM, NAD, alert, Ox3. [FreeTextEntry1] : Left Buttock/sacrum- scattered excoriations/areas of fragile epithelium  [FreeTextEntry2] : 8.2 [FreeTextEntry3] : 8.3 [FreeTextEntry4] : 0.1 [de-identified] : serosanguineous [de-identified] : Adaptic Touch and Allevyn Border  [de-identified] : Mechanically Cleansed with Normal Saline  [TWNoteComboBox4] : Small [TWNoteComboBox5] : No [TWNoteComboBox6] : Pressure [de-identified] : No [de-identified] : Erythema [de-identified] : None [de-identified] : None [de-identified] : 100% [de-identified] : No [de-identified] : Every other day [de-identified] : Primary Dressing

## 2022-04-06 NOTE — VITALS
[Burning] : burning [Tender] : tender [] : No [de-identified] : Pt states 2/10 pain at present. [FreeTextEntry3] : Sacrum/ Left Buttock  [FreeTextEntry1] : Offloading [FreeTextEntry2] : Pressure, Shearing of the Skin  [FreeTextEntry4] : Pt educated to offload the sacrum. Pt positioned on side for visit to avoid pressure to sacrum.

## 2022-04-08 ENCOUNTER — NON-APPOINTMENT (OUTPATIENT)
Age: 77
End: 2022-04-08

## 2022-04-11 ENCOUNTER — APPOINTMENT (OUTPATIENT)
Dept: ULTRASOUND IMAGING | Facility: CLINIC | Age: 77
End: 2022-04-11

## 2022-04-15 ENCOUNTER — NON-APPOINTMENT (OUTPATIENT)
Age: 77
End: 2022-04-15

## 2022-04-19 ENCOUNTER — APPOINTMENT (OUTPATIENT)
Dept: PHYSICAL MEDICINE AND REHAB | Facility: CLINIC | Age: 77
End: 2022-04-19
Payer: MEDICARE

## 2022-04-19 VITALS
DIASTOLIC BLOOD PRESSURE: 70 MMHG | TEMPERATURE: 97.3 F | HEART RATE: 83 BPM | OXYGEN SATURATION: 98 % | SYSTOLIC BLOOD PRESSURE: 106 MMHG

## 2022-04-19 PROCEDURE — 95874 GUIDE NERV DESTR NEEDLE EMG: CPT

## 2022-04-19 PROCEDURE — 64644 CHEMODENERV 1 EXTREM 5/> MUS: CPT

## 2022-04-28 ENCOUNTER — APPOINTMENT (OUTPATIENT)
Dept: UROLOGY | Facility: CLINIC | Age: 77
End: 2022-04-28
Payer: MEDICARE

## 2022-04-28 VITALS
SYSTOLIC BLOOD PRESSURE: 99 MMHG | HEART RATE: 88 BPM | TEMPERATURE: 98 F | RESPIRATION RATE: 16 BRPM | OXYGEN SATURATION: 96 % | DIASTOLIC BLOOD PRESSURE: 63 MMHG

## 2022-04-28 PROCEDURE — 51702 INSERT TEMP BLADDER CATH: CPT

## 2022-05-03 ENCOUNTER — OUTPATIENT (OUTPATIENT)
Dept: OUTPATIENT SERVICES | Facility: HOSPITAL | Age: 77
LOS: 1 days | Discharge: HOME | End: 2022-05-03
Payer: MEDICARE

## 2022-05-03 ENCOUNTER — APPOINTMENT (OUTPATIENT)
Dept: WOUND CARE | Facility: HOSPITAL | Age: 77
End: 2022-05-03
Payer: MEDICARE

## 2022-05-03 VITALS
WEIGHT: 142 LBS | TEMPERATURE: 97.1 F | DIASTOLIC BLOOD PRESSURE: 62 MMHG | RESPIRATION RATE: 20 BRPM | HEART RATE: 70 BPM | HEIGHT: 73 IN | BODY MASS INDEX: 18.82 KG/M2 | OXYGEN SATURATION: 97 % | SYSTOLIC BLOOD PRESSURE: 99 MMHG

## 2022-05-03 DIAGNOSIS — L89.152 PRESSURE ULCER OF SACRAL REGION, STAGE 2: ICD-10-CM

## 2022-05-03 PROCEDURE — 99213 OFFICE O/P EST LOW 20 MIN: CPT

## 2022-05-03 PROCEDURE — G0463: CPT

## 2022-05-03 NOTE — REASON FOR VISIT
I spoke with the patient today. She is insisting that [facility] told her that we should call them and file an appeal for the patient to be able to continue care with [facility]. I explained to the patient that they are out of network. Patient states she is finally getting help with her pain and that [facility] was no help to her. She wants to know why we can not issue a referral or file an appeal with her insurance. I explained that [facility] manages her insurance and claims. She requested that I try to get approval for her to continue care at [facility]. Please advise, thank you    [Follow-Up: _____] : a [unfilled] follow-up visit [Formal Caregiver] : formal caregiver

## 2022-05-04 DIAGNOSIS — R33.9 RETENTION OF URINE, UNSPECIFIED: ICD-10-CM

## 2022-05-04 DIAGNOSIS — Z80.0 FAMILY HISTORY OF MALIGNANT NEOPLASM OF DIGESTIVE ORGANS: ICD-10-CM

## 2022-05-04 DIAGNOSIS — G62.9 POLYNEUROPATHY, UNSPECIFIED: ICD-10-CM

## 2022-05-04 DIAGNOSIS — L89.152 PRESSURE ULCER OF SACRAL REGION, STAGE 2: ICD-10-CM

## 2022-05-04 DIAGNOSIS — N40.1 BENIGN PROSTATIC HYPERPLASIA WITH LOWER URINARY TRACT SYMPTOMS: ICD-10-CM

## 2022-05-04 DIAGNOSIS — R26.89 OTHER ABNORMALITIES OF GAIT AND MOBILITY: ICD-10-CM

## 2022-05-04 DIAGNOSIS — Z82.3 FAMILY HISTORY OF STROKE: ICD-10-CM

## 2022-05-04 DIAGNOSIS — Z87.440 PERSONAL HISTORY OF URINARY (TRACT) INFECTIONS: ICD-10-CM

## 2022-05-04 DIAGNOSIS — Z98.890 OTHER SPECIFIED POSTPROCEDURAL STATES: ICD-10-CM

## 2022-05-04 DIAGNOSIS — G20 PARKINSON'S DISEASE: ICD-10-CM

## 2022-05-04 DIAGNOSIS — Z79.899 OTHER LONG TERM (CURRENT) DRUG THERAPY: ICD-10-CM

## 2022-05-04 DIAGNOSIS — R26.0 ATAXIC GAIT: ICD-10-CM

## 2022-05-04 NOTE — PHYSICAL EXAM
[Normal Thyroid] : the thyroid was normal [Normal Breath Sounds] : Normal breath sounds [Normal Heart Sounds] : normal heart sounds [Normal Rate and Rhythm] : normal rate and rhythm [Alert] : alert [Oriented to Person] : oriented to person [Oriented to Place] : oriented to place [Oriented to Time] : oriented to time [Calm] : calm [4 x 4] : 4 x 4  [Abdominal Pad] : Abdominal Pad [JVD] : no jugular venous distention  [Abdomen Masses] : No abdominal massess [Abdomen Tenderness] : ~T ~M No abdominal tenderness [Tender] : nontender [Enlarged] : not enlarged [de-identified] : elderly WM, NAD, alert, Ox3. [FreeTextEntry1] : Left buttocks [FreeTextEntry2] : 0.4 [FreeTextEntry3] : 0.3 [FreeTextEntry4] : 0.1 [de-identified] : Serous/sanguinous [de-identified] : 50% [de-identified] : Silver alginate [de-identified] : Mechanically cleansed with sterile gauze and normal saline.\par Cloth tape  [TWNoteComboBox4] : Moderate [de-identified] : Erythema [de-identified] : None [de-identified] : None [de-identified] : 50% [de-identified] : Yes [de-identified] : Every other day [de-identified] : Primary Dressing

## 2022-05-04 NOTE — REVIEW OF SYSTEMS
[Negative] : Heme/Lymph [FreeTextEntry2] : has parkinsons [FreeTextEntry7] : dysphagia [FreeTextEntry8] : BPH [FreeTextEntry9] : ataxia [de-identified] : superficial sacral ulceration

## 2022-05-04 NOTE — HISTORY OF PRESENT ILLNESS
[FreeTextEntry1] : 75 yo WM, here for f/u of a chronic sacral pressure ulcer. Appears to be a stage 2 now with partial dermis loss. Encouraged pt/aide of importance of frequent rotation/change of position.

## 2022-05-04 NOTE — PLAN
[FreeTextEntry1] : frequent change of position/rotation\par ag alginate, DD\par f/u 1 month.'\par \par \par \par time spent 25 mins.

## 2022-05-04 NOTE — ASSESSMENT
[Verbal] : Verbal [Written] : Written [Demo] : Demo [Patient] : Patient [Caregiver] : Caregiver [Good - alert, interested, motivated] : Good - alert, interested, motivated [Verbalizes knowledge/Understanding] : Verbalizes knowledge/understanding [Dressing changes] : dressing changes [Skin Care] : skin care [Signs and symptoms of infection] : sign and symptoms of infection [Nutrition] : nutrition [How and When to Call] : how and when to call [Pain Management] : pain management [Patient responsibility to plan of care] : patient responsibility to plan of care [Stable] : stable [Home] : Home [Wheelchair] : Wheelchair [Not Applicable - Long Term Care/Home Health Agency] : Long Term Care/Home Health Agency: Not Applicable [] : No [FreeTextEntry2] : Infection prevention\par Localized wound care \par Goal remaining pain free regarding wounds\par  [FreeTextEntry4] : Patient states he is currently using and egg crate cover to sleep on. \par Nutrition consult to be rescheduled to earlier time\par Follow up in 4 weeks

## 2022-05-13 ENCOUNTER — EMERGENCY (EMERGENCY)
Facility: HOSPITAL | Age: 77
LOS: 1 days | Discharge: HOME | End: 2022-05-13
Attending: EMERGENCY MEDICINE
Payer: MEDICARE

## 2022-05-13 VITALS
OXYGEN SATURATION: 98 % | RESPIRATION RATE: 18 BRPM | DIASTOLIC BLOOD PRESSURE: 66 MMHG | HEART RATE: 78 BPM | SYSTOLIC BLOOD PRESSURE: 110 MMHG | TEMPERATURE: 98 F

## 2022-05-13 VITALS
WEIGHT: 134.92 LBS | DIASTOLIC BLOOD PRESSURE: 61 MMHG | TEMPERATURE: 98 F | HEIGHT: 73 IN | HEART RATE: 86 BPM | OXYGEN SATURATION: 95 % | RESPIRATION RATE: 20 BRPM | SYSTOLIC BLOOD PRESSURE: 116 MMHG

## 2022-05-13 LAB
APPEARANCE UR: ABNORMAL
BACTERIA # UR AUTO: ABNORMAL
BILIRUB UR-MCNC: NEGATIVE — SIGNIFICANT CHANGE UP
COLOR SPEC: YELLOW — SIGNIFICANT CHANGE UP
DIFF PNL FLD: NEGATIVE — SIGNIFICANT CHANGE UP
EPI CELLS # UR: 0 /HPF — SIGNIFICANT CHANGE UP
GLUCOSE UR QL: NEGATIVE — SIGNIFICANT CHANGE UP
HYALINE CASTS # UR AUTO: 2 /LPF — SIGNIFICANT CHANGE UP (ref 0–2)
KETONES UR-MCNC: NEGATIVE — SIGNIFICANT CHANGE UP
LEUKOCYTE ESTERASE UR-ACNC: ABNORMAL
NITRITE UR-MCNC: POSITIVE
PH UR: 8 — SIGNIFICANT CHANGE UP (ref 5–8)
PROT UR-MCNC: ABNORMAL
RBC CASTS # UR COMP ASSIST: 4 /HPF — SIGNIFICANT CHANGE UP (ref 0–4)
SP GR SPEC: 1.01 — SIGNIFICANT CHANGE UP (ref 1.01–1.02)
UROBILINOGEN FLD QL: NEGATIVE — SIGNIFICANT CHANGE UP
WBC UR QL: 282 /HPF — HIGH (ref 0–5)

## 2022-05-13 PROCEDURE — 51702 INSERT TEMP BLADDER CATH: CPT

## 2022-05-13 PROCEDURE — 87086 URINE CULTURE/COLONY COUNT: CPT

## 2022-05-13 PROCEDURE — 99283 EMERGENCY DEPT VISIT LOW MDM: CPT | Mod: GC

## 2022-05-13 PROCEDURE — 99283 EMERGENCY DEPT VISIT LOW MDM: CPT | Mod: 25

## 2022-05-13 PROCEDURE — 81001 URINALYSIS AUTO W/SCOPE: CPT

## 2022-05-13 NOTE — ED PROVIDER NOTE - OBJECTIVE STATEMENT
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 Feels like fowler is clogged, last changed 2 weeks ago. denies fever, N/V/D, prior abd surgeries, hematuria.

## 2022-05-13 NOTE — ED PROVIDER NOTE - PROGRESS NOTE DETAILS
Mary Anne, PGY-3: signout received, given chronic indwelling fowler and pt recurrent c diff 2/2 ax, plan was to f/u urine culture.

## 2022-05-13 NOTE — ED PROVIDER NOTE - PATIENT PORTAL LINK FT
You can access the FollowMyHealth Patient Portal offered by [facility] by registering at the following website: [email or web address]. By joining [facility]’s FollowMyHealth portal, you will also be able to view your health information using other applications (apps) compatible with our system.

## 2022-05-13 NOTE — ED PROVIDER NOTE - NSFOLLOWUPINSTRUCTIONS_ED_ALL_ED_FT
- Continue all regular medications  - For pain, take tylenol or ibuprofen  - Follow up with your urologist as scheduled  - You were given copies of labs and/or imaging results if applicable, please take them to your follow up appointments  - Return to the ER for fever, fowler problems or any worsening symptoms or concerns

## 2022-05-13 NOTE — ED PROVIDER NOTE - PHYSICAL EXAMINATION
Physical Exam:  Gen: NAD, non-toxic appearing  Head: NCAT  HEENT: EOMI, PEERLA, normal conjunctiva, tongue midline, oral mucosa moist  Lung: no respiratory distress, speaking in full sentences  Abd: soft, suprapubic TTP, bladder palpable  MSK: no visible deformities, ROM normal in UE/LE  Skin: Warm, well perfused, no rash  Psych: normal affect, calm

## 2022-05-13 NOTE — ED ADULT NURSE NOTE - OBJECTIVE STATEMENT
pt is a 76 y.o Male coming to the ED complaining of urinary retention. PMH of urinary retention secondary to BPH and Parkinson's. Pt came into ED with fowler placed, states he feels like he is retaining and that the fowler isn't draining . As per pt the day before 600 output and today was only 10. On arrival fowler bag has less than 10 cc and moderate sedimentation noted in tube. previous fowler balloon deflated, removed without resistance, pain or blood . new 18 fr cud fowler was placed with 2 RN's at bedside, sterile procedure followed, urine draining. pt is a 76 y.o Male coming to the ED complaining of urinary retention. PMH of urinary retention secondary to BPH and Parkinson's. Pt came into ED with fowler placed, states he feels like he is retaining and that the fowler isn't draining . As per pt the day before 600 output and today was only 10. On arrival fowler bag has less than 10 cc and moderate sedimentation noted in tube. pt on assessment A&Ox4, breathing non labored on room air, afebrile, denies headache/chest pains/fevers/SOB/sick contact.  previous fowler balloon deflated, removed without resistance, pain or blood . new 18 fr coude fowler was placed with 2 RN's at bedside, no resistance/pain/blood noted, sterile procedure followed, urine draining yellow/clear. pt is a 76 y.o Male coming to the ED complaining of urinary retention. PMH of urinary retention secondary to BPH and Parkinson's. Pt came into ED with fowler placed, states he feels like he is retaining and that the fowler isn't draining . As per pt the day before 600 output and today was only 10. On arrival fowler bag has less than 10 cc and moderate sedimentation noted in tube. pt on assessment A&Ox4, breathing non labored on room air, afebrile, denies headache/chest pains/fevers/SOB/sick contact.  previous fowler balloon deflated, removed without resistance, pain or blood . new 18 fr coude fowler was placed with 2 RN's at bedside, no resistance/pain/blood noted, sterile procedure followed, urine draining yellow/clear, initial 500cc output.

## 2022-05-13 NOTE — ED PROVIDER NOTE - CLINICAL SUMMARY MEDICAL DECISION MAKING FREE TEXT BOX
75yo male p/w clogged fowler catheter overnight, 100 cc's in bag, no clots. r/o infection. urine, fowler change and dc.

## 2022-05-13 NOTE — ED ADULT NURSE NOTE - NSIMPLEMENTINTERV_GEN_ALL_ED
Implemented All Fall with Harm Risk Interventions:  [place] to call system. Call bell, personal items and telephone within reach. Instruct patient to call for assistance. Room bathroom lighting operational. Non-slip footwear when patient is off stretcher. Physically safe environment: no spills, clutter or unnecessary equipment. Stretcher in lowest position, wheels locked, appropriate side rails in place. Provide visual cue, wrist band, yellow gown, etc. Monitor gait and stability. Monitor for mental status changes and reorient to person, place, and time. Review medications for side effects contributing to fall risk. Reinforce activity limits and safety measures with patient and family. Provide visual clues: red socks.

## 2022-05-13 NOTE — ED PROVIDER NOTE - ATTENDING CONTRIBUTION TO CARE
Afebrile. Awake and Alert. Lungs CTA. Heart RRR. Abdomen soft NTND. CN II-XII grossly intact. Moves all extremities without lateralization. Sediment noted in old Spencer, patient reports chronic.    Spencer clogged likely 2/2 sediment, replace  r/o UTI: no fever, no SP TTP, no CVA TTP

## 2022-05-15 LAB
CULTURE RESULTS: SIGNIFICANT CHANGE UP
SPECIMEN SOURCE: SIGNIFICANT CHANGE UP

## 2022-05-16 NOTE — ED POST DISCHARGE NOTE - DETAILS
Pt w/ chronic Spencer. Spoke to patient and informed of results. Pt reports he is feeling improved. Denies fevers or issues with Spencer. Aware to have repeat urine sample taken if symptoms develop. - Lilia Pelletier PA-C

## 2022-05-30 ENCOUNTER — NON-APPOINTMENT (OUTPATIENT)
Age: 77
End: 2022-05-30

## 2022-05-31 ENCOUNTER — APPOINTMENT (OUTPATIENT)
Dept: WOUND CARE | Facility: HOSPITAL | Age: 77
End: 2022-05-31
Payer: MEDICARE

## 2022-05-31 ENCOUNTER — OUTPATIENT (OUTPATIENT)
Dept: OUTPATIENT SERVICES | Facility: HOSPITAL | Age: 77
LOS: 1 days | Discharge: HOME | End: 2022-05-31
Payer: MEDICARE

## 2022-05-31 VITALS
HEART RATE: 70 BPM | WEIGHT: 142 LBS | RESPIRATION RATE: 20 BRPM | HEIGHT: 73 IN | SYSTOLIC BLOOD PRESSURE: 105 MMHG | BODY MASS INDEX: 18.82 KG/M2 | OXYGEN SATURATION: 97 % | DIASTOLIC BLOOD PRESSURE: 63 MMHG | TEMPERATURE: 97.2 F

## 2022-05-31 DIAGNOSIS — L89.152 PRESSURE ULCER OF SACRAL REGION, STAGE 2: ICD-10-CM

## 2022-05-31 PROCEDURE — 99213 OFFICE O/P EST LOW 20 MIN: CPT

## 2022-05-31 PROCEDURE — G0463: CPT

## 2022-05-31 NOTE — PHYSICAL EXAM
[Normal Thyroid] : the thyroid was normal [Normal Breath Sounds] : Normal breath sounds [Normal Heart Sounds] : normal heart sounds [Normal Rate and Rhythm] : normal rate and rhythm [Alert] : alert [Oriented to Person] : oriented to person [Oriented to Place] : oriented to place [Oriented to Time] : oriented to time [Calm] : calm [JVD] : no jugular venous distention  [Ankle Swelling (On Exam)] : not present [Varicose Veins Of Lower Extremities] : not present [] : not present [Abdomen Masses] : No abdominal massess [Abdomen Tenderness] : ~T ~M No abdominal tenderness [Tender] : nontender [Enlarged] : not enlarged [de-identified] : elderly WM, NAD, alert, Ox3. [FreeTextEntry1] : Buttocks- fragile epithelium  [FreeTextEntry2] : 6.1 [FreeTextEntry3] : 4.3 [FreeTextEntry4] : 0.1 [de-identified] : Serosanguineous [de-identified] : moderate [de-identified] : Adpatic Touch [de-identified] : Mechanically cleansed with sterile gauze and normal saline.\par  [TWNoteComboBox1] : Left [TWNoteComboBox4] : Small [de-identified] : Erythema [de-identified] : None [de-identified] : None [de-identified] : 100% [de-identified] : No [de-identified] : 3x Weekly [de-identified] : Primary Dressing

## 2022-05-31 NOTE — REVIEW OF SYSTEMS
[Negative] : Heme/Lymph [FreeTextEntry2] : has parkinsons [FreeTextEntry7] : dysphagia [FreeTextEntry8] : BPH [FreeTextEntry9] : ataxia [de-identified] : superficial sacral ulceration

## 2022-05-31 NOTE — HISTORY OF PRESENT ILLNESS
[FreeTextEntry1] : 78 yo WM, here for f/u of bilateral buttock pressure ulcers. Only a scattering of excoriations remain. i emphasized the importance of standing/ambulattion and change of position/rotation as much as possible throughout the day/night. Pt presently receiving PT and OT.

## 2022-05-31 NOTE — PLAN
[FreeTextEntry1] : increase stanidng/ambul/frequent change of position/rotation\par abd pad/adaptic\par get roho cushion\par f/u 3 wks\par \par time spent 25 mins.

## 2022-05-31 NOTE — ASSESSMENT
[] : No [FreeTextEntry2] : Infection prevention\par Localized wound care \par Goal remaining pain free regarding wounds\par Nutrition consult\par F/U 3 weeks [FreeTextEntry4] : Patient had a a nutrition consult performed today. Patient verbalized understanding of nutrition education provided.\par Patient stated that he is at an impasse as to where he will be residing in the near future. Nurse and MD recommended a Roho cushion, patient stated that he currently has a makeshift cushion that he uses. Patient stated that a Roho cushion was ordered in the past but he became overwhelmed with the back and forth between his insurance and packaging being delivered to the wrong address. \par Patient was reminded that he had a Roho cushion ordered in the past but already has a cushion on file with his insurance which resulted in a denial of the request. The patient was presented with the option to pay out of pocket if he wanted to move forward with purchasing the Roho cushion, he refused the option. \par The patient was educated on continuos need for offloading throughout the day and night to keep pressure off the affected area. He verbalized understanding of education. \par F/U 3 weeks

## 2022-06-01 ENCOUNTER — APPOINTMENT (OUTPATIENT)
Dept: PHYSICAL MEDICINE AND REHAB | Facility: CLINIC | Age: 77
End: 2022-06-01
Payer: MEDICARE

## 2022-06-01 VITALS — HEART RATE: 73 BPM | DIASTOLIC BLOOD PRESSURE: 69 MMHG | SYSTOLIC BLOOD PRESSURE: 105 MMHG | OXYGEN SATURATION: 98 %

## 2022-06-01 DIAGNOSIS — Z79.899 OTHER LONG TERM (CURRENT) DRUG THERAPY: ICD-10-CM

## 2022-06-01 DIAGNOSIS — Z82.3 FAMILY HISTORY OF STROKE: ICD-10-CM

## 2022-06-01 DIAGNOSIS — R26.0 ATAXIC GAIT: ICD-10-CM

## 2022-06-01 DIAGNOSIS — L89.152 PRESSURE ULCER OF SACRAL REGION, STAGE 2: ICD-10-CM

## 2022-06-01 DIAGNOSIS — Z98.890 OTHER SPECIFIED POSTPROCEDURAL STATES: ICD-10-CM

## 2022-06-01 DIAGNOSIS — R33.9 RETENTION OF URINE, UNSPECIFIED: ICD-10-CM

## 2022-06-01 DIAGNOSIS — Z87.440 PERSONAL HISTORY OF URINARY (TRACT) INFECTIONS: ICD-10-CM

## 2022-06-01 DIAGNOSIS — R26.89 OTHER ABNORMALITIES OF GAIT AND MOBILITY: ICD-10-CM

## 2022-06-01 DIAGNOSIS — Z80.0 FAMILY HISTORY OF MALIGNANT NEOPLASM OF DIGESTIVE ORGANS: ICD-10-CM

## 2022-06-01 DIAGNOSIS — N40.1 BENIGN PROSTATIC HYPERPLASIA WITH LOWER URINARY TRACT SYMPTOMS: ICD-10-CM

## 2022-06-01 DIAGNOSIS — G20 PARKINSON'S DISEASE: ICD-10-CM

## 2022-06-01 DIAGNOSIS — G62.9 POLYNEUROPATHY, UNSPECIFIED: ICD-10-CM

## 2022-06-01 PROCEDURE — 99213 OFFICE O/P EST LOW 20 MIN: CPT

## 2022-06-01 RX ORDER — ONABOTULINUMTOXINA 100 [USP'U]/1
100 INJECTION, POWDER, LYOPHILIZED, FOR SOLUTION INTRADERMAL; INTRAMUSCULAR
Qty: 6 | Refills: 0 | Status: ACTIVE | OUTPATIENT
Start: 2022-06-01

## 2022-06-02 NOTE — ASSESSMENT
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 Patient's appears to have component of tibial internal rotation contributing to his foot inversion during ambulation which did not improve after last injection.. Will eliminate the biceps femoris long head and increase dose to the popliteus/semitendinosus muscle to his Botox injection protocol. Prescription provided for a right outer sole lift- 1" to improve clearance of his left lower extremity. New Botox injection protocol:\par \par Left tibialis posterior----------------- 100 units \par Left medial gastrocnemius---------- 75 units \par Left lateral gastrocnemius----------- 75 units \par Left soleus----------------------------- 50 units \par Left semitendinosus------------------ 110 units\par Left semimembranosus-------------- 100 units\par Left popliteus--------------------------- 70 units\par \par Total -------------------------------------580 units.\par \par I spent a total of 25 minutes on the date of the encounter evaluating and treating the patient including a discussion of treatment options.\par \par

## 2022-06-02 NOTE — PHYSICAL EXAM
[FreeTextEntry1] : General: Well-developed male in no apparent distress. Patient is awake, alert, and cooperative with examination. Patient follows two-step commands.\par Extremities: Minimal/no pedal edema.\par \par Motor:\par Both upper extremities: Mild rigidity, resting tremor noted. Active range of motion within functional limits except\par [place] neck deformity of L 3rd and 4th digit, can only flex PIP joints to neutral position. 5/5 motor power\par \par Both lower extremities: Rigidity noted. Hip flexion/knee extension 4+-5/5 motor power. Right ankle 5/5 motor power\par Left ankle dorsiflexion 4+/5 motor power, ankle inversion/eversion 4+/5 motor power, ankle plantarflexion 4/5 motor power.\par Able to passively everted left ankle to approximately 5°\par Able to passively dorsiflexed ankle to just neutral with the knee flexed, 5-10° plantarflexion with the knee extended\par Able to PROM right knee to full extension \par L ankle posturing in less equinovarus.\par ankle PF MAS= 0-1\par Ankle invertors MAS= 0-1\par \par Sensory: Intact to light touch both lower extremity\par \par Functional status: Patient ambulated with rolling walker with toe strike noted on the left with his AFO. Significant foot inversion noted which appears to be coming from tibial internal rotation. LLE clearance improve with the addition of Darco shoe and a 1/2' heel lift on the right. Patient's knee appears to be in less flexion during ambulation, approximately 10° during stance phase\par

## 2022-06-02 NOTE — HISTORY OF PRESENT ILLNESS
[FreeTextEntry1] : Patient is a 77-year-old male history of Parkinson's disease, BPH with chronic indwelling Spencer who received a Botox injection on April 19, 2022 for focal dystonia. Patient reports that his ankle has decreased tone but does not report any improvement of his left foot turning inward during ambulation. Patient's brace is comfortable, no skin breakdown. Patient is not sure if he's having less knee flexion during stance phase after Botox injection to the hamstrings. Patient is receiving physical therapy. No fall since last visit.

## 2022-06-09 ENCOUNTER — APPOINTMENT (OUTPATIENT)
Dept: UROLOGY | Facility: CLINIC | Age: 77
End: 2022-06-09
Payer: MEDICARE

## 2022-06-09 PROCEDURE — 51702 INSERT TEMP BLADDER CATH: CPT

## 2022-06-21 ENCOUNTER — APPOINTMENT (OUTPATIENT)
Dept: WOUND CARE | Facility: HOSPITAL | Age: 77
End: 2022-06-21

## 2022-06-21 ENCOUNTER — OUTPATIENT (OUTPATIENT)
Dept: OUTPATIENT SERVICES | Facility: HOSPITAL | Age: 77
LOS: 1 days | Discharge: HOME | End: 2022-06-21
Payer: MEDICARE

## 2022-06-21 VITALS
BODY MASS INDEX: 18.82 KG/M2 | DIASTOLIC BLOOD PRESSURE: 50 MMHG | OXYGEN SATURATION: 96 % | TEMPERATURE: 97.7 F | HEART RATE: 59 BPM | RESPIRATION RATE: 18 BRPM | WEIGHT: 142 LBS | HEIGHT: 73 IN | SYSTOLIC BLOOD PRESSURE: 91 MMHG

## 2022-06-21 DIAGNOSIS — L89.152 PRESSURE ULCER OF SACRAL REGION, STAGE 2: ICD-10-CM

## 2022-06-21 PROCEDURE — 99213 OFFICE O/P EST LOW 20 MIN: CPT

## 2022-06-21 PROCEDURE — G0463: CPT

## 2022-06-22 DIAGNOSIS — N40.1 BENIGN PROSTATIC HYPERPLASIA WITH LOWER URINARY TRACT SYMPTOMS: ICD-10-CM

## 2022-06-22 DIAGNOSIS — R33.9 RETENTION OF URINE, UNSPECIFIED: ICD-10-CM

## 2022-06-22 DIAGNOSIS — G20 PARKINSON'S DISEASE: ICD-10-CM

## 2022-06-22 DIAGNOSIS — Z82.3 FAMILY HISTORY OF STROKE: ICD-10-CM

## 2022-06-22 DIAGNOSIS — Z79.899 OTHER LONG TERM (CURRENT) DRUG THERAPY: ICD-10-CM

## 2022-06-22 DIAGNOSIS — G62.9 POLYNEUROPATHY, UNSPECIFIED: ICD-10-CM

## 2022-06-22 DIAGNOSIS — R26.89 OTHER ABNORMALITIES OF GAIT AND MOBILITY: ICD-10-CM

## 2022-06-22 DIAGNOSIS — Z87.440 PERSONAL HISTORY OF URINARY (TRACT) INFECTIONS: ICD-10-CM

## 2022-06-22 DIAGNOSIS — L89.152 PRESSURE ULCER OF SACRAL REGION, STAGE 2: ICD-10-CM

## 2022-06-22 DIAGNOSIS — Z98.890 OTHER SPECIFIED POSTPROCEDURAL STATES: ICD-10-CM

## 2022-06-22 DIAGNOSIS — Z80.0 FAMILY HISTORY OF MALIGNANT NEOPLASM OF DIGESTIVE ORGANS: ICD-10-CM

## 2022-06-22 DIAGNOSIS — R26.0 ATAXIC GAIT: ICD-10-CM

## 2022-06-22 NOTE — REVIEW OF SYSTEMS
[Negative] : Heme/Lymph [FreeTextEntry2] : has parkinsons [FreeTextEntry7] : dysphagia [FreeTextEntry8] : BPH [FreeTextEntry9] : ataxia [de-identified] : superficial sacral ulceration

## 2022-06-22 NOTE — ASSESSMENT
[Verbal] : Verbal [Written] : Written [Demo] : Demo [Patient] : Patient [Good - alert, interested, motivated] : Good - alert, interested, motivated [Demonstrates independently] : demonstrates independently [Dressing changes] : dressing changes [Skin Care] : skin care [Pressure relief] : pressure relief [Signs and symptoms of infection] : sign and symptoms of infection [Nutrition] : nutrition [How and When to Call] : how and when to call [Patient responsibility to plan of care] : patient responsibility to plan of care [] : Yes [Stable] : stable [LTC] : LTC [Wheelchair] : Wheelchair [FreeTextEntry2] : Infection prevention \par Wound care (dressing changes)\par Maintain optimal skin integrity to high pressure areas\par Nutrition and wound healing\par Pressure relief/ Pressure redistribution [FreeTextEntry4] : Pt scheduled for Friday to see in house Podiatrist for right great toe ingrown nail removal\par Pt currently uses a pressure relief mattress (unsure of name) at [facility] \par Since last visit with Nutritionist (5/31/22) Pt states his Nutrition input has improved and Pt feels wound improved because of that.\par Pt to f/u in 2 weeks [FreeTextEntry1] : Pt's Aid provided wound care instructions for [facility] Nursing staff.

## 2022-06-22 NOTE — PLAN
[FreeTextEntry1] : increase stanidng/ambul/frequent change of position/rotation\par abd pad/adaptic 3X/week to left buttock/sacrum\par get roho cushion\par f/u 3 wks\par \par time spent 25 mins.

## 2022-06-22 NOTE — PHYSICAL EXAM
[Normal Thyroid] : the thyroid was normal [Normal Breath Sounds] : Normal breath sounds [Normal Heart Sounds] : normal heart sounds [Normal Rate and Rhythm] : normal rate and rhythm [Alert] : alert [Oriented to Person] : oriented to person [Oriented to Place] : oriented to place [Oriented to Time] : oriented to time [Calm] : calm [4 x 4] : 4 x 4  [JVD] : no jugular venous distention  [Ankle Swelling (On Exam)] : not present [Varicose Veins Of Lower Extremities] : not present [] : not present [Abdomen Masses] : No abdominal massess [Abdomen Tenderness] : ~T ~M No abdominal tenderness [Tender] : nontender [Enlarged] : not enlarged [de-identified] : elderly WM, NAD, alert, Ox3. [FreeTextEntry1] : Buttocks Wound [FreeTextEntry2] : 4.5 [FreeTextEntry3] : 3.5 [FreeTextEntry4] : 0.1 [de-identified] : serosanguineous [de-identified] : mild excoriations with mild erythema [de-identified] : none [de-identified] : 100% [de-identified] : none [de-identified] : Adaptic Touch [de-identified] : Mechanically cleansed with sterile gauze and normal saline 0.9%\par Dry Dressing\par  [TWNoteComboBox4] : Small [TWNoteComboBox5] : No [TWNoteComboBox6] : Pressure [de-identified] : No [de-identified] : other [de-identified] : None [de-identified] : No [de-identified] : 3x Weekly [de-identified] : Primary Dressing

## 2022-06-29 ENCOUNTER — EMERGENCY (EMERGENCY)
Facility: HOSPITAL | Age: 77
LOS: 1 days | Discharge: HOME | End: 2022-06-29
Attending: EMERGENCY MEDICINE
Payer: MEDICARE

## 2022-06-29 VITALS
HEIGHT: 73 IN | SYSTOLIC BLOOD PRESSURE: 130 MMHG | DIASTOLIC BLOOD PRESSURE: 65 MMHG | OXYGEN SATURATION: 96 % | WEIGHT: 139.99 LBS | RESPIRATION RATE: 16 BRPM | TEMPERATURE: 99 F | HEART RATE: 90 BPM

## 2022-06-29 LAB — GAS PNL BLDV: SIGNIFICANT CHANGE UP

## 2022-06-29 PROCEDURE — 99284 EMERGENCY DEPT VISIT MOD MDM: CPT

## 2022-06-29 RX ORDER — ACETAMINOPHEN 500 MG
650 TABLET ORAL ONCE
Refills: 0 | Status: COMPLETED | OUTPATIENT
Start: 2022-06-29 | End: 2022-06-29

## 2022-06-29 NOTE — ED PROVIDER NOTE - OBJECTIVE STATEMENT
77 M BPH chronic fowler, muscular dystrophy p/w fowler complication x 1 day. pt states his fowler stopped draining and now he is having lower abd pain. fowler placed by urology 2 weeks ago, due for replacement in 1 week.   pt also endorsing R great toe redness. states he had nail removed by podiatry 1 week ago, has not been able to take his abx until today when they delivered to his home.  otherwise no fever, cp, sob, vomiting, diarrhea, back pain, hematuria reported by patient 77 M BPH chronic fowler, muscular dystrophy p/w fowler complication x 1 day. pt states his fowler stopped draining and now he is having lower abd pain. fowler placed by urology 2 weeks ago, due for replacement in 1 week.   pt also endorsing R great toe redness and pain. states he had nail removed by podiatry 1 week ago, has not been able to take his abx until today when they delivered to his home.   otherwise no headache, fever, cp, sob, vomiting, diarrhea, back pain, hematuria reported by patient

## 2022-06-29 NOTE — ED PROVIDER NOTE - ATTENDING CONTRIBUTION TO CARE
pt is a 78 y/o male is bed bound here for no UO since this afternoon with a clogged fowler which has happened before, mild suprapubic tend, check labs, fowler, ivf.  r/o urinary retention.

## 2022-06-29 NOTE — ED PROVIDER NOTE - CARE PLAN
Principal Discharge DX:	Cellulitis  Secondary Diagnosis:	Acute UTI  Secondary Diagnosis:	Spencer catheter problem   1

## 2022-06-29 NOTE — ED PROVIDER NOTE - PROGRESS NOTE DETAILS
Alex Webster PGY-3 patient endorsing that he does not want to stay in the hospital. only wants his fowler to be changed, explained to patient that we will need labs and imaging given he is febrile with retension along with probable cellulitis of his toe. patient endorsing he does not want any abx but amenable to labs. will speak with patient more thoroughly regarding his findings when labs and imaging result. hold off on abx for now given patients refusal. pt have decision making capacity, aaox3 understands risk and benefits of iv abx Attending Romi:  discussed grave concern for pt going home, has clear cellulitis to toe as well as uti (prior cx w/ multiple resistnances), discussed concern even sending home w/ po abx they may not appropriately cover his infections, also state I feel he needs iv abx. discussed high likelihood of needing a toe amputation, spread of infection loss of further limb, potential death, pt does not want to stay bc he feels once he is here he can never leave including the ed, discussed he can leave any time he wants including now as he has capacity, has insight, pt does have capacity axox3, will add bactrim, has keflex, he does not want to stay for first dose abx here, told him to please come back if he changes his mind even if it is as soon as he gets home. I am very concerned about this pt developing worsened infection and have expressed thus to him. Alex Webster PGY-3 patient endorsing that he does not want to stay in the hospital. only wants his fowler to be changed, explained to patient that we will need labs and imaging given he is febrile with retension along with probable cellulitis of his toe. patient endorsing he does not want any abx but amenable to labs. will speak with patient more thoroughly regarding his findings when labs and imaging result. hold off on abx for now given patients refusal. pt have decision making capacity, aaox3

## 2022-06-29 NOTE — ED PROVIDER NOTE - PHYSICAL EXAMINATION
Vital signs reviewed  GENERAL: Patient nontoxic appearing, NAD  HEAD: NCAT  EYES: Anicteric  ENT: MMM  NECK: Supple, non tender  RESPIRATORY: Normal respiratory effort. CTA B/L. No wheezing, rales, rhonchi  CARDIOVASCULAR: Regular rate and rhythm  ABDOMEN: Soft. suprapubic trenderness   MUSCULOSKELETAL/EXTREMITIES: contracted upper and lower extremities   SKIN:  +R great toe erythema, swelling, tenderness with tracking towards anterior foot. +2 DP pulse., sensation intact  NEURO: AAOx3.   PSYCHIATRIC: Cooperative. Affect appropriate. Vital signs reviewed  GENERAL: Patient nontoxic appearing, NAD  HEAD: NCAT  EYES: Anicteric  ENT: MMM  NECK: Supple, non tender  RESPIRATORY: Normal respiratory effort. CTA B/L. No wheezing, rales, rhonchi  CARDIOVASCULAR: Regular rate and rhythm  ABDOMEN: Soft. suprapubic trenderness   MUSCULOSKELETAL/EXTREMITIES: contracted upper and lower extremities   : hypospadias  SKIN:  +R great toe erythema, swelling, tenderness with tracking towards anterior foot. +2 DP pulse., sensation intact  NEURO: AAOx3.   PSYCHIATRIC: Cooperative. Affect appropriate.

## 2022-06-29 NOTE — ED ADULT NURSE REASSESSMENT NOTE - NS ED NURSE REASSESS COMMENT FT1
18 F coude fowler catheter removed, meatus noted on underside of penis, urology contacted regarding placement of new fowler catheter 18 F coude fowler catheter removed, meatus noted on underside of penis, MD Webster contacted urology regarding placement of new fowler catheter

## 2022-06-29 NOTE — ED PROVIDER NOTE - NSFOLLOWUPINSTRUCTIONS_ED_ALL_ED_FT
- Follow up with your doctor and podiatrist as soon as possible given your infection- bring copies of your results if you were given.     - Return to the ED for any new, worsening, or concerning symptoms to you such as fever or worsening pain    - Continue all prescribed medications such as antibiotics prescribed to you    - Rest and keep yourself hydrated with fluids      Sepsis, Diagnosis, Adult      Sepsis is a serious bodily reaction to an infection. The infection that triggers sepsis may be from a bacteria, virus, or fungus. Sepsis can result from an infection in any part of your body. Infections that commonly lead to sepsis include skin, lung, and urinary tract infections.    Sepsis is a medical emergency that must be treated right away in a hospital. In severe cases, it can lead to septic shock. Septic shock can weaken your heart and cause your blood pressure to drop. This can cause your central nervous system and your body's organs to stop working.      What are the causes?    This condition is caused by a severe reaction to infections from bacteria, viruses, or fungus. The germs that most often lead to sepsis include:  •Escherichia coli (E. coli) bacteria.      •Staphylococcus aureus (staph) bacteria.      •Some types of Streptococcus bacteria.      The most common infections affect these organs:  •The lung (pneumonia).      •The kidneys or bladder (urinary tract infection).      •The skin (cellulitis).      •The bowel, gallbladder, or pancreas.        What increases the risk?    You are more likely to develop this condition if:  •Your body's disease-fighting system (immune system) is weakened.      •You are age 65 or older.      •You are male.      •You had surgery or you have been hospitalized.    •You have these devices inserted into your body:   •A small, thin tube (catheter).      •IV line.       •Breathing tube.       •Drainage tube.        •You are not getting enough nutrients from food (malnourished).      •You have a chronic disease, such as cancer, lung disease, kidney disease, or diabetes.        What are the signs or symptoms?    Symptoms of this condition may include:  •Fever.      •Chills or feeling very cold.      •Confusion or anxiety.      •Fatigue.      •Muscle aches.      •Shortness of breath or rapid breathing (hyperventilation).      •Nausea and vomiting.      •Urinating much less than usual.      •Fast heart rate.      •Changes in skin color. Your skin may look blotchy, pale, or blue.      •Cool, clammy, or sweaty skin.      •Skin rash.      Other symptoms depend on the source of your infection.      How is this diagnosed?     This condition is diagnosed based on your symptoms, medical history, and physical exam. Other tests may also be done to find out the cause of the infection and how severe the sepsis is. Tests may include:  •Blood tests.      •Urine tests.      •Swabs from other areas of your body that may have an infection. These samples may be tested (cultured) to find out what type of bacteria is causing the infection.      •Chest X-ray to check for pneumonia. Other imaging tests, such as a CT scan, may also be done.      •Lumbar puncture. This removes a small amount of the fluid that surrounds your brain and spinal cord. The fluid is then examined for infection.        How is this treated?    This condition must be treated in a hospital. Based on the cause of your infection, you may be given an antibiotic, antiviral, or antifungal medicine.    You may also receive:  •Fluids through an IV.      •Oxygen and breathing assistance.      •Medicines to increase your blood pressure.      •Kidney dialysis. This process cleans your blood if your kidneys have failed.      •Surgery to remove infected tissue.      •Blood transfusion if needed.      •Medicine to prevent blood clots.    •Nutrients to correct imbalances in basic body function (metabolism). You may:  •Receive important salts and minerals (electrolytes) through an IV.      •Have your blood sugar level adjusted.          Follow these instructions at home:      Medicines      •Take over-the-counter and prescription medicines only as told by your health care provider.      •If you were prescribed an antibiotic, antiviral, or antifungal medicine, take it as told by your health care provider. Do not stop taking the medicine even if you start to feel better.      General instructions     •If you have a catheter or other indwelling device, ask to have it removed as soon as possible.      •Keep all follow-up visits. This is important.        Contact a health care provider if:    •You do not feel like you are getting better or regaining strength.      •You are having trouble coping with your recovery.      •You frequently feel tired.      •You feel worse or do not seem to get better after surgery.      •You think you may have an infection after surgery.        Get help right away if:    •You have any symptoms of sepsis.      •You have difficulty breathing.      •You have a rapid or skipping heartbeat.      •You become confused or disoriented.      •You have a high fever.      •Your skin becomes blotchy, pale, or blue.      •You have an infection that is getting worse or not getting better.      These symptoms may represent a serious problem that is an emergency. Do not wait to see if the symptoms will go away. Get medical help right away. Call your local emergency services (911 in the [place] Do not drive yourself to the hospital.       Summary    •Sepsis is a medical emergency that requires immediate treatment in a hospital.      •This condition is caused by a severe reaction to infections from bacteria, viruses, or fungus.      •Based on the cause of your infection, you may be given an antibiotic, antiviral, or antifungal medicine.      •Treatment may also include IV fluids, breathing assistance, and kidney dialysis.      This information is not intended to replace advice given to you by your health care provider. Make sure you discuss any questions you have with your health care provider.

## 2022-06-29 NOTE — ED ADULT TRIAGE NOTE - CHIEF COMPLAINT QUOTE
Pt BIBEMS c/o decreased urine output from fowler.  Pt states fowler bag was changed today, but not the catheter.

## 2022-06-29 NOTE — ED PROVIDER NOTE - CLINICAL SUMMARY MEDICAL DECISION MAKING FREE TEXT BOX
See Attending Note See Attending Note  77 M BPH chronic fowler, muscular dystrophy p/w fowler complication x 1 day. febrile in ED, concern for bactermia, cellulitis, uti, pyelo. plan for bc, labs, tylenol, uro consult and abx/admission.

## 2022-06-29 NOTE — ED PROVIDER NOTE - NS ED ROS FT
Constitutional: No fever, chills.  Eyes:  No visual changes  ENMT:  No neck pain  Cardiac:  No chest pain  Respiratory:  No cough, SOB  GI:  No nausea, vomiting, diarrhea, +abdominal pain.  :  +fowler complication  MS:  No back pain.  Neuro:  No headache or lightheadedness  Skin:  No skin rash  Except as documented in the HPI,  all other systems are negative. Constitutional: No fever, chills.  Eyes:  No visual changes  ENMT:  No neck pain  Cardiac:  No chest pain  Respiratory:  No cough, SOB  GI:  No nausea, vomiting, diarrhea, +abdominal pain.  :  +fowler complication  MS:  No back pain.  Neuro:  No headache or lightheadedness  Skin:  skin redness   Except as documented in the HPI,  all other systems are negative.

## 2022-06-29 NOTE — ED PROVIDER NOTE - PATIENT PORTAL LINK FT
You can access the FollowMyHealth Patient Portal offered by [facility] by registering at the following website: [email or web address]. By joining [facility]’s FollowMyHealth portal, you will also be able to view your health information using other applications (apps) compatible with our system.

## 2022-06-29 NOTE — ED PROVIDER NOTE - REFUSAL OF SERVICE, MDM
patient has decision making capacity, aaox3, able to tell me risk of leaving ama such as worsening infection, amputation, death. patient states he just wants his fowler changed and wants to go home. will arrange non emergent for patient. explained to come back to ED as soon as possible for worsening symptoms

## 2022-06-30 VITALS
SYSTOLIC BLOOD PRESSURE: 133 MMHG | DIASTOLIC BLOOD PRESSURE: 63 MMHG | TEMPERATURE: 100 F | RESPIRATION RATE: 16 BRPM | OXYGEN SATURATION: 97 % | HEART RATE: 70 BPM

## 2022-06-30 LAB
-  K. PNEUMONIAE GROUP: SIGNIFICANT CHANGE UP
ALBUMIN SERPL ELPH-MCNC: 3.5 G/DL — SIGNIFICANT CHANGE UP (ref 3.3–5)
ALP SERPL-CCNC: 110 U/L — SIGNIFICANT CHANGE UP (ref 40–120)
ALT FLD-CCNC: 7 U/L — LOW (ref 10–45)
ANION GAP SERPL CALC-SCNC: 11 MMOL/L — SIGNIFICANT CHANGE UP (ref 5–17)
APPEARANCE UR: ABNORMAL
AST SERPL-CCNC: 9 U/L — LOW (ref 10–40)
BACTERIA # UR AUTO: ABNORMAL
BASE EXCESS BLDV CALC-SCNC: 5.6 MMOL/L — HIGH (ref -2–2)
BASOPHILS # BLD AUTO: 0.06 K/UL — SIGNIFICANT CHANGE UP (ref 0–0.2)
BASOPHILS NFR BLD AUTO: 0.5 % — SIGNIFICANT CHANGE UP (ref 0–2)
BILIRUB SERPL-MCNC: 0.4 MG/DL — SIGNIFICANT CHANGE UP (ref 0.2–1.2)
BILIRUB UR-MCNC: NEGATIVE — SIGNIFICANT CHANGE UP
BUN SERPL-MCNC: 14 MG/DL — SIGNIFICANT CHANGE UP (ref 7–23)
CA-I SERPL-SCNC: 1.14 MMOL/L — LOW (ref 1.15–1.33)
CALCIUM SERPL-MCNC: 8.8 MG/DL — SIGNIFICANT CHANGE UP (ref 8.4–10.5)
CHLORIDE BLDV-SCNC: 102 MMOL/L — SIGNIFICANT CHANGE UP (ref 96–108)
CHLORIDE SERPL-SCNC: 100 MMOL/L — SIGNIFICANT CHANGE UP (ref 96–108)
CO2 BLDV-SCNC: 31 MMOL/L — HIGH (ref 22–26)
CO2 SERPL-SCNC: 26 MMOL/L — SIGNIFICANT CHANGE UP (ref 22–31)
COLOR SPEC: YELLOW — SIGNIFICANT CHANGE UP
CREAT SERPL-MCNC: 0.8 MG/DL — SIGNIFICANT CHANGE UP (ref 0.5–1.3)
DIFF PNL FLD: ABNORMAL
EGFR: 92 ML/MIN/1.73M2 — SIGNIFICANT CHANGE UP
EOSINOPHIL # BLD AUTO: 0.1 K/UL — SIGNIFICANT CHANGE UP (ref 0–0.5)
EOSINOPHIL NFR BLD AUTO: 0.9 % — SIGNIFICANT CHANGE UP (ref 0–6)
EPI CELLS # UR: 0 /HPF — SIGNIFICANT CHANGE UP
GAS PNL BLDV: 134 MMOL/L — LOW (ref 136–145)
GAS PNL BLDV: SIGNIFICANT CHANGE UP
GLUCOSE BLDV-MCNC: 108 MG/DL — HIGH (ref 70–99)
GLUCOSE SERPL-MCNC: 109 MG/DL — HIGH (ref 70–99)
GLUCOSE UR QL: NEGATIVE — SIGNIFICANT CHANGE UP
GRAM STN FLD: SIGNIFICANT CHANGE UP
HCO3 BLDV-SCNC: 30 MMOL/L — HIGH (ref 22–29)
HCT VFR BLD CALC: 33.7 % — LOW (ref 39–50)
HCT VFR BLDA CALC: 34 % — LOW (ref 39–51)
HGB BLD CALC-MCNC: 11.3 G/DL — LOW (ref 12.6–17.4)
HGB BLD-MCNC: 10.8 G/DL — LOW (ref 13–17)
HOROWITZ INDEX BLDV+IHG-RTO: SIGNIFICANT CHANGE UP
HYALINE CASTS # UR AUTO: 3 /LPF — HIGH (ref 0–2)
IMM GRANULOCYTES NFR BLD AUTO: 0.4 % — SIGNIFICANT CHANGE UP (ref 0–1.5)
KETONES UR-MCNC: SIGNIFICANT CHANGE UP
LACTATE BLDV-MCNC: 0.8 MMOL/L — SIGNIFICANT CHANGE UP (ref 0.7–2)
LEUKOCYTE ESTERASE UR-ACNC: ABNORMAL
LYMPHOCYTES # BLD AUTO: 1.05 K/UL — SIGNIFICANT CHANGE UP (ref 1–3.3)
LYMPHOCYTES # BLD AUTO: 9.1 % — LOW (ref 13–44)
MCHC RBC-ENTMCNC: 31 PG — SIGNIFICANT CHANGE UP (ref 27–34)
MCHC RBC-ENTMCNC: 32 GM/DL — SIGNIFICANT CHANGE UP (ref 32–36)
MCV RBC AUTO: 96.8 FL — SIGNIFICANT CHANGE UP (ref 80–100)
METHOD TYPE: SIGNIFICANT CHANGE UP
MONOCYTES # BLD AUTO: 1.05 K/UL — HIGH (ref 0–0.9)
MONOCYTES NFR BLD AUTO: 9.1 % — SIGNIFICANT CHANGE UP (ref 2–14)
NEUTROPHILS # BLD AUTO: 9.22 K/UL — HIGH (ref 1.8–7.4)
NEUTROPHILS NFR BLD AUTO: 80 % — HIGH (ref 43–77)
NITRITE UR-MCNC: POSITIVE
NRBC # BLD: 0 /100 WBCS — SIGNIFICANT CHANGE UP (ref 0–0)
PCO2 BLDV: 41 MMHG — LOW (ref 42–55)
PH BLDV: 7.47 — HIGH (ref 7.32–7.43)
PH UR: 6.5 — SIGNIFICANT CHANGE UP (ref 5–8)
PLATELET # BLD AUTO: 231 K/UL — SIGNIFICANT CHANGE UP (ref 150–400)
PO2 BLDV: 53 MMHG — HIGH (ref 25–45)
POTASSIUM BLDV-SCNC: 3.1 MMOL/L — LOW (ref 3.5–5.1)
POTASSIUM SERPL-MCNC: 3.3 MMOL/L — LOW (ref 3.5–5.3)
POTASSIUM SERPL-SCNC: 3.3 MMOL/L — LOW (ref 3.5–5.3)
PROT SERPL-MCNC: 6.8 G/DL — SIGNIFICANT CHANGE UP (ref 6–8.3)
PROT UR-MCNC: ABNORMAL
RBC # BLD: 3.48 M/UL — LOW (ref 4.2–5.8)
RBC # FLD: 12.5 % — SIGNIFICANT CHANGE UP (ref 10.3–14.5)
RBC CASTS # UR COMP ASSIST: 82 /HPF — HIGH (ref 0–4)
SAO2 % BLDV: 88.6 % — HIGH (ref 67–88)
SODIUM SERPL-SCNC: 137 MMOL/L — SIGNIFICANT CHANGE UP (ref 135–145)
SP GR SPEC: 1.01 — SIGNIFICANT CHANGE UP (ref 1.01–1.02)
SPECIMEN SOURCE: SIGNIFICANT CHANGE UP
UROBILINOGEN FLD QL: NEGATIVE — SIGNIFICANT CHANGE UP
WBC # BLD: 11.53 K/UL — HIGH (ref 3.8–10.5)
WBC # FLD AUTO: 11.53 K/UL — HIGH (ref 3.8–10.5)
WBC UR QL: 609 /HPF — HIGH (ref 0–5)

## 2022-06-30 PROCEDURE — 82565 ASSAY OF CREATININE: CPT

## 2022-06-30 PROCEDURE — 81001 URINALYSIS AUTO W/SCOPE: CPT

## 2022-06-30 PROCEDURE — 83605 ASSAY OF LACTIC ACID: CPT

## 2022-06-30 PROCEDURE — 84295 ASSAY OF SERUM SODIUM: CPT

## 2022-06-30 PROCEDURE — 99283 EMERGENCY DEPT VISIT LOW MDM: CPT

## 2022-06-30 PROCEDURE — 51703 INSERT BLADDER CATH COMPLEX: CPT

## 2022-06-30 PROCEDURE — 85018 HEMOGLOBIN: CPT

## 2022-06-30 PROCEDURE — 51702 INSERT TEMP BLADDER CATH: CPT

## 2022-06-30 PROCEDURE — 82803 BLOOD GASES ANY COMBINATION: CPT

## 2022-06-30 PROCEDURE — 80053 COMPREHEN METABOLIC PANEL: CPT

## 2022-06-30 PROCEDURE — 87077 CULTURE AEROBIC IDENTIFY: CPT

## 2022-06-30 PROCEDURE — 82947 ASSAY GLUCOSE BLOOD QUANT: CPT

## 2022-06-30 PROCEDURE — 36415 COLL VENOUS BLD VENIPUNCTURE: CPT

## 2022-06-30 PROCEDURE — 82435 ASSAY OF BLOOD CHLORIDE: CPT

## 2022-06-30 PROCEDURE — 87086 URINE CULTURE/COLONY COUNT: CPT

## 2022-06-30 PROCEDURE — 82330 ASSAY OF CALCIUM: CPT

## 2022-06-30 PROCEDURE — 84132 ASSAY OF SERUM POTASSIUM: CPT

## 2022-06-30 PROCEDURE — 85014 HEMATOCRIT: CPT

## 2022-06-30 PROCEDURE — 85025 COMPLETE CBC W/AUTO DIFF WBC: CPT

## 2022-06-30 PROCEDURE — 87186 SC STD MICRODIL/AGAR DIL: CPT

## 2022-06-30 PROCEDURE — 87040 BLOOD CULTURE FOR BACTERIA: CPT

## 2022-06-30 PROCEDURE — 87150 DNA/RNA AMPLIFIED PROBE: CPT

## 2022-06-30 RX ORDER — SODIUM CHLORIDE 9 MG/ML
1000 INJECTION, SOLUTION INTRAVENOUS ONCE
Refills: 0 | Status: COMPLETED | OUTPATIENT
Start: 2022-06-30 | End: 2022-06-30

## 2022-06-30 RX ADMIN — SODIUM CHLORIDE 1000 MILLILITER(S): 9 INJECTION, SOLUTION INTRAVENOUS at 03:18

## 2022-06-30 RX ADMIN — Medication 650 MILLIGRAM(S): at 00:33

## 2022-06-30 NOTE — PROCEDURE NOTE - ADDITIONAL PROCEDURE DETAILS
Called to place fowler. Pt with chronic catheter. 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 Some blood noted at the meatus upon arrival.    Using aseptic technique, area prepped in traditional sterile fashion and a 18F catheter placed without resistance. 750cc cloudy yellow urine drained. 10cc sterile water placed into balloon and fowler catheter secured with stat lock. pt tolerated procedure well. Pressure held at meatus, no further active bleeding visualized.     plan for fowler per primary team. please page with any acute  concerns or questions.  p: [phone]

## 2022-06-30 NOTE — ED POST DISCHARGE NOTE - OTHER COMMUNICATION
7/4/22: third attempt to discuss ucx result. Pt already contacted by KEVIN Jett regarding +blood cx as above and was appropriately counseled to return to ED though upon further review of chart does not appear pt returned to this ED. Calling again to inform need to return if hasn't already. No answer on any # listed, no option for vm. Given third attempt will send telegram. Certified letter information give to ED rep Blaise to send telegram. - Zelalem Brian PA-C

## 2022-06-30 NOTE — ED POST DISCHARGE NOTE - DETAILS
Patient contacted and made aware of abnormal findings.  Patient febrile in the ER with likely UTI and toe cellulitis. Patient left hospital AMA last night. Patient strongly encouraged to return to the ER now.  Patient made aware of risk of not returning, including death.  He expressed understanding.  -Wayne Jett PA-C Attempted to contact pt to advise final blood cx now returned w/ Klebsiella and UCX w/ GNR. Called to advise pt again recommendation is to return to ED for IV abx. Phone rang w/out ability to LVM - Lilia Pelletier PA-C 7/3/22: Attempted to contact pt primary # rings without option for VM, no answer on emergency contact. will reattempt - Uriah Morgan PA-C

## 2022-06-30 NOTE — ED ADULT NURSE REASSESSMENT NOTE - NS ED NURSE REASSESS COMMENT FT1
fowler cateter placed by urology, 16 F coude placed, 550 mL of urine drained immediately, urine is clear and yellow, UA/UC sent, pt states that he had relief from urine drainage, pt states that he does not want to stay in the hospital and would like to leave against medical advice, MD aware, pending dispo

## 2022-06-30 NOTE — ED ADULT NURSE NOTE - OBJECTIVE STATEMENT
77 y.o. male coming in from the [facility] assisted living via EMS for decreased drainage of his chronic fowler catheter. pt states that today at around 1600 he started having lower abdominal pain that he states usually occurs when the pt gets a blockage and the bladder starts getting too full. pt states that the Fowler frequently gets clogged with sediment. current fowler has white sediment in the tubing, pt states that every time he comes to the hospital to get the Fowler replaced he has to have urology change it in order to reduce clogging of the tubing. PMH muscular dystrophy, urinary retention. A&Ox3, vitals stable, abdominal tenderness increased on palpation, no lower extremity edema noted bilaterally, redness and swelling noted on right great toe that pt states is being followed up by MD outpatient and pt does not want the toe evaluated while he is here in the hospital. bed in lowest position, call bell in reach and teaching on use completed.

## 2022-07-02 LAB
-  AMIKACIN: SIGNIFICANT CHANGE UP
-  AMIKACIN: SIGNIFICANT CHANGE UP
-  AMOXICILLIN/CLAVULANIC ACID: SIGNIFICANT CHANGE UP
-  AMPICILLIN/SULBACTAM: SIGNIFICANT CHANGE UP
-  AMPICILLIN/SULBACTAM: SIGNIFICANT CHANGE UP
-  AMPICILLIN: SIGNIFICANT CHANGE UP
-  AMPICILLIN: SIGNIFICANT CHANGE UP
-  AZTREONAM: SIGNIFICANT CHANGE UP
-  AZTREONAM: SIGNIFICANT CHANGE UP
-  CEFAZOLIN: SIGNIFICANT CHANGE UP
-  CEFAZOLIN: SIGNIFICANT CHANGE UP
-  CEFEPIME: SIGNIFICANT CHANGE UP
-  CEFEPIME: SIGNIFICANT CHANGE UP
-  CEFOXITIN: SIGNIFICANT CHANGE UP
-  CEFOXITIN: SIGNIFICANT CHANGE UP
-  CEFTRIAXONE: SIGNIFICANT CHANGE UP
-  CEFTRIAXONE: SIGNIFICANT CHANGE UP
-  CIPROFLOXACIN: SIGNIFICANT CHANGE UP
-  CIPROFLOXACIN: SIGNIFICANT CHANGE UP
-  ERTAPENEM: SIGNIFICANT CHANGE UP
-  ERTAPENEM: SIGNIFICANT CHANGE UP
-  GENTAMICIN: SIGNIFICANT CHANGE UP
-  GENTAMICIN: SIGNIFICANT CHANGE UP
-  IMIPENEM: SIGNIFICANT CHANGE UP
-  IMIPENEM: SIGNIFICANT CHANGE UP
-  LEVOFLOXACIN: SIGNIFICANT CHANGE UP
-  LEVOFLOXACIN: SIGNIFICANT CHANGE UP
-  MEROPENEM: SIGNIFICANT CHANGE UP
-  MEROPENEM: SIGNIFICANT CHANGE UP
-  NITROFURANTOIN: SIGNIFICANT CHANGE UP
-  PIPERACILLIN/TAZOBACTAM: SIGNIFICANT CHANGE UP
-  PIPERACILLIN/TAZOBACTAM: SIGNIFICANT CHANGE UP
-  TIGECYCLINE: SIGNIFICANT CHANGE UP
-  TOBRAMYCIN: SIGNIFICANT CHANGE UP
-  TOBRAMYCIN: SIGNIFICANT CHANGE UP
-  TRIMETHOPRIM/SULFAMETHOXAZOLE: SIGNIFICANT CHANGE UP
-  TRIMETHOPRIM/SULFAMETHOXAZOLE: SIGNIFICANT CHANGE UP
CULTURE RESULTS: SIGNIFICANT CHANGE UP
CULTURE RESULTS: SIGNIFICANT CHANGE UP
METHOD TYPE: SIGNIFICANT CHANGE UP
METHOD TYPE: SIGNIFICANT CHANGE UP
ORGANISM # SPEC MICROSCOPIC CNT: SIGNIFICANT CHANGE UP
SPECIMEN SOURCE: SIGNIFICANT CHANGE UP
SPECIMEN SOURCE: SIGNIFICANT CHANGE UP

## 2022-07-05 ENCOUNTER — OUTPATIENT (OUTPATIENT)
Dept: OUTPATIENT SERVICES | Facility: HOSPITAL | Age: 77
LOS: 1 days | Discharge: HOME | End: 2022-07-05
Payer: MEDICARE

## 2022-07-05 ENCOUNTER — APPOINTMENT (OUTPATIENT)
Dept: WOUND CARE | Facility: HOSPITAL | Age: 77
End: 2022-07-05

## 2022-07-05 VITALS
HEIGHT: 73 IN | SYSTOLIC BLOOD PRESSURE: 94 MMHG | HEART RATE: 64 BPM | RESPIRATION RATE: 20 BRPM | DIASTOLIC BLOOD PRESSURE: 58 MMHG | BODY MASS INDEX: 18.82 KG/M2 | WEIGHT: 142 LBS | OXYGEN SATURATION: 99 %

## 2022-07-05 DIAGNOSIS — L89.152 PRESSURE ULCER OF SACRAL REGION, STAGE 2: ICD-10-CM

## 2022-07-05 LAB
CULTURE RESULTS: SIGNIFICANT CHANGE UP
SPECIMEN SOURCE: SIGNIFICANT CHANGE UP

## 2022-07-05 PROCEDURE — G0463: CPT

## 2022-07-05 PROCEDURE — 99213 OFFICE O/P EST LOW 20 MIN: CPT

## 2022-07-06 DIAGNOSIS — R26.89 OTHER ABNORMALITIES OF GAIT AND MOBILITY: ICD-10-CM

## 2022-07-06 DIAGNOSIS — Z98.890 OTHER SPECIFIED POSTPROCEDURAL STATES: ICD-10-CM

## 2022-07-06 DIAGNOSIS — R33.9 RETENTION OF URINE, UNSPECIFIED: ICD-10-CM

## 2022-07-06 DIAGNOSIS — Z82.3 FAMILY HISTORY OF STROKE: ICD-10-CM

## 2022-07-06 DIAGNOSIS — R26.0 ATAXIC GAIT: ICD-10-CM

## 2022-07-06 DIAGNOSIS — G20 PARKINSON'S DISEASE: ICD-10-CM

## 2022-07-06 DIAGNOSIS — Z87.440 PERSONAL HISTORY OF URINARY (TRACT) INFECTIONS: ICD-10-CM

## 2022-07-06 DIAGNOSIS — N40.1 BENIGN PROSTATIC HYPERPLASIA WITH LOWER URINARY TRACT SYMPTOMS: ICD-10-CM

## 2022-07-06 DIAGNOSIS — G62.9 POLYNEUROPATHY, UNSPECIFIED: ICD-10-CM

## 2022-07-06 DIAGNOSIS — Z80.0 FAMILY HISTORY OF MALIGNANT NEOPLASM OF DIGESTIVE ORGANS: ICD-10-CM

## 2022-07-06 DIAGNOSIS — Z79.899 OTHER LONG TERM (CURRENT) DRUG THERAPY: ICD-10-CM

## 2022-07-06 DIAGNOSIS — L89.152 PRESSURE ULCER OF SACRAL REGION, STAGE 2: ICD-10-CM

## 2022-07-06 NOTE — REVIEW OF SYSTEMS
[Negative] : Heme/Lymph [FreeTextEntry2] : has parkinsons [FreeTextEntry7] : dysphagia [FreeTextEntry8] : BPH [FreeTextEntry9] : ataxia [de-identified] : superficial sacral ulceration

## 2022-07-06 NOTE — PLAN
[FreeTextEntry1] : increase stanidng/ambul/frequent change of position/rotation\par abd pad/adaptic and CaAlginate QOD to left buttock/sacrum\par get roho cushion\par f/u 2 wks\par \par time spent 25 mins.

## 2022-07-06 NOTE — ASSESSMENT
[Verbal] : Verbal [Written] : Written [Demo] : Demo [Patient] : Patient [Caregiver] : Caregiver [Good - alert, interested, motivated] : Good - alert, interested, motivated [Needs reinforcement] : needs reinforcement [Dressing changes] : dressing changes [Skin Care] : skin care [Pressure relief] : pressure relief [Signs and symptoms of infection] : sign and symptoms of infection [Nutrition] : nutrition [How and When to Call] : how and when to call [Pain Management] : pain management [Patient responsibility to plan of care] : patient responsibility to plan of care [] : Yes [Stable] : stable [LTC] : LTC [Wheelchair] : Wheelchair [Not Applicable - Long Term Care/Home Health Agency] : Long Term Care/Home Health Agency: Not Applicable [FreeTextEntry2] : Infection prevention\par Localized wound care \par Goal remaining pain free regarding wounds\par Frequent turning and repositioning  \par Promote optimal nutrition \par Promote optimal skin integrity  [FreeTextEntry4] : Follow up in 2 weeks

## 2022-07-06 NOTE — HISTORY OF PRESENT ILLNESS
[FreeTextEntry1] : 76 yo WM, here for f/u of bilateral buttock pressure ulcers. Only a scattering of excoriations remain. i emphasized the importance of standing/ambulattion and change of position/rotation as much as possible throughout the day/night. Pt presently receiving PT and OT.\par \par 6/21/22 marked improvement and healing\par 7/5/22 wound smaller but periwound about the same may be from moisture

## 2022-07-06 NOTE — PHYSICAL EXAM
[Normal Thyroid] : the thyroid was normal [Normal Breath Sounds] : Normal breath sounds [Normal Heart Sounds] : normal heart sounds [Normal Rate and Rhythm] : normal rate and rhythm [Alert] : alert [Oriented to Person] : oriented to person [Oriented to Place] : oriented to place [Oriented to Time] : oriented to time [Calm] : calm [4 x 4] : 4 x 4  [Abdominal Pad] : Abdominal Pad [JVD] : no jugular venous distention  [Ankle Swelling (On Exam)] : not present [Varicose Veins Of Lower Extremities] : not present [] : not present [Abdomen Masses] : No abdominal massess [Abdomen Tenderness] : ~T ~M No abdominal tenderness [Tender] : nontender [Enlarged] : not enlarged [de-identified] : elderly WM, NAD, alert, Ox3. [FreeTextEntry1] : buttocks wound  [FreeTextEntry2] : 3 [FreeTextEntry3] : 1.2 [FreeTextEntry4] : 0.1 [de-identified] : Serous/sanguinous [de-identified] : Excoriated  [de-identified] : Adaptic touch, calcium alginate  [de-identified] : Mechanically cleansed with sterile gauze and normal saline.\par Paper tape  [TWNoteComboBox4] : Small [de-identified] : Macerated [de-identified] : None [de-identified] : None [de-identified] : 100% [de-identified] : No [de-identified] : 3x Weekly [de-identified] : Primary Dressing

## 2022-07-16 NOTE — PROVIDER CONTACT NOTE (CRITICAL VALUE NOTIFICATION) - ACTION/TREATMENT ORDERED:
In order to meet Medicare requirements, the clinical documentation must support the information cited in the admission order.  Please be sure to provide detailed and clear documentation about the following in the admitting note/history and physical:
NP PK aware. Will continue to monitor
PA notified. Will continue to monitor.

## 2022-07-19 ENCOUNTER — APPOINTMENT (OUTPATIENT)
Dept: WOUND CARE | Facility: HOSPITAL | Age: 77
End: 2022-07-19

## 2022-07-19 ENCOUNTER — OUTPATIENT (OUTPATIENT)
Dept: OUTPATIENT SERVICES | Facility: HOSPITAL | Age: 77
LOS: 1 days | Discharge: HOME | End: 2022-07-19
Payer: MEDICARE

## 2022-07-19 VITALS
HEART RATE: 73 BPM | DIASTOLIC BLOOD PRESSURE: 54 MMHG | TEMPERATURE: 97.9 F | OXYGEN SATURATION: 97 % | HEIGHT: 73 IN | WEIGHT: 142 LBS | RESPIRATION RATE: 20 BRPM | BODY MASS INDEX: 18.82 KG/M2 | SYSTOLIC BLOOD PRESSURE: 97 MMHG

## 2022-07-19 DIAGNOSIS — L89.152 PRESSURE ULCER OF SACRAL REGION, STAGE 2: ICD-10-CM

## 2022-07-19 PROCEDURE — G0463: CPT

## 2022-07-19 PROCEDURE — 99213 OFFICE O/P EST LOW 20 MIN: CPT

## 2022-07-19 RX ORDER — POTASSIUM CHLORIDE 750 MG/1
10 TABLET, FILM COATED, EXTENDED RELEASE ORAL
Qty: 30 | Refills: 0 | Status: ACTIVE | COMMUNITY
Start: 2022-07-12

## 2022-07-19 NOTE — PLAN
[FreeTextEntry1] : increase stanidng/ambul/frequent change of position/rotation\par abd pad/adaptic and CaAlginate QOD to left buttock/sacrum\par get roho cushion\par area has improved and treatment continued\par f/u 2 wks\par \par time spent 25 mins.

## 2022-07-19 NOTE — PHYSICAL EXAM
[4 x 4] : 4 x 4  [Abdominal Pad] : Abdominal Pad [JVD] : no jugular venous distention  [Normal Thyroid] : the thyroid was normal [Normal Breath Sounds] : Normal breath sounds [Normal Heart Sounds] : normal heart sounds [Normal Rate and Rhythm] : normal rate and rhythm [Ankle Swelling (On Exam)] : not present [Varicose Veins Of Lower Extremities] : not present [] : not present [Abdomen Masses] : No abdominal massess [Abdomen Tenderness] : ~T ~M No abdominal tenderness [Tender] : nontender [Enlarged] : not enlarged [Alert] : alert [Oriented to Person] : oriented to person [Oriented to Place] : oriented to place [Oriented to Time] : oriented to time [Calm] : calm [de-identified] : elderly WM, NAD, alert, Ox3. [FreeTextEntry1] : Buttocks wound  [FreeTextEntry2] : 0.2 [FreeTextEntry3] : 0.2 [FreeTextEntry4] : 0.1 [de-identified] : Serous/sanguinous [de-identified] : Adaptic touch, Calcium alginate  [de-identified] : Mechanically cleansed with sterile gauze and normal saline.\par Paper tape  [TWNoteComboBox4] : Small [de-identified] : Erythema [de-identified] : None [de-identified] : None [de-identified] : 100% [de-identified] : No [de-identified] : 3x Weekly [de-identified] : Primary Dressing

## 2022-07-19 NOTE — ASSESSMENT
[Verbal] : Verbal [Written] : Written [Demo] : Demo [Patient] : Patient [Caregiver] : Caregiver [Good - alert, interested, motivated] : Good - alert, interested, motivated [Verbalizes knowledge/Understanding] : Verbalizes knowledge/understanding [Dressing changes] : dressing changes [Skin Care] : skin care [Pressure relief] : pressure relief [Signs and symptoms of infection] : sign and symptoms of infection [Nutrition] : nutrition [How and When to Call] : how and when to call [Pain Management] : pain management [Patient responsibility to plan of care] : patient responsibility to plan of care [] : Yes [Stable] : stable [LTC] : LTC [Wheelchair] : Wheelchair [FreeTextEntry2] : Infection prevention\par Localized wound care \par Goal remaining pain free regarding wounds\par frequent turning and repositioning  [FreeTextEntry4] : Follow up in 2 weeks

## 2022-07-19 NOTE — HISTORY OF PRESENT ILLNESS
[FreeTextEntry1] : 78 yo WM, here for f/u of bilateral buttock pressure ulcers. Only a scattering of excoriations remain. i emphasized the importance of standing/ambulattion and change of position/rotation as much as possible throughout the day/night. Pt presently receiving PT and OT.\par \par 6/21/22 marked improvement and healing\par 7/5/22 wound smaller but periwound about the same may be from moisture\par 7/19/22 wound much improved

## 2022-07-19 NOTE — REVIEW OF SYSTEMS
[Negative] : Heme/Lymph [FreeTextEntry2] : has parkinsons [FreeTextEntry7] : dysphagia [FreeTextEntry8] : BPH [FreeTextEntry9] : ataxia [de-identified] : superficial sacral ulceration

## 2022-07-21 ENCOUNTER — APPOINTMENT (OUTPATIENT)
Dept: UROLOGY | Facility: CLINIC | Age: 77
End: 2022-07-21

## 2022-07-21 VITALS
BODY MASS INDEX: 18.82 KG/M2 | WEIGHT: 142 LBS | HEART RATE: 92 BPM | OXYGEN SATURATION: 96 % | DIASTOLIC BLOOD PRESSURE: 65 MMHG | HEIGHT: 73 IN | RESPIRATION RATE: 13 BRPM | SYSTOLIC BLOOD PRESSURE: 100 MMHG

## 2022-07-21 DIAGNOSIS — R26.89 OTHER ABNORMALITIES OF GAIT AND MOBILITY: ICD-10-CM

## 2022-07-21 DIAGNOSIS — Z98.890 OTHER SPECIFIED POSTPROCEDURAL STATES: ICD-10-CM

## 2022-07-21 DIAGNOSIS — Q54.9 HYPOSPADIAS, UNSPECIFIED: ICD-10-CM

## 2022-07-21 DIAGNOSIS — R33.9 RETENTION OF URINE, UNSPECIFIED: ICD-10-CM

## 2022-07-21 DIAGNOSIS — L89.152 PRESSURE ULCER OF SACRAL REGION, STAGE 2: ICD-10-CM

## 2022-07-21 DIAGNOSIS — G20 PARKINSON'S DISEASE: ICD-10-CM

## 2022-07-21 DIAGNOSIS — Z82.3 FAMILY HISTORY OF STROKE: ICD-10-CM

## 2022-07-21 DIAGNOSIS — G62.9 POLYNEUROPATHY, UNSPECIFIED: ICD-10-CM

## 2022-07-21 DIAGNOSIS — Z87.440 PERSONAL HISTORY OF URINARY (TRACT) INFECTIONS: ICD-10-CM

## 2022-07-21 DIAGNOSIS — R26.0 ATAXIC GAIT: ICD-10-CM

## 2022-07-21 DIAGNOSIS — N40.1 BENIGN PROSTATIC HYPERPLASIA WITH LOWER URINARY TRACT SYMPTOMS: ICD-10-CM

## 2022-07-21 DIAGNOSIS — Z79.899 OTHER LONG TERM (CURRENT) DRUG THERAPY: ICD-10-CM

## 2022-07-21 DIAGNOSIS — Z80.0 FAMILY HISTORY OF MALIGNANT NEOPLASM OF DIGESTIVE ORGANS: ICD-10-CM

## 2022-07-21 PROCEDURE — 51702 INSERT TEMP BLADDER CATH: CPT

## 2022-07-24 LAB — BACTERIA UR CULT: NORMAL

## 2022-07-27 ENCOUNTER — APPOINTMENT (OUTPATIENT)
Dept: PHYSICAL MEDICINE AND REHAB | Facility: CLINIC | Age: 77
End: 2022-07-27

## 2022-07-27 VITALS — OXYGEN SATURATION: 98 % | SYSTOLIC BLOOD PRESSURE: 100 MMHG | HEART RATE: 92 BPM | DIASTOLIC BLOOD PRESSURE: 64 MMHG

## 2022-07-27 PROCEDURE — 95874 GUIDE NERV DESTR NEEDLE EMG: CPT

## 2022-07-27 PROCEDURE — 64644 CHEMODENERV 1 EXTREM 5/> MUS: CPT

## 2022-08-02 ENCOUNTER — APPOINTMENT (OUTPATIENT)
Dept: WOUND CARE | Facility: HOSPITAL | Age: 77
End: 2022-08-02

## 2022-08-02 ENCOUNTER — OUTPATIENT (OUTPATIENT)
Dept: OUTPATIENT SERVICES | Facility: HOSPITAL | Age: 77
LOS: 1 days | Discharge: HOME | End: 2022-08-02
Payer: MEDICARE

## 2022-08-02 VITALS
HEART RATE: 74 BPM | HEIGHT: 73 IN | DIASTOLIC BLOOD PRESSURE: 53 MMHG | RESPIRATION RATE: 13 BRPM | TEMPERATURE: 97.6 F | OXYGEN SATURATION: 98 % | WEIGHT: 142 LBS | BODY MASS INDEX: 18.82 KG/M2 | SYSTOLIC BLOOD PRESSURE: 100 MMHG

## 2022-08-02 DIAGNOSIS — L89.152 PRESSURE ULCER OF SACRAL REGION, STAGE 2: ICD-10-CM

## 2022-08-02 PROCEDURE — 99213 OFFICE O/P EST LOW 20 MIN: CPT

## 2022-08-02 PROCEDURE — G0463: CPT

## 2022-08-02 NOTE — HISTORY OF PRESENT ILLNESS
[FreeTextEntry1] : 76 yo WM, here with his aide for f/u of left buttock pressure ulcer. Encouraged/reminded pt of importance of frequent change of position/rotation/standing/ambulation.

## 2022-08-02 NOTE — PLAN
[FreeTextEntry1] : frequent change of position/rotation\par increase standing/ambul\par f/u 2 wks\par \par time spent 25 mins.

## 2022-08-02 NOTE — ASSESSMENT
[FreeTextEntry4] : Topics discussed; nutrition and wound healing , and pressure relief measures. \par Pt resides at [facility] ([place], [place])\par Supplies submitted\par Pt to f/u in 3 weeks. [FreeTextEntry2] : Infection prevention \par Wound care (dressing changes)\par Maintain optimal skin integrity to high pressure areas\par Nutrition and wound healing\par Pressure relief/ Pressure redistribution\par Offloading the stress on skin structures and decreasing potential pathologic biomechanical influences.\par Frequent turning and repositioning q 2 hrs. [FreeTextEntry1] : Pt's Aid provided wound care instructions for [facility] Nursing staff. \par  \par

## 2022-08-02 NOTE — REVIEW OF SYSTEMS
[Negative] : Heme/Lymph [FreeTextEntry2] : has parkinsons [FreeTextEntry7] : dysphagia [FreeTextEntry8] : BPH [FreeTextEntry9] : ataxia [de-identified] : superficial sacral ulceration

## 2022-08-02 NOTE — PHYSICAL EXAM
[Normal Thyroid] : the thyroid was normal [Normal Breath Sounds] : Normal breath sounds [Normal Heart Sounds] : normal heart sounds [Normal Rate and Rhythm] : normal rate and rhythm [Alert] : alert [Oriented to Person] : oriented to person [Oriented to Place] : oriented to place [Oriented to Time] : oriented to time [Calm] : calm [JVD] : no jugular venous distention  [Abdomen Masses] : No abdominal massess [Abdomen Tenderness] : ~T ~M No abdominal tenderness [Tender] : nontender [Enlarged] : not enlarged [de-identified] : elderly WM, NAD, alert,Ox3. [FreeTextEntry1] : Buttocks wound  [FreeTextEntry2] : 0.2 [FreeTextEntry3] : 0.2 [FreeTextEntry4] : 0.1 [de-identified] : serosanguineous [de-identified] : pressure [de-identified] : with scattered excoriations [de-identified] : Adaptic touch [de-identified] : Mechanically cleansed with sterile gauze and normal saline.\par Paper tape  [TWNoteComboBox4] : Small [de-identified] : Erythema [de-identified] : None [de-identified] : None [de-identified] : 100% [de-identified] : No [de-identified] : 3x Weekly [de-identified] : Primary Dressing

## 2022-08-03 DIAGNOSIS — R26.0 ATAXIC GAIT: ICD-10-CM

## 2022-08-03 DIAGNOSIS — Z98.890 OTHER SPECIFIED POSTPROCEDURAL STATES: ICD-10-CM

## 2022-08-03 DIAGNOSIS — Z80.0 FAMILY HISTORY OF MALIGNANT NEOPLASM OF DIGESTIVE ORGANS: ICD-10-CM

## 2022-08-03 DIAGNOSIS — R26.89 OTHER ABNORMALITIES OF GAIT AND MOBILITY: ICD-10-CM

## 2022-08-03 DIAGNOSIS — Z87.440 PERSONAL HISTORY OF URINARY (TRACT) INFECTIONS: ICD-10-CM

## 2022-08-03 DIAGNOSIS — G62.9 POLYNEUROPATHY, UNSPECIFIED: ICD-10-CM

## 2022-08-03 DIAGNOSIS — L89.152 PRESSURE ULCER OF SACRAL REGION, STAGE 2: ICD-10-CM

## 2022-08-03 DIAGNOSIS — N40.1 BENIGN PROSTATIC HYPERPLASIA WITH LOWER URINARY TRACT SYMPTOMS: ICD-10-CM

## 2022-08-03 DIAGNOSIS — Z79.899 OTHER LONG TERM (CURRENT) DRUG THERAPY: ICD-10-CM

## 2022-08-03 DIAGNOSIS — Z82.3 FAMILY HISTORY OF STROKE: ICD-10-CM

## 2022-08-03 DIAGNOSIS — R33.9 RETENTION OF URINE, UNSPECIFIED: ICD-10-CM

## 2022-08-03 DIAGNOSIS — G20 PARKINSON'S DISEASE: ICD-10-CM

## 2022-08-23 ENCOUNTER — APPOINTMENT (OUTPATIENT)
Dept: WOUND CARE | Facility: HOSPITAL | Age: 77
End: 2022-08-23

## 2022-08-23 ENCOUNTER — OUTPATIENT (OUTPATIENT)
Dept: OUTPATIENT SERVICES | Facility: HOSPITAL | Age: 77
LOS: 1 days | Discharge: HOME | End: 2022-08-23
Payer: MEDICARE

## 2022-08-23 VITALS
HEART RATE: 87 BPM | DIASTOLIC BLOOD PRESSURE: 79 MMHG | BODY MASS INDEX: 18.82 KG/M2 | WEIGHT: 142 LBS | HEIGHT: 73 IN | TEMPERATURE: 97.2 F | SYSTOLIC BLOOD PRESSURE: 121 MMHG | RESPIRATION RATE: 20 BRPM | OXYGEN SATURATION: 98 %

## 2022-08-23 DIAGNOSIS — Z80.0 FAMILY HISTORY OF MALIGNANT NEOPLASM OF DIGESTIVE ORGANS: ICD-10-CM

## 2022-08-23 DIAGNOSIS — Z82.3 FAMILY HISTORY OF STROKE: ICD-10-CM

## 2022-08-23 DIAGNOSIS — Z79.899 OTHER LONG TERM (CURRENT) DRUG THERAPY: ICD-10-CM

## 2022-08-23 DIAGNOSIS — N40.1 BENIGN PROSTATIC HYPERPLASIA WITH LOWER URINARY TRACT SYMPTOMS: ICD-10-CM

## 2022-08-23 DIAGNOSIS — R26.0 ATAXIC GAIT: ICD-10-CM

## 2022-08-23 DIAGNOSIS — G62.9 POLYNEUROPATHY, UNSPECIFIED: ICD-10-CM

## 2022-08-23 DIAGNOSIS — Z87.440 PERSONAL HISTORY OF URINARY (TRACT) INFECTIONS: ICD-10-CM

## 2022-08-23 DIAGNOSIS — Z98.890 OTHER SPECIFIED POSTPROCEDURAL STATES: ICD-10-CM

## 2022-08-23 DIAGNOSIS — L89.152 PRESSURE ULCER OF SACRAL REGION, STAGE 2: ICD-10-CM

## 2022-08-23 DIAGNOSIS — R26.89 OTHER ABNORMALITIES OF GAIT AND MOBILITY: ICD-10-CM

## 2022-08-23 DIAGNOSIS — G20 PARKINSON'S DISEASE: ICD-10-CM

## 2022-08-23 DIAGNOSIS — R33.9 RETENTION OF URINE, UNSPECIFIED: ICD-10-CM

## 2022-08-23 PROCEDURE — G0463: CPT

## 2022-08-23 PROCEDURE — 99213 OFFICE O/P EST LOW 20 MIN: CPT

## 2022-08-23 NOTE — HISTORY OF PRESENT ILLNESS
[FreeTextEntry1] : 78 yo WM, here for f/u of a chronic pressure ulcer involving his sacral area. Pt states that he was observed in the ER overnight about 2 wks ago due to his fowler failure and had to lay supine the entire time. I reemphasized the importance of offload/frequent rotation/change of position  to help press. ulcers to heal and to prevent them.\par    Today ,pt only has a superficial press. ulcer on his left buttock.

## 2022-08-23 NOTE — REVIEW OF SYSTEMS
[Negative] : Heme/Lymph [FreeTextEntry2] : has parkinsons [FreeTextEntry7] : dysphagia [FreeTextEntry8] : BPH [FreeTextEntry9] : ataxia [de-identified] : superficial sacral ulceration

## 2022-08-23 NOTE — PLAN
[FreeTextEntry1] : offload; frequent change of position/rotation\par allevyn\par f/u 3 wks\par \par time spent 25 mins.

## 2022-08-23 NOTE — ASSESSMENT
[] : No [FreeTextEntry2] : Infection prevention\par Localized wound care \par Goal remaining pain free regarding wounds\par Frequent turning and repositioning \par Promote optimal nutrition  [FreeTextEntry3] : Per pt he was in ER due to failing fowler catheter and was on his back for a long period of time.  [FreeTextEntry4] : Follow up in 3 weeks

## 2022-08-23 NOTE — PHYSICAL EXAM
[Normal Thyroid] : the thyroid was normal [Normal Breath Sounds] : Normal breath sounds [Normal Heart Sounds] : normal heart sounds [Normal Rate and Rhythm] : normal rate and rhythm [Alert] : alert [Oriented to Person] : oriented to person [Oriented to Place] : oriented to place [Oriented to Time] : oriented to time [Calm] : calm [JVD] : no jugular venous distention  [Ankle Swelling (On Exam)] : not present [Varicose Veins Of Lower Extremities] : not present [] : not present [Abdomen Masses] : No abdominal massess [Abdomen Tenderness] : ~T ~M No abdominal tenderness [Tender] : nontender [Enlarged] : not enlarged [de-identified] : elderly WM, NAD, alert, Ox3. [FreeTextEntry1] : Buttocks  [FreeTextEntry2] : 1.2 [FreeTextEntry3] : 0.7 [FreeTextEntry4] : 0.1 [de-identified] : Scant Serous/sanguinous [de-identified] : Excoriated and erythema  [de-identified] : Allevyn pad  [de-identified] : Mechanically cleansed with sterile gauze and normal saline.\par Paper tape  [de-identified] : None [de-identified] : None [de-identified] : >75% [de-identified] : No [de-identified] : 3x Weekly [de-identified] : Secondary Dressing

## 2022-08-25 ENCOUNTER — APPOINTMENT (OUTPATIENT)
Dept: PHYSICAL MEDICINE AND REHAB | Facility: CLINIC | Age: 77
End: 2022-08-25

## 2022-08-25 VITALS
HEART RATE: 80 BPM | SYSTOLIC BLOOD PRESSURE: 99 MMHG | DIASTOLIC BLOOD PRESSURE: 66 MMHG | OXYGEN SATURATION: 98 % | TEMPERATURE: 97.3 F | RESPIRATION RATE: 19 BRPM

## 2022-08-25 DIAGNOSIS — M21.542 ACQUIRED CLUBFOOT, LEFT FOOT: ICD-10-CM

## 2022-08-25 DIAGNOSIS — R26.89 OTHER ABNORMALITIES OF GAIT AND MOBILITY: ICD-10-CM

## 2022-08-25 DIAGNOSIS — M21.372 FOOT DROP, LEFT FOOT: ICD-10-CM

## 2022-08-25 PROCEDURE — 99213 OFFICE O/P EST LOW 20 MIN: CPT

## 2022-08-25 RX ORDER — ONABOTULINUMTOXINA 100 [USP'U]/1
100 INJECTION, POWDER, LYOPHILIZED, FOR SOLUTION INTRADERMAL; INTRAMUSCULAR
Qty: 6 | Refills: 0 | Status: ACTIVE | OUTPATIENT
Start: 2022-08-25

## 2022-08-25 RX ORDER — MUPIROCIN 20 MG/G
2 OINTMENT TOPICAL
Qty: 22 | Refills: 0 | Status: ACTIVE | COMMUNITY
Start: 2022-06-28

## 2022-08-25 NOTE — PHYSICAL EXAM
[FreeTextEntry1] : General: Well-developed male in no apparent distress. Patient is awake, alert, and cooperative with examination. Patient follows two-step commands.\par Extremities: Minimal/no pedal edema.\par \par Motor:\par Both upper extremities: Mild rigidity, resting tremor noted. Active range of motion within functional limits except\par [place] neck deformity of L 3rd and 4th digit, can only flex PIP joints to neutral position. 5/5 motor power\par \par Both lower extremities: Rigidity noted. Hip flexion/knee extension 4+-5/5 motor power. Right ankle 5/5 motor power\par Left ankle dorsiflexion 4+/5 motor power, ankle inversion/eversion 4+/5 motor power, ankle plantarflexion 4/5 motor power.\par Able to passively everted left ankle rotate tibia to neutral position. \par Able to passively dorsiflexed ankle to just neutral with the knee flexed, 5-10° plantarflexion with the knee extended\par Able to PROM right knee to full extension \par L ankle posturing in less equinovarus.\par ankle PF MAS= 0-1\par Ankle invertors MAS= 0-1\par Tibial internal rotators, MAS=0\par \par Sensory: Intact to light touch both lower extremity\par \par Functional status: Patient ambulated with rolling walker with toe strike noted on the left with his AFO.  Left foot inversion improved during ambulation and can cue patient to obtain neutral position of his foot. \par

## 2022-08-25 NOTE — ASSESSMENT
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  Significant tone reduction noted in his tibial internal rotators and patient is now able to place his foot in neutral position with cueing. Will continue the Botox injection protocol of July 27, 2022 (6 vials).  Encourage patient to obtain his right outer sole lift-1 inch to improve clearance of his left lower extremity.\par \par I spent a total of 20 minutes on the date of the encounter evaluating and treating the patient including a discussion of treatment options.

## 2022-08-25 NOTE — HISTORY OF PRESENT ILLNESS
[FreeTextEntry1] : Patient is a 77-year-old male history of Parkinson's disease, BPH/chronic Spencer, who received a Botox injection on July 27, 2022 for focal dystonia.  Patient and aide report significant reduction in tone causing inversion of his left foot.  Improved ease of stretching the foot and he places his foot in a more neutral position during ambulation.  Patient is receiving outpatient PT and OT.  Patient has been ambulating in physical therapy with a rolling walker with contact-guard.  Patient has not yet received his right outer sole lift.  Patient also reports ambulation has been easier after toenail removal on his right foot.  No falls reported since last visit.

## 2022-09-01 ENCOUNTER — APPOINTMENT (OUTPATIENT)
Dept: UROLOGY | Facility: CLINIC | Age: 77
End: 2022-09-01

## 2022-09-13 ENCOUNTER — NON-APPOINTMENT (OUTPATIENT)
Age: 77
End: 2022-09-13

## 2022-09-15 ENCOUNTER — NON-APPOINTMENT (OUTPATIENT)
Age: 77
End: 2022-09-15

## 2022-09-17 ENCOUNTER — NON-APPOINTMENT (OUTPATIENT)
Age: 77
End: 2022-09-17

## 2022-09-17 ENCOUNTER — INPATIENT (INPATIENT)
Facility: HOSPITAL | Age: 77
LOS: 4 days | Discharge: HOME HEALTH | DRG: 695 | End: 2022-09-22
Attending: INTERNAL MEDICINE | Admitting: STUDENT IN AN ORGANIZED HEALTH CARE EDUCATION/TRAINING PROGRAM
Payer: MEDICARE

## 2022-09-17 VITALS
WEIGHT: 139.99 LBS | HEIGHT: 73 IN | DIASTOLIC BLOOD PRESSURE: 62 MMHG | OXYGEN SATURATION: 98 % | TEMPERATURE: 98 F | RESPIRATION RATE: 20 BRPM | SYSTOLIC BLOOD PRESSURE: 115 MMHG | HEART RATE: 73 BPM

## 2022-09-17 DIAGNOSIS — Z29.9 ENCOUNTER FOR PROPHYLACTIC MEASURES, UNSPECIFIED: ICD-10-CM

## 2022-09-17 DIAGNOSIS — F41.9 ANXIETY DISORDER, UNSPECIFIED: ICD-10-CM

## 2022-09-17 DIAGNOSIS — S31.000A UNSPECIFIED OPEN WOUND OF LOWER BACK AND PELVIS WITHOUT PENETRATION INTO RETROPERITONEUM, INITIAL ENCOUNTER: ICD-10-CM

## 2022-09-17 DIAGNOSIS — N39.0 URINARY TRACT INFECTION, SITE NOT SPECIFIED: ICD-10-CM

## 2022-09-17 DIAGNOSIS — G20 PARKINSON'S DISEASE: ICD-10-CM

## 2022-09-17 DIAGNOSIS — R82.90 UNSPECIFIED ABNORMAL FINDINGS IN URINE: ICD-10-CM

## 2022-09-17 LAB
ALBUMIN SERPL ELPH-MCNC: 3.4 G/DL — SIGNIFICANT CHANGE UP (ref 3.3–5)
ALP SERPL-CCNC: 145 U/L — HIGH (ref 40–120)
ALT FLD-CCNC: 5 U/L — LOW (ref 10–45)
ANION GAP SERPL CALC-SCNC: 10 MMOL/L — SIGNIFICANT CHANGE UP (ref 5–17)
APPEARANCE UR: ABNORMAL
AST SERPL-CCNC: 10 U/L — SIGNIFICANT CHANGE UP (ref 10–40)
BACTERIA # UR AUTO: ABNORMAL
BASE EXCESS BLDV CALC-SCNC: 8.1 MMOL/L — HIGH (ref -2–3)
BASOPHILS # BLD AUTO: 0.08 K/UL — SIGNIFICANT CHANGE UP (ref 0–0.2)
BASOPHILS NFR BLD AUTO: 0.8 % — SIGNIFICANT CHANGE UP (ref 0–2)
BILIRUB SERPL-MCNC: 0.2 MG/DL — SIGNIFICANT CHANGE UP (ref 0.2–1.2)
BILIRUB UR-MCNC: NEGATIVE — SIGNIFICANT CHANGE UP
BUN SERPL-MCNC: 11 MG/DL — SIGNIFICANT CHANGE UP (ref 7–23)
CA-I SERPL-SCNC: 1.15 MMOL/L — SIGNIFICANT CHANGE UP (ref 1.15–1.33)
CALCIUM SERPL-MCNC: 8.5 MG/DL — SIGNIFICANT CHANGE UP (ref 8.4–10.5)
CHLORIDE BLDV-SCNC: 101 MMOL/L — SIGNIFICANT CHANGE UP (ref 96–108)
CHLORIDE SERPL-SCNC: 101 MMOL/L — SIGNIFICANT CHANGE UP (ref 96–108)
CO2 BLDV-SCNC: 37 MMOL/L — HIGH (ref 22–26)
CO2 SERPL-SCNC: 30 MMOL/L — SIGNIFICANT CHANGE UP (ref 22–31)
COLOR SPEC: ABNORMAL
CREAT SERPL-MCNC: 0.65 MG/DL — SIGNIFICANT CHANGE UP (ref 0.5–1.3)
DIFF PNL FLD: ABNORMAL
EGFR: 98 ML/MIN/1.73M2 — SIGNIFICANT CHANGE UP
EOSINOPHIL # BLD AUTO: 0.36 K/UL — SIGNIFICANT CHANGE UP (ref 0–0.5)
EOSINOPHIL NFR BLD AUTO: 3.6 % — SIGNIFICANT CHANGE UP (ref 0–6)
EPI CELLS # UR: 6 — SIGNIFICANT CHANGE UP
GAS PNL BLDV: 136 MMOL/L — SIGNIFICANT CHANGE UP (ref 136–145)
GAS PNL BLDV: SIGNIFICANT CHANGE UP
GAS PNL BLDV: SIGNIFICANT CHANGE UP
GLUCOSE BLDV-MCNC: 108 MG/DL — HIGH (ref 70–99)
GLUCOSE SERPL-MCNC: 110 MG/DL — HIGH (ref 70–99)
GLUCOSE UR QL: NEGATIVE — SIGNIFICANT CHANGE UP
HCO3 BLDV-SCNC: 35 MMOL/L — HIGH (ref 22–29)
HCT VFR BLD CALC: 34.3 % — LOW (ref 39–50)
HCT VFR BLDA CALC: 42 % — SIGNIFICANT CHANGE UP (ref 39–51)
HGB BLD CALC-MCNC: 13.9 G/DL — SIGNIFICANT CHANGE UP (ref 12.6–17.4)
HGB BLD-MCNC: 10.4 G/DL — LOW (ref 13–17)
HYALINE CASTS # UR AUTO: 3 /LPF — SIGNIFICANT CHANGE UP (ref 0–7)
IMM GRANULOCYTES NFR BLD AUTO: 0.4 % — SIGNIFICANT CHANGE UP (ref 0–0.9)
KETONES UR-MCNC: NEGATIVE — SIGNIFICANT CHANGE UP
LACTATE BLDV-MCNC: 1.7 MMOL/L — SIGNIFICANT CHANGE UP (ref 0.5–2)
LEUKOCYTE ESTERASE UR-ACNC: ABNORMAL
LYMPHOCYTES # BLD AUTO: 0.97 K/UL — LOW (ref 1–3.3)
LYMPHOCYTES # BLD AUTO: 9.8 % — LOW (ref 13–44)
MAGNESIUM SERPL-MCNC: 2 MG/DL — SIGNIFICANT CHANGE UP (ref 1.6–2.6)
MCHC RBC-ENTMCNC: 30.1 PG — SIGNIFICANT CHANGE UP (ref 27–34)
MCHC RBC-ENTMCNC: 30.3 GM/DL — LOW (ref 32–36)
MCV RBC AUTO: 99.4 FL — SIGNIFICANT CHANGE UP (ref 80–100)
MONOCYTES # BLD AUTO: 0.71 K/UL — SIGNIFICANT CHANGE UP (ref 0–0.9)
MONOCYTES NFR BLD AUTO: 7.1 % — SIGNIFICANT CHANGE UP (ref 2–14)
NEUTROPHILS # BLD AUTO: 7.78 K/UL — HIGH (ref 1.8–7.4)
NEUTROPHILS NFR BLD AUTO: 78.3 % — HIGH (ref 43–77)
NITRITE UR-MCNC: POSITIVE
NRBC # BLD: 0 /100 WBCS — SIGNIFICANT CHANGE UP (ref 0–0)
PCO2 BLDV: 58 MMHG — HIGH (ref 42–55)
PH BLDV: 7.39 — SIGNIFICANT CHANGE UP (ref 7.32–7.43)
PH UR: 8.5 — HIGH (ref 5–8)
PLATELET # BLD AUTO: 381 K/UL — SIGNIFICANT CHANGE UP (ref 150–400)
PO2 BLDV: 24 MMHG — LOW (ref 25–45)
POTASSIUM BLDV-SCNC: 3 MMOL/L — LOW (ref 3.5–5.1)
POTASSIUM SERPL-MCNC: 3.2 MMOL/L — LOW (ref 3.5–5.3)
POTASSIUM SERPL-SCNC: 3.2 MMOL/L — LOW (ref 3.5–5.3)
PROT SERPL-MCNC: 7.1 G/DL — SIGNIFICANT CHANGE UP (ref 6–8.3)
PROT UR-MCNC: >600
RBC # BLD: 3.45 M/UL — LOW (ref 4.2–5.8)
RBC # FLD: 13.2 % — SIGNIFICANT CHANGE UP (ref 10.3–14.5)
RBC CASTS # UR COMP ASSIST: 17 /HPF — HIGH (ref 0–4)
SAO2 % BLDV: 33.7 % — LOW (ref 67–88)
SARS-COV-2 RNA SPEC QL NAA+PROBE: SIGNIFICANT CHANGE UP
SODIUM SERPL-SCNC: 141 MMOL/L — SIGNIFICANT CHANGE UP (ref 135–145)
SP GR SPEC: 1.01 — SIGNIFICANT CHANGE UP (ref 1.01–1.02)
TRI-PHOS CRY UR QL COMP ASSIST: ABNORMAL
UROBILINOGEN FLD QL: NEGATIVE — SIGNIFICANT CHANGE UP
WBC # BLD: 9.94 K/UL — SIGNIFICANT CHANGE UP (ref 3.8–10.5)
WBC # FLD AUTO: 9.94 K/UL — SIGNIFICANT CHANGE UP (ref 3.8–10.5)
WBC UR QL: >50 /HPF — HIGH (ref 0–5)

## 2022-09-17 PROCEDURE — 93010 ELECTROCARDIOGRAM REPORT: CPT

## 2022-09-17 PROCEDURE — 99285 EMERGENCY DEPT VISIT HI MDM: CPT | Mod: GC

## 2022-09-17 PROCEDURE — 99223 1ST HOSP IP/OBS HIGH 75: CPT

## 2022-09-17 RX ORDER — CEFTRIAXONE 500 MG/1
1000 INJECTION, POWDER, FOR SOLUTION INTRAMUSCULAR; INTRAVENOUS ONCE
Refills: 0 | Status: COMPLETED | OUTPATIENT
Start: 2022-09-17 | End: 2022-09-17

## 2022-09-17 RX ORDER — DIAZEPAM 5 MG
5 TABLET ORAL EVERY 12 HOURS
Refills: 0 | Status: DISCONTINUED | OUTPATIENT
Start: 2022-09-17 | End: 2022-09-22

## 2022-09-17 RX ORDER — CARBIDOPA AND LEVODOPA 25; 100 MG/1; MG/1
1 TABLET ORAL THREE TIMES A DAY
Refills: 0 | Status: DISCONTINUED | OUTPATIENT
Start: 2022-09-18 | End: 2022-09-22

## 2022-09-17 RX ORDER — CARBIDOPA AND LEVODOPA 25; 100 MG/1; MG/1
1 TABLET ORAL ONCE
Refills: 0 | Status: COMPLETED | OUTPATIENT
Start: 2022-09-17 | End: 2022-09-17

## 2022-09-17 RX ORDER — POTASSIUM CHLORIDE 20 MEQ
20 PACKET (EA) ORAL ONCE
Refills: 0 | Status: COMPLETED | OUTPATIENT
Start: 2022-09-17 | End: 2022-09-18

## 2022-09-17 RX ADMIN — CEFTRIAXONE 100 MILLIGRAM(S): 500 INJECTION, POWDER, FOR SOLUTION INTRAMUSCULAR; INTRAVENOUS at 21:23

## 2022-09-17 NOTE — H&P ADULT - PROBLEM SELECTOR PLAN 5
dvt ppx: lovenox  ambulate: with assistance  Diet: Pureed pending speech eval    Speech Eval  PT Eval     HCP Zamzam Jimena (friend) - pt denies having any close family who can make decisions for him

## 2022-09-17 NOTE — ED ADULT NURSE NOTE - NSIMPLEMENTINTERV_GEN_ALL_ED
Implemented All Fall Risk Interventions:  [place] to call system. Call bell, personal items and telephone within reach. Instruct patient to call for assistance. Room bathroom lighting operational. Non-slip footwear when patient is off stretcher. Physically safe environment: no spills, clutter or unnecessary equipment. Stretcher in lowest position, wheels locked, appropriate side rails in place. Provide visual cue, wrist band, yellow gown, etc. Monitor gait and stability. Monitor for mental status changes and reorient to person, place, and time. Review medications for side effects contributing to fall risk. Reinforce activity limits and safety measures with patient and family.

## 2022-09-17 NOTE — H&P ADULT - HISTORY OF PRESENT ILLNESS
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 c/b rhabdomyolysis s/p PEG reversed, Parkinson's Disease, anxiety, BPH s/p chronic fowler, previous UTI and Cdiff infections, presents with urinary sediment, pt reports he was recently admitted to outside hospital where fowler was exchanged, but has had continued sediment in the urine and erosion at the urethral meatus, he is chronically bedbound and has become less functional over past few weeks, he recently has acquired a 24 hour home health aid, he lives at [facility] in [facility], he endorses pain at the site of his chronic left buttock ulcer but otherwise denies f/c, flank pain, n/v, or other urinary symptoms, in the ED, VSS, labs were at baseline, grossly positive U/A, urology consulted in the ED, pt admitted to general medicine for further management.

## 2022-09-17 NOTE — ED PROVIDER NOTE - PROGRESS NOTE DETAILS
JORDANA Resendiz (PGY-3) - pt w/ tp crystals on UA, urine is foul smelling, pt here for generalized weakness, will treat with ceftriaxone which his klebsiella was sensitive for last time. TBA.

## 2022-09-17 NOTE — H&P ADULT - ASSESSMENT
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

## 2022-09-17 NOTE — ED ADULT NURSE REASSESSMENT NOTE - NS ED NURSE REASSESS COMMENT FT1
Pt received from SHAYAN Jensen in purple, A&Ox3, breathing spontaneously, unlabored & w/o distress on room air. Pt c/o discomfort from sacral ulcer, pt repositioned for comfort endorses relief. Pt VSS. Stretcher in lowest position and locked, call bell within reach, awaiting dispo.

## 2022-09-17 NOTE — ED PROVIDER NOTE - CHILD ABUSE FACILITY
[FreeTextEntry1] : 48-year-old female   with a history of infertility endometriosis status post IVF delivered twins at 29 weeks both of which have CP. Patient subsequently had several laparoscopies the last one performed 2018 but I cannot now for endometriosis of the right ovary both tubes removed at the time patient presents today with bilateral ovarian masses elevated  to 333 for further management. She is requesting at the very minimum to both tubes and ovaries removed and possibly her uterus as well.\par The Patient is also followed by  Dr. Arnold.  (Remanant of ovarian tissue remained after RSO\par Here for surgical consultation.) \par \par 2018 path\par 1. Right ovary and fallopian tube: - Ovarian adhesions and hemorrhagic cyst consistent with endometriosis. - Ovary with cystic follicles, involuting atretic follicles, and corpora albicantia also noted. - Histologically unremarkable fallopian tube - Paratubal simple serous cysts \par \par \par  [facility]

## 2022-09-17 NOTE — ED PROVIDER NOTE - OBJECTIVE STATEMENT
77M with BPH, chronic fowler (changed last week), muscular dystrophy, ?parkinson's who p/w purulent foul smelling urine, and worsening erosion at meatus and scrotal pain. Patient was hospitalized twice the last month for urosepsis and pneumonia at [place] and since then has been having worsening decline in functional status now bedbound and homebound. fowler catether changed at [place] last week and since then having smelly and cloudy urine, along with increasing scrotal and penile pain that the home health aide reports is associated with worsening erosion at urethal meatus. No f/c, no bloody urine. no abdominal pain. does have chronic sacral ulcer

## 2022-09-17 NOTE — ED PROVIDER NOTE - NS ED ROS FT
Constitutional: no fever and no chills  Eyes: no discharge, no irritation, no pain, no visual changes  ENMT: no ear pain or hearing loss, no dysphagia or throat pain  Neck: no pain, no stiffness, no swollen glands  CV: no chest pain, no palpitations, no edema  Resp: no cough, no shortness of breath  Abd: no abdominal pain, no nausea or vomiting, no diarrhea  : no dysuria, no hematuria, +purulent and smelly urine, +scrotal pain and swelling  MSK: no back pain, no neck pain, no joint pain  Neuro: no LOC, no gait abnormality, no headache, no sensory deficits, no weakness  Skin: no rashes, no lacerations, no lesions

## 2022-09-17 NOTE — CONSULT NOTE ADULT - SUBJECTIVE AND OBJECTIVE BOX
HPI: 78 yo M with PMHx of BPH managed by chronic fowler and muscular dystrophy presents with c/o purulent urine, worsening urethral erosion and scrotal pain. Pt reports recently admitted at [facility] and had catheter exchanged one week ago. Since then, urine has been cloudy and purulent. He also recently got a new HHA who reports that he had significant urethral erosion. This morning, he had some discomfort in the scrotum, which has since improved. The catheter had been draining. Also reports he has history of a hydrocele. No fever/chills, n/v, abd pain, flank pain, dysuria, hematuria, bleeding from the meatus or other acute complaints.     PAST MEDICAL & SURGICAL HISTORY:  Psychiatric disorder  Scrotal swelling  H/O muscular dystrophy  Urinary retention    No significant past surgical history      MEDICATIONS  (STANDING):    MEDICATIONS  (PRN):      FAMILY HISTORY:  FHx: stomach cancer        Allergies    No Known Allergies    Intolerances        SOCIAL HISTORY:    REVIEW OF SYSTEMS: Otherwise negative as stated in HPI    Physical Exam  Vital signs  T(C): 36.4 (09-17-22 @ 15:51), Max: 36.4 (09-17-22 @ 15:51)  HR: 73 (09-17-22 @ 15:51)  BP: 115/62 (09-17-22 @ 15:51)  SpO2: 98% (09-17-22 @ 15:51)    Output    Gen: NAD  Pulm: No respiratory distress  Abd: S/ND/NT  : profound urethral erosion around catheter. fowler in place with purulent yellow urine. scrotum with mild erythema and excoriations. no active bleeding. mild TTP of R hemiscrotum. no L hemiscrotal TTP. testes descended bilaterally.     LABS:                          10.4   9.94  )-----------( 381      ( 17 Sep 2022 17:24 )             34.3     BMP pending    Urine Cx: pending     HPI: 76 yo M with PMHx of BPH managed by chronic fowler and muscular dystrophy presents with c/o purulent urine, worsening urethral erosion and scrotal pain. Pt reports recently admitted at [facility] and had catheter exchanged one week ago. Since then, urine has been cloudy and purulent. He also recently got a new HHA who reports that he had significant urethral erosion. This morning, he had some discomfort in the scrotum, which has since improved. The catheter had been draining. Also reports he has history of a hydrocele. No fever/chills, n/v, abd pain, flank pain, dysuria, hematuria, bleeding from the meatus or other acute complaints.     PAST MEDICAL & SURGICAL HISTORY:  Psychiatric disorder  Scrotal swelling  H/O muscular dystrophy  Urinary retention    No significant past surgical history      MEDICATIONS  (STANDING):    MEDICATIONS  (PRN):      FAMILY HISTORY:  FHx: stomach cancer        Allergies    No Known Allergies    Intolerances        SOCIAL HISTORY:    REVIEW OF SYSTEMS: Otherwise negative as stated in HPI    Physical Exam  Vital signs  T(C): 36.4 (09-17-22 @ 15:51), Max: 36.4 (09-17-22 @ 15:51)  HR: 73 (09-17-22 @ 15:51)  BP: 115/62 (09-17-22 @ 15:51)  SpO2: 98% (09-17-22 @ 15:51)    Output    Gen: NAD  Pulm: No respiratory distress  Abd: S/ND/NT  : profound urethral erosion around catheter. fowler in place with purulent yellow urine. scrotum with mild erythema and excoriations. no active bleeding. mild TTP of R hemiscrotum. no L hemiscrotal TTP. testes descended bilaterally. hydrocele felt in left scrotum.     LABS:                          10.4   9.94  )-----------( 381      ( 17 Sep 2022 17:24 )             34.3     BMP pending    Urine Cx: pending

## 2022-09-17 NOTE — ED ADULT NURSE NOTE - OBJECTIVE STATEMENT
77y male A&OX4 BIBEMS coming complaining of infection. PMHX parkinson's, urinary retention w/ Spencer. Pt reports having pain in cath for a week. Pt health care proxy states his urine smelled really bad and became cloudy when they went to PCP last week. PCP called and told them to come because they have found  an infection in the urine. PT 77y male A&OX4 BIBEMS coming complaining of infection. PMHX parkinson's, urinary retention w/ Spencer. Pt reports having pain in cath for a week. Pt health care proxy states his urine smelled really bad and became cloudy when they went to PCP last week. PCP called and told them to come because they have found  an infection in the urine. Pt has cath inserted at r side of penis with redness on head of penis. Pt has Spencer cath with white/yellow cloudy urine.  Pt has scrotum swollen and stage two on sacrum w/ redness on buttock. Pt denies chest pain, shortness of breath, abd pain, dizziness, lightheadedness. Labs was collected and sent to lab. Pt awaiting urology consult. Pt pending dispo.

## 2022-09-17 NOTE — CONSULT NOTE ADULT - ASSESSMENT
76 yo M with PMHx of BPH managed by chronic fowler and muscular dystrophy presents with c/o purulent urine, worsening urethral erosion and scrotal pain.    f/u urine culture  treat if indicated  local scrotal skin care  f/u with Dr. Louie for  care    d/w Dr. Louie 76 yo M with PMHx of BPH managed by chronic fowler and muscular dystrophy presents with c/o purulent urine, worsening urethral erosion and scrotal pain.    f/u urine culture. 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   local scrotal skin care for excoriations. exam c/w known left hydrocele.   f/u with Dr. Louie for  care    d/w Dr. Hoenig

## 2022-09-17 NOTE — ED PROVIDER NOTE - PHYSICAL EXAMINATION
PHYSICAL EXAM:  GENERAL: lying comfortably in bed, answering questions appropriately, cachetic with temporal muscle wasting  HENMT: Atraumatic, moist mucous membranes, no oropharyngeal exudates or vesicles, uvula is midline EYES: Clear bilaterally, PERRL, EOMs intact b/l  HEART: RRR, S1/S2, no murmur/gallops/rubs  RESPIRATORY: Clear to auscultation bilaterally, no wheezes/rhonchi/rales  ABDOMEN: +BS, soft, nontender, nondistended  : fowler with cloudy urine, increasing sediment  EXTREMITIES: No lower extremity edema, +2 radial pulses b/l  NEURO:  A&Ox4, no focal motor deficits or sensory deficits   Heme/LYMPH: No ecchymosis or bruising, no anterior/posterior cervical or supraclavicular LAD  SKIN:  Skin normal color for race, warm, dry and intact. No evidence of rash.

## 2022-09-17 NOTE — ED PROVIDER NOTE - CLINICAL SUMMARY MEDICAL DECISION MAKING FREE TEXT BOX
77M with muscular dystrophy, BPH, chronic fowler who presents with purulent, smelly urine, likely UTI, along with scrotal pain, will consult urology and obtain blood work and urine studies

## 2022-09-17 NOTE — ED PROVIDER NOTE - ATTENDING CONTRIBUTION TO CARE
Patient presenting with two complaints:  first is decreased urine output into chronic fowler catheter over last 24 hours.  Reporting only about 500mL in last 24 hours associated with cloudy/purulent urine in bag.  Second complaint is worsening chronic wounds of penis/scrotum.  Follows with urology Dr Louie, healthcare proxy spoke with jack BROWN for Dr Louie who recommended coming to Emergency Department for evaluation.  Denying fevers, chills, rigors, decreased energy level, abdominal pains or other acute complaints.  HCP does not that since a recent longer admission at [facility] patient is now largely bedbound and has had a significant overall physical decline.    A 14 point review of systems is negative except as in HPI or otherwise documented.    Exam:  General: Patient well appearing, vital signs within normal limits  HEENT: airway patent with moist mucous membranes  Cardiac: RRR S1/S2 with strong peripheral pulses  Respiratory: lungs clear without respiratory distress  GI: abdomen soft, non tender, non distended  : to be completed  Skin: warm, well perfused  Psych: normal mood and affect    Patient presenting with decreased urine output and lesions on genitalia sent by urology - given history will evaluate for worsening renal function/dehydration or systemic infection and consult urology.

## 2022-09-17 NOTE — H&P ADULT - NSHPPHYSICALEXAM_GEN_ALL_CORE
Vital Signs Last 24 Hrs  T(C): 36.7 (17 Sep 2022 19:25), Max: 36.7 (17 Sep 2022 19:25)  T(F): 98 (17 Sep 2022 19:25), Max: 98 (17 Sep 2022 19:25)  HR: 73 (17 Sep 2022 19:25) (73 - 74)  BP: 107/63 (17 Sep 2022 19:25) (102/63 - 115/62)  BP(mean): --  RR: 18 (17 Sep 2022 19:25) (18 - 20)  SpO2: 98% (17 Sep 2022 19:25) (98% - 100%)    Parameters below as of 17 Sep 2022 19:25  Patient On (Oxygen Delivery Method): room air

## 2022-09-18 LAB
ALBUMIN SERPL ELPH-MCNC: 3.3 G/DL — SIGNIFICANT CHANGE UP (ref 3.3–5)
ALP SERPL-CCNC: 138 U/L — HIGH (ref 40–120)
ALT FLD-CCNC: <5 U/L — LOW (ref 10–45)
ANION GAP SERPL CALC-SCNC: 10 MMOL/L — SIGNIFICANT CHANGE UP (ref 5–17)
AST SERPL-CCNC: 9 U/L — LOW (ref 10–40)
BILIRUB SERPL-MCNC: 0.2 MG/DL — SIGNIFICANT CHANGE UP (ref 0.2–1.2)
BUN SERPL-MCNC: 14 MG/DL — SIGNIFICANT CHANGE UP (ref 7–23)
CALCIUM SERPL-MCNC: 8.6 MG/DL — SIGNIFICANT CHANGE UP (ref 8.4–10.5)
CHLORIDE SERPL-SCNC: 101 MMOL/L — SIGNIFICANT CHANGE UP (ref 96–108)
CO2 SERPL-SCNC: 28 MMOL/L — SIGNIFICANT CHANGE UP (ref 22–31)
CREAT SERPL-MCNC: 0.6 MG/DL — SIGNIFICANT CHANGE UP (ref 0.5–1.3)
EGFR: 101 ML/MIN/1.73M2 — SIGNIFICANT CHANGE UP
GLUCOSE SERPL-MCNC: 167 MG/DL — HIGH (ref 70–99)
HCT VFR BLD CALC: 35.1 % — LOW (ref 39–50)
HGB BLD-MCNC: 10.8 G/DL — LOW (ref 13–17)
MAGNESIUM SERPL-MCNC: 1.9 MG/DL — SIGNIFICANT CHANGE UP (ref 1.6–2.6)
MCHC RBC-ENTMCNC: 30.5 PG — SIGNIFICANT CHANGE UP (ref 27–34)
MCHC RBC-ENTMCNC: 30.8 GM/DL — LOW (ref 32–36)
MCV RBC AUTO: 99.2 FL — SIGNIFICANT CHANGE UP (ref 80–100)
NRBC # BLD: 0 /100 WBCS — SIGNIFICANT CHANGE UP (ref 0–0)
PHOSPHATE SERPL-MCNC: 2.8 MG/DL — SIGNIFICANT CHANGE UP (ref 2.5–4.5)
PLATELET # BLD AUTO: 336 K/UL — SIGNIFICANT CHANGE UP (ref 150–400)
POTASSIUM SERPL-MCNC: 3.7 MMOL/L — SIGNIFICANT CHANGE UP (ref 3.5–5.3)
POTASSIUM SERPL-SCNC: 3.7 MMOL/L — SIGNIFICANT CHANGE UP (ref 3.5–5.3)
PROT SERPL-MCNC: 7 G/DL — SIGNIFICANT CHANGE UP (ref 6–8.3)
RBC # BLD: 3.54 M/UL — LOW (ref 4.2–5.8)
RBC # FLD: 13.2 % — SIGNIFICANT CHANGE UP (ref 10.3–14.5)
SODIUM SERPL-SCNC: 139 MMOL/L — SIGNIFICANT CHANGE UP (ref 135–145)
WBC # BLD: 7.72 K/UL — SIGNIFICANT CHANGE UP (ref 3.8–10.5)
WBC # FLD AUTO: 7.72 K/UL — SIGNIFICANT CHANGE UP (ref 3.8–10.5)

## 2022-09-18 RX ORDER — ACETAMINOPHEN 500 MG
650 TABLET ORAL EVERY 6 HOURS
Refills: 0 | Status: DISCONTINUED | OUTPATIENT
Start: 2022-09-18 | End: 2022-09-22

## 2022-09-18 RX ORDER — INFLUENZA VIRUS VACCINE 15; 15; 15; 15 UG/.5ML; UG/.5ML; UG/.5ML; UG/.5ML
0.7 SUSPENSION INTRAMUSCULAR ONCE
Refills: 0 | Status: DISCONTINUED | OUTPATIENT
Start: 2022-09-18 | End: 2022-09-22

## 2022-09-18 RX ORDER — ENOXAPARIN SODIUM 100 MG/ML
40 INJECTION SUBCUTANEOUS EVERY 24 HOURS
Refills: 0 | Status: DISCONTINUED | OUTPATIENT
Start: 2022-09-18 | End: 2022-09-20

## 2022-09-18 RX ADMIN — CARBIDOPA AND LEVODOPA 1 TABLET(S): 25; 100 TABLET ORAL at 00:19

## 2022-09-18 RX ADMIN — Medication 5 MILLIGRAM(S): at 15:03

## 2022-09-18 RX ADMIN — Medication 20 MILLIEQUIVALENT(S): at 06:34

## 2022-09-18 RX ADMIN — Medication 5 MILLIGRAM(S): at 00:24

## 2022-09-18 RX ADMIN — CARBIDOPA AND LEVODOPA 1 TABLET(S): 25; 100 TABLET ORAL at 13:22

## 2022-09-18 RX ADMIN — Medication 650 MILLIGRAM(S): at 13:22

## 2022-09-18 RX ADMIN — CARBIDOPA AND LEVODOPA 1 TABLET(S): 25; 100 TABLET ORAL at 22:12

## 2022-09-18 RX ADMIN — CARBIDOPA AND LEVODOPA 1 TABLET(S): 25; 100 TABLET ORAL at 06:34

## 2022-09-18 NOTE — SWALLOW BEDSIDE ASSESSMENT ADULT - SLP GENERAL OBSERVATIONS
Encountered Pt sitting upright in bed w/ lunch tray. Pt is A&Ox2-3, verbally responsive, and able to follow simple commands.

## 2022-09-18 NOTE — SWALLOW BEDSIDE ASSESSMENT ADULT - SLP PERTINENT HISTORY OF CURRENT PROBLEM
78yo M with PMHx BPH, chronic fowler (changed last week), muscular dystrophy, ?parkinson's who p/w purulent foul smelling urine. Pt was hospitalized x2 last month for urosepsis and PNA at [place] and since then has been having worsening decline in functional status. 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

## 2022-09-18 NOTE — SWALLOW BEDSIDE ASSESSMENT ADULT - ASR SWALLOW LABIAL MOBILITY
oriented to person, place, time and situation oriented to person, place, time and situation within functional limits

## 2022-09-18 NOTE — PHYSICAL THERAPY INITIAL EVALUATION ADULT - PHYSICAL ASSIST/NONPHYSICAL ASSIST: SIT/SUPINE, REHAB EVAL
[address] [facility] EMERGENCY DEPT 
[address] [facility] E[facility]KI[address] Vel[facility] [address] 
[phone] Work/School Note Date: 4/14/2020 To Whom It May concern: 
 
Chucho Rodriguez was seen and treated today in the emergency room by the following provider(s): 
Attending Provider: Peyton Ppo MD 
Physician Assistant: ASHANTI Cortez. Chucho Rodriguez may return to work on 4-15-20 without restriction. Sincerely, ASHANTI Szymanski 
 
 
 
 verbal cues/nonverbal cues (demo/gestures)/1 person assist

## 2022-09-18 NOTE — PHYSICAL THERAPY INITIAL EVALUATION ADULT - PERTINENT HX OF CURRENT PROBLEM, REHAB EVAL
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    Pt lives alone in apartment  at the [facility] with + elevator and currently has commode and RW; pt also has 24 hour x 7 day HHA. Pt reports  ambulating independently  < 2 months ago.

## 2022-09-18 NOTE — PATIENT PROFILE ADULT - FALL HARM RISK - HARM RISK INTERVENTIONS

## 2022-09-18 NOTE — PHYSICAL THERAPY INITIAL EVALUATION ADULT - NSPTDISCHREC_GEN_A_CORE
TBD once functional eval is completed. TBD once functional eval is completed. functional eval completed 9/20/22; Resume HHA  24 hr x 7 days/Home PT

## 2022-09-18 NOTE — PHYSICAL THERAPY INITIAL EVALUATION ADULT - ACTIVE RANGE OF MOTION EXAMINATION, REHAB EVAL
MING SEAMANOM WFL/bilateral upper extremity Active ROM was WFL (within functional limits) except L hand has swan neck deformities on digits 3& 4 with semipronated grasp ; BLE AAROM WFL except R ankle to neutral and L ankle equinovarus at rest and L ankle DF =-10d DF/bilateral upper extremity Active ROM was WFL (within functional limits)

## 2022-09-18 NOTE — SWALLOW BEDSIDE ASSESSMENT ADULT - SWALLOW EVAL: DIAGNOSIS
78yo M with PMHx muscular dystrophy and parkinson's who p/w purulent foul smelling urine. Pt presents w/ a functional oral/pharyngeal swallow. Timing of pharyngeal swallow and hyolaryngeal excursion are WNL. Occasional dry cough noted at baseline and throughout exam, however, no increase in occurrence noted w/ PO trials. No clinical indications of aspiration are present (WBC 7.72). Easy-to-chew solids are recommended given incomplete dentition. This service will f/u to monitor diet tolerance. 76yo M with PMHx muscular dystrophy and parkinson's who p/w purulent foul smelling urine. Pt presents w/ a functional oral/pharyngeal swallow. Timing of pharyngeal swallow and hyolaryngeal excursion are WNL. Occasional dry cough noted at baseline and throughout exam, however, no increase in occurrence noted w/ PO trials. No clinical indications of aspiration are present. Easy-to-chew solids are recommended given incomplete dentition. This service will f/u to monitor diet tolerance.

## 2022-09-18 NOTE — PHYSICAL THERAPY INITIAL EVALUATION ADULT - ADDITIONAL COMMENTS
Pt reports he lives at the [facility] with +elevator. Pt reports he is unable to ambulate the last couple of weeks but prior to that he was able to ambulate with RW. Pt states he has a HHA for 24 hrs/7 days which was initiated 1 day PTA. Pt owns commode and wheelchair. Pt reports he lives at the [facility] with +elevator. Pt reports he is unable to ambulate the last couple of weeks but prior to that he was able to ambulate with RW and assist and independently ambulating < 2 months ago. Pt states he has a HHA for 24 hrs/7 days which was initiated 1 day PTA. Pt owns commode and wheelchair.

## 2022-09-18 NOTE — SWALLOW BEDSIDE ASSESSMENT ADULT - NS ASR SWALLOW FINDINGS DISCUS
Aide at bedside and KEVIN Dangelo/Nursing/Patient Miriam at bedside, SHAYAN Carrizales, and KEVIN Dangelo/Nursing/Patient

## 2022-09-18 NOTE — SWALLOW BEDSIDE ASSESSMENT ADULT - SWALLOW EVAL: PATIENT/FAMILY GOALS STATEMENT
"I don't have any problem swallowing" Pt endorses hx of dysphagia and swallow tx in 2019. Per Pt report, Pt completed swallow therapy and was upgraded to regular/thin. He reports tolerating diet well.

## 2022-09-18 NOTE — SWALLOW BEDSIDE ASSESSMENT ADULT - COMMENTS
***Pt has previously been seen by this service in 2019 w/ MBS performed 8/9/2019, 8/15/2019, and 11/22/2019-see reports.

## 2022-09-19 ENCOUNTER — APPOINTMENT (OUTPATIENT)
Dept: WOUND CARE | Facility: HOSPITAL | Age: 77
End: 2022-09-19

## 2022-09-19 LAB
ANION GAP SERPL CALC-SCNC: 11 MMOL/L — SIGNIFICANT CHANGE UP (ref 5–17)
BASOPHILS # BLD AUTO: 0.06 K/UL — SIGNIFICANT CHANGE UP (ref 0–0.2)
BASOPHILS NFR BLD AUTO: 0.6 % — SIGNIFICANT CHANGE UP (ref 0–2)
BUN SERPL-MCNC: 13 MG/DL — SIGNIFICANT CHANGE UP (ref 7–23)
CALCIUM SERPL-MCNC: 8.7 MG/DL — SIGNIFICANT CHANGE UP (ref 8.4–10.5)
CHLORIDE SERPL-SCNC: 100 MMOL/L — SIGNIFICANT CHANGE UP (ref 96–108)
CO2 SERPL-SCNC: 27 MMOL/L — SIGNIFICANT CHANGE UP (ref 22–31)
CREAT SERPL-MCNC: 0.53 MG/DL — SIGNIFICANT CHANGE UP (ref 0.5–1.3)
CULTURE RESULTS: SIGNIFICANT CHANGE UP
EGFR: 105 ML/MIN/1.73M2 — SIGNIFICANT CHANGE UP
EOSINOPHIL # BLD AUTO: 0.36 K/UL — SIGNIFICANT CHANGE UP (ref 0–0.5)
EOSINOPHIL NFR BLD AUTO: 3.3 % — SIGNIFICANT CHANGE UP (ref 0–6)
GLUCOSE SERPL-MCNC: 101 MG/DL — HIGH (ref 70–99)
HCT VFR BLD CALC: 31.8 % — LOW (ref 39–50)
HGB BLD-MCNC: 9.9 G/DL — LOW (ref 13–17)
IMM GRANULOCYTES NFR BLD AUTO: 0.6 % — SIGNIFICANT CHANGE UP (ref 0–0.9)
LYMPHOCYTES # BLD AUTO: 1.31 K/UL — SIGNIFICANT CHANGE UP (ref 1–3.3)
LYMPHOCYTES # BLD AUTO: 12 % — LOW (ref 13–44)
MCHC RBC-ENTMCNC: 30 PG — SIGNIFICANT CHANGE UP (ref 27–34)
MCHC RBC-ENTMCNC: 31.1 GM/DL — LOW (ref 32–36)
MCV RBC AUTO: 96.4 FL — SIGNIFICANT CHANGE UP (ref 80–100)
MONOCYTES # BLD AUTO: 0.75 K/UL — SIGNIFICANT CHANGE UP (ref 0–0.9)
MONOCYTES NFR BLD AUTO: 6.9 % — SIGNIFICANT CHANGE UP (ref 2–14)
NEUTROPHILS # BLD AUTO: 8.35 K/UL — HIGH (ref 1.8–7.4)
NEUTROPHILS NFR BLD AUTO: 76.6 % — SIGNIFICANT CHANGE UP (ref 43–77)
NRBC # BLD: 0 /100 WBCS — SIGNIFICANT CHANGE UP (ref 0–0)
PLATELET # BLD AUTO: 329 K/UL — SIGNIFICANT CHANGE UP (ref 150–400)
POTASSIUM SERPL-MCNC: 3.9 MMOL/L — SIGNIFICANT CHANGE UP (ref 3.5–5.3)
POTASSIUM SERPL-SCNC: 3.9 MMOL/L — SIGNIFICANT CHANGE UP (ref 3.5–5.3)
RBC # BLD: 3.3 M/UL — LOW (ref 4.2–5.8)
RBC # FLD: 13.3 % — SIGNIFICANT CHANGE UP (ref 10.3–14.5)
SODIUM SERPL-SCNC: 138 MMOL/L — SIGNIFICANT CHANGE UP (ref 135–145)
SPECIMEN SOURCE: SIGNIFICANT CHANGE UP
WBC # BLD: 10.89 K/UL — HIGH (ref 3.8–10.5)
WBC # FLD AUTO: 10.89 K/UL — HIGH (ref 3.8–10.5)

## 2022-09-19 PROCEDURE — 99222 1ST HOSP IP/OBS MODERATE 55: CPT | Mod: FS

## 2022-09-19 RX ADMIN — Medication 5 MILLIGRAM(S): at 21:38

## 2022-09-19 RX ADMIN — CARBIDOPA AND LEVODOPA 1 TABLET(S): 25; 100 TABLET ORAL at 21:37

## 2022-09-19 RX ADMIN — CARBIDOPA AND LEVODOPA 1 TABLET(S): 25; 100 TABLET ORAL at 05:45

## 2022-09-19 RX ADMIN — Medication 5 MILLIGRAM(S): at 09:14

## 2022-09-19 RX ADMIN — CARBIDOPA AND LEVODOPA 1 TABLET(S): 25; 100 TABLET ORAL at 13:16

## 2022-09-19 NOTE — CONSULT NOTE ADULT - NS ATTEND AMEND GEN_ALL_CORE FT
Pt seen and examined with ACP.  Assessment and plan reviewed and discussed.  Agree with above.    Status of wounds and treatment recommendations d/w  pt.  All questions answered.   Pt expressed understanding.      I spent 50  minutes face to face w/ this pt of which more than 50% of the time was spent counseling & coordinating care of this pt.

## 2022-09-19 NOTE — PROVIDER CONTACT NOTE (MEDICATION) - BACKGROUND
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 Admitted with UTI.

## 2022-09-19 NOTE — CONSULT NOTE ADULT - SUBJECTIVE AND OBJECTIVE BOX
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 c/b rhabdomyolysis s/p PEG reversed, Parkinson's Disease, anxiety, BPH s/p chronic fowler, previous UTI and Cdiff infections, presents with urinary sediment, pt reports he was recently admitted to outside hospital where fowler was exchanged, but has had continued sediment in the urine and erosion at the urethral meatus, he is chronically bedbound and has become less functional over past few weeks, he recently has acquired a 24 hour home health aid, he lives at [facility] in [facility], he endorses pain at the site of his chronic left buttock ulcer but otherwise denies f/c, flank pain, n/v, or other urinary symptoms, in the ED, VSS, labs were at baseline, grossly positive U/A, urology consulted in the ED, pt admitted to general medicine for further management.  (17 Sep 2022 23:23)        N/V/D,  BM/ Flatus,   NGT,     palp/ sob/dyspnea/ cp,       F/C/S  Wound consult requested by team to assist w/ management of      wound/ pressure injury.   Pt (unable to)  c/o pain, drainage, odor, color change,  worsening swelling. Offloading and pericare initiated Increasingly sedentary 2/2 to illness. Pt is Incontinent of urine & stool. (+)fowler/ ostomy.   H/o falls, trauma.  Pt seen by Wound RN  CAV[facility]ON Advance/  Mihai,TRIAD/ Dary/ Allevyn/ medihoney/ dakins/ Adaptic/ DSD recommened used at home/ while awaiting consult.  Appetite good/ decreased.  weight loss.  S&S / RD consult appreciated All questions asked and answered to pt's and family's expressed understanding and satisfaction.    Current Diet:     PAST MEDICAL & SURGICAL HISTORY:  Psychiatric disorder      Scrotal swelling      H/O muscular dystrophy      Urinary retention      No significant past surgical history          REVIEW OF SYSTEMS: Pt unable to offer  General/ Breast/ Skin/ Neuro/ MSK: see HPI  All other systems negative    MEDICATIONS  (STANDING):  carbidopa/levodopa  25/100 1 Tablet(s) Oral three times a day  enoxaparin Injectable 40 milliGRAM(s) SubCutaneous every 24 hours  influenza  Vaccine (HIGH DOSE) 0.7 milliLiter(s) IntraMuscular once    MEDICATIONS  (PRN):  acetaminophen     Tablet .. 650 milliGRAM(s) Oral every 6 hours PRN Mild Pain (1 - 3)  diazepam    Tablet 5 milliGRAM(s) Oral every 12 hours PRN anxiety      Allergies    No Known Allergies    Intolerances        SOCIAL HISTORY:  / /single/ ; (+)HHA/ lives in SNF; Former smoker, Nourrent/ Denies smoking, ETOH, drugs    FAMILY HISTORY:  FHx: stomach cancer     no h/o PVD or wound healing or skin/ significant problems    PHYSICAL EXAM:  Vital Signs Last 24 Hrs  T(C): 36.7 (19 Sep 2022 11:27), Max: 36.9 (18 Sep 2022 19:53)  T(F): 98 (19 Sep 2022 11:27), Max: 98.4 (18 Sep 2022 19:53)  HR: 81 (19 Sep 2022 12:02) (66 - 87)  BP: 106/68 (19 Sep 2022 12:02) (98/58 - 114/70)  BP(mean): --  RR: 18 (19 Sep 2022 11:27) (18 - 18)  SpO2: 97% (19 Sep 2022 11:27) (94% - 98%)    Parameters below as of 19 Sep 2022 11:27  Patient On (Oxygen Delivery Method): room air        NAD / Guarded but stable,  A&Ox3/ Alert/ Confused  cachectic/ thin/  MO/ Obese/ frail  WD/ WN/ WG/ Disheveled  Total Care Sport/ Versa Care P500 / Envella Progressa bed     HEENT:  NC/AT, PERRL, EOMI, sclera clear, mucosa moist, throat clear, trachea midline, neck supple, trach  Respiratory: nonlabored w/ equal chest rise  Gastrointestinal soft NT/ND (+)BS  (+)PEG (+)ostomy (+)NGT  : (+)fowler/ purewick  Neurology:  weakened strength & sensation grossly intact/ paraesthesia  nonverbal, no follow commands/ paraplegic  Psych: calm/ appropriate/ flat affect/ easily aggitated/ restless  Musculoskeletal:  limited stiff / FROM, no deformities/ contractures  Vascular: BLE equally warm/ cool,  no cyanosis, clubbing, edema           >LE //BLE edema equal           BLE DP/PT pulses palpable           no acute ischemia noted          BLE hemosiderin staining  Skin:  moist w/ good turgor  pale, frail,  ecchymosis w/o hematoma  serosanguinous drainage  No odor, erythema, increased warmth, tenderness, induration, fluctuance, nor crepitus    LABS/ CULTURES/ RADIOLOGY:                        9.9    10.89 )-----------( 329      ( 19 Sep 2022 07:58 )             31.8       138  |  100  |  13  ----------------------------<  101      [09-19-22 @ 07:57]  3.9   |  27  |  0.53        Ca     8.7     [09-19-22 @ 07:57]      Mg     1.9     [09-18-22 @ 10:37]      Phos  2.8     [09-18-22 @ 10:37]    TPro  7.0  /  Alb  3.3  /  TBili  0.2  /  DBili  x   /  AST  9   /  ALT  <5  /  AlkPhos  138  [09-18-22 @ 10:37]          Hemoglobin A1C     Culture - Blood (collected 09-17-22 @ 16:55)  Source: .Blood Blood  Preliminary Report (09-19-22 @ 01:02):    No growth to date.    Culture - Blood (collected 09-17-22 @ 16:35)  Source: .Blood Blood  Preliminary Report (09-19-22 @ 01:02):    No growth to date.                 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 c/b rhabdomyolysis s/p PEG reversed, Parkinson's Disease, anxiety, Hydorcele,  BPH s/p chronic fowler, previous UTI and Cdiff infections, presents with urinary sediment, pt reports he was recently admitted to outside hospital where fowler was exchanged, but has had continued sediment in the urine and erosion at the urethral meatus, he is chronically bedbound and has become less functional over past few weeks, he recently has acquired a 24 hour home health aid, he lives at [facility] in [facility], he endorses pain at the site of his chronic left buttock ulcer but otherwise denies f/c, flank pain, n/v, or other urinary symptoms, in the ED, VSS, labs were at baseline, grossly positive U/A, urology consulted in the ED, pt admitted to general medicine for further management.    Wound consult requested by team to assist w/ management of buttocks/ perineal wounds.  Pt with sensation but w/o c/o pain, drainage, odor, color change, or swelling. Offloading and pericare initiated as pt iIncreasingly sedentary 2/2 to illness. Pt is Incontinent of urine & stool. (+)fowler.   pt denies H/o falls, trauma.   Appetite good w/o  weight loss.  All questions asked and answered to pt's expressed understanding and satisfaction.      PAST MEDICAL & SURGICAL HISTORY:  s/p PEG= and removal    Parkinson's Disease    Hydrocele     Anxiety    muscular dystrophy    Urinary retention    BPH    UTis    Cdiff    REVIEW OF SYSTEMS:General/Skin/ Neuro/ MSK/ /GI: see HPI  All other systems negative    MEDICATIONS  (STANDING):  carbidopa/levodopa  25/100 1 Tablet(s) Oral three times a day  enoxaparin Injectable 40 milliGRAM(s) SubCutaneous every 24 hours  influenza  Vaccine (HIGH DOSE) 0.7 milliLiter(s) IntraMuscular once    MEDICATIONS  (PRN):  acetaminophen Tablet 650 milliGRAM(s) Oral every 6 hours PRN Mild Pain (1 - 3)  diazepam Tablet 5 milliGRAM(s) Oral every 12 hours PRN anxiety      No Known Allergies      SOCIAL HISTORY: single; (+)HHA/ lives in Assisted Living; Denies smoking, ETOH, drugs    FAMILY HISTORY: no h/o PVD or wound healing or skin problems    FHx: stomach cancer       PHYSICAL EXAM:  Vital Signs Last 24 Hrs  T(C): 36.7 (19 Sep 2022 11:27), Max: 36.9 (18 Sep 2022 19:53)  T(F): 98 (19 Sep 2022 11:27), Max: 98.4 (18 Sep 2022 19:53)  HR: 81 (19 Sep 2022 12:02) (66 - 87)  BP: 106/68 (19 Sep 2022 12:02) (98/58 - 114/70)  BP(mean): --  RR: 18 (19 Sep 2022 11:27) (18 - 18)  SpO2: 97% (19 Sep 2022 11:27) (94% - 98%)    Parameters below as of 19 Sep 2022 11:27  Patient On (Oxygen Delivery Method): room air      NAD  A&Ox3 cachectic frail  Versa Care P500 bed  HEENT:  NC/AT, EOMI, sclera clear, mucosa moist, throat clear, trachea midline, neck supple  Respiratory: nonlabored w/ equal chest rise  Gastrointestinal soft NT/ND   : (+)fowler, scrotal edema/ hydrocele  Neurology:  sensation grossly intact/ paraplegic  Psych: calm/ appropriate  Musculoskeletal:  limited stiff FROM, (+)contractures  Vascular: BLE equally warml,  no cyanosis, clubbing, edema           no acute ischemia noted  Skin: pale, moist w/ good turgor  sacrum w/ stage 2 pressure injury 2cm x 2cm x 0.1cm  bilateral buttocks into perineum/ posterior scrotum w/ incontinence dermatitis     no blistering or acitve drainage  No odor, erythema, increased warmth, tenderness, induration, fluctuance, nor crepitus    LABS/ CULTURES/ RADIOLOGY:                        9.9    10.89 )-----------( 329      ( 19 Sep 2022 07:58 )             31.8       138  |  100  |  13  ----------------------------<  101      [09-19-22 @ 07:57]  3.9   |  27  |  0.53        Ca     8.7     [09-19-22 @ 07:57]      Mg     1.9     [09-18-22 @ 10:37]      Phos  2.8     [09-18-22 @ 10:37]    TPro  7.0  /  Alb  3.3  /  TBili  0.2  /  DBili  x   /  AST  9   /  ALT  <5  /  AlkPhos  138  [09-18-22 @ 10:37]        Culture - Blood (collected 09-17-22 @ 16:55)  Source: .Blood Blood  Preliminary Report (09-19-22 @ 01:02):    No growth to date.    Culture - Blood (collected 09-17-22 @ 16:35)  Source: .Blood Blood  Preliminary Report (09-19-22 @ 01:02):    No growth to date.

## 2022-09-19 NOTE — PROVIDER CONTACT NOTE (MEDICATION) - SITUATION
pt refused lovenox despite educated the risk and benefit of not getting Lovenox. stated he will take the risk of not getting the lovenox.

## 2022-09-19 NOTE — CONSULT NOTE ADULT - ASSESSMENT
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 c/b rhabdomyolysis s/p PEG reversed, Parkinson's Disease, anxiety, BPH s/p chronic fowler, previous UTI and Cdiff infections, presents with urinary sediment       Wound Consult requested to assist w/ management of Stage 2 pressure injury  Incontinence Dermatitis of buttocks/ perineum/ scrotum  Incontinence of urine and stool    Buttocks/ Sacrum TRIAD BID and prn soiling       Continue w/ attends under pads and Pericare as per protocol  Scrotal elevation  Abx per Medicine/ ID  Moisturize intact skin w/ SWEEN cream BID  Nutrition Consult for optimization           encourage high quality protein, MVI & Vit C to promote wound healing  Continue turning and positioning w/ offloading assistive devices as per protocol  Waffle Cushion to chair when oob to chair  Continue w/ low air loss pressure redistribution bed surface   Care as per medicine, will follow w/ you  Upon discharge f/u as outpatient at [facility] [address] [place], [place] [phone]  Seen w/ attng and D/w team & RN  Thank you for this consult  Marilin Trevino PA-C [id number]  I spent 50minutes face to face w/ this pt of which more than 50% of the time was spent counseling & coordinating care of this pt.  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 c/b rhabdomyolysis s/p PEG reversed, Parkinson's Disease, anxiety, BPH s/p chronic fowler, previous UTI and Cdiff infections, presents with urinary sediment       Wound Consult requested to assist w/ management of:  Stage 2 pressure injury  Incontinence Dermatitis of buttocks/ perineum/ scrotum  Incontinence of urine and stool    Buttocks/ Sacrum TRIAD BID and prn soiling       Continue w/ attends under pads and Pericare as per protocol  Scrotal elevation  Abx per Medicine/ ID  Moisturize intact skin w/ SWEEN cream BID  Nutrition Consult for optimization           encourage high quality protein, MVI & Vit C to promote wound healing  Continue turning and positioning w/ offloading assistive devices as per protocol  Waffle Cushion to chair when oob to chair  Continue w/ low air loss pressure redistribution bed surface   Care as per medicine, will follow w/ you  Upon discharge f/u as outpatient at Wound Center [address] [place], [place] [phone]  Seen w/ attng and D/w team & RN  Thank you for this consult  Marilin Trevino PA-C CWS [id number]

## 2022-09-20 LAB
FOLATE SERPL-MCNC: 17.2 NG/ML — SIGNIFICANT CHANGE UP
HCT VFR BLD CALC: 33.2 % — LOW (ref 39–50)
HGB BLD-MCNC: 10.5 G/DL — LOW (ref 13–17)
MCHC RBC-ENTMCNC: 30.9 PG — SIGNIFICANT CHANGE UP (ref 27–34)
MCHC RBC-ENTMCNC: 31.6 GM/DL — LOW (ref 32–36)
MCV RBC AUTO: 97.6 FL — SIGNIFICANT CHANGE UP (ref 80–100)
NRBC # BLD: 0 /100 WBCS — SIGNIFICANT CHANGE UP (ref 0–0)
PLATELET # BLD AUTO: 302 K/UL — SIGNIFICANT CHANGE UP (ref 150–400)
RBC # BLD: 3.4 M/UL — LOW (ref 4.2–5.8)
RBC # FLD: 13.3 % — SIGNIFICANT CHANGE UP (ref 10.3–14.5)
T PALLIDUM AB TITR SER: NEGATIVE — SIGNIFICANT CHANGE UP
TSH SERPL-MCNC: 1.14 UIU/ML — SIGNIFICANT CHANGE UP (ref 0.27–4.2)
VIT B12 SERPL-MCNC: 364 PG/ML — SIGNIFICANT CHANGE UP (ref 232–1245)
WBC # BLD: 9.39 K/UL — SIGNIFICANT CHANGE UP (ref 3.8–10.5)
WBC # FLD AUTO: 9.39 K/UL — SIGNIFICANT CHANGE UP (ref 3.8–10.5)

## 2022-09-20 RX ADMIN — CARBIDOPA AND LEVODOPA 1 TABLET(S): 25; 100 TABLET ORAL at 21:26

## 2022-09-20 RX ADMIN — CARBIDOPA AND LEVODOPA 1 TABLET(S): 25; 100 TABLET ORAL at 14:29

## 2022-09-20 RX ADMIN — Medication 5 MILLIGRAM(S): at 12:58

## 2022-09-20 RX ADMIN — CARBIDOPA AND LEVODOPA 1 TABLET(S): 25; 100 TABLET ORAL at 06:01

## 2022-09-20 NOTE — DIETITIAN INITIAL EVALUATION ADULT - ENERGY INTAKE
Pt reports good appetite and PO intake in house. Noted % PO intake x 1 yesterday (09/19) as per flow sheets. Refused all nutritional supplements. Pt reports tolerating easy to chew diet so far. Denies nausea or vomiting. Denies diarrhea or constipation but reports loose BM, last today (09/20).

## 2022-09-20 NOTE — DIETITIAN INITIAL EVALUATION ADULT - ADD RECOMMEND
1. Will continue to monitor PO intake, weight, labs, skin, GI status, and diet as able. 2. Encourage PO intake and honor food preferences. 3. Recommend Multivitamin and Vitamin C if medically feasible to optimize nutrient intake and further aid with pressure ulcer healing. 4. Provided recommendations to optimize PO and protein intake - made aware RD remains available. 5. Malnutrition/BMI <19 notification placed in chart.

## 2022-09-20 NOTE — DIETITIAN INITIAL EVALUATION ADULT - ORAL INTAKE PTA/DIET HISTORY
Pt reports good appetite and PO intake PTA. Confirms NKFA. Reports following a pureed diet at home with no other restrictions. Reports taking vitamins sometimes, unable to specify. Denies drinking any nutritional supplement due to disliking them.

## 2022-09-20 NOTE — CONSULT NOTE ADULT - ASSESSMENT
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 on 9/17/22 with urinary sediment; he had recently been at [facility] where my team has seen him for his Parkinsonian symptoms, we had started him on Sinemet but patient is unsure if it has helped.  He has an appointment with Dr. Sergey Ledezma in October 2022.  He is chronically bed-bound.     Diagnosis:  history of Parkinsonism and possible muscular dystrophy.     Recommendations:  would continue Sinemet as it may be helping a little bit.  Ideally would up-titrate though in general avoid doing this during hosptializations.  He would benefit most from evaluation by movement disorder specialist.  He states that he has outpatient follow-up with Dr. Sergey Ledezma in October.  Recommend physical therapy inpatient vs outpatient.  Defer to primary team for urinary issues

## 2022-09-20 NOTE — DIETITIAN INITIAL EVALUATION ADULT - CONTINUE CURRENT NUTRITION CARE PLAN
Defer diet/fluid consistencies to medical team/SLP recommendations. Continue easy to chew diet. Will continue to monitor as able and adjust as needed./yes

## 2022-09-20 NOTE — CONSULT NOTE ADULT - SUBJECTIVE AND OBJECTIVE BOX
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  He has an appointment with Dr. Sergey Ledezma in October 2022.  He is chronically bed-bound.     ******    Past Medical History:  No pertinent past medical history [[id number]]    Psychiatric disorder [F99]    Scrotal swelling [N50.89]    H/O muscular dystrophy [Z86.69]    Urinary retention [R33.9]        Past Surgical History:  No significant past surgical history [[id number]]        Social History:  No toxic habits    Family History:  FAMILY HISTORY:  FHx: stomach cancer        Allergies:  No Known Allergies      ROS:  Constitutional: Patient offers no complaints of fevers or significant weight loss  Ears, Nose, Mouth and Throat: The patient presents with no abnormalities of the head, ears, eyes, nose or throat  Skin: Patient offers no concerns of new rashes or lesions  Respiratory: The patient presents with no abnormalities of the respiratory tract  Cardiovascular: The patient presents with no cardiac abnormalities  Gastrointestinal: The patient presents with no abnormalities of the GI system  Genitourinary: The patient presents with no dysuria, hematuria or frequent urination  Neurological: See HPI  Endocrine: Patient offers no complaints of excessive thirst, urination, or heat/cold intolerance    Advanced care planning reviewed and noted in the chart.    Medications:  acetaminophen     Tablet .. 650 milliGRAM(s) Oral every 6 hours PRN  carbidopa/levodopa  25/100 1 Tablet(s) Oral three times a day  diazepam    Tablet 5 milliGRAM(s) Oral every 12 hours PRN  enoxaparin Injectable 40 milliGRAM(s) SubCutaneous every 24 hours  influenza  Vaccine (HIGH DOSE) 0.7 milliLiter(s) IntraMuscular once      Labs:  CBC Full  -  ( 20 Sep 2022 07:46 )  WBC Count : 9.39 K/uL  RBC Count : 3.40 M/uL  Hemoglobin : 10.5 g/dL  Hematocrit : 33.2 %  Platelet Count - Automated : 302 K/uL  Mean Cell Volume : 97.6 fl  Mean Cell Hemoglobin : 30.9 pg  Mean Cell Hemoglobin Concentration : 31.6 gm/dL  Auto Neutrophil # : x  Auto Lymphocyte # : x  Auto Monocyte # : x  Auto Eosinophil # : x  Auto Basophil # : x  Auto Neutrophil % : x  Auto Lymphocyte % : x  Auto Monocyte % : x  Auto Eosinophil % : x  Auto Basophil % : x    09-19    138  |  100  |  13  ----------------------------<  101<H>  3.9   |  27  |  0.53    Ca    8.7      19 Sep 2022 07:57      CAPILLARY BLOOD GLUCOSE              Male    Vitals:  Vital Signs Last 24 Hrs  T(C): 36.7 (20 Sep 2022 04:40), Max: 36.8 (19 Sep 2022 21:00)  T(F): 98.1 (20 Sep 2022 04:40), Max: 98.2 (19 Sep 2022 21:00)  HR: 64 (20 Sep 2022 04:40) (64 - 86)  BP: 110/67 (20 Sep 2022 04:40) (106/68 - 116/68)  BP(mean): --  RR: 18 (20 Sep 2022 04:40) (18 - 18)  SpO2: 95% (20 Sep 2022 04:40) (95% - 95%)    Parameters below as of 20 Sep 2022 04:40  Patient On (Oxygen Delivery Method): room air        NEUROLOGICAL EXAM:    Mental status: Awake, alert, and in no apparent distress. Oriented to person, place and time. Language function is normal. Recent memory, digit span and concentration were subtly impaired      Cranial Nerves: Pupils were equal, round, reactive to light. Extraocular movements were intact. Visual field were full. Fundoscopic exam was deferred. Facial sensation was intact to light touch. There was no facial asymmetry. The palate was upgoing symmetrically and tongue was midline. Hearing acuity was intact to finger rub AU. Shoulder shrug was full bilaterally    Motor exam: Bulk notable for temporal wasting, tone increased, resting tremor.  Strength was 4/5 in arms and 2-3/5 in legs.  Left leg somewhat weaker, spastic, and externally rotated.  Fine finger movements were impaired bilaterally. There was no pronator drift    Reflexes: 2+ in the bilateral upper extremities. 2+ in the bilateral lower extremities. Toes were equivocal bilaterally.     Sensation: Intact to light touch, temperature, did not assess vibration and proprioception.     Coordination: Finger-nose-finger was limited by tremor and tone, heel-to-shin was limited by weakness    Gait: Non-ambulatory

## 2022-09-20 NOTE — DIETITIAN INITIAL EVALUATION ADULT - OTHER INFO
Pt reports 15 pounds weight loss x 1 month due to "hospitalizations", from 140 to 125 pounds. Pt states always "being thin". Weight as per flow sheets (09/17) 140 pounds -?accuracy. Obtained bedscale weight (09/20) 129.4 pounds.     Provided recommendations to optimize PO and protein intake, recommended small frequent meals by ordering nutrient-dense snacks and leaving non-perishable food away from tray for later consumption during the day or between meals and to start with protein; reviewed foods with protein and menu order procedures in hospital. Pt denies having further questions/concerns about diet and nutrition - made aware RD remains available.

## 2022-09-20 NOTE — DIETITIAN INITIAL EVALUATION ADULT - REASON FOR ADMISSION
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

## 2022-09-20 NOTE — DIETITIAN INITIAL EVALUATION ADULT - REASON INDICATOR FOR ASSESSMENT
Pt seen for BMI <19 referral and consult for pressure ulcer >2.  Information obtained from: medical record and pt.

## 2022-09-20 NOTE — DIETITIAN INITIAL EVALUATION ADULT - PERTINENT MEDS FT
MEDICATIONS  (STANDING):  carbidopa/levodopa  25/100 1 Tablet(s) Oral three times a day  enoxaparin Injectable 40 milliGRAM(s) SubCutaneous every 24 hours  influenza  Vaccine (HIGH DOSE) 0.7 milliLiter(s) IntraMuscular once    MEDICATIONS  (PRN):  acetaminophen     Tablet .. 650 milliGRAM(s) Oral every 6 hours PRN Mild Pain (1 - 3)  diazepam    Tablet 5 milliGRAM(s) Oral every 12 hours PRN anxiety

## 2022-09-21 ENCOUNTER — TRANSCRIPTION ENCOUNTER (OUTPATIENT)
Age: 77
End: 2022-09-21

## 2022-09-21 DIAGNOSIS — R82.79 OTHER ABNORMAL FINDINGS ON MICROBIOLOGICAL EXAMINATION OF URINE: ICD-10-CM

## 2022-09-21 PROCEDURE — 99222 1ST HOSP IP/OBS MODERATE 55: CPT

## 2022-09-21 RX ADMIN — CARBIDOPA AND LEVODOPA 1 TABLET(S): 25; 100 TABLET ORAL at 21:53

## 2022-09-21 RX ADMIN — CARBIDOPA AND LEVODOPA 1 TABLET(S): 25; 100 TABLET ORAL at 07:48

## 2022-09-21 RX ADMIN — Medication 5 MILLIGRAM(S): at 21:53

## 2022-09-21 RX ADMIN — CARBIDOPA AND LEVODOPA 1 TABLET(S): 25; 100 TABLET ORAL at 17:13

## 2022-09-21 RX ADMIN — Medication 650 MILLIGRAM(S): at 00:12

## 2022-09-21 RX ADMIN — Medication 5 MILLIGRAM(S): at 07:51

## 2022-09-21 RX ADMIN — Medication 650 MILLIGRAM(S): at 00:50

## 2022-09-21 NOTE — PROGRESS NOTE ADULT - NEUROLOGICAL SYMPTOMS
weakness/tremors/difficulty walking

## 2022-09-21 NOTE — PROGRESS NOTE ADULT - PROBLEM SELECTOR PLAN 1
-per urology does not reflect active infection  -s/p ceftriaxone x1 in ED  -f/u urine cultures contaminant   -trend fever/WBC  -of note prior Cultures grew pseudomonas and VRE  -scrotal wound care    ID consult appreciated   No need for abx
-per urology does not reflect active infection  -s/p ceftriaxone x1 in ED  -f/u urine cultures contaminant   -trend fever/WBC  -of note prior Cultures grew pseudomonas and VRE  -scrotal wound care    ID consult called   Follow up recs
-per urology does not reflect active infection  -s/p ceftriaxone x1 in ED  -f/u urine cultures contaminant   -trend fever/WBC  -of note prior Cultures grew pseudomonas and VRE  -scrotal wound care    ID consult called   Follow up recs
-per urology does not reflect active infection  -s/p ceftriaxone x1 in ED  -f/u urine cultures  -trend fever/WBC  -of note prior Cultures grew pseudomonas and VRE  -scrotal wound care

## 2022-09-21 NOTE — DISCHARGE NOTE PROVIDER - NSDCFUADDAPPT_GEN_ALL_CORE_FT
APPTS ARE READY TO BE MADE: [X] YES    Best Family or Patient Contact (if needed):    Additional Information about above appointments (if needed):    1:   2:   3:     Other comments or requests:    APPTS ARE READY TO BE MADE: [X] YES    Best Family or Patient Contact (if needed):    Additional Information about above appointments (if needed):    1:   2:   3:     Other comments or requests:   Patient was previously scheduled with Saira Onofre on 9/29 at 1:30 PM  Patient was previously scheduled with Sergey Fernandez on (10/18 1 PM) at [address] [place] [address].

## 2022-09-21 NOTE — CONSULT NOTE ADULT - ASSESSMENT
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

## 2022-09-21 NOTE — PROGRESS NOTE ADULT - PROBLEM SELECTOR PLAN 3
-c/w home carbidopa-levodopa 25-100mg TID  Neuro eval appreciated   PT
-c/w home carbidopa-levodopa 25-100mg TID
-c/w home carbidopa-levodopa 25-100mg TID  Neuro eval appreciated   PT
-c/w home carbidopa-levodopa 25-100mg TID  Neuro eval   PT

## 2022-09-21 NOTE — DISCHARGE NOTE PROVIDER - DETAILS OF MALNUTRITION DIAGNOSIS/DIAGNOSES
This patient has been assessed with a concern for Malnutrition and was treated during this hospitalization for the following Nutrition diagnosis/diagnoses:     -  09/20/2022: Severe protein-calorie malnutrition   -  09/20/2022: Underweight (BMI < 19)

## 2022-09-21 NOTE — DISCHARGE NOTE PROVIDER - NSDCCPCAREPLAN_GEN_ALL_CORE_FT
PRINCIPAL DISCHARGE DIAGNOSIS  Diagnosis: Acute UTI  Assessment and Plan of Treatment: Likely contaminated. You were observed off of antibiotics  Follow-up with your primary care physician within 1 week. Call for appointment.  Please bring all discharge paperwork and list of medications to all follow up appointments  Please call for follow up appointments one day after discharge        SECONDARY DISCHARGE DIAGNOSES  Diagnosis: Parkinsons disease  Assessment and Plan of Treatment: Please continue follow up outpatient with neurologist     PRINCIPAL DISCHARGE DIAGNOSIS  Diagnosis: Acute UTI  Assessment and Plan of Treatment: Likely contaminated. You were observed off of antibiotics  Follow-up with your primary care physician within 1 week. Call for appointment.  Please bring all discharge paperwork and list of medications to all follow up appointments  Please call for follow up appointments one day after discharge        SECONDARY DISCHARGE DIAGNOSES  Diagnosis: Parkinsons disease  Assessment and Plan of Treatment: Please continue follow up outpatient with neurologist  would continue Sinemet as it may be helping a little bit.  Ideally would up-titrate though in general avoid doing this during hosptializations.  He would benefit most from evaluation by movement disorder specialist.  He states that he has outpatient follow-up with Dr. Sergey Ledezma in October.  Recommend physical therapy inpatient vs outpatient.       PRINCIPAL DISCHARGE DIAGNOSIS  Diagnosis: Acute UTI  Assessment and Plan of Treatment: Likely contaminated. You were observed off of antibiotics  Follow-up with your primary care physician within 1 week. Call for appointment.  Please bring all discharge paperwork and list of medications to all follow up appointments  Please call for follow up appointments one day after discharge        SECONDARY DISCHARGE DIAGNOSES  Diagnosis: Parkinsons disease  Assessment and Plan of Treatment: Please continue follow up outpatient with neurologist  would continue Sinemet as it may be helping a little bit.  Ideally would up-titrate though in general avoid doing this during hosptializations.  He would benefit most from evaluation by movement disorder specialist.  He states that he has outpatient follow-up with Dr. Sergey Ledezma in October.  Recommend physical therapy inpatient vs outpatient.      Diagnosis: Sacral wound  Assessment and Plan of Treatment: cleanse with normal saline  apply triad pase 3 x day and as needed for soiling  off load, turn and position every 2 hrs

## 2022-09-21 NOTE — PROGRESS NOTE ADULT - NEGATIVE ALLERGIC REACTIONS
no anaphylaxis/no photosensitivity

## 2022-09-21 NOTE — PROGRESS NOTE ADULT - NUTRITIONAL ASSESSMENT
This patient has been assessed with a concern for Malnutrition and has been determined to have a diagnosis/diagnoses of Severe protein-calorie malnutrition and Underweight (BMI < 19).    This patient is being managed with:   Diet Easy to Chew-  Entered: Sep 18 2022  2:25PM    
This patient has been assessed with a concern for Malnutrition and has been determined to have a diagnosis/diagnoses of Severe protein-calorie malnutrition and Underweight (BMI < 19).    This patient is being managed with:   Diet Easy to Chew-  Entered: Sep 18 2022  2:25PM

## 2022-09-21 NOTE — DISCHARGE NOTE PROVIDER - CARE PROVIDERS DIRECT ADDRESSES
[email or web address]

## 2022-09-21 NOTE — PROGRESS NOTE ADULT - NEGATIVE ENDOCRINE SYMPTOMS
no cold intolerance/no heat intolerance

## 2022-09-21 NOTE — CONSULT NOTE ADULT - PROBLEM SELECTOR RECOMMENDATION 9
-has chronic fowler  -no fever and normal wbc  -has been stable of abx  -not septic  -doubt this is UTI given above  -risk of abx > benefit at this time

## 2022-09-21 NOTE — DISCHARGE NOTE PROVIDER - HOSPITAL COURSE
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 He is chronically bedbound and has become less functional over past few weeks, he recently has acquired a 24 hour home health aid, lives at [facility] in [place]. He endorses pain at the site of his chronic left buttock ulcer but otherwise denies f/c, flank pain, n/v, or other urinary symptoms. In the ED, VSS, labs were at baseline, grossly positive U/A, Urology consulted in the ED. Pt admitted to general medicine for further management.     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 Patient to follow up outpatient with Dr. Louie for  care. ID was consulted in setting of urine culture >=3 organisms. Probable collection contamination - recommending monitor off antibiotics given no fever and normal wbc. Neuro also saw patient given history of Parkinsonism and possible muscular dystrophy recommending continue Sinemet; Ideally would up-titrate though in general avoid doing this during hosptializations. Speech and swallow evaluated patient recommending Easy-to-chew/thin liquids diet. Physical therapy recommended home PT with resumption HHA  24 hr x 7 days.    Patient medically cleared for discharge with outpatient follow up with PCP/urology/neurology.

## 2022-09-21 NOTE — DISCHARGE NOTE PROVIDER - NSDCMRMEDTOKEN_GEN_ALL_CORE_FT
diazePAM 5 mg oral tablet: 1 tab(s) orally every 12 hours MDD:2 tabs  hospital bed:   [facility] lift: G 20.0 parkinsons disease  MANINDER 99 Days  HT  185.4 CM  WT 63.5 KG   Sinemet 25 mg-100 mg oral tablet: 1 tab(s) orally 3 times a day   diazePAM 5 mg oral tablet: 1 tab(s) orally every 12 hours MDD:2 tabs  Sinemet 25 mg-100 mg oral tablet: 1 tab(s) orally 3 times a day

## 2022-09-21 NOTE — PROGRESS NOTE ADULT - NEGATIVE ENMT SYMPTOMS
no nasal congestion/no throat pain

## 2022-09-21 NOTE — PROGRESS NOTE ADULT - NEGATIVE OPHTHALMOLOGIC SYMPTOMS
no blurred vision L/no blurred vision R

## 2022-09-21 NOTE — PROGRESS NOTE ADULT - MALE-SPECIFIC SYMPTOMS
+sediment in urine/genital sores/scrotal mass L

## 2022-09-21 NOTE — DISCHARGE NOTE PROVIDER - NSDCFUSCHEDAPPT_GEN_ALL_CORE_FT
[facility]  UROLOGY [address]  Scheduled Appointment: 09/29/2022    Saira Louie  [facility]  UROLOGY [address]  Scheduled Appointment: 09/29/2022    Sergey Ledezma  [facility]  NEUROLOGY [address] [facility]  Scheduled Appointment: 10/18/2022    Brett Lawrence  [facility]  PHYSMED [address]  Scheduled Appointment: 10/20/2022    Sergey Ledezma  [facility]  Neuro [address] [facility]  Scheduled Appointment: 11/23/2022

## 2022-09-21 NOTE — DISCHARGE NOTE PROVIDER - PROVIDER TOKENS
PROVIDER:[TOKEN:[[id number]],SCHEDULEDAPPT:[09/29/2022]],PROVIDER:[TOKEN:[[id number]],SCHEDULEDAPPT:[10/18/2022]]

## 2022-09-21 NOTE — PROGRESS NOTE ADULT - NEGATIVE GENERAL GENITOURINARY SYMPTOMS
no hematuria/no flank pain L/no flank pain R/normal urinary frequency

## 2022-09-21 NOTE — CONSULT NOTE ADULT - SUBJECTIVE AND OBJECTIVE BOX
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 c/b rhabdomyolysis s/p PEG reversed, Parkinson's Disease, anxiety, BPH s/p chronic fowler, previous UTI and Cdiff infections, presents with urinary sediment, pt reports he was recently admitted to outside hospital where fowler was exchanged, but has had continued sediment in the urine and erosion at the urethral meatus, he is chronically bedbound and has become less functional over past few weeks, he recently has acquired a 24 hour home health aid, he lives at [facility] in [facility], he endorses pain at the site of his chronic left buttock ulcer but otherwise denies f/c, flank pain, n/v, or other urinary symptoms, in the ED, VSS, labs were at baseline, grossly positive U/A, urology consulted in the ED, pt admitted to general medicine for further management.  (17 Sep 2022 23:23)      PAST MEDICAL & SURGICAL HISTORY:  Psychiatric disorder      Scrotal swelling      H/O muscular dystrophy      Urinary retention      No significant past surgical history          Allergies    No Known Allergies    Intolerances        ANTIMICROBIALS:      MEDICATIONS  (STANDING):  carbidopa/levodopa  25/100 1 Tablet(s) Oral three times a day  influenza  Vaccine (HIGH DOSE) 0.7 milliLiter(s) IntraMuscular once      Social History  Smoking:  Etoh:  Drug use:      FAMILY HISTORY:  FHx: stomach cancer        Vital Signs Last 24 Hrs  T(C): 36.7 (21 Sep 2022 04:49), Max: 36.9 (20 Sep 2022 21:15)  T(F): 98 (21 Sep 2022 04:49), Max: 98.4 (20 Sep 2022 21:15)  HR: 64 (21 Sep 2022 04:49) (64 - 82)  BP: 106/65 (21 Sep 2022 04:49) (106/65 - 118/73)  BP(mean): --  RR: 17 (21 Sep 2022 04:49) (17 - 18)  SpO2: 96% (21 Sep 2022 04:49) (96% - 96%)    Parameters below as of 21 Sep 2022 04:49  Patient On (Oxygen Delivery Method): room air        CBC Full  -  ( 20 Sep 2022 07:46 )  WBC Count : 9.39 K/uL  RBC Count : 3.40 M/uL  Hemoglobin : 10.5 g/dL  Hematocrit : 33.2 %  Platelet Count - Automated : 302 K/uL  Mean Cell Volume : 97.6 fl  Mean Cell Hemoglobin : 30.9 pg  Mean Cell Hemoglobin Concentration : 31.6 gm/dL  Auto Neutrophil # : x  Auto Lymphocyte # : x  Auto Monocyte # : x  Auto Eosinophil # : x  Auto Basophil # : x  Auto Neutrophil % : x  Auto Lymphocyte % : x  Auto Monocyte % : x  Auto Eosinophil % : x  Auto Basophil % : x                MICROBIOLOGY:  Catheterized Catheterized  09-17-22   >=3 organisms. Probable collection contamination.  --  --      .Blood Blood  09-17-22   No growth to date.  --  --      .Blood Blood  09-17-22   No growth to date.  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 c/b rhabdomyolysis s/p PEG reversed, Parkinson's Disease, anxiety, BPH s/p chronic fowler, previous UTI and Cdiff infections, presents with urinary sediment, pt reports he was recently admitted to outside hospital where fowler was exchanged, but has had continued sediment in the urine and erosion at the urethral meatus, he is chronically bedbound and has become less functional over past few weeks, he recently has acquired a 24 hour home health aid, he lives at [facility] in [facility], he endorses pain at the site of his chronic left buttock ulcer but otherwise denies f/c, flank pain, n/v, or other urinary symptoms, in the ED, VSS, labs were at baseline, grossly positive U/A, urology consulted in the ED, pt admitted to general medicine for further management.  (17 Sep 2022 23:23)    ID called for positive urine cx. Pt has chronic ofwler at home     PAST MEDICAL & SURGICAL HISTORY:  Psychiatric disorder      Scrotal swelling      H/O muscular dystrophy      Urinary retention      No significant past surgical history          Allergies    No Known Allergies    Intolerances        ANTIMICROBIALS:      MEDICATIONS  (STANDING):  carbidopa/levodopa  25/100 1 Tablet(s) Oral three times a day  influenza  Vaccine (HIGH DOSE) 0.7 milliLiter(s) IntraMuscular once      Social History  Smoking: no  Etoh: no  Drug use: no      FAMILY HISTORY:  FHx: stomach cancer        Vital Signs Last 24 Hrs  T(C): 36.7 (21 Sep 2022 04:49), Max: 36.9 (20 Sep 2022 21:15)  T(F): 98 (21 Sep 2022 04:49), Max: 98.4 (20 Sep 2022 21:15)  HR: 64 (21 Sep 2022 04:49) (64 - 82)  BP: 106/65 (21 Sep 2022 04:49) (106/65 - 118/73)  BP(mean): --  RR: 17 (21 Sep 2022 04:49) (17 - 18)  SpO2: 96% (21 Sep 2022 04:49) (96% - 96%)    Parameters below as of 21 Sep 2022 04:49  Patient On (Oxygen Delivery Method): room air        CBC Full  -  ( 20 Sep 2022 07:46 )  WBC Count : 9.39 K/uL  RBC Count : 3.40 M/uL  Hemoglobin : 10.5 g/dL  Hematocrit : 33.2 %  Platelet Count - Automated : 302 K/uL  Mean Cell Volume : 97.6 fl  Mean Cell Hemoglobin : 30.9 pg  Mean Cell Hemoglobin Concentration : 31.6 gm/dL  Auto Neutrophil # : x  Auto Lymphocyte # : x  Auto Monocyte # : x  Auto Eosinophil # : x  Auto Basophil # : x  Auto Neutrophil % : x  Auto Lymphocyte % : x  Auto Monocyte % : x  Auto Eosinophil % : x  Auto Basophil % : x        MICROBIOLOGY:  Catheterized Catheterized  09-17-22   >=3 organisms. Probable collection contamination.  --  --      .Blood Blood  09-17-22   No growth to date.  --  --      .Blood Blood  09-17-22   No growth to date.  --  --      RADIOLOGY

## 2022-09-21 NOTE — PROGRESS NOTE ADULT - PROBLEM SELECTOR PLAN 2
-c/w home Diazepam 5mg q12h PRN (iSTOP Reference #[id number])
-c/w home Diazepam 5mg q12h PRN (i[employer] Reference #[id number])
-c/w home Diazepam 5mg q12h PRN (i[employer] Reference #[id number])
-c/w home Diazepam 5mg q12h PRN (iSTOP Reference #[id number])

## 2022-09-21 NOTE — DISCHARGE NOTE PROVIDER - CARE PROVIDER_API CALL
Saira Louie)  Urology  [address] [place] [id number]  Phone: [phone]  Fax: [phone]  Scheduled Appointment: 09/29/2022    Sergey Ledezma; PhD)  Neurology  [address] [place] [address], [address]  [place] [address]  Phone: [phone]  Fax: [phone]  Scheduled Appointment: 10/18/2022

## 2022-09-21 NOTE — PROGRESS NOTE ADULT - NEGATIVE CARDIOVASCULAR SYMPTOMS
no chest pain/no palpitations

## 2022-09-21 NOTE — PROGRESS NOTE ADULT - NEGATIVE MUSCULOSKELETAL SYMPTOMS
no arthritis/no joint swelling

## 2022-09-22 ENCOUNTER — APPOINTMENT (OUTPATIENT)
Dept: UROLOGY | Facility: CLINIC | Age: 77
End: 2022-09-22

## 2022-09-22 ENCOUNTER — TRANSCRIPTION ENCOUNTER (OUTPATIENT)
Age: 77
End: 2022-09-22

## 2022-09-22 VITALS
SYSTOLIC BLOOD PRESSURE: 104 MMHG | RESPIRATION RATE: 18 BRPM | DIASTOLIC BLOOD PRESSURE: 63 MMHG | HEART RATE: 82 BPM | TEMPERATURE: 98 F | OXYGEN SATURATION: 94 %

## 2022-09-22 PROCEDURE — 83605 ASSAY OF LACTIC ACID: CPT

## 2022-09-22 PROCEDURE — 82330 ASSAY OF CALCIUM: CPT

## 2022-09-22 PROCEDURE — 87086 URINE CULTURE/COLONY COUNT: CPT

## 2022-09-22 PROCEDURE — 96374 THER/PROPH/DIAG INJ IV PUSH: CPT

## 2022-09-22 PROCEDURE — 84100 ASSAY OF PHOSPHORUS: CPT

## 2022-09-22 PROCEDURE — 84443 ASSAY THYROID STIM HORMONE: CPT

## 2022-09-22 PROCEDURE — 83735 ASSAY OF MAGNESIUM: CPT

## 2022-09-22 PROCEDURE — 82803 BLOOD GASES ANY COMBINATION: CPT

## 2022-09-22 PROCEDURE — 82435 ASSAY OF BLOOD CHLORIDE: CPT

## 2022-09-22 PROCEDURE — 92610 EVALUATE SWALLOWING FUNCTION: CPT

## 2022-09-22 PROCEDURE — 36415 COLL VENOUS BLD VENIPUNCTURE: CPT

## 2022-09-22 PROCEDURE — 84295 ASSAY OF SERUM SODIUM: CPT

## 2022-09-22 PROCEDURE — 81001 URINALYSIS AUTO W/SCOPE: CPT

## 2022-09-22 PROCEDURE — 92526 ORAL FUNCTION THERAPY: CPT

## 2022-09-22 PROCEDURE — 85018 HEMOGLOBIN: CPT

## 2022-09-22 PROCEDURE — 84132 ASSAY OF SERUM POTASSIUM: CPT

## 2022-09-22 PROCEDURE — U0003: CPT

## 2022-09-22 PROCEDURE — 80053 COMPREHEN METABOLIC PANEL: CPT

## 2022-09-22 PROCEDURE — 97161 PT EVAL LOW COMPLEX 20 MIN: CPT

## 2022-09-22 PROCEDURE — 82607 VITAMIN B-12: CPT

## 2022-09-22 PROCEDURE — 97110 THERAPEUTIC EXERCISES: CPT

## 2022-09-22 PROCEDURE — 99285 EMERGENCY DEPT VISIT HI MDM: CPT

## 2022-09-22 PROCEDURE — 85027 COMPLETE CBC AUTOMATED: CPT

## 2022-09-22 PROCEDURE — 86780 TREPONEMA PALLIDUM: CPT

## 2022-09-22 PROCEDURE — 85025 COMPLETE CBC W/AUTO DIFF WBC: CPT

## 2022-09-22 PROCEDURE — 82947 ASSAY GLUCOSE BLOOD QUANT: CPT

## 2022-09-22 PROCEDURE — 97530 THERAPEUTIC ACTIVITIES: CPT

## 2022-09-22 PROCEDURE — 85014 HEMATOCRIT: CPT

## 2022-09-22 PROCEDURE — U0005: CPT

## 2022-09-22 PROCEDURE — 87040 BLOOD CULTURE FOR BACTERIA: CPT

## 2022-09-22 PROCEDURE — 80048 BASIC METABOLIC PNL TOTAL CA: CPT

## 2022-09-22 PROCEDURE — 82746 ASSAY OF FOLIC ACID SERUM: CPT

## 2022-09-22 RX ORDER — CARBIDOPA AND LEVODOPA 25; 100 MG/1; MG/1
1 TABLET ORAL
Qty: 0 | Refills: 0 | DISCHARGE

## 2022-09-22 RX ORDER — CARBIDOPA AND LEVODOPA 25; 100 MG/1; MG/1
1 TABLET ORAL
Qty: 90 | Refills: 0
Start: 2022-09-22 | End: 2022-10-21

## 2022-09-22 RX ADMIN — CARBIDOPA AND LEVODOPA 1 TABLET(S): 25; 100 TABLET ORAL at 05:34

## 2022-09-22 RX ADMIN — Medication 5 MILLIGRAM(S): at 11:04

## 2022-09-22 RX ADMIN — CARBIDOPA AND LEVODOPA 1 TABLET(S): 25; 100 TABLET ORAL at 13:49

## 2022-09-22 NOTE — OCCUPATIONAL THERAPY INITIAL EVALUATION ADULT - PERTINENT HX OF CURRENT PROBLEM, REHAB EVAL
Pt is a 77M with BPH, chronic fowler (changed last week), muscular dystrophy, Parkinson's who p/w purulent foul smelling urine, and worsening erosion at meatus and scrotal pain. Patient was hospitalized twice the last month for urosepsis and pneumonia at [place] and since then has been having worsening decline in functional status now bedbound and homebound. fowler catether changed at [place] last week and since then having smelly and cloudy urine, along with increasing scrotal and penile pain that the home health aide reports is associated with worsening erosion at urethal meatus. No f/c, no bloody urine. no abdominal pain. Does have chronic sacral ulcer. Pt presents with chief complaint of urinary sediment. Pt is chronically bedbound and denies having any close family who can make decisions for him.

## 2022-09-22 NOTE — PROGRESS NOTE ADULT - REASON FOR ADMISSION
urinary sediment

## 2022-09-22 NOTE — DISCHARGE NOTE NURSING/CASE MANAGEMENT/SOCIAL WORK - PATIENT PORTAL LINK FT
You can access the FollowMyHealth Patient Portal offered by [facility] by registering at the following website: [email or web address]. By joining [employer]’s FollowMyHealth portal, you will also be able to view your health information using other applications (apps) compatible with our system.

## 2022-09-22 NOTE — PROGRESS NOTE ADULT - SUBJECTIVE AND OBJECTIVE BOX
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  He has an appointment with Dr. Sergey Ledezma in October 2022.  He is chronically bed-bound.     pt without new complaints, eating breakfast    ******    Past Medical History:  No pertinent past medical history [[id number]]    Psychiatric disorder [F99]    Scrotal swelling [N50.89]    H/O muscular dystrophy [Z86.69]    Urinary retention [R33.9]        Past Surgical History:  No significant past surgical history [[id number]]        Social History:  No toxic habits    Family History:  FAMILY HISTORY:  FHx: stomach cancer        Allergies:  No Known Allergies      ROS:  Constitutional: Patient offers no complaints of fevers or significant weight loss  Ears, Nose, Mouth and Throat: The patient presents with no abnormalities of the head, ears, eyes, nose or throat  Skin: Patient offers no concerns of new rashes or lesions  Respiratory: The patient presents with no abnormalities of the respiratory tract  Cardiovascular: The patient presents with no cardiac abnormalities  Gastrointestinal: The patient presents with no abnormalities of the GI system  Genitourinary: The patient presents with no dysuria, hematuria or frequent urination  Neurological: See HPI  Endocrine: Patient offers no complaints of excessive thirst, urination, or heat/cold intolerance    Advanced care planning reviewed and noted in the chart.  Medications:  acetaminophen     Tablet .. 650 milliGRAM(s) Oral every 6 hours PRN  carbidopa/levodopa  25/100 1 Tablet(s) Oral three times a day  diazepam    Tablet 5 milliGRAM(s) Oral every 12 hours PRN  influenza  Vaccine (HIGH DOSE) 0.7 milliLiter(s) IntraMuscular once      Labs:          CAPILLARY BLOOD GLUCOSE                  Vitals:  Vital Signs Last 24 Hrs  T(C): 37 (22 Sep 2022 04:00), Max: 37 (22 Sep 2022 04:00)  T(F): 98.6 (22 Sep 2022 04:00), Max: 98.6 (22 Sep 2022 04:00)  HR: 69 (22 Sep 2022 04:00) (69 - 89)  BP: 98/60 (22 Sep 2022 04:00) (98/60 - 112/70)  BP(mean): --  RR: 18 (22 Sep 2022 04:00) (17 - 18)  SpO2: 96% (22 Sep 2022 04:00) (95% - 96%)    Parameters below as of 22 Sep 2022 04:00  Patient On (Oxygen Delivery Method): room air          NEUROLOGICAL EXAM:    Mental status: Awake, alert, and in no apparent distress. Oriented to person, place and time. Language function is normal. Recent memory, digit span and concentration were subtly impaired      Cranial Nerves: Pupils were equal, round, reactive to light. Extraocular movements were intact. Visual field were full.  Facial sensation was intact to light touch. There was no facial asymmetry. The palate was upgoing symmetrically and tongue was midline. Hearing acuity was intact to finger rub AU. Shoulder shrug was full bilaterally    Motor exam: Bulk notable for temporal wasting, tone increased, resting tremor.  Strength was 4/5 in arms and 2-3/5 in legs.  Left leg somewhat weaker, spastic, and externally rotated.  Fine finger movements were impaired bilaterally. There was no pronator drift     Reflexes: 2+ in the bilateral upper extremities. 2+ in the bilateral lower extremities. Toes were equivocal bilaterally.     Sensation: Intact to light touch, temperature    Coordination: Finger-nose-finger was limited by tremor and tone, heel-to-shin was limited by weakness    Gait: Non-ambulatory       no radiology
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 on 9/17/22 with urinary sediment; he had recently been at [facility] where my team has seen him for his Parkinsonian symptoms, we had started him on Sinemet but patient is unsure if it has helped.  He has an appointment with Dr. Sergey Ledezma in October 2022.  He is chronically bed-bound.     pt without new complaints    ******    Past Medical History:  No pertinent past medical history [[id number]]    Psychiatric disorder [F99]    Scrotal swelling [N50.89]    H/O muscular dystrophy [Z86.69]    Urinary retention [R33.9]        Past Surgical History:  No significant past surgical history [[id number]]        Social History:  No toxic habits    Family History:  FAMILY HISTORY:  FHx: stomach cancer        Allergies:  No Known Allergies      ROS:  Constitutional: Patient offers no complaints of fevers or significant weight loss  Ears, Nose, Mouth and Throat: The patient presents with no abnormalities of the head, ears, eyes, nose or throat  Skin: Patient offers no concerns of new rashes or lesions  Respiratory: The patient presents with no abnormalities of the respiratory tract  Cardiovascular: The patient presents with no cardiac abnormalities  Gastrointestinal: The patient presents with no abnormalities of the GI system  Genitourinary: The patient presents with no dysuria, hematuria or frequent urination  Neurological: See HPI  Endocrine: Patient offers no complaints of excessive thirst, urination, or heat/cold intolerance    Advanced care planning reviewed and noted in the chart.      Medications:  acetaminophen     Tablet .. 650 milliGRAM(s) Oral every 6 hours PRN  carbidopa/levodopa  25/100 1 Tablet(s) Oral three times a day  diazepam    Tablet 5 milliGRAM(s) Oral every 12 hours PRN  influenza  Vaccine (HIGH DOSE) 0.7 milliLiter(s) IntraMuscular once      Labs:  CBC Full  -  ( 20 Sep 2022 07:46 )  WBC Count : 9.39 K/uL  RBC Count : 3.40 M/uL  Hemoglobin : 10.5 g/dL  Hematocrit : 33.2 %  Platelet Count - Automated : 302 K/uL  Mean Cell Volume : 97.6 fl  Mean Cell Hemoglobin : 30.9 pg  Mean Cell Hemoglobin Concentration : 31.6 gm/dL  Auto Neutrophil # : x  Auto Lymphocyte # : x  Auto Monocyte # : x  Auto Eosinophil # : x  Auto Basophil # : x  Auto Neutrophil % : x  Auto Lymphocyte % : x  Auto Monocyte % : x  Auto Eosinophil % : x  Auto Basophil % : x          CAPILLARY BLOOD GLUCOSE              Vitamin B12: 364 pg/mL [232 - 1245] 09-19-22 @ 18:17  Folate: 17.2 ng/mL 09-19-22 @ 18:17  Thyroid Function: -- 09-19-22 @ 18:17  Ammonia: -- 09-19-22 @ 18:17  Dilantin: -- 09-19-22 @ 18:17      Vitals:  Vital Signs Last 24 Hrs  T(C): 36.7 (21 Sep 2022 04:49), Max: 36.9 (20 Sep 2022 21:15)  T(F): 98 (21 Sep 2022 04:49), Max: 98.4 (20 Sep 2022 21:15)  HR: 64 (21 Sep 2022 04:49) (64 - 82)  BP: 106/65 (21 Sep 2022 04:49) (106/65 - 118/73)  BP(mean): --  RR: 17 (21 Sep 2022 04:49) (17 - 18)  SpO2: 96% (21 Sep 2022 04:49) (96% - 96%)    Parameters below as of 21 Sep 2022 04:49  Patient On (Oxygen Delivery Method): room air        NEUROLOGICAL EXAM:    Mental status: Awake, alert, and in no apparent distress. Oriented to person, place and time. Language function is normal. Recent memory, digit span and concentration were subtly impaired      Cranial Nerves: Pupils were equal, round, reactive to light. Extraocular movements were intact. Visual field were full. Fundoscopic exam was deferred. Facial sensation was intact to light touch. There was no facial asymmetry. The palate was upgoing symmetrically and tongue was midline. Hearing acuity was intact to finger rub AU. Shoulder shrug was full bilaterally    Motor exam: Bulk notable for temporal wasting, tone increased, resting tremor.  Strength was 4/5 in arms and 2-3/5 in legs.  Left leg somewhat weaker, spastic, and externally rotated.  Fine finger movements were impaired bilaterally. There was no pronator drift     Reflexes: 2+ in the bilateral upper extremities. 2+ in the bilateral lower extremities. Toes were equivocal bilaterally.     Sensation: Intact to light touch, temperature, did not assess vibration and proprioception.     Coordination: Finger-nose-finger was limited by tremor and tone, heel-to-shin was limited by weakness    Gait: Non-ambulatory       no radiology
Patient is a 77y old  Male who presents with a chief complaint of urinary sediment (17 Sep 2022 23:23)      SUBJECTIVE / OVERNIGHT EVENTS: no events       T(C): 36.9 (09-20-22 @ 21:15), Max: 36.9 (09-20-22 @ 21:15)  HR: 73 (09-20-22 @ 21:15) (73 - 82)  BP: 108/67 (09-20-22 @ 21:15) (108/67 - 118/73)  RR: 18 (09-20-22 @ 21:15) (18 - 18)  SpO2: 96% (09-20-22 @ 21:15) (96% - 96%)      MEDICATIONS  (STANDING):  carbidopa/levodopa  25/100 1 Tablet(s) Oral three times a day  influenza  Vaccine (HIGH DOSE) 0.7 milliLiter(s) IntraMuscular once    MEDICATIONS  (PRN):  acetaminophen     Tablet .. 650 milliGRAM(s) Oral every 6 hours PRN Mild Pain (1 - 3)  diazepam    Tablet 5 milliGRAM(s) Oral every 12 hours PRN anxiety    PHYSICAL EXAM:  GENERAL: NAD, well-developed  HEAD:  Atraumatic, Normocephalic  EYES: EOMI, PERRLA, conjunctiva and sclera clear  NECK: Supple, No JVD  CHEST/LUNG: Clear to auscultation bilaterally; No wheeze  HEART: Regular rate and rhythm; No murmurs, rubs, or gallops  ABDOMEN: Soft, Nontender, Nondistended; Bowel sounds present  EXTREMITIES:  2+ Peripheral Pulses, No clubbing, cyanosis, or edema  PSYCH: AAOx3  NEUROLOGY: non-focal  SKIN: No rashes or lesions                                            10.5   9.39  )-----------( 302      ( 20 Sep 2022 07:46 )             33.2                 RADIOLOGY & ADDITIONAL TESTS:    Imaging Personally Reviewed:    Consultant(s) Notes Reviewed:      Care Discussed with Consultants/Other Providers:  
                      10.5   9.39  )-----------( 302      ( 20 Sep 2022 07:46 )             33.2               Patient is a 77y old  Male who presents with a chief complaint of urinary sediment (17 Sep 2022 23:23)      SUBJECTIVE / OVERNIGHT EVENTS: no events       T(C): 36.7 (09-21-22 @ 21:13), Max: 36.7 (09-21-22 @ 21:13)  HR: 89 (09-21-22 @ 21:13) (82 - 89)  BP: 112/70 (09-21-22 @ 21:13) (105/67 - 112/70)  RR: 18 (09-21-22 @ 21:13) (17 - 18)  SpO2: 95% (09-21-22 @ 21:13) (95% - 96%)      MEDICATIONS  (STANDING):  carbidopa/levodopa  25/100 1 Tablet(s) Oral three times a day  influenza  Vaccine (HIGH DOSE) 0.7 milliLiter(s) IntraMuscular once    MEDICATIONS  (PRN):  acetaminophen     Tablet .. 650 milliGRAM(s) Oral every 6 hours PRN Mild Pain (1 - 3)  diazepam    Tablet 5 milliGRAM(s) Oral every 12 hours PRN anxiety  PHYSICAL EXAM:  GENERAL: NAD, well-developed  HEAD:  Atraumatic, Normocephalic  EYES: EOMI, PERRLA, conjunctiva and sclera clear  NECK: Supple, No JVD  CHEST/LUNG: Clear to auscultation bilaterally; No wheeze  HEART: Regular rate and rhythm; No murmurs, rubs, or gallops  ABDOMEN: Soft, Nontender, Nondistended; Bowel sounds present  EXTREMITIES:  2+ Peripheral Pulses, No clubbing, cyanosis, or edema  PSYCH: AAOx3  NEUROLOGY: non-focal  SKIN: No rashes or lesions                          10.5   9.39  )-----------( 302      ( 20 Sep 2022 07:46 )             33.2                          
Patient is a 77y old  Male who presents with a chief complaint of urinary sediment (17 Sep 2022 23:23)      SUBJECTIVE / OVERNIGHT EVENTS: no events     MEDICATIONS  (STANDING):  carbidopa/levodopa  25/100 1 Tablet(s) Oral three times a day  enoxaparin Injectable 40 milliGRAM(s) SubCutaneous every 24 hours  influenza  Vaccine (HIGH DOSE) 0.7 milliLiter(s) IntraMuscular once    MEDICATIONS  (PRN):  acetaminophen     Tablet .. 650 milliGRAM(s) Oral every 6 hours PRN Mild Pain (1 - 3)  diazepam    Tablet 5 milliGRAM(s) Oral every 12 hours PRN anxiety      CAPILLARY BLOOD GLUCOSE        I&O's Summary    17 Sep 2022 07:  -  18 Sep 2022 07:00  --------------------------------------------------------  IN: 0 mL / OUT: 800 mL / NET: -800 mL    18 Sep 2022 07:01  -  18 Sep 2022 23:51  --------------------------------------------------------  IN: 0 mL / OUT: 300 mL / NET: -300 mL      T(C): 36.9 (22 @ 19:53), Max: 36.9 (22 @ 19:53)  HR: 81 (22 @ 19:53) (75 - 87)  BP: 114/70 (22 @ 19:53) (102/62 - 114/70)  RR: 18 (22 @ 19:53) (18 - 18)  SpO2: 98% (22 @ 19:53) (98% - 98%)    PHYSICAL EXAM:  GENERAL: NAD, well-developed  HEAD:  Atraumatic, Normocephalic  EYES: EOMI, PERRLA, conjunctiva and sclera clear  NECK: Supple, No JVD  CHEST/LUNG: Clear to auscultation bilaterally; No wheeze  HEART: Regular rate and rhythm; No murmurs, rubs, or gallops  ABDOMEN: Soft, Nontender, Nondistended; Bowel sounds present  EXTREMITIES:  2+ Peripheral Pulses, No clubbing, cyanosis, or edema  PSYCH: AAOx3  NEUROLOGY: non-focal  SKIN: No rashes or lesions    LABS:                        10.8   7.72  )-----------( 336      ( 18 Sep 2022 10:37 )             35.1         139  |  101  |  14  ----------------------------<  167<H>  3.7   |  28  |  0.60    Ca    8.6      18 Sep 2022 10:37  Phos  2.8       Mg     1.9         TPro  7.0  /  Alb  3.3  /  TBili  0.2  /  DBili  x   /  AST  9<L>  /  ALT  <5<L>  /  AlkPhos  138<H>            Urinalysis Basic - ( 17 Sep 2022 19:49 )    Color: Orange / Appearance: Turbid / S.015 / pH: x  Gluc: x / Ketone: Negative  / Bili: Negative / Urobili: Negative   Blood: x / Protein: >600 / Nitrite: Positive   Leuk Esterase: Large / RBC: 17 /hpf / WBC >50 /HPF   Sq Epi: x / Non Sq Epi: 6 / Bacteria: Many        RADIOLOGY & ADDITIONAL TESTS:    Imaging Personally Reviewed:    Consultant(s) Notes Reviewed:      Care Discussed with Consultants/Other Providers:  
Patient is a 77y old  Male who presents with a chief complaint of urinary sediment (17 Sep 2022 23:23)      SUBJECTIVE / OVERNIGHT EVENTS: no events     T(C): 36.8 (09-19-22 @ 21:00), Max: 36.8 (09-19-22 @ 21:00)  HR: 86 (09-19-22 @ 21:00) (77 - 86)  BP: 116/68 (09-19-22 @ 21:00) (98/58 - 116/68)  RR: 18 (09-19-22 @ 21:00) (18 - 18)  SpO2: 95% (09-19-22 @ 21:00) (95% - 97%)      MEDICATIONS  (STANDING):  carbidopa/levodopa  25/100 1 Tablet(s) Oral three times a day  enoxaparin Injectable 40 milliGRAM(s) SubCutaneous every 24 hours  influenza  Vaccine (HIGH DOSE) 0.7 milliLiter(s) IntraMuscular once    MEDICATIONS  (PRN):  acetaminophen     Tablet .. 650 milliGRAM(s) Oral every 6 hours PRN Mild Pain (1 - 3)  diazepam    Tablet 5 milliGRAM(s) Oral every 12 hours PRN anxiety    PHYSICAL EXAM:  GENERAL: NAD, well-developed  HEAD:  Atraumatic, Normocephalic  EYES: EOMI, PERRLA, conjunctiva and sclera clear  NECK: Supple, No JVD  CHEST/LUNG: Clear to auscultation bilaterally; No wheeze  HEART: Regular rate and rhythm; No murmurs, rubs, or gallops  ABDOMEN: Soft, Nontender, Nondistended; Bowel sounds present  EXTREMITIES:  2+ Peripheral Pulses, No clubbing, cyanosis, or edema  PSYCH: AAOx3  NEUROLOGY: non-focal  SKIN: No rashes or lesions                          9.9    10.89 )-----------( 329      ( 19 Sep 2022 07:58 )             31.8           LIVER FUNCTIONS - ( 18 Sep 2022 10:37 )  Alb: 3.3 g/dL / Pro: 7.0 g/dL / ALK PHOS: 138 U/L / ALT: <5 U/L / AST: 9 U/L / GGT: x             138|100|13<101  3.9|27|0.53  8.7,--,--  09-19 @ 07:57          RADIOLOGY & ADDITIONAL TESTS:    Imaging Personally Reviewed:    Consultant(s) Notes Reviewed:      Care Discussed with Consultants/Other Providers:

## 2022-09-22 NOTE — PROGRESS NOTE ADULT - ASSESSMENT
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  He has an appointment with Dr. Sergey Ledezma in October 2022.  He is chronically bed-bound.     Diagnosis:  history of Parkinsonism and possible muscular dystrophy.     Recommendations:  would continue Sinemet as it may be helping a little bit.  Ideally would up-titrate though in general avoid doing this during hosptializations.  He would benefit most from evaluation by movement disorder specialist.  He states that he has outpatient follow-up with Dr. Sergey Ledezma in October.  Recommend physical therapy inpatient vs outpatient.    Defer to primary team for urinary issues   appreciate ID consult  social issues are paramount in this case  no further inpt neuro workup, no neuro c/i to discharge    
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 on 9/17/22 with urinary sediment; he had recently been at [facility] where my team has seen him for his Parkinsonian symptoms, we had started him on Sinemet but patient is unsure if it has helped.  He has an appointment with Dr. Sergey Ledezma in October 2022.  He is chronically bed-bound.     Diagnosis:  history of Parkinsonism and possible muscular dystrophy.     Recommendations:  would continue Sinemet as it may be helping a little bit.  Ideally would up-titrate though in general avoid doing this during hosptializations.  He would benefit most from evaluation by movement disorder specialist.  He states that he has outpatient follow-up with Dr. Sergey Ledezma in October.  Recommend physical therapy inpatient vs outpatient.    Defer to primary team for urinary issues   social issues are paramount in this case    
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

## 2022-09-22 NOTE — CHART NOTE - NSCHARTNOTEFT_GEN_A_CORE
Based on patients ongoing issues with deconditioning and generalized weakness secondary to patients diagnosis of  G 20.0 parkinsons disease. Patient will require a semi electric hospital bed. This is necessary to achieve positioning, elevation and head of bed needs to be elevated at least 30 degrees most of the time. Bed pillows and wedges have been tried and ruled out.   AYSHA Lubin NP
1 attempt was made to reach patient, which have been unsuccessful. Unable to leave voicemail on 9/22/22
Pt requires hoyerlift which is medically necessary pt has generalized weakness and patients diagnosis of  G 20.0 parkinsons disease., requires maximum assistance of two persons for transfers, has decreased weight shifting ability, abnormal muscle tone and decreased balance which places pt at risk for falls

## 2022-09-22 NOTE — DISCHARGE NOTE NURSING/CASE MANAGEMENT/SOCIAL WORK - NSDCVIVACCINE_GEN_ALL_CORE_FT
Tdap; 08-Apr-2020 00:56; Nora Machuca (RN); [employer]; [id number] (Exp. Date: 19-Mar-2022); IntraMuscular; Deltoid Left.; 0.5 milliLiter(s); VIS (VIS Published: 09-May-2013, VIS Presented: 08-Apr-2020);

## 2022-09-23 ENCOUNTER — NON-APPOINTMENT (OUTPATIENT)
Age: 77
End: 2022-09-23

## 2022-09-29 ENCOUNTER — APPOINTMENT (OUTPATIENT)
Dept: UROLOGY | Facility: CLINIC | Age: 77
End: 2022-09-29

## 2022-09-29 VITALS — SYSTOLIC BLOOD PRESSURE: 102 MMHG | HEART RATE: 101 BPM | DIASTOLIC BLOOD PRESSURE: 70 MMHG

## 2022-09-29 PROCEDURE — 51703 INSERT BLADDER CATH COMPLEX: CPT

## 2022-10-10 ENCOUNTER — EMERGENCY (EMERGENCY)
Facility: HOSPITAL | Age: 77
LOS: 1 days | Discharge: HOME | End: 2022-10-10
Attending: EMERGENCY MEDICINE
Payer: MEDICARE

## 2022-10-10 ENCOUNTER — NON-APPOINTMENT (OUTPATIENT)
Age: 77
End: 2022-10-10

## 2022-10-10 VITALS
SYSTOLIC BLOOD PRESSURE: 106 MMHG | DIASTOLIC BLOOD PRESSURE: 66 MMHG | HEART RATE: 86 BPM | OXYGEN SATURATION: 95 % | TEMPERATURE: 98 F | WEIGHT: 139.99 LBS | HEIGHT: 73 IN

## 2022-10-10 VITALS
TEMPERATURE: 98 F | OXYGEN SATURATION: 98 % | HEART RATE: 83 BPM | RESPIRATION RATE: 18 BRPM | DIASTOLIC BLOOD PRESSURE: 61 MMHG | SYSTOLIC BLOOD PRESSURE: 100 MMHG

## 2022-10-10 LAB
ALBUMIN SERPL ELPH-MCNC: 3.6 G/DL — SIGNIFICANT CHANGE UP (ref 3.3–5)
ALP SERPL-CCNC: 145 U/L — HIGH (ref 40–120)
ALT FLD-CCNC: <5 U/L — LOW (ref 10–45)
ANION GAP SERPL CALC-SCNC: 11 MMOL/L — SIGNIFICANT CHANGE UP (ref 5–17)
APPEARANCE UR: ABNORMAL
APTT BLD: 31.2 SEC — SIGNIFICANT CHANGE UP (ref 27.5–35.5)
AST SERPL-CCNC: 8 U/L — LOW (ref 10–40)
BACTERIA # UR AUTO: ABNORMAL
BASOPHILS # BLD AUTO: 0.08 K/UL — SIGNIFICANT CHANGE UP (ref 0–0.2)
BASOPHILS NFR BLD AUTO: 0.7 % — SIGNIFICANT CHANGE UP (ref 0–2)
BILIRUB SERPL-MCNC: 0.2 MG/DL — SIGNIFICANT CHANGE UP (ref 0.2–1.2)
BILIRUB UR-MCNC: NEGATIVE — SIGNIFICANT CHANGE UP
BUN SERPL-MCNC: 12 MG/DL — SIGNIFICANT CHANGE UP (ref 7–23)
CALCIUM SERPL-MCNC: 8.8 MG/DL — SIGNIFICANT CHANGE UP (ref 8.4–10.5)
CHLORIDE SERPL-SCNC: 103 MMOL/L — SIGNIFICANT CHANGE UP (ref 96–108)
CO2 SERPL-SCNC: 26 MMOL/L — SIGNIFICANT CHANGE UP (ref 22–31)
COLOR SPEC: YELLOW — SIGNIFICANT CHANGE UP
CREAT SERPL-MCNC: 0.55 MG/DL — SIGNIFICANT CHANGE UP (ref 0.5–1.3)
DIFF PNL FLD: ABNORMAL
EGFR: 103 ML/MIN/1.73M2 — SIGNIFICANT CHANGE UP
EOSINOPHIL # BLD AUTO: 0.41 K/UL — SIGNIFICANT CHANGE UP (ref 0–0.5)
EOSINOPHIL NFR BLD AUTO: 3.6 % — SIGNIFICANT CHANGE UP (ref 0–6)
EPI CELLS # UR: 0 /HPF — SIGNIFICANT CHANGE UP
FLUAV AG NPH QL: SIGNIFICANT CHANGE UP
FLUBV AG NPH QL: SIGNIFICANT CHANGE UP
GLUCOSE SERPL-MCNC: 108 MG/DL — HIGH (ref 70–99)
GLUCOSE UR QL: NEGATIVE — SIGNIFICANT CHANGE UP
HCT VFR BLD CALC: 36.9 % — LOW (ref 39–50)
HGB BLD-MCNC: 11.8 G/DL — LOW (ref 13–17)
HYALINE CASTS # UR AUTO: 2 /LPF — SIGNIFICANT CHANGE UP (ref 0–2)
IMM GRANULOCYTES NFR BLD AUTO: 0.6 % — SIGNIFICANT CHANGE UP (ref 0–0.9)
INR BLD: 1.21 RATIO — HIGH (ref 0.88–1.16)
KETONES UR-MCNC: NEGATIVE — SIGNIFICANT CHANGE UP
LEUKOCYTE ESTERASE UR-ACNC: ABNORMAL
LYMPHOCYTES # BLD AUTO: 1.33 K/UL — SIGNIFICANT CHANGE UP (ref 1–3.3)
LYMPHOCYTES # BLD AUTO: 11.7 % — LOW (ref 13–44)
MCHC RBC-ENTMCNC: 30.9 PG — SIGNIFICANT CHANGE UP (ref 27–34)
MCHC RBC-ENTMCNC: 32 GM/DL — SIGNIFICANT CHANGE UP (ref 32–36)
MCV RBC AUTO: 96.6 FL — SIGNIFICANT CHANGE UP (ref 80–100)
MONOCYTES # BLD AUTO: 0.77 K/UL — SIGNIFICANT CHANGE UP (ref 0–0.9)
MONOCYTES NFR BLD AUTO: 6.8 % — SIGNIFICANT CHANGE UP (ref 2–14)
NEUTROPHILS # BLD AUTO: 8.67 K/UL — HIGH (ref 1.8–7.4)
NEUTROPHILS NFR BLD AUTO: 76.6 % — SIGNIFICANT CHANGE UP (ref 43–77)
NITRITE UR-MCNC: POSITIVE
NRBC # BLD: 0 /100 WBCS — SIGNIFICANT CHANGE UP (ref 0–0)
PH UR: 8 — SIGNIFICANT CHANGE UP (ref 5–8)
PLATELET # BLD AUTO: 316 K/UL — SIGNIFICANT CHANGE UP (ref 150–400)
POTASSIUM SERPL-MCNC: 3.4 MMOL/L — LOW (ref 3.5–5.3)
POTASSIUM SERPL-SCNC: 3.4 MMOL/L — LOW (ref 3.5–5.3)
PROT SERPL-MCNC: 7.1 G/DL — SIGNIFICANT CHANGE UP (ref 6–8.3)
PROT UR-MCNC: ABNORMAL
PROTHROM AB SERPL-ACNC: 14 SEC — HIGH (ref 10.5–13.4)
RBC # BLD: 3.82 M/UL — LOW (ref 4.2–5.8)
RBC # FLD: 13.2 % — SIGNIFICANT CHANGE UP (ref 10.3–14.5)
RBC CASTS # UR COMP ASSIST: 18 /HPF — HIGH (ref 0–4)
RSV RNA NPH QL NAA+NON-PROBE: SIGNIFICANT CHANGE UP
SARS-COV-2 RNA SPEC QL NAA+PROBE: SIGNIFICANT CHANGE UP
SODIUM SERPL-SCNC: 140 MMOL/L — SIGNIFICANT CHANGE UP (ref 135–145)
SP GR SPEC: 1.01 — SIGNIFICANT CHANGE UP (ref 1.01–1.02)
UROBILINOGEN FLD QL: NEGATIVE — SIGNIFICANT CHANGE UP
WBC # BLD: 11.33 K/UL — HIGH (ref 3.8–10.5)
WBC # FLD AUTO: 11.33 K/UL — HIGH (ref 3.8–10.5)
WBC UR QL: 123 /HPF — HIGH (ref 0–5)

## 2022-10-10 PROCEDURE — 81001 URINALYSIS AUTO W/SCOPE: CPT

## 2022-10-10 PROCEDURE — 87040 BLOOD CULTURE FOR BACTERIA: CPT

## 2022-10-10 PROCEDURE — 51702 INSERT TEMP BLADDER CATH: CPT

## 2022-10-10 PROCEDURE — 71045 X-RAY EXAM CHEST 1 VIEW: CPT | Mod: 26

## 2022-10-10 PROCEDURE — 99284 EMERGENCY DEPT VISIT MOD MDM: CPT | Mod: GC

## 2022-10-10 PROCEDURE — 71045 X-RAY EXAM CHEST 1 VIEW: CPT

## 2022-10-10 PROCEDURE — 99285 EMERGENCY DEPT VISIT HI MDM: CPT | Mod: 25

## 2022-10-10 PROCEDURE — 85025 COMPLETE CBC W/AUTO DIFF WBC: CPT

## 2022-10-10 PROCEDURE — 87086 URINE CULTURE/COLONY COUNT: CPT

## 2022-10-10 PROCEDURE — 93005 ELECTROCARDIOGRAM TRACING: CPT | Mod: XU

## 2022-10-10 PROCEDURE — 85610 PROTHROMBIN TIME: CPT

## 2022-10-10 PROCEDURE — 85730 THROMBOPLASTIN TIME PARTIAL: CPT

## 2022-10-10 PROCEDURE — 87637 SARSCOV2&INF A&B&RSV AMP PRB: CPT

## 2022-10-10 PROCEDURE — 80053 COMPREHEN METABOLIC PANEL: CPT

## 2022-10-10 RX ORDER — CEFPODOXIME PROXETIL 100 MG
1 TABLET ORAL
Qty: 14 | Refills: 0
Start: 2022-10-10 | End: 2022-10-16

## 2022-10-10 NOTE — ED PROVIDER NOTE - PATIENT PORTAL LINK FT
You can access the FollowMyHealth Patient Portal offered by [facility] by registering at the following website: [email or web address]. By joining [email or web address]’s FollowMyHealth portal, you will also be able to view your health information using other applications (apps) compatible with our system.

## 2022-10-10 NOTE — ED PROVIDER NOTE - PROGRESS NOTE DETAILS
Therese Villalobos, PGY-1 DO:  Spencer was replaced. Patient UA was + patient will be given oral abx and discharged. VSS. last culture UC was negative. Patient would like to go home and be treated for UTI.

## 2022-10-10 NOTE — ED PROVIDER NOTE - OBJECTIVE STATEMENT
77 y/ male pmhx parkinson's presenting with Spencer problem. states that this has happened in the past and has but has been happening more frequently. The patient had catheter placed for urinary Retention due to BPH in 2019. The patient states he has suprapubic tenderness. Otherwise he denies pain anywhere else. States that he has been having decreased urinary output and that his urine is a different color. Usually brown but now yellow. Patient states the last time this happened to him was 10 days ago and he had Spencer replaced at urology office. Denies CP/SOB/N/V/F/C

## 2022-10-10 NOTE — ED PROVIDER NOTE - CHIEF COMPLAINT
The patient is a 77y Male complaining of fowler catheter problem The patient is a 77y Male complaining of fowler catheter problem.

## 2022-10-10 NOTE — ED PROVIDER NOTE - CARE PLAN
1 Principal Discharge DX:	Spencer catheter problem   Principal Discharge DX:	Spencer catheter problem  Secondary Diagnosis:	UTI (urinary tract infection)

## 2022-10-10 NOTE — ED PROVIDER NOTE - PHYSICAL EXAMINATION
GENERAL: Awake, alert, NAD  HEENT: NC/AT, moist mucous membranes, PERRL, EOMI  LUNGS: CTAB, no wheezes or crackles   CARDIAC: RRR, no m/r/g  ABDOMEN: Soft, TTP in suprapubc region, non distended belly, no rebound, no guarding  BACK: No midline spinal tenderness, no CVA tenderness  EXT: No edema, no calf tenderness, 2+ DP pulses bilaterally, no deformities.  NEURO: A&Ox3. Moving all extremities.  SKIN: Warm and dry. No rash.  PSYCH: Normal affect.

## 2022-10-10 NOTE — ED ADULT NURSE NOTE - OBJECTIVE STATEMENT
76 y/o PMHx Parkinson's, Spencer catheter c/c Spencer catheter issues. Pt stated that Spencer needs to be changed and that its not draining adequately. Pt stated that he had a UTI and was not given the full dose of treatment. Changed Spencer catheter, and replaced with new 18F Spencer, drained 500ml. Pt stated that he felt immediate relief after insertion of new Spencer catheter. Noted urine yellow, with sediments. Denies hematuria/f/ha/sob, VSS, Safety precautions maintained.

## 2022-10-10 NOTE — ED PROVIDER NOTE - CLINICAL SUMMARY MEDICAL DECISION MAKING FREE TEXT BOX
77 y/ male pmhx parkinson's presenting with Fowler problem. Patient has history of multiple fowler problem in the past. VSS. Physical + suprapubic tenderness. Urine brown, per patient darker than normal 77 y/ male pmhx parkinson's presenting with Fowler problem. Patient has history of multiple fowler problem in the past. VSS. Physical + suprapubic tenderness. Urine brown, per patient darker than normal.    High concern for UTI. Patient will have UA and UC. Fowler will be replaced. Dispo pending labs. 77 y/ male pmhx parkinson's presenting with Fowler problem. Patient has history of multiple fowler problem in the past. VSS. Physical + suprapubic tenderness. Urine brown, per patient darker than normal.    High concern for UTI. Patient will have UA and UC. Fowler will be replaced. Dispo pending labs.    Dr. Higginbotham (Attending Physician)

## 2022-10-10 NOTE — ED PROVIDER NOTE - ATTENDING CONTRIBUTION TO CARE
Dr. Higginbotham (Attending Physician)  I performed a history and physical exam of the patient and discussed their management with the resident. I reviewed the resident's note and agree with the documented findings and plan of care. My medical decision making and observations are found above.

## 2022-10-10 NOTE — ED ADULT NURSE NOTE - EXTENSIONS OF SELF_ADULT
Impression: Exdtve age-rel mclr degn, left eye, with actv chrdl neovas: H35.3221. Left. s/p Eylea 6/11/21 OCT: 
OD:  Atrophy OS: Atrophy Plan: The diagnosis, natural history, and prognosis of wet AMD, as well as the risks and benefits of various treatment options including laser, PDT, Avastin, Lucentis and Eylea; along with the alternatives of observation or participation in a clinical trial, were discussed at length. The patient understands that treatment may not improve vision, but should reduce the risk of further visual loss. The patient understands that smoking is the most significant modifiable risk factor for the development of advanced AMD, and also understands that if they do smoke (or have history of smoking in the past 5 years), they cannot take vitamin A/beta-carotene, since it may increase their risk for lung cancer. Given stability of vision and exam, pt elects to proceed with Eylea today. 

Rec 10-12 weeks OCT OU reeval Eylea None

## 2022-10-12 LAB
CULTURE RESULTS: SIGNIFICANT CHANGE UP
SPECIMEN SOURCE: SIGNIFICANT CHANGE UP

## 2022-10-14 NOTE — SWALLOW BEDSIDE ASSESSMENT ADULT - ORAL PHASE
Unknown Delayed oral transit time/Decreased anterior-posterior movement of the bolus/Multiple swallows x2 to clear suspected residue

## 2022-10-18 ENCOUNTER — NON-APPOINTMENT (OUTPATIENT)
Age: 77
End: 2022-10-18

## 2022-10-18 ENCOUNTER — APPOINTMENT (OUTPATIENT)
Dept: NEUROLOGY | Facility: CLINIC | Age: 77
End: 2022-10-18

## 2022-10-18 RX ORDER — METHENAMINE HIPPURATE 1 G/1
1 TABLET ORAL
Qty: 90 | Refills: 3 | Status: ACTIVE | COMMUNITY
Start: 2022-10-18 | End: 1900-01-01

## 2022-10-24 NOTE — ED ADULT TRIAGE NOTE - PAIN RATING/NUMBER SCALE (0-10): REST
Quality 431: Preventive Care And Screening: Unhealthy Alcohol Use - Screening: Patient not identified as an unhealthy alcohol user when screened for unhealthy alcohol use using a systematic screening method Detail Level: Detailed Quality 130: Documentation Of Current Medications In The Medical Record: Current Medications Documented Quality 402: Tobacco Use And Help With Quitting Among Adolescents: Patient screened for tobacco and never smoked 9

## 2022-10-27 ENCOUNTER — APPOINTMENT (OUTPATIENT)
Dept: UROLOGY | Facility: CLINIC | Age: 77
End: 2022-10-27

## 2022-11-18 ENCOUNTER — APPOINTMENT (OUTPATIENT)
Dept: UROLOGY | Facility: CLINIC | Age: 77
End: 2022-11-18

## 2022-11-23 ENCOUNTER — APPOINTMENT (OUTPATIENT)
Dept: NEUROLOGY | Facility: CLINIC | Age: 77
End: 2022-11-23

## 2022-12-07 ENCOUNTER — APPOINTMENT (OUTPATIENT)
Dept: PHYSICAL MEDICINE AND REHAB | Facility: CLINIC | Age: 77
End: 2022-12-07

## 2022-12-07 PROCEDURE — 64644 CHEMODENERV 1 EXTREM 5/> MUS: CPT

## 2022-12-07 PROCEDURE — 95874 GUIDE NERV DESTR NEEDLE EMG: CPT

## 2022-12-08 RX ORDER — POTASSIUM CHLORIDE 1.5 G/1.58G
20 POWDER, FOR SOLUTION ORAL
Qty: 30 | Refills: 0 | Status: ACTIVE | COMMUNITY
Start: 2022-09-01

## 2022-12-08 RX ORDER — SCOPOLAMINE 1.5 MG/1
1 PATCH, EXTENDED RELEASE TRANSDERMAL
Qty: 10 | Refills: 0 | Status: ACTIVE | COMMUNITY
Start: 2022-09-01

## 2022-12-08 RX ORDER — CARBIDOPA AND LEVODOPA 25; 100 MG/1; MG/1
25-100 TABLET, ORALLY DISINTEGRATING ORAL
Qty: 90 | Refills: 0 | Status: ACTIVE | COMMUNITY
Start: 2022-09-01

## 2022-12-08 NOTE — ASSESSMENT
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

## 2022-12-08 NOTE — PROCEDURE
[Consent] : consent was given by patient or guardian [Site Verification] : the injection site was verified [Post-Injection Instructions Provided] : post-injection instructions were provided [Total Units: ___] : [unfilled] units [Total Vials: ___] : [unfilled] vials were used [Total Waste: ___] : with [unfilled] wasted [de-identified] : Procedural note for Botox injection:\par \par The procedure was explained in detail including associated risks and informed consent was obtained. Under sterile conditions the following muscles were injected with Botox and a 2 mL dilution and with EMG guidance:\par \par Left tibialis posterior------------------- 100 units (4 sites)\par Left medial gastrocnemius----------- 75 units (4 sites)\par Left lateral gastrocnemius------------ 75 units (4 sites)\par Left soleus-------------------------------- 50 units (2 sites)\par Left semitendinosis---------------------110 units (4 sites)\par left semimembranosis------------------100 units (4 sites)\par Left popliteus-----------------------------70 units (3 sites)\par \par Total --------------------------------------580 units

## 2023-01-05 NOTE — SWALLOW VFSS/MBS ASSESSMENT ADULT - ADDITIONAL RECOMMENDATIONS
[facility]al [facility] Services     Treatment Plan Acknowledgement    Courtney Peng was involved in the treatment plan for this current admission, 1/4/2023.  Patient has seen and agrees to the Interdisciplinary Treatment Plan.  Courtney Peng verbalizes that he/she will work with the treatment team to meet the Interdisciplinary Treatment Plan goals.    X____________________________________    Date/Time: _____________________    Patient/Legally Authorized Representative  X____________________________________    Date/Time: _____________________    Treatment Team Member   [id number]     Swallowing therapy post d/c Swallowing therapy post d/c to improve oropharyngeal skills and instruct pt on safe swallowing strategies prior to diet upgrade; ie chin tuck posture with cued throat clear and repeat swallow on small sips of thin liquids; repeat swallows (3-4) with mechanical soft texture prior to next intake. Swallowing therapy post d/c to improve oropharyngeal skills and instruct pt on safe swallowing strategies to determine candidacy for diet upgrade; ie chin tuck posture with cued throat clear and repeat swallow on small sips of thin liquids; repeat swallows (3-4) with mechanical soft texture prior to next intake.

## 2023-02-02 ENCOUNTER — NON-APPOINTMENT (OUTPATIENT)
Age: 78
End: 2023-02-02

## 2023-02-03 NOTE — ED ADULT TRIAGE NOTE - ESI TRIAGE ACUITY LEVEL, MLM
3 Rinvoq Counseling: I discussed with the patient the risks of Rinvoq therapy including but not limited to upper respiratory tract infections, shingles, cold sores, bronchitis, nausea, cough, fever, acne, and headache. Live vaccines should be avoided.  This medication has been linked to serious infections; higher rate of mortality; malignancy and lymphoproliferative disorders; major adverse cardiovascular events; thrombosis; thrombocytopenia, anemia, and neutropenia; lipid elevations; liver enzyme elevations; and gastrointestinal perforations.

## 2023-03-09 ENCOUNTER — APPOINTMENT (OUTPATIENT)
Dept: PHYSICAL MEDICINE AND REHAB | Facility: CLINIC | Age: 78
End: 2023-03-09
Payer: MEDICARE

## 2023-03-09 VITALS
HEART RATE: 97 BPM | OXYGEN SATURATION: 97 % | TEMPERATURE: 97.5 F | SYSTOLIC BLOOD PRESSURE: 98 MMHG | DIASTOLIC BLOOD PRESSURE: 68 MMHG

## 2023-03-09 PROCEDURE — 95874 GUIDE NERV DESTR NEEDLE EMG: CPT

## 2023-03-09 PROCEDURE — 64644 CHEMODENERV 1 EXTREM 5/> MUS: CPT

## 2023-03-09 NOTE — PROCEDURE
[Consent] : consent was given by patient or guardian [Site Verification] : the injection site was verified [Post-Injection Instructions Provided] : post-injection instructions were provided [Total Units: ___] : [unfilled] units [Total Vials: ___] : [unfilled] vials were used [Total Waste: ___] : with [unfilled] wasted [de-identified] : Procedural note for Botox injection:\par \par The procedure was explained in detail including associated risks and informed consent was obtained. Under sterile conditions the following muscles were injected with Botox and a 2 mL dilution and with EMG guidance:\par \par Left tibialis posterior------------------- 100 units (4 sites)\par Left medial gastrocnemius----------- 75 units (4 sites)\par Left lateral gastrocnemius------------ 75 units (4 sites)\par Left soleus-------------------------------- 50 units (2 sites)\par Left semitendinosis---------------------110 units (4 sites)\par left semimembranosis------------------100 units (4 sites)\par Left popliteus-----------------------------70 units (3 sites)\par \par Total --------------------------------------580 units

## 2023-03-26 NOTE — PHYSICAL THERAPY INITIAL EVALUATION ADULT - RANGE OF MOTION EXAMINATION, REHAB EVAL
Internal Medicine Resident Progress Note     Patient: Amanda Stanton Date: 3/26/2023   YOB: 1967 Admission Date: 3/25/2023   MRN: [id number] Attending: Marjorie Hackett MD     Chief Complaint   Patient presents with   • Shortness of Breath     Patient Active Problem List   Diagnosis   • Essential hypertension, benign   • Vasovagal syncope   • Iron deficiency anemia   • Fibroid   • Ovarian cyst   • COVID-19 virus infection   • Hypertensive urgency   • Hypertrophic cardiomyopathy (CMD)   • Respiratory failure (CMD)   • Acute hypoxemic respiratory failure due to COVID-19 (CMD)     Amanda Stanton is a 55 year old female who was admitted on 3/25/2023 with complaints of shortness of breath. Patient reported one day of generalized body aches/soreness which gradually worsened. It was associated with shortness of breath with mild improvement upon using inhaler 5-6 times per day. It has been interfering with sleep. She also reported productive cough with clear sputum that exacerbates sternal chest tightness and nasal congestion. She reports sick contact three days prior to admission. She did have two hospital admissions for acute COPD exacerbations in 12/2022 at [facility] secondary to influenza infection and ICU admission 02/2023 at [facility] for BiPAP. Patient does not follow with Pulmonary Medicine, but was seen by Service (Dr. Fischer) during last admission and had plans on completing workup with pulmonary function test, sleep study and pulmonary rehabilitation, however, these have not been performed yet.     In the ED, patient was afebrile, hypertensive, placed on 2L O2 NC for SpO2 88-90%. On exam, wheezing, rhonchi and rales were present. Workup revealed COVD-19 test positive. CXR negative. She was given methylprednisolone 125 mg IV, ipratropium inhaler 2 puff, albuterol 10 puff, acetaminophen 1g and admitted for further management.     During her admission, patient reported improvement of respiratory status. She  continued respiratory therapy and inhalers, started Remdesivir with monitoring liver function with CMP daily. Echo was ordered to follow-up on prior study from 2021.    SUBJECTIVE  The patient was on SVT on telemetry overnight.  Complaints of: sternal tightness which has been exacerbated upon coughing which has been increased in frequency. Reports sputum is now green.  Tolerating diet, Denies Nausea and Denies Vomiting    Reviewed: Medical History, Surgical History, Social History and Family History    OBJECTIVE  Scheduled Medications sodium chloride (PF), 2 mL, 2 times per day  enoxaparin, 40 mg, Daily  dexAMETHasone, 6 mg, Daily  Potassium Standard Replacement Protocol (Levels 3.5 and lower), , See Admin Instructions  Magnesium Standard Replacement Protocol, , See Admin Instructions  fluticasone-vilanterol, 1 puff, Daily Resp  guaiFENesin, 1,200 mg, 2 times per day  remdesivir, 100 mg, Daily at noon  amLODIPine, 10 mg, Daily  losartan, 50 mg, Nightly      PRN Medications sodium chloride, 500 mL, PRN  sodium chloride, 25 mL, PRN  sodium chloride, 25 mL, PRN  ondansetron, 4 mg, BID PRN  prochlorperazine, 5 mg, Q4H PRN  prochlorperazine, 5 mg, Q4H PRN  acetaminophen, 650 mg, Q4H PRN  polyethylene glycol, 17 g, Daily PRN  docusate sodium-sennosides, 2 tablet, Daily PRN  bisacodyl, 10 mg, Daily PRN  aluminum-magnesium hydroxide-simethicone, 30 mL, Q4H PRN  melatonin, 6 mg, Nightly PRN  hydrALAZINE, 10 mg, Q6H PRN  benzonatate, 100 mg, TID PRN  albuterol, 2 puff, Q4H Resp PRN      Continuous Infusions   Current Facility-Administered Medications   Medication Dose Route Frequency Provider Last Rate Last Admin     Vital Last Value 24 Hour Range   Temperature 98.2 °F (36.8 °C) (03/26/23 0603) Temp  Min: 98.2 °F (36.8 °C)  Max: 98.8 °F (37.1 °C)   Pulse 82 (03/26/23 0603) Pulse  Min: 82  Max: 99   Respiratory 18 (03/26/23 0603) Resp  Min: 18  Max: 20   Non-Invasive  Blood Pressure (!) 150/89 (03/26/23 0603) BP  Min: 132/80   Max: 160/101   Arterial   Blood Pressure   No data recorded   Pulse Oximetry 92 % (03/26/23 0603) SpO2  Min: 92 %  Max: 97 %     Height/Weight Today Admitted   Weight 86 kg (189 lb 9.5 oz) (03/25/23 1031) Weight: 88.7 kg (195 lb 8.8 oz) (03/25/23 0542)   Height N/A Height: 5' 4\" (162.6 cm) (03/25/23 0542)   BMI N/A BMI (Calculated): 33.57 (03/25/23 0542)     Intake/Output  Last Stool Occurrence:      Intake/Output Summary (Last 24 hours) at 3/26/2023 0710  Last data filed at 3/25/2023 1800  Gross per 24 hour   Intake 850 ml   Output --   Net 850 ml       Active Lines and Nursing Skin Assessments  Peripheral IV 03/25/23 Left Forearm 20 (Active)   Site Assessment WDL 03/25/23 2325   Dressing Type Transparent 03/25/23 2325   Dressing Activity Applied 03/25/23 0630   Line Status Blood return noted;Line flushed 03/25/23 0630   Interventions Capped IV 03/25/23 2325       PHYSICAL EXAM  General: Alert, cooperative, conversive. No acute distress. obese  Skin: warm, dry no evidence of rash or other lesions.  HEENT: PERRL   EOMI  The sclerae and conjunctivae are normal bilaterally..   The nasal sinuses are normal.  The nasal septum is midline..    The soft palate is symmetrical without lesion.  The oral mucosa and gingiva are normal. .  Neck:  The trachea is midline.  No cervical or supraclavicular lymphadenopathy.  Respiratory:  Non-labored respirations.  Normal respiratory effort.  Central rhonchi.  Cardiovascular: Regular rate and rhythm and S1, S2 present  Abdomen: Abdomen: soft, non-tender, non-distended. Positive bowel sounds all quadrants. No guarding or rebound. No hepatomegaly or splenomegaly.   Extremities and Spine:  No edema.  No deformity.  No tenderness..  .  Neuro:  Alert, oriented x4.  Speech intact.  No dysphasia or dysarthria.  Symmetrical facial structures.  Moves all four extremities.  Psych:  Affect is  appropriate with normal social interaction.    LABORATORY RESULTS (24 hour)  Recent Results (from the  past 24 hour(s))   Hepatitis Serology Panel Chronic with Reflex HCV PCR    Collection Time: 03/25/23 10:44 AM   Result Value Ref Range    Hepatitis B Surface Antibody Positive     Hepatitis B Surface Antigen Negative Negative    Hepatitis B Core Antibody, IgG And IgM Positive (A) Negative    Hepatitis B Core Antibody, IgM Negative Negative    Hepatitis B Interpretation      Hepatitis C Antibody Negative Negative   Light Green Top    Collection Time: 03/25/23 10:50 AM   Result Value Ref Range    Extra Tube Hold for Add Ons    Lavender Top    Collection Time: 03/25/23 10:50 AM   Result Value Ref Range    Extra Tube Hold for Add Ons    TROPONIN I, HIGH SENSITIVITY    Collection Time: 03/26/23  5:52 AM   Result Value Ref Range    Troponin I, High Sensitivity 8 <52 ng/L   Ferritin    Collection Time: 03/26/23  5:52 AM   Result Value Ref Range    Ferritin 132 8 - 252 ng/mL   Lactate Dehydrogenase    Collection Time: 03/26/23  5:52 AM   Result Value Ref Range    LD, Total 173 82 - 240 Units/L   C Reactive Protein    Collection Time: 03/26/23  5:52 AM   Result Value Ref Range    C-Reactive Protein 0.9 <=1.0 mg/dL   D Dimer, Quantitative    Collection Time: 03/26/23  5:52 AM   Result Value Ref Range    D Dimer, Quantitative <0.19 <0.57 mg/L (FEU)   Magnesium    Collection Time: 03/26/23  5:52 AM   Result Value Ref Range    Magnesium 2.0 1.7 - 2.4 mg/dL   Comprehensive Metabolic Panel    Collection Time: 03/26/23  5:52 AM   Result Value Ref Range    Fasting Status      Sodium 139 135 - 145 mmol/L    Potassium 4.0 3.4 - 5.1 mmol/L    Chloride 107 97 - 110 mmol/L    Carbon Dioxide 23 21 - 32 mmol/L    Anion Gap 13 7 - 19 mmol/L    Glucose 122 (H) 70 - 99 mg/dL    BUN 11 6 - 20 mg/dL    Creatinine 0.56 0.51 - 0.95 mg/dL    Glomerular Filtration Rate >90 >=60    BUN/Cr 20 7 - 25    Calcium 8.5 8.4 - 10.2 mg/dL    Bilirubin, Total 0.3 0.2 - 1.0 mg/dL    GOT/AST 16 <=37 Units/L    GPT/ALT 27 <64 Units/L    Alkaline Phosphatase 132  (H) 45 - 117 Units/L    Albumin 3.0 (L) 3.6 - 5.1 g/dL    Protein, Total 6.3 (L) 6.4 - 8.2 g/dL    Globulin 3.3 2.0 - 4.0 g/dL    A/G Ratio 0.9 (L) 1.0 - 2.4   CBC with Automated Differential (performable only)    Collection Time: 03/26/23  5:52 AM   Result Value Ref Range    WBC 5.0 4.2 - 11.0 K/mcL    RBC 5.42 (H) 4.00 - 5.20 mil/mcL    HGB 12.3 12.0 - 15.5 g/dL    HCT 39.9 36.0 - 46.5 %    MCV 73.6 (L) 78.0 - 100.0 fl    MCH 22.7 (L) 26.0 - 34.0 pg    MCHC 30.8 (L) 32.0 - 36.5 g/dL    RDW-CV 15.7 (H) 11.0 - 15.0 %    RDW-SD 41.0 39.0 - 50.0 fL     140 - 450 K/mcL    NRBC 0 <=0 /100 WBC    Neutrophil, Percent 63 %    Lymphocytes, Percent 20 %    Mono, Percent 17 %    Eosinophils, Percent 0 %    Basophils, Percent 0 %    Immature Granulocytes 0 %    Absolute Neutrophils 3.1 1.8 - 7.7 K/mcL    Absolute Lymphocytes 1.0 1.0 - 4.0 K/mcL    Absolute Monocytes 0.9 0.3 - 0.9 K/mcL    Absolute Eosinophils  0.0 0.0 - 0.5 K/mcL    Absolute Basophils 0.0 0.0 - 0.3 K/mcL    Absolute Immature Granulocytes 0.0 0.0 - 0.2 K/mcL       EKG (Most recent)  Results for orders placed or performed during the hospital encounter of 03/25/23   Electrocardiogram 12-Lead   Result Value Ref Range    Systolic Blood Pressure 154     Diastolic Blood Pressure 95     Ventricular Rate EKG/Min (BPM) 88     Atrial Rate (BPM) 88     RI-Interval (MSEC) 172     QRS-Interval (MSEC) 88     QT-Interval (MSEC) 356     QTc 431     P Axis (Degrees) 60     R Axis (Degrees) -12     T Axis (Degrees) -7     REPORT TEXT       Normal sinus rhythm  Nonspecific ST and T wave abnormality  Abnormal ECG  When compared with ECG of  26-FEB-2023 19:26,  Nonspecific T wave abnormality now evident in  Inferior leads  T wave inversion more evident in  Lateral leads  Confirmed by DEMARCUS WEISS, DANIELLE ([id number]),  Karen Lees ([id number]) on 3/25/2023 7:04:57 AM         IMAGING  XR Chest AP or PA   Final Result   IMPRESSION:   1. No acute cardiopulmonary disease.              MICROBIOLOGY  Blood Culture  No results found for this or any previous visit.  Urine Culture  No results found for this or any previous visit.    Quality Indicators   AMI With Heart Failure with Reduced LVEF (<40%)? no  Heart failure?  No  Stroke measures indicated? no  AMI? No  DVT/VTE Prophylaxis:  VTE Pharmacologic Prophylaxis: Yes  VTE Mechanical Prophylaxis: Yes  Severe sepsis with septic shock?  No    ASSESSMENT AND PLAN  Amanda Stanton is a 55 year old female who was admitted on 3/25/2023. The patient is currently in stable condition.  We plan to proceed as follows (by problem).    # Acute hypoxic respiratory failure on RA 2/2 to below - improving  # COVID-19 infection  # Asthma with acute exacerbation  Hx Admitted ICU 2/2023 for BiPAP   1 day prior to admission with shortness of breath and generalized body aches  Increase use of inhalers providing little relieve. Reports COVID-19 exposure 3 days prior to admission. Second hand smoking exposure from sister smoking upstairs.  S/p duonebs and steroids in the EMS and ED and improved symptoms  WBC 3.3, no neutrophilia/left shift  Covid-19 positive with Ct value 25.9. Flu/RSV negative  Lactic acid 0.7, procalcitonin ngeative, troponin negative  CXR 3/25 negative for acute disease.  Weaned off oxygen supplementation  - Remdesivir 200 mg once 3/25, followed by 100 mg daily for 4 doses  - Guaifenesin and tessalon perles PRN  - Steroids: dexamethasone 6 mg IV for 10 days. Will taper.  - Bronchodilators: albuterol nebulizer every 4 hours, Breo Ellipta daily  - O2 supplementation to maintain SpO2 >92%  - will need PFT's as outpatient  - smoking cessation   - maintain Mg >2  - CBC, CMP  - isolation     # Essential hypertension  # Hypertensive urgency, resolved  BP elevated likely 2/2 to uncontrolled pain  - Continue PTA amlodipine 10 mg daily, carvedilol 25 mg BID, and losartan 50 mg daily upon med rec  - hydralazine PRN     Chronic Diagnoses:   # HoCM  # Hx  NSVT  Follows Cardiology (Dr. Vasquez). Genetic testing negative.  Echo 9/2021: LVEF 70%, increased LV thickness. Grade II diastolic dysfunction.  4/2022: mild burden of ventricular ectopy, represented predominantly as isolated PVCs. No significant ventricular arrhythmia, and no significant supraventricular ectopy or arrhythmia was identified.   No pauses and no atrial fibrillation were detected.   Cardiac MRI 7/2021: cardiomyopathy  NM stress test 08/2019: no ischemia  - keep K >4, Mg >2  - ensure CTA of coronaries is completed as outpatient  - PTA amlodipine 10 mg daily  - PTA coreg 25 mg BID - holding during acute asthma exacerbation   - PTA losartan 50 mg nightly  - PRN hydralazine SBP > 160  - Echo ordered    # Suspect ANJEL  STOPBANG Score: 6/8 (Score>= is considered to be high risk  BMI 33.5  -Sleep study as outpatient     # Former smoker - continue cessation  # Iron deficiency anemia - ctm    BEST PRACTICES    Fluids: none   Isolation: contact, droplet  DM: no DM, daily check  Electrolyte PRN's: yes  Nutrition: PO Cardiac  Pain: Acetaminophen PO  N/V: no complaint of N/V  PT/OT: when appropriate  GI Prophylaxis: currently none      Discharge Planning     Barriers to discharge: Patient is not medically ready and needs to remain in the hospital today due to pending improvement of respiratory status, continue respiratory therapy and antiviral therapy  Anticipated discharge destination: Home  Expected Discharge Date: TBD          Signed,  Mae Garzon MD  3/26/2023 7:10 AM  Pager: [phone]    The patient's history and physical were discussed with Marjorie Hackett MD, who upon seeing the patient agreed with the above assessment and plan.    Please contact the cross cover pager ([phone] or  at [facility], [phone] or  [facility]) for questions between 1900 to 0700 Monday through Friday and between 1100 to 0700 on weekends & holidays.      I have interviewed and evaluated Amanda HERNANDEZ  Maximino and discussed her case with resident doctor.  I have also reviewed the note as documented and agree with it.  Allergies, radiology reports, labs and other clinical notes reviewed.   Please refer to full note  for further details.  Marjorie Hackett MD           bilateral lower extremity ROM was WFL (within functional limits)/right shoulder range of motion limited secondary to possible old rotator cuff tear as per patient./Left UE ROM was WFL (within functional limits)

## 2023-03-29 NOTE — HISTORY OF PRESENT ILLNESS
[FreeTextEntry1] : 77yo male with PMH of PD, BPH w/ chronic indwelling fowler presents to the clinic for evaluation of L AFO. Patient with recent hospitalization in Spring of 2020 for urosepsis at [facility] complicated by COVID and C diff and was discharged to [facility] re[facility]. Since his hospitalization he has developed a contracture of L ankle and notes difficulty with ambulation. He is now currently living at [facility] where he is receiving PT and OT. He is able to ambulate with a walker with assistance. He notes hitting his L hand in a car door a few months ago and since then has had deformities of his L 3rd and 4th digits and weakness. He states that he compensates with the walker by placing more force on his R hand and has been having some R hand pain as a result.\par \par \par 
No

## 2023-04-28 NOTE — DIETITIAN INITIAL EVALUATION ADULT. - ENERGY NEEDS
Hide Additional Notes?: No Detail Level: Zone Current Wt. 141 pounds  Height 185.4 cm ( 73")  BMI 18.6 kg/m^2

## 2023-05-12 ENCOUNTER — INPATIENT (INPATIENT)
Facility: HOSPITAL | Age: 78
LOS: 3 days | Discharge: HOME HEALTH | DRG: 698 | End: 2023-05-16
Attending: INTERNAL MEDICINE | Admitting: INTERNAL MEDICINE
Payer: MEDICARE

## 2023-05-12 VITALS
RESPIRATION RATE: 20 BRPM | TEMPERATURE: 104 F | OXYGEN SATURATION: 97 % | HEART RATE: 88 BPM | SYSTOLIC BLOOD PRESSURE: 102 MMHG | DIASTOLIC BLOOD PRESSURE: 62 MMHG

## 2023-05-12 DIAGNOSIS — A41.9 SEPSIS, UNSPECIFIED ORGANISM: ICD-10-CM

## 2023-05-12 DIAGNOSIS — E87.6 HYPOKALEMIA: ICD-10-CM

## 2023-05-12 DIAGNOSIS — Z86.69 PERSONAL HISTORY OF OTHER DISEASES OF THE NERVOUS SYSTEM AND SENSE ORGANS: ICD-10-CM

## 2023-05-12 DIAGNOSIS — G20 PARKINSON'S DISEASE: ICD-10-CM

## 2023-05-12 LAB
ALBUMIN SERPL ELPH-MCNC: 3.6 G/DL — SIGNIFICANT CHANGE UP (ref 3.3–5)
ALP SERPL-CCNC: 129 U/L — HIGH (ref 40–120)
ALT FLD-CCNC: 10 U/L — SIGNIFICANT CHANGE UP (ref 10–45)
ANION GAP SERPL CALC-SCNC: 17 MMOL/L — SIGNIFICANT CHANGE UP (ref 5–17)
APPEARANCE UR: ABNORMAL
APTT BLD: 30 SEC — SIGNIFICANT CHANGE UP (ref 27.5–35.5)
AST SERPL-CCNC: 8 U/L — LOW (ref 10–40)
BACTERIA # UR AUTO: ABNORMAL
BASOPHILS # BLD AUTO: 0 K/UL — SIGNIFICANT CHANGE UP (ref 0–0.2)
BASOPHILS NFR BLD AUTO: 0 % — SIGNIFICANT CHANGE UP (ref 0–2)
BILIRUB SERPL-MCNC: 1 MG/DL — SIGNIFICANT CHANGE UP (ref 0.2–1.2)
BILIRUB UR-MCNC: NEGATIVE — SIGNIFICANT CHANGE UP
BUN SERPL-MCNC: 14 MG/DL — SIGNIFICANT CHANGE UP (ref 7–23)
CALCIUM SERPL-MCNC: 9 MG/DL — SIGNIFICANT CHANGE UP (ref 8.4–10.5)
CHLORIDE SERPL-SCNC: 99 MMOL/L — SIGNIFICANT CHANGE UP (ref 96–108)
CK SERPL-CCNC: 33 U/L — SIGNIFICANT CHANGE UP (ref 30–200)
CO2 SERPL-SCNC: 22 MMOL/L — SIGNIFICANT CHANGE UP (ref 22–31)
COLOR SPEC: ABNORMAL
CREAT SERPL-MCNC: 0.69 MG/DL — SIGNIFICANT CHANGE UP (ref 0.5–1.3)
DIFF PNL FLD: ABNORMAL
EGFR: 97 ML/MIN/1.73M2 — SIGNIFICANT CHANGE UP
EOSINOPHIL # BLD AUTO: 0 K/UL — SIGNIFICANT CHANGE UP (ref 0–0.5)
EOSINOPHIL NFR BLD AUTO: 0 % — SIGNIFICANT CHANGE UP (ref 0–6)
EPI CELLS # UR: 2 — SIGNIFICANT CHANGE UP
GLUCOSE SERPL-MCNC: 167 MG/DL — HIGH (ref 70–99)
GLUCOSE UR QL: NEGATIVE — SIGNIFICANT CHANGE UP
HCT VFR BLD CALC: 37.8 % — LOW (ref 39–50)
HGB BLD-MCNC: 12.5 G/DL — LOW (ref 13–17)
HYALINE CASTS # UR AUTO: 0 /LPF — SIGNIFICANT CHANGE UP (ref 0–2)
INR BLD: 1.58 RATIO — HIGH (ref 0.88–1.16)
KETONES UR-MCNC: NEGATIVE — SIGNIFICANT CHANGE UP
LEUKOCYTE ESTERASE UR-ACNC: ABNORMAL
LYMPHOCYTES # BLD AUTO: 0.4 K/UL — LOW (ref 1–3.3)
LYMPHOCYTES # BLD AUTO: 1.7 % — LOW (ref 13–44)
MAGNESIUM SERPL-MCNC: 1.7 MG/DL — SIGNIFICANT CHANGE UP (ref 1.6–2.6)
MANUAL SMEAR VERIFICATION: SIGNIFICANT CHANGE UP
MCHC RBC-ENTMCNC: 30.9 PG — SIGNIFICANT CHANGE UP (ref 27–34)
MCHC RBC-ENTMCNC: 33.1 GM/DL — SIGNIFICANT CHANGE UP (ref 32–36)
MCV RBC AUTO: 93.3 FL — SIGNIFICANT CHANGE UP (ref 80–100)
MONOCYTES # BLD AUTO: 0.42 K/UL — SIGNIFICANT CHANGE UP (ref 0–0.9)
MONOCYTES NFR BLD AUTO: 1.8 % — LOW (ref 2–14)
NEUTROPHILS # BLD AUTO: 22.56 K/UL — HIGH (ref 1.8–7.4)
NEUTROPHILS NFR BLD AUTO: 95.6 % — HIGH (ref 43–77)
NEUTS BAND # BLD: 0.9 % — SIGNIFICANT CHANGE UP (ref 0–8)
NITRITE UR-MCNC: NEGATIVE — SIGNIFICANT CHANGE UP
NT-PROBNP SERPL-SCNC: 173 PG/ML — SIGNIFICANT CHANGE UP (ref 0–300)
PH UR: 8.5 — HIGH (ref 5–8)
PHOSPHATE SERPL-MCNC: 2.4 MG/DL — LOW (ref 2.5–4.5)
PLAT MORPH BLD: NORMAL — SIGNIFICANT CHANGE UP
PLATELET # BLD AUTO: 225 K/UL — SIGNIFICANT CHANGE UP (ref 150–400)
POTASSIUM SERPL-MCNC: 3.2 MMOL/L — LOW (ref 3.5–5.3)
POTASSIUM SERPL-SCNC: 3.2 MMOL/L — LOW (ref 3.5–5.3)
PROCALCITONIN SERPL-MCNC: 1.11 NG/ML — HIGH (ref 0.02–0.1)
PROT SERPL-MCNC: 7.4 G/DL — SIGNIFICANT CHANGE UP (ref 6–8.3)
PROT UR-MCNC: ABNORMAL
PROTHROM AB SERPL-ACNC: 18.2 SEC — HIGH (ref 10.5–13.4)
RAPID RVP RESULT: SIGNIFICANT CHANGE UP
RBC # BLD: 4.05 M/UL — LOW (ref 4.2–5.8)
RBC # FLD: 13.2 % — SIGNIFICANT CHANGE UP (ref 10.3–14.5)
RBC BLD AUTO: SIGNIFICANT CHANGE UP
RBC CASTS # UR COMP ASSIST: >50 /HPF — HIGH (ref 0–4)
SARS-COV-2 RNA SPEC QL NAA+PROBE: SIGNIFICANT CHANGE UP
SODIUM SERPL-SCNC: 138 MMOL/L — SIGNIFICANT CHANGE UP (ref 135–145)
SP GR SPEC: 1.02 — SIGNIFICANT CHANGE UP (ref 1.01–1.02)
T3 SERPL-MCNC: 85 NG/DL — SIGNIFICANT CHANGE UP (ref 80–200)
T4 AB SER-ACNC: 8.8 UG/DL — SIGNIFICANT CHANGE UP (ref 4.6–12)
TRI-PHOS CRY UR QL COMP ASSIST: ABNORMAL
TROPONIN T, HIGH SENSITIVITY RESULT: 13 NG/L — SIGNIFICANT CHANGE UP (ref 0–51)
TSH SERPL-MCNC: 1.52 UIU/ML — SIGNIFICANT CHANGE UP (ref 0.27–4.2)
UROBILINOGEN FLD QL: NEGATIVE — SIGNIFICANT CHANGE UP
WBC # BLD: 23.38 K/UL — HIGH (ref 3.8–10.5)
WBC # FLD AUTO: 23.38 K/UL — HIGH (ref 3.8–10.5)
WBC UR QL: >50 /HPF — HIGH (ref 0–5)

## 2023-05-12 PROCEDURE — 71045 X-RAY EXAM CHEST 1 VIEW: CPT | Mod: 26

## 2023-05-12 PROCEDURE — 99223 1ST HOSP IP/OBS HIGH 75: CPT

## 2023-05-12 PROCEDURE — 99285 EMERGENCY DEPT VISIT HI MDM: CPT | Mod: GC

## 2023-05-12 RX ORDER — PIPERACILLIN AND TAZOBACTAM 4; .5 G/20ML; G/20ML
3.38 INJECTION, POWDER, LYOPHILIZED, FOR SOLUTION INTRAVENOUS ONCE
Refills: 0 | Status: COMPLETED | OUTPATIENT
Start: 2023-05-12 | End: 2023-05-12

## 2023-05-12 RX ORDER — ACETAMINOPHEN 500 MG
1000 TABLET ORAL ONCE
Refills: 0 | Status: COMPLETED | OUTPATIENT
Start: 2023-05-12 | End: 2023-05-12

## 2023-05-12 RX ORDER — ENOXAPARIN SODIUM 100 MG/ML
40 INJECTION SUBCUTANEOUS EVERY 24 HOURS
Refills: 0 | Status: DISCONTINUED | OUTPATIENT
Start: 2023-05-12 | End: 2023-05-16

## 2023-05-12 RX ORDER — SODIUM CHLORIDE 9 MG/ML
1000 INJECTION INTRAMUSCULAR; INTRAVENOUS; SUBCUTANEOUS ONCE
Refills: 0 | Status: COMPLETED | OUTPATIENT
Start: 2023-05-12 | End: 2023-05-12

## 2023-05-12 RX ORDER — CARBIDOPA AND LEVODOPA 25; 100 MG/1; MG/1
1 TABLET ORAL THREE TIMES A DAY
Refills: 0 | Status: DISCONTINUED | OUTPATIENT
Start: 2023-05-12 | End: 2023-05-16

## 2023-05-12 RX ORDER — CEFTRIAXONE 500 MG/1
1000 INJECTION, POWDER, FOR SOLUTION INTRAMUSCULAR; INTRAVENOUS ONCE
Refills: 0 | Status: COMPLETED | OUTPATIENT
Start: 2023-05-12 | End: 2023-05-12

## 2023-05-12 RX ORDER — CEFTRIAXONE 500 MG/1
1000 INJECTION, POWDER, FOR SOLUTION INTRAMUSCULAR; INTRAVENOUS EVERY 24 HOURS
Refills: 0 | Status: DISCONTINUED | OUTPATIENT
Start: 2023-05-13 | End: 2023-05-16

## 2023-05-12 RX ORDER — DIAZEPAM 5 MG
5 TABLET ORAL
Refills: 0 | Status: DISCONTINUED | OUTPATIENT
Start: 2023-05-12 | End: 2023-05-16

## 2023-05-12 RX ORDER — SODIUM CHLORIDE 9 MG/ML
1000 INJECTION INTRAMUSCULAR; INTRAVENOUS; SUBCUTANEOUS
Refills: 0 | Status: DISCONTINUED | OUTPATIENT
Start: 2023-05-12 | End: 2023-05-16

## 2023-05-12 RX ORDER — SODIUM CHLORIDE 9 MG/ML
500 INJECTION INTRAMUSCULAR; INTRAVENOUS; SUBCUTANEOUS ONCE
Refills: 0 | Status: COMPLETED | OUTPATIENT
Start: 2023-05-12 | End: 2023-05-12

## 2023-05-12 RX ORDER — CEFTRIAXONE 500 MG/1
INJECTION, POWDER, FOR SOLUTION INTRAMUSCULAR; INTRAVENOUS
Refills: 0 | Status: DISCONTINUED | OUTPATIENT
Start: 2023-05-12 | End: 2023-05-16

## 2023-05-12 RX ADMIN — Medication 400 MILLIGRAM(S): at 14:13

## 2023-05-12 RX ADMIN — CEFTRIAXONE 1000 MILLIGRAM(S): 500 INJECTION, POWDER, FOR SOLUTION INTRAMUSCULAR; INTRAVENOUS at 19:53

## 2023-05-12 RX ADMIN — SODIUM CHLORIDE 100 MILLILITER(S): 9 INJECTION INTRAMUSCULAR; INTRAVENOUS; SUBCUTANEOUS at 17:46

## 2023-05-12 RX ADMIN — Medication 5 MILLIGRAM(S): at 17:45

## 2023-05-12 RX ADMIN — SODIUM CHLORIDE 500 MILLILITER(S): 9 INJECTION INTRAMUSCULAR; INTRAVENOUS; SUBCUTANEOUS at 14:51

## 2023-05-12 RX ADMIN — CARBIDOPA AND LEVODOPA 1 TABLET(S): 25; 100 TABLET ORAL at 21:30

## 2023-05-12 RX ADMIN — SODIUM CHLORIDE 1000 MILLILITER(S): 9 INJECTION INTRAMUSCULAR; INTRAVENOUS; SUBCUTANEOUS at 13:01

## 2023-05-12 RX ADMIN — PIPERACILLIN AND TAZOBACTAM 200 GRAM(S): 4; .5 INJECTION, POWDER, LYOPHILIZED, FOR SOLUTION INTRAVENOUS at 13:21

## 2023-05-12 NOTE — CONSULT NOTE ADULT - ASSESSMENT
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 Pt noted to have dark urine and fevers with weakness;    Presentation suggests urosepsis, possible nephrolithiasis  home care NP on phone states no recent abx  previous cultures  generally >3 org - 3 years ago had urinary Klebs pn isolate resistant to Amp, floroquinolones only    Suggest  Ceftriaxone 1 gm once daily  await Urine, blood cultures  urology evaluation  abd CT  check CK    discussed with primary medical attending - I will see over weekend

## 2023-05-12 NOTE — ED ADULT NURSE NOTE - NSFALLRISKINTERV_ED_ALL_ED
Assistance OOB with selected safe patient handling equipment if applicable/Assistance with ambulation/Communicate fall risk and risk factors to all staff, patient, and family/Provide visual cue: yellow wristband, yellow gown, etc/Reinforce activity limits and safety measures with patient and family/Toileting schedule using arm’s reach rule for commode and bathroom/Call bell, personal items and telephone in reach/Instruct patient to call for assistance before getting out of bed/chair/stretcher/Non-slip footwear applied when patient is off stretcher/[place] to call system/Physically safe environment - no spills, clutter or unnecessary equipment/Purposeful Proactive Rounding/Room/bathroom lighting operational, light cord in reach

## 2023-05-12 NOTE — ED PROVIDER NOTE - ATTENDING CONTRIBUTION TO CARE
Attending MD Barnard:  I personally have seen and examined this patient.  Resident note reviewed and agree on plan of care and except where noted.  Please see my MDM for further details.

## 2023-05-12 NOTE — PATIENT PROFILE ADULT - FALL HARM RISK - HARM RISK INTERVENTIONS

## 2023-05-12 NOTE — ED ADULT NURSE NOTE - OBJECTIVE STATEMENT
77 y.o M BIB EMS from the [facility] p/w c/o hematuria. A+Ox4. Pt states had chronic Spencer catheter changed yesterday and since has been having burning w/ urination and generalized weakness. Endorsing nausea, no vomiting. Febrile 103.5 rectally. Denies decreased oral intake. Denies SOB.

## 2023-05-12 NOTE — ED PROVIDER NOTE - CLINICAL SUMMARY MEDICAL DECISION MAKING FREE TEXT BOX
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 VS and exam as above  Concern for sepsis from UTI, however will also need to eval for metabolic derangements. Lower concern for ACS at this time  Plan: sepsis/cardiac labs, ua/ucx, cxr, abx, reassess symptoms Attending MD Barnard: 77M PMH suspected muscular dystrophy, Parkinson's Disease, chronic fowler, previous UTI and Cdiff infections BIBEMS for fever, dysuria. Fowler changed yest at his NH. Last time fowler was changed before that was 4/14.   Patient awake and alert, ill appearing, fowler draining red cloudy urine, no abdominal pain.  Infectious workup and IV antibiotics.      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 VS and exam as above  Concern for sepsis from UTI, however will also need to eval for metabolic derangements. Lower concern for ACS at this time  Plan: sepsis/cardiac labs, ua/ucx, cxr, abx, reassess symptoms

## 2023-05-12 NOTE — CONSULT NOTE ADULT - SUBJECTIVE AND OBJECTIVE BOX
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 Pt noted to have dark urine and fevers with weakness; fowler was changed yest at his NH. Last time fowler was changed before that was 4/14. No cp/sob, abd pain, leg swelling    He has been living at home. He has had decline in functional status -- unable to stand with assistance.  He has chronic Fowler and suspects that some previous episodes of UTI have represented only colonization. He feels sicker than on previous episodes. He states he feels "woozy" Denies pain, Gross hematuria evident      PAST MEDICAL & SURGICAL HISTORY:  HTN (hypertension)  HLD (hyperlipidemia)    Social history:  home with aide    REVIEW OF SYSTEMS:  CONSTITUTIONAL: ++ weakness, fevers or chills  EYES/ENT: No visual changes;  No vertigo or throat pain   NECK: No pain or stiffness  RESPIRATORY: No cough, wheezing, hemoptysis; No shortness of breath  CARDIOVASCULAR: No chest pain or palpitations  GASTROINTESTINAL: No abdominal or epigastric pain. No nausea, vomiting, or hematemesis; No diarrhea or constipation. No melena or hematochezia.  GENITOURINARY: No dysuria, frequency or hematuria  NEUROLOGICAL: weakness/stiffness  SKIN: No itching, burning, rashes, or lesions   All other review of systems is negative unless indicated above    Allergies  No Known Allergies          Antimicrobials:      Vital Signs Last 24 Hrs  T(C): 36.8 (12 May 2023 16:06), Max: 39.8 (12 May 2023 13:00)  T(F): 98.2 (12 May 2023 16:06), Max: 103.6 (12 May 2023 13:00)  HR: 80 (12 May 2023 16:06) (80 - 88)  BP: 99/62 (12 May 2023 16:06) (99/62 - 102/62)  BP(mean): 75 (12 May 2023 13:00) (75 - 75)  RR: 20 (12 May 2023 16:06) (20 - 20)  SpO2: 96% (12 May 2023 16:06) (96% - 97%)    Parameters below as of 12 May 2023 16:06  Patient On (Oxygen Delivery Method): room air        PHYSICAL EXAM:  General:  NAD, Non-toxic  Neurology: A&Ox3, stiff, mask like face, decreased spontaneous movement    Respiratory: Clear to auscultation bilaterally  CV: RRR, S1S2, no murmurs, rubs or gallops  Abdominal: Soft, Non-tender, non-distended, normal bowel sounds  Extremities: swan neck deformities left hand and wrist  Line Sites: Clear  Skin: No rash                    12.5   23.38 )-----------( 225      ( 12 May 2023 13:28 )             37.8     05-12    138  |  99  |  14  ----------------------------<  167<H>  3.2<L>   |  22  |  0.69    Ca    9.0      12 May 2023 13:28  Phos  2.4     05-12  Mg     1.7     05-12    TPro  7.4  /  Alb  3.6  /  TBili  1.0  /  DBili  x   /  AST  8<L>  /  ALT  10  /  AlkPhos  129<H>  05-12       (05.12.23 @ 13:29)   pH Urine: 8.5  Urine Appearance: Turbid  Color: Red  Specific Gravity: 1.016  Protein, Urine: 300 mg/dL  Glucose Qualitative, Urine: Negative  Ketone - Urine: Negative  Blood, Urine: Large  Bilirubin: Negative  Urobilinogen: Negative  Leukocyte Esterase Concentration: Large  Nitrite: Negative  White Blood Cell - Urine: >50 /HPF  Red Blood Cell - Urine: >50 /hpf  Bacteria: Many  Hyaline Casts: 0 /lpf  Squamous Epithelial Cells: 2  Triple Phosphate Crystals: Moderate  MICROBIOLOGY:    Radiology:  5/12/23 CXR on my read  - rotated film, no consolidations, sharp CP angles    Skyler Teran MD; Division of Infectious Disease; Pager: [phone]; nights and weekends: [phone]

## 2023-05-12 NOTE — H&P ADULT - TIME BILLING
Admission H&P . D/W Pt and his HCP Zamzam in detail test results and treatment plan. Consults called .

## 2023-05-12 NOTE — ED PROVIDER NOTE - NS ED ROS FT
CONST: +fevers, +chills  ENT: no sore throat  CV: no chest pain, no leg swelling  RESP: no shortness of breath, no cough  ABD: no abdominal pain, no nausea, no vomiting, no diarrhea  : +dysuria, no flank pain, no hematuria  MSK: no back pain, no extremity pain  SKIN:  no rash

## 2023-05-12 NOTE — PATIENT PROFILE ADULT - LOCATION #1
Telephone Encounter by Ailin Trivedi RN at 08/17/18 08:37 AM     Author:  Ailin Trivedi RN Service:  (none) Author Type:  Registered Nurse     Filed:  08/17/18 08:45 AM Encounter Date:  8/16/2018 Status:  Signed     :  Ailin Trivedi RN (Registered Nurse)            New tel msg[SG1.1M] created under allergy telephone message to generate letters needed as per below.[SG1.2M]         Revision History        User Key Date/Time User Provider Type Action    > SG1.2 08/17/18 08:45 AM Ailin Trivedi, RN Registered Nurse Sign     SG1.1 08/17/18 08:40 AM Ailin Trivedi, RN Registered Nurse     M - Manual            
Telephone Encounter by Leatha Oreilly at 08/16/18 12:28 PM     Author:  Leatha Oreilly Service:  (none) Author Type:       Filed:  08/16/18 12:29 PM Encounter Date:  8/16/2018 Status:  Signed     :  Leatha Oreilly ()            Parent dropped off school medication authorization form to be completed.  Form placed in clinical staff work bin.[AC1.1M]  Electronically Signed by:    Leatha Oreilly 8/16/2018[AC1.2T]        Revision History        User Key Date/Time User Provider Type Action    > AC1.2 08/16/18 12:29 PM Leatha Oreilly  Sign     AC1.1 08/16/18 12:28 PM Leatha Oreilly      M - Manual, T - Template            
Telephone Encounter by Perla Gentile RN at 08/16/18 01:42 PM     Author:  Perla Gentile RN Service:  (none) Author Type:  Registered Nurse     Filed:  08/16/18 01:49 PM Encounter Date:  8/16/2018 Status:  Signed     :  Perla Gentile RN (Registered Nurse)            Called mom and  let her know Allergist ordered her EPI pen so they need to complete her medication form for school and let her know we Will send the  form to Allergy to request completion of her school form   Advised mom to follow up with allergy dept for her form    Medication school form taken to allergy dept      sent message  to allergy dept for advice in completing her medication form   Can you please follow up with mom regarding completion of this school form   Mom's cell number is[SD1.1M]   [phone] (cell)[SD1.2T]    Thank you for you help[SD1.1M]  Electronically Signed by:    Perla Gentile RN , 8/16/2018[SD1.3T]            Revision History        User Key Date/Time User Provider Type Action    > SD1.2 08/16/18 01:49 PM Perla Gentile RN Registered Nurse Sign     SD1.3 08/16/18 01:46 PM Perla Gentile RN Registered Nurse      SD1.1 08/16/18 01:42 PM Perla Gentile RN Registered Nurse     M - Manual, T - Template            
[place]

## 2023-05-12 NOTE — ED PROVIDER NOTE - PHYSICAL EXAMINATION
Physical Exam:  Gen: awake alert   HEENT: normal conjunctiva, oral mucosa dry  Lung: CTAB, no respiratory distress, no wheezes/rhonchi/rales B/L, speaking in full sentences  CV: RRR  Abd: soft, NT, ND, no guarding, no rigidity, no rebound tenderness  : fowler catheter in place, no rash  MSK: no visible deformities  Skin: Warm, well perfused, no leg swelling  ~Oumar Grimm MD (PGY-3)

## 2023-05-12 NOTE — H&P ADULT - PROBLEM SELECTOR PLAN 2
Likely Spencer associated. S/P Blood and Urine cultures and IV ABxs. ID help appreciated and will continue IV Rocephin.

## 2023-05-12 NOTE — H&P ADULT - HISTORY OF PRESENT ILLNESS
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 Pt noted to have dark urine and fevers with weakness; fowler was changed yest at his NH. Last time fowler was changed before that was 4/14. No cp/sob, abd pain, leg swelling Return precautions discussed at length - to return to the ED for persistent or worsening signs and symptoms, will follow up with pediatrician in 1 day.     Stitches, Staples, or Adhesive Wound Closure  ImageDoctors use stitches (sutures), staples, and certain glue (skin adhesives) to hold your skin together while it heals (wound closure). You may need this treatment after you have surgery or if you cut your skin accidentally. These methods help your skin heal more quickly. They also make it less likely that you will have a scar.    What are the different kinds of wound closures?  There are many options for wound closure. The one that your doctor uses depends on how deep and large your wound is.    Adhesive Glue     To use this glue to close a wound, your doctor holds the edges of the wound together and paints the glue on the surface of your skin. You may need more than one layer of glue. Then the wound may be covered with a light bandage (dressing).    This type of skin closure may be used for small wounds that are not deep (superficial). Using glue for wound closure is less painful than other methods. It does not require a medicine that numbs the area. This method also leaves nothing to be removed. Adhesive glue is often used for children and on facial wounds.    Adhesive glue cannot be used for wounds that are deep, uneven, or bleeding. It is not used inside of a wound.    Adhesive Strips     These strips are made of sticky (adhesive), porous paper. They are placed across your skin edges like a regular adhesive bandage. You leave them on until they fall off.    Adhesive strips may be used to close very superficial wounds. They may also be used along with sutures to improve closure of your skin edges.    Sutures     Sutures are the oldest method of wound closure. Sutures can be made from natural or synthetic materials. They can be made from a material that your body can break down as your wound heals (absorbable), or they can be made from a material that needs to be removed from your skin (nonabsorbable). They come in many different strengths and sizes.    Your doctor attaches the sutures to a steel needle on one end. Sutures can be passed through your skin, or through the tissues beneath your skin. Then they are tied and cut. Your skin edges may be closed in one continuous stitch or in separate stitches.    Sutures are strong and can be used for all kinds of wounds. Absorbable sutures may be used to close tissues under the skin. The disadvantage of sutures is that they may cause skin reactions that lead to infection. Nonabsorbable sutures need to be removed.    Staples     When surgical staples are used to close a wound, the edges of your skin on both sides of the wound are brought close together. A staple is placed across the wound, and an instrument secures the edges together. Staples are often used to close surgical cuts (incisions).    Staples are faster to use than sutures, and they cause less reaction from your skin. Staples need to be removed using a tool that bends the staples away from your skin.    How do I care for my wound closure?  Take medicines only as told by your doctor.  If you were prescribed an antibiotic medicine for your wound, finish it all even if you start to feel better.  Use ointments or creams only as told by your doctor.  Wash your hands with soap and water before and after touching your wound.  Do not soak your wound in water. Do not take baths, swim, or use a hot tub until your doctor says it is okay.  Ask your doctor when you can start showering. Cover your wound if told by your doctor.  Do not take out your own sutures or staples.  Do not pick at your wound. Picking can cause an infection.  Keep all follow-up visits as told by your doctor. This is important.  How long will I have my wound closure?  Leave adhesive glue on your skin until the glue peels away.  Leave adhesive strips on your skin until they fall off.  Absorbable sutures will dissolve within several days.  Nonabsorbable sutures and staples must be removed. The location of the wound will determine how long they stay in. This can range from several days to a couple of weeks.    YOUR DIVYA WOUND NEEDS FOLLOW UP FOR A WOUND CHECK, SUTURE REMOVAL OR STAPLE REMOVAL IN  ______ DAYS    IF YOU HAD SUTURES WERE PLACED TODAY:  _________ SUTURES WERE PLACED  When should I seek help for my wound closure?  Contact your doctor if:    You have a fever.  You have chills.  You have redness, puffiness (swelling), or pain at the site of your wound.  You have fluid, blood, or pus coming from your wound.  There is a bad smell coming from your wound.  The skin edges of your wound start to separate after your sutures have been removed.  Your wound becomes thick, raised, and darker in color after your sutures come out (scarring).    This information is not intended to replace advice given to you by your health care provider. Make sure you discuss any questions you have with your health care provider.

## 2023-05-12 NOTE — ED ADULT NURSE NOTE - HISTORY OF COVID-19 VACCINATION
Medical Week 3 Survey      Responses   [facility] patient discharged from?  [facility]   Does the patient have one of the following disease processes/diagnoses(primary or secondary)?  Other   Week 3 attempt successful?  No   Unsuccessful attempts  Attempt 1          Liane Taylor LPN  
Yes

## 2023-05-12 NOTE — PATIENT PROFILE ADULT - PUBLIC BENEFITS
Reviewed with Dr. Lora  Order for nutrition counseling placed per request.  Malena was instructed to take her Tresiba 10 units at hs to see if this helps, if she starts going low she is to go back to her previous carbohydrate ratio and correction ratio of 1:50 over 125 and carb ratio of 1:15    no

## 2023-05-13 LAB
ANION GAP SERPL CALC-SCNC: 15 MMOL/L — SIGNIFICANT CHANGE UP (ref 5–17)
BUN SERPL-MCNC: 18 MG/DL — SIGNIFICANT CHANGE UP (ref 7–23)
CALCIUM SERPL-MCNC: 8.1 MG/DL — LOW (ref 8.4–10.5)
CHLORIDE SERPL-SCNC: 103 MMOL/L — SIGNIFICANT CHANGE UP (ref 96–108)
CO2 SERPL-SCNC: 21 MMOL/L — LOW (ref 22–31)
CREAT SERPL-MCNC: 0.78 MG/DL — SIGNIFICANT CHANGE UP (ref 0.5–1.3)
EGFR: 93 ML/MIN/1.73M2 — SIGNIFICANT CHANGE UP
GLUCOSE SERPL-MCNC: 100 MG/DL — HIGH (ref 70–99)
HCT VFR BLD CALC: 34 % — LOW (ref 39–50)
HCV AB S/CO SERPL IA: 0.16 S/CO — SIGNIFICANT CHANGE UP (ref 0–0.99)
HCV AB SERPL-IMP: SIGNIFICANT CHANGE UP
HGB BLD-MCNC: 10.9 G/DL — LOW (ref 13–17)
MCHC RBC-ENTMCNC: 30.3 PG — SIGNIFICANT CHANGE UP (ref 27–34)
MCHC RBC-ENTMCNC: 32.1 GM/DL — SIGNIFICANT CHANGE UP (ref 32–36)
MCV RBC AUTO: 94.4 FL — SIGNIFICANT CHANGE UP (ref 80–100)
NRBC # BLD: 0 /100 WBCS — SIGNIFICANT CHANGE UP (ref 0–0)
PLATELET # BLD AUTO: 166 K/UL — SIGNIFICANT CHANGE UP (ref 150–400)
POTASSIUM SERPL-MCNC: 3.3 MMOL/L — LOW (ref 3.5–5.3)
POTASSIUM SERPL-SCNC: 3.3 MMOL/L — LOW (ref 3.5–5.3)
RBC # BLD: 3.6 M/UL — LOW (ref 4.2–5.8)
RBC # FLD: 13.7 % — SIGNIFICANT CHANGE UP (ref 10.3–14.5)
SODIUM SERPL-SCNC: 139 MMOL/L — SIGNIFICANT CHANGE UP (ref 135–145)
WBC # BLD: 19.15 K/UL — HIGH (ref 3.8–10.5)
WBC # FLD AUTO: 19.15 K/UL — HIGH (ref 3.8–10.5)

## 2023-05-13 PROCEDURE — 51703 INSERT BLADDER CATH COMPLEX: CPT

## 2023-05-13 PROCEDURE — 99231 SBSQ HOSP IP/OBS SF/LOW 25: CPT | Mod: 25

## 2023-05-13 PROCEDURE — 99232 SBSQ HOSP IP/OBS MODERATE 35: CPT

## 2023-05-13 RX ORDER — POTASSIUM CHLORIDE 20 MEQ
40 PACKET (EA) ORAL ONCE
Refills: 0 | Status: COMPLETED | OUTPATIENT
Start: 2023-05-13 | End: 2023-05-13

## 2023-05-13 RX ADMIN — CEFTRIAXONE 100 MILLIGRAM(S): 500 INJECTION, POWDER, FOR SOLUTION INTRAMUSCULAR; INTRAVENOUS at 17:13

## 2023-05-13 RX ADMIN — Medication 40 MILLIEQUIVALENT(S): at 20:13

## 2023-05-13 RX ADMIN — SODIUM CHLORIDE 100 MILLILITER(S): 9 INJECTION INTRAMUSCULAR; INTRAVENOUS; SUBCUTANEOUS at 05:01

## 2023-05-13 RX ADMIN — CARBIDOPA AND LEVODOPA 1 TABLET(S): 25; 100 TABLET ORAL at 13:54

## 2023-05-13 RX ADMIN — CARBIDOPA AND LEVODOPA 1 TABLET(S): 25; 100 TABLET ORAL at 21:10

## 2023-05-13 RX ADMIN — CARBIDOPA AND LEVODOPA 1 TABLET(S): 25; 100 TABLET ORAL at 05:27

## 2023-05-13 RX ADMIN — Medication 5 MILLIGRAM(S): at 05:27

## 2023-05-13 RX ADMIN — Medication 5 MILLIGRAM(S): at 17:14

## 2023-05-13 NOTE — PROVIDER CONTACT NOTE (OTHER) - SITUATION
Pt received w c/o of abdominal discomfort. c/o of urinary retention. Upon bladder scan, >400 ml urine found.

## 2023-05-13 NOTE — PROVIDER CONTACT NOTE (OTHER) - ASSESSMENT
Pt complaining of fowler not draining and having some pain in the area. minimal output coming from fowler.
Pt abdomen distended; BladderScan >400

## 2023-05-13 NOTE — PROVIDER CONTACT NOTE (OTHER) - ACTION/TREATMENT ORDERED:
Pritesh Zuniga notified of situation; recommending to consult urology
PA aware. As per PA, flush the fowler catheter and urology will be notified.

## 2023-05-13 NOTE — PROVIDER CONTACT NOTE (OTHER) - REASON
Pt complaining of fowler not draining and having some pain in the area
Pt has urinary retention of <400. Unsuccessful attempts of flushing catheter

## 2023-05-13 NOTE — PROCEDURE NOTE - ADDITIONAL PROCEDURE DETAILS
See note. Pt with retention balloon taken down and fowler advanced to hub. Sudden drainage of 600cc of yellow murky urine drained.  Pts balloon blown up in prostate. Fowler now in correct position.  Expect bleeding altagracia fowler.   Next fowler change 30 days

## 2023-05-14 LAB
ANION GAP SERPL CALC-SCNC: 12 MMOL/L — SIGNIFICANT CHANGE UP (ref 5–17)
BUN SERPL-MCNC: 15 MG/DL — SIGNIFICANT CHANGE UP (ref 7–23)
CALCIUM SERPL-MCNC: 8.4 MG/DL — SIGNIFICANT CHANGE UP (ref 8.4–10.5)
CHLORIDE SERPL-SCNC: 104 MMOL/L — SIGNIFICANT CHANGE UP (ref 96–108)
CO2 SERPL-SCNC: 22 MMOL/L — SIGNIFICANT CHANGE UP (ref 22–31)
CREAT SERPL-MCNC: 0.65 MG/DL — SIGNIFICANT CHANGE UP (ref 0.5–1.3)
CULTURE RESULTS: SIGNIFICANT CHANGE UP
EGFR: 98 ML/MIN/1.73M2 — SIGNIFICANT CHANGE UP
GLUCOSE SERPL-MCNC: 99 MG/DL — SIGNIFICANT CHANGE UP (ref 70–99)
MAGNESIUM SERPL-MCNC: 1.8 MG/DL — SIGNIFICANT CHANGE UP (ref 1.6–2.6)
POTASSIUM SERPL-MCNC: 3.2 MMOL/L — LOW (ref 3.5–5.3)
POTASSIUM SERPL-SCNC: 3.2 MMOL/L — LOW (ref 3.5–5.3)
SODIUM SERPL-SCNC: 138 MMOL/L — SIGNIFICANT CHANGE UP (ref 135–145)
SPECIMEN SOURCE: SIGNIFICANT CHANGE UP

## 2023-05-14 PROCEDURE — 99232 SBSQ HOSP IP/OBS MODERATE 35: CPT

## 2023-05-14 RX ORDER — POTASSIUM CHLORIDE 20 MEQ
40 PACKET (EA) ORAL EVERY 4 HOURS
Refills: 0 | Status: COMPLETED | OUTPATIENT
Start: 2023-05-14 | End: 2023-05-14

## 2023-05-14 RX ADMIN — CARBIDOPA AND LEVODOPA 1 TABLET(S): 25; 100 TABLET ORAL at 05:23

## 2023-05-14 RX ADMIN — CEFTRIAXONE 100 MILLIGRAM(S): 500 INJECTION, POWDER, FOR SOLUTION INTRAMUSCULAR; INTRAVENOUS at 17:21

## 2023-05-14 RX ADMIN — CARBIDOPA AND LEVODOPA 1 TABLET(S): 25; 100 TABLET ORAL at 21:31

## 2023-05-14 RX ADMIN — Medication 40 MILLIEQUIVALENT(S): at 13:21

## 2023-05-14 RX ADMIN — ENOXAPARIN SODIUM 40 MILLIGRAM(S): 100 INJECTION SUBCUTANEOUS at 17:26

## 2023-05-14 RX ADMIN — Medication 5 MILLIGRAM(S): at 05:23

## 2023-05-14 RX ADMIN — Medication 5 MILLIGRAM(S): at 17:20

## 2023-05-14 RX ADMIN — CARBIDOPA AND LEVODOPA 1 TABLET(S): 25; 100 TABLET ORAL at 12:49

## 2023-05-14 RX ADMIN — Medication 40 MILLIEQUIVALENT(S): at 09:14

## 2023-05-14 NOTE — DIETITIAN INITIAL EVALUATION ADULT - PERTINENT LABORATORY DATA
05-14    138  |  104  |  15  ----------------------------<  99  3.2<L>   |  22  |  0.65    Ca    8.4      14 May 2023 06:51  Phos  2.4     05-12  Mg     1.8     05-14    TPro  7.4  /  Alb  3.6  /  TBili  1.0  /  DBili  x   /  AST  8<L>  /  ALT  10  /  AlkPhos  129<H>  05-12

## 2023-05-14 NOTE — DIETITIAN INITIAL EVALUATION ADULT - NSFNSGIIOFT_GEN_A_CORE
no height/weight noted in EMR. Pt tall appearing, unsure of UBW. Visually, appears around 5'10". ? accuracy.   .  Denies nausea/vomiting/constipation/diarrhea. Last bowel movement per flow sheets 5/12.

## 2023-05-14 NOTE — DIETITIAN INITIAL EVALUATION ADULT - REASON INDICATOR FOR ASSESSMENT
"Chief Complaint  Diabetes and Wound Check (RIGHT FOOT-pt is seen by wound care)    Subjective          Ray Leary presents to Select Specialty Hospital consulted for pressure injury stage 2 or >. information obtained from pt, RN, electronic medical record.

## 2023-05-14 NOTE — DIETITIAN INITIAL EVALUATION ADULT - ADD RECOMMEND
1) Will continue to monitor PO intake, weight, labs, skin, GI status and diet 2) RD to add Mighty shake with meals to aid in meeting nutrient needs 3) add Multivitamin to aid in wound healing if no contraindications 4) nutrition risk placed in chart 5) OBTAIN height and weight

## 2023-05-14 NOTE — DIETITIAN INITIAL EVALUATION ADULT - PERTINENT MEDS FT
MEDICATIONS  (STANDING):  carbidopa/levodopa  25/100 1 Tablet(s) Oral three times a day  cefTRIAXone   IVPB      cefTRIAXone   IVPB 1000 milliGRAM(s) IV Intermittent every 24 hours  diazepam    Tablet 5 milliGRAM(s) Oral two times a day  enoxaparin Injectable 40 milliGRAM(s) SubCutaneous every 24 hours  potassium chloride   Powder 40 milliEquivalent(s) Oral every 4 hours  sodium chloride 0.9%. 1000 milliLiter(s) (100 mL/Hr) IV Continuous <Continuous>

## 2023-05-14 NOTE — DIETITIAN INITIAL EVALUATION ADULT - ORAL INTAKE PTA/DIET HISTORY
visited pt at bedside this morning, eating breakfast. asked to defer interview at this time. no allergies noted per chart.

## 2023-05-15 ENCOUNTER — NON-APPOINTMENT (OUTPATIENT)
Age: 78
End: 2023-05-15

## 2023-05-15 LAB
ANION GAP SERPL CALC-SCNC: 14 MMOL/L — SIGNIFICANT CHANGE UP (ref 5–17)
BUN SERPL-MCNC: 9 MG/DL — SIGNIFICANT CHANGE UP (ref 7–23)
CALCIUM SERPL-MCNC: 8.4 MG/DL — SIGNIFICANT CHANGE UP (ref 8.4–10.5)
CHLORIDE SERPL-SCNC: 100 MMOL/L — SIGNIFICANT CHANGE UP (ref 96–108)
CO2 SERPL-SCNC: 24 MMOL/L — SIGNIFICANT CHANGE UP (ref 22–31)
CREAT SERPL-MCNC: 0.56 MG/DL — SIGNIFICANT CHANGE UP (ref 0.5–1.3)
EGFR: 103 ML/MIN/1.73M2 — SIGNIFICANT CHANGE UP
GLUCOSE SERPL-MCNC: 94 MG/DL — SIGNIFICANT CHANGE UP (ref 70–99)
GRAM STN FLD: SIGNIFICANT CHANGE UP
HCT VFR BLD CALC: 33.1 % — LOW (ref 39–50)
HGB BLD-MCNC: 10.5 G/DL — LOW (ref 13–17)
MCHC RBC-ENTMCNC: 30.9 PG — SIGNIFICANT CHANGE UP (ref 27–34)
MCHC RBC-ENTMCNC: 31.7 GM/DL — LOW (ref 32–36)
MCV RBC AUTO: 97.4 FL — SIGNIFICANT CHANGE UP (ref 80–100)
METHOD TYPE: SIGNIFICANT CHANGE UP
NRBC # BLD: 0 /100 WBCS — SIGNIFICANT CHANGE UP (ref 0–0)
PLATELET # BLD AUTO: 167 K/UL — SIGNIFICANT CHANGE UP (ref 150–400)
POTASSIUM SERPL-MCNC: 3.3 MMOL/L — LOW (ref 3.5–5.3)
POTASSIUM SERPL-SCNC: 3.3 MMOL/L — LOW (ref 3.5–5.3)
PROTEUS SP DNA BLD POS QL NAA+NON-PROBE: SIGNIFICANT CHANGE UP
RBC # BLD: 3.4 M/UL — LOW (ref 4.2–5.8)
RBC # FLD: 13.8 % — SIGNIFICANT CHANGE UP (ref 10.3–14.5)
SODIUM SERPL-SCNC: 138 MMOL/L — SIGNIFICANT CHANGE UP (ref 135–145)
WBC # BLD: 9.25 K/UL — SIGNIFICANT CHANGE UP (ref 3.8–10.5)
WBC # FLD AUTO: 9.25 K/UL — SIGNIFICANT CHANGE UP (ref 3.8–10.5)

## 2023-05-15 PROCEDURE — 99232 SBSQ HOSP IP/OBS MODERATE 35: CPT

## 2023-05-15 RX ORDER — POTASSIUM CHLORIDE 20 MEQ
40 PACKET (EA) ORAL EVERY 4 HOURS
Refills: 0 | Status: COMPLETED | OUTPATIENT
Start: 2023-05-15 | End: 2023-05-15

## 2023-05-15 RX ADMIN — Medication 40 MILLIEQUIVALENT(S): at 13:39

## 2023-05-15 RX ADMIN — CEFTRIAXONE 100 MILLIGRAM(S): 500 INJECTION, POWDER, FOR SOLUTION INTRAMUSCULAR; INTRAVENOUS at 17:17

## 2023-05-15 RX ADMIN — CARBIDOPA AND LEVODOPA 1 TABLET(S): 25; 100 TABLET ORAL at 06:04

## 2023-05-15 RX ADMIN — Medication 5 MILLIGRAM(S): at 06:04

## 2023-05-15 RX ADMIN — Medication 5 MILLIGRAM(S): at 17:17

## 2023-05-15 RX ADMIN — CARBIDOPA AND LEVODOPA 1 TABLET(S): 25; 100 TABLET ORAL at 13:41

## 2023-05-15 RX ADMIN — Medication 40 MILLIEQUIVALENT(S): at 08:26

## 2023-05-15 RX ADMIN — CARBIDOPA AND LEVODOPA 1 TABLET(S): 25; 100 TABLET ORAL at 21:03

## 2023-05-15 NOTE — PHYSICAL THERAPY INITIAL EVALUATION ADULT - ADDITIONAL COMMENTS
Prior to admit, patient living in apartment at [facility] living with pvt hire  24 x 7 care. Patient states not been walking, has hospital bed and air mattress and  specialty wedge, has abby device, but not using.

## 2023-05-15 NOTE — PHYSICAL THERAPY INITIAL EVALUATION ADULT - TRANSFER TRAINING, PT EVAL
GOAL: Patient will perform sit to stand transfers minimal assist with proper hand placement in 2 weeks

## 2023-05-15 NOTE — ADVANCED PRACTICE NURSE CONSULT - ASSESSMENT
When wound care RN arrived on unit, patient was found lying in a low air loss pressure redistribution support surface style bed with his personal aide at bedside. Physical therapy arrived to assess patient. Patient was alert and oriented and gave consent to skin consult. Mr Patel is unable to turn independently and staff assistance x 2 was provided. Once turned, wound care RN was able to visualize an area of persistent nonblanchable maroon discoloration with purple hues and scattered areas of superficial partial thickness skin loss, area measures approximately 10cm x 10cm x 0.1cm. Once consult was complete, patient and aide were educated regarding the need for routine turning and positioning to prevent pressure injuries and patient was placed in a left side-lying position utilizing pillow positioner assistive devices. When wound care RN arrived on unit, patient was found lying in a low air loss pressure redistribution support surface style bed with his personal aide at bedside. Physical therapy arrived to assess patient. Patient was alert and oriented and gave consent to skin consult. Mr Patel is unable to turn independently and staff assistance x 2 was provided. Once turned, wound care RN was able to visualize an area of persistent nonblanchable maroon discoloration with purple hues and scattered areas of superficial partial thickness skin loss, area measures approximately 10cm x 10cm x 0.1cm. Presentation of this wound is consistent with a deep tissue injury in evolution with incontinence involvement. Once consult was complete, patient and aide were educated regarding the need for routine turning and positioning to prevent pressure injuries and patient was placed in a left side-lying position utilizing pillow positioner assistive devices.

## 2023-05-15 NOTE — ADVANCED PRACTICE NURSE CONSULT - REASON FOR CONSULT
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 Pt noted to have dark urine and fevers with weakness; fowler was changed yest at his NH. Last time fowler was changed before that was 4/14. No cp/sob, abd pain, leg swelling

## 2023-05-15 NOTE — PROGRESS NOTE ADULT - REASON FOR ADMISSION
Nausea & fever

## 2023-05-15 NOTE — PHYSICAL THERAPY INITIAL EVALUATION ADULT - GENERAL OBSERVATIONS, REHAB EVAL
Pt received in bed in NAD with wound care RN at bedside.  Home health aide present.  +MING contreras, +Spencer, +IVL. Chronic left hand deformity. Cachectic.

## 2023-05-15 NOTE — PROGRESS NOTE ADULT - NUTRITIONAL ASSESSMENT
This patient has been assessed with a concern for Malnutrition and has been determined to have a diagnosis/diagnoses of Severe protein-calorie malnutrition.    This patient is being managed with:   Diet Soft and Bite Sized-  Entered: May 12 2023  5:07PM  
This patient has been assessed with a concern for Malnutrition and has been determined to have a diagnosis/diagnoses of Severe protein-calorie malnutrition.    This patient is being managed with:   Diet Soft and Bite Sized-  Entered: May 12 2023  5:07PM

## 2023-05-15 NOTE — PHYSICAL THERAPY INITIAL EVALUATION ADULT - TRANSFER SAFETY CONCERNS NOTED: BED/CHAIR, REHAB EVAL
decreased balance during turns/decreased safety awareness/decreased sequencing ability/stand pivot/decreased weight-shifting ability

## 2023-05-15 NOTE — PHYSICAL THERAPY INITIAL EVALUATION ADULT - PERTINENT HX OF CURRENT PROBLEM, REHAB EVAL
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 Pt noted to have dark urine and fevers with weakness; fowler was changed 5/11 at his NH. Last time fowler was changed before that was 4/14. No cp/sob, abd pain, leg swelling. Hospital Course: 5/12 CXR -clear; started on IVF and Abx. 5/13 Fowler replaced.

## 2023-05-15 NOTE — ADVANCED PRACTICE NURSE CONSULT - RECOMMEDATIONS
Impression:    B/L buttocks/sacral deep tissue injury in evolution  fecal incontinence    Recommendations:    1) continue turning and positioning q2 and PRN utilizing offloading assistive devices  2) continue with routine pericare daily and PRN soiling  3) encourage optimal nutrition  4) waffle cushion when oob to chair  5) B/L LE complete cair air fluidized boots to offload heels/feet  6) triad protective barrier cream to B/L buttocks/sacrum daily and PRN soiling  7) incontinence management - continue external female urinary catheter to divert urine from skin if incontinent    Plan discussed with SHAYAN Hess via teams

## 2023-05-15 NOTE — CHART NOTE - NSCHARTNOTEFT_GEN_A_CORE
Notified by RN, pt' s BC collected on 5/12 returned with preliminary report of + GNR in aerobic bottle.     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 Pt noted to have dark urine and fevers with weakness; fowler was changed yesterday at his NH.  Pt admitted with sepsis w/ hypotension 2/2 to UTI on Ceftriaxone as per ID following. Pt with misplaced Fowler that was adjusted by Urology on 5/13.  Not BC in 1 bottle return GNR in aerobic bottle, no growth in second bottle.    Vital Signs Last 24 Hrs  T(C): 36.8 (15 May 2023 19:24), Max: 37 (15 May 2023 01:08)  T(F): 98.3 (15 May 2023 19:24), Max: 98.6 (15 May 2023 01:08)  HR: 75 (15 May 2023 19:24) (70 - 81)  BP: 103/62 (15 May 2023 19:24) (97/57 - 112/72)  BP(mean): --  RR: 18 (15 May 2023 19:24) (18 - 18)  SpO2: 98% (15 May 2023 19:24) (95% - 98%)    Parameters below as of 15 May 2023 19:24  Patient On (Oxygen Delivery Method): room air    Complete Blood Count in AM (05.15.23 @ 07:18)   Nucleated RBC: 0 /100 WBCs  WBC Count: 9.25 K/uL  RBC Count: 3.40 M/uL  Hemoglobin: 10.5 g/dL  Hematocrit: 33.1 %  Mean Cell Volume: 97.4 fl  Mean Cell Hemoglobin: 30.9 pg  Mean Cell Hemoglobin Conc: 31.7 gm/dL  Red Cell Distrib Width: 13.8 %  Platelet Count - Automated: 167 K/uL    Culture - Blood (05.12.23 @ 13:00)   Specimen Source: .Blood Blood-Peripheral  Culture Results:   No growth to date.Culture - Blood (05.12.23 @ 12:45)   Gram Stain:   Growth in aerobic bottle: Gram Negative Rods  Specimen Source: .Blood Blood-Peripheral  Culture Results:   Growth in aerobic bottle: Gram Negative Rods     A/P:   Pt is a 77M PMH 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 Pt noted to have dark urine and fevers with weakness; fowler was changed yesterday at his NH.  Pt admitted with sepsis w/ hypotension 2/2 to UTI on Ceftriaxone as per ID following. Pt with misplaced Fowler that was adjusted by Urology on 5/13.  Not BC in 1 bottle return GNR in aerobic bottle, no growth in second bottle.    - Pt is hemodynamically stable- vital signs are stable- no hypotension  - Pt with no present leukocytosis or fever.   - Continue with Ceftriaxone for antibiotic coverage  - Follow up with attending and ID in AM     Lexi Curran, SACHIN  [id number] Notified by RN, pt' s BC collected on 5/12 returned with preliminary report of + GNR in aerobic bottle.     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 Pt noted to have dark urine and fevers with weakness; fowler was changed yesterday at his NH.  Pt admitted with sepsis w/ hypotension 2/2 to UTI on Ceftriaxone as per ID following. Pt with misplaced Fowler that was adjusted by Urology on 5/13.  Not BC in 1 bottle return GNR in aerobic bottle, no growth in second bottle.    Vital Signs Last 24 Hrs  T(C): 36.8 (15 May 2023 19:24), Max: 37 (15 May 2023 01:08)  T(F): 98.3 (15 May 2023 19:24), Max: 98.6 (15 May 2023 01:08)  HR: 75 (15 May 2023 19:24) (70 - 81)  BP: 103/62 (15 May 2023 19:24) (97/57 - 112/72)  BP(mean): --  RR: 18 (15 May 2023 19:24) (18 - 18)  SpO2: 98% (15 May 2023 19:24) (95% - 98%)    Parameters below as of 15 May 2023 19:24  Patient On (Oxygen Delivery Method): room air    Complete Blood Count in AM (05.15.23 @ 07:18)   Nucleated RBC: 0 /100 WBCs  WBC Count: 9.25 K/uL  RBC Count: 3.40 M/uL  Hemoglobin: 10.5 g/dL  Hematocrit: 33.1 %  Mean Cell Volume: 97.4 fl  Mean Cell Hemoglobin: 30.9 pg  Mean Cell Hemoglobin Conc: 31.7 gm/dL  Red Cell Distrib Width: 13.8 %  Platelet Count - Automated: 167 K/uL    Culture - Blood (05.12.23 @ 13:00)   Specimen Source: .Blood Blood-Peripheral  Culture Results:   No growth to date.Culture - Blood (05.12.23 @ 12:45)   Gram Stain:   Growth in aerobic bottle: Gram Negative Rods  Specimen Source: .Blood Blood-Peripheral  Culture Results:   Growth in aerobic bottle: Gram Negative Rods     A/P:   Pt is a 77M PMH 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 Pt noted to have dark urine and fevers with weakness; fowler was changed yesterday at his NH.  Pt admitted with sepsis w/ hypotension 2/2 to UTI on Ceftriaxone as per ID following. Pt with misplaced Fowler that was adjusted by Urology on 5/13.  Not BC in 1 bottle return GNR in aerobic bottle, no growth in second bottle.    - Pt is hemodynamically stable- vital signs are stable  - Pt with no present leukocytosis or fever.   - Continue with Ceftriaxone for antibiotic coverage  - Follow up with attending and ID in AM     Lexi Curran NP  [id number]

## 2023-05-15 NOTE — PROGRESS NOTE ADULT - PROVIDER SPECIALTY LIST ADULT
Infectious Disease
Urology
Infectious Disease
Internal Medicine
Internal Medicine
Infectious Disease
Internal Medicine

## 2023-05-15 NOTE — PROGRESS NOTE ADULT - ASSESSMENT
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 Pt noted to have dark urine and fevers with weakness;  admitted 5/12/23    Presentation suggested urosepsis, possible nephrolithiasis  home care NP on phone states no recent abx  previous cultures  generally >3 org - 3 years ago had urinary Klebs pn isolate resistant to Amp, floroquinolones only  urology evaluation much appreciated- Balloon taken down and spontaneous 60cc d rained. Fowler likely blown up in prostate. Now currently in place   marked improvement    Antibiotics  Zosyn 5/12 x1  Ceftriaxone 5/12 -->    Suggest  Continue Ceftriaxone 1 gm once daily   5-7 day duration anticipated  repeat Urine cultures, cbc ordered  Physical Therapy assessment   consider nutritional supplementaion  
 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 Pt noted to have dark urine and fevers with weakness; fowler was changed yest at his NH. Last time fowler was changed before that was 4/14. No cp/sob, abd pain, leg     Problem/Plan - 1:  ·  Problem: Sepsis with hypotension.   ·  Plan: Present on admission . Better.   S/P IVF and IV Abxs.   Will continue IVF and IV Abxs. .     Problem/Plan - 2:  ·  Problem: Sepsis secondary to UTI.   ·  Plan: Likely Fowler associated. S/P Blood and Urine cultures and IV ABxs.   ID help appreciated and will continue IV Rocephin.     Problem/Plan - 3:  ·  Problem: Hypokalemia.   ·  Plan: Replacing.     Problem/Plan - 4:  ·  Problem: Parkinson disease.   ·  Plan: Continuing home Sinemet.     Problem/Plan - 5:  ·  Problem: History of muscular dystrophy.   ·  Plan: Bed bound .      
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 Pt noted to have dark urine and fevers with weakness;  admitted 5/12/23    Presentation suggested urosepsis, possible nephrolithiasis  home care NP on phone states no recent abx  previous cultures  generally >3 org - 3 years ago had urinary Klebs pn isolate resistant to Amp, floroquinolones only  urology evaluation much appreciated- Balloon taken down and spontaneous 60cc d rained. Fowler likely blown up in prostate. Now currently in place   marked improvement  Physical Therapy assessment  appreciated    Antibiotics  Zosyn 5/12 x1  Ceftriaxone 5/12 -->    Suggest  Continue Ceftriaxone 1 gm once daily   through 5/16/23 then discontinue   nutritional supplementation  hot soaks to left shoulder and neck    I will be out 5/16  Please call ID service if needed  
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 Pt noted to have dark urine and fevers with weakness; fowler was changed yest at his NH. Last time fowler was changed before that was 4/14. No cp/sob, abd pain, leg     Problem/Plan - 1:  ·  Problem: Sepsis with hypotension.   ·  Plan: Present on admission . Better.   S/P IVF and IV Abxs.   Will continue IVF and IV Abxs. .     Problem/Plan - 2:  ·  Problem: Sepsis secondary to UTI.   ·  Plan: Likely Fowler associated. S/P Blood and Urine cultures and rpting Urine Cultures.    ID help appreciated and will continue IV Rocephin.     Problem/Plan - 3:  ·  Problem: Hypokalemia.   ·  Plan: Replacing.     Problem/Plan - 4:  ·  Problem: Parkinson disease.   ·  Plan: Continuing home Sinemet.     Problem/Plan - 5:  ·  Problem: History of muscular dystrophy.   ·  Plan: Bed bound .    
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 Pt noted to have dark urine and fevers with weakness; fowler was changed yest at his NH. Last time fowler was changed before that was 4/14. No cp/sob, abd pain, leg     Problem/Plan - 1:  ·  Problem: Sepsis with hypotension.   ·  Plan: Present on admission . Resolved.   S/P IVF and IV Abxs.   Will continue IVF and IV Abxs. .     Problem/Plan - 2:  ·  Problem: Sepsis secondary to UTI.   ·  Plan: Likely Fowler associated. S/P Blood and Urine cultures and rpting Urine Cultures.    ID help appreciated and will continue IV Rocephin.     Problem/Plan - 3:  ·  Problem: Hypokalemia.   ·  Plan: Replacing.     Problem/Plan - 4:  ·  Problem: Parkinson disease.   ·  Plan: Continuing home Sinemet.     Problem/Plan - 5:  ·  Problem: History of muscular dystrophy with Functional quadriplegia .   ·  Plan: Bed bound . Supportive care.       Dispo : DC tomorrow after Rocephin dose.     
78 yo m with chronic fowler that was last changed two days ago. Subsequently with urinary retention, gross hematuria, pain and sepsis.     See procedure note.    Balloon taken down and spontaneous 60cc d rained. Fowler likely blown up in prostate. Now currently in place   Urine yellow but expect blood altagracia fowler   cont abx and fu cx; appropriately draining catheter will aid in fighting infection   next fowler change in 30- days from placement 
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 Pt noted to have dark urine and fevers with weakness;  admitted 5/12/23    Presentation suggested urosepsis, possible nephrolithiasis  home care NP on phone states no recent abx  previous cultures  generally >3 org - 3 years ago had urinary Klebs pn isolate resistant to Amp, floroquinolones only  urology evaluation much appreciated- Balloon taken down and spontaneous 60cc d rained. Fowler likely blown up in prostate. Now currently in place   marked improvement    Antibiotics  Zosyn 5/12 x1  Ceftriaxone 5/12 -->    Suggest  Continue Ceftriaxone 1 gm once daily   await Urine, blood cultures

## 2023-05-16 ENCOUNTER — TRANSCRIPTION ENCOUNTER (OUTPATIENT)
Age: 78
End: 2023-05-16

## 2023-05-16 VITALS
RESPIRATION RATE: 18 BRPM | HEART RATE: 87 BPM | OXYGEN SATURATION: 98 % | DIASTOLIC BLOOD PRESSURE: 67 MMHG | SYSTOLIC BLOOD PRESSURE: 108 MMHG | TEMPERATURE: 98 F

## 2023-05-16 LAB
ANION GAP SERPL CALC-SCNC: 15 MMOL/L — SIGNIFICANT CHANGE UP (ref 5–17)
BUN SERPL-MCNC: 6 MG/DL — LOW (ref 7–23)
CALCIUM SERPL-MCNC: 8.8 MG/DL — SIGNIFICANT CHANGE UP (ref 8.4–10.5)
CHLORIDE SERPL-SCNC: 100 MMOL/L — SIGNIFICANT CHANGE UP (ref 96–108)
CO2 SERPL-SCNC: 24 MMOL/L — SIGNIFICANT CHANGE UP (ref 22–31)
CREAT SERPL-MCNC: 0.53 MG/DL — SIGNIFICANT CHANGE UP (ref 0.5–1.3)
CULTURE RESULTS: SIGNIFICANT CHANGE UP
EGFR: 105 ML/MIN/1.73M2 — SIGNIFICANT CHANGE UP
GLUCOSE SERPL-MCNC: 93 MG/DL — SIGNIFICANT CHANGE UP (ref 70–99)
GRAM STN FLD: SIGNIFICANT CHANGE UP
HCT VFR BLD CALC: 33.1 % — LOW (ref 39–50)
HGB BLD-MCNC: 10.5 G/DL — LOW (ref 13–17)
MCHC RBC-ENTMCNC: 30.5 PG — SIGNIFICANT CHANGE UP (ref 27–34)
MCHC RBC-ENTMCNC: 31.7 GM/DL — LOW (ref 32–36)
MCV RBC AUTO: 96.2 FL — SIGNIFICANT CHANGE UP (ref 80–100)
NRBC # BLD: 0 /100 WBCS — SIGNIFICANT CHANGE UP (ref 0–0)
PLATELET # BLD AUTO: 192 K/UL — SIGNIFICANT CHANGE UP (ref 150–400)
POTASSIUM SERPL-MCNC: 3.5 MMOL/L — SIGNIFICANT CHANGE UP (ref 3.5–5.3)
POTASSIUM SERPL-SCNC: 3.5 MMOL/L — SIGNIFICANT CHANGE UP (ref 3.5–5.3)
RBC # BLD: 3.44 M/UL — LOW (ref 4.2–5.8)
RBC # FLD: 13.6 % — SIGNIFICANT CHANGE UP (ref 10.3–14.5)
SARS-COV-2 RNA SPEC QL NAA+PROBE: SIGNIFICANT CHANGE UP
SODIUM SERPL-SCNC: 139 MMOL/L — SIGNIFICANT CHANGE UP (ref 135–145)
SPECIMEN SOURCE: SIGNIFICANT CHANGE UP
WBC # BLD: 7.4 K/UL — SIGNIFICANT CHANGE UP (ref 3.8–10.5)
WBC # FLD AUTO: 7.4 K/UL — SIGNIFICANT CHANGE UP (ref 3.8–10.5)

## 2023-05-16 RX ADMIN — Medication 5 MILLIGRAM(S): at 06:09

## 2023-05-16 RX ADMIN — CARBIDOPA AND LEVODOPA 1 TABLET(S): 25; 100 TABLET ORAL at 06:08

## 2023-05-16 RX ADMIN — Medication 5 MILLIGRAM(S): at 17:19

## 2023-05-16 RX ADMIN — CEFTRIAXONE 100 MILLIGRAM(S): 500 INJECTION, POWDER, FOR SOLUTION INTRAMUSCULAR; INTRAVENOUS at 16:42

## 2023-05-16 RX ADMIN — CARBIDOPA AND LEVODOPA 1 TABLET(S): 25; 100 TABLET ORAL at 14:25

## 2023-05-16 NOTE — DISCHARGE NOTE PROVIDER - CARE PROVIDER_API CALL
Saira Louie)  Urology  [address] [place] [address]  Phone: [phone]  Fax: [phone]  Follow Up Time:     PRERNA HERRERA  [facility]  [address] [place] [address]  Phone: [phone]  Fax: ([phone]  Follow Up Time:

## 2023-05-16 NOTE — DISCHARGE NOTE NURSING/CASE MANAGEMENT/SOCIAL WORK - NSDCFUADDAPPT_GEN_ALL_CORE_FT
APPTS ARE READY TO BE MADE: [x] YES    Best Family or Patient Contact (if needed):      1: Follow up with Urologist Dr. Louie within 2-4 wks outpt  2: Follow up with PCP Dr. Tammy Jackson within 1-2 weeks outpt           Patient was provided with Dr. Louie and Dr. Jackson and was advised to call to schedule follow up within specified time frame. At this time patient declined scheduling assistance.

## 2023-05-16 NOTE — DISCHARGE NOTE PROVIDER - DETAILS OF MALNUTRITION DIAGNOSIS/DIAGNOSES
This patient has been assessed with a concern for Malnutrition and was treated during this hospitalization for the following Nutrition diagnosis/diagnoses:     -  05/14/2023: Severe protein-calorie malnutrition

## 2023-05-16 NOTE — DISCHARGE NOTE PROVIDER - NSDCCPCAREPLAN_GEN_ALL_CORE_FT
PRINCIPAL DISCHARGE DIAGNOSIS  Diagnosis: Acute sepsis  Assessment and Plan of Treatment: Call you Health care provider upon arrival home to make a one week follow up appointment.  If you develop fever, chills, malaise, or change in mental status call your Health Care Provider or go to the Emergency Department.  Nutrition is important, eat small frequent meals to help ensure you get adequate calories.  Increase your activity daily as tolerated.      SECONDARY DISCHARGE DIAGNOSES  Diagnosis: Parkinson disease  Assessment and Plan of Treatment: continue sinemet as prescribed  continue home PT as determined with PT    Diagnosis: Acute UTI  Assessment and Plan of Treatment: Next fowler exchange 30 days from last (last exchanged on 5/13/23)  Follow up with urologist outpatient   HOME CARE INSTRUCTIONS  Drink enough water and fluids to keep your urine clear or pale yellow.  Avoid caffeine, tea, and carbonated beverages. They tend to irritate your bladder.  Empty your bladder often. Avoid holding urine for long periods of time. Use each tissue only once.  SEEK MEDICAL CARE IF:  You have back pain.  You develop a fever.  Your symptoms do not begin to resolve within 3 days.  SEEK IMMEDIATE MEDICAL CARE IF:  You have severe back pain or lower abdominal pain.  You develop chills.  You have nausea or vomiting.  You have continued burning or discomfort with urination.

## 2023-05-16 NOTE — DISCHARGE NOTE PROVIDER - NSDCMRMEDTOKEN_GEN_ALL_CORE_FT
carbidopa-levodopa 25 mg-100 mg oral tablet: 1 tab(s) orally 3 times a day  diazePAM 5 mg oral tablet: 1 tab(s) orally 2 times a day

## 2023-05-16 NOTE — DISCHARGE NOTE NURSING/CASE MANAGEMENT/SOCIAL WORK - NSDCPEFALRISK_GEN_ALL_CORE
For information on Fall & Injury Prevention, visit: [email or web address] OR  [email or web address] OR  https://www.cdc.gov/steadi/patient.html

## 2023-05-16 NOTE — DISCHARGE NOTE NURSING/CASE MANAGEMENT/SOCIAL WORK - PATIENT PORTAL LINK FT
You can access the FollowMyHealth Patient Portal offered by [facility] by registering at the following website: [email or web address]. By joining [facility]’s FollowMyHealth portal, you will also be able to view your health information using other applications (apps) compatible with our system.

## 2023-05-16 NOTE — DISCHARGE NOTE PROVIDER - HOSPITAL COURSE
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 Pt noted to have dark urine and fevers with weakness; fowler was changed yest at his NH. Last time fowler was changed before that was 4/14. No cp/sob, abd pain      Sepsis with hypotension.   -Present on admission, now resolved.   -Secondary to UTI, likely Fowler associated. Status post blood and urine cultures   - Status post IV fluids and IV Antibiotics (ceftriaxone completed on 5/16)     Urosepsis  - Presentation suggested urosepsis, possible nephrolithiasis  -Chronic fowler that was last changed two prior to admission. Subsequently with urinary retention, gross hematuria, pain and sepsis.    -Cath found misplaced in prostate. Status post deflation of balloon, spontaenous 60 cc drained, and new fowler repositioned by urology on 5/13  - Next fowler change in 30 days from placement   - Follow up with urologist Dr. Louie outpt      Hypokalemia.   - resolved, repleted with supplemental potassium    Parkinson disease.   -Continue home Sinemet.    History of muscular dystrophy with Functional quadriplegia .   - Bedbound, supportive care  -Chronic fowler           Pt has been medically cleared for discharge home w/ Home PT per Dr. Chairez       Urologist Dr. Louie  PCP Dr. 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 Pt noted to have dark urine and fevers with weakness; fowler was changed yest at his NH. Last time fowler was changed before that was 4/14. No cp/sob, abd pain      Sepsis with hypotension.   -Present on admission, now resolved.   -Secondary to UTI, likely Fowler associated. Status post blood and urine cultures   - Status post IV fluids and IV Antibiotics (ceftriaxone completed on 5/16)     Urosepsis  - Presentation suggested urosepsis, possible nephrolithiasis  -Chronic fowler that was last changed two prior to admission. Subsequently with urinary retention, gross hematuria, pain and sepsis.    -Cath found misplaced in prostate. Status post deflation of balloon, spontaenous 60 cc drained, and new fowler repositioned by urology on 5/13  - Next fowler change in 30 days from placement   - Follow up with urologist Dr. Louie outpt      Hypokalemia.   - resolved, repleted with supplemental potassium    Parkinson disease.   -Continue home Sinemet.    History of muscular dystrophy with Functional quadriplegia .   - Bedbound, supportive care  -Chronic fowler           Pt has been medically cleared for discharge to [facility] w/ Home PT per Dr. Chairez

## 2023-05-16 NOTE — DISCHARGE NOTE PROVIDER - NSDCFUADDAPPT_GEN_ALL_CORE_FT
APPTS ARE READY TO BE MADE: [x] YES    Best Family or Patient Contact (if needed):      1: Follow up with Urologist Dr. Louie within 2-4 wks outpt  2: Follow up with PCP Dr. Tammy Jackson within 1-2 weeks outpt      APPTS ARE READY TO BE MADE: [x] YES    Best Family or Patient Contact (if needed):      1: Follow up with Urologist Dr. Louie within 2-4 wks outpt  2: Follow up with PCP Dr. Tammy Jackson within 1-2 weeks outpt           Patient was provided with Dr. Louie and Dr. Jackson and was advised to call to schedule follow up within specified time frame. At this time patient declined scheduling assistance.

## 2023-05-17 DIAGNOSIS — N39.0 URINARY TRACT INFECTION, SITE NOT SPECIFIED: ICD-10-CM

## 2023-05-17 LAB
-  AMIKACIN: SIGNIFICANT CHANGE UP
-  AMPICILLIN/SULBACTAM: SIGNIFICANT CHANGE UP
-  AMPICILLIN: SIGNIFICANT CHANGE UP
-  AZTREONAM: SIGNIFICANT CHANGE UP
-  CEFAZOLIN: SIGNIFICANT CHANGE UP
-  CEFEPIME: SIGNIFICANT CHANGE UP
-  CEFOXITIN: SIGNIFICANT CHANGE UP
-  CEFTRIAXONE: SIGNIFICANT CHANGE UP
-  CIPROFLOXACIN: SIGNIFICANT CHANGE UP
-  ERTAPENEM: SIGNIFICANT CHANGE UP
-  GENTAMICIN: SIGNIFICANT CHANGE UP
-  LEVOFLOXACIN: SIGNIFICANT CHANGE UP
-  MEROPENEM: SIGNIFICANT CHANGE UP
-  PIPERACILLIN/TAZOBACTAM: SIGNIFICANT CHANGE UP
-  TOBRAMYCIN: SIGNIFICANT CHANGE UP
-  TRIMETHOPRIM/SULFAMETHOXAZOLE: SIGNIFICANT CHANGE UP
CULTURE RESULTS: SIGNIFICANT CHANGE UP
GRAM STN FLD: SIGNIFICANT CHANGE UP
METHOD TYPE: SIGNIFICANT CHANGE UP
ORGANISM # SPEC MICROSCOPIC CNT: SIGNIFICANT CHANGE UP
SPECIMEN SOURCE: SIGNIFICANT CHANGE UP

## 2023-05-17 RX ORDER — CIPROFLOXACIN HYDROCHLORIDE 500 MG/1
500 TABLET, FILM COATED ORAL
Qty: 8 | Refills: 0 | Status: ACTIVE | COMMUNITY
Start: 2023-05-17 | End: 1900-01-01

## 2023-05-17 NOTE — CHART NOTE - NSCHARTNOTEFT_GEN_A_CORE
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 Pt noted to have dark urine and fevers with weakness;  admitted 5/12/23    Presentation suggested urosepsis, possible nephrolithiasis  home care NP on phone states no recent abx  previous cultures  generally >3 org - 3 years ago had urinary Klebs pn isolate resistant to Amp, floroquinolones only  urology evaluation much appreciated- Balloon taken down and spontaneous 60cc d rained. Fowler likely blown up in prostate. Now currently in place   marked improvement  Physical Therapy assessment  appreciated    Antibiotics  Zosyn 5/12 x1  Ceftriaxone 5/12 --> 5/16  Cipro prescribed 5/17 to continue through 5/21/23

## 2023-05-18 LAB
CULTURE RESULTS: SIGNIFICANT CHANGE UP
SPECIMEN SOURCE: SIGNIFICANT CHANGE UP

## 2023-05-25 RX ORDER — DIAZEPAM 5 MG
1 TABLET ORAL
Refills: 0 | DISCHARGE

## 2023-05-25 RX ORDER — CARBIDOPA AND LEVODOPA 25; 100 MG/1; MG/1
1 TABLET ORAL
Refills: 0 | DISCHARGE

## 2023-05-31 NOTE — HISTORY OF PRESENT ILLNESS
[FreeTextEntry1] : 76 yo WM, here for f/u of bilateral buttock pressure ulcers. Only a scattering of excoriations remain. i emphasized the importance of standing/ambulattion and change of position/rotation as much as possible throughout the day/night. Pt presently receiving PT and OT.\par \par 6/21/22 marked improvement and healing Olumiant Counseling: I discussed with the patient the risks of Olumiant therapy including but not limited to upper respiratory tract infections, shingles, cold sores, and nausea. Live vaccines should be avoided.  This medication has been linked to serious infections; higher rate of mortality; malignancy and lymphoproliferative disorders; major adverse cardiovascular events; thrombosis; gastrointestinal perforations; neutropenia; lymphopenia; anemia; liver enzyme elevations; and lipid elevations.

## 2023-06-08 ENCOUNTER — APPOINTMENT (OUTPATIENT)
Dept: PHYSICAL MEDICINE AND REHAB | Facility: CLINIC | Age: 78
End: 2023-06-08
Payer: MEDICARE

## 2023-06-08 VITALS — DIASTOLIC BLOOD PRESSURE: 69 MMHG | HEART RATE: 90 BPM | SYSTOLIC BLOOD PRESSURE: 105 MMHG | OXYGEN SATURATION: 99 %

## 2023-06-08 PROBLEM — E78.5 HYPERLIPIDEMIA, UNSPECIFIED: Chronic | Status: ACTIVE | Noted: 2023-05-12

## 2023-06-08 PROBLEM — I10 ESSENTIAL (PRIMARY) HYPERTENSION: Chronic | Status: ACTIVE | Noted: 2023-05-12

## 2023-06-08 PROCEDURE — 95874 GUIDE NERV DESTR NEEDLE EMG: CPT

## 2023-06-08 PROCEDURE — 64644 CHEMODENERV 1 EXTREM 5/> MUS: CPT

## 2023-06-08 NOTE — PROCEDURE
[Consent] : consent was given by patient or guardian [Site Verification] : the injection site was verified [Post-Injection Instructions Provided] : post-injection instructions were provided [Total Units: ___] : [unfilled] units [Total Vials: ___] : [unfilled] vials were used [Total Waste: ___] : with [unfilled] wasted [de-identified] : Procedural note for Botox injection:\par \par The procedure was explained in detail including associated risks and informed consent was obtained. Under sterile conditions the following muscles were injected with Botox and a 2 mL dilution and with EMG guidance:\par \par Left tibialis posterior------------------- 100 units (4 sites)\par Left medial gastrocnemius----------- 75 units (4 sites)\par Left lateral gastrocnemius------------ 75 units (4 sites)\par Left soleus-------------------------------- 50 units (2 sites)\par Left semitendinosis---------------------110 units (4 sites)\par left semimembranosis------------------100 units (4 sites)\par Left popliteus-----------------------------70 units (3 sites)\par \par Total --------------------------------------580 units

## 2023-06-08 NOTE — ASSESSMENT
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

## 2023-06-12 ENCOUNTER — APPOINTMENT (OUTPATIENT)
Dept: UROLOGY | Facility: CLINIC | Age: 78
End: 2023-06-12
Payer: MEDICARE

## 2023-06-12 ENCOUNTER — OUTPATIENT (OUTPATIENT)
Dept: OUTPATIENT SERVICES | Facility: HOSPITAL | Age: 78
LOS: 1 days | End: 2023-06-12
Payer: MEDICARE

## 2023-06-12 VITALS
TEMPERATURE: 97.6 F | OXYGEN SATURATION: 99 % | HEART RATE: 85 BPM | RESPIRATION RATE: 17 BRPM | SYSTOLIC BLOOD PRESSURE: 111 MMHG | DIASTOLIC BLOOD PRESSURE: 71 MMHG

## 2023-06-12 DIAGNOSIS — R35.0 FREQUENCY OF MICTURITION: ICD-10-CM

## 2023-06-12 DIAGNOSIS — R31.0 GROSS HEMATURIA: ICD-10-CM

## 2023-06-12 PROCEDURE — 51703 INSERT BLADDER CATH COMPLEX: CPT

## 2023-06-20 PROBLEM — R31.0 GROSS HEMATURIA: Status: ACTIVE | Noted: 2022-03-31

## 2023-06-21 DIAGNOSIS — R33.9 RETENTION OF URINE, UNSPECIFIED: ICD-10-CM

## 2023-06-21 DIAGNOSIS — R31.0 GROSS HEMATURIA: ICD-10-CM

## 2023-07-12 ENCOUNTER — APPOINTMENT (OUTPATIENT)
Dept: UROLOGY | Facility: CLINIC | Age: 78
End: 2023-07-12
Payer: MEDICARE

## 2023-07-12 ENCOUNTER — OUTPATIENT (OUTPATIENT)
Dept: OUTPATIENT SERVICES | Facility: HOSPITAL | Age: 78
LOS: 1 days | End: 2023-07-12
Payer: MEDICARE

## 2023-07-12 VITALS
RESPIRATION RATE: 17 BRPM | DIASTOLIC BLOOD PRESSURE: 66 MMHG | SYSTOLIC BLOOD PRESSURE: 109 MMHG | HEART RATE: 89 BPM | OXYGEN SATURATION: 98 %

## 2023-07-12 DIAGNOSIS — R35.0 FREQUENCY OF MICTURITION: ICD-10-CM

## 2023-07-12 PROCEDURE — 51702 INSERT TEMP BLADDER CATH: CPT

## 2023-07-18 DIAGNOSIS — Z97.8 PRESENCE OF OTHER SPECIFIED DEVICES: ICD-10-CM

## 2023-07-18 DIAGNOSIS — R33.9 RETENTION OF URINE, UNSPECIFIED: ICD-10-CM

## 2023-08-07 ENCOUNTER — APPOINTMENT (OUTPATIENT)
Dept: UROLOGY | Facility: CLINIC | Age: 78
End: 2023-08-07
Payer: MEDICARE

## 2023-08-07 ENCOUNTER — OUTPATIENT (OUTPATIENT)
Dept: OUTPATIENT SERVICES | Facility: HOSPITAL | Age: 78
LOS: 1 days | End: 2023-08-07
Payer: MEDICARE

## 2023-08-07 VITALS — DIASTOLIC BLOOD PRESSURE: 68 MMHG | SYSTOLIC BLOOD PRESSURE: 100 MMHG | HEART RATE: 89 BPM

## 2023-08-07 DIAGNOSIS — R35.0 FREQUENCY OF MICTURITION: ICD-10-CM

## 2023-08-07 PROCEDURE — 51702 INSERT TEMP BLADDER CATH: CPT

## 2023-08-15 DIAGNOSIS — R33.9 RETENTION OF URINE, UNSPECIFIED: ICD-10-CM

## 2023-08-15 DIAGNOSIS — G20 PARKINSON'S DISEASE: ICD-10-CM

## 2023-09-07 ENCOUNTER — APPOINTMENT (OUTPATIENT)
Dept: PHYSICAL MEDICINE AND REHAB | Facility: CLINIC | Age: 78
End: 2023-09-07
Payer: MEDICARE

## 2023-09-07 PROCEDURE — 95874 GUIDE NERV DESTR NEEDLE EMG: CPT

## 2023-09-07 PROCEDURE — 64644 CHEMODENERV 1 EXTREM 5/> MUS: CPT

## 2023-09-07 NOTE — PROCEDURE
[Consent] : consent was given by patient or guardian [Site Verification] : the injection site was verified [Post-Injection Instructions Provided] : post-injection instructions were provided [Total Units: ___] : [unfilled] units [Total Vials: ___] : [unfilled] vials were used [Total Waste: ___] : with [unfilled] wasted [de-identified] : Procedural note for Botox injection:\par  \par  The procedure was explained in detail including associated risks and informed consent was obtained. Under sterile conditions the following muscles were injected with Botox and a 2 mL dilution and with EMG guidance:\par  \par  Left tibialis posterior------------------- 100 units (4 sites)\par  Left medial gastrocnemius----------- 75 units (4 sites)\par  Left lateral gastrocnemius------------ 75 units (4 sites)\par  Left soleus-------------------------------- 50 units (2 sites)\par  Left semitendinosis---------------------110 units (4 sites)\par  left semimembranosis------------------100 units (4 sites)\par  Left popliteus-----------------------------70 units (3 sites)\par  \par  Total --------------------------------------580 units

## 2023-09-13 ENCOUNTER — APPOINTMENT (OUTPATIENT)
Dept: UROLOGY | Facility: CLINIC | Age: 78
End: 2023-09-13
Payer: MEDICARE

## 2023-09-13 ENCOUNTER — OUTPATIENT (OUTPATIENT)
Dept: OUTPATIENT SERVICES | Facility: HOSPITAL | Age: 78
LOS: 1 days | End: 2023-09-13
Payer: MEDICARE

## 2023-09-13 VITALS — DIASTOLIC BLOOD PRESSURE: 74 MMHG | SYSTOLIC BLOOD PRESSURE: 124 MMHG | HEART RATE: 84 BPM

## 2023-09-13 DIAGNOSIS — R35.0 FREQUENCY OF MICTURITION: ICD-10-CM

## 2023-09-13 PROCEDURE — 51702 INSERT TEMP BLADDER CATH: CPT

## 2023-09-14 ENCOUNTER — NON-APPOINTMENT (OUTPATIENT)
Age: 78
End: 2023-09-14

## 2023-09-15 NOTE — PROVIDER CONTACT NOTE (OTHER) - ASSESSMENT
pt a&o4, VSS, no s/s of distress. pt IV from 8/10. pt refusing it to be changed. States "I doesn't want to be bothered, and suzanna had so many already"  Current PIVL WDL, flushing at this time. pt a&o4, VSS, no s/s of distress. pt IV from 8/10. pt refusing it to be changed.  Current PIVL WDL, flushing at this time. No

## 2023-09-20 DIAGNOSIS — R33.9 RETENTION OF URINE, UNSPECIFIED: ICD-10-CM

## 2023-09-20 DIAGNOSIS — G20 PARKINSON'S DISEASE: ICD-10-CM

## 2023-09-26 ENCOUNTER — NON-APPOINTMENT (OUTPATIENT)
Age: 78
End: 2023-09-26

## 2023-10-16 ENCOUNTER — APPOINTMENT (OUTPATIENT)
Dept: UROLOGY | Facility: CLINIC | Age: 78
End: 2023-10-16
Payer: MEDICARE

## 2023-10-16 ENCOUNTER — OUTPATIENT (OUTPATIENT)
Dept: OUTPATIENT SERVICES | Facility: HOSPITAL | Age: 78
LOS: 1 days | End: 2023-10-16
Payer: MEDICARE

## 2023-10-16 VITALS — HEART RATE: 91 BPM | SYSTOLIC BLOOD PRESSURE: 108 MMHG | DIASTOLIC BLOOD PRESSURE: 67 MMHG

## 2023-10-16 DIAGNOSIS — R35.0 FREQUENCY OF MICTURITION: ICD-10-CM

## 2023-10-16 DIAGNOSIS — R33.9 RETENTION OF URINE, UNSPECIFIED: ICD-10-CM

## 2023-10-16 PROCEDURE — 51702 INSERT TEMP BLADDER CATH: CPT

## 2023-10-25 PROBLEM — R33.9 INCOMPLETE EMPTYING OF BLADDER: Status: ACTIVE | Noted: 2022-03-31

## 2023-10-26 DIAGNOSIS — R33.9 RETENTION OF URINE, UNSPECIFIED: ICD-10-CM

## 2023-11-12 NOTE — DIETITIAN NUTRITION RISK NOTIFICATION - MALNUTRITION EVALUATION AS DEMONSTRATED BY (ADULTS > 20 YEARS OF AGE)
With chronic graft rejection, follows w nephrology most recently 1 month PTA  Home Meds: Pred 5, Tacro 5mg qd, Mepron  - c/w prednisone 5 qd  - c/w mepron home med  - tacro level was 7.9 on 11/9. Goal 4-6 per renal  - tacro 5mg qd resumed  - stopped cellcept on 11/12 since now treating for concerning infection ?multifocal pna  -- f/u with renal regarding cellcept, but hold for now Loss of subcutaneous fat.../Loss of muscle...

## 2023-11-13 ENCOUNTER — APPOINTMENT (OUTPATIENT)
Dept: UROLOGY | Facility: CLINIC | Age: 78
End: 2023-11-13
Payer: MEDICARE

## 2023-11-13 ENCOUNTER — OUTPATIENT (OUTPATIENT)
Dept: OUTPATIENT SERVICES | Facility: HOSPITAL | Age: 78
LOS: 1 days | End: 2023-11-13
Payer: MEDICARE

## 2023-11-13 VITALS
RESPIRATION RATE: 17 BRPM | SYSTOLIC BLOOD PRESSURE: 85 MMHG | OXYGEN SATURATION: 98 % | HEART RATE: 85 BPM | DIASTOLIC BLOOD PRESSURE: 54 MMHG

## 2023-11-13 VITALS — SYSTOLIC BLOOD PRESSURE: 136 MMHG | DIASTOLIC BLOOD PRESSURE: 75 MMHG

## 2023-11-13 DIAGNOSIS — N13.8 BENIGN PROSTATIC HYPERPLASIA WITH LOWER URINARY TRACT SYMPMS: ICD-10-CM

## 2023-11-13 DIAGNOSIS — R35.0 FREQUENCY OF MICTURITION: ICD-10-CM

## 2023-11-13 DIAGNOSIS — N40.1 BENIGN PROSTATIC HYPERPLASIA WITH LOWER URINARY TRACT SYMPMS: ICD-10-CM

## 2023-11-13 PROCEDURE — 51702 INSERT TEMP BLADDER CATH: CPT

## 2023-11-13 NOTE — PROCEDURE NOTE - SUPERVISORY STATEMENT
Chief Complaint:  Chief Complaint   Patient presents with   • Ankle Injury     right       HISTORY OF PRESENT ILLNESS:  Linsey is a 13 year old female who presents for above. Here w/ mom.    R ankle injury.  It occurred yesterday while she was playing basketball.  She states she was jumping up to get a rebound and another kid bumped into her and she twisted her R ankle/but inwards.  She is able to limp on it.  Pain on the lateral aspect.  He has iced it and taken some ibuprofen.  Mom would like to make sure nothing is fractured.  No hx of fracture or surgery in this area.  She is otherwise a healthy child.    PCP: Jim Rincon MD    REVIEW OF SYSTEMS:  Full ROS reviewed and are negative except as noted above.  PMH, PFH, Soc Hx, Allergies reviewed today.    OBJECTIVE:  Visit Vitals  /63 (BP Location: RUE - Right upper extremity, Patient Position: Sitting, Cuff Size: Regular)   Pulse 82   Temp 97.2 °F (36.2 °C) (Temporal)   Resp 20   Wt (!) 64 kg (141 lb 1.5 oz)   SpO2 100%        General:  NAD, appears stated age, well groomed, well-appearing child, in good spirits, conversive, nontoxic, eating a popsicle. Answers all questions appropriately.  CV/Pulm: No increased WOB on RA, regular rate  Derm/MSK:  No rash, slightly antalgic gait.  R ankle w/ generalized swelling, TTP just below the lateral malleolus.  Faint bruising starting.  No tenderness throughout the foot itself.  Good sensation perfusion intact in all digits.  Able to wiggle all toes.  No abrasions.  RLE is neurovascularly intact. L foot does appear to have less arch.    ASSESSMENT/PLAN:  1. Injury of right ankle, initial encounter    2. Pes planus of both feet      XR done that showed no acute findings on my interpretation. Will call w/ final read by rads.   Disc supportive cares regarding ankle sprains, gave a handout. Placed in walking boot, can transition out of this over the next week as tolerated.  Mom notes that her feet seem like they are  leaning more inwards, essentially flat feet.  She has had this in birth.  Mom would like referral to ortho to discuss her foot concerns. Sent referral.  Ice, tyl/ibu, elevate, rest.     Pt and mom verbalizes understanding and agrees w/ plan. Gave strict return precautions. F/u w/ PCP, ortho   agree

## 2023-11-14 ENCOUNTER — NON-APPOINTMENT (OUTPATIENT)
Age: 78
End: 2023-11-14

## 2023-11-15 RX ORDER — CEFPODOXIME PROXETIL 200 MG/1
200 TABLET, FILM COATED ORAL
Qty: 14 | Refills: 0 | Status: ACTIVE | COMMUNITY
Start: 2022-10-29 | End: 1900-01-01

## 2023-11-16 PROBLEM — N40.1 BENIGN PROSTATIC HYPERPLASIA WITH URINARY OBSTRUCTION: Status: ACTIVE | Noted: 2019-12-20

## 2023-11-17 DIAGNOSIS — N40.1 BENIGN PROSTATIC HYPERPLASIA WITH LOWER URINARY TRACT SYMPTOMS: ICD-10-CM

## 2023-11-27 NOTE — PHYSICAL THERAPY INITIAL EVALUATION ADULT - MANUAL MUSCLE TESTING RESULTS, REHAB EVAL
HISTORY   Past Surgical History:   Procedure Laterality Date    BREAST BIOPSY       SECTION      COLONOSCOPY      COLONOSCOPY  2018    ECHO COMPL W DOP COLOR FLOW  2011         HIP ARTHROPLASTY Right 2009    HYSTERECTOMY (CERVIX STATUS UNKNOWN)  1987    NECK SURGERY  2003    OTHER SURGICAL HISTORY  2015    Right Eye cataract extraction with IOL    TONSILLECTOMY         SOCIAL HISTORY   Tobacco:   reports that she quit smoking about 28 years ago. Her smoking use included cigarettes. She has a 20.00 pack-year smoking history. She has been exposed to tobacco smoke. She has never used smokeless tobacco.  Alcohol:   reports current alcohol use. Drugs:   reports no history of drug use. FAMILY HISTORY   Family History   Problem Relation Age of Onset    Diabetes Mother     Heart Disease Mother     High Blood Pressure Mother     Lung Cancer Mother     Cancer Sister     Breast Cancer Sister 79    Breast Cancer Maternal Aunt 79       ALLERGIES AND DRUG REACTIONS   Allergies   Allergen Reactions    Tape [place] Shaun Tape] Rash       CURRENT MEDICATIONS   Current Outpatient Medications   Medication Sig Dispense Refill    losartan (COZAAR) 25 MG tablet Take 1 tablet by mouth daily 100 tablet 3    pantoprazole (PROTONIX) 40 MG tablet TAKE 1 TABLET BY MOUTH EVERY DAY BEFORE BREAKFAST 90 tablet 1    rosuvastatin (CRESTOR) 10 MG tablet Take 1 tablet by mouth daily 100 tablet 1    methylPREDNISolone (MEDROL DOSEPACK) 4 MG tablet Take by mouth. 1 kit 0    zolpidem (AMBIEN) 10 MG tablet Take 1 tablet by mouth nightly as needed for Sleep for up to 180 days.  90 tablet 0    cyclobenzaprine (FLEXERIL) 10 MG tablet TAKE 1 TABLET TWICE A DAY  AS NEEDED FOR MUSCLE SPASMS 60 tablet 3    ONETOUCH ULTRA strip CHECK BLOOD SUGAR TWICE DAILY 200 strip 3    dilTIAZem (CARDIZEM CD) 120 MG extended release capsule TAKE 1 CAPSULE DAILY 90 capsule 2    latanoprost (XALATAN) 0.005 % ophthalmic solution INSTILL 1 DROP INTO BOTH EYES AT
Strength is grossly at least 3/5 throughout on BUE, 2+/5 on BLE/grossly assessed due to

## 2023-12-11 ENCOUNTER — OUTPATIENT (OUTPATIENT)
Dept: OUTPATIENT SERVICES | Facility: HOSPITAL | Age: 78
LOS: 1 days | End: 2023-12-11
Payer: MEDICARE

## 2023-12-11 ENCOUNTER — APPOINTMENT (OUTPATIENT)
Dept: UROLOGY | Facility: CLINIC | Age: 78
End: 2023-12-11
Payer: MEDICARE

## 2023-12-11 VITALS — HEART RATE: 90 BPM | DIASTOLIC BLOOD PRESSURE: 71 MMHG | SYSTOLIC BLOOD PRESSURE: 106 MMHG

## 2023-12-11 DIAGNOSIS — R35.0 FREQUENCY OF MICTURITION: ICD-10-CM

## 2023-12-11 PROCEDURE — 51703 INSERT BLADDER CATH COMPLEX: CPT

## 2023-12-13 ENCOUNTER — APPOINTMENT (OUTPATIENT)
Dept: PHYSICAL MEDICINE AND REHAB | Facility: CLINIC | Age: 78
End: 2023-12-13
Payer: MEDICARE

## 2023-12-13 PROCEDURE — 95874 GUIDE NERV DESTR NEEDLE EMG: CPT

## 2023-12-13 PROCEDURE — 64644 CHEMODENERV 1 EXTREM 5/> MUS: CPT

## 2023-12-13 NOTE — PROCEDURE
[Consent] : consent was given by patient or guardian [Site Verification] : the injection site was verified [Post-Injection Instructions Provided] : post-injection instructions were provided [Total Units: ___] : [unfilled] units [Total Vials: ___] : [unfilled] vials were used [Total Waste: ___] : with [unfilled] wasted [de-identified] : Procedural note for Botox injection:\par  \par  The procedure was explained in detail including associated risks and informed consent was obtained. Under sterile conditions the following muscles were injected with Botox and a 2 mL dilution and with EMG guidance:\par  \par  Left tibialis posterior------------------- 100 units (4 sites)\par  Left medial gastrocnemius----------- 75 units (4 sites)\par  Left lateral gastrocnemius------------ 75 units (4 sites)\par  Left soleus-------------------------------- 50 units (2 sites)\par  Left semitendinosis---------------------110 units (4 sites)\par  left semimembranosis------------------100 units (4 sites)\par  Left popliteus-----------------------------70 units (3 sites)\par  \par  Total --------------------------------------580 units

## 2023-12-13 NOTE — ASSESSMENT
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

## 2023-12-15 DIAGNOSIS — R33.9 RETENTION OF URINE, UNSPECIFIED: ICD-10-CM

## 2023-12-31 NOTE — CHART NOTE - NSCHARTNOTESELECT_GEN_ALL_CORE
+ BC/Event Note
Infectious Disease Post Discharge Call/Event Note
Patient presents to ED with c/o cough, nasal congestion, vomiting, subjective fevers times two days.  Per mother, child woke up at 0100 with trouble breathing.  No known respiratory illnesses.  Patient sleeping comfortably in triage in no acute respiratory distress.  Last Ibuprofen at 1500 yesterday.

## 2024-01-10 ENCOUNTER — APPOINTMENT (OUTPATIENT)
Dept: UROLOGY | Facility: CLINIC | Age: 79
End: 2024-01-10
Payer: MEDICARE

## 2024-01-10 ENCOUNTER — OUTPATIENT (OUTPATIENT)
Dept: OUTPATIENT SERVICES | Facility: HOSPITAL | Age: 79
LOS: 1 days | End: 2024-01-10
Payer: MEDICARE

## 2024-01-10 VITALS — HEART RATE: 82 BPM | RESPIRATION RATE: 17 BRPM | SYSTOLIC BLOOD PRESSURE: 116 MMHG | DIASTOLIC BLOOD PRESSURE: 75 MMHG

## 2024-01-10 DIAGNOSIS — R35.0 FREQUENCY OF MICTURITION: ICD-10-CM

## 2024-01-10 PROCEDURE — 51702 INSERT TEMP BLADDER CATH: CPT

## 2024-01-12 DIAGNOSIS — R33.9 RETENTION OF URINE, UNSPECIFIED: ICD-10-CM

## 2024-01-24 RX ORDER — CEFPODOXIME PROXETIL 200 MG/1
200 TABLET, FILM COATED ORAL
Qty: 10 | Refills: 0 | Status: ACTIVE | COMMUNITY
Start: 2022-08-10 | End: 1900-01-01

## 2024-01-31 ENCOUNTER — APPOINTMENT (OUTPATIENT)
Dept: PHYSICAL MEDICINE AND REHAB | Facility: CLINIC | Age: 79
End: 2024-01-31

## 2024-02-07 ENCOUNTER — OUTPATIENT (OUTPATIENT)
Dept: OUTPATIENT SERVICES | Facility: HOSPITAL | Age: 79
LOS: 1 days | End: 2024-02-07
Payer: MEDICARE

## 2024-02-07 ENCOUNTER — APPOINTMENT (OUTPATIENT)
Dept: UROLOGY | Facility: CLINIC | Age: 79
End: 2024-02-07
Payer: MEDICARE

## 2024-02-07 VITALS — DIASTOLIC BLOOD PRESSURE: 75 MMHG | HEART RATE: 87 BPM | SYSTOLIC BLOOD PRESSURE: 106 MMHG

## 2024-02-07 DIAGNOSIS — G20.A1 PARKINSON'S DISEASE WITHOUT DYSKINESIA, WITHOUT MENTION OF FLUCTUATIONS: ICD-10-CM

## 2024-02-07 DIAGNOSIS — R35.0 FREQUENCY OF MICTURITION: ICD-10-CM

## 2024-02-07 PROCEDURE — 51702 INSERT TEMP BLADDER CATH: CPT

## 2024-02-13 PROBLEM — G20.A1 PARKINSON'S DISEASE: Status: ACTIVE | Noted: 2021-03-19

## 2024-02-14 DIAGNOSIS — G20.A1 PARKINSON'S DISEASE WITHOUT DYSKINESIA, WITHOUT MENTION OF FLUCTUATIONS: ICD-10-CM

## 2024-02-14 DIAGNOSIS — Z97.8 PRESENCE OF OTHER SPECIFIED DEVICES: ICD-10-CM

## 2024-02-14 DIAGNOSIS — R33.9 RETENTION OF URINE, UNSPECIFIED: ICD-10-CM

## 2024-03-04 ENCOUNTER — APPOINTMENT (OUTPATIENT)
Dept: UROLOGY | Facility: CLINIC | Age: 79
End: 2024-03-04
Payer: MEDICARE

## 2024-03-04 ENCOUNTER — OUTPATIENT (OUTPATIENT)
Dept: OUTPATIENT SERVICES | Facility: HOSPITAL | Age: 79
LOS: 1 days | End: 2024-03-04
Payer: MEDICARE

## 2024-03-04 VITALS — HEART RATE: 88 BPM | SYSTOLIC BLOOD PRESSURE: 97 MMHG | DIASTOLIC BLOOD PRESSURE: 67 MMHG

## 2024-03-04 DIAGNOSIS — R35.0 FREQUENCY OF MICTURITION: ICD-10-CM

## 2024-03-04 PROCEDURE — 51702 INSERT TEMP BLADDER CATH: CPT

## 2024-03-04 NOTE — PATIENT PROFILE ADULT - DISASTER - NSASFALLATTEMPTOOB_GEN_A_NUR
Pt last seen on 12/29/23. Due to return in 3 months.    Has appt on 4/5/24.    Rx last filled on 12/5/23 #90/0RF.    Will forward to MD for refill.   no

## 2024-03-06 ENCOUNTER — APPOINTMENT (OUTPATIENT)
Dept: PHYSICAL MEDICINE AND REHAB | Facility: CLINIC | Age: 79
End: 2024-03-06
Payer: MEDICARE

## 2024-03-06 PROCEDURE — 64644 CHEMODENERV 1 EXTREM 5/> MUS: CPT

## 2024-03-06 PROCEDURE — 95874 GUIDE NERV DESTR NEEDLE EMG: CPT

## 2024-03-06 RX ORDER — AMOXICILLIN AND CLAVULANATE POTASSIUM 875; 125 MG/1; MG/1
875-125 TABLET, COATED ORAL
Qty: 28 | Refills: 0 | Status: COMPLETED | COMMUNITY
Start: 2022-06-30 | End: 2024-03-06

## 2024-03-06 RX ORDER — NABUMETONE 500 MG/1
500 TABLET, FILM COATED ORAL TWICE DAILY
Qty: 30 | Refills: 0 | Status: COMPLETED | COMMUNITY
Start: 2019-12-16 | End: 2024-03-06

## 2024-03-06 NOTE — PROCEDURE
[Consent] : consent was given by patient or guardian [Post-Injection Instructions Provided] : post-injection instructions were provided [Site Verification] : the injection site was verified [Total Units: ___] : [unfilled] units [Total Vials: ___] : [unfilled] vials were used [Total Waste: ___] : with [unfilled] wasted [de-identified] : Procedural note for Botox injection:\par  \par  The procedure was explained in detail including associated risks and informed consent was obtained. Under sterile conditions the following muscles were injected with Botox and a 2 mL dilution and with EMG guidance:\par  \par  Left tibialis posterior------------------- 100 units (4 sites)\par  Left medial gastrocnemius----------- 75 units (4 sites)\par  Left lateral gastrocnemius------------ 75 units (4 sites)\par  Left soleus-------------------------------- 50 units (2 sites)\par  Left semitendinosis---------------------110 units (4 sites)\par  left semimembranosis------------------100 units (4 sites)\par  Left popliteus-----------------------------70 units (3 sites)\par  \par  Total --------------------------------------580 units

## 2024-03-11 NOTE — ED ADULT NURSE NOTE - ED CARDIAC HEART SOUNDS
Hien Burk is calling to request a refill on the following medication(s):    Last Visit Date (If Applicable):  Visit date not found    Next Visit Date:    6/6/2024    Medication Request:  Requested Prescriptions     Pending Prescriptions Disp Refills    irbesartan (AVAPRO) 150 MG tablet [Pharmacy Med Name: IRBESARTAN 150 MG TABLET] 90 tablet 3     Sig: TAKE ONE TABLET BY MOUTH DAILY               normal S1, S2 heard

## 2024-03-12 DIAGNOSIS — Z97.8 PRESENCE OF OTHER SPECIFIED DEVICES: ICD-10-CM

## 2024-03-12 DIAGNOSIS — R33.9 RETENTION OF URINE, UNSPECIFIED: ICD-10-CM

## 2024-03-26 NOTE — ED ADULT TRIAGE NOTE - PAIN RATING/NUMBER SCALE (0-10): ACTIVITY
Medication: OMEPRAZOLE 40MG CAPSULES   Last office visit date: 10/20/2023  Next appointment scheduled?: Yes   Number of refills given: 90 day, R1     6

## 2024-03-26 NOTE — DISCHARGE NOTE PROVIDER - YES NO FOR MLM POSITIVE OR NEGATIVE COVID RESULT
"Music Therapy Note    Yoselyn Hernandez     Therapy Session  Referral Type: New referral this admission  Visit Type: New visit  Session Start Time: 8  Session End Time: 1530  Intervention Delivery: In-person  Conflict of Service: None  Number of family members present: 0  Family Present for Session: None     Pre-assessment  Pain Score: 0 - No pain  Stress Level (0-10): 6  Anxiety Level (0-10): 6  Mood/Affect: Calm, Goal focused, Cooperative, Participative, Appropriate  Verbalized Emotional State:  (\"fair\")         Treatment/Interventions  Areas of Focus: Stress reduction, Anxiety reduction, Normalization, Relaxation  Music Therapy Interventions: Assessment, Live music listening, Active music engagement, Music sharing/discussion, Empathic listening/validating emotions, Music-assisted life review  Interruption: No  Patient Fell Asleep at End of Session: No    Post-assessment  Pain Score: 0 - No pain  Stress Level (0-10): 4  Anxiety Level (0-10): 4  Mood/Affect: Calm, Appropriate, Cooperative, Participative  Verbalized Emotional State: Gratitude, Relaxed  Continue Visiting: Yes  Total Session Time (min): 52 minutes    Narrative  Assessment Detail: Pt was off the floor at 1353 first attempt. At time of second attempt, pt was seated upright on the bed drawing in a coloring book. Pt said she loves music and was agreeable to music intervention this day.  Plan: MTI engaged pt in live music listening and discussion to assist with stress and anxiety reduction  Intervention: MTI provided patient preferred music via guitar and voice. Pt likes 90s, [employer], Melissa Rosario, Majo Michael, and \"It's Gonna Be Alright\" from [facility].  Evaluation: Pt sang along to the music, swaying side to side, smiling, and coloring. She opened up about frustrations with her breathing troubles. Pt expressed that each of her parents  from something she is experiencing, contributing to her stress and anxiety. Pt spoke " about strength in her yonatan in God, and positive memories of her and her mother engaging in music together. Pt was agreeable to follow up  Follow-up: MTI will continue to follow up if pt still admitted           ,

## 2024-04-01 ENCOUNTER — OUTPATIENT (OUTPATIENT)
Dept: OUTPATIENT SERVICES | Facility: HOSPITAL | Age: 79
LOS: 1 days | End: 2024-04-01
Payer: MEDICARE

## 2024-04-01 ENCOUNTER — APPOINTMENT (OUTPATIENT)
Dept: UROLOGY | Facility: CLINIC | Age: 79
End: 2024-04-01
Payer: MEDICARE

## 2024-04-01 VITALS — HEART RATE: 90 BPM | DIASTOLIC BLOOD PRESSURE: 68 MMHG | OXYGEN SATURATION: 96 % | SYSTOLIC BLOOD PRESSURE: 118 MMHG

## 2024-04-01 DIAGNOSIS — R35.0 FREQUENCY OF MICTURITION: ICD-10-CM

## 2024-04-01 DIAGNOSIS — Z97.8 PRESENCE OF OTHER SPECIFIED DEVICES: ICD-10-CM

## 2024-04-01 DIAGNOSIS — R33.9 RETENTION OF URINE, UNSPECIFIED: ICD-10-CM

## 2024-04-01 PROCEDURE — 51702 INSERT TEMP BLADDER CATH: CPT

## 2024-04-04 PROBLEM — Z97.8 CHRONIC INDWELLING FOLEY CATHETER: Status: ACTIVE | Noted: 2022-03-31

## 2024-04-04 PROBLEM — R33.9 URINARY RETENTION: Status: ACTIVE | Noted: 2019-10-16

## 2024-04-05 DIAGNOSIS — Z97.8 PRESENCE OF OTHER SPECIFIED DEVICES: ICD-10-CM

## 2024-04-05 DIAGNOSIS — R33.9 RETENTION OF URINE, UNSPECIFIED: ICD-10-CM

## 2024-04-16 NOTE — BEHAVIORAL HEALTH ASSESSMENT NOTE - MOOD
CHIEF COMPLAINT: This is a 42-year-old female here for preventive health exam.     SUBJECTIVE: Patient is doing well without complaints except for grief related to recent death of close friend. She has lost weight and current BMI is 110/72. She takes Aplenzin 522 mg and Lexapro 10 mg for major depression as prescribed by her psychologist. She takes Vyvanse 50 mg for ADHD and depression. She takes continuous OCP with improvement in mood. She continues to care for her mother who has brain cancer. She reports that she does get time away socially which she spends with her friends. Patient has never been sexually active and is reluctant to have pelvic or Pap smear due to severe pain when attempted previously. She cannot use tampons. Patient is concerned about genetic tendency for Alzheimer's disease.      Eye exam 2024. No Pap.  Tdap August 2016. COVID 19 vaccine March, April, December 2021, November 2022.     ROS:  GENERAL: Patient denies fever, chills, night sweats.  Patient denies weight gain or loss. Patient denies anorexia, fatigue, weakness or swollen glands.  SKIN: Patient denies rash or hair loss.  HEENT: Patient denies sore throat, ear pain, hearing loss, nasal congestion, or runny nose. Patient denies visual disturbance, eye irritation or discharge.  LUNGS: Patient denies cough, wheeze or hemoptysis.  CARDIOVASCULAR: Patient denies chest pain, shortness of breath, palpitations, syncope or lower extremity edema.  GI: Patient denies abdominal pain, nausea, vomiting, diarrhea, constipation, blood in stool or melena.  GENITOURINARY: Patient denies pelvic pain, vaginal discharge, itch or odor. Patient denies irregular vaginal bleeding.  Patient denies dysuria, frequency, hematuria, nocturia, urgency or incontinence.  BREASTS: Patient denies breast pain, mass or nipple discharge.  MUSCULOSKELETAL: Patient denies joint pain, swelling, redness or warmth.  NEUROLOGIC: Patient denies headache, vertigo, paresthesias,  weakness in limb, dysarthria, dysphagia or abnormality of gait.  PSYCHIATRIC: Patient denies anxiety, insomnia or memory loss.  Positive for dysphoric mood.      OBJECTIVE:   GENERAL: Well-developed well-nourished pleasant, white female alert and oriented x3, in no acute distress.  Memory, judgment and cognition without deficit.   SKIN: Clear without rash.  Normal color and tone.  HEENT: Eyes: Clear conjunctivae. No scleral icterus.  Pupils equal reactive to light and accommodation.  Ears: Clear canals. Clear TMs.  Nose: Without congestion.  Pharynx: Without injection or exudates.  NECK: Supple, normal range of motion.  No masses, lymphadenopathy or enlarged thyroid.  No JVD.  Carotids 2+ and equal.  No bruits.  LUNGS: Clear to auscultation.  Normal respiratory effort.  CARDIOVASCULAR:  Regular rhythm, normal S1, S2 without murmur, gallop or rub.  BACK:  No CVA or spinal tenderness.  BREASTS:  No masses, tenderness or nipple discharge.  ABDOMEN: Normal appearance.  Active bowel sounds.  Soft, nontender without mass or organomegaly.  No rebound or guarding.  EXTREMITIES: Without cyanosis, clubbing or edema.  Distal pulses 2+ and equal.  Normal range of motion in all extremities.  No joint effusion, erythema or warmth.  NEUROLOGIC:   Cranial nerves II through XII without deficit.  Motor strength equal bilaterally.  Sensation normal to touch.  Deep tendon reflexes 2+ and equal.  Gait without abnormality.  No tremor.  Negative cerebellar signs.  PELVIC: Deferred per patient request.    ASSESSMENT:  1. Preventative health care    2. Recurrent major depressive disorder, in partial remission    3. ADHD (attention deficit hyperactivity disorder), inattentive type    4. Dysmenorrhea      PLAN:  1. Exercise regularly.  2. Age-appropriate counseling.  3. Fasting lab.  4. Mammogram.  5. Follow up in one year.    This note is generated with speech recognition software and is subject to transcription error and sound alike phrases  that may be missed by proofreading.   Normal

## 2024-04-29 ENCOUNTER — APPOINTMENT (OUTPATIENT)
Dept: UROLOGY | Facility: CLINIC | Age: 79
End: 2024-04-29
Payer: MEDICARE

## 2024-04-29 PROCEDURE — 51703 INSERT BLADDER CATH COMPLEX: CPT

## 2024-04-30 ENCOUNTER — NON-APPOINTMENT (OUTPATIENT)
Age: 79
End: 2024-04-30

## 2024-05-28 ENCOUNTER — APPOINTMENT (OUTPATIENT)
Dept: UROLOGY | Facility: CLINIC | Age: 79
End: 2024-05-28
Payer: MEDICARE

## 2024-05-28 VITALS
HEIGHT: 73 IN | BODY MASS INDEX: 18.82 KG/M2 | SYSTOLIC BLOOD PRESSURE: 96 MMHG | DIASTOLIC BLOOD PRESSURE: 66 MMHG | WEIGHT: 142 LBS | HEART RATE: 92 BPM | OXYGEN SATURATION: 95 % | TEMPERATURE: 97.8 F | RESPIRATION RATE: 17 BRPM

## 2024-05-28 PROCEDURE — 51703 INSERT BLADDER CATH COMPLEX: CPT

## 2024-06-12 ENCOUNTER — APPOINTMENT (OUTPATIENT)
Dept: PHYSICAL MEDICINE AND REHAB | Facility: CLINIC | Age: 79
End: 2024-06-12
Payer: MEDICARE

## 2024-06-12 VITALS
OXYGEN SATURATION: 97 % | DIASTOLIC BLOOD PRESSURE: 68 MMHG | TEMPERATURE: 98.1 F | HEART RATE: 91 BPM | SYSTOLIC BLOOD PRESSURE: 105 MMHG | RESPIRATION RATE: 14 BRPM

## 2024-06-12 DIAGNOSIS — G24.8 OTHER DYSTONIA: ICD-10-CM

## 2024-06-12 PROCEDURE — 95874 GUIDE NERV DESTR NEEDLE EMG: CPT

## 2024-06-12 PROCEDURE — 64644 CHEMODENERV 1 EXTREM 5/> MUS: CPT

## 2024-06-12 RX ORDER — ONABOTULINUMTOXINA 100 [USP'U]/1
100 INJECTION, POWDER, LYOPHILIZED, FOR SOLUTION INTRADERMAL; INTRAMUSCULAR
Qty: 1 | Refills: 0 | Status: COMPLETED | OUTPATIENT
Start: 2024-06-12

## 2024-06-12 RX ORDER — SODIUM CHLORIDE 9 MG/ML
0.9 INJECTION, SOLUTION INTRAMUSCULAR; INTRAVENOUS; SUBCUTANEOUS
Refills: 0 | Status: COMPLETED | OUTPATIENT
Start: 2024-06-12

## 2024-06-12 RX ADMIN — ONABOTULINUMTOXINA 0 UNIT: 100 INJECTION, POWDER, LYOPHILIZED, FOR SOLUTION INTRADERMAL; INTRAMUSCULAR at 00:00

## 2024-06-12 RX ADMIN — SODIUM CHLORIDE 0 %: 9 INJECTION, SOLUTION INTRAMUSCULAR; INTRAVENOUS; SUBCUTANEOUS at 00:00

## 2024-06-12 NOTE — ASSESSMENT
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

## 2024-06-12 NOTE — PROCEDURE
[Consent] : consent was given by patient or guardian [Site Verification] : the injection site was verified [Post-Injection Instructions Provided] : post-injection instructions were provided [Total Units: ___] : [unfilled] units [Total Vials: ___] : [unfilled] vials were used [Total Waste: ___] : with [unfilled] wasted [de-identified] : Procedural note for Botox injection:\par  \par  The procedure was explained in detail including associated risks and informed consent was obtained. Under sterile conditions the following muscles were injected with Botox and a 2 mL dilution and with EMG guidance:\par  \par  Left tibialis posterior------------------- 100 units (4 sites)\par  Left medial gastrocnemius----------- 75 units (4 sites)\par  Left lateral gastrocnemius------------ 75 units (4 sites)\par  Left soleus-------------------------------- 50 units (2 sites)\par  Left semitendinosis---------------------110 units (4 sites)\par  left semimembranosis------------------100 units (4 sites)\par  Left popliteus-----------------------------70 units (3 sites)\par  \par  Total --------------------------------------580 units

## 2024-06-14 NOTE — PATIENT PROFILE ADULT - VISION (WITH CORRECTIVE LENSES IF THE PATIENT USUALLY WEARS THEM):
Normal vision: sees adequately in most situations; can see medication labels, newsprint
Pertinent PMH/PSH/FHx/SHx and Review of Systems contained within:  Patient presents to the ED for bilateral lower abd pain worsening over the past few days.  Recently evaluated for appy.  PMH of autism.  Otherwise baseline. Does have some associated nausea.  no trauma.  Non toxic.  Well appearing. No testicular pain, No fever/chills,  No chest pain/palpitations, no SOB/cough/wheeze/stridor,  No diarrhea, no dysuria/frequency/discharge, No neck/back pain, no rash, no changes in neurological status/function.

## 2024-07-01 ENCOUNTER — APPOINTMENT (OUTPATIENT)
Dept: UROLOGY | Facility: CLINIC | Age: 79
End: 2024-07-01
Payer: MEDICARE

## 2024-07-01 PROCEDURE — 51703 INSERT BLADDER CATH COMPLEX: CPT

## 2024-07-08 NOTE — PHYSICAL THERAPY INITIAL EVALUATION ADULT - PHYSICAL ASSIST/NONPHYSICAL ASSIST: SUPINE/SIT, REHAB EVAL
Detail Level: Generalized Detail Level: Zone Detail Level: Detailed 1 person assist/nonverbal cues (demo/gestures)/verbal cues

## 2024-07-29 ENCOUNTER — APPOINTMENT (OUTPATIENT)
Dept: UROLOGY | Facility: CLINIC | Age: 79
End: 2024-07-29
Payer: MEDICARE

## 2024-07-29 VITALS
SYSTOLIC BLOOD PRESSURE: 104 MMHG | BODY MASS INDEX: 18.82 KG/M2 | HEIGHT: 73 IN | DIASTOLIC BLOOD PRESSURE: 70 MMHG | WEIGHT: 142 LBS | HEART RATE: 92 BPM | TEMPERATURE: 98 F | OXYGEN SATURATION: 96 % | RESPIRATION RATE: 15 BRPM

## 2024-07-29 DIAGNOSIS — R33.9 RETENTION OF URINE, UNSPECIFIED: ICD-10-CM

## 2024-07-29 PROCEDURE — 51703 INSERT BLADDER CATH COMPLEX: CPT

## 2024-08-26 ENCOUNTER — APPOINTMENT (OUTPATIENT)
Dept: UROLOGY | Facility: CLINIC | Age: 79
End: 2024-08-26
Payer: MEDICARE

## 2024-08-26 VITALS
HEIGHT: 73 IN | BODY MASS INDEX: 18.82 KG/M2 | HEART RATE: 87 BPM | SYSTOLIC BLOOD PRESSURE: 118 MMHG | DIASTOLIC BLOOD PRESSURE: 78 MMHG | WEIGHT: 142 LBS

## 2024-08-26 DIAGNOSIS — R33.9 RETENTION OF URINE, UNSPECIFIED: ICD-10-CM

## 2024-08-26 PROCEDURE — 51703 INSERT BLADDER CATH COMPLEX: CPT

## 2024-08-26 RX ORDER — CEFPODOXIME PROXETIL 200 MG/1
200 TABLET, FILM COATED ORAL
Qty: 14 | Refills: 0 | Status: ACTIVE | COMMUNITY
Start: 2024-08-26 | End: 1900-01-01

## 2024-08-29 LAB — BACTERIA UR CULT: NORMAL

## 2024-09-17 ENCOUNTER — APPOINTMENT (OUTPATIENT)
Dept: UROLOGY | Facility: CLINIC | Age: 79
End: 2024-09-17

## 2024-09-17 ENCOUNTER — APPOINTMENT (OUTPATIENT)
Dept: UROLOGY | Facility: CLINIC | Age: 79
End: 2024-09-17
Payer: MEDICARE

## 2024-09-17 PROCEDURE — 51703 INSERT BLADDER CATH COMPLEX: CPT

## 2024-09-25 ENCOUNTER — NON-APPOINTMENT (OUTPATIENT)
Age: 79
End: 2024-09-25

## 2024-09-25 ENCOUNTER — APPOINTMENT (OUTPATIENT)
Dept: PHYSICAL MEDICINE AND REHAB | Facility: CLINIC | Age: 79
End: 2024-09-25
Payer: MEDICARE

## 2024-09-25 DIAGNOSIS — G24.8 OTHER DYSTONIA: ICD-10-CM

## 2024-09-25 PROCEDURE — 95874 GUIDE NERV DESTR NEEDLE EMG: CPT

## 2024-09-25 PROCEDURE — 64644 CHEMODENERV 1 EXTREM 5/> MUS: CPT

## 2024-09-25 NOTE — PROCEDURE
[Consent] : consent was given by patient or guardian [Site Verification] : the injection site was verified [Post-Injection Instructions Provided] : post-injection instructions were provided [Total Units: ___] : [unfilled] units [Total Vials: ___] : [unfilled] vials were used [Total Waste: ___] : with [unfilled] wasted [de-identified] : Procedural note for Botox injection:  The procedure was explained in detail including associated risks and informed consent was obtained. Under sterile conditions the following muscles were injected with Botox and a 2 mL dilution and with EMG guidance:  Left tibialis posterior------------------- 100 units (4 sites) Left medial gastrocnemius----------- 100 units (4 sites) Left lateral gastrocnemius------------ 70 units (4 sites) Left soleus-------------------------------- 50 units (2 sites) Left semitendinosis---------------------110 units (4 sites) left semimembranosis------------------100 units (4 sites) Left popliteus-----------------------------70 units (3 sites)  Total --------------------------------------600 units

## 2024-09-26 RX ORDER — SODIUM CHLORIDE 9 MG/ML
0.9 INJECTION, SOLUTION INTRAMUSCULAR; INTRAVENOUS; SUBCUTANEOUS
Refills: 0 | Status: COMPLETED | OUTPATIENT
Start: 2024-09-26

## 2024-09-26 RX ORDER — ONABOTULINUMTOXINA 100 [USP'U]/1
100 INJECTION, POWDER, LYOPHILIZED, FOR SOLUTION INTRADERMAL; INTRAMUSCULAR
Qty: 1 | Refills: 0 | Status: COMPLETED | OUTPATIENT
Start: 2024-09-26

## 2024-09-26 RX ADMIN — ONABOTULINUMTOXINA 0 UNIT: 100 INJECTION, POWDER, LYOPHILIZED, FOR SOLUTION INTRADERMAL; INTRAMUSCULAR at 00:00

## 2024-09-26 RX ADMIN — SODIUM CHLORIDE 0 %: 9 INJECTION, SOLUTION INTRAMUSCULAR; INTRAVENOUS; SUBCUTANEOUS at 00:00

## 2024-10-15 ENCOUNTER — APPOINTMENT (OUTPATIENT)
Dept: UROLOGY | Facility: CLINIC | Age: 79
End: 2024-10-15
Payer: MEDICARE

## 2024-10-15 DIAGNOSIS — R32 UNSPECIFIED URINARY INCONTINENCE: ICD-10-CM

## 2024-10-15 PROCEDURE — 51702 INSERT TEMP BLADDER CATH: CPT

## 2024-10-15 PROCEDURE — 99213 OFFICE O/P EST LOW 20 MIN: CPT | Mod: 25

## 2024-11-12 ENCOUNTER — APPOINTMENT (OUTPATIENT)
Dept: UROLOGY | Facility: CLINIC | Age: 79
End: 2024-11-12
Payer: MEDICARE

## 2024-11-12 PROCEDURE — 51703 INSERT BLADDER CATH COMPLEX: CPT

## 2024-12-16 ENCOUNTER — APPOINTMENT (OUTPATIENT)
Dept: UROLOGY | Facility: CLINIC | Age: 79
End: 2024-12-16
Payer: MEDICARE

## 2024-12-16 VITALS — OXYGEN SATURATION: 95 % | SYSTOLIC BLOOD PRESSURE: 90 MMHG | DIASTOLIC BLOOD PRESSURE: 63 MMHG | HEART RATE: 93 BPM

## 2024-12-16 PROCEDURE — 51703 INSERT BLADDER CATH COMPLEX: CPT

## 2024-12-16 NOTE — DIETITIAN INITIAL EVALUATION ADULT. - OTHER INFO
Caller: ezekiel Rubin    Doctor: Jimenez    Reason for call: pt had a TFESI on 12/12 and she has been in awful pain since then.  Pt would like to know what she can do to help her pain.  Current pain is a constant 10/10.  No relief with tylenol.  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 Reports good appetite, eats 3 meals daily at home and sometimes snack between, however does not eat very large meals. Reports he has tried multiple oral nutrition supplements in the past including TwoCal and Ensure products, as well as Mike and he does not want any of these supplements here. States they make him feel full and impact his ability to complete his meals.  Therapeutic Diet PTA: none  Nutrition Supplements PTA: multivitamin  NKFA reported.    Pt UBW: ~134 lbs most recently, states this is "good" for him, he used to weigh less. He feels good about his ability to gain weight still if he really tries to. States the highest weight he has been that he can remember is a little over 150 lbs. He has always been thin.  Recent weight history per chart: 136.5 lbs (7/8/20), 126.9 lbs (6/30/20).    Pt reports good appetite and po intake here so far, consuming 100% of meals so far. Does not want double portion at meals, states he won't eat it, does not want additional snacks provided at meals- states he will select these on his menu as he desires them.  Pt denies chewing/swallowing difficulties.  Pt has no c/o nausea, vomiting, diarrhea, or constipation.     Nutrition education provided: reviewed importance of adequate protein-energy intake in setting of increased nutrient needs. Pt well aware and verbalizes understanding.

## 2024-12-18 NOTE — PATIENT PROFILE ADULT - FALL HARM RISK CONCLUSION
RN introduced self to patient and received patient consent for legal blood draw. Patient calm, polite, and cooperative with nursing and INGRIS. \"Vacuum not guaranteed after\" date verified by RN and kit within date. All kit components removed, patient identifiers verified and allergies confirmed, site cleansed with alcohol free povidone iodine, Tubes filled until cessation of flow into tube. Tubes gently inverted, and specimen seals placed over blood vial stopper, certificates of blood withdrawal completed and attached to tubes, tubes placed in monique in ziplock bag with excess air expelled and placed in box with DUI/D information sheet. Kit closed and sealed with tamper evident shipping seal. INGRIS portions completed by Acevedo. Kit hand delivered by RN to INGRIS Acevedo. Phlebotomy site hemostatic at time of patient departure, Patient calm, cooperative, and tolerated well.    Fall with Harm Risk

## 2024-12-26 NOTE — ED PROVIDER NOTE - TOBACCO USE
Pt called stating he got a letter about scheduling some appointments. I see in his chart he's due for a PSA and follow up with Dr. Lam. He was questioning why ongoing PSA after radiation and I explained we follow it closely for some time to make sure cancer isn't coming back. He verbalized his understanding and I transferred him to [facility] our IC, to schedule.     Carey Jacobs RN     Never smoker

## 2025-01-13 ENCOUNTER — APPOINTMENT (OUTPATIENT)
Dept: UROLOGY | Facility: CLINIC | Age: 80
End: 2025-01-13
Payer: MEDICARE

## 2025-01-13 PROCEDURE — 51703 INSERT BLADDER CATH COMPLEX: CPT

## 2025-02-10 ENCOUNTER — APPOINTMENT (OUTPATIENT)
Dept: UROLOGY | Facility: CLINIC | Age: 80
End: 2025-02-10
Payer: MEDICARE

## 2025-02-10 VITALS
SYSTOLIC BLOOD PRESSURE: 95 MMHG | HEART RATE: 100 BPM | RESPIRATION RATE: 15 BRPM | DIASTOLIC BLOOD PRESSURE: 63 MMHG | OXYGEN SATURATION: 95 %

## 2025-02-10 PROCEDURE — 51703 INSERT BLADDER CATH COMPLEX: CPT

## 2025-02-14 NOTE — DISCHARGE NOTE NURSING/CASE MANAGEMENT/SOCIAL WORK - NSTRANSFERBELONGINGSDISPO_GEN_A_NUR
Caller: Spenser Pascal    Relationship: Self    Best call back number: [phone]   What is the best time to reach you: ANY    Who are you requesting to speak with (clinical staff, provider,  specific staff member): CLAUS    What was the call regarding: PATIENT IS REQUESTING A CALL BACK FROM CLAUS IN REGARDS TO HIS RETURN TO WORK LETTER. PLEASE CONTACT PATIENT AT YOUR EARLIEST CONVENIENCE.    Is it okay if the provider responds through [employer]: PLEASE CALL     with patient

## 2025-02-24 ENCOUNTER — APPOINTMENT (OUTPATIENT)
Dept: PHYSICAL MEDICINE AND REHAB | Facility: CLINIC | Age: 80
End: 2025-02-24
Payer: MEDICARE

## 2025-02-24 DIAGNOSIS — G24.8 OTHER DYSTONIA: ICD-10-CM

## 2025-02-24 PROCEDURE — 95874 GUIDE NERV DESTR NEEDLE EMG: CPT

## 2025-02-24 PROCEDURE — 64644 CHEMODENERV 1 EXTREM 5/> MUS: CPT

## 2025-02-24 RX ORDER — ONABOTULINUMTOXINA 100 [USP'U]/1
100 INJECTION, POWDER, LYOPHILIZED, FOR SOLUTION INTRADERMAL; INTRAMUSCULAR
Qty: 1 | Refills: 0 | Status: COMPLETED | OUTPATIENT
Start: 2025-02-24

## 2025-02-24 RX ADMIN — ONABOTULINUMTOXINA 0 UNIT: 100 INJECTION, POWDER, LYOPHILIZED, FOR SOLUTION INTRADERMAL; INTRAMUSCULAR at 00:00

## 2025-03-10 ENCOUNTER — APPOINTMENT (OUTPATIENT)
Dept: UROLOGY | Facility: CLINIC | Age: 80
End: 2025-03-10
Payer: MEDICARE

## 2025-03-10 PROCEDURE — 51703 INSERT BLADDER CATH COMPLEX: CPT

## 2025-03-31 ENCOUNTER — APPOINTMENT (OUTPATIENT)
Dept: PHYSICAL MEDICINE AND REHAB | Facility: CLINIC | Age: 80
End: 2025-03-31

## 2025-04-22 ENCOUNTER — NON-APPOINTMENT (OUTPATIENT)
Age: 80
End: 2025-04-22

## 2025-04-30 ENCOUNTER — APPOINTMENT (OUTPATIENT)
Dept: UROLOGY | Facility: CLINIC | Age: 80
End: 2025-04-30
Payer: MEDICARE

## 2025-04-30 VITALS
OXYGEN SATURATION: 96 % | RESPIRATION RATE: 16 BRPM | DIASTOLIC BLOOD PRESSURE: 63 MMHG | SYSTOLIC BLOOD PRESSURE: 96 MMHG | HEART RATE: 95 BPM

## 2025-04-30 DIAGNOSIS — Z97.8 PRESENCE OF OTHER SPECIFIED DEVICES: ICD-10-CM

## 2025-04-30 PROCEDURE — G2211 COMPLEX E/M VISIT ADD ON: CPT

## 2025-04-30 PROCEDURE — 99214 OFFICE O/P EST MOD 30 MIN: CPT

## 2025-05-05 ENCOUNTER — APPOINTMENT (OUTPATIENT)
Dept: UROLOGY | Facility: CLINIC | Age: 80
End: 2025-05-05
Payer: MEDICARE

## 2025-05-05 ENCOUNTER — NON-APPOINTMENT (OUTPATIENT)
Age: 80
End: 2025-05-05

## 2025-05-05 VITALS
HEART RATE: 93 BPM | HEIGHT: 73 IN | RESPIRATION RATE: 17 BRPM | DIASTOLIC BLOOD PRESSURE: 68 MMHG | BODY MASS INDEX: 18.82 KG/M2 | SYSTOLIC BLOOD PRESSURE: 103 MMHG | WEIGHT: 142 LBS | TEMPERATURE: 97.9 F

## 2025-05-05 DIAGNOSIS — N39.0 URINARY TRACT INFECTION, SITE NOT SPECIFIED: ICD-10-CM

## 2025-05-05 DIAGNOSIS — N40.1 BENIGN PROSTATIC HYPERPLASIA WITH LOWER URINARY TRACT SYMPMS: ICD-10-CM

## 2025-05-05 DIAGNOSIS — N20.1 CALCULUS OF URETER: ICD-10-CM

## 2025-05-05 DIAGNOSIS — N13.8 BENIGN PROSTATIC HYPERPLASIA WITH LOWER URINARY TRACT SYMPMS: ICD-10-CM

## 2025-05-05 DIAGNOSIS — R33.9 RETENTION OF URINE, UNSPECIFIED: ICD-10-CM

## 2025-05-05 DIAGNOSIS — N20.0 CALCULUS OF KIDNEY: ICD-10-CM

## 2025-05-05 PROCEDURE — 99215 OFFICE O/P EST HI 40 MIN: CPT

## 2025-05-11 PROBLEM — N20.1 URETERAL STONE: Status: ACTIVE | Noted: 2025-05-11

## 2025-05-11 PROBLEM — N20.0 NEPHROLITHIASIS: Status: ACTIVE | Noted: 2025-05-11

## 2025-05-14 ENCOUNTER — APPOINTMENT (OUTPATIENT)
Dept: UROLOGY | Facility: CLINIC | Age: 80
End: 2025-05-14
Payer: MEDICARE

## 2025-05-14 ENCOUNTER — OUTPATIENT (OUTPATIENT)
Dept: OUTPATIENT SERVICES | Facility: HOSPITAL | Age: 80
LOS: 1 days | End: 2025-05-14
Payer: MEDICARE

## 2025-05-14 VITALS — DIASTOLIC BLOOD PRESSURE: 67 MMHG | HEART RATE: 108 BPM | SYSTOLIC BLOOD PRESSURE: 102 MMHG | RESPIRATION RATE: 16 BRPM

## 2025-05-14 DIAGNOSIS — N20.1 CALCULUS OF URETER: ICD-10-CM

## 2025-05-14 DIAGNOSIS — N13.8 BENIGN PROSTATIC HYPERPLASIA WITH LOWER URINARY TRACT SYMPMS: ICD-10-CM

## 2025-05-14 DIAGNOSIS — G20.A1 PARKINSON'S DISEASE WITHOUT DYSKINESIA, WITHOUT MENTION OF FLUCTUATIONS: ICD-10-CM

## 2025-05-14 DIAGNOSIS — R35.0 FREQUENCY OF MICTURITION: ICD-10-CM

## 2025-05-14 DIAGNOSIS — N20.0 CALCULUS OF KIDNEY: ICD-10-CM

## 2025-05-14 DIAGNOSIS — N40.1 BENIGN PROSTATIC HYPERPLASIA WITH LOWER URINARY TRACT SYMPMS: ICD-10-CM

## 2025-05-14 DIAGNOSIS — R33.9 RETENTION OF URINE, UNSPECIFIED: ICD-10-CM

## 2025-05-14 PROCEDURE — 51705 CHANGE OF BLADDER TUBE: CPT

## 2025-05-14 PROCEDURE — 99214 OFFICE O/P EST MOD 30 MIN: CPT | Mod: 25

## 2025-05-14 PROCEDURE — C2627: CPT

## 2025-05-19 DIAGNOSIS — R33.9 RETENTION OF URINE, UNSPECIFIED: ICD-10-CM

## 2025-05-19 DIAGNOSIS — G20.A1 PARKINSON'S DISEASE WITHOUT DYSKINESIA, WITHOUT MENTION OF FLUCTUATIONS: ICD-10-CM

## 2025-05-19 DIAGNOSIS — N20.1 CALCULUS OF URETER: ICD-10-CM

## 2025-05-19 DIAGNOSIS — N20.0 CALCULUS OF KIDNEY: ICD-10-CM

## 2025-05-19 DIAGNOSIS — N40.1 BENIGN PROSTATIC HYPERPLASIA WITH LOWER URINARY TRACT SYMPTOMS: ICD-10-CM

## 2025-05-22 NOTE — ED ADULT NURSE NOTE - NSIMPLEMENTINTERV_GEN_ALL_ED
TAKE ONE TABLET BY MOUTH EVERY DAY    losartan (COZAAR) 25 MG tablet TAKE ONE TABLET BY MOUTH DAILY FOR BLOOD PRESSURE    Lutein 20 MG TABS Take 20 mg by mouth daily    vitamin E 400 UNIT capsule Take 1 capsule by mouth daily     No current facility-administered medications for this visit.     Past Medical History:   Diagnosis Date    GERD (gastroesophageal reflux disease)     Hypercholesterolemia     Hypertension     Osteopenia      Social History     Tobacco Use    Smoking status: Never    Smokeless tobacco: Never   Substance Use Topics    Alcohol use: No     Past Surgical History:   Procedure Laterality Date    ESOPHAGUS SURGERY      OTHER SURGICAL HISTORY  2016    esophal stretch     Family History   Problem Relation Age of Onset    Hypertension Father     Heart Attack Father 60    Heart Surgery Father 87        stents and valves    Hearing Loss Father     Breast Cancer Mother 86    Heart Surgery Mother 85        ablation    Hypertension Mother     Cataracts Mother     Cancer Mother         ROS:    Review of Systems   Constitutional:  Negative for chills and fever.   HENT:  Negative for congestion, facial swelling, hearing loss, nosebleeds, sinus pressure, sinus pain and sneezing.    Eyes:  Negative for pain, discharge and itching.   Respiratory:  Negative for apnea, cough, choking, shortness of breath, wheezing and stridor.    Cardiovascular:  Negative for chest pain.   Gastrointestinal:  Negative for constipation, diarrhea and nausea.   Endocrine: Negative for polyuria.   Genitourinary:  Negative for difficulty urinating and flank pain.   Musculoskeletal:  Negative for arthralgias, myalgias and neck stiffness.   Skin:  Negative for rash and wound.   Neurological:  Negative for dizziness, facial asymmetry and headaches.   Hematological:  Negative for adenopathy. Does not bruise/bleed easily.   Psychiatric/Behavioral:  Negative for agitation, behavioral problems and confusion.           PHYSICAL EXAM:    Resp 16  Implemented All Fall with Harm Risk Interventions:  [place] to call system. Call bell, personal items and telephone within reach. Instruct patient to call for assistance. Room bathroom lighting operational. Non-slip footwear when patient is off stretcher. Physically safe environment: no spills, clutter or unnecessary equipment. Stretcher in lowest position, wheels locked, appropriate side rails in place. Provide visual cue, wrist band, yellow gown, etc. Monitor gait and stability. Monitor for mental status changes and reorient to person, place, and time. Review medications for side effects contributing to fall risk. Reinforce activity limits and safety measures with patient and family. Provide visual clues: red socks.

## 2025-05-28 ENCOUNTER — APPOINTMENT (OUTPATIENT)
Dept: PHYSICAL MEDICINE AND REHAB | Facility: CLINIC | Age: 80
End: 2025-05-28

## 2025-06-10 ENCOUNTER — APPOINTMENT (OUTPATIENT)
Dept: UROLOGY | Facility: CLINIC | Age: 80
End: 2025-06-10

## 2025-06-11 ENCOUNTER — OUTPATIENT (OUTPATIENT)
Dept: OUTPATIENT SERVICES | Facility: HOSPITAL | Age: 80
LOS: 1 days | End: 2025-06-11
Payer: MEDICARE

## 2025-06-11 ENCOUNTER — APPOINTMENT (OUTPATIENT)
Dept: UROLOGY | Facility: CLINIC | Age: 80
End: 2025-06-11
Payer: MEDICARE

## 2025-06-11 DIAGNOSIS — R35.0 FREQUENCY OF MICTURITION: ICD-10-CM

## 2025-06-11 PROCEDURE — 51702 INSERT TEMP BLADDER CATH: CPT

## 2025-06-13 DIAGNOSIS — R33.9 RETENTION OF URINE, UNSPECIFIED: ICD-10-CM

## 2025-06-13 NOTE — PHYSICAL THERAPY INITIAL EVALUATION ADULT - THERAPY FREQUENCY, PT EVAL
Lleve un registro de haque presion pepper las proximas dos semanas y llévelo a haque proxima binu. Tomese la presion arterial al despertarse por la mañana. Vuelva a medirla 3 horas despues de jennifer amlodipine 2.5mg.   
3-5x/week

## 2025-07-02 ENCOUNTER — APPOINTMENT (OUTPATIENT)
Dept: UROLOGY | Facility: CLINIC | Age: 80
End: 2025-07-02
Payer: MEDICARE

## 2025-07-02 ENCOUNTER — OUTPATIENT (OUTPATIENT)
Dept: OUTPATIENT SERVICES | Facility: HOSPITAL | Age: 80
LOS: 1 days | End: 2025-07-02
Payer: MEDICARE

## 2025-07-02 VITALS
WEIGHT: 145 LBS | BODY MASS INDEX: 19.22 KG/M2 | OXYGEN SATURATION: 97 % | SYSTOLIC BLOOD PRESSURE: 109 MMHG | HEART RATE: 94 BPM | HEIGHT: 73 IN | RESPIRATION RATE: 16 BRPM | DIASTOLIC BLOOD PRESSURE: 72 MMHG

## 2025-07-02 DIAGNOSIS — R35.0 FREQUENCY OF MICTURITION: ICD-10-CM

## 2025-07-02 PROCEDURE — 51703 INSERT BLADDER CATH COMPLEX: CPT

## 2025-07-03 DIAGNOSIS — R33.9 RETENTION OF URINE, UNSPECIFIED: ICD-10-CM

## 2025-07-07 LAB — BACTERIA UR CULT: NORMAL

## 2025-07-30 ENCOUNTER — APPOINTMENT (OUTPATIENT)
Dept: UROLOGY | Facility: CLINIC | Age: 80
End: 2025-07-30
Payer: MEDICARE

## 2025-07-30 ENCOUNTER — OUTPATIENT (OUTPATIENT)
Dept: OUTPATIENT SERVICES | Facility: HOSPITAL | Age: 80
LOS: 1 days | End: 2025-07-30
Payer: MEDICARE

## 2025-07-30 VITALS — RESPIRATION RATE: 19 BRPM | DIASTOLIC BLOOD PRESSURE: 77 MMHG | HEART RATE: 90 BPM | SYSTOLIC BLOOD PRESSURE: 133 MMHG

## 2025-07-30 DIAGNOSIS — R35.0 FREQUENCY OF MICTURITION: ICD-10-CM

## 2025-07-30 DIAGNOSIS — G20.A1 PARKINSON'S DISEASE WITHOUT DYSKINESIA, WITHOUT MENTION OF FLUCTUATIONS: ICD-10-CM

## 2025-07-30 PROCEDURE — 51703 INSERT BLADDER CATH COMPLEX: CPT

## 2025-07-31 DIAGNOSIS — R33.9 RETENTION OF URINE, UNSPECIFIED: ICD-10-CM

## 2025-07-31 DIAGNOSIS — G20.A1 PARKINSON'S DISEASE WITHOUT DYSKINESIA, WITHOUT MENTION OF FLUCTUATIONS: ICD-10-CM

## 2025-07-31 DIAGNOSIS — N40.1 BENIGN PROSTATIC HYPERPLASIA WITH LOWER URINARY TRACT SYMPTOMS: ICD-10-CM

## 2025-07-31 NOTE — ED ADULT TRIAGE NOTE - CCCP TRG CHIEF CMPLNT
eye edema /s/p fall
Bed/Stretcher in lowest position, wheels locked, appropriate side rails in place/Call bell, personal items and telephone in reach/Instruct patient to call for assistance before getting out of bed/chair/stretcher/Non-slip footwear applied when patient is off stretcher/Center to call system/Physically safe environment - no spills, clutter or unnecessary equipment/Purposeful proactive rounding/Room/bathroom lighting operational, light cord in reach

## 2025-08-11 ENCOUNTER — NON-APPOINTMENT (OUTPATIENT)
Age: 80
End: 2025-08-11

## 2025-08-13 ENCOUNTER — APPOINTMENT (OUTPATIENT)
Dept: UROLOGY | Facility: CLINIC | Age: 80
End: 2025-08-13
Payer: MEDICARE

## 2025-08-13 ENCOUNTER — APPOINTMENT (OUTPATIENT)
Dept: CT IMAGING | Facility: IMAGING CENTER | Age: 80
End: 2025-08-13
Payer: MEDICARE

## 2025-08-13 ENCOUNTER — OUTPATIENT (OUTPATIENT)
Dept: OUTPATIENT SERVICES | Facility: HOSPITAL | Age: 80
LOS: 1 days | End: 2025-08-13
Payer: MEDICARE

## 2025-08-13 VITALS
HEART RATE: 94 BPM | RESPIRATION RATE: 18 BRPM | SYSTOLIC BLOOD PRESSURE: 117 MMHG | DIASTOLIC BLOOD PRESSURE: 63 MMHG | OXYGEN SATURATION: 98 % | TEMPERATURE: 98 F

## 2025-08-13 DIAGNOSIS — N13.8 BENIGN PROSTATIC HYPERPLASIA WITH LOWER URINARY TRACT SYMPMS: ICD-10-CM

## 2025-08-13 DIAGNOSIS — N40.1 BENIGN PROSTATIC HYPERPLASIA WITH LOWER URINARY TRACT SYMPMS: ICD-10-CM

## 2025-08-13 DIAGNOSIS — R33.9 RETENTION OF URINE, UNSPECIFIED: ICD-10-CM

## 2025-08-13 DIAGNOSIS — R35.0 FREQUENCY OF MICTURITION: ICD-10-CM

## 2025-08-13 DIAGNOSIS — N20.0 CALCULUS OF KIDNEY: ICD-10-CM

## 2025-08-13 PROCEDURE — 51703 INSERT BLADDER CATH COMPLEX: CPT

## 2025-08-13 PROCEDURE — 74176 CT ABD & PELVIS W/O CONTRAST: CPT | Mod: 26

## 2025-08-13 PROCEDURE — 74176 CT ABD & PELVIS W/O CONTRAST: CPT

## 2025-08-14 DIAGNOSIS — R33.9 RETENTION OF URINE, UNSPECIFIED: ICD-10-CM

## 2025-08-14 DIAGNOSIS — N40.1 BENIGN PROSTATIC HYPERPLASIA WITH LOWER URINARY TRACT SYMPTOMS: ICD-10-CM

## 2025-08-27 ENCOUNTER — APPOINTMENT (OUTPATIENT)
Dept: UROLOGY | Facility: CLINIC | Age: 80
End: 2025-08-27

## 2025-09-03 ENCOUNTER — OUTPATIENT (OUTPATIENT)
Dept: OUTPATIENT SERVICES | Facility: HOSPITAL | Age: 80
LOS: 1 days | End: 2025-09-03
Payer: MEDICARE

## 2025-09-03 ENCOUNTER — APPOINTMENT (OUTPATIENT)
Dept: UROLOGY | Facility: CLINIC | Age: 80
End: 2025-09-03
Payer: MEDICARE

## 2025-09-03 VITALS
TEMPERATURE: 97.8 F | RESPIRATION RATE: 16 BRPM | DIASTOLIC BLOOD PRESSURE: 68 MMHG | HEART RATE: 80 BPM | OXYGEN SATURATION: 99 % | SYSTOLIC BLOOD PRESSURE: 107 MMHG

## 2025-09-03 DIAGNOSIS — R35.0 FREQUENCY OF MICTURITION: ICD-10-CM

## 2025-09-03 DIAGNOSIS — G20.A1 PARKINSON'S DISEASE WITHOUT DYSKINESIA, WITHOUT MENTION OF FLUCTUATIONS: ICD-10-CM

## 2025-09-03 DIAGNOSIS — N20.0 CALCULUS OF KIDNEY: ICD-10-CM

## 2025-09-03 DIAGNOSIS — N13.8 BENIGN PROSTATIC HYPERPLASIA WITH LOWER URINARY TRACT SYMPMS: ICD-10-CM

## 2025-09-03 DIAGNOSIS — Z97.8 PRESENCE OF OTHER SPECIFIED DEVICES: ICD-10-CM

## 2025-09-03 DIAGNOSIS — N40.1 BENIGN PROSTATIC HYPERPLASIA WITH LOWER URINARY TRACT SYMPMS: ICD-10-CM

## 2025-09-03 PROCEDURE — 51702 INSERT TEMP BLADDER CATH: CPT

## 2025-09-03 PROCEDURE — 99212 OFFICE O/P EST SF 10 MIN: CPT | Mod: 25

## 2025-09-04 DIAGNOSIS — N40.1 BENIGN PROSTATIC HYPERPLASIA WITH LOWER URINARY TRACT SYMPTOMS: ICD-10-CM

## 2025-09-04 DIAGNOSIS — Z97.8 PRESENCE OF OTHER SPECIFIED DEVICES: ICD-10-CM

## 2025-09-04 DIAGNOSIS — N20.0 CALCULUS OF KIDNEY: ICD-10-CM

## 2025-09-04 DIAGNOSIS — G20.A1 PARKINSON'S DISEASE WITHOUT DYSKINESIA, WITHOUT MENTION OF FLUCTUATIONS: ICD-10-CM
